# Patient Record
Sex: MALE | Race: WHITE | NOT HISPANIC OR LATINO | Employment: OTHER | URBAN - METROPOLITAN AREA
[De-identification: names, ages, dates, MRNs, and addresses within clinical notes are randomized per-mention and may not be internally consistent; named-entity substitution may affect disease eponyms.]

---

## 2017-04-27 ENCOUNTER — TRANSCRIBE ORDERS (OUTPATIENT)
Dept: ADMINISTRATIVE | Facility: HOSPITAL | Age: 62
End: 2017-04-27

## 2017-04-27 DIAGNOSIS — R91.1 LUNG NODULE: Primary | ICD-10-CM

## 2017-04-27 DIAGNOSIS — J44.9 CHRONIC OBSTRUCTIVE PULMONARY DISEASE, UNSPECIFIED COPD TYPE (HCC): ICD-10-CM

## 2017-05-17 ENCOUNTER — GENERIC CONVERSION - ENCOUNTER (OUTPATIENT)
Dept: OTHER | Facility: OTHER | Age: 62
End: 2017-05-17

## 2017-05-17 ENCOUNTER — HOSPITAL ENCOUNTER (OUTPATIENT)
Dept: PULMONOLOGY | Facility: HOSPITAL | Age: 62
Discharge: HOME | End: 2017-05-17
Attending: INTERNAL MEDICINE
Payer: COMMERCIAL

## 2017-05-17 DIAGNOSIS — J44.9 CHRONIC OBSTRUCTIVE PULMONARY DISEASE, UNSPECIFIED COPD TYPE (HCC): ICD-10-CM

## 2017-05-17 DIAGNOSIS — R91.1 LUNG NODULE: ICD-10-CM

## 2017-05-17 LAB
ARTERIAL PATENCY WRIST A: ABNORMAL
BASE EXCESS BLDA CALC-SCNC: 3 MMOL/L (ref -2–3)
CA-I BLD-SCNC: 1.02 MMOL/L (ref 1.12–1.32)
FIO2 GAS DIL.REBREATH: 0.21 L
GLUCOSE SERPL-MCNC: 116 MG/DL (ref 65–140)
HCO3 BLDA-SCNC: 27.7 MMOL/L (ref 22–28)
HCT VFR BLD CALC: 42 % (ref 36.5–49.3)
HGB BLDA-MCNC: 14.3 G/DL (ref 12–17)
PCO2 BLD: 29 MMOL/L (ref 21–32)
PCO2 BLD: 40.9 MM HG (ref 36–44)
PH BLD: 7.44 [PH] (ref 7.35–7.45)
PO2 BLD: 67 MM HG (ref 75–129)
POTASSIUM BLD-SCNC: 3.1 MMOL/L (ref 3.5–5.3)
SAMPLE SITE: ABNORMAL
SAO2 % BLD FROM PO2: 94 % (ref 95–98)
SODIUM BLD-SCNC: 144 MMOL/L (ref 136–145)
SPECIMEN SOURCE: ABNORMAL

## 2017-05-17 PROCEDURE — 94060 EVALUATION OF WHEEZING: CPT

## 2017-05-17 PROCEDURE — 94729 DIFFUSING CAPACITY: CPT

## 2017-05-17 PROCEDURE — 85014 HEMATOCRIT: CPT

## 2017-05-17 PROCEDURE — 82803 BLOOD GASES ANY COMBINATION: CPT

## 2017-05-17 PROCEDURE — 82947 ASSAY GLUCOSE BLOOD QUANT: CPT

## 2017-05-17 PROCEDURE — 84295 ASSAY OF SERUM SODIUM: CPT

## 2017-05-17 PROCEDURE — 36600 WITHDRAWAL OF ARTERIAL BLOOD: CPT

## 2017-05-17 PROCEDURE — 82330 ASSAY OF CALCIUM: CPT

## 2017-05-17 PROCEDURE — 84132 ASSAY OF SERUM POTASSIUM: CPT

## 2017-05-17 PROCEDURE — 94760 N-INVAS EAR/PLS OXIMETRY 1: CPT

## 2017-05-17 PROCEDURE — 94726 PLETHYSMOGRAPHY LUNG VOLUMES: CPT

## 2017-05-17 RX ORDER — ALBUTEROL SULFATE 2.5 MG/3ML
2.5 SOLUTION RESPIRATORY (INHALATION) ONCE
Status: COMPLETED | OUTPATIENT
Start: 2017-05-17 | End: 2017-05-17

## 2017-05-17 RX ADMIN — ALBUTEROL SULFATE 2.5 MG: 2.5 SOLUTION RESPIRATORY (INHALATION) at 15:02

## 2017-06-28 ENCOUNTER — HOSPITAL ENCOUNTER (INPATIENT)
Facility: HOSPITAL | Age: 62
LOS: 3 days | Discharge: HOME | DRG: 371 | End: 2017-07-01
Attending: EMERGENCY MEDICINE | Admitting: FAMILY MEDICINE
Payer: COMMERCIAL

## 2017-06-28 DIAGNOSIS — N28.9 RENAL INSUFFICIENCY: Primary | ICD-10-CM

## 2017-06-28 DIAGNOSIS — E87.2 METABOLIC ACIDOSIS: ICD-10-CM

## 2017-06-28 DIAGNOSIS — K50.90: ICD-10-CM

## 2017-06-28 DIAGNOSIS — E87.6 HYPOKALEMIA: ICD-10-CM

## 2017-06-28 PROBLEM — E87.1 HYPONATREMIA: Status: ACTIVE | Noted: 2017-06-28

## 2017-06-28 PROBLEM — N17.9 AKI (ACUTE KIDNEY INJURY) (HCC): Status: ACTIVE | Noted: 2017-06-28

## 2017-06-28 PROBLEM — E86.0 DEHYDRATION: Status: ACTIVE | Noted: 2017-06-28

## 2017-06-28 LAB
ALBUMIN SERPL BCP-MCNC: 3.7 G/DL (ref 3.5–5)
ALP SERPL-CCNC: 74 U/L (ref 46–116)
ALT SERPL W P-5'-P-CCNC: 55 U/L (ref 12–78)
ANION GAP SERPL CALCULATED.3IONS-SCNC: 20 MMOL/L (ref 4–13)
AST SERPL W P-5'-P-CCNC: 39 U/L (ref 5–45)
BASE EX.OXY STD BLDV CALC-SCNC: 49.4 % (ref 60–80)
BASE EXCESS BLDV CALC-SCNC: -2.1 MMOL/L
BASOPHILS # BLD AUTO: 0.04 THOUSANDS/ΜL (ref 0–0.1)
BASOPHILS NFR BLD AUTO: 1 % (ref 0–1)
BILIRUB SERPL-MCNC: 0.9 MG/DL (ref 0.2–1)
BUN SERPL-MCNC: 48 MG/DL (ref 5–25)
CALCIUM SERPL-MCNC: 7.9 MG/DL (ref 8.3–10.1)
CHLORIDE SERPL-SCNC: 83 MMOL/L (ref 100–108)
CO2 SERPL-SCNC: 26 MMOL/L (ref 21–32)
CREAT SERPL-MCNC: 3.38 MG/DL (ref 0.6–1.3)
EOSINOPHIL # BLD AUTO: 0.03 THOUSAND/ΜL (ref 0–0.61)
EOSINOPHIL NFR BLD AUTO: 0 % (ref 0–6)
ERYTHROCYTE [DISTWIDTH] IN BLOOD BY AUTOMATED COUNT: 11.8 % (ref 11.6–15.1)
GFR SERPL CREATININE-BSD FRML MDRD: 18.6 ML/MIN/1.73SQ M
GLUCOSE SERPL-MCNC: 121 MG/DL (ref 65–140)
HCO3 BLDV-SCNC: 26.4 MMOL/L (ref 24–30)
HCT VFR BLD AUTO: 50.8 % (ref 36.5–49.3)
HGB BLD-MCNC: 18.8 G/DL (ref 12–17)
LACTATE SERPL-SCNC: 3.1 MMOL/L (ref 0.5–2)
LACTATE SERPL-SCNC: 4.6 MMOL/L (ref 0.5–2)
LYMPHOCYTES # BLD AUTO: 1.93 THOUSANDS/ΜL (ref 0.6–4.47)
LYMPHOCYTES NFR BLD AUTO: 23 % (ref 14–44)
MAGNESIUM SERPL-MCNC: 0.9 MG/DL (ref 1.6–2.6)
MCH RBC QN AUTO: 33.9 PG (ref 26.8–34.3)
MCHC RBC AUTO-ENTMCNC: 37 G/DL (ref 31.4–37.4)
MCV RBC AUTO: 92 FL (ref 82–98)
MONOCYTES # BLD AUTO: 1.04 THOUSAND/ΜL (ref 0.17–1.22)
MONOCYTES NFR BLD AUTO: 12 % (ref 4–12)
NEUTROPHILS # BLD AUTO: 5.35 THOUSANDS/ΜL (ref 1.85–7.62)
NEUTS SEG NFR BLD AUTO: 64 % (ref 43–75)
O2 CT BLDV-SCNC: 12.7 ML/DL
PCO2 BLDV: 59.1 MM HG (ref 42–50)
PH BLDV: 7.27 [PH] (ref 7.3–7.4)
PLATELET # BLD AUTO: 228 THOUSANDS/UL (ref 149–390)
PMV BLD AUTO: 12 FL (ref 8.9–12.7)
PO2 BLDV: 33.1 MM HG (ref 35–45)
POTASSIUM SERPL-SCNC: 2.6 MMOL/L (ref 3.5–5.3)
PROT SERPL-MCNC: 8.3 G/DL (ref 6.4–8.2)
RBC # BLD AUTO: 5.55 MILLION/UL (ref 3.88–5.62)
SODIUM SERPL-SCNC: 129 MMOL/L (ref 136–145)
WBC # BLD AUTO: 8.39 THOUSAND/UL (ref 4.31–10.16)

## 2017-06-28 PROCEDURE — 80053 COMPREHEN METABOLIC PANEL: CPT | Performed by: EMERGENCY MEDICINE

## 2017-06-28 PROCEDURE — 96374 THER/PROPH/DIAG INJ IV PUSH: CPT

## 2017-06-28 PROCEDURE — 99285 EMERGENCY DEPT VISIT HI MDM: CPT

## 2017-06-28 PROCEDURE — 36415 COLL VENOUS BLD VENIPUNCTURE: CPT | Performed by: EMERGENCY MEDICINE

## 2017-06-28 PROCEDURE — 82805 BLOOD GASES W/O2 SATURATION: CPT | Performed by: EMERGENCY MEDICINE

## 2017-06-28 PROCEDURE — 83605 ASSAY OF LACTIC ACID: CPT | Performed by: EMERGENCY MEDICINE

## 2017-06-28 PROCEDURE — 96375 TX/PRO/DX INJ NEW DRUG ADDON: CPT

## 2017-06-28 PROCEDURE — 83735 ASSAY OF MAGNESIUM: CPT | Performed by: EMERGENCY MEDICINE

## 2017-06-28 PROCEDURE — 96361 HYDRATE IV INFUSION ADD-ON: CPT

## 2017-06-28 PROCEDURE — 85025 COMPLETE CBC W/AUTO DIFF WBC: CPT | Performed by: EMERGENCY MEDICINE

## 2017-06-28 RX ORDER — AMLODIPINE BESYLATE 2.5 MG/1
10 TABLET ORAL DAILY
COMMUNITY
End: 2020-11-24

## 2017-06-28 RX ORDER — ALBUTEROL SULFATE 90 UG/1
2 AEROSOL, METERED RESPIRATORY (INHALATION) EVERY 6 HOURS PRN
Status: ON HOLD | COMMUNITY
End: 2020-07-15

## 2017-06-28 RX ORDER — POTASSIUM CHLORIDE 14.9 MG/ML
20 INJECTION INTRAVENOUS ONCE
Status: COMPLETED | OUTPATIENT
Start: 2017-06-29 | End: 2017-06-29

## 2017-06-28 RX ORDER — OMEPRAZOLE 20 MG/1
20 CAPSULE, DELAYED RELEASE ORAL DAILY
COMMUNITY
End: 2017-12-06 | Stop reason: HOSPADM

## 2017-06-28 RX ORDER — POTASSIUM CHLORIDE 29.8 MG/ML
40 INJECTION INTRAVENOUS ONCE
Status: DISCONTINUED | OUTPATIENT
Start: 2017-06-28 | End: 2017-06-28 | Stop reason: SDUPTHER

## 2017-06-28 RX ORDER — MORPHINE SULFATE 10 MG/ML
6 INJECTION, SOLUTION INTRAMUSCULAR; INTRAVENOUS ONCE
Status: COMPLETED | OUTPATIENT
Start: 2017-06-28 | End: 2017-06-28

## 2017-06-28 RX ORDER — POTASSIUM CHLORIDE 14.9 MG/ML
20 INJECTION INTRAVENOUS ONCE
Status: COMPLETED | OUTPATIENT
Start: 2017-06-28 | End: 2017-06-29

## 2017-06-28 RX ADMIN — MORPHINE SULFATE 6 MG: 10 INJECTION, SOLUTION INTRAMUSCULAR; INTRAVENOUS at 21:07

## 2017-06-28 RX ADMIN — SODIUM CHLORIDE 1000 ML: 0.9 INJECTION, SOLUTION INTRAVENOUS at 21:05

## 2017-06-28 RX ADMIN — POTASSIUM CHLORIDE 20 MEQ: 200 INJECTION, SOLUTION INTRAVENOUS at 23:03

## 2017-06-29 PROBLEM — E87.6 HYPOKALEMIA: Chronic | Status: ACTIVE | Noted: 2017-06-28

## 2017-06-29 PROBLEM — R19.7 DIARRHEA: Status: ACTIVE | Noted: 2017-06-29

## 2017-06-29 PROBLEM — Z93.3 COLOSTOMY IN PLACE (HCC): Status: ACTIVE | Noted: 2017-06-29

## 2017-06-29 PROBLEM — E86.0 DEHYDRATION: Status: RESOLVED | Noted: 2017-06-28 | Resolved: 2017-06-29

## 2017-06-29 PROBLEM — E83.42 HYPOMAGNESEMIA: Status: ACTIVE | Noted: 2017-06-29

## 2017-06-29 LAB
ANION GAP SERPL CALCULATED.3IONS-SCNC: 10 MMOL/L (ref 4–13)
ANION GAP SERPL CALCULATED.3IONS-SCNC: 13 MMOL/L (ref 4–13)
ANION GAP SERPL CALCULATED.3IONS-SCNC: 14 MMOL/L (ref 4–13)
BASOPHILS # BLD AUTO: 0.03 THOUSANDS/ΜL (ref 0–0.1)
BASOPHILS NFR BLD AUTO: 0 % (ref 0–1)
BUN SERPL-MCNC: 30 MG/DL (ref 5–25)
BUN SERPL-MCNC: 37 MG/DL (ref 5–25)
BUN SERPL-MCNC: 41 MG/DL (ref 5–25)
C DIFF TOX GENS STL QL NAA+PROBE: NORMAL
CALCIUM SERPL-MCNC: 6.5 MG/DL (ref 8.3–10.1)
CALCIUM SERPL-MCNC: 6.9 MG/DL (ref 8.3–10.1)
CALCIUM SERPL-MCNC: 7 MG/DL (ref 8.3–10.1)
CHLORIDE SERPL-SCNC: 93 MMOL/L (ref 100–108)
CHLORIDE SERPL-SCNC: 94 MMOL/L (ref 100–108)
CHLORIDE SERPL-SCNC: 97 MMOL/L (ref 100–108)
CO2 SERPL-SCNC: 25 MMOL/L (ref 21–32)
CO2 SERPL-SCNC: 26 MMOL/L (ref 21–32)
CO2 SERPL-SCNC: 28 MMOL/L (ref 21–32)
CREAT SERPL-MCNC: 1.46 MG/DL (ref 0.6–1.3)
CREAT SERPL-MCNC: 1.77 MG/DL (ref 0.6–1.3)
CREAT SERPL-MCNC: 2.06 MG/DL (ref 0.6–1.3)
EOSINOPHIL # BLD AUTO: 0.06 THOUSAND/ΜL (ref 0–0.61)
EOSINOPHIL NFR BLD AUTO: 1 % (ref 0–6)
ERYTHROCYTE [DISTWIDTH] IN BLOOD BY AUTOMATED COUNT: 11.8 % (ref 11.6–15.1)
ERYTHROCYTE [DISTWIDTH] IN BLOOD BY AUTOMATED COUNT: 11.8 % (ref 11.6–15.1)
GFR SERPL CREATININE-BSD FRML MDRD: 33 ML/MIN/1.73SQ M
GFR SERPL CREATININE-BSD FRML MDRD: 39.3 ML/MIN/1.73SQ M
GFR SERPL CREATININE-BSD FRML MDRD: 49.1 ML/MIN/1.73SQ M
GLUCOSE SERPL-MCNC: 79 MG/DL (ref 65–140)
GLUCOSE SERPL-MCNC: 83 MG/DL (ref 65–140)
GLUCOSE SERPL-MCNC: 85 MG/DL (ref 65–140)
HCT VFR BLD AUTO: 43.6 % (ref 36.5–49.3)
HCT VFR BLD AUTO: 43.6 % (ref 36.5–49.3)
HGB BLD-MCNC: 15.9 G/DL (ref 12–17)
HGB BLD-MCNC: 15.9 G/DL (ref 12–17)
LACTATE SERPL-SCNC: 1.4 MMOL/L (ref 0.5–2)
LYMPHOCYTES # BLD AUTO: 2.17 THOUSANDS/ΜL (ref 0.6–4.47)
LYMPHOCYTES NFR BLD AUTO: 24 % (ref 14–44)
MAGNESIUM SERPL-MCNC: 1.4 MG/DL (ref 1.6–2.6)
MAGNESIUM SERPL-MCNC: 1.6 MG/DL (ref 1.6–2.6)
MCH RBC QN AUTO: 34 PG (ref 26.8–34.3)
MCH RBC QN AUTO: 34 PG (ref 26.8–34.3)
MCHC RBC AUTO-ENTMCNC: 36.5 G/DL (ref 31.4–37.4)
MCHC RBC AUTO-ENTMCNC: 36.5 G/DL (ref 31.4–37.4)
MCV RBC AUTO: 93 FL (ref 82–98)
MCV RBC AUTO: 93 FL (ref 82–98)
MONOCYTES # BLD AUTO: 1.26 THOUSAND/ΜL (ref 0.17–1.22)
MONOCYTES NFR BLD AUTO: 14 % (ref 4–12)
NEUTROPHILS # BLD AUTO: 5.58 THOUSANDS/ΜL (ref 1.85–7.62)
NEUTS SEG NFR BLD AUTO: 61 % (ref 43–75)
OSMOLALITY UR/SERPL-RTO: 286 MMOL/KG (ref 282–298)
OSMOLALITY UR: 448 MMOL/KG
PHOSPHATE SERPL-MCNC: 2.9 MG/DL (ref 2.3–4.1)
PHOSPHATE SERPL-MCNC: 5.2 MG/DL (ref 2.3–4.1)
PLATELET # BLD AUTO: 178 THOUSANDS/UL (ref 149–390)
PLATELET # BLD AUTO: 178 THOUSANDS/UL (ref 149–390)
PLATELET # BLD AUTO: 185 THOUSANDS/UL (ref 149–390)
PMV BLD AUTO: 11.8 FL (ref 8.9–12.7)
PMV BLD AUTO: 12 FL (ref 8.9–12.7)
PMV BLD AUTO: 12 FL (ref 8.9–12.7)
POTASSIUM SERPL-SCNC: 2.4 MMOL/L (ref 3.5–5.3)
POTASSIUM SERPL-SCNC: 2.5 MMOL/L (ref 3.5–5.3)
POTASSIUM SERPL-SCNC: 2.7 MMOL/L (ref 3.5–5.3)
RBC # BLD AUTO: 4.67 MILLION/UL (ref 3.88–5.62)
RBC # BLD AUTO: 4.67 MILLION/UL (ref 3.88–5.62)
SODIUM 24H UR-SCNC: 17 MOL/L
SODIUM SERPL-SCNC: 132 MMOL/L (ref 136–145)
SODIUM SERPL-SCNC: 133 MMOL/L (ref 136–145)
SODIUM SERPL-SCNC: 135 MMOL/L (ref 136–145)
TSH SERPL DL<=0.05 MIU/L-ACNC: 0.83 UIU/ML (ref 0.36–3.74)
TSH SERPL DL<=0.05 MIU/L-ACNC: 2.35 UIU/ML (ref 0.36–3.74)
WBC # BLD AUTO: 9.45 THOUSAND/UL (ref 4.31–10.16)
WBC # BLD AUTO: 9.45 THOUSAND/UL (ref 4.31–10.16)

## 2017-06-29 PROCEDURE — 85027 COMPLETE CBC AUTOMATED: CPT | Performed by: PHYSICIAN ASSISTANT

## 2017-06-29 PROCEDURE — 83605 ASSAY OF LACTIC ACID: CPT | Performed by: PHYSICIAN ASSISTANT

## 2017-06-29 PROCEDURE — 85025 COMPLETE CBC W/AUTO DIFF WBC: CPT | Performed by: FAMILY MEDICINE

## 2017-06-29 PROCEDURE — 83930 ASSAY OF BLOOD OSMOLALITY: CPT | Performed by: PHYSICIAN ASSISTANT

## 2017-06-29 PROCEDURE — 84100 ASSAY OF PHOSPHORUS: CPT | Performed by: FAMILY MEDICINE

## 2017-06-29 PROCEDURE — 83735 ASSAY OF MAGNESIUM: CPT | Performed by: PHYSICIAN ASSISTANT

## 2017-06-29 PROCEDURE — 83935 ASSAY OF URINE OSMOLALITY: CPT | Performed by: PHYSICIAN ASSISTANT

## 2017-06-29 PROCEDURE — 87077 CULTURE AEROBIC IDENTIFY: CPT | Performed by: PHYSICIAN ASSISTANT

## 2017-06-29 PROCEDURE — 85049 AUTOMATED PLATELET COUNT: CPT | Performed by: PHYSICIAN ASSISTANT

## 2017-06-29 PROCEDURE — 87493 C DIFF AMPLIFIED PROBE: CPT | Performed by: PHYSICIAN ASSISTANT

## 2017-06-29 PROCEDURE — 83735 ASSAY OF MAGNESIUM: CPT | Performed by: FAMILY MEDICINE

## 2017-06-29 PROCEDURE — 84100 ASSAY OF PHOSPHORUS: CPT | Performed by: PHYSICIAN ASSISTANT

## 2017-06-29 PROCEDURE — 87899 AGENT NOS ASSAY W/OPTIC: CPT | Performed by: PHYSICIAN ASSISTANT

## 2017-06-29 PROCEDURE — 87046 STOOL CULTR AEROBIC BACT EA: CPT | Performed by: PHYSICIAN ASSISTANT

## 2017-06-29 PROCEDURE — 84300 ASSAY OF URINE SODIUM: CPT | Performed by: PHYSICIAN ASSISTANT

## 2017-06-29 PROCEDURE — 80048 BASIC METABOLIC PNL TOTAL CA: CPT | Performed by: PHYSICIAN ASSISTANT

## 2017-06-29 PROCEDURE — 84443 ASSAY THYROID STIM HORMONE: CPT | Performed by: FAMILY MEDICINE

## 2017-06-29 PROCEDURE — 87045 FECES CULTURE AEROBIC BACT: CPT | Performed by: PHYSICIAN ASSISTANT

## 2017-06-29 PROCEDURE — 87015 SPECIMEN INFECT AGNT CONCNTJ: CPT | Performed by: PHYSICIAN ASSISTANT

## 2017-06-29 RX ORDER — ACETAMINOPHEN 325 MG/1
650 TABLET ORAL EVERY 6 HOURS PRN
Status: DISCONTINUED | OUTPATIENT
Start: 2017-06-29 | End: 2017-07-01 | Stop reason: HOSPADM

## 2017-06-29 RX ORDER — MAGNESIUM SULFATE HEPTAHYDRATE 40 MG/ML
2 INJECTION, SOLUTION INTRAVENOUS ONCE
Status: COMPLETED | OUTPATIENT
Start: 2017-06-29 | End: 2017-06-29

## 2017-06-29 RX ORDER — POTASSIUM CHLORIDE 20 MEQ/1
40 TABLET, EXTENDED RELEASE ORAL ONCE
Status: COMPLETED | OUTPATIENT
Start: 2017-06-29 | End: 2017-06-29

## 2017-06-29 RX ORDER — TEMAZEPAM 15 MG/1
15 CAPSULE ORAL
Status: DISCONTINUED | OUTPATIENT
Start: 2017-06-29 | End: 2017-07-01 | Stop reason: HOSPADM

## 2017-06-29 RX ORDER — POLYETHYLENE GLYCOL 3350 17 G/17G
238 POWDER, FOR SOLUTION ORAL ONCE
Status: COMPLETED | OUTPATIENT
Start: 2017-06-29 | End: 2017-06-29

## 2017-06-29 RX ORDER — SODIUM CHLORIDE 9 MG/ML
75 INJECTION, SOLUTION INTRAVENOUS CONTINUOUS
Status: DISCONTINUED | OUTPATIENT
Start: 2017-06-29 | End: 2017-07-01 | Stop reason: HOSPADM

## 2017-06-29 RX ORDER — POTASSIUM CHLORIDE 14.9 MG/ML
20 INJECTION INTRAVENOUS ONCE
Status: COMPLETED | OUTPATIENT
Start: 2017-06-29 | End: 2017-06-29

## 2017-06-29 RX ORDER — POTASSIUM CHLORIDE 14.9 MG/ML
20 INJECTION INTRAVENOUS
Status: COMPLETED | OUTPATIENT
Start: 2017-06-29 | End: 2017-06-30

## 2017-06-29 RX ORDER — ONDANSETRON 2 MG/ML
4 INJECTION INTRAMUSCULAR; INTRAVENOUS EVERY 6 HOURS PRN
Status: DISCONTINUED | OUTPATIENT
Start: 2017-06-29 | End: 2017-07-01 | Stop reason: HOSPADM

## 2017-06-29 RX ORDER — ALBUTEROL SULFATE 90 UG/1
2 AEROSOL, METERED RESPIRATORY (INHALATION) EVERY 6 HOURS PRN
Status: DISCONTINUED | OUTPATIENT
Start: 2017-06-29 | End: 2017-07-01 | Stop reason: HOSPADM

## 2017-06-29 RX ORDER — HEPARIN SODIUM 5000 [USP'U]/ML
5000 INJECTION, SOLUTION INTRAVENOUS; SUBCUTANEOUS EVERY 8 HOURS SCHEDULED
Status: DISCONTINUED | OUTPATIENT
Start: 2017-06-29 | End: 2017-07-01 | Stop reason: HOSPADM

## 2017-06-29 RX ORDER — AMLODIPINE BESYLATE 2.5 MG/1
2.5 TABLET ORAL DAILY
Status: DISCONTINUED | OUTPATIENT
Start: 2017-06-29 | End: 2017-07-01 | Stop reason: HOSPADM

## 2017-06-29 RX ORDER — POTASSIUM CHLORIDE 14.9 MG/ML
20 INJECTION INTRAVENOUS ONCE
Status: COMPLETED | OUTPATIENT
Start: 2017-06-30 | End: 2017-06-30

## 2017-06-29 RX ORDER — PANTOPRAZOLE SODIUM 40 MG/1
40 TABLET, DELAYED RELEASE ORAL
Status: DISCONTINUED | OUTPATIENT
Start: 2017-06-29 | End: 2017-07-01 | Stop reason: HOSPADM

## 2017-06-29 RX ADMIN — MAGNESIUM SULFATE HEPTAHYDRATE 2 G: 40 INJECTION, SOLUTION INTRAVENOUS at 02:23

## 2017-06-29 RX ADMIN — PANTOPRAZOLE SODIUM 40 MG: 40 TABLET, DELAYED RELEASE ORAL at 05:14

## 2017-06-29 RX ADMIN — BISACODYL 10 MG: 5 TABLET, COATED ORAL at 17:28

## 2017-06-29 RX ADMIN — HEPARIN SODIUM 5000 UNITS: 5000 INJECTION, SOLUTION INTRAVENOUS; SUBCUTANEOUS at 15:34

## 2017-06-29 RX ADMIN — SODIUM CHLORIDE 75 ML/HR: 0.9 INJECTION, SOLUTION INTRAVENOUS at 02:23

## 2017-06-29 RX ADMIN — FLUTICASONE PROPIONATE AND SALMETEROL 1 PUFF: 50; 100 POWDER RESPIRATORY (INHALATION) at 08:09

## 2017-06-29 RX ADMIN — FLUTICASONE PROPIONATE AND SALMETEROL 1 PUFF: 50; 100 POWDER RESPIRATORY (INHALATION) at 21:35

## 2017-06-29 RX ADMIN — HYDROMORPHONE HYDROCHLORIDE 0.5 MG: 1 INJECTION, SOLUTION INTRAMUSCULAR; INTRAVENOUS; SUBCUTANEOUS at 08:09

## 2017-06-29 RX ADMIN — POTASSIUM CHLORIDE 20 MEQ: 200 INJECTION, SOLUTION INTRAVENOUS at 21:51

## 2017-06-29 RX ADMIN — AMLODIPINE BESYLATE 2.5 MG: 2.5 TABLET ORAL at 08:09

## 2017-06-29 RX ADMIN — POLYETHYLENE GLYCOL 3350 238 G: 17 POWDER, FOR SOLUTION ORAL at 17:28

## 2017-06-29 RX ADMIN — HEPARIN SODIUM 5000 UNITS: 5000 INJECTION, SOLUTION INTRAVENOUS; SUBCUTANEOUS at 05:14

## 2017-06-29 RX ADMIN — POTASSIUM CHLORIDE 20 MEQ: 200 INJECTION, SOLUTION INTRAVENOUS at 11:49

## 2017-06-29 RX ADMIN — TEMAZEPAM 15 MG: 15 CAPSULE ORAL at 22:00

## 2017-06-29 RX ADMIN — TEMAZEPAM 15 MG: 15 CAPSULE ORAL at 01:29

## 2017-06-29 RX ADMIN — HEPARIN SODIUM 5000 UNITS: 5000 INJECTION, SOLUTION INTRAVENOUS; SUBCUTANEOUS at 21:35

## 2017-06-29 RX ADMIN — POTASSIUM CHLORIDE 20 MEQ: 200 INJECTION, SOLUTION INTRAVENOUS at 00:39

## 2017-06-29 RX ADMIN — POTASSIUM CHLORIDE 40 MEQ: 1500 TABLET, EXTENDED RELEASE ORAL at 11:48

## 2017-06-30 ENCOUNTER — GENERIC CONVERSION - ENCOUNTER (OUTPATIENT)
Dept: OTHER | Facility: OTHER | Age: 62
End: 2017-06-30

## 2017-06-30 ENCOUNTER — ANESTHESIA EVENT (INPATIENT)
Dept: GASTROENTEROLOGY | Facility: HOSPITAL | Age: 62
DRG: 371 | End: 2017-06-30
Payer: COMMERCIAL

## 2017-06-30 ENCOUNTER — ANESTHESIA (INPATIENT)
Dept: GASTROENTEROLOGY | Facility: HOSPITAL | Age: 62
DRG: 371 | End: 2017-06-30
Payer: COMMERCIAL

## 2017-06-30 PROBLEM — E87.1 HYPONATREMIA: Status: RESOLVED | Noted: 2017-06-28 | Resolved: 2017-06-30

## 2017-06-30 LAB
ANION GAP SERPL CALCULATED.3IONS-SCNC: 10 MMOL/L (ref 4–13)
ANION GAP SERPL CALCULATED.3IONS-SCNC: 11 MMOL/L (ref 4–13)
ANION GAP SERPL CALCULATED.3IONS-SCNC: 9 MMOL/L (ref 4–13)
ANION GAP SERPL CALCULATED.3IONS-SCNC: 9 MMOL/L (ref 4–13)
BUN SERPL-MCNC: 14 MG/DL (ref 5–25)
BUN SERPL-MCNC: 17 MG/DL (ref 5–25)
BUN SERPL-MCNC: 19 MG/DL (ref 5–25)
BUN SERPL-MCNC: 24 MG/DL (ref 5–25)
CALCIUM SERPL-MCNC: 7 MG/DL (ref 8.3–10.1)
CALCIUM SERPL-MCNC: 7.1 MG/DL (ref 8.3–10.1)
CALCIUM SERPL-MCNC: 7.4 MG/DL (ref 8.3–10.1)
CALCIUM SERPL-MCNC: 7.5 MG/DL (ref 8.3–10.1)
CHLORIDE SERPL-SCNC: 100 MMOL/L (ref 100–108)
CHLORIDE SERPL-SCNC: 100 MMOL/L (ref 100–108)
CHLORIDE SERPL-SCNC: 98 MMOL/L (ref 100–108)
CHLORIDE SERPL-SCNC: 99 MMOL/L (ref 100–108)
CO2 SERPL-SCNC: 25 MMOL/L (ref 21–32)
CO2 SERPL-SCNC: 27 MMOL/L (ref 21–32)
CO2 SERPL-SCNC: 27 MMOL/L (ref 21–32)
CO2 SERPL-SCNC: 28 MMOL/L (ref 21–32)
CREAT SERPL-MCNC: 0.97 MG/DL (ref 0.6–1.3)
CREAT SERPL-MCNC: 1 MG/DL (ref 0.6–1.3)
CREAT SERPL-MCNC: 1.08 MG/DL (ref 0.6–1.3)
CREAT SERPL-MCNC: 1.33 MG/DL (ref 0.6–1.3)
CRP SERPL QL: 30.4 MG/L
ERYTHROCYTE [DISTWIDTH] IN BLOOD BY AUTOMATED COUNT: 11.6 % (ref 11.6–15.1)
ERYTHROCYTE [SEDIMENTATION RATE] IN BLOOD: 17 MM/HOUR (ref 0–10)
GFR SERPL CREATININE-BSD FRML MDRD: 54.7 ML/MIN/1.73SQ M
GFR SERPL CREATININE-BSD FRML MDRD: >60 ML/MIN/1.73SQ M
GLUCOSE SERPL-MCNC: 90 MG/DL (ref 65–140)
GLUCOSE SERPL-MCNC: 96 MG/DL (ref 65–140)
GLUCOSE SERPL-MCNC: 98 MG/DL (ref 65–140)
GLUCOSE SERPL-MCNC: 99 MG/DL (ref 65–140)
HCT VFR BLD AUTO: 42.1 % (ref 36.5–49.3)
HGB BLD-MCNC: 15.3 G/DL (ref 12–17)
MAGNESIUM SERPL-MCNC: 1.5 MG/DL (ref 1.6–2.6)
MCH RBC QN AUTO: 34.5 PG (ref 26.8–34.3)
MCHC RBC AUTO-ENTMCNC: 36.3 G/DL (ref 31.4–37.4)
MCV RBC AUTO: 95 FL (ref 82–98)
PLATELET # BLD AUTO: 182 THOUSANDS/UL (ref 149–390)
PMV BLD AUTO: 11.5 FL (ref 8.9–12.7)
POTASSIUM SERPL-SCNC: 3 MMOL/L (ref 3.5–5.3)
POTASSIUM SERPL-SCNC: 3.3 MMOL/L (ref 3.5–5.3)
POTASSIUM SERPL-SCNC: 3.5 MMOL/L (ref 3.5–5.3)
POTASSIUM SERPL-SCNC: 3.8 MMOL/L (ref 3.5–5.3)
RBC # BLD AUTO: 4.43 MILLION/UL (ref 3.88–5.62)
SODIUM SERPL-SCNC: 134 MMOL/L (ref 136–145)
SODIUM SERPL-SCNC: 136 MMOL/L (ref 136–145)
SODIUM SERPL-SCNC: 136 MMOL/L (ref 136–145)
SODIUM SERPL-SCNC: 137 MMOL/L (ref 136–145)
WBC # BLD AUTO: 7.55 THOUSAND/UL (ref 4.31–10.16)

## 2017-06-30 PROCEDURE — 85027 COMPLETE CBC AUTOMATED: CPT | Performed by: FAMILY MEDICINE

## 2017-06-30 PROCEDURE — 80048 BASIC METABOLIC PNL TOTAL CA: CPT | Performed by: FAMILY MEDICINE

## 2017-06-30 PROCEDURE — 88305 TISSUE EXAM BY PATHOLOGIST: CPT | Performed by: INTERNAL MEDICINE

## 2017-06-30 PROCEDURE — 88342 IMHCHEM/IMCYTCHM 1ST ANTB: CPT | Performed by: INTERNAL MEDICINE

## 2017-06-30 PROCEDURE — 80048 BASIC METABOLIC PNL TOTAL CA: CPT | Performed by: PHYSICIAN ASSISTANT

## 2017-06-30 PROCEDURE — 0DBL8ZX EXCISION OF TRANSVERSE COLON, VIA NATURAL OR ARTIFICIAL OPENING ENDOSCOPIC, DIAGNOSTIC: ICD-10-PCS | Performed by: INTERNAL MEDICINE

## 2017-06-30 PROCEDURE — 85652 RBC SED RATE AUTOMATED: CPT | Performed by: PHYSICIAN ASSISTANT

## 2017-06-30 PROCEDURE — 0DBB8ZX EXCISION OF ILEUM, VIA NATURAL OR ARTIFICIAL OPENING ENDOSCOPIC, DIAGNOSTIC: ICD-10-PCS | Performed by: INTERNAL MEDICINE

## 2017-06-30 PROCEDURE — 83735 ASSAY OF MAGNESIUM: CPT | Performed by: FAMILY MEDICINE

## 2017-06-30 PROCEDURE — 86140 C-REACTIVE PROTEIN: CPT | Performed by: PHYSICIAN ASSISTANT

## 2017-06-30 RX ORDER — PROPOFOL 10 MG/ML
INJECTION, EMULSION INTRAVENOUS AS NEEDED
Status: DISCONTINUED | OUTPATIENT
Start: 2017-06-30 | End: 2017-06-30 | Stop reason: SURG

## 2017-06-30 RX ORDER — POTASSIUM CHLORIDE 14.9 MG/ML
20 INJECTION INTRAVENOUS ONCE
Status: COMPLETED | OUTPATIENT
Start: 2017-06-30 | End: 2017-06-30

## 2017-06-30 RX ORDER — PROPOFOL 10 MG/ML
INJECTION, EMULSION INTRAVENOUS CONTINUOUS PRN
Status: DISCONTINUED | OUTPATIENT
Start: 2017-06-30 | End: 2017-06-30 | Stop reason: SURG

## 2017-06-30 RX ORDER — PREDNISONE 20 MG/1
40 TABLET ORAL DAILY
Status: DISCONTINUED | OUTPATIENT
Start: 2017-06-30 | End: 2017-07-01 | Stop reason: HOSPADM

## 2017-06-30 RX ORDER — POTASSIUM CHLORIDE 20 MEQ/1
40 TABLET, EXTENDED RELEASE ORAL ONCE
Status: COMPLETED | OUTPATIENT
Start: 2017-06-30 | End: 2017-06-30

## 2017-06-30 RX ORDER — LIDOCAINE HYDROCHLORIDE 10 MG/ML
INJECTION, SOLUTION INFILTRATION; PERINEURAL AS NEEDED
Status: DISCONTINUED | OUTPATIENT
Start: 2017-06-30 | End: 2017-06-30 | Stop reason: SURG

## 2017-06-30 RX ADMIN — MAGNESIUM SULFATE IN DEXTROSE 1 G: 10 INJECTION, SOLUTION INTRAVENOUS at 11:39

## 2017-06-30 RX ADMIN — LIDOCAINE HYDROCHLORIDE 50 MG: 10 INJECTION, SOLUTION INFILTRATION; PERINEURAL at 14:05

## 2017-06-30 RX ADMIN — HEPARIN SODIUM 5000 UNITS: 5000 INJECTION, SOLUTION INTRAVENOUS; SUBCUTANEOUS at 05:42

## 2017-06-30 RX ADMIN — TEMAZEPAM 15 MG: 15 CAPSULE ORAL at 21:11

## 2017-06-30 RX ADMIN — POTASSIUM CHLORIDE 20 MEQ: 200 INJECTION, SOLUTION INTRAVENOUS at 00:47

## 2017-06-30 RX ADMIN — POTASSIUM CHLORIDE 20 MEQ: 200 INJECTION, SOLUTION INTRAVENOUS at 11:52

## 2017-06-30 RX ADMIN — POTASSIUM CHLORIDE 40 MEQ: 1500 TABLET, EXTENDED RELEASE ORAL at 15:33

## 2017-06-30 RX ADMIN — SODIUM CHLORIDE: 0.9 INJECTION, SOLUTION INTRAVENOUS at 13:20

## 2017-06-30 RX ADMIN — SODIUM CHLORIDE 75 ML/HR: 0.9 INJECTION, SOLUTION INTRAVENOUS at 03:07

## 2017-06-30 RX ADMIN — AMLODIPINE BESYLATE 2.5 MG: 2.5 TABLET ORAL at 07:43

## 2017-06-30 RX ADMIN — FLUTICASONE PROPIONATE AND SALMETEROL 1 PUFF: 50; 100 POWDER RESPIRATORY (INHALATION) at 21:11

## 2017-06-30 RX ADMIN — PROPOFOL 150 MG: 10 INJECTION, EMULSION INTRAVENOUS at 14:05

## 2017-06-30 RX ADMIN — ONDANSETRON 4 MG: 2 INJECTION INTRAMUSCULAR; INTRAVENOUS at 11:57

## 2017-06-30 RX ADMIN — PREDNISONE 40 MG: 20 TABLET ORAL at 15:33

## 2017-06-30 RX ADMIN — SODIUM CHLORIDE 75 ML/HR: 0.9 INJECTION, SOLUTION INTRAVENOUS at 23:28

## 2017-06-30 RX ADMIN — FLUTICASONE PROPIONATE AND SALMETEROL 1 PUFF: 50; 100 POWDER RESPIRATORY (INHALATION) at 07:44

## 2017-06-30 RX ADMIN — PANTOPRAZOLE SODIUM 40 MG: 40 TABLET, DELAYED RELEASE ORAL at 05:42

## 2017-06-30 RX ADMIN — PROPOFOL 100 MCG/KG/MIN: 10 INJECTION, EMULSION INTRAVENOUS at 14:05

## 2017-06-30 RX ADMIN — SODIUM CHLORIDE 75 ML/HR: 0.9 INJECTION, SOLUTION INTRAVENOUS at 10:18

## 2017-06-30 RX ADMIN — HEPARIN SODIUM 5000 UNITS: 5000 INJECTION, SOLUTION INTRAVENOUS; SUBCUTANEOUS at 21:12

## 2017-07-01 VITALS
HEIGHT: 69 IN | OXYGEN SATURATION: 98 % | BODY MASS INDEX: 19.85 KG/M2 | SYSTOLIC BLOOD PRESSURE: 126 MMHG | WEIGHT: 134.04 LBS | RESPIRATION RATE: 16 BRPM | HEART RATE: 70 BPM | DIASTOLIC BLOOD PRESSURE: 66 MMHG | TEMPERATURE: 97.8 F

## 2017-07-01 PROBLEM — N17.9 AKI (ACUTE KIDNEY INJURY) (HCC): Status: RESOLVED | Noted: 2017-06-28 | Resolved: 2017-07-01

## 2017-07-01 PROBLEM — E87.6 HYPOKALEMIA: Chronic | Status: RESOLVED | Noted: 2017-06-28 | Resolved: 2017-07-01

## 2017-07-01 PROBLEM — E43 SEVERE PROTEIN-CALORIE MALNUTRITION (HCC): Status: ACTIVE | Noted: 2017-07-01

## 2017-07-01 PROBLEM — E83.42 HYPOMAGNESEMIA: Status: RESOLVED | Noted: 2017-06-29 | Resolved: 2017-07-01

## 2017-07-01 LAB
ANION GAP SERPL CALCULATED.3IONS-SCNC: 8 MMOL/L (ref 4–13)
BUN SERPL-MCNC: 11 MG/DL (ref 5–25)
CALCIUM SERPL-MCNC: 7.7 MG/DL (ref 8.3–10.1)
CHLORIDE SERPL-SCNC: 103 MMOL/L (ref 100–108)
CO2 SERPL-SCNC: 27 MMOL/L (ref 21–32)
CREAT SERPL-MCNC: 0.86 MG/DL (ref 0.6–1.3)
GFR SERPL CREATININE-BSD FRML MDRD: >60 ML/MIN/1.73SQ M
GLUCOSE SERPL-MCNC: 115 MG/DL (ref 65–140)
MAGNESIUM SERPL-MCNC: 1.6 MG/DL (ref 1.6–2.6)
POTASSIUM SERPL-SCNC: 4.5 MMOL/L (ref 3.5–5.3)
SODIUM SERPL-SCNC: 138 MMOL/L (ref 136–145)

## 2017-07-01 PROCEDURE — 86704 HEP B CORE ANTIBODY TOTAL: CPT | Performed by: INTERNAL MEDICINE

## 2017-07-01 PROCEDURE — 80048 BASIC METABOLIC PNL TOTAL CA: CPT | Performed by: FAMILY MEDICINE

## 2017-07-01 PROCEDURE — 83735 ASSAY OF MAGNESIUM: CPT | Performed by: FAMILY MEDICINE

## 2017-07-01 PROCEDURE — 87340 HEPATITIS B SURFACE AG IA: CPT | Performed by: INTERNAL MEDICINE

## 2017-07-01 PROCEDURE — 86706 HEP B SURFACE ANTIBODY: CPT | Performed by: INTERNAL MEDICINE

## 2017-07-01 RX ORDER — PREDNISONE 10 MG/1
30 TABLET ORAL DAILY
Qty: 84 TABLET | Refills: 0 | Status: SHIPPED | OUTPATIENT
Start: 2017-07-16 | End: 2017-07-30

## 2017-07-01 RX ORDER — PREDNISONE 20 MG/1
40 TABLET ORAL DAILY
Qty: 28 TABLET | Refills: 0 | Status: SHIPPED | OUTPATIENT
Start: 2017-07-01 | End: 2017-07-15

## 2017-07-01 RX ORDER — PREDNISONE 1 MG/1
5 TABLET ORAL DAILY
Qty: 14 TABLET | Refills: 0 | Status: SHIPPED | OUTPATIENT
Start: 2017-08-28 | End: 2017-09-11

## 2017-07-01 RX ORDER — PREDNISONE 10 MG/1
40 TABLET ORAL DAILY
Qty: 70 TABLET | Refills: 0 | Status: SHIPPED | OUTPATIENT
Start: 2017-07-01 | End: 2017-07-01 | Stop reason: HOSPADM

## 2017-07-01 RX ORDER — MESALAMINE 500 MG/1
1000 CAPSULE, EXTENDED RELEASE ORAL 2 TIMES DAILY
Qty: 60 CAPSULE | Refills: 0 | Status: SHIPPED | OUTPATIENT
Start: 2017-07-01 | End: 2017-12-04

## 2017-07-01 RX ADMIN — FLUTICASONE PROPIONATE AND SALMETEROL 1 PUFF: 50; 100 POWDER RESPIRATORY (INHALATION) at 07:39

## 2017-07-01 RX ADMIN — MESALAMINE 1000 MG: 250 CAPSULE ORAL at 11:34

## 2017-07-01 RX ADMIN — HEPARIN SODIUM 5000 UNITS: 5000 INJECTION, SOLUTION INTRAVENOUS; SUBCUTANEOUS at 05:08

## 2017-07-01 RX ADMIN — PREDNISONE 40 MG: 20 TABLET ORAL at 07:39

## 2017-07-01 RX ADMIN — AMLODIPINE BESYLATE 2.5 MG: 2.5 TABLET ORAL at 07:39

## 2017-07-01 RX ADMIN — PANTOPRAZOLE SODIUM 40 MG: 40 TABLET, DELAYED RELEASE ORAL at 05:08

## 2017-07-02 LAB
BACTERIA STL CULT: NORMAL
HBV CORE AB SER QL: NORMAL
HBV SURFACE AB SER-ACNC: <3.1 MIU/ML
HBV SURFACE AG SER QL: NORMAL

## 2017-07-05 ENCOUNTER — GENERIC CONVERSION - ENCOUNTER (OUTPATIENT)
Dept: OTHER | Facility: OTHER | Age: 62
End: 2017-07-05

## 2017-07-11 ENCOUNTER — GENERIC CONVERSION - ENCOUNTER (OUTPATIENT)
Dept: OTHER | Facility: OTHER | Age: 62
End: 2017-07-11

## 2017-09-21 ENCOUNTER — TRANSCRIBE ORDERS (OUTPATIENT)
Dept: ADMINISTRATIVE | Facility: HOSPITAL | Age: 62
End: 2017-09-21

## 2017-09-21 ENCOUNTER — APPOINTMENT (OUTPATIENT)
Dept: LAB | Facility: AMBULARY SURGERY CENTER | Age: 62
End: 2017-09-21
Attending: INTERNAL MEDICINE
Payer: COMMERCIAL

## 2017-09-21 ENCOUNTER — ALLSCRIPTS OFFICE VISIT (OUTPATIENT)
Dept: OTHER | Facility: OTHER | Age: 62
End: 2017-09-21

## 2017-09-21 ENCOUNTER — GENERIC CONVERSION - ENCOUNTER (OUTPATIENT)
Dept: OTHER | Facility: OTHER | Age: 62
End: 2017-09-21

## 2017-09-21 DIAGNOSIS — K50.90 CROHN'S DISEASE WITHOUT COMPLICATION (HCC): ICD-10-CM

## 2017-09-21 DIAGNOSIS — K50.919 CROHN'S DISEASE WITH COMPLICATION, UNSPECIFIED GASTROINTESTINAL TRACT LOCATION (HCC): Primary | ICD-10-CM

## 2017-09-21 LAB
ALBUMIN SERPL BCP-MCNC: 3.1 G/DL (ref 3.5–5)
ALP SERPL-CCNC: 68 U/L (ref 46–116)
ALT SERPL W P-5'-P-CCNC: 46 U/L (ref 12–78)
ANION GAP SERPL CALCULATED.3IONS-SCNC: 8 MMOL/L (ref 4–13)
AST SERPL W P-5'-P-CCNC: 44 U/L (ref 5–45)
BILIRUB DIRECT SERPL-MCNC: 0.4 MG/DL (ref 0–0.2)
BILIRUB SERPL-MCNC: 1.29 MG/DL (ref 0.2–1)
BUN SERPL-MCNC: 9 MG/DL (ref 5–25)
CALCIUM SERPL-MCNC: 8 MG/DL (ref 8.3–10.1)
CHLORIDE SERPL-SCNC: 98 MMOL/L (ref 100–108)
CO2 SERPL-SCNC: 31 MMOL/L (ref 21–32)
CREAT SERPL-MCNC: 0.68 MG/DL (ref 0.6–1.3)
CRP SERPL QL: 6.8 MG/L
ERYTHROCYTE [DISTWIDTH] IN BLOOD BY AUTOMATED COUNT: 12.5 % (ref 11.6–15.1)
ERYTHROCYTE [SEDIMENTATION RATE] IN BLOOD: 13 MM/HOUR (ref 0–10)
GFR SERPL CREATININE-BSD FRML MDRD: 103 ML/MIN/1.73SQ M
GLUCOSE SERPL-MCNC: 99 MG/DL (ref 65–140)
HCT VFR BLD AUTO: 40.3 % (ref 36.5–49.3)
HGB BLD-MCNC: 14.8 G/DL (ref 12–17)
LIPASE SERPL-CCNC: 88 U/L (ref 73–393)
MCH RBC QN AUTO: 36.4 PG (ref 26.8–34.3)
MCHC RBC AUTO-ENTMCNC: 36.7 G/DL (ref 31.4–37.4)
MCV RBC AUTO: 99 FL (ref 82–98)
PLATELET # BLD AUTO: 193 THOUSANDS/UL (ref 149–390)
PMV BLD AUTO: 11.1 FL (ref 8.9–12.7)
POTASSIUM SERPL-SCNC: 2.6 MMOL/L (ref 3.5–5.3)
PROT SERPL-MCNC: 6.6 G/DL (ref 6.4–8.2)
RBC # BLD AUTO: 4.07 MILLION/UL (ref 3.88–5.62)
SODIUM SERPL-SCNC: 137 MMOL/L (ref 136–145)
WBC # BLD AUTO: 9.38 THOUSAND/UL (ref 4.31–10.16)

## 2017-09-21 PROCEDURE — 85652 RBC SED RATE AUTOMATED: CPT

## 2017-09-21 PROCEDURE — 83993 ASSAY FOR CALPROTECTIN FECAL: CPT

## 2017-09-21 PROCEDURE — 83690 ASSAY OF LIPASE: CPT

## 2017-09-21 PROCEDURE — 86140 C-REACTIVE PROTEIN: CPT

## 2017-09-21 PROCEDURE — 81401 MOPATH PROCEDURE LEVEL 2: CPT

## 2017-09-21 PROCEDURE — 82542 COL CHROMOTOGRAPHY QUAL/QUAN: CPT

## 2017-09-21 PROCEDURE — 86480 TB TEST CELL IMMUN MEASURE: CPT

## 2017-09-21 PROCEDURE — 36415 COLL VENOUS BLD VENIPUNCTURE: CPT

## 2017-09-21 PROCEDURE — 85027 COMPLETE CBC AUTOMATED: CPT

## 2017-09-21 PROCEDURE — 80048 BASIC METABOLIC PNL TOTAL CA: CPT

## 2017-09-21 PROCEDURE — 80076 HEPATIC FUNCTION PANEL: CPT

## 2017-09-28 LAB
CALPROTECTIN STL-MCNT: 31 UG/G (ref 0–120)
DIAGNOSTIC IMP SPEC-IMP: NORMAL
LABORATORY COMMENT REPORT: NORMAL
REF LAB TEST METHOD: NORMAL
TEST INTERPRETATION: NORMAL
TPMT GENE MUT ANL BLD/T: NORMAL
TPMT GENE MUT ANL BLD/T: NORMAL
TPMT RBC-CCNC: 24.5 UNITS/ML RBC

## 2017-10-01 ENCOUNTER — GENERIC CONVERSION - ENCOUNTER (OUTPATIENT)
Dept: OTHER | Facility: OTHER | Age: 62
End: 2017-10-01

## 2017-10-25 LAB — QUANTIFERON-TB GOLD IN TUBE: NORMAL

## 2017-10-28 ENCOUNTER — GENERIC CONVERSION - ENCOUNTER (OUTPATIENT)
Dept: OTHER | Facility: OTHER | Age: 62
End: 2017-10-28

## 2017-12-04 ENCOUNTER — APPOINTMENT (EMERGENCY)
Dept: RADIOLOGY | Facility: HOSPITAL | Age: 62
DRG: 603 | End: 2017-12-04
Payer: COMMERCIAL

## 2017-12-04 ENCOUNTER — HOSPITAL ENCOUNTER (INPATIENT)
Facility: HOSPITAL | Age: 62
LOS: 2 days | Discharge: HOME | DRG: 603 | End: 2017-12-06
Attending: EMERGENCY MEDICINE | Admitting: INTERNAL MEDICINE
Payer: COMMERCIAL

## 2017-12-04 DIAGNOSIS — E87.6 HYPOKALEMIA: ICD-10-CM

## 2017-12-04 DIAGNOSIS — E83.42 HYPOMAGNESEMIA: ICD-10-CM

## 2017-12-04 DIAGNOSIS — I96 GANGRENE (HCC): ICD-10-CM

## 2017-12-04 DIAGNOSIS — L03.012 CELLULITIS OF FINGER OF LEFT HAND: Primary | ICD-10-CM

## 2017-12-04 PROBLEM — L03.90 CELLULITIS: Status: ACTIVE | Noted: 2017-12-04

## 2017-12-04 LAB
ALBUMIN SERPL BCP-MCNC: 3.3 G/DL (ref 3.5–5)
ALP SERPL-CCNC: 76 U/L (ref 46–116)
ALT SERPL W P-5'-P-CCNC: 47 U/L (ref 12–78)
ANION GAP SERPL CALCULATED.3IONS-SCNC: 10 MMOL/L (ref 4–13)
APTT PPP: 30 SECONDS (ref 23–35)
AST SERPL W P-5'-P-CCNC: 31 U/L (ref 5–45)
BASOPHILS # BLD AUTO: 0.02 THOUSANDS/ΜL (ref 0–0.1)
BASOPHILS NFR BLD AUTO: 0 % (ref 0–1)
BILIRUB SERPL-MCNC: 2.1 MG/DL (ref 0.2–1)
BUN SERPL-MCNC: 8 MG/DL (ref 5–25)
CALCIUM SERPL-MCNC: 7.9 MG/DL (ref 8.3–10.1)
CHLORIDE SERPL-SCNC: 98 MMOL/L (ref 100–108)
CO2 SERPL-SCNC: 32 MMOL/L (ref 21–32)
CREAT SERPL-MCNC: 0.73 MG/DL (ref 0.6–1.3)
CRP SERPL HS-MCNC: 26.23 MG/L
EOSINOPHIL # BLD AUTO: 0 THOUSAND/ΜL (ref 0–0.61)
EOSINOPHIL NFR BLD AUTO: 0 % (ref 0–6)
ERYTHROCYTE [DISTWIDTH] IN BLOOD BY AUTOMATED COUNT: 12.2 % (ref 11.6–15.1)
ERYTHROCYTE [SEDIMENTATION RATE] IN BLOOD: 12 MM/HOUR (ref 0–10)
GFR SERPL CREATININE-BSD FRML MDRD: 99 ML/MIN/1.73SQ M
GLUCOSE SERPL-MCNC: 99 MG/DL (ref 65–140)
HCT VFR BLD AUTO: 39.3 % (ref 36.5–49.3)
HGB BLD-MCNC: 14.2 G/DL (ref 12–17)
INR PPP: 0.94 (ref 0.86–1.16)
LACTATE SERPL-SCNC: 2 MMOL/L (ref 0.5–2)
LYMPHOCYTES # BLD AUTO: 1.23 THOUSANDS/ΜL (ref 0.6–4.47)
LYMPHOCYTES NFR BLD AUTO: 8 % (ref 14–44)
MAGNESIUM SERPL-MCNC: 0.6 MG/DL (ref 1.6–2.6)
MCH RBC QN AUTO: 35 PG (ref 26.8–34.3)
MCHC RBC AUTO-ENTMCNC: 36.1 G/DL (ref 31.4–37.4)
MCV RBC AUTO: 97 FL (ref 82–98)
MONOCYTES # BLD AUTO: 0.89 THOUSAND/ΜL (ref 0.17–1.22)
MONOCYTES NFR BLD AUTO: 6 % (ref 4–12)
NEUTROPHILS # BLD AUTO: 13.58 THOUSANDS/ΜL (ref 1.85–7.62)
NEUTS SEG NFR BLD AUTO: 86 % (ref 43–75)
PLATELET # BLD AUTO: 164 THOUSANDS/UL (ref 149–390)
PMV BLD AUTO: 10.7 FL (ref 8.9–12.7)
POTASSIUM SERPL-SCNC: 2.6 MMOL/L (ref 3.5–5.3)
PROT SERPL-MCNC: 6.9 G/DL (ref 6.4–8.2)
PROTHROMBIN TIME: 12.9 SECONDS (ref 12.1–14.4)
RBC # BLD AUTO: 4.06 MILLION/UL (ref 3.88–5.62)
SODIUM SERPL-SCNC: 140 MMOL/L (ref 136–145)
WBC # BLD AUTO: 15.72 THOUSAND/UL (ref 4.31–10.16)

## 2017-12-04 PROCEDURE — 86141 C-REACTIVE PROTEIN HS: CPT | Performed by: EMERGENCY MEDICINE

## 2017-12-04 PROCEDURE — 87040 BLOOD CULTURE FOR BACTERIA: CPT | Performed by: EMERGENCY MEDICINE

## 2017-12-04 PROCEDURE — 85025 COMPLETE CBC W/AUTO DIFF WBC: CPT | Performed by: EMERGENCY MEDICINE

## 2017-12-04 PROCEDURE — 96374 THER/PROPH/DIAG INJ IV PUSH: CPT

## 2017-12-04 PROCEDURE — 85652 RBC SED RATE AUTOMATED: CPT | Performed by: EMERGENCY MEDICINE

## 2017-12-04 PROCEDURE — 83735 ASSAY OF MAGNESIUM: CPT | Performed by: EMERGENCY MEDICINE

## 2017-12-04 PROCEDURE — 85610 PROTHROMBIN TIME: CPT | Performed by: EMERGENCY MEDICINE

## 2017-12-04 PROCEDURE — 83605 ASSAY OF LACTIC ACID: CPT | Performed by: EMERGENCY MEDICINE

## 2017-12-04 PROCEDURE — 73140 X-RAY EXAM OF FINGER(S): CPT

## 2017-12-04 PROCEDURE — 36415 COLL VENOUS BLD VENIPUNCTURE: CPT | Performed by: EMERGENCY MEDICINE

## 2017-12-04 PROCEDURE — 96361 HYDRATE IV INFUSION ADD-ON: CPT

## 2017-12-04 PROCEDURE — 80053 COMPREHEN METABOLIC PANEL: CPT | Performed by: EMERGENCY MEDICINE

## 2017-12-04 PROCEDURE — 85730 THROMBOPLASTIN TIME PARTIAL: CPT | Performed by: EMERGENCY MEDICINE

## 2017-12-04 RX ORDER — MAGNESIUM SULFATE HEPTAHYDRATE 40 MG/ML
2 INJECTION, SOLUTION INTRAVENOUS ONCE
Status: COMPLETED | OUTPATIENT
Start: 2017-12-04 | End: 2017-12-04

## 2017-12-04 RX ORDER — POTASSIUM CHLORIDE 29.8 MG/ML
40 INJECTION INTRAVENOUS ONCE
Status: DISCONTINUED | OUTPATIENT
Start: 2017-12-04 | End: 2017-12-04 | Stop reason: SDUPTHER

## 2017-12-04 RX ORDER — VANCOMYCIN HYDROCHLORIDE 1 G/200ML
15 INJECTION, SOLUTION INTRAVENOUS ONCE
Status: COMPLETED | OUTPATIENT
Start: 2017-12-04 | End: 2017-12-05

## 2017-12-04 RX ORDER — POTASSIUM CHLORIDE 14.9 MG/ML
20 INJECTION INTRAVENOUS
Status: DISPENSED | OUTPATIENT
Start: 2017-12-04 | End: 2017-12-05

## 2017-12-04 RX ADMIN — POTASSIUM CHLORIDE 20 MEQ: 200 INJECTION, SOLUTION INTRAVENOUS at 23:54

## 2017-12-04 RX ADMIN — MAGNESIUM SULFATE HEPTAHYDRATE 2 G: 40 INJECTION, SOLUTION INTRAVENOUS at 22:44

## 2017-12-04 RX ADMIN — SODIUM CHLORIDE 1000 ML: 0.9 INJECTION, SOLUTION INTRAVENOUS at 21:19

## 2017-12-04 RX ADMIN — SODIUM CHLORIDE 3 G: 9 INJECTION, SOLUTION INTRAVENOUS at 23:19

## 2017-12-05 LAB
ANION GAP SERPL CALCULATED.3IONS-SCNC: 8 MMOL/L (ref 4–13)
BASOPHILS # BLD AUTO: 0.03 THOUSANDS/ΜL (ref 0–0.1)
BASOPHILS NFR BLD AUTO: 0 % (ref 0–1)
BUN SERPL-MCNC: 5 MG/DL (ref 5–25)
CALCIUM SERPL-MCNC: 7.2 MG/DL (ref 8.3–10.1)
CHLORIDE SERPL-SCNC: 100 MMOL/L (ref 100–108)
CO2 SERPL-SCNC: 29 MMOL/L (ref 21–32)
CREAT SERPL-MCNC: 0.65 MG/DL (ref 0.6–1.3)
EOSINOPHIL # BLD AUTO: 0.04 THOUSAND/ΜL (ref 0–0.61)
EOSINOPHIL NFR BLD AUTO: 0 % (ref 0–6)
ERYTHROCYTE [DISTWIDTH] IN BLOOD BY AUTOMATED COUNT: 12.4 % (ref 11.6–15.1)
ERYTHROCYTE [DISTWIDTH] IN BLOOD BY AUTOMATED COUNT: 12.7 % (ref 11.6–15.1)
GFR SERPL CREATININE-BSD FRML MDRD: 104 ML/MIN/1.73SQ M
GLUCOSE SERPL-MCNC: 102 MG/DL (ref 65–140)
HCT VFR BLD AUTO: 35.8 % (ref 36.5–49.3)
HCT VFR BLD AUTO: 35.9 % (ref 36.5–49.3)
HGB BLD-MCNC: 12.5 G/DL (ref 12–17)
HGB BLD-MCNC: 12.7 G/DL (ref 12–17)
LYMPHOCYTES # BLD AUTO: 1.78 THOUSANDS/ΜL (ref 0.6–4.47)
LYMPHOCYTES NFR BLD AUTO: 11 % (ref 14–44)
MAGNESIUM SERPL-MCNC: 1.1 MG/DL (ref 1.6–2.6)
MCH RBC QN AUTO: 34.4 PG (ref 26.8–34.3)
MCH RBC QN AUTO: 34.5 PG (ref 26.8–34.3)
MCHC RBC AUTO-ENTMCNC: 34.8 G/DL (ref 31.4–37.4)
MCHC RBC AUTO-ENTMCNC: 35.5 G/DL (ref 31.4–37.4)
MCV RBC AUTO: 97 FL (ref 82–98)
MCV RBC AUTO: 99 FL (ref 82–98)
MONOCYTES # BLD AUTO: 0.97 THOUSAND/ΜL (ref 0.17–1.22)
MONOCYTES NFR BLD AUTO: 6 % (ref 4–12)
NEUTROPHILS # BLD AUTO: 13.81 THOUSANDS/ΜL (ref 1.85–7.62)
NEUTS SEG NFR BLD AUTO: 83 % (ref 43–75)
PLATELET # BLD AUTO: 136 THOUSANDS/UL (ref 149–390)
PLATELET # BLD AUTO: 144 THOUSANDS/UL (ref 149–390)
PMV BLD AUTO: 10.5 FL (ref 8.9–12.7)
PMV BLD AUTO: 11.2 FL (ref 8.9–12.7)
POTASSIUM SERPL-SCNC: 2.3 MMOL/L (ref 3.5–5.3)
POTASSIUM SERPL-SCNC: 3.4 MMOL/L (ref 3.5–5.3)
RBC # BLD AUTO: 3.63 MILLION/UL (ref 3.88–5.62)
RBC # BLD AUTO: 3.68 MILLION/UL (ref 3.88–5.62)
SODIUM SERPL-SCNC: 137 MMOL/L (ref 136–145)
WBC # BLD AUTO: 16.63 THOUSAND/UL (ref 4.31–10.16)
WBC # BLD AUTO: 19.17 THOUSAND/UL (ref 4.31–10.16)

## 2017-12-05 PROCEDURE — 85025 COMPLETE CBC W/AUTO DIFF WBC: CPT | Performed by: FAMILY MEDICINE

## 2017-12-05 PROCEDURE — 87798 DETECT AGENT NOS DNA AMP: CPT | Performed by: FAMILY MEDICINE

## 2017-12-05 PROCEDURE — 85027 COMPLETE CBC AUTOMATED: CPT | Performed by: PHYSICIAN ASSISTANT

## 2017-12-05 PROCEDURE — 83735 ASSAY OF MAGNESIUM: CPT | Performed by: FAMILY MEDICINE

## 2017-12-05 PROCEDURE — 80048 BASIC METABOLIC PNL TOTAL CA: CPT | Performed by: PHYSICIAN ASSISTANT

## 2017-12-05 PROCEDURE — 36415 COLL VENOUS BLD VENIPUNCTURE: CPT | Performed by: PHYSICIAN ASSISTANT

## 2017-12-05 PROCEDURE — 84132 ASSAY OF SERUM POTASSIUM: CPT | Performed by: FAMILY MEDICINE

## 2017-12-05 PROCEDURE — 99285 EMERGENCY DEPT VISIT HI MDM: CPT

## 2017-12-05 RX ORDER — POTASSIUM CHLORIDE 14.9 MG/ML
20 INJECTION INTRAVENOUS
Status: COMPLETED | OUTPATIENT
Start: 2017-12-05 | End: 2017-12-05

## 2017-12-05 RX ORDER — LANOLIN ALCOHOL/MO/W.PET/CERES
6 CREAM (GRAM) TOPICAL
Status: DISCONTINUED | OUTPATIENT
Start: 2017-12-05 | End: 2017-12-06 | Stop reason: HOSPADM

## 2017-12-05 RX ORDER — ALBUTEROL SULFATE 90 UG/1
2 AEROSOL, METERED RESPIRATORY (INHALATION) EVERY 6 HOURS PRN
Status: DISCONTINUED | OUTPATIENT
Start: 2017-12-05 | End: 2017-12-06 | Stop reason: HOSPADM

## 2017-12-05 RX ORDER — AMILORIDE HYDROCHLORIDE 5 MG/1
5 TABLET ORAL DAILY
Status: DISCONTINUED | OUTPATIENT
Start: 2017-12-05 | End: 2017-12-06 | Stop reason: HOSPADM

## 2017-12-05 RX ORDER — AMLODIPINE BESYLATE 2.5 MG/1
2.5 TABLET ORAL DAILY
Status: DISCONTINUED | OUTPATIENT
Start: 2017-12-06 | End: 2017-12-06 | Stop reason: HOSPADM

## 2017-12-05 RX ORDER — POTASSIUM CHLORIDE 14.9 MG/ML
20 INJECTION INTRAVENOUS
Status: DISPENSED | OUTPATIENT
Start: 2017-12-05 | End: 2017-12-05

## 2017-12-05 RX ORDER — POTASSIUM CHLORIDE 20 MEQ/1
40 TABLET, EXTENDED RELEASE ORAL ONCE
Status: COMPLETED | OUTPATIENT
Start: 2017-12-05 | End: 2017-12-05

## 2017-12-05 RX ORDER — ACETAMINOPHEN 325 MG/1
650 TABLET ORAL EVERY 6 HOURS PRN
Status: DISCONTINUED | OUTPATIENT
Start: 2017-12-05 | End: 2017-12-06 | Stop reason: HOSPADM

## 2017-12-05 RX ORDER — FAMOTIDINE 40 MG/5ML
20 POWDER, FOR SUSPENSION ORAL 2 TIMES DAILY
Status: DISCONTINUED | OUTPATIENT
Start: 2017-12-05 | End: 2017-12-06 | Stop reason: HOSPADM

## 2017-12-05 RX ORDER — MAGNESIUM SULFATE HEPTAHYDRATE 40 MG/ML
4 INJECTION, SOLUTION INTRAVENOUS ONCE
Status: COMPLETED | OUTPATIENT
Start: 2017-12-05 | End: 2017-12-05

## 2017-12-05 RX ORDER — AMLODIPINE BESYLATE 2.5 MG/1
2.5 TABLET ORAL DAILY
Status: DISCONTINUED | OUTPATIENT
Start: 2017-12-05 | End: 2017-12-05

## 2017-12-05 RX ORDER — PANTOPRAZOLE SODIUM 40 MG/1
40 TABLET, DELAYED RELEASE ORAL
Status: DISCONTINUED | OUTPATIENT
Start: 2017-12-05 | End: 2017-12-05

## 2017-12-05 RX ORDER — ONDANSETRON 2 MG/ML
4 INJECTION INTRAMUSCULAR; INTRAVENOUS EVERY 6 HOURS PRN
Status: DISCONTINUED | OUTPATIENT
Start: 2017-12-05 | End: 2017-12-06 | Stop reason: HOSPADM

## 2017-12-05 RX ORDER — NICOTINE 21 MG/24HR
1 PATCH, TRANSDERMAL 24 HOURS TRANSDERMAL DAILY
Status: DISCONTINUED | OUTPATIENT
Start: 2017-12-05 | End: 2017-12-06 | Stop reason: HOSPADM

## 2017-12-05 RX ADMIN — MELATONIN 6 MG: 3 TAB ORAL at 21:16

## 2017-12-05 RX ADMIN — MELATONIN 6 MG: 3 TAB ORAL at 01:49

## 2017-12-05 RX ADMIN — CEFAZOLIN SODIUM 1000 MG: 1 SOLUTION INTRAVENOUS at 22:00

## 2017-12-05 RX ADMIN — POTASSIUM CHLORIDE 20 MEQ: 200 INJECTION, SOLUTION INTRAVENOUS at 16:19

## 2017-12-05 RX ADMIN — FLUTICASONE PROPIONATE AND SALMETEROL 1 PUFF: 50; 100 POWDER RESPIRATORY (INHALATION) at 21:51

## 2017-12-05 RX ADMIN — POTASSIUM CHLORIDE 40 MEQ: 1500 TABLET, EXTENDED RELEASE ORAL at 12:42

## 2017-12-05 RX ADMIN — AMLODIPINE BESYLATE 2.5 MG: 2.5 TABLET ORAL at 08:07

## 2017-12-05 RX ADMIN — POTASSIUM CHLORIDE 20 MEQ: 200 INJECTION, SOLUTION INTRAVENOUS at 13:23

## 2017-12-05 RX ADMIN — POTASSIUM CHLORIDE 40 MEQ: 1500 TABLET, EXTENDED RELEASE ORAL at 06:15

## 2017-12-05 RX ADMIN — NICOTINE 1 PATCH: 21 PATCH, EXTENDED RELEASE TRANSDERMAL at 08:07

## 2017-12-05 RX ADMIN — FLUTICASONE PROPIONATE AND SALMETEROL 1 PUFF: 50; 100 POWDER RESPIRATORY (INHALATION) at 08:08

## 2017-12-05 RX ADMIN — AMILORIDE HYDROCHLORIDE 5 MG: 5 TABLET ORAL at 16:16

## 2017-12-05 RX ADMIN — CEFAZOLIN SODIUM 1000 MG: 1 SOLUTION INTRAVENOUS at 14:00

## 2017-12-05 RX ADMIN — VANCOMYCIN HYDROCHLORIDE 1000 MG: 1 INJECTION, SOLUTION INTRAVENOUS at 00:00

## 2017-12-05 RX ADMIN — CEFAZOLIN SODIUM 1000 MG: 1 SOLUTION INTRAVENOUS at 06:21

## 2017-12-05 RX ADMIN — ENOXAPARIN SODIUM 40 MG: 40 INJECTION SUBCUTANEOUS at 08:09

## 2017-12-05 RX ADMIN — PANTOPRAZOLE SODIUM 40 MG: 40 TABLET, DELAYED RELEASE ORAL at 06:14

## 2017-12-05 RX ADMIN — MAGNESIUM SULFATE IN WATER 4 G: 40 INJECTION, SOLUTION INTRAVENOUS at 16:26

## 2017-12-05 RX ADMIN — FAMOTIDINE 20 MG: 40 POWDER, FOR SUSPENSION ORAL at 18:00

## 2017-12-05 NOTE — ED NOTES
Patient rang call bell and screaming "MY ARM HURTS! MAKE IT STOP!" Potassium infusion decreased to 4mEq/hr  Patient tolerating well        Gena Shook RN  12/05/17 0396

## 2017-12-05 NOTE — ED NOTES
Pt provided food menu at this time, made pt aware cannot order till [address] Best Crabtree RN  12/05/17 3423

## 2017-12-05 NOTE — ED NOTES
Jamila Basurto PA-C from [facility] paged at this time re: patient's request for medication to help him sleep  Awaiting new orders        Sabrina Silva RN  12/05/17 6154
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
## 2017-12-05 NOTE — ED NOTES
Pt complains that IV site started to burn all of the sudden  Potassium placed on hold at this time, ice pack provided to site         Reynold Carter RN  12/05/17 0253

## 2017-12-05 NOTE — H&P
History and Physical - [facility]    Patient Information: Jazmine Daugherty II 58 y o  male MRN: [id number]  Unit/Bed#: ED 19 Encounter: [id number]  Admitting Physician: Channing Chamorro PA-C  PCP: Cindy Ivy MD  Date of Admission:  12/04/17    Assessment/Plan:    Hospital Problem List:     Principal Problem:    Cellulitis  Active Problems:    Hypertension    Emphysema/COPD ([facility] )    Crohn disease ([facility] )    Colostomy in place [facility]      Plan for the Primary Problem(s):  · Cellulitis  · XR Left Second Digit- Soft tissue swelling throughout the mid and distal second digit  No evidence of gas or collection  No radiographic evidence of osteomyelitis  · Afebrile here  · Leukoctyosis of 15 72   · Initially tachycardic  · Lactic 2 0  · Blood cultures pending  · IV Ancef  · Tylenol PRN fever  · Pain management  Plan for Additional Problems:   · Hypokalemia- K 2 6  Potassium given in ED  Monitor  · Hypomagnesemia- Mg 0 6  Magnesium given in ED  Monitor  · HTN- /66  Continue home medications  · Emphysema- Stable  Continue home medications  · Crohn Disease s/p colostomy- Continue Pentasa  VTE Prophylaxis: Enoxaparin (Lovenox)  / sequential compression device   Code Status: Full Code  POLST: POLST form is not discussed and not completed at this time  Anticipated Length of Stay:  Patient will be admitted on an Inpatient basis with an anticipated length of stay of  Greater than 2 midnights  Justification for Hospital Stay: Cellulitis  Total Time for Visit, including Counseling / Coordination of Care: 45 minutes  Greater than 50% of this total time spent on direct patient counseling and coordination of care  Chief Complaint:   Finger swelling  History of Present Illness:    Celi Castelan is a 58 y o  male who presents with finger swelling and redness x2 weeks  Patient reports that he started noticing swelling and redness in his finger  He developed a fever today of 102 with chills and sweats  He started to become concerned that he would have an infection in the bone, so he came to the hospital  He reports that he has a history of osteomyelitis in that finger about 8 years ago in which he had to have the bone removed and his fingertip put back on after cutting his finger with a hunting knife  He denies any injury or trauma to the area  Denies lymphangitic spread  Denies pain in finger  Review of Systems:    Review of Systems   Constitutional: Positive for chills, diaphoresis and fever  Negative for appetite change  Respiratory: Negative for cough, shortness of breath and wheezing  Cardiovascular: Negative for chest pain and palpitations  Gastrointestinal: Negative for abdominal pain, constipation, diarrhea, nausea and vomiting  Genitourinary: Negative for dysuria and hematuria  Skin: Positive for color change  Neurological: Negative for dizziness, light-headedness and headaches  All other systems reviewed and are negative  Past Medical and Surgical History:     Past Medical History:   Diagnosis Date    LUCILLE (acute kidney injury) ([place] [address] ) 6/28/2017    Cataract     Colostomy in place [facility]) 6/29/2017    Crohn disease ([place] [address] )     Emphysema of lung ([place] [address] )     Emphysema/COPD ([place] [address] )     Hypertension     Hypokalemia 6/28/2017    Hypomagnesemia 6/29/2017    Hyponatremia 6/28/2017       Past Surgical History:   Procedure Laterality Date    CHOLECYSTECTOMY      COLECTOMY      10 inchs of ileum    COLONOSCOPY N/A 6/30/2017    Procedure: COLONOSCOPY;  Surgeon: Will Wilde MD;  [facility]; Service: Gastroenterology    COLOSTOMY      LITHOTRIPSY         Meds/Allergies:    Prior to Admission medications    Medication Sig Start Date End Date Taking?  Authorizing Provider   albuterol (PROVENTIL HFA,VENTOLIN HFA) 90 mcg/act inhaler Inhale 2 puffs every 6 (six) hours as needed for wheezing    Historical Provider, MD   amLODIPine (NORVASC) 2 5 mg tablet Take 2 5 mg by mouth daily    Historical Provider, MD   fluticasone-salmeterol (ADVAIR HFA) 45-21 MCG/ACT inhaler Inhale 2 puffs 2 (two) times a day    Historical Provider, MD   mesalamine (PENTASA) 500 mg CR capsule Take 2 capsules by mouth 2 (two) times a day 7/1/17   Betty Lazo MD   omeprazole (PriLOSEC) 20 mg delayed release capsule Take 20 mg by mouth daily    Historical Provider, MD     I have reviewed home medications with patient personally  Allergies: No Known Allergies    Social History:     Marital Status: Single   Occupation: Retired  Patient Pre-hospital Living Situation: Home  Patient Pre-hospital Level of Mobility: Independent  Patient Pre-hospital Diet Restrictions: None  Substance Use History:   History   Alcohol Use    Yes     Comment: weekend social     History   Smoking Status    Current Every Day Smoker    Packs/day: 1 50    Types: Cigarettes   Smokeless Tobacco    Never Used     History   Drug Use No       Family History:    non-contributory    Physical Exam:     Vitals:   Blood Pressure: 150/66 (12/04/17 2230)  Pulse: 96 (12/04/17 2230)  Temperature: 99 5 °F (37 5 °C) (12/04/17 1952)  Temp Source: Oral (12/04/17 1952)  Respirations: 16 (12/04/17 2230)  Weight - Scale: 63 5 kg (140 lb) (12/04/17 1950)  SpO2: 95 % (12/04/17 2230)    Physical Exam   Constitutional: He is oriented to person, place, and time  He appears well-developed and well-nourished  He is cooperative  He does not appear ill  No distress  HENT:   Head: Normocephalic and atraumatic  Eyes: Conjunctivae, EOM and lids are normal  Pupils are equal, round, and reactive to light  Cardiovascular: Normal rate, regular rhythm, normal heart sounds and normal pulses  No murmur heard  Pulmonary/Chest: Effort normal and breath sounds normal  He has no wheezes  He has no rhonchi  He has no rales  Abdominal: Soft  Normal appearance and bowel sounds are normal  There is no tenderness  Musculoskeletal: Normal range of motion  Neurological: He is alert and oriented to person, place, and time  He has normal strength  He is not disoriented  No sensory deficit  Skin: He is not diaphoretic  Erythema, edema, warmth of distal phalanx of left second digit without tenderness  No lymphangitic spread  Full ROM of digit  Psychiatric: He has a normal mood and affect  His speech is normal and behavior is normal  Cognition and memory are normal    Vitals reviewed  Additional Data:     Lab Results: I have personally reviewed pertinent reports  Results from last 7 days  Lab Units 12/04/17  2105   WBC Thousand/uL 15 72*   HEMOGLOBIN g/dL 14 2   HEMATOCRIT % 39 3   PLATELETS Thousands/uL 164   NEUTROS PCT % 86*   LYMPHS PCT % 8*   MONOS PCT % 6   EOS PCT % 0       Results from last 7 days  Lab Units 12/04/17  2105   SODIUM mmol/L 140   POTASSIUM mmol/L 2 6*   CHLORIDE mmol/L 98*   CO2 mmol/L 32   BUN mg/dL 8   CREATININE mg/dL 0 73   CALCIUM mg/dL 7 9*   TOTAL PROTEIN g/dL 6 9   BILIRUBIN TOTAL mg/dL 2 10*   ALK PHOS U/L 76   ALT U/L 47   AST U/L 31   GLUCOSE RANDOM mg/dL 99       Results from last 7 days  Lab Units 12/04/17  2105   INR  0 94       Imaging: I have personally reviewed pertinent reports  Xr Finger Second Digit-index Left    Result Date: 12/4/2017  Narrative: LEFT FINGER INDICATION: Distal left second digit pain and swelling  COMPARISON: None VIEWS:  PA view hand and 2 cone-down views of the     digit IMAGES:  3 For the purposes of institution wide universal language the following terms will apply: (thumb=1st digit/finger, index finger=2nd digit/finger, long finger=3rd digit/finger, ring=4th digit/finger and small finger=5th digit/finger) FINDINGS: Metallic clips project over the second distal interphalangeal joint  There is no acute fracture or dislocation  No significant degenerative changes  No lytic or blastic lesion  Soft tissue swelling throughout the mid and distal second digit   No evidence of gas or collection        Impression: Soft tissue swelling throughout the mid and distal second digit  No evidence of gas or collection  No radiographic evidence of osteomyelitis  Workstation performed: [id number]       EKG, Pathology, and Other Studies Reviewed on Admission:   · EKG: Unavailable  Allscripts Records Reviewed: Yes     ** Please Note: Dragon 360 Dictation voice to text software may have been used in the creation of this document   **

## 2017-12-05 NOTE — ED PROVIDER NOTES
History  Chief Complaint   Patient presents with    Finger Injury     pt c/o left hand pointer discoloration  informed injured his finger year ago with a knife but 2 weeks ago noticed discoloration, pt c/o fever 102 at home  no numbness to area  pt hx smoking and COPD     This 15-year-old male presents today with fevers and pain/swelling to his left index finger  Patient states he has a history of a bone infection after a wound many years ago that required surgical debridement and antibiotics  Patient states approximately two weeks ago he began to notice increasing swelling and discoloration to this finger  Patient however did not present until today when his family encouraged him strongly to come to the emergency department  Patient states his fever was 102 at max at home  Patient denies any other signs of illness elsewhere  Patient denies any drainage from the fingers, denies any new injury to finger  Patient does have a history of Crohn's disease with colostomy as well as hypertension and emphysema  Patient is an ongoing smoker still  History provided by:  Patient   used: No    Hand Pain   Location:  Left index finger  Quality:  Aching  Severity:  Moderate  Onset quality:  Gradual  Duration:  2 weeks  Timing:  Constant  Progression:  Worsening  Chronicity:  Recurrent  Ineffective treatments:  None tried  Associated symptoms: fever    Associated symptoms: no abdominal pain, no chest pain, no cough, no diarrhea, no ear pain, no headaches, no nausea, no rash, no shortness of breath, no sore throat, no vomiting and no wheezing        Prior to Admission Medications   Prescriptions Last Dose Informant Patient Reported? Taking?    albuterol (PROVENTIL HFA,VENTOLIN HFA) 90 mcg/act inhaler   Yes No   Sig: Inhale 2 puffs every 6 (six) hours as needed for wheezing   amLODIPine (NORVASC) 2 5 mg tablet   Yes No   Sig: Take 2 5 mg by mouth daily   fluticasone-salmeterol (ADVAIR HFA) 45-21 MCG/ACT inhaler   Yes No   Sig: Inhale 2 puffs 2 (two) times a day   mesalamine (PENTASA) 500 mg CR capsule   No No   Sig: Take 2 capsules by mouth 2 (two) times a day   omeprazole (PriLOSEC) 20 mg delayed release capsule   Yes No   Sig: Take 20 mg by mouth daily      Facility-Administered Medications: None       Past Medical History:   Diagnosis Date    LUCILLE (acute kidney injury) ([place] [address] ) 6/28/2017    Cataract     Colostomy in place ([place] [address] ) 6/29/2017    Crohn disease ([place] [address] )     Emphysema of lung ([place] [address] )     Emphysema/COPD ([place] [address] )     Hypertension     Hypokalemia 6/28/2017    Hypomagnesemia 6/29/2017    Hyponatremia 6/28/2017       Past Surgical History:   Procedure Laterality Date    CHOLECYSTECTOMY      COLECTOMY      10 inchs of ileum    COLONOSCOPY N/A 6/30/2017    Procedure: COLONOSCOPY;  Surgeon: Renetta Winslow MD;  [facility]; Service: Gastroenterology    COLOSTOMY      LITHOTRIPSY         History reviewed  No pertinent family history  I have reviewed and agree with the history as documented  Social History   Substance Use Topics    Smoking status: Current Every Day Smoker     Packs/day: 1 50     Types: Cigarettes    Smokeless tobacco: Never Used    Alcohol use Yes      Comment: weekend social        Review of Systems   Constitutional: Positive for chills, diaphoresis and fever  Negative for activity change and appetite change  HENT: Negative for ear pain, facial swelling, sore throat, tinnitus and voice change  Eyes: Negative for photophobia, pain and redness  Respiratory: Negative for cough, chest tightness, shortness of breath and wheezing  Cardiovascular: Negative for chest pain, palpitations and leg swelling  Gastrointestinal: Negative for abdominal distention, abdominal pain, constipation, diarrhea, nausea and vomiting  Genitourinary: Negative for difficulty urinating, dysuria, flank pain, hematuria and urgency  Musculoskeletal: Positive for joint swelling   Negative for back pain, gait problem and neck pain  Skin: Negative for rash and wound  Neurological: Negative for dizziness, syncope, speech difficulty, weakness and headaches  Psychiatric/Behavioral: Negative for agitation, behavioral problems and confusion  Physical Exam  ED Triage Vitals   Temperature Pulse Respirations Blood Pressure SpO2   12/04/17 1952 12/04/17 1950 12/04/17 1950 12/04/17 1950 12/04/17 1950   99 5 °F (37 5 °C) (!) 106 18 (!) 193/98 96 %      Temp Source Heart Rate Source Patient Position - Orthostatic VS BP Location FiO2 (%)   12/04/17 1950 12/04/17 1950 12/04/17 1950 12/04/17 1950 --   Oral Monitor Sitting Left arm       Pain Score       12/04/17 1950       No Pain           Orthostatic Vital Signs  Vitals:    12/04/17 1950 12/04/17 2121 12/04/17 2200 12/04/17 2230   BP: (!) 193/98 159/82 165/77 150/66   Pulse: (!) 106 99 96 96   Patient Position - Orthostatic VS: Sitting Sitting         Physical Exam   Constitutional: He is oriented to person, place, and time  He appears well-developed and well-nourished  He is cooperative  No distress  HENT:   Head: Normocephalic and atraumatic  Eyes: EOM and lids are normal  Pupils are equal, round, and reactive to light  Right eye exhibits no discharge  Left eye exhibits no discharge  Right conjunctiva is not injected  Left conjunctiva is not injected  Neck: Trachea normal, normal range of motion, full passive range of motion without pain and phonation normal  Neck supple  Cardiovascular: Normal rate, regular rhythm, normal heart sounds and normal pulses  No murmur heard  Pulses:       Dorsalis pedis pulses are 2+ on the right side, and 2+ on the left side  Pulmonary/Chest: Effort normal and breath sounds normal    Abdominal: Soft  He exhibits no distension  There is no tenderness  Musculoskeletal: Normal range of motion  He exhibits edema, tenderness and deformity  Left hand: He exhibits tenderness, deformity and swelling   He exhibits no laceration  Hands:  Neurological: He is alert and oriented to person, place, and time  He has normal strength  No cranial nerve deficit  GCS eye subscore is 4  GCS verbal subscore is 5  GCS motor subscore is 6  Skin: Skin is warm, dry and intact  Capillary refill takes less than 2 seconds  Psychiatric: He has a normal mood and affect  His speech is normal and behavior is normal    Vitals reviewed  ED Medications  Medications   sodium chloride 0 9 % bolus 1,000 mL (1,000 mL Intravenous New Bag 12/4/17 2119)     Followed by   sodium chloride 0 9 % bolus 1,000 mL (0 mL Intravenous Hold 12/4/17 2215)   magnesium sulfate 2 g/50 mL IVPB (premix) 2 g (2 g Intravenous New Bag 12/4/17 2244)   ampicillin-sulbactam (UNASYN) 3 g in sodium chloride 0 9 % 100 mL IVPB (not administered)   vancomycin (VANCOCIN) IVPB (premix) 1,000 mg (not administered)   potassium chloride 20 mEq IVPB (premix) (not administered)       Diagnostic Studies  Results Reviewed     Procedure Component Value Units Date/Time    Comprehensive metabolic panel [[id number]]  (Abnormal) Collected:  12/04/17 2105    Lab Status:  Final result Specimen:  Blood from Arm, Right Updated:  12/04/17 2209     Sodium 140 mmol/L      Potassium 2 6 (LL) mmol/L      Chloride 98 (L) mmol/L      CO2 32 mmol/L      Anion Gap 10 mmol/L      BUN 8 mg/dL      Creatinine 0 73 mg/dL      Glucose 99 mg/dL      Calcium 7 9 (L) mg/dL      AST 31 U/L      ALT 47 U/L      Alkaline Phosphatase 76 U/L      Total Protein 6 9 g/dL      Albumin 3 3 (L) g/dL      Total Bilirubin 2 10 (H) mg/dL      eGFR 99 ml/min/1 73sq m     Narrative:         National Kidney Disease Education Program recommendations are as follows:  GFR calculation is accurate only with a steady state creatinine  Chronic Kidney disease less than 60 ml/min/1 73 sq  meters  Kidney failure less than 15 ml/min/1 73 sq  meters      Magnesium [[id number]]  (Abnormal) Collected:  12/04/17 2105    Lab Status: Final result Specimen:  Blood from Arm, Right Updated:  12/04/17 2208     Magnesium 0 6 (LL) mg/dL     High sensitivity CRP [[id number]]  (Normal) Collected:  12/04/17 2105    Lab Status:  Final result Specimen:  Blood from Arm, Right Updated:  12/04/17 2206     CRP, High Sensitivity 26 23 mg/L     Narrative:               HsCRP Level       Relative Risk           <1 0 mg/L          Low           1 0 to 3 0 mg/L    Average           >3 0 mg/L          High      Lactic acid x2 [[id number]]  (Normal) Collected:  12/04/17 2105    Lab Status:  Final result Specimen:  Blood Updated:  12/04/17 2134     LACTIC ACID 2 0 mmol/L     Narrative:         Result may be elevated if tourniquet was used during collection  Protime-INR [[id number]]  (Normal) Collected:  12/04/17 2105    Lab Status:  Final result Specimen:  Blood from Arm, Right Updated:  12/04/17 2127     Protime 12 9 seconds      INR 0 94    APTT [[id number]]  (Normal) Collected:  12/04/17 2105    Lab Status:  Final result Specimen:  Blood from Arm, Right Updated:  12/04/17 2127     PTT 30 seconds     Narrative: Therapeutic Heparin Range = 60-90 seconds    Blood culture #1 [[id number]] Collected:  12/04/17 2119    Lab Status:   In process Specimen:  Blood from Arm, Right Updated:  12/04/17 2125    CBC and differential [[id number]]  (Abnormal) Collected:  12/04/17 2105    Lab Status:  Final result Specimen:  Blood from Arm, Right Updated:  12/04/17 2122     WBC 15 72 (H) Thousand/uL      RBC 4 06 Million/uL      Hemoglobin 14 2 g/dL      Hematocrit 39 3 %      MCV 97 fL      MCH 35 0 (H) pg      MCHC 36 1 g/dL      RDW 12 2 %      MPV 10 7 fL      Platelets 439 Thousands/uL      Neutrophils Relative 86 (H) %      Lymphocytes Relative 8 (L) %      Monocytes Relative 6 %      Eosinophils Relative 0 %      Basophils Relative 0 %      Neutrophils Absolute 13 58 (H) Thousands/µL      Lymphocytes Absolute 1 23 Thousands/µL      Monocytes Absolute 0 89 Thousand/µL Eosinophils Absolute 0 00 Thousand/µL      Basophils Absolute 0 02 Thousands/µL     Sedimentation rate, automated [[id number]] Collected:  12/04/17 2105    Lab Status: In process Specimen:  Blood from Arm, Right Updated:  12/04/17 2109    Blood culture #2 [[id number]] Collected:  12/04/17 2105    Lab Status: In process Specimen:  Blood from Arm, Right Updated:  12/04/17 2108                 XR finger second digit-index LEFT   Final Result by Tootie Valadez MD (12/04 2218)      Soft tissue swelling throughout the mid and distal second digit  No evidence of gas or collection  No radiographic evidence of osteomyelitis  Workstation performed: [id number]                    Procedures  Procedures       Phone Contacts  ED Phone Contact    ED Course  ED Course                                MDM  Number of Diagnoses or Management Options  Cellulitis of finger of left hand: new and requires workup  Hypokalemia: new and requires workup  Hypomagnesemia: new and requires workup  Diagnosis management comments: Patient with an x-ray that is unremarkable for osteo  Patient however has an elevated white blood cell count and a significantly elevated CRP  Patient will be admitted for further management of this finger infection  Patient be given Unasyn and vancomycin here         Amount and/or Complexity of Data Reviewed  Clinical lab tests: ordered and reviewed  Tests in the radiology section of CPT®: ordered and reviewed  Discuss the patient with other providers: yes  Independent visualization of images, tracings, or specimens: yes    Risk of Complications, Morbidity, and/or Mortality  Presenting problems: high  Diagnostic procedures: high  Management options: high    Patient Progress  Patient progress: stable    CritCare Time    Disposition  Final diagnoses:   Cellulitis of finger of left hand   Hypokalemia   Hypomagnesemia     Time reflects when diagnosis was documented in both MDM as applicable and the Disposition within this note     Time User Action Codes Description Comment    12/4/2017 10:48 PM Rotvincent Joanne Add [L06 [id number]] Cellulitis of finger of left hand     12/4/2017 10:49 PM Rotvincent Joanne Add [E87 [id number]] Hypokalemia     12/4/2017 10:49 PM Rotolo Joanne Add [E83 [id number]] Hypomagnesemia       ED Disposition     ED Disposition Condition Comment    Admit  Case was discussed with [facility] and the patient's admission status was agreed to be Admission Status: inpatient status to the service of Dr Beckie Augustin   Follow-up Information    None       Patient's Medications   Discharge Prescriptions    No medications on file     No discharge procedures on file      ED Provider  Electronically Signed by           Rox Storey MD  12/04/17 7626

## 2017-12-06 VITALS
HEIGHT: 69 IN | SYSTOLIC BLOOD PRESSURE: 142 MMHG | DIASTOLIC BLOOD PRESSURE: 90 MMHG | BODY MASS INDEX: 20.73 KG/M2 | RESPIRATION RATE: 18 BRPM | HEART RATE: 81 BPM | WEIGHT: 139.99 LBS | OXYGEN SATURATION: 97 % | TEMPERATURE: 97.7 F

## 2017-12-06 PROBLEM — E83.42 HYPOMAGNESEMIA: Status: RESOLVED | Noted: 2017-06-29 | Resolved: 2017-12-06

## 2017-12-06 LAB
ALBUMIN SERPL BCP-MCNC: 2.6 G/DL (ref 3.5–5)
ALP SERPL-CCNC: 73 U/L (ref 46–116)
ALT SERPL W P-5'-P-CCNC: 29 U/L (ref 12–78)
ANION GAP SERPL CALCULATED.3IONS-SCNC: 9 MMOL/L (ref 4–13)
AST SERPL W P-5'-P-CCNC: 16 U/L (ref 5–45)
BILIRUB SERPL-MCNC: 0.9 MG/DL (ref 0.2–1)
BUN SERPL-MCNC: 11 MG/DL (ref 5–25)
CALCIUM SERPL-MCNC: 7.5 MG/DL (ref 8.3–10.1)
CHLORIDE SERPL-SCNC: 105 MMOL/L (ref 100–108)
CO2 SERPL-SCNC: 28 MMOL/L (ref 21–32)
CREAT SERPL-MCNC: 0.73 MG/DL (ref 0.6–1.3)
CRP SERPL QL: >90 MG/L
ERYTHROCYTE [SEDIMENTATION RATE] IN BLOOD: 30 MM/HOUR (ref 0–10)
FLUAV AG SPEC QL: NORMAL
FLUBV AG SPEC QL: NORMAL
GFR SERPL CREATININE-BSD FRML MDRD: 99 ML/MIN/1.73SQ M
GLUCOSE SERPL-MCNC: 118 MG/DL (ref 65–140)
MAGNESIUM SERPL-MCNC: 2 MG/DL (ref 1.6–2.6)
POTASSIUM SERPL-SCNC: 3.1 MMOL/L (ref 3.5–5.3)
PROT SERPL-MCNC: 6.1 G/DL (ref 6.4–8.2)
RSV B RNA SPEC QL NAA+PROBE: NORMAL
SODIUM SERPL-SCNC: 142 MMOL/L (ref 136–145)

## 2017-12-06 PROCEDURE — 83735 ASSAY OF MAGNESIUM: CPT | Performed by: FAMILY MEDICINE

## 2017-12-06 PROCEDURE — 85652 RBC SED RATE AUTOMATED: CPT | Performed by: FAMILY MEDICINE

## 2017-12-06 PROCEDURE — 86140 C-REACTIVE PROTEIN: CPT | Performed by: FAMILY MEDICINE

## 2017-12-06 PROCEDURE — 80053 COMPREHEN METABOLIC PANEL: CPT | Performed by: FAMILY MEDICINE

## 2017-12-06 RX ORDER — POTASSIUM CHLORIDE 750 MG/1
40 TABLET, EXTENDED RELEASE ORAL 2 TIMES DAILY
Qty: 60 TABLET | Refills: 0 | Status: SHIPPED | OUTPATIENT
Start: 2017-12-06 | End: 2020-07-09

## 2017-12-06 RX ORDER — AMILORIDE HYDROCHLORIDE 5 MG/1
5 TABLET ORAL DAILY
Qty: 30 TABLET | Refills: 0 | Status: SHIPPED | OUTPATIENT
Start: 2017-12-07 | End: 2020-11-08

## 2017-12-06 RX ORDER — POTASSIUM CHLORIDE 20 MEQ/1
40 TABLET, EXTENDED RELEASE ORAL ONCE
Status: DISCONTINUED | OUTPATIENT
Start: 2017-12-06 | End: 2017-12-06 | Stop reason: HOSPADM

## 2017-12-06 RX ORDER — MAGNESIUM 30 MG
30 TABLET ORAL 2 TIMES DAILY
Qty: 60 TABLET | Refills: 0 | Status: SHIPPED | OUTPATIENT
Start: 2017-12-06 | End: 2020-07-09

## 2017-12-06 RX ORDER — FAMOTIDINE 40 MG/5ML
20 POWDER, FOR SUSPENSION ORAL 2 TIMES DAILY
Qty: 50 ML | Refills: 0 | Status: SHIPPED | OUTPATIENT
Start: 2017-12-06 | End: 2018-04-03

## 2017-12-06 RX ORDER — CEPHALEXIN 500 MG/1
500 CAPSULE ORAL EVERY 6 HOURS SCHEDULED
Qty: 20 CAPSULE | Refills: 0 | Status: SHIPPED | OUTPATIENT
Start: 2017-12-06 | End: 2017-12-11

## 2017-12-06 RX ORDER — POTASSIUM CHLORIDE 20 MEQ/1
40 TABLET, EXTENDED RELEASE ORAL ONCE
Status: COMPLETED | OUTPATIENT
Start: 2017-12-06 | End: 2017-12-06

## 2017-12-06 RX ADMIN — FAMOTIDINE 20 MG: 40 POWDER, FOR SUSPENSION ORAL at 09:50

## 2017-12-06 RX ADMIN — NICOTINE 1 PATCH: 21 PATCH, EXTENDED RELEASE TRANSDERMAL at 09:45

## 2017-12-06 RX ADMIN — AMLODIPINE BESYLATE 2.5 MG: 2.5 TABLET ORAL at 09:34

## 2017-12-06 RX ADMIN — AMILORIDE HYDROCHLORIDE 5 MG: 5 TABLET ORAL at 09:33

## 2017-12-06 RX ADMIN — POTASSIUM CHLORIDE 40 MEQ: 1500 TABLET, EXTENDED RELEASE ORAL at 09:44

## 2017-12-06 RX ADMIN — CEFAZOLIN SODIUM 1000 MG: 1 SOLUTION INTRAVENOUS at 14:44

## 2017-12-06 RX ADMIN — FLUTICASONE PROPIONATE AND SALMETEROL 1 PUFF: 50; 100 POWDER RESPIRATORY (INHALATION) at 09:32

## 2017-12-06 RX ADMIN — ENOXAPARIN SODIUM 40 MG: 40 INJECTION SUBCUTANEOUS at 09:31

## 2017-12-06 RX ADMIN — CEFAZOLIN SODIUM 1000 MG: 1 SOLUTION INTRAVENOUS at 06:02

## 2017-12-06 NOTE — PROGRESS NOTES
NEPHROLOGY PROGRESS NOTE   Cali Armstrong II 58 y o  male MRN: [id number]  Unit/Bed#: -01 Encounter: [id number]  Reason for Consult:  Hypokalemia, hypomagnesemia    ASSESSMENT and PLAN:  1  Electolyte abnormalities:  Likely somewhat chronic in nature  Hypokalemia and hypomagnesemia likely secondary to GI losses associated with poor intake/malabsorption/Crohn's  Also patient on a PPI which can contribute to hypomagnesemia  · Potassium level up to 3 1- Continue replacement  · Magnesium 2 0  · Placed on an H2 blocker 12/5  · Amiloride added on 12/05  2  Crohn's Disease: s/p colostomy 8-10 years ago  On [place]  3  Leukocytosis, fever:  Cellulitis Left index finger    · Blood cultures-no growth at 2400 four hours  · Patient on IV Unasyn  4  Hypertension:  Blood pressure acceptable  Well controlled on amlodipine/Amiloride  5  COPD/emphysema  6  Diarrhea    SUMMARY OF RECOMMENDATIONS:  · Replete potassium- will give 40 mEq b i d   · Continue Amiloride  · Check labs in a m  SUBJECTIVE / INTERVAL HISTORY:  No complaints  Feels better today  Stools are more formed  Appetite intact    OBJECTIVE:  Current Weight: Weight - Scale: 63 5 kg (140 lb)  Vitals:    12/05/17 1208 12/05/17 1521 12/05/17 2200 12/06/17 0608   BP: 139/82 135/79 115/63 120/73   Pulse: 94 82 82 76   Resp: 18 18 18 18   Temp: 98 7 °F (37 1 °C) 98 °F (36 7 °C) 98 2 °F (36 8 °C) 97 8 °F (36 6 °C)   TempSrc: Oral Oral Oral Oral   SpO2: 97% 97% 94% 95%   Weight:           Intake/Output Summary (Last 24 hours) at 12/06/17 0831  Last data filed at 12/05/17 2101   Gross per 24 hour   Intake              880 ml   Output             1000 ml   Net             -120 ml   General:  No acute distress  Comfortably lying in bed  Skin:  Skin warm and dry, no rash  Eyes:  Sclera clear  ENT:  Oropharynx moist  Neck:  Supple, no JVD  Chest:   clear bilaterally  CVS:  Regular rhythm, S1, S2, no S3, no murmur  Abdomen:  Soft, nondistended, bowel sounds present  Colostomy present  Extremities:  No lower extremity edema  Left index finger tip/nail bed discolored but warm  :  Voiding  Neuro:  No acute deficit   Alert and oriented  Psych:  Appropriate, pleasant         Medications:    Current Facility-Administered Medications:     acetaminophen (TYLENOL) tablet 650 mg, 650 mg, Oral, Q6H PRN, Emmanuel Daniel, PA-C    albuterol (PROVENTIL HFA,VENTOLIN HFA) inhaler 2 puff, 2 puff, Inhalation, Q6H PRN, Emmanuel Daniel, PA-C    AMILoride tablet 5 mg, 5 mg, Oral, Daily, Maureen Mauricio MD, 5 mg at 12/05/17 1616    amLODIPine (NORVASC) tablet 2 5 mg, 2 5 mg, Oral, Daily, Samul Homans, CRNP    ceFAZolin (ANCEF) IVPB (premix) 1,000 mg, 1,000 mg, Intravenous, Q8H, Emmanuel Sanchez, PA-C, Last Rate: 100 mL/hr at 12/06/17 0602, 1,000 mg at 12/06/17 0602    enoxaparin (LOVENOX) subcutaneous injection 40 mg, 40 mg, Subcutaneous, Daily, Emmanuel Daniel, PA-C, 40 mg at 12/05/17 0809    famotidine (PEPCID) oral suspension 20 mg, 20 mg, Oral, BID, Samul Homans, CRNP, 20 mg at 12/05/17 1800    fluticasone-salmeterol (ADVAIR) 100-50 mcg/dose inhaler 1 puff, 1 puff, Inhalation, Q12H [address], Emmanuel Daniel, PA-C, 1 puff at 12/05/17 2151    melatonin tablet 6 mg, 6 mg, Oral, HS, Emmanuel Daniel, PA-C, 6 mg at 12/05/17 2116    nicotine (NICODERM CQ) 21 mg/24 hr TD 24 hr patch 1 patch, 1 patch, Transdermal, Daily, Emmanuel Daniel, PA-C, 1 patch at 12/05/17 0807    ondansetron (ZOFRAN) injection 4 mg, 4 mg, Intravenous, Q6H PRN, Emmanuel Daniel, PA-C    [COMPLETED] sodium chloride 0 9 % bolus 1,000 mL, 1,000 mL, Intravenous, Once, Stopped at 12/04/17 2316 **FOLLOWED BY** sodium chloride 0 9 % bolus 1,000 mL, 1,000 mL, Intravenous, Once, Mady Caruso MD, Stopped at 12/05/17 0150    Laboratory Results:    Results from last 7 days  Lab Units 12/06/17  0437 12/05/17  1554 12/05/17  1256 12/05/17  0509 12/04/17  2105   WBC Thousand/uL  --  16 63*  --  19 17* 15 72* HEMOGLOBIN g/dL  --  12 5  --  12 7 14 2   HEMATOCRIT %  --  35 9*  --  35 8* 39 3   PLATELETS Thousands/uL  --  136*  --  144* 164   SODIUM mmol/L 142  --   --  137 140   POTASSIUM mmol/L 3 1* 3 4*  --  2 3* 2 6*   CHLORIDE mmol/L 105  --   --  100 98*   CO2 mmol/L 28  --   --  29 32   BUN mg/dL 11  --   --  5 8   CREATININE mg/dL 0 73  --   --  0 65 0 73   CALCIUM mg/dL 7 5*  --   --  7 2* 7 9*   MAGNESIUM mg/dL 2 0  --  1 1*  --  0 6*   ALBUMIN g/dL 2 6*  --   --   --  3 3*   TOTAL PROTEIN g/dL 6 1*  --   --   --  6 9   GLUCOSE RANDOM mg/dL 118  --   --  102 99     Previous work up:

## 2017-12-06 NOTE — PROGRESS NOTES
Consult Note - Vascular Surgery   [facility]: [phone]    Assessment:  Left index finger cellulitis   Discoloration, left index finger  Nicotine dependence, 1 5ppd x44 years  History of trauma, severed tip L 2nd digit complicated by osteomyelitis requiring surgical debridement of distal phalanx   Hypertension  Emphysema  Crohn's disease, s/p colostomy      Plan:  -Easily palpable radial and ulnar pulses  Good capillary refill  Not concerning for gangrene  Clinical exam and history more consistent with cellulitis  Would treat as such  No need for further testing from a vascular standpoint  Smoking cessation  Discussed with Dr Markus Anne and WADE DE SOUZA    ______________________________________________________________________    Consulting Service: [facility]    Chief Complaint: Evaluate gangrene L 2nd finger    HPI: Sonya Altman is a 58 y o  male with HTN, COPD, Crohn's, Nicotine dependence and  history of trauma to the left 2nd digit that was complicated by osteomyelitis requiring surgical debridement of the distal phalanx 8-10 years ago who presented with discoloration of the left index finger x2 weeks  He does admit to scraping his left index finger on the pavement and sustaining a small abrasion around the same time  He further developed fever of 102 at home and chills  He was concerned about gangrene given the discoloration of his finger tip any presented to the emergency room for evaluation  Vascular is consulted for evaluation of gangrene  Presently denies any pain or discomfort of the left finger tip  He has chronic paresthesias of the left finger tip since trauma and surgical repair at [facility] 8-10 years ago  There is a brown/ecchymotic discoloration of the lateral aspect of the 2nd digit  The nail bed is brown  The area of abrasion from 2 weeks ago has shed a scab and underlying skin is well healed  There is no drainage or induration  There is mild swelling of the tip of index finger    There is good capillary refill  He has easily palpable radial and ulnar pulses  Motor function of the hand grossly intact  X-ray of the hand soft swelling of the mid and distal 2nd digit, no gas or collection  Was no evidence of osteomyelitis  WBC count is 15,000 on admission  WBC 16 63 today  He is afebrile  T Max 99 5 on admission  Preliminarily blood cultures are negative times 24 hours  Review of Systems:  General: negative  Cardiovascular: no chest pain or dyspnea on exertion  Respiratory: no cough, shortness of breath, or wheezing  Gastrointestinal: positive for - colostomy   Genitourinary ROS: no dysuria, trouble voiding, or hematuria  Musculoskeletal ROS: negative  Neurological ROS: no TIA or stroke symptoms  Hematological and Lymphatic ROS: negative  Dermatological ROS: as noted in HPI   Psychological ROS: negative  Ophthalmic ROS: negative  ENT ROS: negative    Past Medical History:  Past Medical History:   Diagnosis Date    LUCILLE [facility]acute kidney injury) ([facility] ) 6/28/2017    Cataract     Colostomy in place ([facility] ) 6/29/2017    Crohn disease ([place] [address] )     Emphysema of lung ([facility] )     Emphysema/COPD ([facility] )     Hypertension     Hypokalemia 6/28/2017    Hypomagnesemia 6/29/2017    Hyponatremia 6/28/2017       Past Surgical History:  Past Surgical History:   Procedure Laterality Date    CHOLECYSTECTOMY      COLECTOMY      10 inchs of ileum    COLONOSCOPY N/A 6/30/2017    Procedure: COLONOSCOPY;  Surgeon: Lan Queen MD;  [facility]; Service: Gastroenterology    COLOSTOMY      FINGER SURGERY Left     LITHOTRIPSY         Social History:  History   Alcohol Use    Yes     Comment: weekend social     History   Drug Use No     History   Smoking Status    Current Every Day Smoker    Packs/day: 1 50    Types: Cigarettes   Smokeless Tobacco    Never Used       Family History:  History reviewed  No pertinent family history      Allergies:  No Known Allergies    Medications:  Current Facility-Administered Medications   Medication Dose Route Frequency    acetaminophen (TYLENOL) tablet 650 mg  650 mg Oral Q6H PRN    albuterol (PROVENTIL HFA,VENTOLIN HFA) inhaler 2 puff  2 puff Inhalation Q6H PRN    AMILoride tablet 5 mg  5 mg Oral Daily    amLODIPine (NORVASC) tablet 2 5 mg  2 5 mg Oral Daily    ceFAZolin (ANCEF) IVPB (premix) 1,000 mg  1,000 mg Intravenous Q8H    enoxaparin (LOVENOX) subcutaneous injection 40 mg  40 mg Subcutaneous Daily    famotidine (PEPCID) oral suspension 20 mg  20 mg Oral BID    fluticasone-salmeterol (ADVAIR) 100-50 mcg/dose inhaler 1 puff  1 puff Inhalation Q12H [address]    melatonin tablet 6 mg  6 mg Oral HS    nicotine (NICODERM CQ) 21 mg/24 hr TD 24 hr patch 1 patch  1 patch Transdermal Daily    ondansetron (ZOFRAN) injection 4 mg  4 mg Intravenous Q6H PRN    sodium chloride 0 9 % bolus 1,000 mL  1,000 mL Intravenous Once       Vitals:  Vitals:    12/06/17 0608   BP: 120/73   Pulse: 76   Resp: 18   Temp: 97 8 °F (36 6 °C)   SpO2: 95%       I/Os:  I/O last 3 completed shifts: In: 3080 [P O :880; IV Piggyback:2200]  Out: 2250 [Urine:1250; Stool:1000]  No intake/output data recorded      Lab Results and Cultures:   CBC with diff:   Lab Results   Component Value Date    WBC 16 63 (H) 12/05/2017    HGB 12 5 12/05/2017    HCT 35 9 (L) 12/05/2017    MCV 99 (H) 12/05/2017     (L) 12/05/2017    MCH 34 4 (H) 12/05/2017    MCHC 34 8 12/05/2017    RDW 12 7 12/05/2017    MPV 10 5 12/05/2017   ,   BMP/CMP:  Lab Results   Component Value Date     12/06/2017    K 3 1 (L) 12/06/2017     12/06/2017    CO2 28 12/06/2017    ANIONGAP 9 12/06/2017    BUN 11 12/06/2017    CREATININE 0 73 12/06/2017    GLUCOSE 118 12/06/2017    GLUCOSE 116 05/17/2017    CALCIUM 7 5 (L) 12/06/2017    AST 16 12/06/2017    ALT 29 12/06/2017    ALKPHOS 73 12/06/2017    PROT 6 1 (L) 12/06/2017    ALBUMIN 2 6 (L) 12/06/2017    BILITOT 0 90 12/06/2017    EGFR 99 12/06/2017   ,   Lipid Panel: No results found for: CHOL,   Coags:   Lab Results   Component Value Date    PTT 30 12/04/2017    INR 0 94 12/04/2017   ,     Blood Culture:   Lab Results   Component Value Date    BLOODCX No Growth at 24 hrs  12/04/2017   ,   Urinalysis: No results found for: Aleshia Penning, SPECGRAV, PHUR, LEUKOCYTESUR, NITRITE, PROTEINUA, GLUCOSEU, KETONESU, BILIRUBINUR, BLOODU,   Urine Culture: No results found for: URINECX,   Wound Culure: No results found for: WOUNDCULT    Imaging:  LEFT FINGER     INDICATION: Distal left second digit pain and swelling       COMPARISON: None     VIEWS:  PA view hand and 2 cone-down views of the     digit     IMAGES:  3  For the purposes of institution wide universal language the following terms will apply: (thumb=1st digit/finger, index finger=2nd digit/finger, long finger=3rd digit/finger, ring=4th digit/finger and small finger=5th digit/finger)     FINDINGS:     Metallic clips project over the second distal interphalangeal joint      There is no acute fracture or dislocation      No significant degenerative changes      No lytic or blastic lesion      Soft tissue swelling throughout the mid and distal second digit  No evidence of gas or collection        IMPRESSION:     Soft tissue swelling throughout the mid and distal second digit  No evidence of gas or collection  No radiographic evidence of osteomyelitis  Physical Exam:    General appearance: alert, appears stated age and cooperative  Head: Normocephalic, without obvious abnormality, atraumatic  Eyes: conjunctivae/corneas clear  PERRL, EOM's intact  Fundi benign  Throat: lips, mucosa, and tongue normal; teeth and gums normal  Neck: no carotid bruit, no JVD and supple, symmetrical, trachea midline  Back: symmetric, no curvature  ROM normal  No CVA tenderness    Lungs: clear to auscultation bilaterally  Chest wall: no tenderness  Heart: regular rate and rhythm, S1, S2 normal, no murmur, click, rub or gallop  Abdomen: soft, non-tender; bowel sounds normal; no masses,  no organomegaly and LLQ colostomy   Genitalia: deferred  Rectal: deferred  Extremities: Left 2nd digit with brown discoloration (see photos), mild swelling, good capillary refill, no evidence of tissue necrosis  Bilateral lower extremities no edema  Skin: Skin color, texture, turgor normal  No rashes or lesions  Neurologic: Alert and oriented X 3, normal strength and tone  Normal symmetric reflexes   Normal coordination and gait    Wound/Incision:  L 2nd digit As above               Pulse exam:  Radial: Right: 2+ Left[de-identified] 2+  Ulnar: Right: 1+ Left[de-identified] 1+  Femoral: Right: 1+ Left: 2+  Popliteal: Right: 2+ Left: 2+  DP: Right: 2+ Left: 1+  PT: Right: 2+ Left: 2+    RAMANA Santiago  12/6/2017

## 2017-12-06 NOTE — ASSESSMENT & PLAN NOTE
1  K stable at 3 1 today  Received 40 mEq K today  Will continue to monitor and replete accordingly  2  Appreciate nephro recommendations

## 2017-12-06 NOTE — PROGRESS NOTES
Progress Note - Gris Mccarthy II 58 y o  male MRN: [id number]    Unit/Bed#: -01 Encounter: [id number]    * Cellulitis   Assessment & Plan    1  Pt recently recalled that he scraped the tip of his second finger two weeks ago  2  Response to IV Ancef: afebrile, subjective fever and chills improving as well as full return to normal appetite  All other VSS  3  Leukocytosis improving- 16 6 yesterday from 19 the previous day  Will trend another WBC with CBC  4  XR right second digit shows not signs of osteomyelitis or gangrene  5  Spoke with Vascular, no major arterial abnormalities  More so a microvascular issue  Pt counseled on quitting smoking for improvement of microvascular circulation  6  Blood cx pending  Hypokalemia   Assessment & Plan    1  K stable at 3 1 today  Received 40 mEq K today  Will continue to monitor and replete accordingly  2  Appreciate nephro recommendations  Hypomagnesemia   Assessment & Plan    1  Resolved at 2 0 today  Nephro following  Crohn disease ([facility] [address] )   Assessment & Plan    1  Pt had flair upon admission, diarrhea has since resolved as well as his appetite  Emphysema/COPD [facility])   Assessment & Plan    1  Stable, continue home regimen of Advair BID and albuterol PRN  Hypertension   Assessment & Plan    1  /90 and stable  2  Continue amlodipine as well as amlioride per nephro recommendation  VTE Pharmacologic Prophylaxis:   Pharmacologic: none needed  Mechanical VTE Prophylaxis in Place: No    Patient Centered Rounds: I have performed bedside rounds with nursing staff today  Discussions with Specialists or Other Care Team Provider: Vascular    Education and Discussions with Family / Patient: updated patient, advised cessation of smoking    Time Spent for Care: 30 minutes  More than 50% of total time spent on counseling and coordination of care as described above      Current Length of Stay: 2 day(s)    Current Patient Status: Inpatient   Certification Statement: The patient, who was admitted as an inpatient, is being discharged sooner than originally anticipated due to: lack of sepsis    Discharge Plan: possibly today    Code Status: Level 1 - Full Code      Subjective: This is a 58year old male who presented to the ED with finger swelling  Today he states that he feels much better  He states that there is no pain in his finger and that his fever and chills are almost gone  He had some episodes of sweating last night but no sweating this morning and no chills  He is able to move his finger with full ROM without pain and has almost full feeling with the exception of some baseline numbness he developed following re-attachment of that finger  He is not weak or fatigued and has full strength of both hands and fingers  He states that he feels much better than when he was admitted  He also states that his diarrhea is resolved  He states that he is now "eating like a pig" versus when he came in and could only tolerate ice water  He is tolerating all his meals  He denies chest pain, shortness of breath, abdominal pain, headaches, lightheadedness, numbness or tingling of his extremities, or leg pain/swelling  Objective:     Vitals:   Temp (24hrs), Av 1 °F (36 7 °C), Min:97 7 °F (36 5 °C), Max:98 7 °F (37 1 °C)    HR:  [76-94] 81  Resp:  [18] 18  BP: (115-142)/(63-90) 142/90  SpO2:  [94 %-97 %] 97 %  Body mass index is 20 67 kg/m²  Input and Output Summary (last 24 hours): Intake/Output Summary (Last 24 hours) at 17 1045  Last data filed at 17 2101   Gross per 24 hour   Intake              880 ml   Output             1000 ml   Net             -120 ml       Physical Exam:     Physical Exam   Constitutional: He is oriented to person, place, and time  He appears well-developed and well-nourished  No distress  HENT:   Head: Normocephalic and atraumatic     Cardiovascular: Normal rate, regular rhythm, normal heart sounds and intact distal pulses  Exam reveals no gallop and no friction rub  No murmur heard  Pulmonary/Chest: Effort normal and breath sounds normal  No respiratory distress  He has no wheezes  He has no rales  He exhibits no tenderness  Musculoskeletal: Normal range of motion  He exhibits no edema or tenderness  Radial and ulnar pulses 2+, full and equal sensation of second left digit  5/5  strength bilaterally and 5/5 index finger strength  Neurological: He is alert and oriented to person, place, and time  Skin: Skin is warm and dry  He is not diaphoretic  Left index finger- patchy brown areas around fingertip, no warmth or erythema  Mildly edematous although could be baseline following re-attachment surgery  Additional Data:     Labs:      Results from last 7 days  Lab Units 12/05/17  1554   WBC Thousand/uL 16 63*   HEMOGLOBIN g/dL 12 5   HEMATOCRIT % 35 9*   PLATELETS Thousands/uL 136*   NEUTROS PCT % 83*   LYMPHS PCT % 11*   MONOS PCT % 6   EOS PCT % 0       Results from last 7 days  Lab Units 12/06/17  0437   SODIUM mmol/L 142   POTASSIUM mmol/L 3 1*   CHLORIDE mmol/L 105   CO2 mmol/L 28   BUN mg/dL 11   CREATININE mg/dL 0 73   CALCIUM mg/dL 7 5*   TOTAL PROTEIN g/dL 6 1*   BILIRUBIN TOTAL mg/dL 0 90   ALK PHOS U/L 73   ALT U/L 29   AST U/L 16   GLUCOSE RANDOM mg/dL 118       Results from last 7 days  Lab Units 12/04/17  2105   INR  0 94       * I Have Reviewed All Lab Data Listed Above  * Additional Pertinent Lab Tests Reviewed: [address] Admission Reviewed    Imaging:    Imaging Reports Reviewed Today Include: XR left second index digit  Imaging Personally Reviewed by Myself Includes:  none    Recent Cultures (last 7 days):       Results from last 7 days  Lab Units 12/05/17  1634 12/04/17  2119 12/04/17  2105   BLOOD CULTURE   --  No Growth at 24 hrs  No Growth at 24 hrs     INFLUENZA A PCR  None Detected  --   --    INFLUENZA B PCR  None Detected  --   --    RSV PCR  None Detected  --   --        Last 24 Hours Medication List:     AMILoride 5 mg Oral Daily   amLODIPine 2 5 mg Oral Daily   cefazolin 1,000 mg Intravenous Q8H   enoxaparin 40 mg Subcutaneous Daily   famotidine 20 mg Oral BID   fluticasone-salmeterol 1 puff Inhalation Q12H [address]   melatonin 6 mg Oral HS   nicotine 1 patch Transdermal Daily   potassium chloride 40 mEq Oral Once   sodium chloride 1,000 mL Intravenous Once        Today, Patient Was Seen By: Curly Stephenson PA-C    ** Please Note: Dictation voice to text software may have been used in the creation of this document   **

## 2017-12-06 NOTE — PLAN OF CARE

## 2017-12-06 NOTE — ASSESSMENT & PLAN NOTE
1  Pt recently recalled that he scraped the tip of his second finger two weeks ago  2  Response to IV Ancef: afebrile, subjective fever and chills improving as well as full return to normal appetite  All other VSS  3  Leukocytosis improving- 16 6 yesterday from 19 the previous day  Will trend another WBC with CBC  4  XR right second digit shows not signs of osteomyelitis or gangrene  5  Spoke with Vascular, no major arterial abnormalities  More so a microvascular issue  Pt counseled on quitting smoking for improvement of microvascular circulation  6  Pre-liminary blood cx results show no growth at 48 hours  7  Pt denies respiratory, GI or urinary symptoms

## 2017-12-07 NOTE — DISCHARGE SUMMARY
Discharge Summary - [facility] Internal Medicine    Patient Information: Lou Muir II 58 y o  male MRN: [id number]  Unit/Bed#: -01 Encounter: [id number]    Discharging Physician / Practitioner: Salinas Colon MD  PCP: Martinez Marinelli MD  Admission Date: 12/4/2017  Discharge Date: 12/06/17    Reason for Admission: left index finer celluitis    Discharge Diagnoses:     Principal Problem:    Cellulitis  Active Problems:    Hypertension    Emphysema/COPD ([facility] )    Crohn disease ([facility] )    Hypokalemia    Colostomy in place [facility])  Resolved Problems:    Hypomagnesemia      Consultations During Hospital Stay:  · vascular surgery  · nephrology    Procedures Performed:     Xray finger-Soft tissue swelling throughout the mid and distal second digit  No evidence of gas or collection  No radiographic evidence of osteomyelitis        Significant Findings / Test Results:     · Blood culture- negative at 24 hrs    Incidental Findings:   ·     Test Results Pending at Discharge (will require follow up): · Blood culture  Outpatient Tests Requested:  Bmp ,mag I a week    Complications:  none    Hospital Course:     Lou Muir II is a 58 y o  male patient who originally presented to the hospital on 12/4/2017 due to discoloration of the left index finger  The patient remained any kind of trauma  Patient was also complaining of fever at home and he was admitted with the diagnosis of cellulitis and started on antibiotics  Since the patient was denying any trauma and there was no pain in the left index finger there was a concern that this may be related to gangrene vascular surgery was evaluated the patient and recommended that his symptoms are more consistent with cellulitis and follow with the antibiotic treatment at this point  The discoloration and erythema significantly improved  Patient will be continued on Keflex to finish the course of antibiotics for a total of 7 days    Patient was also found to have significant electrolyte abnormalities with magnesium of 0 6 and potassium of 2 8 at the time of presentation the hyperkalemia was appearing to be chronic  Given the significantly electrolyte abnormalities renal was consulted and that any renal loss  But nephrology felt that the reason for the hypomagnesemia and hypokalemia is is GI loss patient's electrolytes were repleted and the patient will be going home with p o  magnesium and potassium and this needs to be followed as an outpatient either by his gastroenterologist or by his PCP patient with colostomy and the stool output remains unchanged from before  Patient remained hemodynamically stable and afebrile in the hospital and he was discharged in a stable condition on 12/6/2017 to finish the course of antibiotics and to follow up with the primary care physician  For details refer to the chart    Condition at Discharge: good     Discharge Day Visit / Exam:     * Please refer to separate progress note for these details *    Discussion with Family:       Discharge instructions/Information to patient and family:   See after visit summary for information provided to patient and family  Provisions for Follow-Up Care:  See after visit summary for information related to follow-up care and any pertinent home health orders  Disposition:     Home    For Discharges to [facility] SNF:     Planned Readmission: *none    Discharge Statement:  I spent 45 minutes discharging the patient  This time was spent on the day of discharge  I had direct contact with the patient on the day of discharge  Greater than 50% of the total time was spent examining patient, answering all patient questions, arranging and discussing plan of care with patient as well as directly providing post-discharge instructions  Additional time then spent on discharge activities  Discharge Medications:  See after visit summary for reconciled discharge medications provided to patient and family  ** Please Note: This note has been constructed using a voice recognition system   **

## 2017-12-07 NOTE — CASE MANAGEMENT
Notification of Discharge  This is a Notification of Discharge from our facility [address]  Please be advised that this patient has been discharge from our facility  Below you will find the admission and discharge date and time including the patients disposition  PRESENTATION DATE: 12/4/2017  8:05 PM  IP ADMISSION DATE: 12/4/17 2250  DISCHARGE DATE: 12/6/2017  4:51 PM  DISPOSITION: [address] [place] [address] in the [facility] by Valentin Cameron for 2017  Network Utilization Review Department  Phone: [phone]; Fax [phone]  ATTENTION: The Network Utilization Review Department is now centralized for our 7 Facilities  Make a note that we have a new phone and fax numbers for our Department  Please call with any questions or concerns to [phone] and carefully follow the prompts so that you are directed to the right person  All voicemails are confidential  Fax any determinations, approvals, denials, and requests for initial or continue stay review clinical to [phone]  Due to HIGH CALL volume, it would be easier if you could please send faxed requests to expedite your requests and in part, help us provide discharge notifications faster

## 2017-12-09 LAB — BACTERIA BLD CULT: NORMAL

## 2017-12-10 LAB — BACTERIA BLD CULT: NORMAL

## 2017-12-13 NOTE — CONSULTS
Consult Note - Vascular Surgery   [facility]: [phone]    Assessment:  Left index finger cellulitis   Discoloration, left index finger  Nicotine dependence, 1 5ppd x44 years  History of trauma, severed tip L 2nd digit complicated by osteomyelitis requiring surgical debridement of distal phalanx   Hypertension  Emphysema  Crohn's disease, s/p colostomy      Plan:  -Easily palpable radial and ulnar pulses  Good capillary refill  Not concerning for gangrene  Clinical exam and history more consistent with cellulitis  Would treat as such  No need for further testing from a vascular standpoint  Smoking cessation  Discussed with Dr Sindhu Monzon and WADE DE SOUZA    ______________________________________________________________________    Consulting Service: [facility]    Chief Complaint: Evaluate gangrene L 2nd finger    HPI: Val Henderson is a 58 y o  male with HTN, COPD, Crohn's, Nicotine dependence and  history of trauma to the left 2nd digit that was complicated by osteomyelitis requiring surgical debridement of the distal phalanx 8-10 years ago who presented with discoloration of the left index finger x2 weeks  He does admit to scraping his left index finger on the pavement and sustaining a small abrasion around the same time  He further developed fever of 102 at home and chills  He was concerned about gangrene given the discoloration of his finger tip any presented to the emergency room for evaluation  Vascular is consulted for evaluation of gangrene  Presently denies any pain or discomfort of the left finger tip  He has chronic paresthesias of the left finger tip since trauma and surgical repair at [facility] 8-10 years ago  There is a brown/ecchymotic discoloration of the lateral aspect of the 2nd digit  The nail bed is brown  The area of abrasion from 2 weeks ago has shed a scab and underlying skin is well healed  There is no drainage or induration  There is mild swelling of the tip of index finger    There is good capillary refill  He has easily palpable radial and ulnar pulses  Motor function of the hand grossly intact  X-ray of the hand soft swelling of the mid and distal 2nd digit, no gas or collection  Was no evidence of osteomyelitis  WBC count is 15,000 on admission  WBC 16 63 today  He is afebrile  T Max 99 5 on admission  Preliminarily blood cultures are negative times 24 hours  Review of Systems:  General: negative  Cardiovascular: no chest pain or dyspnea on exertion  Respiratory: no cough, shortness of breath, or wheezing  Gastrointestinal: positive for - colostomy   Genitourinary ROS: no dysuria, trouble voiding, or hematuria  Musculoskeletal ROS: negative  Neurological ROS: no TIA or stroke symptoms  Hematological and Lymphatic ROS: negative  Dermatological ROS: as noted in HPI   Psychological ROS: negative  Ophthalmic ROS: negative  ENT ROS: negative    Past Medical History:  Past Medical History:   Diagnosis Date    LUCILLE (acute kidney injury) ([facility] ) 6/28/2017    Cataract     Colostomy in place ([facility] ) 6/29/2017    Crohn disease ([place] [address] )     Emphysema of lung ([facility] )     Emphysema/COPD ([facility] )     Hypertension     Hypokalemia 6/28/2017    Hypomagnesemia 6/29/2017    Hyponatremia 6/28/2017       Past Surgical History:  Past Surgical History:   Procedure Laterality Date    CHOLECYSTECTOMY      COLECTOMY      10 inchs of ileum    COLONOSCOPY N/A 6/30/2017    Procedure: COLONOSCOPY;  Surgeon: Kendal Corrigna MD;  [facility]; Service: Gastroenterology    COLOSTOMY      FINGER SURGERY Left     LITHOTRIPSY         Social History:  History   Alcohol Use    Yes     Comment: weekend social     History   Drug Use No     History   Smoking Status    Current Every Day Smoker    Packs/day: 1 50    Types: Cigarettes   Smokeless Tobacco    Never Used       Family History:  History reviewed  No pertinent family history      Allergies:  No Known Allergies    Medications:  No current facility-administered medications for this encounter  Vitals:  Vitals:    12/06/17 0900   BP: 142/90   Pulse: 81   Resp: 18   Temp: 97 7 °F (36 5 °C)   SpO2: 97%       I/Os:  No intake/output data recorded  No intake/output data recorded  Lab Results and Cultures:   CBC with diff:   Lab Results   Component Value Date    WBC 16 63 (H) 12/05/2017    HGB 12 5 12/05/2017    HCT 35 9 (L) 12/05/2017    MCV 99 (H) 12/05/2017     (L) 12/05/2017    MCH 34 4 (H) 12/05/2017    MCHC 34 8 12/05/2017    RDW 12 7 12/05/2017    MPV 10 5 12/05/2017   ,   BMP/CMP:  Lab Results   Component Value Date     12/06/2017    K 3 1 (L) 12/06/2017     12/06/2017    CO2 28 12/06/2017    ANIONGAP 9 12/06/2017    BUN 11 12/06/2017    CREATININE 0 73 12/06/2017    GLUCOSE 118 12/06/2017    GLUCOSE 116 05/17/2017    CALCIUM 7 5 (L) 12/06/2017    AST 16 12/06/2017    ALT 29 12/06/2017    ALKPHOS 73 12/06/2017    PROT 6 1 (L) 12/06/2017    ALBUMIN 2 6 (L) 12/06/2017    BILITOT 0 90 12/06/2017    EGFR 99 12/06/2017   ,   Lipid Panel: No results found for: CHOL,   Coags:   Lab Results   Component Value Date    PTT 30 12/04/2017    INR 0 94 12/04/2017   ,     Blood Culture:   Lab Results   Component Value Date    BLOODCX No Growth After 5 Days   12/04/2017   ,   Urinalysis: No results found for: Raguel Haff, SPECGRAV, PHUR, LEUKOCYTESUR, NITRITE, PROTEINUA, GLUCOSEU, KETONESU, BILIRUBINUR, BLOODU,   Urine Culture: No results found for: URINECX,   Wound Culure: No results found for: WOUNDCULT    Imaging:  LEFT FINGER     INDICATION: Distal left second digit pain and swelling       COMPARISON: None     VIEWS:  PA view hand and 2 cone-down views of the     digit     IMAGES:  3  For the purposes of institution wide universal language the following terms will apply: (thumb=1st digit/finger, index finger=2nd digit/finger, long finger=3rd digit/finger, ring=4th digit/finger and small finger=5th digit/finger)     FINDINGS:     Metallic clips project over the second distal interphalangeal joint      There is no acute fracture or dislocation      No significant degenerative changes      No lytic or blastic lesion      Soft tissue swelling throughout the mid and distal second digit  No evidence of gas or collection        IMPRESSION:     Soft tissue swelling throughout the mid and distal second digit  No evidence of gas or collection  No radiographic evidence of osteomyelitis  Physical Exam:    General appearance: alert, appears stated age and cooperative  Head: Normocephalic, without obvious abnormality, atraumatic  Eyes: conjunctivae/corneas clear  PERRL, EOM's intact  Fundi benign  Throat: lips, mucosa, and tongue normal; teeth and gums normal  Neck: no carotid bruit, no JVD and supple, symmetrical, trachea midline  Back: symmetric, no curvature  ROM normal  No CVA tenderness  Lungs: clear to auscultation bilaterally  Chest wall: no tenderness  Heart: regular rate and rhythm, S1, S2 normal, no murmur, click, rub or gallop  Abdomen: soft, non-tender; bowel sounds normal; no masses,  no organomegaly and LLQ colostomy   Genitalia: deferred  Rectal: deferred  Extremities: Left 2nd digit with brown discoloration (see photos), mild swelling, good capillary refill, no evidence of tissue necrosis  Bilateral lower extremities no edema  Skin: Skin color, texture, turgor normal  No rashes or lesions  Neurologic: Alert and oriented X 3, normal strength and tone  Normal symmetric reflexes   Normal coordination and gait    Wound/Incision:  L 2nd digit As above               Pulse exam:  Radial: Right: 2+ Left[de-identified] 2+  Ulnar: Right: 1+ Left[de-identified] 1+  Femoral: Right: 1+ Left: 2+  Popliteal: Right: 2+ Left: 2+  DP: Right: 2+ Left: 1+  PT: Right: 2+ Left: 2+    RAMANA Cohn  12/13/2017

## 2018-01-10 NOTE — MISCELLANEOUS
Message  spoke to patient, he's doing much better on prednisone and Pentasa  Unclear if his inflammation was due to salmonella infection versus his known history of Crohn's disease        Signatures   Electronically signed by : DEAN Tong ; Jul 11 2017 10:24AM EST                       (Author)

## 2018-01-10 NOTE — RESULT NOTES
Discussion/Summary   Please inform patient that his recent stool tests were normal   Some of his markers of inflammation were mildly elevated  I am still awaiting the results of the MRI  Please make sure that he has office follow-up in 3 months time  Verified Results  (1) CALPROTECTIN, FECAL 21Sep2017 09:13AM Mina Huang    Order Number: [id number]     Test Name Result Flag Reference   CALPROTECTIN, FECAL 31 ug/g  0 - 120   Concentration     Interpretation   Follow-Up  <16 - 50 ug/g     Normal           None  >50 -120 ug/g     Borderline       Re-evaluate in 4-6 weeks      >120 ug/g     Abnormal         Repeat as clinically                                      indicated    Performed at:  [address]  [id number]  : Jumana Dalal MD, Phone:  [phone]     (1) TPMT ENZYME ACTIVITY, BLOOD 21Sep2017 09:13AM Gomez Bee     Test Name Result Flag Reference   TPMT ACTIVITY 24 5 Units/mL RBC     Reference Range:  Normal: 15 1 - 26 4  Heterozygous for low TPMT variant: 6 3 - 15 0  Homozygous for low TPMT variant: <6 3   INTERPRETATION Comment     The above results can be interpreted as Normal for red  blood cell Thiopurine Methyltransferase activity  For  patients having an intrinsic low level of TPMT, recent RBC  transfusion can variably increase their assayed enzymatic  activity depending on the amount and circulating half-life  of the transfused red blood cells  This test was developed and its performance characteristics  determined by [facility]  It has not been cleared or approved  by the Food and Drug Administration    This case has been reviewed, approved, interpreted and  electronically signed by Lindy Alonso, PhD    METHODOLOGY Comment     Enzymatic Endpoint/Liquid Chromatography - Tandem Mass Spectrometry  (LC-MS/MS)  Performed at:  [address] [facility] Endocrinology  [address] [place] [address]  [de-identified]  Lab Director: Blanca Eckert MD, Phone:  [phone]     (1) TPMT GENETIC TEST, BLOOD [id number] 09:13AM Analy Leganastacio     Test Name Result Flag Reference   BACKGROUND Comment     The TPMT gene, reference sequence [id number], on chromosome  6p22 3 encodes thiopurine S-methyltransferase, TPMT, an  enzyme that metabolizes and inactivates thiopurine drugs  such as azathioprine, 6-mercaptopurine, and 6-thioguanine  TPMT deficiency variants lead to increased levels of the  activated form of thiopurine drugs, causing an increased  risk for bone marrow toxicity  Approximately 95% of  patients with low TPMT activity have one or more of the  following common mutations: TPMT*2 (c 238G>C), TPMT *3B  (c 460G>A), TPMT *3C (c 719A>G), and TPMT *3A (both  c 460G>A and c 719A>G, which usually occur together on the  same chromosome)  This laboratory developed test detects  the *2, *3A, *3B, *3C, and corresponding *1 wild type  alleles in the TPMT gene by PCR and multiplex  mini-sequencing  Results are reported using nomenclature  recommended by the [facility])  The [facility] thiopurine dosing guidelines  can be found at the [employer] website:  <http://www  pharmgkb org/gene/>  Performed at:  [address] [facility] Endocrinology  [address] [place] [address]  [de-identified]  : Blanca Eckert MD, Phone:  [phone]   TPMT COMMENT Comment     As with all PCR tests, the possibility cannot be ruled out  that a rare polymorphism or unusual mutation alters  amplification and leads to a false negative result  Donor  DNA from transplants and recent transfusions can lead to  inaccurate results  Only three variants are detected by  this assay; the TPMT Enzyme Activity in Erythrocytes test  (623012) might detect the phenotype associated with other  rare TPMT deficiency alleles  These results should be  interpreted in the context of this individual's clinical  history   Adverse reactions to thiopurines may be affected  by additional genetic or environmental causes  Therapeutic  drug monitoring, for example the Thiopurine Metabolites  test (962147), is recommended  This test was developed and its performance characteristics  determined by [facility]  It has not been cleared or approved  by the Food and Drug Administration  This case has been reviewed, approved, interpreted and  electronically signed by Dannielle Velázquez PhD   This case has been reviewed, approved, interpreted and  electronically signed by Dannielle Velázquez PhD    TPMT INTERPRETATION Comment     The TPMT mutations *2, *3A, *3B, and *3C were not detected  This individual most likely has the wild type *1 alleles  for these variant positions  This patient is expected to  have normal TPMT activity, but has a residual risk of  having a mutation not detected by this assay     TPMT GENOTYPE *1/*1

## 2018-01-13 VITALS
HEART RATE: 78 BPM | DIASTOLIC BLOOD PRESSURE: 78 MMHG | HEIGHT: 69 IN | BODY MASS INDEX: 21.09 KG/M2 | RESPIRATION RATE: 14 BRPM | WEIGHT: 142.38 LBS | OXYGEN SATURATION: 98 % | TEMPERATURE: 97 F | SYSTOLIC BLOOD PRESSURE: 122 MMHG

## 2018-01-13 NOTE — RESULT NOTES
Verified Results  (1) C-REACTIVE PROTEIN 21Sep2017 09:13AM Mina Huang    Order Number: [id number]     Test Name Result Flag Reference   C-REACT PROTEIN 6 8 mg/L H <3 0     (1) SED RATE 21Sep2017 09:13AM Mina Huang    Order Number: [id number]     Test Name Result Flag Reference   SED RATE 13 mm/hour H 0-10     (1) CBC/ PLT (NO DIFF) 21Sep2017 09:13AM Mina Huang    Order Number: [id number]     Test Name Result Flag Reference   HEMATOCRIT 40 3 %  36 5-49 3   HEMOGLOBIN 14 8 g/dL  12 0-17 0   MCHC 36 7 g/dL  31 4-37 4   MCH 36 4 pg H 26 8-34 3   MCV 99 fL H 82-98   PLATELET COUNT 901 Thousands/uL  149-390   RBC COUNT 4 07 Million/uL  3 88-5 62   RDW 12 5 %  11 6-15 1   WBC COUNT 9 38 Thousand/uL  4 31-10 16   MPV 11 1 fL  8 9-12 7     (1) BASIC METABOLIC PROFILE [id number] 09:13AM Mina Huang    Order Number: [id number]     Test Name Result Flag Reference   GLUCOSE,RANDM 99 mg/dL     If the patient is fasting, the ADA then defines impaired fasting glucose as > 100 mg/dL and diabetes as > or equal to 123 mg/dL  Specimen collection should occur prior to Sulfasalazine administration due to the potential for falsely depressed results  Specimen collection should occur prior to Sulfapyridine administration due to the potential for falsely elevated results  SODIUM 137 mmol/L  136-145   POTASSIUM 2 6 mmol/L LL 3 5-5 3   CHLORIDE 98 mmol/L L 100-108   CARBON DIOXIDE 31 mmol/L  21-32   ANION GAP (CALC) 8 mmol/L  4-13   BLOOD UREA NITROGEN 9 mg/dL  5-25   CREATININE 0 68 mg/dL  0 60-1 30   Standardized to IDMS reference method   CALCIUM 8 0 mg/dL L 8 3-10 1   eGFR 103 ml/min/1 73sq m     [facility] Disease Education Program recommendations are as follows:  GFR calculation is accurate only with a steady state creatinine  Chronic Kidney disease less than 60 ml/min/1 73 sq  meters  Kidney failure less than 15 ml/min/1 73 sq  meters       (1) HEPATIC FUNCTION PANEL [id number] 09:13AM Patricia Courser   TW Order Number: [id number]     Test Name Result Flag Reference   ALBUMIN 3 1 g/dL L 3 5-5 0   ALK PHOSPHATAS 68 U/L     ALT (SGPT) 46 U/L  12-78   Specimen collection should occur prior to Sulfasalazine and/or Sulfapyridine administration due to the potential for falsely depressed results  AST(SGOT) 44 U/L  5-45   Specimen collection should occur prior to Sulfasalazine and/or Sulfapyridine administration due to the potential for falsely depressed results     BILI, DIRECT 0 40 mg/dL H 0 00-0 20   BILI, TOTAL 1 29 mg/dL H 0 20-1 00   TOTAL PROTEIN 6 6 g/dL  6 4-8 2     (1) LIPASE [id number] 09:13AM Ethan Person Order Number: [id number]     Test Name Result Flag Reference   LIPASE 88 u/L

## 2018-01-15 NOTE — RESULT NOTES
Discussion/Summary   awaiting results of mri/enterography     Verified Results  (1) QUANTIFERON - TB GOLD [id number] 09:13AM Fernanda WALKER Order Number: [id number]     Test Name Result Flag Reference   QUANTIFERON TB GOLD      SEE WRITTEN REPORT FROM [facility]

## 2018-01-17 NOTE — RESULT NOTES
Discussion/Summary   Please inform patient the biopsies from his recent colonoscopy are consistent with activeinflammation in the small intestine at his colon  I like to continue taking prednisone therapy which he should be tapering  I like to see him back in the office in 4-6 weeks  Verified Results  (1) TISSUE EXAM 30Jun2017 02:10PM Chadd Gutierrez     Test Name Result Flag Reference   LAB AP CASE REPORT (Report)     Surgical Pathology Report             Case: [id number]                   Authorizing Provider: Radha Tinoco MD      Collected:      06/30/2017 1410        Ordering Location:   [facility]    Received:      06/30/2017 [address] [place] [address] Endoscopy                               Pathologist:      Blaine Dc MD                                 Specimens:  A) - Intestine, Ileum, H/o crohns, active ileitis                           B) - Colon, Cold biopsy colon, Hx of crohns, active colitis   LAB AP FINAL DIAGNOSIS (Report)     A  Ileum (biopsy):    - Moderate active ileitis with areas of mucosal erosion and villous   blunting     - CMV study pending     - No increase in intraepithelial lymphocytes  - No granulomas, dysplasia or neoplasia identified  B  Colon (biopsy):    - Mild active colitis  - No granulomas, dysplasia or neoplasia identified  Electronically signed by Blaine Dc MD on 7/5/2017 at 1:19 PM   LAB AP NOTE      Interpretation performed at [facility], [address] [facility] [place], [address] [facility]  LAB AP SURGICAL ADDITIONAL INFORMATION (Report)     These tests were developed and their performance characteristics   determined by [facility] or [address] [place] [address]  They may not be cleared or approved by the [place]  Food and   Drug Administration  The FDA has determined that such clearance or   approval is not necessary  These tests are used for clinical purposes  They should not be regarded as investigational or for research   This laboratory has been approved by [facility], designated as a high-complexity   laboratory and is qualified to perform these tests  LAB AP GROSS DESCRIPTION (Report)     A  The specimen is received in formalin, labeled with the patient's name   and hospital number, and is designated ileum biopsy  The specimen   consists of multiple tan-pink soft tissue fragments measuring in aggregate   0 6 x 0 5 x 0 2 cm  Entirely submitted  One cassette  B  The specimen is received in formalin, labeled with the patient's name   and hospital number, and is designated colon biopsy  The specimen   consists of 4 tan-pink soft tissue fragments measuring 0 3 cm, 0 3 cm, 0 4   cm, and 0 5 cm in greatest dimension  Entirely submitted  One cassette  Note: The estimated total formalin fixation time based upon information   provided by the submitting clinician and the standard processing schedule   is approximately 72 hours  MAS   LAB AP CLINICAL INFORMATION History of Crohn's     H/o crohns, active ileitis   LAB AP ADDENDUM 1      A CMV stain performed on the ileal biopsy (part A, with appropriate   control) is negative    Addendum electronically signed by Ethan Haley MD on 7/6/2017 at 11:08 AM     COLONOSCOPY [id number] 12:00AM [place], [address] Name Result Flag Reference   Colonoscopy 06/30/2017        Summary / No summary entered :      No summary entered   Documents attached :      EPIC OP NOTE - Dwaine Foley: [id number] - Transcription      Encounter - Hayes Randolph (Gastroenterology Adult) (Additional      Information Document)

## 2018-04-02 ENCOUNTER — APPOINTMENT (EMERGENCY)
Dept: CT IMAGING | Facility: HOSPITAL | Age: 63
End: 2018-04-02
Payer: COMMERCIAL

## 2018-04-02 ENCOUNTER — HOSPITAL ENCOUNTER (OUTPATIENT)
Facility: HOSPITAL | Age: 63
Setting detail: OBSERVATION
Discharge: HOME | End: 2018-04-04
Attending: EMERGENCY MEDICINE | Admitting: INTERNAL MEDICINE
Payer: COMMERCIAL

## 2018-04-02 ENCOUNTER — APPOINTMENT (EMERGENCY)
Dept: RADIOLOGY | Facility: HOSPITAL | Age: 63
End: 2018-04-02
Payer: COMMERCIAL

## 2018-04-02 DIAGNOSIS — R55 SYNCOPE: Primary | ICD-10-CM

## 2018-04-02 DIAGNOSIS — K21.9 GERD (GASTROESOPHAGEAL REFLUX DISEASE): ICD-10-CM

## 2018-04-02 LAB
ALBUMIN SERPL BCP-MCNC: 3.1 G/DL (ref 3.5–5)
ALP SERPL-CCNC: 89 U/L (ref 46–116)
ALT SERPL W P-5'-P-CCNC: 46 U/L (ref 12–78)
ANION GAP SERPL CALCULATED.3IONS-SCNC: 8 MMOL/L (ref 4–13)
AST SERPL W P-5'-P-CCNC: 38 U/L (ref 5–45)
BASOPHILS # BLD AUTO: 0.02 THOUSANDS/ΜL (ref 0–0.1)
BASOPHILS NFR BLD AUTO: 0 % (ref 0–1)
BILIRUB SERPL-MCNC: 0.7 MG/DL (ref 0.2–1)
BUN SERPL-MCNC: 16 MG/DL (ref 5–25)
CALCIUM SERPL-MCNC: 8.4 MG/DL (ref 8.3–10.1)
CHLORIDE SERPL-SCNC: 103 MMOL/L (ref 100–108)
CO2 SERPL-SCNC: 29 MMOL/L (ref 21–32)
CREAT SERPL-MCNC: 0.95 MG/DL (ref 0.6–1.3)
EOSINOPHIL # BLD AUTO: 0.03 THOUSAND/ΜL (ref 0–0.61)
EOSINOPHIL NFR BLD AUTO: 0 % (ref 0–6)
ERYTHROCYTE [DISTWIDTH] IN BLOOD BY AUTOMATED COUNT: 12.7 % (ref 11.6–15.1)
ETHANOL SERPL-MCNC: <3 MG/DL (ref 0–3)
GFR SERPL CREATININE-BSD FRML MDRD: 85 ML/MIN/1.73SQ M
GLUCOSE SERPL-MCNC: 97 MG/DL (ref 65–140)
GLUCOSE SERPL-MCNC: 98 MG/DL (ref 65–140)
HCT VFR BLD AUTO: 41.1 % (ref 36.5–49.3)
HGB BLD-MCNC: 14.3 G/DL (ref 12–17)
LYMPHOCYTES # BLD AUTO: 1.46 THOUSANDS/ΜL (ref 0.6–4.47)
LYMPHOCYTES NFR BLD AUTO: 13 % (ref 14–44)
MCH RBC QN AUTO: 35.4 PG (ref 26.8–34.3)
MCHC RBC AUTO-ENTMCNC: 34.8 G/DL (ref 31.4–37.4)
MCV RBC AUTO: 102 FL (ref 82–98)
MONOCYTES # BLD AUTO: 0.65 THOUSAND/ΜL (ref 0.17–1.22)
MONOCYTES NFR BLD AUTO: 6 % (ref 4–12)
NEUTROPHILS # BLD AUTO: 8.91 THOUSANDS/ΜL (ref 1.85–7.62)
NEUTS SEG NFR BLD AUTO: 81 % (ref 43–75)
PLATELET # BLD AUTO: 146 THOUSANDS/UL (ref 149–390)
PMV BLD AUTO: 10.3 FL (ref 8.9–12.7)
POTASSIUM SERPL-SCNC: 3.6 MMOL/L (ref 3.5–5.3)
PROT SERPL-MCNC: 6.1 G/DL (ref 6.4–8.2)
RBC # BLD AUTO: 4.04 MILLION/UL (ref 3.88–5.62)
SODIUM SERPL-SCNC: 140 MMOL/L (ref 136–145)
TROPONIN I SERPL-MCNC: <0.02 NG/ML
WBC # BLD AUTO: 11.07 THOUSAND/UL (ref 4.31–10.16)

## 2018-04-02 PROCEDURE — 82948 REAGENT STRIP/BLOOD GLUCOSE: CPT

## 2018-04-02 PROCEDURE — 93005 ELECTROCARDIOGRAM TRACING: CPT

## 2018-04-02 PROCEDURE — 85025 COMPLETE CBC W/AUTO DIFF WBC: CPT | Performed by: EMERGENCY MEDICINE

## 2018-04-02 PROCEDURE — 80320 DRUG SCREEN QUANTALCOHOLS: CPT | Performed by: EMERGENCY MEDICINE

## 2018-04-02 PROCEDURE — 71046 X-RAY EXAM CHEST 2 VIEWS: CPT

## 2018-04-02 PROCEDURE — 36415 COLL VENOUS BLD VENIPUNCTURE: CPT | Performed by: EMERGENCY MEDICINE

## 2018-04-02 PROCEDURE — 96360 HYDRATION IV INFUSION INIT: CPT

## 2018-04-02 PROCEDURE — 84484 ASSAY OF TROPONIN QUANT: CPT | Performed by: EMERGENCY MEDICINE

## 2018-04-02 PROCEDURE — 70450 CT HEAD/BRAIN W/O DYE: CPT

## 2018-04-02 PROCEDURE — 80053 COMPREHEN METABOLIC PANEL: CPT | Performed by: EMERGENCY MEDICINE

## 2018-04-02 RX ORDER — SODIUM CHLORIDE 9 MG/ML
125 INJECTION, SOLUTION INTRAVENOUS CONTINUOUS
Status: DISCONTINUED | OUTPATIENT
Start: 2018-04-02 | End: 2018-04-03 | Stop reason: HOSPADM

## 2018-04-02 RX ADMIN — SODIUM CHLORIDE 500 ML: 0.9 INJECTION, SOLUTION INTRAVENOUS at 22:56

## 2018-04-03 PROBLEM — R55 VASOVAGAL SYNCOPE: Status: ACTIVE | Noted: 2018-04-03

## 2018-04-03 LAB
ANION GAP SERPL CALCULATED.3IONS-SCNC: 9 MMOL/L (ref 4–13)
ATRIAL RATE: 95 BPM
BUN SERPL-MCNC: 14 MG/DL (ref 5–25)
CALCIUM SERPL-MCNC: 7.8 MG/DL (ref 8.3–10.1)
CHLORIDE SERPL-SCNC: 103 MMOL/L (ref 100–108)
CO2 SERPL-SCNC: 26 MMOL/L (ref 21–32)
CREAT SERPL-MCNC: 0.77 MG/DL (ref 0.6–1.3)
ERYTHROCYTE [DISTWIDTH] IN BLOOD BY AUTOMATED COUNT: 12.6 % (ref 11.6–15.1)
GFR SERPL CREATININE-BSD FRML MDRD: 97 ML/MIN/1.73SQ M
GLUCOSE SERPL-MCNC: 90 MG/DL (ref 65–140)
HCT VFR BLD AUTO: 38.9 % (ref 36.5–49.3)
HGB BLD-MCNC: 13.2 G/DL (ref 12–17)
MCH RBC QN AUTO: 34.9 PG (ref 26.8–34.3)
MCHC RBC AUTO-ENTMCNC: 33.9 G/DL (ref 31.4–37.4)
MCV RBC AUTO: 103 FL (ref 82–98)
P AXIS: 90 DEGREES
PLATELET # BLD AUTO: 129 THOUSANDS/UL (ref 149–390)
PLATELET # BLD AUTO: 137 THOUSANDS/UL (ref 149–390)
PMV BLD AUTO: 10.1 FL (ref 8.9–12.7)
PMV BLD AUTO: 10.2 FL (ref 8.9–12.7)
POTASSIUM SERPL-SCNC: 3.3 MMOL/L (ref 3.5–5.3)
PR INTERVAL: 158 MS
QRS AXIS: 24 DEGREES
QRSD INTERVAL: 96 MS
QT INTERVAL: 336 MS
QTC INTERVAL: 422 MS
RBC # BLD AUTO: 3.78 MILLION/UL (ref 3.88–5.62)
SODIUM SERPL-SCNC: 138 MMOL/L (ref 136–145)
T WAVE AXIS: 82 DEGREES
TROPONIN I SERPL-MCNC: <0.02 NG/ML
TROPONIN I SERPL-MCNC: <0.02 NG/ML
VENTRICULAR RATE: 95 BPM
WBC # BLD AUTO: 9.75 THOUSAND/UL (ref 4.31–10.16)

## 2018-04-03 PROCEDURE — 99220 PR INITIAL OBSERVATION CARE/DAY 70 MINUTES: CPT | Performed by: INTERNAL MEDICINE

## 2018-04-03 PROCEDURE — 85049 AUTOMATED PLATELET COUNT: CPT | Performed by: PHYSICIAN ASSISTANT

## 2018-04-03 PROCEDURE — 80048 BASIC METABOLIC PNL TOTAL CA: CPT | Performed by: INTERNAL MEDICINE

## 2018-04-03 PROCEDURE — 93010 ELECTROCARDIOGRAM REPORT: CPT | Performed by: INTERNAL MEDICINE

## 2018-04-03 PROCEDURE — 84484 ASSAY OF TROPONIN QUANT: CPT | Performed by: PHYSICIAN ASSISTANT

## 2018-04-03 PROCEDURE — 99285 EMERGENCY DEPT VISIT HI MDM: CPT

## 2018-04-03 PROCEDURE — 85027 COMPLETE CBC AUTOMATED: CPT | Performed by: INTERNAL MEDICINE

## 2018-04-03 RX ORDER — FAMOTIDINE 40 MG/5ML
20 POWDER, FOR SUSPENSION ORAL 2 TIMES DAILY
Status: DISCONTINUED | OUTPATIENT
Start: 2018-04-03 | End: 2018-04-04 | Stop reason: HOSPADM

## 2018-04-03 RX ORDER — ALBUTEROL SULFATE 90 UG/1
2 AEROSOL, METERED RESPIRATORY (INHALATION) EVERY 6 HOURS PRN
Status: DISCONTINUED | OUTPATIENT
Start: 2018-04-03 | End: 2018-04-04 | Stop reason: HOSPADM

## 2018-04-03 RX ORDER — OMEPRAZOLE 40 MG/1
20 CAPSULE, DELAYED RELEASE ORAL DAILY
COMMUNITY
End: 2020-06-07 | Stop reason: HOSPADM

## 2018-04-03 RX ORDER — NICOTINE 21 MG/24HR
1 PATCH, TRANSDERMAL 24 HOURS TRANSDERMAL DAILY
Status: DISCONTINUED | OUTPATIENT
Start: 2018-04-03 | End: 2018-04-04 | Stop reason: HOSPADM

## 2018-04-03 RX ORDER — POTASSIUM CHLORIDE 20 MEQ/1
40 TABLET, EXTENDED RELEASE ORAL 2 TIMES DAILY
Status: DISCONTINUED | OUTPATIENT
Start: 2018-04-03 | End: 2018-04-04 | Stop reason: HOSPADM

## 2018-04-03 RX ORDER — CALCIUM CARBONATE 200(500)MG
1000 TABLET,CHEWABLE ORAL DAILY PRN
Status: DISCONTINUED | OUTPATIENT
Start: 2018-04-03 | End: 2018-04-04 | Stop reason: HOSPADM

## 2018-04-03 RX ORDER — SENNOSIDES 8.6 MG
1 TABLET ORAL DAILY
Status: DISCONTINUED | OUTPATIENT
Start: 2018-04-03 | End: 2018-04-04 | Stop reason: HOSPADM

## 2018-04-03 RX ORDER — AMILORIDE HYDROCHLORIDE 5 MG/1
5 TABLET ORAL DAILY
Status: DISCONTINUED | OUTPATIENT
Start: 2018-04-03 | End: 2018-04-04 | Stop reason: HOSPADM

## 2018-04-03 RX ORDER — LANOLIN ALCOHOL/MO/W.PET/CERES
6 CREAM (GRAM) TOPICAL
Status: DISCONTINUED | OUTPATIENT
Start: 2018-04-03 | End: 2018-04-04 | Stop reason: HOSPADM

## 2018-04-03 RX ORDER — AMLODIPINE BESYLATE 2.5 MG/1
2.5 TABLET ORAL DAILY
Status: DISCONTINUED | OUTPATIENT
Start: 2018-04-03 | End: 2018-04-04 | Stop reason: HOSPADM

## 2018-04-03 RX ORDER — UREA 10 %
250 LOTION (ML) TOPICAL
Status: DISCONTINUED | OUTPATIENT
Start: 2018-04-03 | End: 2018-04-04 | Stop reason: HOSPADM

## 2018-04-03 RX ORDER — ONDANSETRON 2 MG/ML
4 INJECTION INTRAMUSCULAR; INTRAVENOUS EVERY 6 HOURS PRN
Status: DISCONTINUED | OUTPATIENT
Start: 2018-04-03 | End: 2018-04-04 | Stop reason: HOSPADM

## 2018-04-03 RX ORDER — SODIUM CHLORIDE 9 MG/ML
100 INJECTION, SOLUTION INTRAVENOUS CONTINUOUS
Status: DISCONTINUED | OUTPATIENT
Start: 2018-04-03 | End: 2018-04-04 | Stop reason: HOSPADM

## 2018-04-03 RX ADMIN — AMILORIDE HYDROCHLORIDE 5 MG: 5 TABLET ORAL at 08:54

## 2018-04-03 RX ADMIN — FLUTICASONE PROPIONATE AND SALMETEROL 1 PUFF: 50; 100 POWDER RESPIRATORY (INHALATION) at 20:59

## 2018-04-03 RX ADMIN — FAMOTIDINE 20 MG: 40 POWDER, FOR SUSPENSION ORAL at 18:02

## 2018-04-03 RX ADMIN — POTASSIUM CHLORIDE 40 MEQ: 1500 TABLET, EXTENDED RELEASE ORAL at 08:53

## 2018-04-03 RX ADMIN — FLUTICASONE PROPIONATE AND SALMETEROL 1 PUFF: 50; 100 POWDER RESPIRATORY (INHALATION) at 02:04

## 2018-04-03 RX ADMIN — Medication 250 MG: at 18:02

## 2018-04-03 RX ADMIN — MELATONIN 6 MG: 3 TAB ORAL at 02:06

## 2018-04-03 RX ADMIN — SODIUM CHLORIDE 125 ML/HR: 0.9 INJECTION, SOLUTION INTRAVENOUS at 01:00

## 2018-04-03 RX ADMIN — ENOXAPARIN SODIUM 40 MG: 40 INJECTION SUBCUTANEOUS at 08:53

## 2018-04-03 RX ADMIN — MELATONIN 6 MG: 3 TAB ORAL at 21:00

## 2018-04-03 RX ADMIN — SODIUM CHLORIDE 75 ML/HR: 0.9 INJECTION, SOLUTION INTRAVENOUS at 01:28

## 2018-04-03 RX ADMIN — POTASSIUM CHLORIDE 40 MEQ: 1500 TABLET, EXTENDED RELEASE ORAL at 18:01

## 2018-04-03 RX ADMIN — NICOTINE 1 PATCH: 21 PATCH, EXTENDED RELEASE TRANSDERMAL at 08:53

## 2018-04-03 RX ADMIN — FLUTICASONE PROPIONATE AND SALMETEROL 1 PUFF: 50; 100 POWDER RESPIRATORY (INHALATION) at 08:55

## 2018-04-03 RX ADMIN — AMLODIPINE BESYLATE 2.5 MG: 2.5 TABLET ORAL at 08:53

## 2018-04-03 RX ADMIN — Medication 250 MG: at 06:23

## 2018-04-03 RX ADMIN — FAMOTIDINE 20 MG: 40 POWDER, FOR SUSPENSION ORAL at 08:55

## 2018-04-03 NOTE — CASE MANAGEMENT
Initial Clinical Review    Admission: Date/Time/Statement:  4/3/2018  0003 OBSERVATION     Orders Placed This Encounter   Procedures    Place in Observation (expected length of stay for this patient is less than two midnights)     Telemetry     Standing Status:   Standing     Number of Occurrences:   1     Order Specific Question:   Admitting Physician     Answer:   Nadia Quezada [[id number]]     Order Specific Question:   Level of Care     Answer:   Med Surg [[id number]]     Order Specific Question:   Bed request comments     Answer:   telemetry         ED: Date/Time/Mode of Arrival:   ED Arrival Information     Expected Arrival [address] of Arrival Escorted By Service Admission Type    - 4/2/2018 21:35 Urgent Ambulance [facility] General Medicine Urgent    Arrival Complaint    Syncope          Chief Complaint:   Chief Complaint   Patient presents with    Loss of Consciousness     PT arrived via EMS after a syncopal episode lasting about 1 min  - Head injury, per witness  Pt denies complaints at this time  EMS report some confusion upon arrival         History of Illness: 58 y o  male with past medical history significant for emphysema and Crohn's disease presents the ED status post syncopal episode  Patient reports using the usual state of health and went to a local bar to watch basketball game  Chest CT was starting to have a 2nd beer, he felt a neck cramp in his right side that was severe enough to cause and had to turn up words  Patient reports rubbing the right side of his neck deeply and then the next thing he remembers he was on ambulance  Denies any prodrome of symptoms prior to the syncopized thing  Patient is unsure how long he was down for  He was told that the person sitting next to him was able to catch him, prevented him from falling and hitting his head  Denies chest pain, shortness of breath, nausea, vomiting, lightheaded/weakness, diaphoresis      ED Vital Signs:   ED Triage Vitals Temperature Pulse Respirations Blood Pressure SpO2   04/02/18 2140 04/02/18 2140 04/02/18 2140 04/02/18 2140 04/02/18 2140   99 2 °F (37 3 °C) 97 18 100/67 95 %      Temp Source Heart Rate Source Patient Position - Orthostatic VS BP Location FiO2 (%)   04/02/18 2140 04/02/18 2140 04/02/18 2140 04/02/18 2140 --   Oral Monitor Sitting Right arm       Pain Score       04/02/18 2253       No Pain        Wt Readings from Last 1 Encounters:   04/03/18 59 5 kg (131 lb 2 8 oz)       Vital Signs (abnormal): none  04/04/18 0023  --  65  --  109/54  --  --  --  Lying - Orthostatic VS   04/04/18 0021  --  77  --  122/69  --  --  --  Standing - Orthostatic VS   BP: pt states he felt a little lightheaded, "loss of equilibrium at 04/04/18 0021   04/04/18 0019  --  69  --  119/61  --  --  --  Sitting - Orthostatic VS       Abnormal Labs/Diagnostic Test Results: Total protein 6 1  Albumin 3 1  Wbc 11 07, hgb 14 3, hct 41 1  Ct head - No acute intracranial abnormality  CxR - Emphysematous changes are noted   No focal consolidation, pleural effusion, or pneumothorax  Labs 4/3 - K 3 3   Calcium 7 8  Wbc 9 75, hgb 13 2, hct 38 9    Labs 4/4- calcium 7 9  ED Treatment:   Medication Administration from 04/02/2018 2135 to 04/03/2018 0105       Date/Time Order Dose Route Action Action by Comments     04/03/2018 0009 sodium chloride 0 9 % bolus 500 mL 0 mL Intravenous Stopped Elizabeth Avila RN      04/02/2018 2256 sodium chloride 0 9 % bolus 500 mL 500 mL Intravenous [employer], RN      04/03/2018 0100 sodium chloride 0 9 % infusion 125 mL/hr Intravenous New Bag Comfort Pineda RN           Past Medical/Surgical History:    Active Ambulatory Problems     Diagnosis Date Noted    Hypertension     [facility]     Crohn disease ([facility] )     Hypokalemia 06/28/2017    Col[facility] in place [facility]) 06/29/2017    Diarrhea 06/29/2017    Severe protein-calorie malnutrition ([facility] ) 07/01/2017    Cellulitis 12/04/2017     Resolved Ambulatory Problems     Diagnosis Date Noted    Dehydration 06/28/2017    Hyponatremia 06/28/2017    LUCILLE [facility]acute kidney [facility]) ([facility] ) 06/28/2017    Hypomagnesemia 06/29/2017     Past Medical History:   Diagnosis Date    LUCILLE (acute kidney injury) ([facility] ) 6/28/2017    Cataract     Colostomy in place ([place] [address] ) 6/29/2017    Crohn disease ([place] [address] )     Emphysema of lung ([place] [address] )     Emphysema/COPD ([place] [address] )     Hypertension     Hypokalemia 6/28/2017    Hypomagnesemia 6/29/2017    Hyponatremia 6/28/2017       Admitting Diagnosis: Loss of consciousness ([place] [address] ) [R40 20]  Syncope [R55]    Age/Sex: 58 y o  male    Assessment/Plan:   Emphysema of lung ([place] [address] )   Assessment & Plan     · No SOB today  · Continue home medications          Vasovagal syncope   Assessment & Plan     · Reports acute neck stiffness, then started rubbing R side of neck   Suspect patient compression carotid artery, causing vasovagal syncope  · Tele  · Trend trops  · IVF          Crohn disease ([place] [address] )   Assessment & Plan     · S/P colostomy         Admission Orders:  TELE  4/3/2018  0003 OBSERVATION  Scheduled Meds:   Current Facility-Administered Medications:  albuterol 2 puff Inhalation Q6H PRN Marcelle Le PA-C    AMILoride 5 mg Oral Daily Marcelle Le PA-C    amLODIPine 2 5 mg Oral Daily Marcelle Le PA-C    calcium carbonate 1,000 mg Oral Daily PRN Marcelle Le PA-C    enoxaparin 40 mg Subcutaneous Daily Marcelle Le PA-C    famotidine 20 mg Oral BID Marcelle Le PA-C    fluticasone-salmeterol 1 puff Inhalation Q12H [facility] Marcelle Le PA-C    magnesium gluconate 250 mg Oral BID AC Marcelle Le PA-C    melatonin 6 mg Oral HS Marcelle Le PA-C    nicotine 1 patch Transdermal Daily Marcelle Le PA-C    ondansetron 4 mg Intravenous Q6H PRN Marcelle Le PA-C    potassium chloride 40 mEq Oral BID Marcelle Le PA-C    senna 1 tablet Oral Daily Ora Brayan DANYEL Le PA-C    sodium chloride 75 mL/hr Intravenous Continuous Marcellesantos Le PA-C Last Rate: 75 mL/hr (04/03/18 0128)     Continuous Infusions:   sodium chloride 75 mL/hr Last Rate: 75 mL/hr (04/03/18 0128)     PRN Meds: not used:    albuterol    calcium carbonate    Ondansetron    OTHER ORDERS:  Scds  · On 4/3/2018-  Syncope -   ? Will check orthostatics every shift  Will continue IV fluids overnight  ? Given pain in region of carotid, will check arterial doppler to make sure no issues there  ? Continue telemetry monitoring though so far this is negative  If this testing is negative, will discharge tomorrow am       Thank you,  [address] [facility] in the [facility] by Valentin Cameron for 2017  Network Utilization Review Department  Phone: [phone]; Fax [phone]  ATTENTION: The Network Utilization Review Department is now centralized for our 7 Facilities  Make a note that we have a new phone and fax numbers for our Department  Please call with any questions or concerns to [phone] and carefully follow the prompts so that you are directed to the right person  All voicemails are confidential  Fax any determinations, approvals, denials, and requests for initial or continue stay review clinical to [phone]  Due to HIGH CALL volume, it would be easier if you could please send faxed requests to expedite your requests and in part, help us provide discharge notifications faster

## 2018-04-03 NOTE — PLAN OF CARE
INFECTION - ADULT     Absence or prevention of progression during hospitalization Progressing     Absence of fever/infection during neutropenic period Progressing        Nutrition/Hydration-ADULT     Nutrient/Hydration intake appropriate for improving, restoring or maintaining nutritional needs Progressing        PAIN - ADULT     Verbalizes/displays adequate comfort level or baseline comfort level Progressing        Potential for Falls     Patient will remain free of falls Progressing        SAFETY ADULT     Maintain or return to baseline ADL function Progressing     Maintain or return mobility status to optimal level Progressing

## 2018-04-03 NOTE — H&P
H&P- Richa Sweet II 1955, 58 y o  male MRN: [id number]    Unit/Bed#: -01 Encounter: [id number]    Primary Care Provider: Jyoti Plaza MD   Date and time admitted to hospital: 4/2/2018  9:35 PM    * Emphysema of lung ([facility] )   Assessment & Plan    · No SOB today  · Continue home medications        Vasovagal syncope   Assessment & Plan    · Reports acute neck stiffness, then started rubbing R side of neck  Suspect patient compression carotid artery, causing vasovagal syncope  · Tele  · Trend trops  · IVF        Crohn disease ([facility] )   Assessment & Plan    · S/P colostomy          VTE Prophylaxis: Enoxaparin (Lovenox)  / sequential compression device   Code Status: Level 1- Full Code  POLST: There is no POLST form on file for this patient (pre-hospital)  Discussion with family: family aware of admission    Anticipated Length of Stay:  Patient will be admitted on an Observation basis with an anticipated length of stay of  < 2 midnights  Justification for Hospital Stay: syncope     Total Time for Visit, including Counseling / Coordination of Care: 30 minutes  Greater than 50% of this total time spent on direct patient counseling and coordination of care  Chief Complaint:   syncope    History of Present Illness:    Micheal Sender is a 58 y o  male with past medical history significant for emphysema and Crohn's disease presents the ED status post syncopal episode  Patient reports using the usual state of health and went to a local bar to watch basketball game  Chest CT was starting to have a 2nd beer, he felt a neck cramp in his right side that was severe enough to cause and had to turn up words  Patient reports rubbing the right side of his neck deeply and then the next thing he remembers he was on ambulance  Denies any prodrome of symptoms prior to the syncopized thing  Patient is unsure how long he was down for    He was told that the person sitting next to him was able to catch him, prevented him from falling and hitting his head  Denies chest pain, shortness of breath, nausea, vomiting, lightheaded/weakness, diaphoresis  Review of Systems:    Review of Systems   Constitutional: Negative  HENT: Negative  Eyes: Negative  Respiratory: Negative  Cardiovascular: Negative  Gastrointestinal: Negative  Genitourinary: Negative  Musculoskeletal: Negative  Skin: Negative  Neurological: Positive for syncope  Hematological: Negative  Psychiatric/Behavioral: Negative  Past Medical and Surgical History:     Past Medical History:   Diagnosis Date    [facility] 6/28/2017    Cataract     [facility]) 6/29/2017    Crohn disease ([facility] )     Emphy[facility]ma of lung ([facility] )     Emphysema/COPD ([facility] )     Hypertension     Hypokalemia 6/28/2017    Hypomagnesemia 6/29/2017    Hyponatremia 6/28/2017       Past Surgical History:   Procedure Laterality Date    CHOLECYSTECTOMY      COLECTOMY      10 inchs of ileum    COLONOSCOPY N/A 6/30/2017    Procedure: COLONOSCOPY;  Surgeon: Jeremy Walker MD;  Location: [facility]; Service: Gastroenterology    COLOSTOMY      FINGER SURGERY Left     LITHOTRIPSY         Meds/Allergies:    Prior to Admission medications    Medication Sig Start Date End Date Taking?  Authorizing Provider   albuterol (PROVENTIL HFA,VENTOLIN HFA) 90 mcg/act inhaler Inhale 2 puffs every 6 (six) hours as needed for wheezing   Yes Historical Provider, MD   AMILoride 5 mg tablet Take 1 tablet by mouth daily 12/7/17  Yes Blayne Ray MD   amLODIPine (NORVASC) 2 5 mg tablet Take 2 5 mg by mouth daily   Yes Historical Provider, MD   fluticasone-salmeterol (ADVAIR HFA) 45-21 MCG/ACT inhaler Inhale 2 puffs 2 (two) times a day as needed     Yes Historical Provider, MD   magnesium 30 MG tablet Take 1 tablet by mouth 2 (two) times a day 12/6/17  Yes Blayne Ray MD   omeprazole (PriLOSEC) 40 MG capsule Take 40 mg by mouth daily   Yes Historical Provider, MD   potassium chloride (K-DUR,KLOR-CON) 10 mEq tablet Take 4 tablets by mouth 2 (two) times a day 12/6/17  Yes Sixto Mobley MD   famotidine (PEPCID) 40 mg/5 mL suspension Take 2 5 mL by mouth 2 (two) times a day 12/6/17 4/3/18  Sixto Mobley MD     I have reviewed home medications with patient personally  Allergies: No Known Allergies    Social History:     Marital Status: Single   Occupation: unknown  Patient Pre-hospital Living Situation: independent  Patient Pre-hospital Level of Mobility: independent  Patient Pre-hospital Diet Restrictions: none  Substance Use History:   History   Alcohol Use    Yes     Comment: weekend social     History   Smoking Status    Current Every Day Smoker    Packs/day: 1 50    Types: Cigarettes   Smokeless Tobacco    Never Used     History   Drug Use No       Family History:    non-contributory    Physical Exam:     Vitals:   Blood Pressure: 131/73 (04/03/18 0109)  Pulse: 69 (04/03/18 0109)  Temperature: 98 1 °F (36 7 °C) (04/03/18 0109)  Temp Source: Oral (04/03/18 0109)  Respirations: 18 (04/03/18 0109)  Height: 5' 9" (175 3 cm) (04/03/18 0109)  Weight - Scale: 59 5 kg (131 lb 2 8 oz) (04/03/18 0109)  SpO2: 97 % (04/03/18 0109)    Physical Exam   Constitutional: He is oriented to person, place, and time  He appears well-developed and well-nourished  HENT:   Head: Normocephalic and atraumatic  Eyes: Conjunctivae and EOM are normal  Pupils are equal, round, and reactive to light  No scleral icterus  Neck: No JVD present  Cardiovascular: Normal rate and regular rhythm  Exam reveals no gallop  No murmur heard  Pulmonary/Chest: Effort normal and breath sounds normal  No respiratory distress  He has no wheezes  He has no rales  Abdominal: Soft  Bowel sounds are normal  He exhibits no distension  There is no tenderness  There is no rebound  + colostomy in place   Musculoskeletal: He exhibits no edema or tenderness     Neurological: He is alert and oriented to person, place, and time  He displays normal reflexes  No cranial nerve deficit  He exhibits normal muscle tone  Skin: Skin is warm and dry  No rash noted  No erythema  Psychiatric: He has a normal mood and affect  His behavior is normal        Additional Data:     Lab Results: I have personally reviewed pertinent reports  Results from last 7 days  Lab Units 04/03/18  0137 04/02/18  2252   WBC Thousand/uL  --  11 07*   HEMOGLOBIN g/dL  --  14 3   HEMATOCRIT %  --  41 1   PLATELETS Thousands/uL 137* 146*   NEUTROS PCT %  --  81*   LYMPHS PCT %  --  13*   MONOS PCT %  --  6   EOS PCT %  --  0       Results from last 7 days  Lab Units 04/02/18  2252   SODIUM mmol/L 140   POTASSIUM mmol/L 3 6   CHLORIDE mmol/L 103   CO2 mmol/L 29   BUN mg/dL 16   CREATININE mg/dL 0 95   CALCIUM mg/dL 8 4   TOTAL PROTEIN g/dL 6 1*   BILIRUBIN TOTAL mg/dL 0 70   ALK PHOS U/L 89   ALT U/L 46   AST U/L 38   GLUCOSE RANDOM mg/dL 97           Imaging: I have personally reviewed pertinent reports  CT head without contrast   ED Interpretation by Caralee Essex, MD (04/02 2322)   FINDINGS:      PARENCHYMA:  No intracranial mass, mass effect or midline shift  No CT signs of acute infarction   No acute intracranial hemorrhage  VENTRICLES AND EXTRA-AXIAL SPACES:  Normal for the patient's age  VISUALIZED ORBITS AND PARANASAL SINUSES:  Unremarkable  CALVARIUM AND EXTRACRANIAL SOFT TISSUES:  Normal    Impression:        No acute intracranial abnormality  Workstation performed: [id number]         Final Result by Tootie Valadez MD (04/02 2310)      No acute intracranial abnormality  Workstation performed: [id number]         XR chest 2 views   ED Interpretation by Caralee Essex, MD (04/02 2237)   No acute disease read by me             EKG, Pathology, and Other Studies Reviewed on Admission:   · EKG: NSr    Allscripts / Epic Records Reviewed: Yes     ** Please Note: This note has been constructed using a voice recognition system   **

## 2018-04-03 NOTE — PLAN OF CARE
Problem: DISCHARGE PLANNING - CARE MANAGEMENT  Goal: Discharge to post-acute care or home with appropriate resources  INTERVENTIONS:  - Conduct assessment to determine patient/family and health care team treatment goals, and need for post-acute services based on payer coverage, community resources, and patient preferences, and barriers to discharge  - Address psychosocial, clinical, and financial barriers to discharge as identified in assessment in conjunction with the patient/family and health care team  - Arrange appropriate level of post-acute services according to patients   needs and preference and payer coverage in collaboration with the physician and health care team  - Communicate with and update the patient/family, physician, and health care team regarding progress on the discharge plan  - Arrange appropriate transportation to post-acute venues  Outcome: Adequate for Discharge  Cm met w pt who resides with his daughter and grandchildren in [facility]  Has support at home  Ind w adls  No dme  Drives  DaughterChelsea [phone] is POA and pt has a living will  No VNA hx  Uses [facility] on [address] for RXs  Has ride home at d c no other reported needs at this time  Observation notice provided    CM reviewed discharge planning process including the following: identifying help at home, patient preference for discharge planning needs, pharmacy preference, and availability of treatment team to discuss questions or concerns patient and/or family may have regarding understanding medications and recognizing signs and symptoms once discharged  CM also encouraged patient to follow up with all recommended appointments after discharge  Patient advised of importance for patient and family to participate in managing patients medical well being  CM name and role reviewed and Discharge Checklist provided  Encouraged patient and caregiver to review prior to discharge

## 2018-04-03 NOTE — PLAN OF CARE
Problem: DISCHARGE PLANNING - CARE MANAGEMENT  Goal: Discharge to post-acute care or home with appropriate resources  INTERVENTIONS:  - Conduct assessment to determine patient/family and health care team treatment goals, and need for post-acute services based on payer coverage, community resources, and patient preferences, and barriers to discharge  - Address psychosocial, clinical, and financial barriers to discharge as identified in assessment in conjunction with the patient/family and health care team  - Arrange appropriate level of post-acute services according to patient's   needs and preference and payer coverage in collaboration with the physician and health care team  - Communicate with and update the patient/family, physician, and health care team regarding progress on the discharge plan  - Arrange appropriate transportation to post-acute venues  Outcome: Adequate for Discharge  Cm met w pt who resides with his daughter and grandchildren in [place]  Has support at home  Ind w adls  No dme  Drives  DaughterCarmel [phone] is POA and pt has a living will  No VNA hx  [facility]ses [facility] on [address] for RXs  Has ride home at d c no other reported needs at this time  Observation notice provided    CM reviewed discharge planning process including the following: identifying help at home, patient preference for discharge planning needs, pharmacy preference, and availability of treatment team to discuss questions or concerns patient and/or family may have regarding understanding medications and recognizing signs and symptoms once discharged  CM also encouraged patient to follow up with all recommended appointments after discharge  Patient advised of importance for patient and family to participate in managing patients medical well being  CM name and role reviewed and Discharge Checklist provided  Encouraged patient and caregiver to review prior to discharge

## 2018-04-03 NOTE — SOCIAL WORK
Cm met w pt who resides with his daughter and grandchildren in [place]  Has support at home  Ind w adls  No dme  Drives  DaughterSarah Odette [phone] is POA and pt has a living will  No VNA hx  Uses [facility] on [address] for RXs  Has ride home at d c no other reported needs at this time  Observation notice provided    CM reviewed discharge planning process including the following: identifying help at home, patient preference for discharge planning needs, pharmacy preference, and availability of treatment team to discuss questions or concerns patient and/or family may have regarding understanding medications and recognizing signs and symptoms once discharged  CM also encouraged patient to follow up with all recommended appointments after discharge  Patient advised of importance for patient and family to participate in managing patients medical well being  CM name and role reviewed and Discharge Checklist provided  Encouraged patient and caregiver to review prior to discharge

## 2018-04-03 NOTE — ED PROVIDER NOTES
History  Chief Complaint   Patient presents with    Loss of Consciousness     PT arrived via EMS after a syncopal episode lasting about 1 min  - Head injury, per witness  Pt denies complaints at this time  EMS report some confusion upon arrival       Patient is a 58year old male who had a syncopal episode at the [facility]  Patient has left sided neck pain and was massaging his own neck and looking up and then had syncope  No other prodromal sx  No seizure activity noted  No post ictal period  No prior episodes of syncope  No neck pain now  No chest pain  No fever  No N/V  No headache  Old records reviewed by me and was last seen at this ED on 12/4/17 for cellulitis  [facility] website checked on this patient and patient not found  History provided by:  Patient and relative (daughter)   used: No        Prior to Admission Medications   Prescriptions Last Dose Informant Patient Reported? Taking?    AMILoride 5 mg tablet   No No   Sig: Take 1 tablet by mouth daily   albuterol (PROVENTIL HFA,VENTOLIN HFA) 90 mcg/act inhaler   Yes No   Sig: Inhale 2 puffs every 6 (six) hours as needed for wheezing   amLODIPine (NORVASC) 2 5 mg tablet   Yes No   Sig: Take 2 5 mg by mouth daily   famotidine (PEPCID) 40 mg/5 mL suspension   No No   Sig: Take 2 5 mL by mouth 2 (two) times a day   fluticasone-salmeterol (ADVAIR HFA) 45-21 MCG/ACT inhaler   Yes No   Sig: Inhale 2 puffs 2 (two) times a day as needed     magnesium 30 MG tablet   No No   Sig: Take 1 tablet by mouth 2 (two) times a day   potassium chloride (K-DUR,KLOR-CON) 10 mEq tablet   No No   Sig: Take 4 tablets by mouth 2 (two) times a day      Facility-Administered Medications: None       Past Medical History:   Diagnosis Date    LUICLLE (acute kidney injury) ([facility] ) 6/28/2017    Cataract     Colostomy in place ([facility] ) 6/29/2017    Crohn disease ([facility] )     Emphysema of lung ([facility] )     Emphysema/COPD ([facility] )     Hypertension     Hypokalemia 6/28/2017    Hypomagnesemia 6/29/2017    Hyponatremia 6/28/2017       Past Surgical History:   Procedure Laterality Date    CHOLECYSTECTOMY      COLECTOMY      10 inchs of ileum    COLONOSCOPY N/A 6/30/2017    Procedure: COLONOSCOPY;  Surgeon: Jayson Courtney MD;  [facility]; Service: Gastroenterology    COLOSTOMY      FINGER SURGERY Left     LITHOTRIPSY         History reviewed  No pertinent family history  I have reviewed and agree with the history as documented  Social History   Substance Use Topics    Smoking status: Current Every Day Smoker     Packs/day: 1 50     Types: Cigarettes    Smokeless tobacco: Never Used    Alcohol use Yes      Comment: weekend social        Review of Systems   Constitutional: Negative for fever  Respiratory: Negative for shortness of breath  Cardiovascular: Negative for chest pain  Gastrointestinal: Negative for nausea and vomiting  Musculoskeletal: Positive for neck pain (prior)  Neurological: Positive for syncope  Negative for seizures and headaches  All other systems reviewed and are negative  Physical Exam  ED Triage Vitals   Temperature Pulse Respirations Blood Pressure SpO2   04/02/18 2140 04/02/18 2140 04/02/18 2140 04/02/18 2140 04/02/18 2140   99 2 °F (37 3 °C) 97 18 100/67 95 %      Temp Source Heart Rate Source Patient Position - Orthostatic VS BP Location FiO2 (%)   04/02/18 2140 04/02/18 2140 04/02/18 2140 04/02/18 2140 --   Oral Monitor Sitting Right arm       Pain Score       04/02/18 2253       No Pain           Orthostatic Vital Signs  Vitals:    04/02/18 2140 04/02/18 2215 04/02/18 2257   BP: 100/67  120/74   Pulse: 97 86 77   Patient Position - Orthostatic VS: Sitting  Lying       Physical Exam   Constitutional: He is oriented to person, place, and time  He appears distressed (mild)  HENT:   Head: Normocephalic and atraumatic     Mouth/Throat: Oropharynx is clear and moist    Eyes: EOM are normal  Pupils are equal, round, and reactive to light  No scleral icterus  Neck: Normal range of motion  Neck supple  No JVD present  No tracheal deviation present  Cardiovascular: Normal rate, regular rhythm and normal heart sounds  No murmur heard  Pulmonary/Chest: Effort normal and breath sounds normal  No stridor  No respiratory distress  Abdominal: Soft  Bowel sounds are normal  There is no tenderness  Musculoskeletal: He exhibits no edema, tenderness (No calf tenderness) or deformity  Neurological: He is alert and oriented to person, place, and time  No sensory deficit  No focal deficits  Strength symmetric  Skin: Skin is warm and dry  No rash noted  Psychiatric: He has a normal mood and affect  Nursing note and vitals reviewed  ED Medications  Medications   sodium chloride 0 9 % infusion (not administered)   sodium chloride 0 9 % bolus 500 mL (500 mL Intravenous New Bag 4/2/18 2256)       Diagnostic Studies  Results Reviewed     Procedure Component Value Units Date/Time    Ethanol [[id number]]  (Normal) Collected:  04/02/18 2252    Lab Status:  Final result Specimen:  Blood from Arm, Right Updated:  04/02/18 2338     Ethanol Lvl <3 mg/dL     Troponin I [[id number]]  (Normal) Collected:  04/02/18 2252    Lab Status:  Final result Specimen:  Blood from Arm, Right Updated:  04/02/18 2326     Troponin I <0 02 ng/mL     Narrative:         [employer] Chemistry analyzer 99% cutoff is > 0 04 ng/mL in network labs    o cTnI 99% cutoff is useful only when applied to patients in the clinical setting of myocardial ischemia  o cTnI 99% cutoff should be interpreted in the context of clinical history, ECG findings and possibly cardiac imaging to establish correct diagnosis  o cTnI 99% cutoff may be suggestive but clearly not indicative of a coronary event without the clinical setting of myocardial ischemia      Comprehensive metabolic panel [[id number]]  (Abnormal) Collected:  04/02/18 2252    Lab Status:  Final result Specimen:  Blood from Arm, Right Updated:  04/02/18 2324     Sodium 140 mmol/L      Potassium 3 6 mmol/L      Chloride 103 mmol/L      CO2 29 mmol/L      Anion Gap 8 mmol/L      BUN 16 mg/dL      Creatinine 0 95 mg/dL      Glucose 97 mg/dL      Calcium 8 4 mg/dL      AST 38 U/L      ALT 46 U/L      Alkaline Phosphatase 89 U/L      Total Protein 6 1 (L) g/dL      Albumin 3 1 (L) g/dL      Total Bilirubin 0 70 mg/dL      eGFR 85 ml/min/1 73sq m     Narrative:         National Kidney Disease Education Program recommendations are as follows:  GFR calculation is accurate only with a steady state creatinine  Chronic Kidney disease less than 60 ml/min/1 73 sq  meters  Kidney failure less than 15 ml/min/1 73 sq  meters  CBC and differential [[id number]]  (Abnormal) Collected:  04/02/18 2252    Lab Status:  Final result Specimen:  Blood from Arm, Right Updated:  04/02/18 2308     WBC 11 07 (H) Thousand/uL      RBC 4 04 Million/uL      Hemoglobin 14 3 g/dL      Hematocrit 41 1 %       (H) fL      MCH 35 4 (H) pg      MCHC 34 8 g/dL      RDW 12 7 %      MPV 10 3 fL      Platelets 722 (L) Thousands/uL      Neutrophils Relative 81 (H) %      Lymphocytes Relative 13 (L) %      Monocytes Relative 6 %      Eosinophils Relative 0 %      Basophils Relative 0 %      Neutrophils Absolute 8 91 (H) Thousands/µL      Lymphocytes Absolute 1 46 Thousands/µL      Monocytes Absolute 0 65 Thousand/µL      Eosinophils Absolute 0 03 Thousand/µL      Basophils Absolute 0 02 Thousands/µL     Fingerstick Glucose (POCT) [[id number]]  (Normal) Collected:  04/02/18 2249    Lab Status:  Final result Updated:  04/02/18 2258     POC Glucose 98 mg/dl                  CT head without contrast   ED Interpretation by Trevon Gan MD (04/02 2322)   FINDINGS:      PARENCHYMA:  No intracranial mass, mass effect or midline shift  No CT signs of acute infarction   No acute intracranial hemorrhage  VENTRICLES AND EXTRA-AXIAL SPACES:  Normal for the patient's age  VISUALIZED ORBITS AND PARANASAL SINUSES:  Unremarkable  CALVARIUM AND EXTRACRANIAL SOFT TISSUES:  Normal    Impression:        No acute intracranial abnormality  Workstation performed: [id number]         Final Result by Maggie Ragland MD (04/02 2310)      No acute intracranial abnormality  Workstation performed: [id number]         XR chest 2 views   ED Interpretation by Johann Madrigal MD (04/02 2237)   No acute disease read by me  Procedures  Procedures       Phone Contacts  ED Phone Contact    ED Course  ED Course as of Apr 03 0003 Mon Apr 02, 2018   2356 Patient does not want to be transferred to [facility]                                  MDM  Number of Diagnoses or Management Options  Diagnosis management comments: Differential diagnosis including but not limited to: vasovagal syncope from carotid massaging, cardiac arrhythmia, MI, ACS, doubt PE, metabolic abnormality, intracranial process, anemia, GI bleed, dehydration  Amount and/or Complexity of Data Reviewed  Clinical lab tests: ordered and reviewed  Tests in the radiology section of CPT®: ordered and reviewed  Decide to obtain previous medical records or to obtain history from someone other than the patient: yes  Obtain history from someone other than the patient: yes  Review and summarize past medical records: yes  Independent visualization of images, tracings, or specimens: yes      CritCare Time    Disposition  Final diagnoses:   Syncope     Time reflects when diagnosis was documented in both MDM as applicable and the Disposition within this note     Time User Action Codes Description Comment    4/3/2018 12:01 AM Dank Ruelas Add [R55] Syncope       ED Disposition     ED Disposition Condition Comment    Admit  Case was discussed with AP Marino and the patient's admission status was agreed to be Admission Status: observation status to the service of Dr Christy Gamino   Follow-up Information    None       Patient's Medications   Discharge Prescriptions    No medications on file     No discharge procedures on file      ED Provider  Electronically Signed by           Caitie Mancera MD  04/03/18 4923

## 2018-04-03 NOTE — ASSESSMENT & PLAN NOTE
· Reports acute neck stiffness, then started rubbing R side of neck   Suspect patient compression carotid artery, causing vasovagal syncope  · Tele  · Trend trops  · IVF

## 2018-04-04 ENCOUNTER — APPOINTMENT (OUTPATIENT)
Dept: ULTRASOUND IMAGING | Facility: HOSPITAL | Age: 63
End: 2018-04-04
Payer: COMMERCIAL

## 2018-04-04 VITALS
DIASTOLIC BLOOD PRESSURE: 70 MMHG | BODY MASS INDEX: 19.43 KG/M2 | WEIGHT: 131.17 LBS | HEART RATE: 60 BPM | SYSTOLIC BLOOD PRESSURE: 123 MMHG | TEMPERATURE: 97.8 F | HEIGHT: 69 IN | RESPIRATION RATE: 18 BRPM | OXYGEN SATURATION: 95 %

## 2018-04-04 PROBLEM — E87.6 HYPOKALEMIA: Chronic | Status: RESOLVED | Noted: 2017-06-28 | Resolved: 2018-04-04

## 2018-04-04 LAB
ANION GAP SERPL CALCULATED.3IONS-SCNC: 9 MMOL/L (ref 4–13)
BUN SERPL-MCNC: 14 MG/DL (ref 5–25)
CALCIUM SERPL-MCNC: 7.9 MG/DL (ref 8.3–10.1)
CHLORIDE SERPL-SCNC: 104 MMOL/L (ref 100–108)
CO2 SERPL-SCNC: 25 MMOL/L (ref 21–32)
CREAT SERPL-MCNC: 0.73 MG/DL (ref 0.6–1.3)
GFR SERPL CREATININE-BSD FRML MDRD: 99 ML/MIN/1.73SQ M
GLUCOSE P FAST SERPL-MCNC: 85 MG/DL (ref 65–99)
GLUCOSE SERPL-MCNC: 85 MG/DL (ref 65–140)
POTASSIUM SERPL-SCNC: 4.4 MMOL/L (ref 3.5–5.3)
SODIUM SERPL-SCNC: 138 MMOL/L (ref 136–145)

## 2018-04-04 PROCEDURE — 99217 PR OBSERVATION CARE DISCHARGE MANAGEMENT: CPT | Performed by: INTERNAL MEDICINE

## 2018-04-04 PROCEDURE — 93880 EXTRACRANIAL BILAT STUDY: CPT

## 2018-04-04 PROCEDURE — 80048 BASIC METABOLIC PNL TOTAL CA: CPT | Performed by: INTERNAL MEDICINE

## 2018-04-04 PROCEDURE — 93880 EXTRACRANIAL BILAT STUDY: CPT | Performed by: SURGERY

## 2018-04-04 RX ORDER — FAMOTIDINE 40 MG/5ML
20 POWDER, FOR SUSPENSION ORAL 2 TIMES DAILY
Qty: 50 ML | Refills: 0
Start: 2018-04-04 | End: 2020-06-07 | Stop reason: HOSPADM

## 2018-04-04 RX ADMIN — Medication 250 MG: at 06:10

## 2018-04-04 RX ADMIN — AMILORIDE HYDROCHLORIDE 5 MG: 5 TABLET ORAL at 08:11

## 2018-04-04 RX ADMIN — ENOXAPARIN SODIUM 40 MG: 40 INJECTION SUBCUTANEOUS at 08:09

## 2018-04-04 RX ADMIN — FLUTICASONE PROPIONATE AND SALMETEROL 1 PUFF: 50; 100 POWDER RESPIRATORY (INHALATION) at 08:11

## 2018-04-04 RX ADMIN — POTASSIUM CHLORIDE 40 MEQ: 1500 TABLET, EXTENDED RELEASE ORAL at 08:10

## 2018-04-04 RX ADMIN — NICOTINE 1 PATCH: 21 PATCH, EXTENDED RELEASE TRANSDERMAL at 08:11

## 2018-04-04 RX ADMIN — FAMOTIDINE 20 MG: 40 POWDER, FOR SUSPENSION ORAL at 08:11

## 2018-04-04 RX ADMIN — SODIUM CHLORIDE 100 ML/HR: 0.9 INJECTION, SOLUTION INTRAVENOUS at 00:40

## 2018-04-04 RX ADMIN — AMLODIPINE BESYLATE 2.5 MG: 2.5 TABLET ORAL at 08:10

## 2018-04-04 NOTE — PLAN OF CARE
Problem: Potential for Falls  Goal: Patient will remain free of falls  INTERVENTIONS:  - Assess patient frequently for physical needs  -  Identify cognitive and physical deficits and behaviors that affect risk of falls  -  [place] fall precautions as indicated by assessment   - Educate patient/family on patient safety including physical limitations  - Instruct patient to call for assistance with activity based on assessment  - Modify environment to reduce risk of injury  - Consider OT/PT consult to assist with strengthening/mobility   Outcome: Progressing      Problem: Nutrition/Hydration-ADULT  Goal: Nutrient/Hydration intake appropriate for improving, restoring or maintaining nutritional needs  Monitor and assess patient's nutrition/hydration status for malnutrition (ex- brittle hair, bruises, dry skin, pale skin and conjunctiva, muscle wasting, smooth red tongue, and disorientation)  Collaborate with interdisciplinary team and initiate plan and interventions as ordered  Monitor patient's weight and dietary intake as ordered or per policy  Utilize nutrition screening tool and intervene per policy  Determine patient's food preferences and provide high-protein, high-caloric foods as appropriate       INTERVENTIONS:  - Monitor oral intake, urinary output, labs, and treatment plans  - Assess nutrition and hydration status and recommend course of action  - Evaluate amount of meals eaten  - Assist patient with eating if necessary   - Allow adequate time for meals  - Recommend/ encourage appropriate diets, oral nutritional supplements, and vitamin/mineral supplements  - Order, calculate, and assess calorie counts as needed  - Recommend, monitor, and adjust tube feedings and TPN/PPN based on assessed needs  - Assess need for intravenous fluids  - Provide specific nutrition/hydration education as appropriate  - Include patient/family/caregiver in decisions related to nutrition   Outcome: Progressing

## 2018-04-04 NOTE — DISCHARGE INSTRUCTIONS
Dear Sasha Chaudhry,     It was our pleasure to care for you here at formerly [facility]   It is our hope that we were always able to meet and exceed the expected standards for your care during your stay  You were hospitalized due to syncope (passing out)  You were cared for on the 2nd floor under the service of Nicole Recinos, DO with the [facility] who covers for your primary care physician (PCP), Elizabeth Mendoza MD, while you were hospitalized  If you have any questions or concerns related to this hospitalization, you may contact us at [phone]  For follow up, we recommend that you follow up with your primary care physician  Please review this entire discharge summary as additional general instructions may be provided later as well  However, at this time we provide for you here, the most important instructions / recommendations at discharge:     · Avoid compressing both carotid arteries at the same time  This can temporarily reduce blood flow to the brain and cause you to pass out  · Be sure to keep well hydrated by drinking plenty of fluids per day  Recommendation is to drink at least 64 ounces of fluid per day  · If you have any further concerns related to dizziness or passing out, please discuss further with your primary care provider  · See below for additional information regarding your condition that we evaluated here in the hospital     Sincerely,     Nicole Recinos, DO     Syncope   WHAT YOU NEED TO KNOW:   Syncope is also called fainting or passing out  Syncope is a sudden, temporary loss of consciousness, followed by a fall from a standing or sitting position  Syncope ranges from not serious to a sign of a more serious condition that needs to be treated  You can control some health conditions that cause syncope  Your healthcare providers can help you create a plan to manage syncope and prevent episodes    DISCHARGE INSTRUCTIONS:   Seek care immediately if:   · You are bleeding because you hit your head when you fainted  · You suddenly have double vision, difficulty speaking, numbness, and cannot move your arms or legs  · You have chest pain and trouble breathing  · You vomit blood or material that looks like coffee grounds  · You see blood in your bowel movement  Contact your healthcare provider if:   · You have new or worsening symptoms  · You have another syncope episode  · You have a headache, fast heartbeat, or feel too dizzy to stand up  · You have questions or concerns about your condition or care  Follow up with your healthcare provider as directed:  Write down your questions so you remember to ask them during your visits  Manage syncope:   · Keep a record of your syncope episodes  Include your symptoms and your activity before and after the episode  The record can help your healthcare provider find the cause of your syncope and help you manage episodes  · Sit or lie down when needed  This includes when you feel dizzy, your throat is getting tight, and your vision changes  Raise your legs above the level of your heart  · Take slow, deep breaths if you start to breathe faster with anxiety or fear  This can help decrease dizziness and the feeling that you might faint  · Check your blood pressure often  This is important if you take medicine to lower your blood pressure  Check your blood pressure when you are lying down and when you are standing  Ask how often to check during the day  Keep a record of your blood pressure numbers  Your healthcare provider may use the record to help plan your treatment  Prevent a syncope episode:   · Move slowly and let yourself get used to one position before you move to another position  This is very important when you change from a lying or sitting position to a standing position  Take some deep breaths before you stand up from a lying position  Stand up slowly  Sudden movements may cause a fainting spell  Sit on the side of the bed or couch for a few minutes before you stand up  If you are on bedrest, try to be upright for about 2 hours each day, or as directed  Do not lock your legs if you are standing for a long period of time  Move your legs and bend your knees to keep blood flowing  · Follow your healthcare provider's recommendations  Your provider may  recommend that you drink more liquids to prevent dehydration  You may also need to have more salt to keep your blood pressure from dropping too low and causing syncope  Your provider will tell you how much liquid and sodium to have each day  · Watch for signs of low blood sugar  These include hunger, nervousness, sweating, and fast or fluttery heartbeats  Talk with your healthcare provider about ways to keep your blood sugar level steady  · Do not strain if you are constipated  You may faint if you strain to have a bowel movement  Walking is the best way to get your bowels moving  Eat foods high in fiber to make it easier to have a bowel movement  Good examples are high-fiber cereals, beans, vegetables, and whole-grain breads  Prune juice may help make bowel movements softer  · Be careful in hot weather  Heat can cause a syncope episode  Limit activity done outside on hot days  Physical activity in hot weather can lead to dehydration  This can cause an episode  © 2017 [address] Information is for End User's use only and may not be sold, redistributed or otherwise used for commercial purposes  All illustrations and images included in CareNotes® are the copyrighted property of A D A M [employer] Inc  or Valentin Cameron  The above information is an  only  It is not intended as medical advice for individual conditions or treatments  Talk to your doctor, nurse or pharmacist before following any medical regimen to see if it is safe and effective for you

## 2018-04-04 NOTE — NURSING NOTE
Patient discharged home ambulatory,  Picked up by friend  Discharge instructions reviewed with patient prior to discharge

## 2018-04-04 NOTE — PROGRESS NOTES
Pt is resting comfortably in bed  Pt has NS going at 100ml/hr  Pt is normal sinus on the monitor  Pt has no complaints at this time  No signs of distress  Will continue to monitor

## 2018-04-04 NOTE — DISCHARGE SUMMARY
Discharge Summary - [facility] Internal Medicine    Patient Information: Shamir Arreaga II 58 y o  male MRN: [id number]  Unit/Bed#: -01 Encounter: [id number]    Discharging Physician / Practitioner: Bladimir Schmidt DO  PCP: Bret Rodas MD  Admission Date: 4/2/2018  Discharge Date: 04/04/18    Disposition:     Home    Reason for Admission: Syncope    Discharge Diagnoses:     Principal Problem:    Vasovagal syncope  Active Problems:    Crohn disease ([facility] [address] )    Emphysema of lung ([facility] [address] )  Resolved Problems:    Hypokalemia      Consultations During Hospital Stay:  · None    Procedures Performed:     · None    Significant Findings / Test Results:     · Orthostatic vitals are negative on 04/03/2018 - 04/04/2018  · EKG shows normal sinus rhythm with a rate of 95 bpm and an incomplete right bundle branch block which was present on previous EKG  · Troponin less than 7 85 x 3  · Metabolic profiles were grossly normal aside from some transient hypokalemia on the morning of 04/03/2018  · Hemoglobin ranged between 13 2 and 14 3 during this admission  Platelet count ranges between 129 and 146 during this admission  · White blood cell count initially elevated at 11 07 but normalizes to 9 75 when last checked on 04/03/2018  · Medical alcohol level on admission was normal   · Heart rates have varied anywhere between 58 and 96 during this admission  Most heart rates are in the high 60s to mid / high 70s  Most blood pressures are in the 338'T to 132'F systolic  · Carotid doppler ultrasound shows < 50% stenosis bilaterally (no significant stenosis)  Incidental Findings:   · None     Test Results Pending at Discharge (will require follow up):    · None     Outpatient Tests Requested:  · None    Complications:  None    Hospital Course:     Shamir Arreaga II is a 58 y o  male patient who originally presented to the hospital on 4/2/2018 due to a syncopal event prior to coming to the hospital   The patient had described the event as a pain in the left side of his neck that felt like a spasm or tightening when he turned his neck  The patient then clutched his neck, seemingly pressing on his carotids  The patient states that he then lost consciousness and the next thing he remembers is having people around him as this event happened at a bar  The patient states that he had only had a few sips of a 1st beer when this occurred  The patient has not had similar events in the past   The patient did not fall from a bar stool but instead slumped over weaning on the person who was sitting next to him  He was then helped down to the floor and regain consciousness while on the floor  The patient denies any dizziness or lightheadedness just prior to the event although he states that he has had some dizziness and lightheadedness since that time  The patient denied any chest pain, dyspnea, or palpitations  The patient was admitted into the hospital for evaluation  He was monitored on telemetry was was overall unremarkable  Additional cardiac workup which included troponins and EKG were also negative for any acute pathology that would cause a syncopal event  No further cardiac workup is needed at this time  The patient had carotid Dopplers done to evaluate for any cause for carotidynia and showed < 50% stenosis bilaterally (no significant stenosis)  It is felt that most likely the patient pressed on his carotids temporarily decreasing circulation and resulting in the syncopal event  Alternatively, the patient's syncopal event may have been vasovagal related to the pain response from the neck pain that he had at that time  Condition at Discharge: stable     Discharge Day Visit / Exam:     Subjective: The patient has no acute complaints today and states that his dizziness is gone    The patient has told me that in the past he has no problem with lightheadedness but does get a disequilibrium when he stands under certain fluorescent lights  He has cataracts and 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)  Vitals: Blood Pressure: 123/70 (04/04/18 0715)  Pulse: 60 (04/04/18 0715)  Temperature: 97 8 °F (36 6 °C) (04/04/18 0715)  Temp Source: Oral (04/04/18 0715)  Respirations: 18 (04/04/18 0715)  Height: 5' 9" (175 3 cm) (04/03/18 0109)  Weight - Scale: 59 5 kg (131 lb 2 8 oz) (04/03/18 0109)  SpO2: 95 % (04/04/18 0715)  Exam:   Physical Exam   Constitutional: He is oriented to person, place, and time  No distress  HENT:   Mouth/Throat: Oropharynx is clear and moist  No oropharyngeal exudate  Eyes: Conjunctivae are normal  Pupils are equal, round, and reactive to light  Neck: No JVD present  Cardiovascular: Normal rate and regular rhythm  No murmur heard  Pulmonary/Chest: Effort normal  He has no wheezes  He has no rales  Abdominal: Soft  Bowel sounds are normal  He exhibits no distension  There is no tenderness  Musculoskeletal: He exhibits no edema or tenderness  Neurological: He is alert and oriented to person, place, and time  No cranial nerve deficit  Vitals reviewed  Discussion with Family: Patient only  Discharge instructions/Information to patient and family:   See after visit summary for information provided to patient and family  Provisions for Follow-Up Care:  See after visit summary for information related to follow-up care and any pertinent home health orders  Planned Readmission: None     Discharge Statement:  I spent 32 minutes discharging the patient  This time was spent on the day of discharge  I had direct contact with the patient on the day of discharge  Greater than 50% of the total time was spent examining patient, answering all patient questions, arranging and discussing plan of care with patient as well as directly providing post-discharge instructions  Additional time then spent on discharge activities      Discharge Medications:  See after visit summary for reconciled discharge medications provided to patient and family        ** Please Note: This note has been constructed using a voice recognition system **

## 2018-06-06 ENCOUNTER — HOSPITAL ENCOUNTER (EMERGENCY)
Facility: HOSPITAL | Age: 63
Discharge: HOME | End: 2018-06-06
Attending: EMERGENCY MEDICINE | Admitting: EMERGENCY MEDICINE
Payer: COMMERCIAL

## 2018-06-06 ENCOUNTER — APPOINTMENT (EMERGENCY)
Dept: CT IMAGING | Facility: HOSPITAL | Age: 63
End: 2018-06-06
Payer: COMMERCIAL

## 2018-06-06 VITALS
RESPIRATION RATE: 16 BRPM | DIASTOLIC BLOOD PRESSURE: 89 MMHG | TEMPERATURE: 98.7 F | HEART RATE: 88 BPM | BODY MASS INDEX: 19.99 KG/M2 | OXYGEN SATURATION: 97 % | SYSTOLIC BLOOD PRESSURE: 147 MMHG | WEIGHT: 135.36 LBS

## 2018-06-06 DIAGNOSIS — S00.33XA CONTUSION OF NOSE, INITIAL ENCOUNTER: ICD-10-CM

## 2018-06-06 DIAGNOSIS — S00.83XA CONTUSION OF FACE, INITIAL ENCOUNTER: ICD-10-CM

## 2018-06-06 DIAGNOSIS — S00.31XA ABRASION OF NOSE, INITIAL ENCOUNTER: ICD-10-CM

## 2018-06-06 DIAGNOSIS — S02.2XXA NASAL BONE FRACTURES: ICD-10-CM

## 2018-06-06 DIAGNOSIS — S09.90XA INJURY OF HEAD, INITIAL ENCOUNTER: Primary | ICD-10-CM

## 2018-06-06 DIAGNOSIS — S02.401A MAXILLARY FRACTURE (HCC): ICD-10-CM

## 2018-06-06 PROCEDURE — 99283 EMERGENCY DEPT VISIT LOW MDM: CPT

## 2018-06-06 PROCEDURE — 90471 IMMUNIZATION ADMIN: CPT

## 2018-06-06 PROCEDURE — 90715 TDAP VACCINE 7 YRS/> IM: CPT | Performed by: EMERGENCY MEDICINE

## 2018-06-06 PROCEDURE — 70450 CT HEAD/BRAIN W/O DYE: CPT

## 2018-06-06 PROCEDURE — 70486 CT MAXILLOFACIAL W/O DYE: CPT

## 2018-06-06 RX ORDER — AMOXICILLIN AND CLAVULANATE POTASSIUM 875; 125 MG/1; MG/1
1 TABLET, FILM COATED ORAL EVERY 12 HOURS SCHEDULED
Qty: 20 TABLET | Refills: 0 | Status: SHIPPED | OUTPATIENT
Start: 2018-06-06 | End: 2018-06-16

## 2018-06-06 RX ORDER — AMOXICILLIN AND CLAVULANATE POTASSIUM 875; 125 MG/1; MG/1
1 TABLET, FILM COATED ORAL ONCE
Status: COMPLETED | OUTPATIENT
Start: 2018-06-06 | End: 2018-06-06

## 2018-06-06 RX ADMIN — AMOXICILLIN AND CLAVULANATE POTASSIUM 1 TABLET: 875; 125 TABLET, FILM COATED ORAL at 11:52

## 2018-06-06 RX ADMIN — TETANUS TOXOID, REDUCED DIPHTHERIA TOXOID AND ACELLULAR PERTUSSIS VACCINE, ADSORBED 0.5 ML: 5; 2.5; 8; 8; 2.5 SUSPENSION INTRAMUSCULAR at 10:23

## 2018-06-06 NOTE — ED PROVIDER NOTES
History  Chief Complaint   Patient presents with    Facial Injury     pt tripped fell 3 days ago in his driveway, facial injury to his nose  Noted abrasion, swelling, increased pain and c/o nasal drainage  79-year-old male presents today after a fall three days ago  States he came home Sunday night, got out of his truck, tripped over 1 of his grandson's toys and fell flat on his face  There was no loss of consciousness  His only injuries are to his face and nose  Comes in today because he is having increased pain and continued nasal drainage  Tetanus status is unknown  History provided by:  Patient  Facial Injury   Mechanism of injury:  Fall  Location:  Face, nose and forehead  Time since incident:  4 days  Pain details:     Quality:  Aching    Severity:  Mild    Duration:  4 days    Timing:  Constant    Progression:  Unchanged  Foreign body present:  No foreign bodies  Relieved by:  None tried  Worsened by:  Nothing  Ineffective treatments:  None tried  Associated symptoms: congestion and rhinorrhea    Associated symptoms: no altered mental status, no difficulty breathing, no double vision, no ear pain, no epistaxis, no headaches, no loss of consciousness, no malocclusion, no nausea, no neck pain, no trismus and no vomiting    Risk factors: alcohol use    Risk factors: no prior injuries to these areas        Prior to Admission Medications   Prescriptions Last Dose Informant Patient Reported? Taking?    AMILoride 5 mg tablet   No No   Sig: Take 1 tablet by mouth daily   albuterol (PROVENTIL HFA,VENTOLIN HFA) 90 mcg/act inhaler   Yes No   Sig: Inhale 2 puffs every 6 (six) hours as needed for wheezing   amLODIPine (NORVASC) 2 5 mg tablet   Yes No   Sig: Take 2 5 mg by mouth daily   famotidine (PEPCID) 40 mg/5 mL suspension   No No   Sig: Take 2 5 mL (20 mg total) by mouth 2 (two) times a day   fluticasone-salmeterol (ADVAIR HFA) 45-21 MCG/ACT inhaler   Yes No   Sig: Inhale 2 puffs 2 (two) times a day as needed     magnesium 30 MG tablet   No No   Sig: Take 1 tablet by mouth 2 (two) times a day   omeprazole (PriLOSEC) 40 MG capsule   Yes No   Sig: Take 40 mg by mouth daily   potassium chloride (K-DUR,KLOR-CON) 10 mEq tablet   No No   Sig: Take 4 tablets by mouth 2 (two) times a day      Facility-Administered Medications: None       Past Medical History:   Diagnosis Date    [place]UCILLE (acute kidney injury) ([place] [address] ) 6/28/2017    Cataract     Colostomy in place ([place] [address] ) 6/29/2017    Crohn disease ([place] [address] )     Emphysema of lung ([place] [address] )     Emphysema/COPD ([place] [address] )     Hypertension     Hypokalemia 6/28/2017    Hypomagnesemia 6/29/2017    Hyponatremia 6/28/2017       Past Surgical History:   Procedure Laterality Date    CHOLECYSTECTOMY      COLECTOMY      10 inchs of ileum    COLONOSCOPY N/A 6/30/2017    Procedure: COLONOSCOPY;  Surgeon: Va Nunez MD;  [facility]; Service: Gastroenterology    COLOSTOMY      FINGER SURGERY Left     LITHOTRIPSY         History reviewed  No pertinent family history  I have reviewed and agree with the history as documented  Social History   Substance Use Topics    Smoking status: Current Every Day Smoker     Packs/day: 1 50     Types: Cigarettes    Smokeless tobacco: Never Used    Alcohol use Yes      Comment: weekend social        Review of Systems   Constitutional: Negative for appetite change, chills, diaphoresis, fatigue and fever  HENT: Positive for congestion, facial swelling, rhinorrhea and sinus pressure  Negative for ear pain, nosebleeds, sore throat and trouble swallowing  Eyes: Negative for double vision, redness and visual disturbance  Respiratory: Negative for cough, chest tightness and shortness of breath  Cardiovascular: Negative for chest pain  Gastrointestinal: Negative for abdominal pain, constipation, diarrhea, nausea and vomiting  Musculoskeletal: Negative for back pain, neck pain and neck stiffness  Skin: Positive for wound   Negative for pallor and rash  Allergic/Immunologic: Negative for immunocompromised state  Neurological: Negative for loss of consciousness, speech difficulty, light-headedness, numbness and headaches  Hematological: Does not bruise/bleed easily  Psychiatric/Behavioral: Negative for confusion  All other systems reviewed and are negative  Physical Exam  Physical Exam   Constitutional: He is oriented to person, place, and time  He appears well-developed and well-nourished  HENT:   Head: Head is without raccoon's eyes and without Jones's sign  Right Ear: External ear normal    Left Ear: External ear normal    Mouth/Throat: Oropharynx is clear and moist    Patient has multiple abrasions on the bridge of his nose down to the and  There is a significant amount of nasal swelling  There is no obvious septal hematoma  Nose looks slightly displaced to the right  He also has tenderness along bilateral axilla and the right supraorbital ridge  Eyes: EOM are normal  Pupils are equal, round, and reactive to light  Neck: Normal range of motion  Neck supple  No tracheal deviation present  No midline C-spine tenderness, no step-off noted  Cardiovascular: Normal rate  Pulmonary/Chest: Effort normal    Abdominal: Soft  Bowel sounds are normal    Musculoskeletal: Normal range of motion  Lymphadenopathy:     He has no cervical adenopathy  Neurological: He is alert and oriented to person, place, and time  Skin: Skin is warm and dry  Capillary refill takes less than 2 seconds  Psychiatric: He has a normal mood and affect  Nursing note and vitals reviewed        Vital Signs  ED Triage Vitals [06/06/18 0928]   Temperature Pulse Respirations Blood Pressure SpO2   98 7 °F (37 1 °C) (!) 108 18 (!) 181/101 96 %      Temp Source Heart Rate Source Patient Position - Orthostatic VS BP Location FiO2 (%)   Oral Monitor Sitting Right arm --      Pain Score       Worst Possible Pain           Vitals:    06/06/18 5879 06/06/18 1030 06/06/18 1150   BP: (!) 181/101 152/94 147/89   Pulse: (!) 108  88   Patient Position - Orthostatic VS: Sitting  Lying       Visual Acuity      ED Medications  Medications   tetanus-diphtheria-acellular pertussis (BOOSTRIX) IM injection 0 5 mL (0 5 mL Intramuscular Given 6/6/18 1023)   amoxicillin-clavulanate (AUGMENTIN) 875-125 mg per tablet 1 tablet (1 tablet Oral Given 6/6/18 1152)       Diagnostic Studies  Results Reviewed     None                 CT facial bones without contrast   Final Result by Deedee Wilhelm DO (06/06 1120)   1  Minimally depressed bilateral nasal bone fractures  2   Nondisplaced fracture frontal process of right maxilla  3   Nondisplaced fracture involving the anterior aspect of the bony nasal septum  No evidence of septal hematoma  The study was marked in [facility] for immediate notification  Workstation performed: [id number]         CT head without contrast   Final Result by Deedee Wilhelm DO (06/06 1114)   1  Mild cerebral atrophy with chronic small vessel ischemic change  No acute intracranial abnormality  2   Bilateral nasal bone fractures, incompletely visualized  Please see CT scan of the facial bones report for full details  Workstation performed: [id number]                    Procedures  Procedures       Phone Contacts  ED Phone Contact    ED Course                               MDM  Number of Diagnoses or Management Options  Abrasion of nose, initial encounter: new and requires workup  Contusion of face, initial encounter: new and requires workup  Contusion of nose, initial encounter: new and requires workup  Injury of head, initial encounter: new and requires workup  Maxillary fracture [facility]):   Nasal bone fractures:   Diagnosis management comments: 10:18 AM  No evidence of tetanus record in our system  Last visit to [facility] was in 2010 for a finger injury unknown whether tetanus was updated at this time  Patient does not know when his last tetanus was  Will update today  11:35 AM  CT scans show nondisplaced b/l nasal bone fractures as well as a non displaced fracture of right maxilla  Discussed the case with OMFS who recommend starting Augmentin and follow up in the office as an outpatient  Patient updated with these findings as well  First dose of Augmentin given here in the emergency department  Return to ED instructions reviewed  Amount and/or Complexity of Data Reviewed  Tests in the radiology section of CPT®: ordered and reviewed  Independent visualization of images, tracings, or specimens: yes    Risk of Complications, Morbidity, and/or Mortality  Presenting problems: high  Diagnostic procedures: high  Management options: moderate    Patient Progress  Patient progress: stable    CritCare Time    Disposition  Final diagnoses:   Injury of head, initial encounter   Contusion of face, initial encounter   Contusion of nose, initial encounter   Abrasion of nose, initial encounter   Nasal bone fractures   Maxillary fracture ([facility] [address] )     Time reflects when diagnosis was documented in both MDM as applicable and the Disposition within this note     Time User Action Codes Description Comment    6/6/2018 10:18 AM Ramone Becerra Add [S09 90XA] Injury of head, initial encounter     6/6/2018 10:18 AM Greer Fernandez Add [S00 83XA] Contusion of face, initial encounter     6/6/2018 10:18 AM Greer Fernandez Add [S00 33XA] Contusion of nose, initial encounter     6/6/2018 10:18 AM Greer Fernandez Add [S00 31XA] Abrasion of nose, initial encounter     6/6/2018 11:46 AM Greer Fernandez Add [S02  2XXA] Nasal bone fractures     6/6/2018 11:46 AM Ramone Becerra Add [D62 766S] Maxillary fracture [facility])       ED Disposition     ED Disposition Condition Comment    Discharge  Lea Edelmira III discharge to home/self care      Condition at discharge: Stable        Follow-up Information     Follow up With Specialties Details Why Elian Kearney Roly REHMAN  Schedule an appointment as soon as possible for a visit  [phone]            Discharge Medication List as of 6/6/2018 11:53 AM      START taking these medications    Details   amoxicillin-clavulanate (AUGMENTIN) 875-125 mg per tablet Take 1 tablet by mouth every 12 (twelve) hours for 10 days, Starting Wed 6/6/2018, Until Sat 6/16/2018, Print         CONTINUE these medications which have NOT CHANGED    Details   albuterol (PROVENTIL HFA,VENTOLIN HFA) 90 mcg/act inhaler Inhale 2 puffs every 6 (six) hours as needed for wheezing, Historical Med      AMILoride 5 mg tablet Take 1 tablet by mouth daily, Starting u 12/7/2017, Print      amLODIPine (NORVASC) 2 5 mg tablet Take 2 5 mg by mouth daily, Historical Med      famotidine (PEPCID) 40 mg/5 mL suspension Take 2 5 mL (20 mg total) by mouth 2 (two) times a day, Starting Wed 4/4/2018, No Print      fluticasone-salmeterol (ADVAIR HFA) 45-21 MCG/ACT inhaler Inhale 2 puffs 2 (two) times a day as needed  , Historical Med      magnesium 30 MG tablet Take 1 tablet by mouth 2 (two) times a day, Starting Wed 12/6/2017, Print      omeprazole (PriLOSEC) 40 MG capsule Take 40 mg by mouth daily, Historical Med      potassium chloride (K-DUR,KLOR-CON) 10 mEq tablet Take 4 tablets by mouth 2 (two) times a day, Starting Wed 12/6/2017, Print           No discharge procedures on file      ED Provider  Electronically Signed by           Benjamin Hunter DO  06/06/18 2623

## 2018-06-06 NOTE — DISCHARGE INSTRUCTIONS
Abrasion   WHAT YOU NEED TO KNOW:   An abrasion is a scrape on your skin  It happens when your skin rubs against a rough surface  Some examples of an abrasion include rug burn, a skinned elbow, or road rash  Abrasions can be many shapes and sizes  The wound may hurt, bleed, bruise, or swell  DISCHARGE INSTRUCTIONS:   Return to the emergency department if:   · The bleeding does not stop after 10 minutes of firm pressure  · You cannot rinse one or more foreign objects out of your wound  · You have red streaks on your skin coming from your wound  Contact your healthcare provider if:   · You have a fever or chills  · Your abrasion is red, warm, swollen, or draining pus  · You have questions or concerns about your condition or care  Care for your abrasion:   · Wash your hands and dry them with a clean towel  · Press a clean cloth against your wound to stop any bleeding  · Rinse your wound with a lot of clean water  Do not use harsh soap, alcohol, or iodine solutions  · Use a clean, wet cloth to remove any objects, such as small pieces of rocks or dirt  · Rub antibiotic ointment on your wound  This may help prevent infection and help your wound heal     · Cover the wound with a non-stick bandage  Change the bandage daily, and if gets wet or dirty  Follow up with your healthcare provider as directed:  Write down your questions so you remember to ask them during your visits  © 2017 [address] Information is for End User's use only and may not be sold, redistributed or otherwise used for commercial purposes  All illustrations and images included in CareNotes® are the copyrighted property of [employer]  or Valentin Cameron  The above information is an  only  It is not intended as medical advice for individual conditions or treatments   Talk to your doctor, nurse or pharmacist before following any medical regimen to see if it is safe and effective for you     Contusion in [facility]:   A contusion is a bruise that appears on your skin after an injury  A bruise happens when small blood vessels tear but skin does not  When blood vessels tear, blood leaks into nearby tissue, such as soft tissue or muscle  DISCHARGE INSTRUCTIONS:   Return to the emergency department if:   · You have new trouble moving the injured area  · You have tingling or numbness in or near the injured area  · Your hand or foot below the bruise gets cold or turns pale  Contact your healthcare provider if:   · You find a new lump in the injured area  · Your symptoms do not improve with treatment after 4 to 5 days  · You have questions or concerns about your condition or care  Medicines: You may need any of the following:  · NSAIDs  help decrease swelling and pain or fever  This medicine is available with or without a doctor's order  NSAIDs can cause stomach bleeding or kidney problems in certain people  If you take blood thinner medicine, always ask your healthcare provider if NSAIDs are safe for you  Always read the medicine label and follow directions  · Prescription pain medicine  may be given  Do not wait until the pain is severe before you take your medicine  · Take your medicine as directed  Contact your healthcare provider if you think your medicine is not helping or if you have side effects  Tell him of her if you are allergic to any medicine  Keep a list of the medicines, vitamins, and herbs you take  Include the amounts, and when and why you take them  Bring the list or the pill bottles to follow-up visits  Carry your medicine list with you in case of an emergency  Follow up with your healthcare provider as directed: You may need to return within a week to check your injury again  Write down your questions so you remember to ask them during your visits    Help a contusion heal:   · Rest the injured area  or use it less than usual  If you bruised your leg or foot, you may need crutches or a cane to help you walk  This will help you keep weight off your injured body part  · Apply ice  to decrease swelling and pain  Ice may also help prevent tissue damage  Use an ice pack, or put crushed ice in a plastic bag  Cover it with a towel and place it on your bruise for 15 to 20 minutes every hour or as directed  · Use compression  to support the area and decrease swelling  Wrap an elastic bandage around the area over the bruised muscle  Make sure the bandage is not too tight  You should be able to fit 1 finger between the bandage and your skin  · Elevate (raise) your injured body part  above the level of your heart to help decrease pain and swelling  Use pillows, blankets, or rolled towels to elevate the area as often as you can  · Do not drink alcohol  as directed  Alcohol may slow healing  · Do not stretch injured muscles  right after your injury  Ask your healthcare provider when and how you may safely stretch after your injury  Gentle stretches can help increase your flexibility  · Do not massage the area or put heating pads  on the bruise right after your injury  Heat and massage may slow healing  Your healthcare provider may tell you to apply heat after several days  At that time, heat will start to help the injury heal   Prevent another contusion:   · Stretch and warm up before you play sports or exercise  · Wear protective gear when you play sports  Examples are shin guards and padding  · If you begin a new physical activity, start slowly to give your body a chance to adjust   © 2017 [address] Information is for End User's use only and may not be sold, redistributed or otherwise used for commercial purposes  All illustrations and images included in CareNotes® are the copyrighted property of [employer]  or [address]  The above information is an  only   It is not intended as medical advice for individual conditions or treatments  Talk to your doctor, nurse or pharmacist before following any medical regimen to see if it is safe and effective for you  Facial Fracture   WHAT YOU NEED TO KNOW:   A facial fracture is a break in one or more of the bones in your face  The bones in your face include those around your eye, your cheekbones, and the bones of your nose and jaw  A facial fracture may also cause damage to nearby tissue  DISCHARGE INSTRUCTIONS:   Medicines:   · Decongestant medicine:  Decongestants help decrease swelling in your nose and sinuses  This medicine may also help you breathe easier  · Pain medicine: You may be given a prescription medicine to decrease pain  Do not wait until the pain is severe before you take this medicine  · Steroid medicine: This medicine helps decrease swelling in your face  · Antibiotic medicine:  Antibiotic medicine helps treat an infection caused by bacteria  This medicine may be given if you have an open wound  · Take your medicine as directed  Contact your healthcare provider if you think your medicine is not helping or if you have side effects  Tell him of her if you are allergic to any medicine  Keep a list of the medicines, vitamins, and herbs you take  Include the amounts, and when and why you take them  Bring the list or the pill bottles to follow-up visits  Carry your medicine list with you in case of an emergency  Follow up with your healthcare provider as directed:  Write down your questions so you remember to ask them during your visits  Nutrition:  You may not be able to eat solid food for a period of time  You may first be started on a liquid diet  Examples of liquids you may be able to have include, water, broth, gelatin, apple juice, or lemon-lime soda pop  After a few days, you may be allowed to eat soft foods, such as applesauce, bananas, cooked cereal, cottage cheese, pudding, and yogurt   Ask for more information about the type of foods you can eat  Rehabilitation:  If you had surgery to fix your facial fracture, you may need oral and facial rehabilitation  This is done to restore normal use and movement of your facial muscles  Ask for more information about rehabilitation  Help prevent a facial fracture:   · Wear a helmet when you ride a bicycle or a motorcycle  · Wear a seatbelt at all times when you are inside a motor vehicle  · Wear protective headgear and eyewear during sporting activities  Self-care:   · Apply ice:  Ice helps decrease swelling and pain  Ice may also help prevent tissue damage  Use an ice pack or put crushed ice in a plastic bag  Cover it with a towel and place it on your face for 15 to 20 minutes every hour as directed  · Keep your head elevated:  Keep you head above the level of your heart as often as you can  This will help decrease swelling and pain  Prop your head on pillows or blankets to keep it elevated comfortably  · Avoid putting pressure on your face:      ¨ Do not sleep on the injured side of your face  Pressure on the area of your injury may cause further damage  ¨ Sneeze with your mouth open to decrease pressure on your broken facial bones  Too much pressure from a sneeze may cause your broken bones to move and cause more damage  ¨ DO NOT BLOW YOUR   Nasal Fracture   WHAT YOU NEED TO KNOW:   A nasal fracture is a crack or break in your nose  You may have a break in the upper nose (bridge), the side, or in the septum  The septum is in the middle of the nose and divides your nostrils  Nasal fractures are caused by a hard hit to the nose  They may be caused by a motor vehicle crash, sports injury, fall, or a fight  DISCHARGE INSTRUCTIONS:   Return to the emergency department if:   You feel like one or both of your nasal passages are blocked and you have trouble breathing  Clear fluid is leaking from your nose      Your have severe nose pain, even after you take medicine  You have double vision or have problems moving your eyes  Contact your healthcare provider if:   You have a fever  You continue to have nosebleeds  You have a headache that gets worse, even after you take pain medicine  Your splint or packing are loose  You have questions about your condition or care  Medicines:   Medicine  may be given to decrease pain or help prevent a bacterial infection  Ask how to take pain medicine safely  Medicine may also be given to decrease nasal swelling and help make breathing easier  Take your medicine as directed  Contact your healthcare provider if you think your medicine is not helping or if you have side effects  Tell him or her if you are allergic to any medicine  Keep a list of the medicines, vitamins, and herbs you take  Include the amounts, and when and why you take them  Bring the list or the pill bottles to follow-up visits  Carry your medicine list with you in case of an emergency  Wound care:  Ask your healthcare provider how to care for your wounds, splint, or packing  How to care for your nasal fracture:   Apply ice  on your nose for 15 to 20 minutes every hour or as directed  Use an ice pack, or put crushed ice in a plastic bag  Cover it with a towel  Ice helps prevent tissue damage and decreases swelling and pain  Elevate  your head when you lie down  This will help decrease swelling and pain  You may need to see a specialist 3 to 5 days later for tests or more treatment after swelling has decreased  Protect your nose  to prevent bleeding, bruising, or another fracture  Try not to bump your nose on anything  You may not be able to participate in sports for up to 6 weeks  Follow up with a specialist or your healthcare provider in 2 to 5 days as directed:  Write down any questions you have so you remember to ask them during your visits   Sometimes follow-up care is needed months or even years later to correct problems  © 2017 [address] Information is for End User's use only and may not be sold, redistributed or otherwise used for commercial purposes  All illustrations and images included in CareNotes® are the copyrighted property of [employer]  or Valentin Cameron  The above information is an  only  It is not intended as medical advice for individual conditions or treatments  Talk to your doctor, nurse or pharmacist before following any medical regimen to see if it is safe and effective for you  ¨  nose because it may cause more damage if you have a fracture near your eye  The pressure from blowing your nose may pinch the nerve of your eye and cause permanent damage  · Clean your mouth carefully: It may be hard to clean your teeth if have an injury or fracture near your mouth  Your will be shown the best way to do this so you do not hurt yourself  A water pick or a child-sized soft toothbrush may work well to clean your mouth  Contact your healthcare provider if:   · You are bleeding from a wound on your face  · You have double vision or you suddenly have problems with your eyesight  · You have questions or concerns about your condition or care  Return to the emergency department if:   · You have clear or pinkish fluid draining from your nose or mouth  · You have numbness in your face  · You have worsening pain in your eye or face  · You suddenly have trouble chewing or swallowing  · You suddenly feel lightheaded and short of breath  · You have chest pain when you take a deep breath or cough  You may cough up blood  · Your arm or leg feels warm, tender, and painful  It may look swollen and red  © 2017 [address] Information is for End User's use only and may not be sold, redistributed or otherwise used for commercial purposes   All illustrations and images included in CareNotes® are the copyrighted property of [employer]  or [employer]in Cameron  The above information is an  only  It is not intended as medical advice for individual conditions or treatments  Talk to your doctor, nurse or pharmacist before following any medical regimen to see if it is safe and effective for you

## 2020-05-28 ENCOUNTER — APPOINTMENT (EMERGENCY)
Dept: RADIOLOGY | Facility: HOSPITAL | Age: 65
DRG: 871 | End: 2020-05-28
Payer: COMMERCIAL

## 2020-05-28 ENCOUNTER — APPOINTMENT (INPATIENT)
Dept: MRI IMAGING | Facility: HOSPITAL | Age: 65
DRG: 871 | End: 2020-05-28
Payer: COMMERCIAL

## 2020-05-28 ENCOUNTER — APPOINTMENT (EMERGENCY)
Dept: CT IMAGING | Facility: HOSPITAL | Age: 65
DRG: 871 | End: 2020-05-28
Payer: COMMERCIAL

## 2020-05-28 ENCOUNTER — HOSPITAL ENCOUNTER (INPATIENT)
Facility: HOSPITAL | Age: 65
LOS: 1 days | Discharge: HOME | DRG: 871 | End: 2020-05-29
Attending: EMERGENCY MEDICINE | Admitting: INTERNAL MEDICINE
Payer: COMMERCIAL

## 2020-05-28 DIAGNOSIS — R07.1 CHEST PAIN ON BREATHING: Primary | ICD-10-CM

## 2020-05-28 DIAGNOSIS — J18.9 PNEUMONIA: ICD-10-CM

## 2020-05-28 DIAGNOSIS — R16.0 LIVER MASS, RIGHT LOBE: ICD-10-CM

## 2020-05-28 DIAGNOSIS — A41.9 SEPSIS (HCC): ICD-10-CM

## 2020-05-28 DIAGNOSIS — Z72.0 TOBACCO ABUSE: ICD-10-CM

## 2020-05-28 DIAGNOSIS — R07.82 INTERCOSTAL PAIN: ICD-10-CM

## 2020-05-28 PROBLEM — R07.9 CHEST PAIN: Status: ACTIVE | Noted: 2020-05-28

## 2020-05-28 LAB
ANION GAP SERPL CALCULATED.3IONS-SCNC: 14 MMOL/L (ref 4–13)
ATRIAL RATE: 141 BPM
BASOPHILS # BLD AUTO: 0.04 THOUSANDS/ΜL (ref 0–0.1)
BASOPHILS NFR BLD AUTO: 0 % (ref 0–1)
BUN SERPL-MCNC: 10 MG/DL (ref 5–25)
CALCIUM SERPL-MCNC: 7.5 MG/DL (ref 8.3–10.1)
CHLORIDE SERPL-SCNC: 96 MMOL/L (ref 100–108)
CO2 SERPL-SCNC: 26 MMOL/L (ref 21–32)
CREAT SERPL-MCNC: 0.85 MG/DL (ref 0.6–1.3)
D DIMER PPP FEU-MCNC: 1.57 UG/ML FEU
EOSINOPHIL # BLD AUTO: 0.02 THOUSAND/ΜL (ref 0–0.61)
EOSINOPHIL NFR BLD AUTO: 0 % (ref 0–6)
ERYTHROCYTE [DISTWIDTH] IN BLOOD BY AUTOMATED COUNT: 13.1 % (ref 11.6–15.1)
GFR SERPL CREATININE-BSD FRML MDRD: 92 ML/MIN/1.73SQ M
GLUCOSE SERPL-MCNC: 101 MG/DL (ref 65–140)
HCT VFR BLD AUTO: 44.2 % (ref 36.5–49.3)
HGB BLD-MCNC: 15 G/DL (ref 12–17)
IMM GRANULOCYTES # BLD AUTO: 0.12 THOUSAND/UL (ref 0–0.2)
IMM GRANULOCYTES NFR BLD AUTO: 1 % (ref 0–2)
LACTATE SERPL-SCNC: 1.3 MMOL/L (ref 0.5–2)
LACTATE SERPL-SCNC: 2.4 MMOL/L (ref 0.5–2)
LYMPHOCYTES # BLD AUTO: 2.3 THOUSANDS/ΜL (ref 0.6–4.47)
LYMPHOCYTES NFR BLD AUTO: 13 % (ref 14–44)
MCH RBC QN AUTO: 33.6 PG (ref 26.8–34.3)
MCHC RBC AUTO-ENTMCNC: 33.9 G/DL (ref 31.4–37.4)
MCV RBC AUTO: 99 FL (ref 82–98)
MONOCYTES # BLD AUTO: 1.91 THOUSAND/ΜL (ref 0.17–1.22)
MONOCYTES NFR BLD AUTO: 11 % (ref 4–12)
NEUTROPHILS # BLD AUTO: 13.24 THOUSANDS/ΜL (ref 1.85–7.62)
NEUTS SEG NFR BLD AUTO: 75 % (ref 43–75)
NRBC BLD AUTO-RTO: 0 /100 WBCS
P AXIS: 83 DEGREES
PLATELET # BLD AUTO: 258 THOUSANDS/UL (ref 149–390)
PMV BLD AUTO: 11.9 FL (ref 8.9–12.7)
POTASSIUM SERPL-SCNC: 4.5 MMOL/L (ref 3.5–5.3)
PR INTERVAL: 126 MS
QRS AXIS: 76 DEGREES
QRSD INTERVAL: 80 MS
QT INTERVAL: 274 MS
QTC INTERVAL: 419 MS
RBC # BLD AUTO: 4.46 MILLION/UL (ref 3.88–5.62)
SARS-COV-2 RNA RESP QL NAA+PROBE: NEGATIVE
SODIUM SERPL-SCNC: 136 MMOL/L (ref 136–145)
T WAVE AXIS: 80 DEGREES
TROPONIN I SERPL-MCNC: <0.02 NG/ML
TROPONIN I SERPL-MCNC: <0.02 NG/ML
TSH SERPL DL<=0.05 MIU/L-ACNC: 2.65 UIU/ML (ref 0.36–3.74)
VENTRICULAR RATE: 141 BPM
WBC # BLD AUTO: 17.63 THOUSAND/UL (ref 4.31–10.16)

## 2020-05-28 PROCEDURE — 87077 CULTURE AEROBIC IDENTIFY: CPT | Performed by: FAMILY MEDICINE

## 2020-05-28 PROCEDURE — A9585 GADOBUTROL INJECTION: HCPCS | Performed by: INTERNAL MEDICINE

## 2020-05-28 PROCEDURE — 94640 AIRWAY INHALATION TREATMENT: CPT

## 2020-05-28 PROCEDURE — 84443 ASSAY THYROID STIM HORMONE: CPT | Performed by: INTERNAL MEDICINE

## 2020-05-28 PROCEDURE — 96375 TX/PRO/DX INJ NEW DRUG ADDON: CPT

## 2020-05-28 PROCEDURE — 85379 FIBRIN DEGRADATION QUANT: CPT | Performed by: INTERNAL MEDICINE

## 2020-05-28 PROCEDURE — 71275 CT ANGIOGRAPHY CHEST: CPT

## 2020-05-28 PROCEDURE — 96365 THER/PROPH/DIAG IV INF INIT: CPT

## 2020-05-28 PROCEDURE — 93010 ELECTROCARDIOGRAM REPORT: CPT | Performed by: INTERNAL MEDICINE

## 2020-05-28 PROCEDURE — 83605 ASSAY OF LACTIC ACID: CPT | Performed by: INTERNAL MEDICINE

## 2020-05-28 PROCEDURE — 80048 BASIC METABOLIC PNL TOTAL CA: CPT | Performed by: INTERNAL MEDICINE

## 2020-05-28 PROCEDURE — 94760 N-INVAS EAR/PLS OXIMETRY 1: CPT

## 2020-05-28 PROCEDURE — 85025 COMPLETE CBC W/AUTO DIFF WBC: CPT | Performed by: INTERNAL MEDICINE

## 2020-05-28 PROCEDURE — 99285 EMERGENCY DEPT VISIT HI MDM: CPT

## 2020-05-28 PROCEDURE — 94668 MNPJ CHEST WALL SBSQ: CPT

## 2020-05-28 PROCEDURE — 87040 BLOOD CULTURE FOR BACTERIA: CPT | Performed by: INTERNAL MEDICINE

## 2020-05-28 PROCEDURE — 96376 TX/PRO/DX INJ SAME DRUG ADON: CPT

## 2020-05-28 PROCEDURE — 94664 DEMO&/EVAL PT USE INHALER: CPT

## 2020-05-28 PROCEDURE — 87635 SARS-COV-2 COVID-19 AMP PRB: CPT | Performed by: INTERNAL MEDICINE

## 2020-05-28 PROCEDURE — 87205 SMEAR GRAM STAIN: CPT | Performed by: FAMILY MEDICINE

## 2020-05-28 PROCEDURE — 99284 EMERGENCY DEPT VISIT MOD MDM: CPT | Performed by: EMERGENCY MEDICINE

## 2020-05-28 PROCEDURE — 71045 X-RAY EXAM CHEST 1 VIEW: CPT

## 2020-05-28 PROCEDURE — 74183 MRI ABD W/O CNTR FLWD CNTR: CPT

## 2020-05-28 PROCEDURE — 84484 ASSAY OF TROPONIN QUANT: CPT | Performed by: INTERNAL MEDICINE

## 2020-05-28 PROCEDURE — 99223 1ST HOSP IP/OBS HIGH 75: CPT | Performed by: INTERNAL MEDICINE

## 2020-05-28 PROCEDURE — 36415 COLL VENOUS BLD VENIPUNCTURE: CPT | Performed by: INTERNAL MEDICINE

## 2020-05-28 PROCEDURE — 96366 THER/PROPH/DIAG IV INF ADDON: CPT

## 2020-05-28 PROCEDURE — 93005 ELECTROCARDIOGRAM TRACING: CPT

## 2020-05-28 PROCEDURE — 87070 CULTURE OTHR SPECIMN AEROBIC: CPT | Performed by: FAMILY MEDICINE

## 2020-05-28 RX ORDER — IPRATROPIUM BROMIDE AND ALBUTEROL SULFATE 2.5; .5 MG/3ML; MG/3ML
3 SOLUTION RESPIRATORY (INHALATION) EVERY 6 HOURS PRN
Status: DISCONTINUED | OUTPATIENT
Start: 2020-05-28 | End: 2020-05-29 | Stop reason: HOSPADM

## 2020-05-28 RX ORDER — KETOROLAC TROMETHAMINE 30 MG/ML
15 INJECTION, SOLUTION INTRAMUSCULAR; INTRAVENOUS EVERY 6 HOURS PRN
Status: DISCONTINUED | OUTPATIENT
Start: 2020-05-28 | End: 2020-05-29 | Stop reason: HOSPADM

## 2020-05-28 RX ORDER — CYCLOBENZAPRINE HCL 10 MG
5 TABLET ORAL 3 TIMES DAILY
Status: DISCONTINUED | OUTPATIENT
Start: 2020-05-28 | End: 2020-05-29 | Stop reason: HOSPADM

## 2020-05-28 RX ORDER — IPRATROPIUM BROMIDE AND ALBUTEROL SULFATE 2.5; .5 MG/3ML; MG/3ML
3 SOLUTION RESPIRATORY (INHALATION)
Status: DISCONTINUED | OUTPATIENT
Start: 2020-05-28 | End: 2020-05-28

## 2020-05-28 RX ORDER — LIDOCAINE 50 MG/G
1 PATCH TOPICAL ONCE
Status: COMPLETED | OUTPATIENT
Start: 2020-05-28 | End: 2020-05-29

## 2020-05-28 RX ORDER — LIDOCAINE 50 MG/G
1 PATCH TOPICAL DAILY
Status: DISCONTINUED | OUTPATIENT
Start: 2020-05-28 | End: 2020-05-29 | Stop reason: HOSPADM

## 2020-05-28 RX ORDER — NICOTINE 21 MG/24HR
1 PATCH, TRANSDERMAL 24 HOURS TRANSDERMAL DAILY
Status: DISCONTINUED | OUTPATIENT
Start: 2020-05-28 | End: 2020-05-29 | Stop reason: HOSPADM

## 2020-05-28 RX ORDER — ACETAMINOPHEN 325 MG/1
975 TABLET ORAL EVERY 6 HOURS PRN
Status: DISCONTINUED | OUTPATIENT
Start: 2020-05-28 | End: 2020-05-29 | Stop reason: HOSPADM

## 2020-05-28 RX ORDER — ALBUTEROL SULFATE 90 UG/1
2 AEROSOL, METERED RESPIRATORY (INHALATION) EVERY 4 HOURS PRN
Status: DISCONTINUED | OUTPATIENT
Start: 2020-05-28 | End: 2020-05-29 | Stop reason: HOSPADM

## 2020-05-28 RX ORDER — ALBUTEROL SULFATE 90 UG/1
2 AEROSOL, METERED RESPIRATORY (INHALATION) ONCE
Status: COMPLETED | OUTPATIENT
Start: 2020-05-28 | End: 2020-05-28

## 2020-05-28 RX ORDER — OXYCODONE HYDROCHLORIDE 5 MG/1
5 TABLET ORAL EVERY 4 HOURS PRN
Status: DISCONTINUED | OUTPATIENT
Start: 2020-05-28 | End: 2020-05-29 | Stop reason: HOSPADM

## 2020-05-28 RX ORDER — SODIUM CHLORIDE 9 MG/ML
3 INJECTION INTRAVENOUS
Status: DISCONTINUED | OUTPATIENT
Start: 2020-05-28 | End: 2020-05-29 | Stop reason: HOSPADM

## 2020-05-28 RX ORDER — KETOROLAC TROMETHAMINE 30 MG/ML
15 INJECTION, SOLUTION INTRAMUSCULAR; INTRAVENOUS ONCE
Status: COMPLETED | OUTPATIENT
Start: 2020-05-28 | End: 2020-05-28

## 2020-05-28 RX ORDER — HYDROMORPHONE HCL/PF 1 MG/ML
0.2 SYRINGE (ML) INJECTION EVERY 4 HOURS PRN
Status: DISCONTINUED | OUTPATIENT
Start: 2020-05-28 | End: 2020-05-29 | Stop reason: HOSPADM

## 2020-05-28 RX ADMIN — METRONIDAZOLE 500 MG: 500 INJECTION, SOLUTION INTRAVENOUS at 15:32

## 2020-05-28 RX ADMIN — SODIUM CHLORIDE 800 ML: 0.9 INJECTION, SOLUTION INTRAVENOUS at 11:36

## 2020-05-28 RX ADMIN — IPRATROPIUM BROMIDE AND ALBUTEROL SULFATE 3 ML: 2.5; .5 SOLUTION RESPIRATORY (INHALATION) at 14:45

## 2020-05-28 RX ADMIN — NICOTINE 1 PATCH: 21 PATCH TRANSDERMAL at 15:32

## 2020-05-28 RX ADMIN — KETOROLAC TROMETHAMINE 15 MG: 30 INJECTION, SOLUTION INTRAMUSCULAR at 14:23

## 2020-05-28 RX ADMIN — CYCLOBENZAPRINE HYDROCHLORIDE 5 MG: 10 TABLET, FILM COATED ORAL at 21:53

## 2020-05-28 RX ADMIN — MORPHINE SULFATE 2 MG: 2 INJECTION, SOLUTION INTRAMUSCULAR; INTRAVENOUS at 11:36

## 2020-05-28 RX ADMIN — GADOBUTROL 6 ML: 604.72 INJECTION INTRAVENOUS at 17:09

## 2020-05-28 RX ADMIN — ENOXAPARIN SODIUM 40 MG: 40 INJECTION SUBCUTANEOUS at 15:32

## 2020-05-28 RX ADMIN — IOHEXOL 89 ML: 350 INJECTION, SOLUTION INTRAVENOUS at 11:58

## 2020-05-28 RX ADMIN — MORPHINE SULFATE 2 MG: 2 INJECTION, SOLUTION INTRAMUSCULAR; INTRAVENOUS at 09:34

## 2020-05-28 RX ADMIN — CYCLOBENZAPRINE HYDROCHLORIDE 5 MG: 10 TABLET, FILM COATED ORAL at 15:32

## 2020-05-28 RX ADMIN — ALBUTEROL SULFATE 2 PUFF: 90 AEROSOL, METERED RESPIRATORY (INHALATION) at 10:33

## 2020-05-28 RX ADMIN — METRONIDAZOLE 500 MG: 500 INJECTION, SOLUTION INTRAVENOUS at 21:55

## 2020-05-28 RX ADMIN — SODIUM CHLORIDE 1000 ML: 0.9 INJECTION, SOLUTION INTRAVENOUS at 11:35

## 2020-05-28 RX ADMIN — LIDOCAINE 1 PATCH: 50 PATCH TOPICAL at 14:23

## 2020-05-28 RX ADMIN — CEFTRIAXONE 1000 MG: 1 INJECTION, POWDER, FOR SOLUTION INTRAMUSCULAR; INTRAVENOUS at 11:37

## 2020-05-29 VITALS
WEIGHT: 131 LBS | RESPIRATION RATE: 18 BRPM | DIASTOLIC BLOOD PRESSURE: 82 MMHG | HEART RATE: 110 BPM | BODY MASS INDEX: 19.4 KG/M2 | TEMPERATURE: 99.2 F | HEIGHT: 69 IN | OXYGEN SATURATION: 95 % | SYSTOLIC BLOOD PRESSURE: 139 MMHG

## 2020-05-29 PROBLEM — R65.20 SEVERE SEPSIS (HCC): Status: ACTIVE | Noted: 2020-05-28

## 2020-05-29 PROBLEM — Z72.0 TOBACCO ABUSE: Status: ACTIVE | Noted: 2020-05-29

## 2020-05-29 LAB
ANION GAP SERPL CALCULATED.3IONS-SCNC: 9 MMOL/L (ref 4–13)
BASOPHILS # BLD AUTO: 0.03 THOUSANDS/ΜL (ref 0–0.1)
BASOPHILS NFR BLD AUTO: 0 % (ref 0–1)
BUN SERPL-MCNC: 11 MG/DL (ref 5–25)
CALCIUM SERPL-MCNC: 6.6 MG/DL (ref 8.3–10.1)
CHLORIDE SERPL-SCNC: 100 MMOL/L (ref 100–108)
CO2 SERPL-SCNC: 26 MMOL/L (ref 21–32)
CREAT SERPL-MCNC: 0.65 MG/DL (ref 0.6–1.3)
EOSINOPHIL # BLD AUTO: 0.09 THOUSAND/ΜL (ref 0–0.61)
EOSINOPHIL NFR BLD AUTO: 1 % (ref 0–6)
ERYTHROCYTE [DISTWIDTH] IN BLOOD BY AUTOMATED COUNT: 12.8 % (ref 11.6–15.1)
GFR SERPL CREATININE-BSD FRML MDRD: 103 ML/MIN/1.73SQ M
GLUCOSE SERPL-MCNC: 96 MG/DL (ref 65–140)
HCT VFR BLD AUTO: 36.1 % (ref 36.5–49.3)
HGB BLD-MCNC: 12.1 G/DL (ref 12–17)
IMM GRANULOCYTES # BLD AUTO: 0.05 THOUSAND/UL (ref 0–0.2)
IMM GRANULOCYTES NFR BLD AUTO: 1 % (ref 0–2)
LYMPHOCYTES # BLD AUTO: 1.52 THOUSANDS/ΜL (ref 0.6–4.47)
LYMPHOCYTES NFR BLD AUTO: 14 % (ref 14–44)
MCH RBC QN AUTO: 33.6 PG (ref 26.8–34.3)
MCHC RBC AUTO-ENTMCNC: 33.5 G/DL (ref 31.4–37.4)
MCV RBC AUTO: 100 FL (ref 82–98)
MONOCYTES # BLD AUTO: 1.02 THOUSAND/ΜL (ref 0.17–1.22)
MONOCYTES NFR BLD AUTO: 9 % (ref 4–12)
NEUTROPHILS # BLD AUTO: 8.39 THOUSANDS/ΜL (ref 1.85–7.62)
NEUTS SEG NFR BLD AUTO: 75 % (ref 43–75)
NRBC BLD AUTO-RTO: 0 /100 WBCS
PLATELET # BLD AUTO: 199 THOUSANDS/UL (ref 149–390)
PMV BLD AUTO: 10.7 FL (ref 8.9–12.7)
POTASSIUM SERPL-SCNC: 3.3 MMOL/L (ref 3.5–5.3)
PROCALCITONIN SERPL-MCNC: 0.27 NG/ML
RBC # BLD AUTO: 3.6 MILLION/UL (ref 3.88–5.62)
SODIUM SERPL-SCNC: 135 MMOL/L (ref 136–145)
WBC # BLD AUTO: 11.1 THOUSAND/UL (ref 4.31–10.16)

## 2020-05-29 PROCEDURE — 84145 PROCALCITONIN (PCT): CPT | Performed by: INTERNAL MEDICINE

## 2020-05-29 PROCEDURE — 99239 HOSP IP/OBS DSCHRG MGMT >30: CPT | Performed by: INTERNAL MEDICINE

## 2020-05-29 PROCEDURE — 80048 BASIC METABOLIC PNL TOTAL CA: CPT | Performed by: FAMILY MEDICINE

## 2020-05-29 PROCEDURE — 85025 COMPLETE CBC W/AUTO DIFF WBC: CPT | Performed by: FAMILY MEDICINE

## 2020-05-29 PROCEDURE — 99448 NTRPROF PH1/NTRNET/EHR 21-30: CPT | Performed by: RADIOLOGY

## 2020-05-29 RX ORDER — OXYCODONE HYDROCHLORIDE 5 MG/1
5 TABLET ORAL EVERY 6 HOURS PRN
Qty: 12 TABLET | Refills: 0 | Status: SHIPPED | OUTPATIENT
Start: 2020-05-29 | End: 2020-06-01

## 2020-05-29 RX ORDER — CYCLOBENZAPRINE HCL 5 MG
5 TABLET ORAL 3 TIMES DAILY
Qty: 90 TABLET | Refills: 0 | Status: SHIPPED | OUTPATIENT
Start: 2020-05-29 | End: 2020-07-09

## 2020-05-29 RX ORDER — POTASSIUM CHLORIDE 20 MEQ/1
40 TABLET, EXTENDED RELEASE ORAL 2 TIMES DAILY
Status: DISCONTINUED | OUTPATIENT
Start: 2020-05-29 | End: 2020-05-29 | Stop reason: HOSPADM

## 2020-05-29 RX ORDER — AMOXICILLIN AND CLAVULANATE POTASSIUM 875; 125 MG/1; MG/1
1 TABLET, FILM COATED ORAL EVERY 12 HOURS SCHEDULED
Qty: 14 TABLET | Refills: 0 | Status: SHIPPED | OUTPATIENT
Start: 2020-05-30 | End: 2020-06-07 | Stop reason: HOSPADM

## 2020-05-29 RX ORDER — NICOTINE 21 MG/24HR
1 PATCH, TRANSDERMAL 24 HOURS TRANSDERMAL DAILY
Qty: 28 PATCH | Refills: 0 | Status: SHIPPED | OUTPATIENT
Start: 2020-05-30 | End: 2020-09-12 | Stop reason: HOSPADM

## 2020-05-29 RX ADMIN — LIDOCAINE 1 PATCH: 50 PATCH TOPICAL at 08:05

## 2020-05-29 RX ADMIN — NICOTINE 1 PATCH: 21 PATCH TRANSDERMAL at 08:06

## 2020-05-29 RX ADMIN — CYCLOBENZAPRINE HYDROCHLORIDE 5 MG: 10 TABLET, FILM COATED ORAL at 08:06

## 2020-05-29 RX ADMIN — POTASSIUM CHLORIDE 40 MEQ: 1500 TABLET, EXTENDED RELEASE ORAL at 09:13

## 2020-05-29 RX ADMIN — OXYCODONE HYDROCHLORIDE 5 MG: 5 TABLET ORAL at 01:24

## 2020-05-29 RX ADMIN — ENOXAPARIN SODIUM 40 MG: 40 INJECTION SUBCUTANEOUS at 08:04

## 2020-05-29 RX ADMIN — CEFTRIAXONE SODIUM 1000 MG: 10 INJECTION, POWDER, FOR SOLUTION INTRAVENOUS at 12:04

## 2020-05-29 RX ADMIN — METRONIDAZOLE 500 MG: 500 INJECTION, SOLUTION INTRAVENOUS at 07:22

## 2020-05-29 RX ADMIN — KETOROLAC TROMETHAMINE 15 MG: 30 INJECTION, SOLUTION INTRAMUSCULAR at 02:31

## 2020-05-31 LAB
BACTERIA SPT RESP CULT: ABNORMAL
BACTERIA SPT RESP CULT: ABNORMAL
GRAM STN SPEC: ABNORMAL
GRAM STN SPEC: ABNORMAL

## 2020-06-02 ENCOUNTER — TELEPHONE (OUTPATIENT)
Dept: RADIOLOGY | Facility: HOSPITAL | Age: 65
End: 2020-06-02

## 2020-06-02 LAB
BACTERIA BLD CULT: NORMAL
BACTERIA BLD CULT: NORMAL

## 2020-06-02 RX ORDER — SODIUM CHLORIDE 9 MG/ML
75 INJECTION, SOLUTION INTRAVENOUS CONTINUOUS
Status: CANCELLED | OUTPATIENT
Start: 2020-06-02

## 2020-06-04 ENCOUNTER — HOSPITAL ENCOUNTER (INPATIENT)
Facility: HOSPITAL | Age: 65
LOS: 3 days | Discharge: HOME | DRG: 392 | End: 2020-06-07
Attending: EMERGENCY MEDICINE | Admitting: HOSPITALIST
Payer: COMMERCIAL

## 2020-06-04 ENCOUNTER — APPOINTMENT (EMERGENCY)
Dept: CT IMAGING | Facility: HOSPITAL | Age: 65
DRG: 392 | End: 2020-06-04
Payer: COMMERCIAL

## 2020-06-04 DIAGNOSIS — R10.13 EPIGASTRIC PAIN: Primary | ICD-10-CM

## 2020-06-04 DIAGNOSIS — K29.70 GASTRITIS: ICD-10-CM

## 2020-06-04 DIAGNOSIS — R10.9 ABDOMINAL PAIN: ICD-10-CM

## 2020-06-04 PROBLEM — R65.10 SIRS (SYSTEMIC INFLAMMATORY RESPONSE SYNDROME) (HCC): Status: ACTIVE | Noted: 2020-06-04

## 2020-06-04 LAB
ALBUMIN SERPL BCP-MCNC: 2.9 G/DL (ref 3.5–5)
ALP SERPL-CCNC: 75 U/L (ref 46–116)
ALT SERPL W P-5'-P-CCNC: 34 U/L (ref 12–78)
ANION GAP SERPL CALCULATED.3IONS-SCNC: 19 MMOL/L (ref 4–13)
AST SERPL W P-5'-P-CCNC: 35 U/L (ref 5–45)
ATRIAL RATE: 115 BPM
BASOPHILS # BLD AUTO: 0.08 THOUSANDS/ΜL (ref 0–0.1)
BASOPHILS NFR BLD AUTO: 0 % (ref 0–1)
BILIRUB SERPL-MCNC: 0.48 MG/DL (ref 0.2–1)
BUN SERPL-MCNC: 6 MG/DL (ref 5–25)
CALCIUM SERPL-MCNC: 8.3 MG/DL (ref 8.3–10.1)
CHLORIDE SERPL-SCNC: 99 MMOL/L (ref 100–108)
CO2 SERPL-SCNC: 21 MMOL/L (ref 21–32)
CREAT SERPL-MCNC: 1.05 MG/DL (ref 0.6–1.3)
EOSINOPHIL # BLD AUTO: 0.01 THOUSAND/ΜL (ref 0–0.61)
EOSINOPHIL NFR BLD AUTO: 0 % (ref 0–6)
ERYTHROCYTE [DISTWIDTH] IN BLOOD BY AUTOMATED COUNT: 12.7 % (ref 11.6–15.1)
GFR SERPL CREATININE-BSD FRML MDRD: 75 ML/MIN/1.73SQ M
GLUCOSE SERPL-MCNC: 174 MG/DL (ref 65–140)
HCT VFR BLD AUTO: 41.4 % (ref 36.5–49.3)
HGB BLD-MCNC: 14.2 G/DL (ref 12–17)
IMM GRANULOCYTES # BLD AUTO: 0.27 THOUSAND/UL (ref 0–0.2)
IMM GRANULOCYTES NFR BLD AUTO: 1 % (ref 0–2)
LACTATE SERPL-SCNC: 5.5 MMOL/L (ref 0.5–2)
LACTATE SERPL-SCNC: 5.8 MMOL/L (ref 0.5–2)
LIPASE SERPL-CCNC: 183 U/L (ref 73–393)
LYMPHOCYTES # BLD AUTO: 2.46 THOUSANDS/ΜL (ref 0.6–4.47)
LYMPHOCYTES NFR BLD AUTO: 11 % (ref 14–44)
MAGNESIUM SERPL-MCNC: 0.5 MG/DL (ref 1.6–2.6)
MCH RBC QN AUTO: 33.3 PG (ref 26.8–34.3)
MCHC RBC AUTO-ENTMCNC: 34.3 G/DL (ref 31.4–37.4)
MCV RBC AUTO: 97 FL (ref 82–98)
MONOCYTES # BLD AUTO: 1.27 THOUSAND/ΜL (ref 0.17–1.22)
MONOCYTES NFR BLD AUTO: 6 % (ref 4–12)
NEUTROPHILS # BLD AUTO: 18.76 THOUSANDS/ΜL (ref 1.85–7.62)
NEUTS SEG NFR BLD AUTO: 82 % (ref 43–75)
NRBC BLD AUTO-RTO: 0 /100 WBCS
P AXIS: 79 DEGREES
PLATELET # BLD AUTO: 417 THOUSANDS/UL (ref 149–390)
PLATELET # BLD AUTO: 557 THOUSANDS/UL (ref 149–390)
PMV BLD AUTO: 10.1 FL (ref 8.9–12.7)
PMV BLD AUTO: 9.9 FL (ref 8.9–12.7)
POTASSIUM SERPL-SCNC: 2.9 MMOL/L (ref 3.5–5.3)
PR INTERVAL: 146 MS
PROCALCITONIN SERPL-MCNC: <0.05 NG/ML
PROT SERPL-MCNC: 7.4 G/DL (ref 6.4–8.2)
QRS AXIS: 49 DEGREES
QRSD INTERVAL: 94 MS
QT INTERVAL: 350 MS
QTC INTERVAL: 484 MS
RBC # BLD AUTO: 4.27 MILLION/UL (ref 3.88–5.62)
SODIUM SERPL-SCNC: 139 MMOL/L (ref 136–145)
T WAVE AXIS: 78 DEGREES
TROPONIN I SERPL-MCNC: <0.02 NG/ML
VENTRICULAR RATE: 115 BPM
WBC # BLD AUTO: 22.85 THOUSAND/UL (ref 4.31–10.16)

## 2020-06-04 PROCEDURE — 83690 ASSAY OF LIPASE: CPT | Performed by: EMERGENCY MEDICINE

## 2020-06-04 PROCEDURE — 84484 ASSAY OF TROPONIN QUANT: CPT | Performed by: EMERGENCY MEDICINE

## 2020-06-04 PROCEDURE — 93010 ELECTROCARDIOGRAM REPORT: CPT | Performed by: INTERNAL MEDICINE

## 2020-06-04 PROCEDURE — 84145 PROCALCITONIN (PCT): CPT | Performed by: EMERGENCY MEDICINE

## 2020-06-04 PROCEDURE — 83735 ASSAY OF MAGNESIUM: CPT | Performed by: NURSE PRACTITIONER

## 2020-06-04 PROCEDURE — 84484 ASSAY OF TROPONIN QUANT: CPT | Performed by: NURSE PRACTITIONER

## 2020-06-04 PROCEDURE — 74174 CTA ABD&PLVS W/CONTRAST: CPT

## 2020-06-04 PROCEDURE — 80053 COMPREHEN METABOLIC PANEL: CPT | Performed by: EMERGENCY MEDICINE

## 2020-06-04 PROCEDURE — 85049 AUTOMATED PLATELET COUNT: CPT | Performed by: NURSE PRACTITIONER

## 2020-06-04 PROCEDURE — 99285 EMERGENCY DEPT VISIT HI MDM: CPT | Performed by: EMERGENCY MEDICINE

## 2020-06-04 PROCEDURE — 83605 ASSAY OF LACTIC ACID: CPT | Performed by: NURSE PRACTITIONER

## 2020-06-04 PROCEDURE — C9113 INJ PANTOPRAZOLE SODIUM, VIA: HCPCS | Performed by: EMERGENCY MEDICINE

## 2020-06-04 PROCEDURE — 85025 COMPLETE CBC W/AUTO DIFF WBC: CPT | Performed by: EMERGENCY MEDICINE

## 2020-06-04 PROCEDURE — 96361 HYDRATE IV INFUSION ADD-ON: CPT

## 2020-06-04 PROCEDURE — 36415 COLL VENOUS BLD VENIPUNCTURE: CPT | Performed by: EMERGENCY MEDICINE

## 2020-06-04 PROCEDURE — 96365 THER/PROPH/DIAG IV INF INIT: CPT

## 2020-06-04 PROCEDURE — 83605 ASSAY OF LACTIC ACID: CPT | Performed by: EMERGENCY MEDICINE

## 2020-06-04 PROCEDURE — 71275 CT ANGIOGRAPHY CHEST: CPT

## 2020-06-04 PROCEDURE — 96375 TX/PRO/DX INJ NEW DRUG ADDON: CPT

## 2020-06-04 PROCEDURE — 99285 EMERGENCY DEPT VISIT HI MDM: CPT

## 2020-06-04 PROCEDURE — 99223 1ST HOSP IP/OBS HIGH 75: CPT | Performed by: NURSE PRACTITIONER

## 2020-06-04 PROCEDURE — 87040 BLOOD CULTURE FOR BACTERIA: CPT | Performed by: EMERGENCY MEDICINE

## 2020-06-04 PROCEDURE — 93005 ELECTROCARDIOGRAM TRACING: CPT

## 2020-06-04 RX ORDER — PANTOPRAZOLE SODIUM 40 MG/1
40 INJECTION, POWDER, FOR SOLUTION INTRAVENOUS ONCE
Status: COMPLETED | OUTPATIENT
Start: 2020-06-04 | End: 2020-06-04

## 2020-06-04 RX ORDER — POTASSIUM CHLORIDE 14.9 MG/ML
20 INJECTION INTRAVENOUS
Status: COMPLETED | OUTPATIENT
Start: 2020-06-04 | End: 2020-06-04

## 2020-06-04 RX ORDER — AMILORIDE HYDROCHLORIDE 5 MG/1
5 TABLET ORAL DAILY
Status: DISCONTINUED | OUTPATIENT
Start: 2020-06-05 | End: 2020-06-07 | Stop reason: HOSPADM

## 2020-06-04 RX ORDER — SODIUM CHLORIDE 9 MG/ML
75 INJECTION, SOLUTION INTRAVENOUS CONTINUOUS
Status: DISCONTINUED | OUTPATIENT
Start: 2020-06-04 | End: 2020-06-06

## 2020-06-04 RX ORDER — PANTOPRAZOLE SODIUM 40 MG/1
40 INJECTION, POWDER, FOR SOLUTION INTRAVENOUS
Status: DISCONTINUED | OUTPATIENT
Start: 2020-06-05 | End: 2020-06-07 | Stop reason: HOSPADM

## 2020-06-04 RX ORDER — HYDROMORPHONE HCL/PF 1 MG/ML
0.5 SYRINGE (ML) INJECTION EVERY 4 HOURS PRN
Status: DISCONTINUED | OUTPATIENT
Start: 2020-06-04 | End: 2020-06-07 | Stop reason: HOSPADM

## 2020-06-04 RX ORDER — NICOTINE 21 MG/24HR
1 PATCH, TRANSDERMAL 24 HOURS TRANSDERMAL DAILY
Status: DISCONTINUED | OUTPATIENT
Start: 2020-06-05 | End: 2020-06-07 | Stop reason: HOSPADM

## 2020-06-04 RX ORDER — FLUTICASONE FUROATE AND VILANTEROL 100; 25 UG/1; UG/1
1 POWDER RESPIRATORY (INHALATION) DAILY
Status: DISCONTINUED | OUTPATIENT
Start: 2020-06-05 | End: 2020-06-07 | Stop reason: HOSPADM

## 2020-06-04 RX ORDER — MAGNESIUM HYDROXIDE/ALUMINUM HYDROXICE/SIMETHICONE 120; 1200; 1200 MG/30ML; MG/30ML; MG/30ML
30 SUSPENSION ORAL ONCE
Status: COMPLETED | OUTPATIENT
Start: 2020-06-04 | End: 2020-06-04

## 2020-06-04 RX ORDER — OXYCODONE HYDROCHLORIDE 5 MG/1
5 TABLET ORAL EVERY 4 HOURS PRN
Status: DISCONTINUED | OUTPATIENT
Start: 2020-06-04 | End: 2020-06-07 | Stop reason: HOSPADM

## 2020-06-04 RX ORDER — CYCLOBENZAPRINE HCL 10 MG
5 TABLET ORAL 3 TIMES DAILY
Status: DISCONTINUED | OUTPATIENT
Start: 2020-06-04 | End: 2020-06-07 | Stop reason: HOSPADM

## 2020-06-04 RX ORDER — ALBUTEROL SULFATE 90 UG/1
2 AEROSOL, METERED RESPIRATORY (INHALATION) EVERY 6 HOURS PRN
Status: DISCONTINUED | OUTPATIENT
Start: 2020-06-04 | End: 2020-06-07 | Stop reason: HOSPADM

## 2020-06-04 RX ORDER — MAGNESIUM SULFATE HEPTAHYDRATE 40 MG/ML
2 INJECTION, SOLUTION INTRAVENOUS ONCE
Status: COMPLETED | OUTPATIENT
Start: 2020-06-04 | End: 2020-06-05

## 2020-06-04 RX ORDER — POTASSIUM CHLORIDE 29.8 MG/ML
40 INJECTION INTRAVENOUS ONCE
Status: DISCONTINUED | OUTPATIENT
Start: 2020-06-04 | End: 2020-06-04 | Stop reason: SDUPTHER

## 2020-06-04 RX ORDER — OXYCODONE HYDROCHLORIDE 5 MG/1
2.5 TABLET ORAL EVERY 4 HOURS PRN
Status: DISCONTINUED | OUTPATIENT
Start: 2020-06-04 | End: 2020-06-07 | Stop reason: HOSPADM

## 2020-06-04 RX ORDER — POTASSIUM CHLORIDE 20 MEQ/1
20 TABLET, EXTENDED RELEASE ORAL 2 TIMES DAILY
Status: DISCONTINUED | OUTPATIENT
Start: 2020-06-04 | End: 2020-06-07 | Stop reason: HOSPADM

## 2020-06-04 RX ORDER — FENTANYL CITRATE 50 UG/ML
50 INJECTION, SOLUTION INTRAMUSCULAR; INTRAVENOUS ONCE
Status: COMPLETED | OUTPATIENT
Start: 2020-06-04 | End: 2020-06-04

## 2020-06-04 RX ORDER — AMLODIPINE BESYLATE 10 MG/1
10 TABLET ORAL DAILY
Status: DISCONTINUED | OUTPATIENT
Start: 2020-06-05 | End: 2020-06-07 | Stop reason: HOSPADM

## 2020-06-04 RX ORDER — ACETAMINOPHEN 325 MG/1
650 TABLET ORAL EVERY 6 HOURS PRN
Status: DISCONTINUED | OUTPATIENT
Start: 2020-06-04 | End: 2020-06-07 | Stop reason: HOSPADM

## 2020-06-04 RX ORDER — DICYCLOMINE HCL 20 MG
20 TABLET ORAL
Status: DISCONTINUED | OUTPATIENT
Start: 2020-06-04 | End: 2020-06-07 | Stop reason: HOSPADM

## 2020-06-04 RX ORDER — MAGNESIUM HYDROXIDE/ALUMINUM HYDROXICE/SIMETHICONE 120; 1200; 1200 MG/30ML; MG/30ML; MG/30ML
30 SUSPENSION ORAL EVERY 4 HOURS PRN
Status: DISCONTINUED | OUTPATIENT
Start: 2020-06-04 | End: 2020-06-07 | Stop reason: HOSPADM

## 2020-06-04 RX ORDER — LIDOCAINE HYDROCHLORIDE 20 MG/ML
15 SOLUTION OROPHARYNGEAL ONCE
Status: COMPLETED | OUTPATIENT
Start: 2020-06-04 | End: 2020-06-04

## 2020-06-04 RX ADMIN — FENTANYL CITRATE 50 MCG: 50 INJECTION INTRAMUSCULAR; INTRAVENOUS at 19:49

## 2020-06-04 RX ADMIN — OXYCODONE HYDROCHLORIDE 5 MG: 5 TABLET ORAL at 21:04

## 2020-06-04 RX ADMIN — PANTOPRAZOLE SODIUM 40 MG: 40 INJECTION, POWDER, FOR SOLUTION INTRAVENOUS at 18:15

## 2020-06-04 RX ADMIN — FENTANYL CITRATE 50 MCG: 50 INJECTION INTRAMUSCULAR; INTRAVENOUS at 18:14

## 2020-06-04 RX ADMIN — POTASSIUM CHLORIDE 20 MEQ: 14.9 INJECTION, SOLUTION INTRAVENOUS at 19:07

## 2020-06-04 RX ADMIN — POTASSIUM CHLORIDE 20 MEQ: 14.9 INJECTION, SOLUTION INTRAVENOUS at 21:00

## 2020-06-04 RX ADMIN — PIPERACILLIN SODIUM,TAZOBACTAM SODIUM 3.38 G: 3; .375 INJECTION, POWDER, FOR SOLUTION INTRAVENOUS at 20:30

## 2020-06-04 RX ADMIN — DICYCLOMINE HYDROCHLORIDE 20 MG: 20 TABLET ORAL at 22:16

## 2020-06-04 RX ADMIN — SODIUM CHLORIDE 500 ML: 0.9 INJECTION, SOLUTION INTRAVENOUS at 22:44

## 2020-06-04 RX ADMIN — SODIUM CHLORIDE 1000 ML: 0.9 INJECTION, SOLUTION INTRAVENOUS at 20:00

## 2020-06-04 RX ADMIN — ALUMINUM HYDROXIDE, MAGNESIUM HYDROXIDE, AND SIMETHICONE 30 ML: 200; 200; 20 SUSPENSION ORAL at 19:53

## 2020-06-04 RX ADMIN — SODIUM CHLORIDE 1000 ML: 0.9 INJECTION, SOLUTION INTRAVENOUS at 18:13

## 2020-06-04 RX ADMIN — HYDROMORPHONE HYDROCHLORIDE 0.5 MG: 1 INJECTION, SOLUTION INTRAMUSCULAR; INTRAVENOUS; SUBCUTANEOUS at 22:16

## 2020-06-04 RX ADMIN — POTASSIUM CHLORIDE 20 MEQ: 1500 TABLET, EXTENDED RELEASE ORAL at 22:16

## 2020-06-04 RX ADMIN — LIDOCAINE HYDROCHLORIDE 15 ML: 20 SOLUTION ORAL; TOPICAL at 19:53

## 2020-06-04 RX ADMIN — CYCLOBENZAPRINE 5 MG: 10 TABLET, FILM COATED ORAL at 22:16

## 2020-06-04 RX ADMIN — IOHEXOL 100 ML: 350 INJECTION, SOLUTION INTRAVENOUS at 18:41

## 2020-06-04 RX ADMIN — SODIUM CHLORIDE 100 ML/HR: 0.9 INJECTION, SOLUTION INTRAVENOUS at 22:18

## 2020-06-05 ENCOUNTER — TELEPHONE (OUTPATIENT)
Dept: INPATIENT UNIT | Facility: HOSPITAL | Age: 65
End: 2020-06-05

## 2020-06-05 LAB
ANION GAP SERPL CALCULATED.3IONS-SCNC: 8 MMOL/L (ref 4–13)
BUN SERPL-MCNC: 5 MG/DL (ref 5–25)
CALCIUM SERPL-MCNC: 7.4 MG/DL (ref 8.3–10.1)
CHLORIDE SERPL-SCNC: 101 MMOL/L (ref 100–108)
CO2 SERPL-SCNC: 28 MMOL/L (ref 21–32)
CREAT SERPL-MCNC: 0.71 MG/DL (ref 0.6–1.3)
ERYTHROCYTE [DISTWIDTH] IN BLOOD BY AUTOMATED COUNT: 13.1 % (ref 11.6–15.1)
GFR SERPL CREATININE-BSD FRML MDRD: 99 ML/MIN/1.73SQ M
GLUCOSE SERPL-MCNC: 141 MG/DL (ref 65–140)
HCT VFR BLD AUTO: 39.7 % (ref 36.5–49.3)
HGB BLD-MCNC: 13.5 G/DL (ref 12–17)
LACTATE SERPL-SCNC: 1.2 MMOL/L (ref 0.5–2)
LACTATE SERPL-SCNC: 2.2 MMOL/L (ref 0.5–2)
LACTATE SERPL-SCNC: 3.8 MMOL/L (ref 0.5–2)
LACTATE SERPL-SCNC: 4.6 MMOL/L (ref 0.5–2)
MAGNESIUM SERPL-MCNC: 1.4 MG/DL (ref 1.6–2.6)
MCH RBC QN AUTO: 33.4 PG (ref 26.8–34.3)
MCHC RBC AUTO-ENTMCNC: 34 G/DL (ref 31.4–37.4)
MCV RBC AUTO: 98 FL (ref 82–98)
PLATELET # BLD AUTO: 367 THOUSANDS/UL (ref 149–390)
PMV BLD AUTO: 9.4 FL (ref 8.9–12.7)
POTASSIUM SERPL-SCNC: 3.4 MMOL/L (ref 3.5–5.3)
RBC # BLD AUTO: 4.04 MILLION/UL (ref 3.88–5.62)
SODIUM SERPL-SCNC: 137 MMOL/L (ref 136–145)
TROPONIN I SERPL-MCNC: <0.02 NG/ML
TROPONIN I SERPL-MCNC: <0.02 NG/ML
WBC # BLD AUTO: 17.04 THOUSAND/UL (ref 4.31–10.16)

## 2020-06-05 PROCEDURE — 80048 BASIC METABOLIC PNL TOTAL CA: CPT | Performed by: NURSE PRACTITIONER

## 2020-06-05 PROCEDURE — 83605 ASSAY OF LACTIC ACID: CPT | Performed by: NURSE PRACTITIONER

## 2020-06-05 PROCEDURE — 99232 SBSQ HOSP IP/OBS MODERATE 35: CPT | Performed by: INTERNAL MEDICINE

## 2020-06-05 PROCEDURE — 83735 ASSAY OF MAGNESIUM: CPT | Performed by: NURSE PRACTITIONER

## 2020-06-05 PROCEDURE — 84484 ASSAY OF TROPONIN QUANT: CPT | Performed by: NURSE PRACTITIONER

## 2020-06-05 PROCEDURE — 85027 COMPLETE CBC AUTOMATED: CPT | Performed by: NURSE PRACTITIONER

## 2020-06-05 PROCEDURE — C9113 INJ PANTOPRAZOLE SODIUM, VIA: HCPCS | Performed by: NURSE PRACTITIONER

## 2020-06-05 RX ORDER — SUCRALFATE 1 G/1
1 TABLET ORAL
Status: DISCONTINUED | OUTPATIENT
Start: 2020-06-05 | End: 2020-06-07 | Stop reason: HOSPADM

## 2020-06-05 RX ORDER — SUCRALFATE 1 G/1
1 TABLET ORAL
Status: DISCONTINUED | OUTPATIENT
Start: 2020-06-05 | End: 2020-06-05

## 2020-06-05 RX ORDER — MAGNESIUM SULFATE HEPTAHYDRATE 40 MG/ML
2 INJECTION, SOLUTION INTRAVENOUS ONCE
Status: COMPLETED | OUTPATIENT
Start: 2020-06-05 | End: 2020-06-05

## 2020-06-05 RX ADMIN — PANTOPRAZOLE SODIUM 40 MG: 40 INJECTION, POWDER, FOR SOLUTION INTRAVENOUS at 08:19

## 2020-06-05 RX ADMIN — OXYCODONE HYDROCHLORIDE 5 MG: 5 TABLET ORAL at 01:31

## 2020-06-05 RX ADMIN — DICYCLOMINE HYDROCHLORIDE 20 MG: 20 TABLET ORAL at 21:48

## 2020-06-05 RX ADMIN — AMILORIDE HYDROCLORIDE 5 MG: 5 TABLET ORAL at 08:20

## 2020-06-05 RX ADMIN — SUCRALFATE 1 G: 1 TABLET ORAL at 10:54

## 2020-06-05 RX ADMIN — AMLODIPINE BESYLATE 10 MG: 10 TABLET ORAL at 08:20

## 2020-06-05 RX ADMIN — SUCRALFATE 1 G: 1 TABLET ORAL at 17:12

## 2020-06-05 RX ADMIN — PIPERACILLIN SODIUM AND TAZOBACTAM SODIUM 3.38 G: 36; 4.5 INJECTION, POWDER, FOR SOLUTION INTRAVENOUS at 21:48

## 2020-06-05 RX ADMIN — OXYCODONE HYDROCHLORIDE 5 MG: 5 TABLET ORAL at 15:14

## 2020-06-05 RX ADMIN — CYCLOBENZAPRINE 5 MG: 10 TABLET, FILM COATED ORAL at 17:12

## 2020-06-05 RX ADMIN — SODIUM CHLORIDE 100 ML/HR: 0.9 INJECTION, SOLUTION INTRAVENOUS at 08:28

## 2020-06-05 RX ADMIN — POTASSIUM CHLORIDE 20 MEQ: 1500 TABLET, EXTENDED RELEASE ORAL at 17:12

## 2020-06-05 RX ADMIN — OXYCODONE HYDROCHLORIDE 5 MG: 5 TABLET ORAL at 10:54

## 2020-06-05 RX ADMIN — MAGNESIUM SULFATE HEPTAHYDRATE 2 G: 40 INJECTION, SOLUTION INTRAVENOUS at 00:19

## 2020-06-05 RX ADMIN — PIPERACILLIN SODIUM AND TAZOBACTAM SODIUM 3.38 G: 36; 4.5 INJECTION, POWDER, FOR SOLUTION INTRAVENOUS at 04:44

## 2020-06-05 RX ADMIN — POTASSIUM CHLORIDE 20 MEQ: 1500 TABLET, EXTENDED RELEASE ORAL at 08:20

## 2020-06-05 RX ADMIN — MAGNESIUM OXIDE 800 MG: 400 TABLET ORAL at 08:20

## 2020-06-05 RX ADMIN — CYCLOBENZAPRINE 5 MG: 10 TABLET, FILM COATED ORAL at 21:47

## 2020-06-05 RX ADMIN — OXYCODONE HYDROCHLORIDE 5 MG: 5 TABLET ORAL at 21:47

## 2020-06-05 RX ADMIN — DICYCLOMINE HYDROCHLORIDE 20 MG: 20 TABLET ORAL at 17:12

## 2020-06-05 RX ADMIN — SODIUM CHLORIDE 500 ML: 0.9 INJECTION, SOLUTION INTRAVENOUS at 01:30

## 2020-06-05 RX ADMIN — OXYCODONE HYDROCHLORIDE 5 MG: 5 TABLET ORAL at 06:20

## 2020-06-05 RX ADMIN — SUCRALFATE 1 G: 1 TABLET ORAL at 21:47

## 2020-06-05 RX ADMIN — MAGNESIUM SULFATE HEPTAHYDRATE 2 G: 40 INJECTION, SOLUTION INTRAVENOUS at 10:52

## 2020-06-05 RX ADMIN — PIPERACILLIN SODIUM AND TAZOBACTAM SODIUM 3.38 G: 36; 4.5 INJECTION, POWDER, FOR SOLUTION INTRAVENOUS at 15:14

## 2020-06-05 RX ADMIN — SODIUM CHLORIDE 75 ML/HR: 0.9 INJECTION, SOLUTION INTRAVENOUS at 21:49

## 2020-06-05 RX ADMIN — DICYCLOMINE HYDROCHLORIDE 20 MG: 20 TABLET ORAL at 10:54

## 2020-06-05 RX ADMIN — FLUTICASONE FUROATE AND VILANTEROL TRIFENATATE 1 PUFF: 100; 25 POWDER RESPIRATORY (INHALATION) at 08:20

## 2020-06-05 RX ADMIN — DICYCLOMINE HYDROCHLORIDE 20 MG: 20 TABLET ORAL at 06:21

## 2020-06-05 RX ADMIN — CYCLOBENZAPRINE 5 MG: 10 TABLET, FILM COATED ORAL at 08:20

## 2020-06-05 RX ADMIN — PIPERACILLIN SODIUM AND TAZOBACTAM SODIUM 3.38 G: 36; 4.5 INJECTION, POWDER, FOR SOLUTION INTRAVENOUS at 08:19

## 2020-06-06 LAB
ALBUMIN SERPL BCP-MCNC: 2.6 G/DL (ref 3.5–5)
ALP SERPL-CCNC: 65 U/L (ref 46–116)
ALT SERPL W P-5'-P-CCNC: 8 U/L (ref 12–78)
ANION GAP SERPL CALCULATED.3IONS-SCNC: 6 MMOL/L (ref 4–13)
AST SERPL W P-5'-P-CCNC: 26 U/L (ref 5–45)
BASOPHILS # BLD AUTO: 0.02 THOUSANDS/ΜL (ref 0–0.1)
BASOPHILS NFR BLD AUTO: 0 % (ref 0–1)
BILIRUB SERPL-MCNC: 1.21 MG/DL (ref 0.2–1)
BUN SERPL-MCNC: 4 MG/DL (ref 5–25)
CALCIUM SERPL-MCNC: 7.5 MG/DL (ref 8.3–10.1)
CHLORIDE SERPL-SCNC: 100 MMOL/L (ref 100–108)
CO2 SERPL-SCNC: 28 MMOL/L (ref 21–32)
CREAT SERPL-MCNC: 0.76 MG/DL (ref 0.6–1.3)
EOSINOPHIL # BLD AUTO: 0.03 THOUSAND/ΜL (ref 0–0.61)
EOSINOPHIL NFR BLD AUTO: 0 % (ref 0–6)
ERYTHROCYTE [DISTWIDTH] IN BLOOD BY AUTOMATED COUNT: 13 % (ref 11.6–15.1)
GFR SERPL CREATININE-BSD FRML MDRD: 96 ML/MIN/1.73SQ M
GLUCOSE SERPL-MCNC: 92 MG/DL (ref 65–140)
HCT VFR BLD AUTO: 43.3 % (ref 36.5–49.3)
HGB BLD-MCNC: 14.3 G/DL (ref 12–17)
IMM GRANULOCYTES # BLD AUTO: 0.1 THOUSAND/UL (ref 0–0.2)
IMM GRANULOCYTES NFR BLD AUTO: 1 % (ref 0–2)
LYMPHOCYTES # BLD AUTO: 1.32 THOUSANDS/ΜL (ref 0.6–4.47)
LYMPHOCYTES NFR BLD AUTO: 7 % (ref 14–44)
MAGNESIUM SERPL-MCNC: 1.6 MG/DL (ref 1.6–2.6)
MCH RBC QN AUTO: 32.7 PG (ref 26.8–34.3)
MCHC RBC AUTO-ENTMCNC: 33 G/DL (ref 31.4–37.4)
MCV RBC AUTO: 99 FL (ref 82–98)
MONOCYTES # BLD AUTO: 0.82 THOUSAND/ΜL (ref 0.17–1.22)
MONOCYTES NFR BLD AUTO: 4 % (ref 4–12)
NEUTROPHILS # BLD AUTO: 16.4 THOUSANDS/ΜL (ref 1.85–7.62)
NEUTS SEG NFR BLD AUTO: 88 % (ref 43–75)
NRBC BLD AUTO-RTO: 0 /100 WBCS
PLATELET # BLD AUTO: 318 THOUSANDS/UL (ref 149–390)
PMV BLD AUTO: 9.6 FL (ref 8.9–12.7)
POTASSIUM SERPL-SCNC: 3.4 MMOL/L (ref 3.5–5.3)
PROCALCITONIN SERPL-MCNC: 0.14 NG/ML
PROT SERPL-MCNC: 7 G/DL (ref 6.4–8.2)
RBC # BLD AUTO: 4.37 MILLION/UL (ref 3.88–5.62)
SODIUM SERPL-SCNC: 134 MMOL/L (ref 136–145)
WBC # BLD AUTO: 18.69 THOUSAND/UL (ref 4.31–10.16)

## 2020-06-06 PROCEDURE — 99232 SBSQ HOSP IP/OBS MODERATE 35: CPT | Performed by: INTERNAL MEDICINE

## 2020-06-06 PROCEDURE — C9113 INJ PANTOPRAZOLE SODIUM, VIA: HCPCS | Performed by: NURSE PRACTITIONER

## 2020-06-06 PROCEDURE — 85025 COMPLETE CBC W/AUTO DIFF WBC: CPT | Performed by: INTERNAL MEDICINE

## 2020-06-06 PROCEDURE — 83735 ASSAY OF MAGNESIUM: CPT | Performed by: INTERNAL MEDICINE

## 2020-06-06 PROCEDURE — 80053 COMPREHEN METABOLIC PANEL: CPT | Performed by: INTERNAL MEDICINE

## 2020-06-06 PROCEDURE — 84145 PROCALCITONIN (PCT): CPT | Performed by: INTERNAL MEDICINE

## 2020-06-06 RX ORDER — POTASSIUM CHLORIDE 20 MEQ/1
40 TABLET, EXTENDED RELEASE ORAL ONCE
Status: COMPLETED | OUTPATIENT
Start: 2020-06-06 | End: 2020-06-06

## 2020-06-06 RX ADMIN — SODIUM CHLORIDE 75 ML/HR: 0.9 INJECTION, SOLUTION INTRAVENOUS at 08:37

## 2020-06-06 RX ADMIN — PIPERACILLIN SODIUM AND TAZOBACTAM SODIUM 3.38 G: 36; 4.5 INJECTION, POWDER, FOR SOLUTION INTRAVENOUS at 03:33

## 2020-06-06 RX ADMIN — DICYCLOMINE HYDROCHLORIDE 20 MG: 20 TABLET ORAL at 21:07

## 2020-06-06 RX ADMIN — PANTOPRAZOLE SODIUM 40 MG: 40 INJECTION, POWDER, FOR SOLUTION INTRAVENOUS at 08:33

## 2020-06-06 RX ADMIN — POTASSIUM CHLORIDE 20 MEQ: 1500 TABLET, EXTENDED RELEASE ORAL at 17:42

## 2020-06-06 RX ADMIN — AMILORIDE HYDROCLORIDE 5 MG: 5 TABLET ORAL at 08:34

## 2020-06-06 RX ADMIN — POTASSIUM CHLORIDE 20 MEQ: 1500 TABLET, EXTENDED RELEASE ORAL at 08:32

## 2020-06-06 RX ADMIN — DICYCLOMINE HYDROCHLORIDE 20 MG: 20 TABLET ORAL at 12:25

## 2020-06-06 RX ADMIN — OXYCODONE HYDROCHLORIDE 5 MG: 5 TABLET ORAL at 08:32

## 2020-06-06 RX ADMIN — CYCLOBENZAPRINE 5 MG: 10 TABLET, FILM COATED ORAL at 08:31

## 2020-06-06 RX ADMIN — MAGNESIUM OXIDE 800 MG: 400 TABLET ORAL at 08:31

## 2020-06-06 RX ADMIN — SUCRALFATE 1 G: 1 TABLET ORAL at 21:07

## 2020-06-06 RX ADMIN — SUCRALFATE 1 G: 1 TABLET ORAL at 12:25

## 2020-06-06 RX ADMIN — SUCRALFATE 1 G: 1 TABLET ORAL at 16:29

## 2020-06-06 RX ADMIN — FLUTICASONE FUROATE AND VILANTEROL TRIFENATATE 1 PUFF: 100; 25 POWDER RESPIRATORY (INHALATION) at 08:34

## 2020-06-06 RX ADMIN — PIPERACILLIN SODIUM AND TAZOBACTAM SODIUM 3.38 G: 36; 4.5 INJECTION, POWDER, FOR SOLUTION INTRAVENOUS at 09:58

## 2020-06-06 RX ADMIN — PIPERACILLIN SODIUM AND TAZOBACTAM SODIUM 3.38 G: 36; 4.5 INJECTION, POWDER, FOR SOLUTION INTRAVENOUS at 15:01

## 2020-06-06 RX ADMIN — OXYCODONE HYDROCHLORIDE 5 MG: 5 TABLET ORAL at 21:08

## 2020-06-06 RX ADMIN — DICYCLOMINE HYDROCHLORIDE 20 MG: 20 TABLET ORAL at 06:30

## 2020-06-06 RX ADMIN — PIPERACILLIN SODIUM AND TAZOBACTAM SODIUM 3.38 G: 36; 4.5 INJECTION, POWDER, FOR SOLUTION INTRAVENOUS at 21:07

## 2020-06-06 RX ADMIN — POTASSIUM CHLORIDE 40 MEQ: 1500 TABLET, EXTENDED RELEASE ORAL at 16:29

## 2020-06-06 RX ADMIN — AMLODIPINE BESYLATE 10 MG: 10 TABLET ORAL at 08:32

## 2020-06-06 RX ADMIN — DICYCLOMINE HYDROCHLORIDE 20 MG: 20 TABLET ORAL at 16:30

## 2020-06-06 RX ADMIN — ENOXAPARIN SODIUM 40 MG: 40 INJECTION SUBCUTANEOUS at 08:33

## 2020-06-06 RX ADMIN — CYCLOBENZAPRINE 5 MG: 10 TABLET, FILM COATED ORAL at 16:29

## 2020-06-06 RX ADMIN — SUCRALFATE 1 G: 1 TABLET ORAL at 06:30

## 2020-06-07 VITALS
TEMPERATURE: 97.9 F | OXYGEN SATURATION: 96 % | HEART RATE: 92 BPM | BODY MASS INDEX: 20.32 KG/M2 | WEIGHT: 137.57 LBS | SYSTOLIC BLOOD PRESSURE: 148 MMHG | RESPIRATION RATE: 18 BRPM | DIASTOLIC BLOOD PRESSURE: 90 MMHG

## 2020-06-07 PROBLEM — R65.10 SIRS (SYSTEMIC INFLAMMATORY RESPONSE SYNDROME) (HCC): Status: RESOLVED | Noted: 2020-06-04 | Resolved: 2020-06-07

## 2020-06-07 PROBLEM — E87.6 HYPOKALEMIA: Chronic | Status: RESOLVED | Noted: 2017-06-28 | Resolved: 2020-06-07

## 2020-06-07 LAB
ALBUMIN SERPL BCP-MCNC: 2.4 G/DL (ref 3.5–5)
ALP SERPL-CCNC: 65 U/L (ref 46–116)
ALT SERPL W P-5'-P-CCNC: 18 U/L (ref 12–78)
ANION GAP SERPL CALCULATED.3IONS-SCNC: 7 MMOL/L (ref 4–13)
AST SERPL W P-5'-P-CCNC: 17 U/L (ref 5–45)
BASOPHILS # BLD AUTO: 0.03 THOUSANDS/ΜL (ref 0–0.1)
BASOPHILS NFR BLD AUTO: 0 % (ref 0–1)
BILIRUB SERPL-MCNC: 1.29 MG/DL (ref 0.2–1)
BUN SERPL-MCNC: 6 MG/DL (ref 5–25)
CALCIUM SERPL-MCNC: 8.4 MG/DL (ref 8.3–10.1)
CHLORIDE SERPL-SCNC: 101 MMOL/L (ref 100–108)
CO2 SERPL-SCNC: 25 MMOL/L (ref 21–32)
CREAT SERPL-MCNC: 0.68 MG/DL (ref 0.6–1.3)
EOSINOPHIL # BLD AUTO: 0.05 THOUSAND/ΜL (ref 0–0.61)
EOSINOPHIL NFR BLD AUTO: 0 % (ref 0–6)
ERYTHROCYTE [DISTWIDTH] IN BLOOD BY AUTOMATED COUNT: 13.1 % (ref 11.6–15.1)
GFR SERPL CREATININE-BSD FRML MDRD: 101 ML/MIN/1.73SQ M
GLUCOSE SERPL-MCNC: 83 MG/DL (ref 65–140)
HCT VFR BLD AUTO: 40.4 % (ref 36.5–49.3)
HGB BLD-MCNC: 13.7 G/DL (ref 12–17)
IMM GRANULOCYTES # BLD AUTO: 0.11 THOUSAND/UL (ref 0–0.2)
IMM GRANULOCYTES NFR BLD AUTO: 1 % (ref 0–2)
LYMPHOCYTES # BLD AUTO: 1.46 THOUSANDS/ΜL (ref 0.6–4.47)
LYMPHOCYTES NFR BLD AUTO: 10 % (ref 14–44)
MCH RBC QN AUTO: 33.4 PG (ref 26.8–34.3)
MCHC RBC AUTO-ENTMCNC: 33.9 G/DL (ref 31.4–37.4)
MCV RBC AUTO: 99 FL (ref 82–98)
MONOCYTES # BLD AUTO: 0.75 THOUSAND/ΜL (ref 0.17–1.22)
MONOCYTES NFR BLD AUTO: 5 % (ref 4–12)
NEUTROPHILS # BLD AUTO: 12.65 THOUSANDS/ΜL (ref 1.85–7.62)
NEUTS SEG NFR BLD AUTO: 84 % (ref 43–75)
NRBC BLD AUTO-RTO: 0 /100 WBCS
PLATELET # BLD AUTO: 250 THOUSANDS/UL (ref 149–390)
PMV BLD AUTO: 9.9 FL (ref 8.9–12.7)
POTASSIUM SERPL-SCNC: 3.6 MMOL/L (ref 3.5–5.3)
PROT SERPL-MCNC: 6.6 G/DL (ref 6.4–8.2)
RBC # BLD AUTO: 4.1 MILLION/UL (ref 3.88–5.62)
SODIUM SERPL-SCNC: 133 MMOL/L (ref 136–145)
WBC # BLD AUTO: 15.05 THOUSAND/UL (ref 4.31–10.16)

## 2020-06-07 PROCEDURE — 80053 COMPREHEN METABOLIC PANEL: CPT | Performed by: INTERNAL MEDICINE

## 2020-06-07 PROCEDURE — 85025 COMPLETE CBC W/AUTO DIFF WBC: CPT | Performed by: INTERNAL MEDICINE

## 2020-06-07 PROCEDURE — C9113 INJ PANTOPRAZOLE SODIUM, VIA: HCPCS | Performed by: NURSE PRACTITIONER

## 2020-06-07 PROCEDURE — 99239 HOSP IP/OBS DSCHRG MGMT >30: CPT | Performed by: INTERNAL MEDICINE

## 2020-06-07 RX ORDER — PANTOPRAZOLE SODIUM 40 MG/1
40 TABLET, DELAYED RELEASE ORAL 2 TIMES DAILY
Qty: 30 TABLET | Refills: 0 | Status: SHIPPED | OUTPATIENT
Start: 2020-06-07 | End: 2020-07-09

## 2020-06-07 RX ADMIN — PIPERACILLIN SODIUM AND TAZOBACTAM SODIUM 3.38 G: 36; 4.5 INJECTION, POWDER, FOR SOLUTION INTRAVENOUS at 02:08

## 2020-06-07 RX ADMIN — FLUTICASONE FUROATE AND VILANTEROL TRIFENATATE 1 PUFF: 100; 25 POWDER RESPIRATORY (INHALATION) at 08:57

## 2020-06-07 RX ADMIN — AMILORIDE HYDROCLORIDE 5 MG: 5 TABLET ORAL at 08:57

## 2020-06-07 RX ADMIN — PIPERACILLIN SODIUM AND TAZOBACTAM SODIUM 3.38 G: 36; 4.5 INJECTION, POWDER, FOR SOLUTION INTRAVENOUS at 08:56

## 2020-06-07 RX ADMIN — POTASSIUM CHLORIDE 20 MEQ: 1500 TABLET, EXTENDED RELEASE ORAL at 08:56

## 2020-06-07 RX ADMIN — CYCLOBENZAPRINE 5 MG: 10 TABLET, FILM COATED ORAL at 08:55

## 2020-06-07 RX ADMIN — SUCRALFATE 1 G: 1 TABLET ORAL at 06:07

## 2020-06-07 RX ADMIN — ENOXAPARIN SODIUM 40 MG: 40 INJECTION SUBCUTANEOUS at 08:56

## 2020-06-07 RX ADMIN — MAGNESIUM OXIDE 800 MG: 400 TABLET ORAL at 08:54

## 2020-06-07 RX ADMIN — AMLODIPINE BESYLATE 10 MG: 10 TABLET ORAL at 08:55

## 2020-06-07 RX ADMIN — PANTOPRAZOLE SODIUM 40 MG: 40 INJECTION, POWDER, FOR SOLUTION INTRAVENOUS at 08:56

## 2020-06-07 RX ADMIN — DICYCLOMINE HYDROCHLORIDE 20 MG: 20 TABLET ORAL at 06:08

## 2020-06-08 ENCOUNTER — HOSPITAL ENCOUNTER (OUTPATIENT)
Dept: RADIOLOGY | Facility: HOSPITAL | Age: 65
Discharge: HOME | End: 2020-06-08
Attending: RADIOLOGY | Admitting: RADIOLOGY
Payer: COMMERCIAL

## 2020-06-08 VITALS
HEIGHT: 69 IN | WEIGHT: 130 LBS | HEART RATE: 89 BPM | RESPIRATION RATE: 17 BRPM | DIASTOLIC BLOOD PRESSURE: 71 MMHG | OXYGEN SATURATION: 97 % | TEMPERATURE: 98.2 F | BODY MASS INDEX: 19.26 KG/M2 | SYSTOLIC BLOOD PRESSURE: 143 MMHG

## 2020-06-08 LAB
INR PPP: 1.02 (ref 0.84–1.19)
PROTHROMBIN TIME: 13 SECONDS (ref 11.6–14.5)

## 2020-06-08 PROCEDURE — 88313 SPECIAL STAINS GROUP 2: CPT | Performed by: PATHOLOGY

## 2020-06-08 PROCEDURE — 88341 IMHCHEM/IMCYTCHM EA ADD ANTB: CPT | Performed by: PATHOLOGY

## 2020-06-08 PROCEDURE — 88307 TISSUE EXAM BY PATHOLOGIST: CPT | Performed by: PATHOLOGY

## 2020-06-08 PROCEDURE — 88342 IMHCHEM/IMCYTCHM 1ST ANTB: CPT | Performed by: PATHOLOGY

## 2020-06-08 PROCEDURE — 47000 NEEDLE BIOPSY OF LIVER PERQ: CPT | Performed by: RADIOLOGY

## 2020-06-08 PROCEDURE — 99152 MOD SED SAME PHYS/QHP 5/>YRS: CPT | Performed by: RADIOLOGY

## 2020-06-08 PROCEDURE — 88333 PATH CONSLTJ SURG CYTO XM 1: CPT | Performed by: PATHOLOGY

## 2020-06-08 PROCEDURE — 85610 PROTHROMBIN TIME: CPT | Performed by: RADIOLOGY

## 2020-06-08 PROCEDURE — 47000 NEEDLE BIOPSY OF LIVER PERQ: CPT

## 2020-06-08 PROCEDURE — 76942 ECHO GUIDE FOR BIOPSY: CPT | Performed by: RADIOLOGY

## 2020-06-08 PROCEDURE — 99024 POSTOP FOLLOW-UP VISIT: CPT | Performed by: RADIOLOGY

## 2020-06-08 RX ORDER — LIDOCAINE WITH 8.4% SOD BICARB 0.9%(10ML)
SYRINGE (ML) INJECTION CODE/TRAUMA/SEDATION MEDICATION
Status: COMPLETED | OUTPATIENT
Start: 2020-06-08 | End: 2020-06-08

## 2020-06-08 RX ORDER — SODIUM CHLORIDE 9 MG/ML
75 INJECTION, SOLUTION INTRAVENOUS CONTINUOUS
Status: DISCONTINUED | OUTPATIENT
Start: 2020-06-08 | End: 2020-06-08 | Stop reason: HOSPADM

## 2020-06-08 RX ORDER — FENTANYL CITRATE 50 UG/ML
INJECTION, SOLUTION INTRAMUSCULAR; INTRAVENOUS CODE/TRAUMA/SEDATION MEDICATION
Status: COMPLETED | OUTPATIENT
Start: 2020-06-08 | End: 2020-06-08

## 2020-06-08 RX ORDER — MIDAZOLAM HYDROCHLORIDE 2 MG/2ML
INJECTION, SOLUTION INTRAMUSCULAR; INTRAVENOUS CODE/TRAUMA/SEDATION MEDICATION
Status: COMPLETED | OUTPATIENT
Start: 2020-06-08 | End: 2020-06-08

## 2020-06-08 RX ADMIN — MIDAZOLAM 0.5 MG: 1 INJECTION INTRAMUSCULAR; INTRAVENOUS at 10:39

## 2020-06-08 RX ADMIN — FENTANYL CITRATE 50 MCG: 50 INJECTION, SOLUTION INTRAMUSCULAR; INTRAVENOUS at 10:34

## 2020-06-08 RX ADMIN — SODIUM CHLORIDE 75 ML/HR: 0.9 INJECTION, SOLUTION INTRAVENOUS at 09:50

## 2020-06-08 RX ADMIN — MIDAZOLAM 1 MG: 1 INJECTION INTRAMUSCULAR; INTRAVENOUS at 10:34

## 2020-06-08 RX ADMIN — Medication 6 ML: at 10:35

## 2020-06-08 RX ADMIN — FENTANYL CITRATE 25 MCG: 50 INJECTION, SOLUTION INTRAMUSCULAR; INTRAVENOUS at 10:39

## 2020-06-09 LAB
BACTERIA BLD CULT: NORMAL
BACTERIA BLD CULT: NORMAL

## 2020-06-19 ENCOUNTER — TELEPHONE (OUTPATIENT)
Dept: SURGICAL ONCOLOGY | Facility: CLINIC | Age: 65
End: 2020-06-19

## 2020-06-23 PROBLEM — C22.0 HEPATOCELLULAR CARCINOMA (HCC): Status: ACTIVE | Noted: 2020-06-23

## 2020-06-25 ENCOUNTER — CONSULT (OUTPATIENT)
Dept: SURGICAL ONCOLOGY | Facility: CLINIC | Age: 65
End: 2020-06-25
Payer: COMMERCIAL

## 2020-06-25 VITALS — SYSTOLIC BLOOD PRESSURE: 140 MMHG | DIASTOLIC BLOOD PRESSURE: 90 MMHG | TEMPERATURE: 100.3 F | HEART RATE: 160 BPM

## 2020-06-25 DIAGNOSIS — C22.0 HEPATOCELLULAR CARCINOMA (HCC): Primary | ICD-10-CM

## 2020-06-25 PROCEDURE — 99245 OFF/OP CONSLTJ NEW/EST HI 55: CPT | Performed by: SURGERY

## 2020-06-25 RX ORDER — CEFAZOLIN SODIUM 1 G/50ML
1000 SOLUTION INTRAVENOUS ONCE
Status: CANCELLED | OUTPATIENT
Start: 2020-06-25 | End: 2020-06-25

## 2020-06-25 NOTE — H&P (VIEW-ONLY)
Surgical Oncology Consult       [address] [facility] [address] [id number] [phone]    Valencia Sharma III  1955  [id number]  [address] [facility] [address] [id number] [phone]    Diagnoses and all orders for this visit:    Hepatocellular carcinoma ([place] [address] )  -     Case request operating room: IOUS LIVER, LIVER RESECTION/ABLATION; Standing  -     PAT Covid Screening; Future  -     Type and screen; Future  -     Prepare Leukoreduced RBC: 2 Units; Future  -     Comprehensive metabolic panel; Future  -     CBC and differential; Future  -     APTT; Future  -     Protime-INR; Future  -     HEMOGLOBIN A1C W/ EAG ESTIMATION; Future  -     EKG 12 lead; Future  -     Ambulatory referral to Cardiology; Future  -     Ambulatory referral to Pulmonology; Future  -     AFP tumor marker; Future  -     Complete PFT without post bronchodilator; Future  -     MISC COVID-19 TEST; Future  -     Case request operating room: IOUS LIVER, LIVER RESECTION/ABLATION    Other orders  -     Incentive spirometry; Standing  -     Insert and maintain IV line; Standing  -     Void On-Call to O R ; Standing  -     Place sequential compression device; Standing  -     Nursing communication Please give pre-op Carbohydrate drink to patient 2-4 hours prior to surgery (Drink is provided by [address] [place] [address]); Standing  -     ceFAZolin (ANCEF) IVPB (premix) 1,000 mg 50 mL  -     metroNIDAZOLE (FLAGYL) IVPB (premix) 500 mg 100 mL        Chief Complaint   Patient presents with    Consult     Patient here for [facility]  of the liver  Incidental liver mass was found while hospitalized  Patient complains of a 10 lb weight loss in the past 4-5 months and dark orange urine  No family history of liver cancer  No follow-ups on file         Hepatocellular carcinoma ([place] [address] ) 6/8/2020 Initial Diagnosis     Hepatocellular carcinoma, moderately differentiated         History of Present Illness:  66-year-old male who recently presented with left chest wall pain  CT on May 28, 2020 revealed a 3 2 x 2 6 cm hypervascular mass in the right hepatic lobe  This was not present in June of 2017  There was also severe emphysema seen  Several pulmonary nodules were seen and this was felt to be inflammatory  Follow-up MRI revealed a 3 3 x 2 7 x 3 3 cm hypervascular mass  IR biopsy was then performed  Pathology revealed [address]  He comes in now to discuss further therapy  No abdominal pain, nausea or vomiting  He has lost close to 30 lb that he relates to stress of his daughter's divorce  He does have a history of COPD and he also had an abnormal EKG during his hospitalization with right bundle branch block  He denies any chest pain or shortness of breath when climbing flights of steps  He is on inhalers  He also has an ileostomy secondary to his Crohn's disease  Review of Systems  Complete ROS Surg Onc:   Constitutional: The patient has weight loss  No change in appetite or fatigue  Eyes: No complaints of visual problems, no scleral icterus  ENT: no complaints of ear pain, no hoarseness, no difficulty swallowing,  no tinnitus and no new masses in head, oral cavity, or neck  Cardiovascular: No complaints of chest pain, no palpitations, no ankle edema  Respiratory: No complaints of shortness of breath, no cough  Gastrointestinal: No complaints of jaundice, no bloody stools, no pale stools  Genitourinary: No complaints of dysuria, no hematuria, no nocturia, no frequent urination, no urethral discharge  Musculoskeletal: No complaints of weakness, paralysis, joint stiffness or arthralgias  Integumentary: No complaints of rash, no new lesions  Neurological: No complaints of convulsions, no seizures, no dizziness  Hematologic/Lymphatic: No complaints of easy bruising  Endocrine:  No hot or cold intolerance  No polydipsia, polyphagia, or polyuria  Allergy/immunology:  No environmental allergies  No food allergies  Not immunocompromised  Skin:  No pallor or rash  No wound  Patient Active Problem List   Diagnosis    Hypertension    Emphysema/COPD ([facility] )    Crohn disease ([facility] )    [facility] place ([facility] )    Diarrhea    Severe protein-calorie malnutrition ([facility] )    Cellulitis    Emphy[facility]ma of lung ([facility] )    Vasovagal syncope    Severe sepsis (HCC)    Pneumonia    Chest pain    Liver mass    Tobacco abuse    Abdominal pain    Hepatocellular carcinoma (HCC)     Past Medical History:   Diagnosis Date    LUCILLE (acute kidney injury) ([facility] ) 6/28/2017    Cataract     Colostomy in place ([facility] ) 6/29/2017    Crohn disease ([facility] )     Emphysema of lung ([facility] )     Emphysema/COPD ([facility] )     Hypertension     Hypokalemia 6/28/2017    Hypomagnesemia 6/29/2017    Hyponatremia 6/28/2017    Pneumonia      Past Surgical History:   Procedure Laterality Date    CHOLECYSTECTOMY      COLECTOMY      10 inchs of ileum    COLONOSCOPY N/A 6/30/2017    Procedure: COLONOSCOPY;  Surgeon: Kristopher Delgado MD;  [facility]; Service: Gastroenterology    COLOSTOMY      FINGER SURGERY Left     IR IMAGE GUIDED BIOPSY/ASPIRATION LIVER  6/8/2020    LITHOTRIPSY       No family history on file    Social History     Socioeconomic History    Marital status: Single     Spouse name: Not on file    Number of children: Not on file    Years of education: Not on file    Highest education level: Not on file   Occupational History    Not on file   Social Needs    Financial resource strain: Not on file    Food insecurity:     Worry: Not on file     Inability: Not on file    Transportation needs:     Medical: Not on file     Non-medical: Not on file   Tobacco Use    Smoking status: Current Every Day Smoker     Packs/day: 1 50     Types: Cigarettes    Smokeless tobacco: Never Used Substance and Sexual Activity    Alcohol use:  Yes     Alcohol/week: 1 0 - 2 0 standard drinks     Types: 1 - 2 Cans of beer per week     Frequency: Monthly or less     Drinks per session: 1 or 2     Comment: "1-2beers a day, glass of wine at night"    Drug use: No    Sexual activity: Never   Lifestyle    Physical activity:     Days per week: Not on file     Minutes per session: Not on file    Stress: Not on file   Relationships    Social connections:     Talks on phone: Not on file     Gets together: Not on file     Attends [employer] service: Not on file     Active member of club or organization: Not on file     Attends meetings of clubs or organizations: Not on file     Relationship status: Not on file    Intimate partner violence:     Fear of current or ex partner: Not on file     Emotionally abused: Not on file     Physically abused: Not on file     Forced sexual activity: Not on file   Other Topics Concern    Not on file   Social History Narrative    Not on file       Current Outpatient Medications:     albuterol (PROVENTIL HFA,VENTOLIN HFA) 90 mcg/act inhaler, Inhale 2 puffs every 6 (six) hours as needed for wheezing, Disp: , Rfl:     AMILoride 5 mg tablet, Take 1 tablet by mouth daily, Disp: 30 tablet, Rfl: 0    amLODIPine (NORVASC) 2 5 mg tablet, Take 10 mg by mouth daily , Disp: , Rfl:     cyclobenzaprine (FLEXERIL) 5 mg tablet, Take 1 tablet (5 mg total) by mouth 3 (three) times a day, Disp: 90 tablet, Rfl: 0    fluticasone-salmeterol (ADVAIR HFA) 45-21 MCG/ACT inhaler, Inhale 2 puffs 2 (two) times a day as needed  , Disp: , Rfl:     magnesium 30 MG tablet, Take 1 tablet by mouth 2 (two) times a day (Patient taking differently: Take 1,000 mg by mouth daily ), Disp: 60 tablet, Rfl: 0    pantoprazole (PROTONIX) 40 mg tablet, Take 1 tablet (40 mg total) by mouth 2 (two) times a day, Disp: 30 tablet, Rfl: 0    potassium chloride (K-DUR,KLOR-CON) 10 mEq tablet, Take 4 tablets by mouth 2 (two) times a day (Patient taking differently: Take 20 mEq by mouth 2 (two) times a day ), Disp: 60 tablet, Rfl: 0    nicotine (NICODERM CQ) 21 mg/24 hr TD 24 hr patch, Place 1 patch on the skin daily (Patient not taking: Reported on 6/25/2020), Disp: 28 patch, Rfl: 0  No Known Allergies  Vitals:    06/25/20 1318   BP: 140/90   Pulse: (!) 160   Temp: 100 3 °F (37 9 °C)       Physical Exam   Constitutional: General appearance: The Patient is well-developed and well-nourished who appears the stated age in no acute distress  Patient is pleasant and talkative  HEENT:  Normocephalic  Sclerae are anicteric  Mucous membranes are moist  Neck is supple without adenopathy  No JVD  Chest: The lungs are clear to auscultation  Cardiac: Heart is regular rate  Abdomen: Abdomen is soft, non-tender, non-distended and without masses  Extremities: There is no clubbing or cyanosis  There is no edema  Symmetric  Neuro: Grossly nonfocal  Gait is normal      Lymphatic: No evidence of cervical adenopathy bilaterally  No evidence of axillary adenopathy bilaterally  No evidence of inguinal adenopathy bilaterally  Skin: Warm, anicteric  Psych:  Patient is pleasant and talkative  Breasts:      Pathology:  Final Diagnosis   A  Liver mass, needle core biopsy:  - Hepatocellular carcinoma, moderately differentiated  - Background liver with mild steatosis (10-20%), and periportal and centrilobular fibrosis (Stage 2 of 4)     Comment: Immunohistochemistry was performed on block A3  The tumor cells are positive for arginase, patchy positive for glypican-3, HepPar1, CD34 shows diffuse staining pattern, polyclonal CEA in a canalicular pattern, special stain for reticulin shows thickened hepatic plates, supporting final diagnosis  Tumor cells are negative for CD68  Trichrome stain shows periportal and centrilobular fibrosis       Intradepartmental consultation is in agreement    Dr Jose Pino is notified of the diagnosis in [facility] via [employer] on 6/10/2020 at 12pm           Electronically signed by Dorice Goldmann, MD on 6/10/2020 at 11:55 AM         Labs:  Lab Results   Component Value Date    WBC 15 05 (H) 06/07/2020    HGB 13 7 06/07/2020    HCT 40 4 06/07/2020    MCV 99 (H) 06/07/2020     06/07/2020     Lab Results   Component Value Date    SODIUM 133 (L) 06/07/2020    K 3 6 06/07/2020     06/07/2020    CO2 25 06/07/2020    AGAP 7 06/07/2020    BUN 6 06/07/2020    CREATININE 0 68 06/07/2020    GLUC 83 06/07/2020    GLUF 85 04/04/2018    CALCIUM 8 4 06/07/2020    AST 17 06/07/2020    ALT 18 06/07/2020    ALKPHOS 65 06/07/2020    TP 6 6 06/07/2020    TBILI 1 29 (H) 06/07/2020    EGFR 101 06/07/2020         Imaging  X-ray Chest 1 View Portable    Result Date: 5/28/2020  Narrative: CHEST INDICATION:   chest pain  COMPARISON:  4/2/2018 EXAM PERFORMED/VIEWS:  XR CHEST PORTABLE FINDINGS:  There is aortic knob calcification  Cardiomediastinal silhouette appears unremarkable  The lungs are clear  No pneumothorax or pleural effusion  Osseous structures appear within normal limits for patient age  Impression: No acute cardiopulmonary disease  Workstation performed: [id number]     Mri Abdomen W Wo Contrast    Result Date: 5/28/2020  Narrative: MRI - ABDOMEN - WITH AND WITHOUT CONTRAST INDICATION:  Liver lesion previous study from May 28, 2020 COMPARISON: CT from May 28, 2020, June 28, 2017 TECHNIQUE:  The following pulse sequences were obtained prior to and following the administration of intravenous contrast:     Coronal and axial T2 with TE of 90 and 180 respectively, axial T2 with fat saturation, axial FIESTA fat-sat, axial T1-weighted in-and-out-of phase, axial DWI/ADC, precontrast axial T1 with fat saturation, post-contrast dynamic axial T1 with fat saturation at 20, 70, and 180 seconds, coronal T1  with fat saturation and 7 minute delayed axial T1 with fat saturation   IV Contrast:  6 mL of gadobutrol injection (MULTI-DOSE) FINDINGS: LOWER CHEST:   Unremarkable  LIVER: There are no cirrhotic changes in the liver parenchyma  There is a liver lesion in the segment 8 which measures 3 3 x 2 7 x 3 3 cm  This lesion is mildly T2 hyperintense on the longer Echo sequences  This lesion is hypervascular and demonstrates intense enhancement during the arterial phase There is only mild decrease in signal intensity of this lesion on the portal venous phase and delayed images This lesion demonstrates diffusion restriction  There is a enhancing capsule/pseudocapsule around this cyst lesion  This lesion was not visible on the previous study of June 28, 2017 The hepatic veins and portal veins are patent  BILE DUCTS: No intrahepatic or extrahepatic bile duct dilation  GALLBLADDER:  Not visualized PANCREAS: Normal  No main pancreatic ductal dilation  ADRENAL GLANDS:  Normal  SPLEEN:  Normal  KIDNEYS/PROXIMAL URETERS: No hydroureteronephrosis  No left renal cyst seen BOWEL:   No abnormal dilation of the small bowel loops seen PERITONEUM/RETROPERITONEUM: No ascites  LYMPH NODES: No abdominal lymphadenopathy  VASCULAR STRUCTURES:  The portal vein is patent Celiac trunk is patent Renal arteries are patent ABDOMINAL WALL:  Unremarkable  OSSEOUS STRUCTURES:  No suspicious osseous lesion  Impression: New Hypervascular mass in the segment 8 of the liver, measuring 2 7 x 3 3 cm, suspicious, needs further characterization consider tissue diagnosis    Differential include hypervascular metastatic lesion, cholangiocarcinoma melanoma  I personally discussed this study with DEE DEE VIVAS on 5/28/2020 at 6:11 PM   Workstation performed: [id number]     Cta Chest Abdomen Pelvis W Wo Contrast    Result Date: 6/4/2020  Narrative: CT ANGIOGRAM OF THE CHEST, ABDOMEN AND PELVIS WITH AND WITHOUT IV CONTRAST INDICATION:  Chest pain COMPARISON: 5/28/2020 CTA TECHNIQUE:  CT angiogram examination of the chest, abdomen and pelvis was performed according to standard protocol  This examination, like all CT scans performed in the [facility], was performed utilizing techniques to minimize radiation dose exposure, including the use of iterative reconstruction and automated exposure control  Contrast as well as noncontrast images were obtained  3D reconstructions were performed an independent workstation, and are supplied for review  Rad dose 742 mGy-cm IV Contrast:  100 mL of iohexol (OMNIPAQUE) Enteric Contrast: FINDINGS: VASCULAR STRUCTURES: OTHER FINDINGS: CHEST: HEART:  Mild coronary artery calcifications  Normal cardiac size  No pericardial effusion  LUNGS: Severe emphysema  Small bilateral upper lobe nodules again evident  PLEURA: No pleural effusion  MEDIASTINUM AND MACIEL:  No mass or significant lymphadenopathy  CHEST WALL AND LOWER NECK: Normal  ABDOMEN LIVER/BILIARY TREE:  3 2 cm x 2 6 cm enhancing mass right hepatic lobe suspicious for metastasis, consistent with prior studies GALLBLADDER:  No calcified gallstones  No pericholecystic inflammatory change  SPLEEN:  Unremarkable  Normal size  PANCREAS:  Unremarkable  ADRENAL GLANDS:  Unremarkable  KIDNEYS/URETERS:  No solid renal mass  No hydronephrosis  No urinary tract calculi  PELVIS REPRODUCTIVE ORGANS:  Unremarkable for patient's age  URINARY BLADDER:  Unremarkable  ADDITIONAL ABDOMINAL AND PELVIC STRUCTURES: STOMACH AND BOWEL:  Left periumbilical colostomy  ABDOMINOPELVIC CAVITY:  No pathologically enlarged mesenteric or retroperitoneal lymph nodes  No ascites or free intraperitoneal air  ABDOMINAL WALL/INGUINAL REGIONS:  Unremarkable  OSSEOUS STRUCTURES:  No acute fracture or destructive osseous lesion  Bilateral L5 spondylolysis  Grade 1 anterolisthesis L5-S1       Impression: Persistent small upper lobe nodules likely infectious or inflammatory, unchanged No evidence of aortic aneurysm or dissection Severe emphysema Persistent enhancing hepatic mass measuring approximately 3 2 cm x 2 6 cm suspicious for metastatic disease Status post left paraumbilical colostomy Workstation performed: [id number]     Ir Image Guided Biopsy/aspiration Liver    Result Date: 6/8/2020  Narrative: EXAMINATION: Liver biopsy INDICATION: Liver mass CONTRAST: N/A FLUOROSCOPY TIME:   N/A IMAGES:  3 ANESTHESIA: Moderate sedation and local lidocaine PROCEDURE:  The patient was identified verbally and by wristband  Timeout was performed  Informed consent was obtained  Following obtaining informed consent, the patient was prepped and draped in the usual sterile fashion  All elements of maximal sterile barrier technique, cap and mask and sterile gloves and sterile drape and hand hygiene and 2% chlorhexidine for cutaneous antisepsis  Sterile ultrasound technique with sterile gel and sterile probe covers was also utilized  Under ultrasound guidance, a 17-gauge coaxial introducer needle was advanced into the right liver mass  An 18-gauge BioPince needle was used to obtain 4 core needle samples which were placed into formalin  Pathologist was present to confirm adequacy of samples  D-Stat hemostatic agent was instilled through the introducer needle during its removal  Bandage was applied  The patient tolerated the procedure well without complication  The patient left the IR department in stable condition  Findings: Successful right liver mass core needle biopsy  Impression: Impression: Successful ultrasound-guided liver biopsy  Workstation performed: [id number]     Cta Ed Chest Pe Study    Result Date: 5/28/2020  Narrative: CTA - CHEST WITH IV CONTRAST - PULMONARY ANGIOGRAM INDICATION:   PE suspected, intermediate prob, positive D-dimer  Left chest pain which started last night while resting  Sharp and stabbing  Worse upon deep inspiration  Increased dyspnea  Smoker with chronic productive cough  COMPARISON: Chest radiograph from today  Abdomen CT from 6/28/2017   TECHNIQUE: CTA examination of the chest was performed using angiographic technique according to a protocol specifically tailored to evaluate for pulmonary embolism  Axial, sagittal, and coronal 2D reformatted images were created from the source data and  submitted for interpretation  In addition, coronal 3D MIP postprocessing was performed on the acquisition scanner  Radiation dose length product (DLP) for this visit:  470 mGy-cm   This examination, like all CT scans performed in the [facility], was performed utilizing techniques to minimize radiation dose exposure, including the use of iterative reconstruction and automated exposure control  IV Contrast:  89 mL of iohexol (OMNIPAQUE)  FINDINGS: PULMONARY ARTERIAL TREE:  No pulmonary embolus  LUNGS:  Severe emphysema with multiple irregular ill-defined subcentimeter nodular opacities in the upper lobe centered about the bronchi  Moderate debris in the lower lobe bronchi  PLEURA:  Unremarkable  HEART/GREAT VESSELS:  No pericardial effusion  Minimal coronary artery calcification indicating atherosclerotic heart disease  MEDIASTINUM AND MACIEL:  Unremarkable  CHEST WALL AND LOWER NECK:   Unremarkable  VISUALIZED STRUCTURES IN THE UPPER ABDOMEN: 3 2 x 2 6 cm hypervascular mass in the right hepatic lobe, not visible on the abdominal CT from June 2017  OSSEOUS STRUCTURES:  Mild degenerative disease in the spine  Mild inferior endplate depression of T6  Healed bilateral rib fractures  Impression: No pulmonary embolus  Multiple subcentimeter nodular opacities in the upper lobes centered about the bronchi which are either infectious or inflammatory  The appearance is not consistent with malignancy  Moderate debris in the lower lobe bronchi which could be due to bronchitis versus aspiration  Severe emphysema  3 2 x 2 6 cm hypervascular mass in the right lobe of the liver, not visible on the abdominal CT from June 2017  Follow-up with a liver MRI is recommended for further evaluation   The study was marked in [facility] for immediate notification and follow-up  Workstation performed: [id number]     I reviewed the above laboratory and imaging data  Discussion/Summary:  51-year-old male with [address]  in a non cirrhotic liver  His Child score is B  His MELD score is 9  We discussed multiple treatment options  With Estrella Chakraborty  that is less than 5 cm in size he is a candidate for transplant  We also discussed resection/ablation with intraoperative ultrasound  I also discussed percutaneous ablation along with a slightly higher risk of local recurrence since this is more than 3 cm in size  After some discussion, he would like to proceed with surgical intervention  The risks of intraoperative ultrasound of the liver, liver resection/ablation were explained and included bleeding, infection, need for further surgery, wound complications, adjacent organ injury, biliary fistula, sepsis, mi, DVT, stroke, pulmonary embolism, and death  Informed consent was obtained  We will schedule this at our earliest mutual convenience  Since he has not seen a pulmonologist I did discuss the pulmonary complications associated with this procedure and especially the right upper quadrant incision  I will have him obtain Pulmonary clearance  His EKG was also abnormal   On my review there are ST depressions, consequently we will have him see Cardiology for risk stratification  I will see him again at the time of surgery after all these consultations have been obtained  We will also obtain an AFP level as a baseline  He is agreeable to this  All his questions were answered

## 2020-06-29 ENCOUNTER — TELEPHONE (OUTPATIENT)
Dept: PULMONOLOGY | Facility: CLINIC | Age: 65
End: 2020-06-29

## 2020-06-29 NOTE — TELEPHONE ENCOUNTER
This patient would be a new patient needing pre-op clearance  Surgery: Liver Resection  Surgery date: Possibly 7/15  On Oxygen: No  Pulmonary Issues: Emphysema   Kind of sedation: General with Epidural  Length of surgery: 180 minutes  Surgeon: Dr Keily Nagy  [phone]    LM for patient to call me back

## 2020-07-01 DIAGNOSIS — C22.0 HEPATOCELLULAR CARCINOMA (HCC): ICD-10-CM

## 2020-07-01 PROCEDURE — U0003 INFECTIOUS AGENT DETECTION BY NUCLEIC ACID (DNA OR RNA); SEVERE ACUTE RESPIRATORY SYNDROME CORONAVIRUS 2 (SARS-COV-2) (CORONAVIRUS DISEASE [COVID-19]), AMPLIFIED PROBE TECHNIQUE, MAKING USE OF HIGH THROUGHPUT TECHNOLOGIES AS DESCRIBED BY CMS-2020-01-R: HCPCS | Performed by: OBSTETRICS & GYNECOLOGY

## 2020-07-02 ENCOUNTER — DOCUMENTATION (OUTPATIENT)
Dept: HEMATOLOGY ONCOLOGY | Facility: CLINIC | Age: 65
End: 2020-07-02

## 2020-07-02 NOTE — PROGRESS NOTES
Rectal/GI Multidisciplinary Case Review date: 7/2/2020      Presenting Doctor: Dr Saul Antony      Diagnosis: Gary Sharma was presented at the Rectal/GI Multidisciplinary Conference today  PHYSICIAN RECOMMENDED PLAN:    -Surgery 7/15/2020  -Have GI evaluate for underlying liver issues (Hepatitis C, Hepatitis B etc)    -Patient sees Dr Sherlyn Mason (GI), spoke to Dr Vel Jackson, per him, Aliyah Shearer has had this testing done already at [facility] labs and all has been negative (Hep B, Hep C), he states he sent his notes over, Aliyah Shearer is scheduled to see Dr Vel Jackson again on 7/31/20    Team agreed to plan

## 2020-07-06 LAB — SARS-COV-2 RNA SPEC QL NAA+PROBE: NOT DETECTED

## 2020-07-07 ENCOUNTER — CONSULT (OUTPATIENT)
Dept: CARDIOLOGY CLINIC | Facility: CLINIC | Age: 65
End: 2020-07-07
Payer: COMMERCIAL

## 2020-07-07 VITALS
HEIGHT: 69 IN | SYSTOLIC BLOOD PRESSURE: 120 MMHG | DIASTOLIC BLOOD PRESSURE: 74 MMHG | TEMPERATURE: 98.6 F | WEIGHT: 129.2 LBS | HEART RATE: 119 BPM | BODY MASS INDEX: 19.13 KG/M2

## 2020-07-07 DIAGNOSIS — R94.31 ABNORMAL EKG: ICD-10-CM

## 2020-07-07 DIAGNOSIS — C22.0 HEPATOCELLULAR CARCINOMA (HCC): ICD-10-CM

## 2020-07-07 DIAGNOSIS — Z01.810 PREOP CARDIOVASCULAR EXAM: Primary | ICD-10-CM

## 2020-07-07 PROCEDURE — 93000 ELECTROCARDIOGRAM COMPLETE: CPT | Performed by: INTERNAL MEDICINE

## 2020-07-07 PROCEDURE — 99244 OFF/OP CNSLTJ NEW/EST MOD 40: CPT | Performed by: INTERNAL MEDICINE

## 2020-07-07 RX ORDER — OMEPRAZOLE 20 MG/1
20 CAPSULE, DELAYED RELEASE ORAL DAILY
COMMUNITY
End: 2021-01-04 | Stop reason: SDUPTHER

## 2020-07-07 RX ORDER — ALPRAZOLAM 0.25 MG/1
TABLET ORAL
COMMUNITY
End: 2020-09-03 | Stop reason: SDUPTHER

## 2020-07-07 RX ORDER — ROSUVASTATIN CALCIUM 10 MG/1
10 TABLET, COATED ORAL DAILY
Qty: 30 TABLET | Refills: 1 | Status: SHIPPED | OUTPATIENT
Start: 2020-07-07 | End: 2020-08-03 | Stop reason: SDUPTHER

## 2020-07-07 NOTE — PATIENT INSTRUCTIONS
Have the echo done at 1 pm on Friday at the cardiology department at [facility] ([address], [place], [place], [address]) at Friday 1 pm      Take your BP medicine till the day before    Call if you have any questions

## 2020-07-07 NOTE — PROGRESS NOTES
Cardiology   Marlen Banks III 59 y o  male MRN: [id number]  Unit/Bed#:  Encounter: [id number]        Reason for Consult / Principal Problem: Pre-operative risk assessment      PCP: Clarita Uriostegui MD      Assessment/Plan:    >> Hepatocellular carcinoma  >> Hypertension  >> COPD  >>  Coronary calcifications and aortic calcifications on CT  >>  Abnormal EKG: Septal infarct pattern    Plan:    -  Start Crestor 10 mg daily today, discussed this with his surgical oncologist Dr Huan Alexis  He is Child [place] B and will likely be able to tolerate it  This may help mitigate some risk of plaque rupture  -  Continue blood pressure medicines:   amiloride, amlodipine into the day before procedure  -  Check ECHO preoperatively: We have made an   Appointment for him  On Friday  -  If the echo significantly abnormal will  Consider getting heart failure involved/ will discuss [facility] surgery whether the procedure can be safely delayed  -  The echo is unremarkable can proceed with surgery  -  He is at moderate to high risk for high risk (intraperitoneal) surgery  -  Traditional risk calculators may underestimate his risk  -  RCRI: 6 6% MACE (positive for high risk surgery, CAD)  - Memo Brody: 1 08%  -  His surgery is urgent as it involves cancer  Therefore will proceed without further ischemic workup  - Consider cardiac anesthesia        CC:  Preoperative risk stratification    HPI  59 y o  male with  Incidentally discovered liver lesion  This turned out to be hepatocellular carcinoma  He also has a history of hypertension and COPD  Long smoking history  He has been trying to cut back  He does not have any complaints of chest pain or shortness of breath  No heart failure type symptoms  He is able to climb up 1 or 2 flights of stairs without any difficulty  Can walk several blocks on level ground without getting chest pain or shortness of breath  Is able to perform all his day-to-day activities    His EKG shows a septal infarct pattern in leads V1 and V2  He has no family history of premature coronary artery disease, does not drink  Significantly or use drugs  He had a CT scan during his hospitalization that revealed coronary artery and aortic calcifications   at this time he has no complaints  Physical exam  Objective   Vitals: There were no vitals taken for this visit  General:  AO x3, no acute distress  Cardiac:  S1-S2 normal  No murmurs, rubs or gallops, JVP:  Not seen  Lungs:   Mild bilateral expiratory wheeze  Abdomen:  Soft, nontender, nondistended  Extremities:  Warm, well perfused, pulses palpable, no ulcers or rashes  Edema: no edema  Neuro: Grossly nonfocal  Psych:  Normal affect  Musculoskeletal:  Range of motion normal, no swelling or tenderness over joints  HEENT:  EOM normal, pupils equal and reactive to light  Neck: no palpable mass, no bruits  Skin:  no rashes (comprehensive skin exam not performed)  @ @        ======================================================  TREADMILL STRESS  No results found for this or any previous visit    ----------------------------------------------------------------------------------------------  NUCLEAR STRESS TEST: No results found for this or any previous visit  No results found for this or any previous visit     --------------------------------------------------------------------------------  CATH:  No results found for this or any previous visit   --------------------------------------------------------------------------------  ECHO:   No results found for this or any previous visit    No results found for this or any previous visit   --------------------------------------------------------------------------------  HOLTER  No results found for this or any previous visit   --------------------------------------------------------------------------------  CAROTIDS  Results for orders placed during the hospital encounter of 04/02/18   VAS carotid complete study    Narrative [facility] REPORT  CLINICAL:  Indications:  Syncope [R55]  The patient experienced a syncopal episode which  lasted 1 minute  Operative History  Colostomy  Cholecystectomy  Risk Factors  The patient has history of smoking (current)  He has no history of HTN or  Diabetes  Clinical  Right Pressure:  109/54 mm Hg     FINDINGS:     Right        Impression  PSV  EDV (cm/s)  Direction of Flow  Ratio    Dist  ICA                 66          27                      1 13    Mid  ICA                  50          15                      0 85    Prox  ICA    1 - 49%      48          11                      0 82    Dist CCA                  43          15                              Mid CCA                   58          15                      1 10    Prox CCA                  53          14                              Ext Carotid               64          11                      1 10    Prox Vert                 58          19  Antegrade                   Subclavian               106           0                                 Left         Impression  PSV  EDV (cm/s)  Direction of Flow  Ratio    Dist  ICA                 68          24                      1 13    Mid  ICA                  63          21                      1 04    Prox  ICA    1 - 49%      88          24                      1 45    Dist CCA                  61          20                              Mid CCA                   60          20                      1 04    Prox CCA                  58          17                              Ext Carotid              185          27                      3 07    Prox Vert                 54          17  Antegrade                   Subclavian               119           0                                       CONCLUSION:  Impression  RIGHT:  There is <50% stenosis noted in the internal carotid artery  Plaque is  heterogenous and irregular  Vertebral artery flow is antegrade   There is no significant subclavian artery  disease  LEFT:  There is <50% stenosis noted in the internal carotid artery  Plaque is  heterogenous and irregular  Vertebral artery flow is antegrade  There is no significant subclavian artery  disease  Recommend repeat duplex in 1 - 2 years to follow up on plaque progression  Internal carotid artery stenosis determination by consensus criteria from:  zaid Bahena al  Carotid Artery Stenosis: Gray-Scale and Doppler US Diagnosis  - Society of Radiologists in [facility] [address], Radiology 2003;  [phone]  SIGNATURE:  Electronically Signed by: Leandra Martinez MD, RPVI on 2018-04-04 02:54:52 PM        There are no diagnoses linked to this encounter    ======================================================          Review of Systems  ROS as noted above, otherwise 12 point review of systems was performed and is negative  Historical Information   Past Medical History:   Diagnosis Date    [facility]    Cataract     Colostomy in place [facility]) 6/29/2017    [facility]     [facility]     [facility]/COPD [facility]     Hypertension     Hypokalemia 6/28/2017    Hypomagnesemia 6/29/2017    Hyponatremia 6/28/2017    Pneumonia      Past Surgical History:   Procedure Laterality Date    CHOLECYSTECTOMY      COLECTOMY      10 inchs of ileum    COLONOSCOPY N/A 6/30/2017    Procedure: COLONOSCOPY;  Surgeon: Ky Gramajo MD;  [facility];   Service: Gastroenterology    COLOSTOMY      FINGER SURGERY Left     IR IMAGE GUIDED BIOPSY/ASPIRATION LIVER  6/8/2020    LITHOTRIPSY       Social History     Substance and Sexual Activity   Alcohol Use Yes    Alcohol/week: 1 0 - 2 0 standard drinks    Types: 1 - 2 Cans of beer per week    Frequency: Monthly or less    Drinks per session: 1 or 2    Comment: "1-2beers a day, glass of wine at night"     Social History     Substance and Sexual Activity   Drug Use No     Social History     Tobacco Use   Smoking Status Current Every Day Smoker    Packs/day: 1 50    Types: Cigarettes   Smokeless Tobacco Never Used     No family history on file  Meds/Allergies   Hospital Medications: No current facility-administered medications for this visit  Home Medications:   (Not in a hospital admission)    No Known Allergies      Portions of the record may have been created with voice recognition software  Occasional wrong words or "sound a like" substitutions may have occurred due to the inherent limitations of voice recognition software  Read the chart carefully and recognize, using context, where substitutions have occurred

## 2020-07-08 ENCOUNTER — APPOINTMENT (OUTPATIENT)
Dept: LAB | Facility: HOSPITAL | Age: 65
End: 2020-07-08
Attending: SURGERY
Payer: COMMERCIAL

## 2020-07-08 ENCOUNTER — HOSPITAL ENCOUNTER (OUTPATIENT)
Dept: PULMONOLOGY | Facility: HOSPITAL | Age: 65
Discharge: HOME | End: 2020-07-08
Attending: SURGERY
Payer: COMMERCIAL

## 2020-07-08 ENCOUNTER — DOCUMENTATION (OUTPATIENT)
Dept: PULMONOLOGY | Facility: CLINIC | Age: 65
End: 2020-07-08

## 2020-07-08 DIAGNOSIS — C22.0 HEPATOCELLULAR CARCINOMA (HCC): ICD-10-CM

## 2020-07-08 DIAGNOSIS — Z01.810 PREOP CARDIOVASCULAR EXAM: ICD-10-CM

## 2020-07-08 DIAGNOSIS — R94.31 ABNORMAL EKG: ICD-10-CM

## 2020-07-08 LAB
ABO GROUP BLD: NORMAL
AFP-TM SERPL-MCNC: 4.2 NG/ML (ref 0.5–8)
ALBUMIN SERPL BCP-MCNC: 2.8 G/DL (ref 3.5–5)
ALP SERPL-CCNC: 101 U/L (ref 46–116)
ALT SERPL W P-5'-P-CCNC: 25 U/L (ref 12–78)
ANION GAP SERPL CALCULATED.3IONS-SCNC: 6 MMOL/L (ref 4–13)
APTT PPP: 32 SECONDS (ref 23–37)
AST SERPL W P-5'-P-CCNC: 22 U/L (ref 5–45)
BASOPHILS # BLD AUTO: 0.08 THOUSANDS/ΜL (ref 0–0.1)
BASOPHILS NFR BLD AUTO: 1 % (ref 0–1)
BILIRUB SERPL-MCNC: 0.49 MG/DL (ref 0.2–1)
BLD GP AB SCN SERPL QL: NEGATIVE
BUN SERPL-MCNC: 7 MG/DL (ref 5–25)
CALCIUM SERPL-MCNC: 7.9 MG/DL (ref 8.3–10.1)
CHLORIDE SERPL-SCNC: 106 MMOL/L (ref 100–108)
CHOLEST SERPL-MCNC: 95 MG/DL (ref 50–200)
CO2 SERPL-SCNC: 28 MMOL/L (ref 21–32)
CREAT SERPL-MCNC: 0.63 MG/DL (ref 0.6–1.3)
EOSINOPHIL # BLD AUTO: 0.18 THOUSAND/ΜL (ref 0–0.61)
EOSINOPHIL NFR BLD AUTO: 2 % (ref 0–6)
ERYTHROCYTE [DISTWIDTH] IN BLOOD BY AUTOMATED COUNT: 12.7 % (ref 11.6–15.1)
EST. AVERAGE GLUCOSE BLD GHB EST-MCNC: 105 MG/DL
GFR SERPL CREATININE-BSD FRML MDRD: 104 ML/MIN/1.73SQ M
GLUCOSE P FAST SERPL-MCNC: 80 MG/DL (ref 65–99)
HBA1C MFR BLD: 5.3 %
HCT VFR BLD AUTO: 41.8 % (ref 36.5–49.3)
HDLC SERPL-MCNC: 40 MG/DL
HGB BLD-MCNC: 14 G/DL (ref 12–17)
IMM GRANULOCYTES # BLD AUTO: 0.06 THOUSAND/UL (ref 0–0.2)
IMM GRANULOCYTES NFR BLD AUTO: 1 % (ref 0–2)
INR PPP: 1.09 (ref 0.84–1.19)
LDLC SERPL CALC-MCNC: 44 MG/DL (ref 0–100)
LYMPHOCYTES # BLD AUTO: 2.38 THOUSANDS/ΜL (ref 0.6–4.47)
LYMPHOCYTES NFR BLD AUTO: 22 % (ref 14–44)
MCH RBC QN AUTO: 32.1 PG (ref 26.8–34.3)
MCHC RBC AUTO-ENTMCNC: 33.5 G/DL (ref 31.4–37.4)
MCV RBC AUTO: 96 FL (ref 82–98)
MONOCYTES # BLD AUTO: 0.71 THOUSAND/ΜL (ref 0.17–1.22)
MONOCYTES NFR BLD AUTO: 7 % (ref 4–12)
NEUTROPHILS # BLD AUTO: 7.4 THOUSANDS/ΜL (ref 1.85–7.62)
NEUTS SEG NFR BLD AUTO: 67 % (ref 43–75)
NRBC BLD AUTO-RTO: 0 /100 WBCS
PLATELET # BLD AUTO: 440 THOUSANDS/UL (ref 149–390)
PMV BLD AUTO: 10 FL (ref 8.9–12.7)
POTASSIUM SERPL-SCNC: 3.5 MMOL/L (ref 3.5–5.3)
PROT SERPL-MCNC: 6.9 G/DL (ref 6.4–8.2)
PROTHROMBIN TIME: 14.1 SECONDS (ref 11.6–14.5)
RBC # BLD AUTO: 4.36 MILLION/UL (ref 3.88–5.62)
RH BLD: POSITIVE
SODIUM SERPL-SCNC: 140 MMOL/L (ref 136–145)
SPECIMEN EXPIRATION DATE: NORMAL
TRIGL SERPL-MCNC: 55 MG/DL
WBC # BLD AUTO: 10.81 THOUSAND/UL (ref 4.31–10.16)

## 2020-07-08 PROCEDURE — 94729 DIFFUSING CAPACITY: CPT | Performed by: INTERNAL MEDICINE

## 2020-07-08 PROCEDURE — 85025 COMPLETE CBC W/AUTO DIFF WBC: CPT

## 2020-07-08 PROCEDURE — 85730 THROMBOPLASTIN TIME PARTIAL: CPT

## 2020-07-08 PROCEDURE — 80053 COMPREHEN METABOLIC PANEL: CPT

## 2020-07-08 PROCEDURE — 36415 COLL VENOUS BLD VENIPUNCTURE: CPT

## 2020-07-08 PROCEDURE — 94726 PLETHYSMOGRAPHY LUNG VOLUMES: CPT | Performed by: INTERNAL MEDICINE

## 2020-07-08 PROCEDURE — 86900 BLOOD TYPING SEROLOGIC ABO: CPT

## 2020-07-08 PROCEDURE — 94010 BREATHING CAPACITY TEST: CPT

## 2020-07-08 PROCEDURE — 83036 HEMOGLOBIN GLYCOSYLATED A1C: CPT

## 2020-07-08 PROCEDURE — 86850 RBC ANTIBODY SCREEN: CPT

## 2020-07-08 PROCEDURE — 94726 PLETHYSMOGRAPHY LUNG VOLUMES: CPT

## 2020-07-08 PROCEDURE — 94760 N-INVAS EAR/PLS OXIMETRY 1: CPT

## 2020-07-08 PROCEDURE — 86901 BLOOD TYPING SEROLOGIC RH(D): CPT

## 2020-07-08 PROCEDURE — 94729 DIFFUSING CAPACITY: CPT

## 2020-07-08 PROCEDURE — 80061 LIPID PANEL: CPT

## 2020-07-08 PROCEDURE — 94010 BREATHING CAPACITY TEST: CPT | Performed by: INTERNAL MEDICINE

## 2020-07-08 PROCEDURE — 82105 ALPHA-FETOPROTEIN SERUM: CPT

## 2020-07-08 PROCEDURE — 85610 PROTHROMBIN TIME: CPT

## 2020-07-09 ENCOUNTER — OFFICE VISIT (OUTPATIENT)
Dept: PULMONOLOGY | Facility: CLINIC | Age: 65
End: 2020-07-09
Payer: COMMERCIAL

## 2020-07-09 ENCOUNTER — ANESTHESIA EVENT (OUTPATIENT)
Dept: PERIOP | Facility: HOSPITAL | Age: 65
DRG: 406 | End: 2020-07-09
Payer: COMMERCIAL

## 2020-07-09 VITALS
WEIGHT: 136 LBS | HEIGHT: 69 IN | OXYGEN SATURATION: 97 % | HEART RATE: 104 BPM | BODY MASS INDEX: 20.14 KG/M2 | SYSTOLIC BLOOD PRESSURE: 120 MMHG | DIASTOLIC BLOOD PRESSURE: 76 MMHG | TEMPERATURE: 98 F

## 2020-07-09 DIAGNOSIS — I10 ESSENTIAL HYPERTENSION: ICD-10-CM

## 2020-07-09 DIAGNOSIS — F17.210 NICOTINE DEPENDENCE, CIGARETTES, UNCOMPLICATED: ICD-10-CM

## 2020-07-09 DIAGNOSIS — E78.5 DYSLIPIDEMIA: ICD-10-CM

## 2020-07-09 DIAGNOSIS — J43.2 CENTRILOBULAR EMPHYSEMA (HCC): Primary | ICD-10-CM

## 2020-07-09 DIAGNOSIS — R06.00 DOE (DYSPNEA ON EXERTION): ICD-10-CM

## 2020-07-09 DIAGNOSIS — C22.0 HEPATOCELLULAR CARCINOMA (HCC): ICD-10-CM

## 2020-07-09 PROCEDURE — U0003 INFECTIOUS AGENT DETECTION BY NUCLEIC ACID (DNA OR RNA); SEVERE ACUTE RESPIRATORY SYNDROME CORONAVIRUS 2 (SARS-COV-2) (CORONAVIRUS DISEASE [COVID-19]), AMPLIFIED PROBE TECHNIQUE, MAKING USE OF HIGH THROUGHPUT TECHNOLOGIES AS DESCRIBED BY CMS-2020-01-R: HCPCS | Performed by: OBSTETRICS & GYNECOLOGY

## 2020-07-09 PROCEDURE — 99244 OFF/OP CNSLTJ NEW/EST MOD 40: CPT | Performed by: INTERNAL MEDICINE

## 2020-07-09 RX ORDER — POTASSIUM CHLORIDE 750 MG/1
20 CAPSULE, EXTENDED RELEASE ORAL 2 TIMES DAILY
COMMUNITY
End: 2022-01-25 | Stop reason: SDUPTHER

## 2020-07-09 NOTE — PROGRESS NOTES
Pulmonary Consultation   Freedom Beyer III 59 y o  male MRN: [id number]    Encounter: [id number]      Reason for consultation:   Chronic obstructive pulmonary disease and preop evaluation  Requesting physician:  Julissa Auguste MD       Impressions:   · Chronic obstructive pulmonary disease of moderate severity  · Dyspnea on exertion  · Ongoing tobacco use  · Hepatocellular carcinoma scheduled for resection next week  · Essential hypertension  · Dyslipidemia  Recommendations:  · Anoro Ellipta 1 inhalation once a day  · Albuterol rescue inhaler 2 inhalations 4 times a day as needed  · Discontinue Advair  · Smoking cessation  · The patient has an increased risk for potential postoperative pulmonary complications  However it is not prohibitive to proceed with the surgery  · Follow-up in 6 months  Discussion:  The patient has moderate chronic obstructive pulmonary disease  I had a long discussion with him regarding the nature of the disease and the potential progression of his disease with ongoing tobacco use  I have started him on Anoro Ellipta 1 inhalation once a day  I have provided him with a sample and showed him how to use the Ellipta device appropriately  I have discontinue the Advair  He will use albuterol rescue inhaler 2 inhalations 4 times a day as needed  I have explained to the patient he has to quit smoking today to improve his out of avoiding postoperative complications  I have also advised him that he should seriously consider nonsmoking even after the surgery  He has a hepatic cancer resection surgery schedule next week  He has a higher risk for potential postoperative pulmonary complications  However his risk is not prohibitive to proceed with the surgery  I will see him in 6 months in a follow-up visit  History of Present Illness   HPI:  Sanjuana Perez is a 59 y o  male who is here for evaluation of COPD and preoperative evaluation    The patient has chronic obstructive pulmonary disease diagnosed about 2 years ago  He has been on Advair as needed  Recently was diagnosed with hepatocellular carcinoma  He is scheduled for hepatocellular carcinoma resection next week  The patient has shortness of breath on exertion  He has occasional cough mainly in the morning  He denies hemoptysis  No chest pain  He has good appetite  Denies weight loss  He has no pain  Review of systems:  Review of systems was completed and was otherwise negative except as listed in HPI  Historical Information   Past Medical History:   Diagnosis Date    [facility]2017    Cataract     Colon polyp     [facility] place [facility] 6/29/2017    [place] ([place] [address] )     [place] of lung ([place] [address] )     Emphyse[place]/COPD ([place] [address] )     GERD (gastroesophageal reflux disease)     Hypertension     Hypokalemia 6/28/2017    Hypomagnesemia 6/29/2017    Hyponatremia 6/28/2017    Kidney stone     Pneumonia      Past Surgical History:   Procedure Laterality Date    CHOLECYSTECTOMY      COLECTOMY      10 inchs of ileum    COLONOSCOPY N/A 6/30/2017    Procedure: COLONOSCOPY;  Surgeon: Gloria Romero MD;  [facility]; Service: Gastroenterology    COLOSTOMY      FINGER SURGERY Left     IR IMAGE GUIDED BIOPSY/ASPIRATION LIVER  6/8/2020    LITHOTRIPSY       No family history on file  Family History:  No family history of chronic lung disease  Social History:  The patient lives with his daughter and grandkids  He has about 50 pack year smoking history  He still smoking at least a pack a day  He is retired  He worked in [email or web address]  Meds/Allergies   No current facility-administered medications for this visit          (Not in a hospital admission)  No Known Allergies    Vitals: Blood pressure 120/76, pulse 104, temperature 98 °F (36 7 °C), temperature source Temporal, height 5' 9" (1 753 m), weight 61 7 kg (136 lb), SpO2 97 % ,      Physical exam:        Head/eyes:    Normocephalic, without obvious abnormality, atraumatic,         PERRL, extraocular muscles intact, no scleral icterus    Nose:   Nares normal, septum midline, mucosa normal, no drainage    or sinus tenderness   Throat:   Moist mucous membranes, no thrush   Neck:   Supple, trachea midline, no adenopathy; no carotid    bruit or JVD   Lungs:     Decreased breath sounds  No wheezing or rhonchi  Heart:    Regular rate and rhythm, S1 and S2 normal, no murmur, rub   or gallop   Abdomen:     Soft, non-tender, bowel sounds active all four quadrants,     no masses, no organomegaly   Extremities:   Extremities normal, atraumatic, no cyanosis or edema   Skin:   Warm, dry, turgor normal, no rashes or lesions   Neurologic:   CNII-XII intact, normal strength, non-focal             Imaging and other studies: I have personally reviewed pertinent films in PACS CT of the chest is reviewed on the [facility] system  He has diffuse centrilobular emphysema predominantly in the upper lobes  PFT's is reviewed  Normal total lung capacity with mildly increased residual volume consistent with mild air trapping  Moderate airflow limitation  Reduced diffusion capacity  Normal airway resistance as indicated by the specific airway conductance      Lab Results   Component Value Date    WBC 10 81 (H) 07/08/2020    HGB 14 0 07/08/2020    HCT 41 8 07/08/2020    MCV 96 07/08/2020     (H) 07/08/2020     Lab Results   Component Value Date    SODIUM 140 07/08/2020    K 3 5 07/08/2020     07/08/2020    CO2 28 07/08/2020    BUN 7 07/08/2020    CREATININE 0 63 07/08/2020    GLUC 83 06/07/2020    CALCIUM 7 9 (L) 07/08/2020             Brayan Cifuentes MD

## 2020-07-09 NOTE — PRE-PROCEDURE INSTRUCTIONS
Pre-Surgery Instructions:   Medication Instructions    albuterol (PROVENTIL HFA,VENTOLIN HFA) 90 mcg/act inhaler Instructed patient per Anesthesia Guidelines   ALPRAZolam (XANAX) 0 25 mg tablet Instructed patient per Anesthesia Guidelines   AMILoride 5 mg tablet Instructed patient per Anesthesia Guidelines   amLODIPine (NORVASC) 2 5 mg tablet Instructed patient per Anesthesia Guidelines   fluticasone-salmeterol (ADVAIR HFA) 45-21 MCG/ACT inhaler Instructed patient per Anesthesia Guidelines   MAGNESIUM CHLORIDE PO Instructed patient per Anesthesia Guidelines   nicotine (NICODERM CQ) 21 mg/24 hr TD 24 hr patch Instructed patient per Anesthesia Guidelines   omeprazole (PriLOSEC) 20 mg delayed release capsule Instructed patient per Anesthesia Guidelines   potassium chloride (MICRO-K) 10 MEQ CR capsule Instructed patient per Anesthesia Guidelines   rosuvastatin (CRESTOR) 10 MG tablet Instructed patient per Anesthesia Guidelines   VIT B12-METHIONINE-INOS-CHOL IM Instructed patient per Anesthesia Guidelines   [DISCONTINUED] potassium chloride (K-DUR,KLOR-CON) 10 mEq tablet Instructed patient per Anesthesia Guidelines  Have you had / have a sore throat? No  have you had / have a cough less than 1 week? No  Have you had / have a fever greater than 100 0 - 100  4? No  Are you experiencing any shortness of breath? No    Review with patient medications and showering instructions  Advice need covid test done today  Advice ASC call  Verbalized understanding    Benzodiazepine antagonist Med Class   If this medication is needed please continue to take on your normal schedule  If you take it in the morning, then you may take this medicine with a sip of water  Calcium Channel Blocker Med Class   Continue to take this heart medication on your normal schedule  If this is an oral medication and you take it in the morning, then you may take this medicine with a sip of water      Inhalational Med Class   Continue to take these inhaler medications on your normal schedule up to and including the day of surgery  Statin Med Class   Continue to take this medication on your normal schedule    If this is an oral medication and you take it in the morning, then you may take this medicine with a sip of water

## 2020-07-10 ENCOUNTER — TRANSCRIBE ORDERS (OUTPATIENT)
Dept: GASTROENTEROLOGY | Facility: HOSPITAL | Age: 65
End: 2020-07-10

## 2020-07-10 ENCOUNTER — HOSPITAL ENCOUNTER (OUTPATIENT)
Dept: NON INVASIVE DIAGNOSTICS | Facility: HOSPITAL | Age: 65
Discharge: HOME | End: 2020-07-10
Payer: COMMERCIAL

## 2020-07-10 ENCOUNTER — DOCUMENTATION (OUTPATIENT)
Dept: NON INVASIVE DIAGNOSTICS | Facility: HOSPITAL | Age: 65
End: 2020-07-10

## 2020-07-10 DIAGNOSIS — C22.0 HEPATOCELLULAR CARCINOMA (HCC): ICD-10-CM

## 2020-07-10 DIAGNOSIS — R94.31 ABNORMAL EKG: ICD-10-CM

## 2020-07-10 DIAGNOSIS — Z01.810 PREOP CARDIOVASCULAR EXAM: ICD-10-CM

## 2020-07-10 LAB
INPATIENT: NORMAL
SARS-COV-2 RNA SPEC QL NAA+PROBE: NOT DETECTED

## 2020-07-10 PROCEDURE — 93306 TTE W/DOPPLER COMPLETE: CPT | Performed by: INTERNAL MEDICINE

## 2020-07-10 PROCEDURE — 93306 TTE W/DOPPLER COMPLETE: CPT

## 2020-07-13 DIAGNOSIS — Z91.89 AT RISK FOR OBSTRUCTIVE SLEEP APNEA: Primary | ICD-10-CM

## 2020-07-14 NOTE — ANESTHESIA PREPROCEDURE EVALUATION
Review of Systems/Medical History  Patient summary reviewed  Chart reviewed  No history of anesthetic complications     Cardiovascular  EKG reviewed, Exercise tolerance (METS): good,  Hypertension poorly controlled,    Pulmonary  Smoker , COPD moderate- medication dependent ,   Comment: COVID negative on 7/9     GI/Hepatic    GERD , Liver disease , Bowel prep  Comment: Crohn's Disease with colostomy     Kidney stones, Kidney disease ARF,   Comment: LUCILLE     Endo/Other  Negative endo/other ROS   Comment: Right hip arthritis    GYN  Negative gynecology ROS          Hematology  No anemia ,     Musculoskeletal    Arthritis     Neurology    Visual impairment,   Comment: Glaucoma b/l eyes Psychology   Negative psychology ROS          7/10/20 TTE     LEFT VENTRICLE:  Systolic function was normal by visual assessment  Ejection fraction was estimated to be 70 %  There were no regional wall motion abnormalities  Doppler parameters were consistent with abnormal left ventricular relaxation (grade 1 diastolic dysfunction)      RIGHT VENTRICLE:  The size was normal   Systolic function was normal        Physical Exam    Airway    Mallampati score: I  TM Distance: >3 FB  Neck ROM: full     Dental       Cardiovascular  Rhythm: regular, Rate: normal,     Pulmonary  Decreased breath sounds, Wheezes,     Other Findings        Anesthesia Plan  ASA Score- 3     Anesthesia Type- general and epidural with ASA Monitors  Additional Monitors: arterial line  Airway Plan: ETT  Plan Factors-    Induction- intravenous  Postoperative Plan- Plan for postoperative opioid use  Planned trial extubation    Informed Consent- Anesthetic plan and risks discussed with patient  I personally reviewed this patient with the CRNA  Discussed and agreed on the Anesthesia Plan with the CRNA  Hubert Thomason

## 2020-07-15 ENCOUNTER — HOSPITAL ENCOUNTER (INPATIENT)
Facility: HOSPITAL | Age: 65
LOS: 7 days | Discharge: HOME HEALTH | DRG: 406 | End: 2020-07-22
Attending: SURGERY | Admitting: SURGERY
Payer: COMMERCIAL

## 2020-07-15 ENCOUNTER — ANESTHESIA (OUTPATIENT)
Dept: PERIOP | Facility: HOSPITAL | Age: 65
DRG: 406 | End: 2020-07-15
Payer: COMMERCIAL

## 2020-07-15 DIAGNOSIS — R10.12 LEFT UPPER QUADRANT ABDOMINAL PAIN: ICD-10-CM

## 2020-07-15 DIAGNOSIS — C22.0 HEPATOCELLULAR CARCINOMA (HCC): Primary | ICD-10-CM

## 2020-07-15 DIAGNOSIS — Z51.5 PALLIATIVE CARE PATIENT: ICD-10-CM

## 2020-07-15 LAB
ABO GROUP BLD: NORMAL
BASE EXCESS BLDA CALC-SCNC: 0 MMOL/L (ref -2–3)
BLD GP AB SCN SERPL QL: NEGATIVE
CA-I BLD-SCNC: 0.92 MMOL/L (ref 1.12–1.32)
ERYTHROCYTE [DISTWIDTH] IN BLOOD BY AUTOMATED COUNT: 13.1 % (ref 11.6–15.1)
GLUCOSE SERPL-MCNC: 75 MG/DL (ref 65–140)
HCO3 BLDA-SCNC: 25.5 MMOL/L (ref 22–28)
HCT VFR BLD AUTO: 37.9 % (ref 36.5–49.3)
HCT VFR BLD CALC: 32 % (ref 36.5–49.3)
HGB BLD-MCNC: 13 G/DL (ref 12–17)
HGB BLDA-MCNC: 10.9 G/DL (ref 12–17)
MCH RBC QN AUTO: 33 PG (ref 26.8–34.3)
MCHC RBC AUTO-ENTMCNC: 34.3 G/DL (ref 31.4–37.4)
MCV RBC AUTO: 96 FL (ref 82–98)
PCO2 BLD: 27 MMOL/L (ref 21–32)
PCO2 BLD: 44.7 MM HG (ref 36–44)
PH BLD: 7.37 [PH] (ref 7.35–7.45)
PLATELET # BLD AUTO: 178 THOUSANDS/UL (ref 149–390)
PMV BLD AUTO: 10.7 FL (ref 8.9–12.7)
PO2 BLD: 221 MM HG (ref 75–129)
POTASSIUM BLD-SCNC: 3.4 MMOL/L (ref 3.5–5.3)
RBC # BLD AUTO: 3.94 MILLION/UL (ref 3.88–5.62)
RH BLD: POSITIVE
SAO2 % BLD FROM PO2: 100 % (ref 60–85)
SODIUM BLD-SCNC: 143 MMOL/L (ref 136–145)
SPECIMEN SOURCE: ABNORMAL
WBC # BLD AUTO: 17.56 THOUSAND/UL (ref 4.31–10.16)

## 2020-07-15 PROCEDURE — 47380 OPEN ABLATE LIVER TUMOR RF: CPT | Performed by: SURGERY

## 2020-07-15 PROCEDURE — 85027 COMPLETE CBC AUTOMATED: CPT | Performed by: SURGERY

## 2020-07-15 PROCEDURE — 82803 BLOOD GASES ANY COMBINATION: CPT

## 2020-07-15 PROCEDURE — 82330 ASSAY OF CALCIUM: CPT

## 2020-07-15 PROCEDURE — C1886 CATHETER, ABLATION: HCPCS | Performed by: SURGERY

## 2020-07-15 PROCEDURE — 84132 ASSAY OF SERUM POTASSIUM: CPT

## 2020-07-15 PROCEDURE — 0F500ZZ DESTRUCTION OF LIVER, OPEN APPROACH: ICD-10-PCS | Performed by: SURGERY

## 2020-07-15 PROCEDURE — 86900 BLOOD TYPING SEROLOGIC ABO: CPT | Performed by: SURGERY

## 2020-07-15 PROCEDURE — 85014 HEMATOCRIT: CPT

## 2020-07-15 PROCEDURE — 86923 COMPATIBILITY TEST ELECTRIC: CPT

## 2020-07-15 PROCEDURE — 76940 US GUIDE TISSUE ABLATION: CPT | Performed by: SURGERY

## 2020-07-15 PROCEDURE — 82947 ASSAY GLUCOSE BLOOD QUANT: CPT

## 2020-07-15 PROCEDURE — 86901 BLOOD TYPING SEROLOGIC RH(D): CPT | Performed by: SURGERY

## 2020-07-15 PROCEDURE — 84295 ASSAY OF SERUM SODIUM: CPT

## 2020-07-15 RX ORDER — PROPOFOL 10 MG/ML
INJECTION, EMULSION INTRAVENOUS AS NEEDED
Status: DISCONTINUED | OUTPATIENT
Start: 2020-07-15 | End: 2020-07-15 | Stop reason: SURG

## 2020-07-15 RX ORDER — ROCURONIUM BROMIDE 10 MG/ML
INJECTION, SOLUTION INTRAVENOUS AS NEEDED
Status: DISCONTINUED | OUTPATIENT
Start: 2020-07-15 | End: 2020-07-15 | Stop reason: SURG

## 2020-07-15 RX ORDER — ROPIVACAINE HYDROCHLORIDE 2 MG/ML
INJECTION, SOLUTION EPIDURAL; INFILTRATION; PERINEURAL AS NEEDED
Status: DISCONTINUED | OUTPATIENT
Start: 2020-07-15 | End: 2020-07-15 | Stop reason: SURG

## 2020-07-15 RX ORDER — LIDOCAINE HYDROCHLORIDE AND EPINEPHRINE 15; 5 MG/ML; UG/ML
INJECTION, SOLUTION EPIDURAL
Status: COMPLETED | OUTPATIENT
Start: 2020-07-15 | End: 2020-07-15

## 2020-07-15 RX ORDER — ONDANSETRON 2 MG/ML
INJECTION INTRAMUSCULAR; INTRAVENOUS AS NEEDED
Status: DISCONTINUED | OUTPATIENT
Start: 2020-07-15 | End: 2020-07-15 | Stop reason: SURG

## 2020-07-15 RX ORDER — ALBUTEROL SULFATE 2.5 MG/3ML
2.5 SOLUTION RESPIRATORY (INHALATION) ONCE AS NEEDED
Status: DISCONTINUED | OUTPATIENT
Start: 2020-07-15 | End: 2020-07-15 | Stop reason: HOSPADM

## 2020-07-15 RX ORDER — HEPARIN SODIUM 5000 [USP'U]/ML
5000 INJECTION, SOLUTION INTRAVENOUS; SUBCUTANEOUS EVERY 8 HOURS SCHEDULED
Status: DISCONTINUED | OUTPATIENT
Start: 2020-07-15 | End: 2020-07-22 | Stop reason: HOSPADM

## 2020-07-15 RX ORDER — ATORVASTATIN CALCIUM 80 MG/1
80 TABLET, FILM COATED ORAL
Status: DISCONTINUED | OUTPATIENT
Start: 2020-07-15 | End: 2020-07-22 | Stop reason: HOSPADM

## 2020-07-15 RX ORDER — METOCLOPRAMIDE HYDROCHLORIDE 5 MG/ML
10 INJECTION INTRAMUSCULAR; INTRAVENOUS ONCE AS NEEDED
Status: DISCONTINUED | OUTPATIENT
Start: 2020-07-15 | End: 2020-07-15 | Stop reason: HOSPADM

## 2020-07-15 RX ORDER — FENTANYL CITRATE/PF 50 MCG/ML
25 SYRINGE (ML) INJECTION
Status: COMPLETED | OUTPATIENT
Start: 2020-07-15 | End: 2020-07-15

## 2020-07-15 RX ORDER — AMLODIPINE BESYLATE 10 MG/1
10 TABLET ORAL DAILY
Status: DISCONTINUED | OUTPATIENT
Start: 2020-07-16 | End: 2020-07-22 | Stop reason: HOSPADM

## 2020-07-15 RX ORDER — FENTANYL CITRATE 50 UG/ML
INJECTION, SOLUTION INTRAMUSCULAR; INTRAVENOUS AS NEEDED
Status: DISCONTINUED | OUTPATIENT
Start: 2020-07-15 | End: 2020-07-15 | Stop reason: SURG

## 2020-07-15 RX ORDER — ONDANSETRON 2 MG/ML
4 INJECTION INTRAMUSCULAR; INTRAVENOUS ONCE AS NEEDED
Status: DISCONTINUED | OUTPATIENT
Start: 2020-07-15 | End: 2020-07-15 | Stop reason: HOSPADM

## 2020-07-15 RX ORDER — LIDOCAINE HYDROCHLORIDE 10 MG/ML
INJECTION, SOLUTION EPIDURAL; INFILTRATION; INTRACAUDAL; PERINEURAL AS NEEDED
Status: DISCONTINUED | OUTPATIENT
Start: 2020-07-15 | End: 2020-07-15 | Stop reason: SURG

## 2020-07-15 RX ORDER — ROPIVACAINE HYDROCHLORIDE 2 MG/ML
INJECTION, SOLUTION EPIDURAL; INFILTRATION; PERINEURAL CONTINUOUS PRN
Status: DISCONTINUED | OUTPATIENT
Start: 2020-07-15 | End: 2020-07-15 | Stop reason: SURG

## 2020-07-15 RX ORDER — SODIUM CHLORIDE, SODIUM LACTATE, POTASSIUM CHLORIDE, CALCIUM CHLORIDE 600; 310; 30; 20 MG/100ML; MG/100ML; MG/100ML; MG/100ML
125 INJECTION, SOLUTION INTRAVENOUS CONTINUOUS
Status: DISCONTINUED | OUTPATIENT
Start: 2020-07-15 | End: 2020-07-15

## 2020-07-15 RX ORDER — SODIUM CHLORIDE, SODIUM LACTATE, POTASSIUM CHLORIDE, CALCIUM CHLORIDE 600; 310; 30; 20 MG/100ML; MG/100ML; MG/100ML; MG/100ML
75 INJECTION, SOLUTION INTRAVENOUS CONTINUOUS
Status: DISCONTINUED | OUTPATIENT
Start: 2020-07-15 | End: 2020-07-16

## 2020-07-15 RX ORDER — DEXAMETHASONE SODIUM PHOSPHATE 10 MG/ML
INJECTION, SOLUTION INTRAMUSCULAR; INTRAVENOUS AS NEEDED
Status: DISCONTINUED | OUTPATIENT
Start: 2020-07-15 | End: 2020-07-15 | Stop reason: SURG

## 2020-07-15 RX ORDER — SODIUM CHLORIDE 9 MG/ML
INJECTION, SOLUTION INTRAVENOUS CONTINUOUS PRN
Status: DISCONTINUED | OUTPATIENT
Start: 2020-07-15 | End: 2020-07-15 | Stop reason: SURG

## 2020-07-15 RX ORDER — CEFAZOLIN SODIUM 1 G/50ML
1000 SOLUTION INTRAVENOUS ONCE
Status: COMPLETED | OUTPATIENT
Start: 2020-07-15 | End: 2020-07-15

## 2020-07-15 RX ORDER — MIDAZOLAM HYDROCHLORIDE 2 MG/2ML
INJECTION, SOLUTION INTRAMUSCULAR; INTRAVENOUS AS NEEDED
Status: DISCONTINUED | OUTPATIENT
Start: 2020-07-15 | End: 2020-07-15 | Stop reason: SURG

## 2020-07-15 RX ORDER — PANTOPRAZOLE SODIUM 20 MG/1
20 TABLET, DELAYED RELEASE ORAL
Status: DISCONTINUED | OUTPATIENT
Start: 2020-07-16 | End: 2020-07-22 | Stop reason: HOSPADM

## 2020-07-15 RX ORDER — HYDROMORPHONE HCL/PF 1 MG/ML
0.5 SYRINGE (ML) INJECTION
Status: DISCONTINUED | OUTPATIENT
Start: 2020-07-15 | End: 2020-07-15 | Stop reason: HOSPADM

## 2020-07-15 RX ORDER — MAGNESIUM HYDROXIDE 1200 MG/15ML
LIQUID ORAL AS NEEDED
Status: DISCONTINUED | OUTPATIENT
Start: 2020-07-15 | End: 2020-07-15 | Stop reason: HOSPADM

## 2020-07-15 RX ORDER — AMILORIDE HYDROCHLORIDE 5 MG/1
5 TABLET ORAL DAILY
Status: DISCONTINUED | OUTPATIENT
Start: 2020-07-16 | End: 2020-07-22 | Stop reason: HOSPADM

## 2020-07-15 RX ORDER — LIDOCAINE HYDROCHLORIDE 10 MG/ML
0.5 INJECTION, SOLUTION EPIDURAL; INFILTRATION; INTRACAUDAL; PERINEURAL ONCE AS NEEDED
Status: COMPLETED | OUTPATIENT
Start: 2020-07-15 | End: 2020-07-15

## 2020-07-15 RX ORDER — SUCCINYLCHOLINE/SOD CL,ISO/PF 100 MG/5ML
SYRINGE (ML) INTRAVENOUS AS NEEDED
Status: DISCONTINUED | OUTPATIENT
Start: 2020-07-15 | End: 2020-07-15 | Stop reason: SURG

## 2020-07-15 RX ADMIN — ROPIVACAINE HYDROCHLORIDE 20 ML: 2 INJECTION, SOLUTION EPIDURAL; INFILTRATION at 14:11

## 2020-07-15 RX ADMIN — FENTANYL CITRATE 25 MCG: 50 INJECTION INTRAMUSCULAR; INTRAVENOUS at 16:38

## 2020-07-15 RX ADMIN — SUGAMMADEX 200 MG: 100 INJECTION, SOLUTION INTRAVENOUS at 15:41

## 2020-07-15 RX ADMIN — PROPOFOL 150 MG: 10 INJECTION, EMULSION INTRAVENOUS at 13:34

## 2020-07-15 RX ADMIN — FENTANYL CITRATE 25 MCG: 50 INJECTION, SOLUTION INTRAMUSCULAR; INTRAVENOUS at 13:11

## 2020-07-15 RX ADMIN — SODIUM CHLORIDE, SODIUM LACTATE, POTASSIUM CHLORIDE, AND CALCIUM CHLORIDE: .6; .31; .03; .02 INJECTION, SOLUTION INTRAVENOUS at 15:35

## 2020-07-15 RX ADMIN — PHENYLEPHRINE HYDROCHLORIDE 40 MCG/MIN: 10 INJECTION INTRAVENOUS at 13:44

## 2020-07-15 RX ADMIN — ONDANSETRON 4 MG: 2 INJECTION INTRAMUSCULAR; INTRAVENOUS at 15:21

## 2020-07-15 RX ADMIN — SODIUM CHLORIDE, SODIUM LACTATE, POTASSIUM CHLORIDE, AND CALCIUM CHLORIDE 75 ML/HR: .6; .31; .03; .02 INJECTION, SOLUTION INTRAVENOUS at 19:17

## 2020-07-15 RX ADMIN — ROPIVACAINE HYDROCHLORIDE 8 ML/HR: 2 INJECTION, SOLUTION EPIDURAL; INFILTRATION at 14:13

## 2020-07-15 RX ADMIN — FENTANYL CITRATE 25 MCG: 50 INJECTION INTRAMUSCULAR; INTRAVENOUS at 17:17

## 2020-07-15 RX ADMIN — FENTANYL CITRATE 25 MCG: 50 INJECTION, SOLUTION INTRAMUSCULAR; INTRAVENOUS at 13:13

## 2020-07-15 RX ADMIN — SODIUM CHLORIDE, SODIUM LACTATE, POTASSIUM CHLORIDE, AND CALCIUM CHLORIDE 125 ML/HR: .6; .31; .03; .02 INJECTION, SOLUTION INTRAVENOUS at 10:45

## 2020-07-15 RX ADMIN — SODIUM CHLORIDE: 0.9 INJECTION, SOLUTION INTRAVENOUS at 13:40

## 2020-07-15 RX ADMIN — LIDOCAINE HYDROCHLORIDE 0.5 ML: 10 INJECTION, SOLUTION EPIDURAL; INFILTRATION; INTRACAUDAL; PERINEURAL at 10:45

## 2020-07-15 RX ADMIN — HEPARIN SODIUM 5000 UNITS: 5000 INJECTION INTRAVENOUS; SUBCUTANEOUS at 21:49

## 2020-07-15 RX ADMIN — LIDOCAINE HYDROCHLORIDE AND EPINEPHRINE 3 ML: 15; 5 INJECTION, SOLUTION EPIDURAL at 13:13

## 2020-07-15 RX ADMIN — DEXAMETHASONE SODIUM PHOSPHATE 10 MG: 10 INJECTION, SOLUTION INTRAMUSCULAR; INTRAVENOUS at 13:40

## 2020-07-15 RX ADMIN — METRONIDAZOLE 500 MG: 500 INJECTION, SOLUTION INTRAVENOUS at 14:01

## 2020-07-15 RX ADMIN — MIDAZOLAM 1 MG: 1 INJECTION INTRAMUSCULAR; INTRAVENOUS at 13:11

## 2020-07-15 RX ADMIN — ROCURONIUM BROMIDE 50 MG: 10 INJECTION, SOLUTION INTRAVENOUS at 13:40

## 2020-07-15 RX ADMIN — ROPIVACAINE HYDROCHLORIDE: 5 INJECTION, SOLUTION EPIDURAL; INFILTRATION; PERINEURAL at 16:00

## 2020-07-15 RX ADMIN — CEFAZOLIN SODIUM 1000 MG: 1 SOLUTION INTRAVENOUS at 14:01

## 2020-07-15 RX ADMIN — FENTANYL CITRATE 25 MCG: 50 INJECTION INTRAMUSCULAR; INTRAVENOUS at 17:02

## 2020-07-15 RX ADMIN — FENTANYL CITRATE 25 MCG: 50 INJECTION INTRAMUSCULAR; INTRAVENOUS at 16:18

## 2020-07-15 RX ADMIN — LIDOCAINE HYDROCHLORIDE 50 MG: 10 INJECTION, SOLUTION EPIDURAL; INFILTRATION; INTRACAUDAL; PERINEURAL at 13:34

## 2020-07-15 RX ADMIN — SODIUM CHLORIDE, SODIUM LACTATE, POTASSIUM CHLORIDE, AND CALCIUM CHLORIDE: .6; .31; .03; .02 INJECTION, SOLUTION INTRAVENOUS at 13:29

## 2020-07-15 RX ADMIN — Medication 100 MG: at 13:34

## 2020-07-15 RX ADMIN — FENTANYL CITRATE 50 MCG: 50 INJECTION, SOLUTION INTRAMUSCULAR; INTRAVENOUS at 13:34

## 2020-07-15 NOTE — ANESTHESIA PROCEDURE NOTES
Epidural Block    Patient location during procedure: holding area  Start time: 7/15/2020 1:13 PM  Reason for block: procedure for pain and at surgeon's request  Staffing  Anesthesiologist: Derik Hernandez MD  Performed: anesthesiologist   Preanesthetic Checklist  Completed: patient identified, site marked, surgical consent, pre-op evaluation, timeout performed, IV checked, risks and benefits discussed and monitors and equipment checked  Epidural  Patient position: sitting  Prep: ChloraPrep  Patient monitoring: cardiac monitor and frequent blood pressure checks  Approach: midline  Location: lumbar (1-5)  Injection technique: EMMA air  Needle  Needle type: Tuohy   Needle gauge: 18 G  Catheter type: side hole  Catheter size: 20 G  Catheter at skin depth: 9 5 cm  Test dose: negativelidocaine 1 5% with epinephrine 1:200,000 test dose, 3 mLnegative aspiration for CSF, negative aspiration for heme and no paresthesia on injection  patient tolerated the procedure well with no immediate complications

## 2020-07-15 NOTE — ANESTHESIA POSTPROCEDURE EVALUATION
Post-Op Assessment Note    CV Status:  Stable  Pain Score: 0    Pain management: adequate     Mental Status:  Alert and awake   Hydration Status:  Euvolemic and stable   PONV Controlled:  Controlled   Airway Patency:  Patent   Post Op Vitals Reviewed: Yes      Staff: Anesthesiologist, CRNA     Post-op block assessment: catheter intact and no complications        /59 (07/15/20 1602)    Temp (!) 97 °F (36 1 °C) (07/15/20 1602)    Pulse 84 (07/15/20 1602)   Resp 22 (07/15/20 1602)    SpO2 100 % (07/15/20 1602)

## 2020-07-15 NOTE — OP NOTE
OPERATIVE REPORT  PATIENT NAME: Freedom Beyer III    :  1955  MRN: [id number]  Pt Location: BE OR ROOM 09    SURGERY DATE: 7/15/2020    Surgeon(s) and Role:     * Julissa Auguste MD - Primary     * Magen Rain MD - Assisting     * Leydi Lester MD - Co-surgeon    Preop Diagnosis:  Hepatocellular carcinoma ([address] ) [C22 0]    Post-Op Diagnosis Codes:     * Hepatocellular carcinoma ([address] ) [C22 0]    Procedure(s) (LRB):  LIVER ABLATION, INTRAOPERATIVE U/S LIVER (N/A)    Specimen(s):  * No specimens in log *    Estimated Blood Loss:   Minimal    Drains:  NG/OG/Enteral Tube Orogastric 18 Fr Center mouth (Active)   Number of days: 0       Colostomy LLQ (Active)   Stoma Assessment [place] 7/15/2020  4:02 PM   Stoma Shape Round 7/15/2020  4:02 PM   Peristomal Assessment Clean; Intact 7/15/2020  4:02 PM   Number of days:        Urethral Catheter Latex 16 Fr  (Active)   Site Assessment Clean;Skin intact 7/15/2020  4:02 PM   Collection Container Standard drainage bag 7/15/2020  4:02 PM   Securement Method Securing device (Describe) 7/15/2020  4:02 PM   Number of days: 0       Anesthesia Type:   General w/ Epidural    Operative Indications:  Hepatocellular carcinoma ([address] ) [C250]  51-year-old male with [address]  It was recommended that he undergo resection/ablation  Risks and benefits were explained  Informed consent was obtained  Patient was brought to the operating  Operative Findings:  No obvious cirrhosis  Lesion measured 3 2 x 2 4 x 3 3 cm    Complications:   None    Procedure and Technique:  After identifying the patient, general anesthesia was achieved  A Torres catheter was placed  Lines were placed by Anesthesia  Patient was prepped and draped in the usual sterile fashion  A time-out was performed  Right upper quadrant incision was made  Using sharp dissection this was taken through the skin, through the fascia, through the rectus, and through the posterior fascia and into the peritoneal cavity    We explored the abdomen  There was no evidence of obvious extrahepatic adenopathy  There was a palpable mass mostly in segment 5 of the liver  Intraoperative ultrasound was now performed and the lesion in segment 5 was the only mass seen, and this did correspond to the lesion seen on MRI  The patient's liver did not appear completely normal, but however was not frankly cirrhotic  Based on the appearance and the size of this lesion, we elected to perform ablation rather than resection  I felt we could completely ablate the lesion with overlapping spheres of energy  Now using ultrasound guidance and microwave energy, we used 5 separate overlapping spheres of migrate energy to completely ablate the lesion  When we initially ablated the lesion there was 1 area that did not appear to be ablated more anteriorly  This was further ablated  The biggest concern was the area of the tumor adjacent to the vessels and there was a concern of a heat sink  Ultimately we were able to place a probe right adjacent to the blood vessel at the deepest aspect of the tumor and were able to ablate this area completely  At this point with ultrasound guidance, there did not appear to be any viable tumor  There was excellent hemostasis  Sponge and needle counts were correct  Wound was copiously irrigated  There was excellent hemostasis  The posterior fascia was approximated with 1  PDS suture starting at either end of the incision and secured centrally  Anterior fascia was closed with 1  PDS suture starting at either end of the incision and secured centrally  Subcutaneous tissue was irrigated  Skin was Monocryl suture in a subcuticular fashion  Skin glue was used on the skin  Patient was then extubated and taken to the recovery room in stable condition having tolerated the procedure well  I was present and participated in all aspects of this procedure         I was present for the entire procedure    Patient Disposition:  PACU     SIGNATURE: Eleanor Malin MD  DATE: July 15, 2020  TIME: 4:35 PM

## 2020-07-15 NOTE — INTERVAL H&P NOTE
H&P reviewed  After examining the patient I find no changes in the patients condition since the H&P had been written      Vitals:    07/15/20 1013   BP: 124/75   Pulse: 93   Resp: 16   Temp: 98 °F (36 7 °C)   SpO2: 100%

## 2020-07-16 LAB
ANION GAP SERPL CALCULATED.3IONS-SCNC: 7 MMOL/L (ref 4–13)
BUN SERPL-MCNC: 10 MG/DL (ref 5–25)
CALCIUM SERPL-MCNC: 6.1 MG/DL (ref 8.3–10.1)
CHLORIDE SERPL-SCNC: 106 MMOL/L (ref 100–108)
CO2 SERPL-SCNC: 26 MMOL/L (ref 21–32)
CREAT SERPL-MCNC: 0.69 MG/DL (ref 0.6–1.3)
ERYTHROCYTE [DISTWIDTH] IN BLOOD BY AUTOMATED COUNT: 12.8 % (ref 11.6–15.1)
GFR SERPL CREATININE-BSD FRML MDRD: 100 ML/MIN/1.73SQ M
GLUCOSE SERPL-MCNC: 227 MG/DL (ref 65–140)
HCT VFR BLD AUTO: 36 % (ref 36.5–49.3)
HGB BLD-MCNC: 11.8 G/DL (ref 12–17)
MCH RBC QN AUTO: 32.3 PG (ref 26.8–34.3)
MCHC RBC AUTO-ENTMCNC: 32.8 G/DL (ref 31.4–37.4)
MCV RBC AUTO: 99 FL (ref 82–98)
PLATELET # BLD AUTO: 143 THOUSANDS/UL (ref 149–390)
PMV BLD AUTO: 11.6 FL (ref 8.9–12.7)
POTASSIUM SERPL-SCNC: 3.4 MMOL/L (ref 3.5–5.3)
RBC # BLD AUTO: 3.65 MILLION/UL (ref 3.88–5.62)
SODIUM SERPL-SCNC: 139 MMOL/L (ref 136–145)
WBC # BLD AUTO: 16.59 THOUSAND/UL (ref 4.31–10.16)

## 2020-07-16 PROCEDURE — NC001 PR NO CHARGE: Performed by: SURGERY

## 2020-07-16 PROCEDURE — 85027 COMPLETE CBC AUTOMATED: CPT | Performed by: SURGERY

## 2020-07-16 PROCEDURE — 99024 POSTOP FOLLOW-UP VISIT: CPT | Performed by: SURGERY

## 2020-07-16 PROCEDURE — 80048 BASIC METABOLIC PNL TOTAL CA: CPT | Performed by: SURGERY

## 2020-07-16 PROCEDURE — 99251 PR INITL INPATIENT CONSULT NEW/ESTAB PT 20 MIN: CPT | Performed by: ANESTHESIOLOGY

## 2020-07-16 RX ORDER — POTASSIUM CHLORIDE 750 MG/1
10 TABLET, EXTENDED RELEASE ORAL DAILY
Status: DISCONTINUED | OUTPATIENT
Start: 2020-07-16 | End: 2020-07-21

## 2020-07-16 RX ORDER — POTASSIUM CHLORIDE 20 MEQ/1
40 TABLET, EXTENDED RELEASE ORAL ONCE
Status: COMPLETED | OUTPATIENT
Start: 2020-07-16 | End: 2020-07-16

## 2020-07-16 RX ORDER — FLUTICASONE FUROATE AND VILANTEROL 100; 25 UG/1; UG/1
1 POWDER RESPIRATORY (INHALATION) DAILY
Status: DISCONTINUED | OUTPATIENT
Start: 2020-07-16 | End: 2020-07-22 | Stop reason: HOSPADM

## 2020-07-16 RX ORDER — HYDROMORPHONE HCL/PF 1 MG/ML
0.5 SYRINGE (ML) INJECTION EVERY 2 HOUR PRN
Status: DISPENSED | OUTPATIENT
Start: 2020-07-16 | End: 2020-07-17

## 2020-07-16 RX ADMIN — ATORVASTATIN CALCIUM 80 MG: 80 TABLET, FILM COATED ORAL at 17:45

## 2020-07-16 RX ADMIN — ROPIVACAINE HYDROCHLORIDE: 5 INJECTION, SOLUTION EPIDURAL; INFILTRATION; PERINEURAL at 08:29

## 2020-07-16 RX ADMIN — HEPARIN SODIUM 5000 UNITS: 5000 INJECTION INTRAVENOUS; SUBCUTANEOUS at 21:00

## 2020-07-16 RX ADMIN — SODIUM CHLORIDE 500 ML: 0.9 INJECTION, SOLUTION INTRAVENOUS at 02:56

## 2020-07-16 RX ADMIN — HEPARIN SODIUM 5000 UNITS: 5000 INJECTION INTRAVENOUS; SUBCUTANEOUS at 05:16

## 2020-07-16 RX ADMIN — POTASSIUM CHLORIDE 40 MEQ: 1500 TABLET, EXTENDED RELEASE ORAL at 08:28

## 2020-07-16 RX ADMIN — HEPARIN SODIUM 5000 UNITS: 5000 INJECTION INTRAVENOUS; SUBCUTANEOUS at 14:03

## 2020-07-16 RX ADMIN — POTASSIUM CHLORIDE 10 MEQ: 750 TABLET, EXTENDED RELEASE ORAL at 08:28

## 2020-07-16 RX ADMIN — AMILORIDE HYDROCLORIDE 5 MG: 5 TABLET ORAL at 08:28

## 2020-07-16 RX ADMIN — FLUTICASONE FUROATE AND VILANTEROL TRIFENATATE 1 PUFF: 100; 25 POWDER RESPIRATORY (INHALATION) at 11:13

## 2020-07-16 RX ADMIN — PANTOPRAZOLE SODIUM 20 MG: 20 TABLET, DELAYED RELEASE ORAL at 05:15

## 2020-07-16 NOTE — RESTORATIVE TECHNICIAN NOTE
Restorative Specialist Mobility Note       Activity: Ambulate in [place], Ambulate in room, Bathroom privileges, Chair, Dangle, Stand at bedside(Educated/encouraged pt to ambulate with assistance 3-4 x's/day  Bed alarm on   Pt callbell, PCA button, phone/tray within reach )     Assistive Device: Front wheel walker       [employer] BS, Restorative Technician, [employer]

## 2020-07-16 NOTE — PLAN OF CARE
Problem: PAIN - ADULT  Goal: Verbalizes/displays adequate comfort level or baseline comfort level  Description  Interventions:  - Encourage patient to monitor pain and request assistance  - Assess pain using appropriate pain scale  - Administer analgesics based on type and severity of pain and evaluate response  - Implement non-pharmacological measures as appropriate and evaluate response  - Consider cultural and social influences on pain and pain management  - Notify physician/advanced practitioner if interventions unsuccessful or patient reports new pain  Outcome: Progressing     Problem: INFECTION - ADULT  Goal: Absence or prevention of progression during hospitalization  Description  INTERVENTIONS:  - Assess and monitor for signs and symptoms of infection  - Monitor lab/diagnostic results  - Monitor all insertion sites, i e  indwelling lines, tubes, and drains  - Monitor endotracheal if appropriate and nasal secretions for changes in amount and color  - [place] appropriate cooling/warming therapies per order  - Administer medications as ordered  - Instruct and encourage patient and family to use good hand hygiene technique  - Identify and instruct in appropriate isolation precautions for identified infection/condition  Outcome: Progressing  Goal: Absence of fever/infection during neutropenic period  Description  INTERVENTIONS:  - Monitor WBC    Outcome: Progressing     Problem: SAFETY ADULT  Goal: Patient will remain free of falls  Description  INTERVENTIONS:  - Assess patient frequently for physical needs  -  Identify cognitive and physical deficits and behaviors that affect risk of falls    -  [place] fall precautions as indicated by assessment   - Educate patient/family on patient safety including physical limitations  - Instruct patient to call for assistance with activity based on assessment  - Modify environment to reduce risk of injury  - Consider OT/PT consult to assist with strengthening/mobility  Outcome: Progressing  Goal: Maintain or return to baseline ADL function  Description  INTERVENTIONS:  -  Assess patient's ability to carry out ADLs; assess patient's baseline for ADL function and identify physical deficits which impact ability to perform ADLs (bathing, care of mouth/teeth, toileting, grooming, dressing, etc )  - Assess/evaluate cause of self-care deficits   - Assess range of motion  - Assess patient's mobility; develop plan if impaired  - Assess patient's need for assistive devices and provide as appropriate  - Encourage maximum independence but intervene and supervise when necessary  - Involve family in performance of ADLs  - Assess for home care needs following discharge   - Consider OT consult to assist with ADL evaluation and planning for discharge  - Provide patient education as appropriate  Outcome: Progressing  Goal: Maintain or return mobility status to optimal level  Description  INTERVENTIONS:  - Assess patient's baseline mobility status (ambulation, transfers, stairs, etc )    - Identify cognitive and physical deficits and behaviors that affect mobility  - Identify mobility aids required to assist with transfers and/or ambulation (gait belt, sit-to-stand, lift, walker, cane, etc )  - [place] fall precautions as indicated by assessment  - Record patient progress and toleration of activity level on Mobility SBAR; progress patient to next Phase/Stage  - Instruct patient to call for assistance with activity based on assessment  - Consider rehabilitation consult to assist with strengthening/weightbearing, etc   Outcome: Progressing     Problem: DISCHARGE PLANNING  Goal: Discharge to home or other facility with appropriate resources  Description  INTERVENTIONS:  - Identify barriers to discharge w/patient and caregiver  - Arrange for needed discharge resources and transportation as appropriate  - Identify discharge learning needs (meds, wound care, etc )  - Arrange for interpretive services to assist at discharge as needed  - Refer to Case Management Department for coordinating discharge planning if the patient needs post-hospital services based on physician/advanced practitioner order or complex needs related to functional status, cognitive ability, or social support system  Outcome: Progressing     Problem: Knowledge Deficit  Goal: Patient/family/caregiver demonstrates understanding of disease process, treatment plan, medications, and discharge instructions  Description  Complete learning assessment and assess knowledge base    Interventions:  - Provide teaching at level of understanding  - Provide teaching via preferred learning methods  Outcome: Progressing

## 2020-07-16 NOTE — CONSULTS
Inpatient consult to Acute Pain Service  Consult performed by: Hilton Snow MD  Consult ordered by: Judd Cox MD        Epidural Follow-up Note - Acute Pain Service    Lena Steele III 59 y o  male MRN: [id number]  Unit/Bed#: [facility] 929-01 Encounter: [id number]      Assessment:   Principal Problem:    Hepatocellular carcinoma ([address] )      Raymundo Cool is a 59y o  year old male POD #1 S/P Liver ablation    Plan:  Analgesia:  - Continue Thoracic epidural infusion of Ropivacaine 0 1% with fentanyl 2 mcg/mL at 8/5/10/3  - Add Dilaudid 0 5 mg IV q2hr PRN for breakthrough pain (ordered by APS)  - Anticipate epidural removal and transition to primarily PO regimen on POD #5    Bowel Regimen:  - Bowel regimen when appropriate from surgical perspective    APS will continue to follow   Please call  / [phone] or enrich-in Acute Pain Service - [facility] (/ between [phone] and on weekends) with questions or concerns    Pain History  Current pain location(s): abdomen  Pain Scale:   5/10  Quality: sharp  24 hour history: steady    PCEA use:  Opioid requirement previous 24 hours: None    Meds/Allergies   current meds:   Current Facility-Administered Medications   Medication Dose Route Frequency    AMILoride tablet 5 mg  5 mg Oral Daily    amLODIPine (NORVASC) tablet 10 mg  10 mg Oral Daily    atorvastatin (LIPITOR) tablet 80 mg  80 mg Oral Daily With Dinner    fluticasone-vilanterol (BREO ELLIPTA) 100-25 mcg/inh inhaler 1 puff  1 puff Inhalation Daily    heparin (porcine) subcutaneous injection 5,000 Units  5,000 Units Subcutaneous Q8H [facility]    pantoprazole (PROTONIX) EC tablet 20 mg  20 mg Oral Early Morning    potassium chloride (K-DUR,KLOR-CON) CR tablet 10 mEq  10 mEq Oral Daily    ropivacaine 0 1% and fentaNYL 2 mcg/mL PCEA   Epidural Continuous       No Known Allergies    Objective     Temp:  [97 °F (36 1 °C)-99 9 °F (37 7 °C)] 98 2 °F (36 8 °C)  HR:  [] 85  Resp:  [12-23] 18  BP: ()/(43-87) 123/71    Physical Exam  Epidural: Site clean/dry/intact, no surrounding erythema/edema/induration, infusion functioning appropriately    Lab Results:   Results from last 7 days   Lab Units 07/16/20  0510   WBC Thousand/uL 16 59*   HEMOGLOBIN g/dL 11 8*   HEMATOCRIT % 36 0*   PLATELETS Thousands/uL 143*      Results from last 7 days   Lab Units 07/16/20  0510 07/15/20  1431   POTASSIUM mmol/L 3 4*  --    CHLORIDE mmol/L 106  --    CO2 mmol/L 26  --    CO2, I-STAT mmol/L  --  27   BUN mg/dL 10  --    CREATININE mg/dL 0 69  --    CALCIUM mg/dL 6 1*  --    GLUCOSE, ISTAT mg/dl  --  75          Imaging Studies: I have personally reviewed pertinent reports  EKG, Pathology, and Other Studies: I have personally reviewed pertinent reports  Counseling / Coordination of Care  Total floor / unit time spent today 15 minutes  Greater than 50% of total time was spent with the patient and / or family counseling and / or coordination of care  Please note that the APS provides consultative services regarding pain management only  With the exception of ketamine, peripheral nerve catheters, and epidural infusions (and except when indicated), final decisions regarding starting or changing doses of analgesic medications are at the discretion of the consulting service  Off hours consultation and/or medication management is generally not available      Awais Up MD  Acute Pain Service

## 2020-07-16 NOTE — SOCIAL WORK
LOS 1  Not a bundle  Met with pt and discussed role of CM  Pt lives with his dtr and 2 grandsons in an in-law suite apt--no steps at entrance  Pt is independent in ADLs  No DME or prior HHC  Preference for pharmacy is [facility] on [address] [facility] [facility]  No MH/D&A tx hx  PCP- Clarita Uriostegui MD ([phone])  Pt reports POA as his dtr Melissa  Main contact: Donny Hull ([phone])  Dtr will transport home upon d/c     CM reviewed d/c planning process including the following: identifying help at home, patient preference for d/c planning needs, Discharge Lounge, [facility] Meds to Bed program, availability of treatment team to discuss questions or concerns patient and/or family may have regarding understanding medications and recognizing signs and symptoms once discharged  CM also encouraged patient to follow up with all recommended appointments after discharge  Patient advised of importance for patient and family to participate in managing patients medical well being  Patient/caregiver received discharge checklist  Content reviewed  Patient/caregiver encouraged to participate in discharge plan of care prior to discharge home

## 2020-07-16 NOTE — PROGRESS NOTES
Progress Note - Surgical Oncology   Sid Pinon III 59 y o  male MRN: [id number]  Unit/Bed#: [facility] 929-01 Encounter: [id number]    Assessment:  Patient is a 59 y o  male w/ hepatocellular carcinoma s/p liver ablation 7/15     hypotensive episode overnight -given bolus  UOP 2180 mL    Plan:  Regular  Possible DC fluids  Epidural  Torres    Subjective/Objective     Subjective:   Pain improving  No nausea  Tolerating diet- eating pretzels through night  [place] Simple BM+ in osteomy  Flatus+  Concerns from nursing for dark urine--patient has history of dark urine with kidney stones  Objective:    Blood pressure 101/61, pulse 94, temperature 98 2 °F (36 8 °C), resp  rate 16, height 5' 9" (1 753 m), weight 63 kg (138 lb 14 2 oz), SpO2 98 %  ,Body mass index is 20 51 kg/m²  Intake/Output Summary (Last 24 hours) at 7/16/2020 0449  Last data filed at 7/15/2020 2214  Gross per 24 hour   Intake 2600 ml   Output 1680 ml   Net 920 ml       Invasive Devices     Peripheral Intravenous Line            Peripheral IV 07/15/20 Left Antecubital less than 1 day    Peripheral IV 07/15/20 Left Forearm less than 1 day    Peripheral IV 07/15/20 Left Forearm less than 1 day          Epidural Line            Epidural Catheter 07/15/20 less than 1 day          Drain            Colostomy LLQ -- days    NG/OG/Enteral Tube Orogastric 18 Fr Center mouth less than 1 day    Urethral Catheter Latex 16 Fr  less than 1 day                Physical Exam:   Gen:  Well-developed, well-nourished male in NAD  Lungs: Normal respiratory effort  Abd: soft, mildly tender, nondistended, Incisions clean dry and intact  Ostomy intact  Skin: warm/ dry  Extremities: pulses 2+  Neuro:  AxO x3      Results from last 7 days   Lab Units 07/15/20  1636 07/15/20  1431   WBC Thousand/uL 17 56*  --    HEMOGLOBIN g/dL 13 0  --    I STAT HEMOGLOBIN g/dl  --  10 9*   HEMATOCRIT % 37 9  --    HEMATOCRIT, ISTAT %  --  32*   PLATELETS Thousands/uL 178  --      Results from last 7 days Lab Units 07/15/20  1431   CO2, I-STAT mmol/L 27   GLUCOSE, ISTAT mg/dl 75

## 2020-07-16 NOTE — UTILIZATION REVIEW
Initial Clinical Review    Elective IP surgical procedure    Age/Sex: 59 y o  male     Surgery Date: 7/15/20    Procedure:   Preop Diagnosis:  Hepatocellular carcinoma ([facility] ) [C22 0]     Post-Op Diagnosis Codes:     * Hepatocellular carcinoma ([facility] [address] [facility] ) [C22 0]     Procedure(s) (LRB):  LIVER ABLATION, INTRAOPERATIVE U/S LIVER (N/A)    Anesthesia: General with Epidural     Operative Findings: No obvious cirrhosis  Lesion measured 3 2 x 2 4 x 3 3 cm    POD#1 Progress Note:   Pt has some hypotension overnight and was bolused with IV fluids  He can advance to regular diet, continue PCEA, IV fluids may d/c today, continue sheehan - has some dark urine with h/o kidney stones, monitor creat  Tolerating diet, no nausea, + flatus and BM in ostomy  Admission Orders: Date/Time/Statement: Admission Orders (From admission, onward)     Ordered        07/15/20 1555  Inpatient Admission  Once                   Orders Placed This Encounter   Procedures    Inpatient Admission     Standing Status:   Standing     Number of Occurrences:   1     Order Specific Question:   Admitting Physician     Answer:   Luis Jansen     Order Specific Question:   Level of Care     Answer:   Med Surg [16]     Order Specific Question:   Estimated length of stay     Answer:   More than 2 Midnights     Order Specific Question:   Certification     Answer:   I certify that inpatient services are medically necessary for this patient for a duration of greater than two midnights  See H&P and MD Progress Notes for additional information about the patient's course of treatment       Vital Signs: /71   Pulse 85   Temp 98 2 °F (36 8 °C)   Resp 18   Ht 5' 9" (1 753 m)   Wt 63 kg (138 lb 14 2 oz)   SpO2 95%   BMI 20 51 kg/m²      Diet: regular   Mobility: chair     DVT Prophylaxis: SCDs, heparin sq    Medications/Pain Control:   Scheduled Medications:    Medications:  AMILoride 5 mg Oral Daily   amLODIPine 10 mg Oral Daily   atorvastatin 80 mg Oral Daily With Dinner   fluticasone-vilanterol 1 puff Inhalation Daily   heparin (porcine) 5,000 Units Subcutaneous Q8H [facility]   pantoprazole 20 mg Oral Early Morning   potassium chloride 10 mEq Oral Daily     Continuous IV Infusions:    ropivacaine 0 1% and fentaNYL 2 mcg/mL  Epidural Continuous     PRN Meds:    HYDROmorphone 0 5 mg Intravenous Q2H PRN         Network Utilization Review Department  [email or web address]  ATTENTION: Please call with any questions or concerns to [phone] and carefully listen to the prompts so that you are directed to the right person  All voicemails are confidential   Valitalo Ronald all requests for admission clinical reviews, approved or denied determinations and any other requests to dedicated fax number below belonging to the campus where the patient is receiving treatment   List of dedicated fax numbers for the Facilities:  [address] DENIALS (Administrative/Medical Necessity) [phone]   [address] (Maternity/NICU/Pediatrics) [phone]   Giovany Sims [phone]   Bertin Alonso [phone]   Faith Ortiz [phone]   Paige Neely [phone]   [address] [phone]   [facility]  [phone]   [address] [facility] [address]  [address] [facility] [address] [facility] [phone]

## 2020-07-16 NOTE — PROGRESS NOTES
Post- OP Note - Surgical Oncology  Ariella Gómez III 59 y o  male MRN: [id number]  Unit/Bed#: [facility] 929-01 Encounter: [id number]    Assessment:  Patient is a 59 y o  male w/ hepatocellular carcinoma s/p liver ablation 7/15  Given bolus for hypotension (88/56)    Plan:  Regular  LR @ 75  Epidural  Torres  Continue post-op care  Monitor Cr    Subjective/Objective     Subjective:   Patient alert and oriented  Pain well controlled  No chest pains or shortness of breath  Concerns for dark urine- patient attributes to history of kidney stones  Objective:    Blood pressure 92/58, pulse 90, temperature 98 2 °F (36 8 °C), resp  rate 16, height 5' 9" (1 753 m), weight 63 kg (138 lb 14 2 oz), SpO2 100 %  ,Body mass index is 20 51 kg/m²  Intake/Output Summary (Last 24 hours) at 7/16/2020 0356  Last data filed at 7/15/2020 2214  Gross per 24 hour   Intake 2600 ml   Output 1680 ml   Net 920 ml       Invasive Devices     Peripheral Intravenous Line            Peripheral IV 07/15/20 Left Antecubital less than 1 day    Peripheral IV 07/15/20 Left Forearm less than 1 day    Peripheral IV 07/15/20 Left Forearm less than 1 day          Epidural Line            Epidural Catheter 07/15/20 less than 1 day          Drain            Colostomy LLQ -- days    NG/OG/Enteral Tube Orogastric 18 Fr Center mouth less than 1 day    Urethral Catheter Latex 16 Fr  less than 1 day                Physical Exam:   Gen:  Well-developed, well-nourished male in NAD  CV: RRR  Lungs: Normal respiratory effort  Abd: soft, nontender, nondistended, Incisions clean dry and intact  Ostomy pink, patent producing  Skin: warm/ dry  Neuro:  AxO x3      Results from last 7 days   Lab Units 07/15/20  1636 07/15/20  1431   WBC Thousand/uL 17 56*  --    HEMOGLOBIN g/dL 13 0  --    I STAT HEMOGLOBIN g/dl  --  10 9*   HEMATOCRIT % 37 9  --    HEMATOCRIT, ISTAT %  --  32*   PLATELETS Thousands/uL 178  --      Results from last 7 days   Lab Units 07/15/20  1431 CO2, I-STAT mmol/L 27   GLUCOSE, ISTAT mg/dl 75

## 2020-07-17 LAB
ANION GAP SERPL CALCULATED.3IONS-SCNC: 7 MMOL/L (ref 4–13)
ATRIAL RATE: 119 BPM
BASOPHILS # BLD AUTO: 0.04 THOUSANDS/ΜL (ref 0–0.1)
BASOPHILS NFR BLD AUTO: 0 % (ref 0–1)
BUN SERPL-MCNC: 5 MG/DL (ref 5–25)
CALCIUM SERPL-MCNC: 6.9 MG/DL (ref 8.3–10.1)
CHLORIDE SERPL-SCNC: 103 MMOL/L (ref 100–108)
CO2 SERPL-SCNC: 26 MMOL/L (ref 21–32)
CREAT SERPL-MCNC: 0.5 MG/DL (ref 0.6–1.3)
EOSINOPHIL # BLD AUTO: 0.03 THOUSAND/ΜL (ref 0–0.61)
EOSINOPHIL NFR BLD AUTO: 0 % (ref 0–6)
ERYTHROCYTE [DISTWIDTH] IN BLOOD BY AUTOMATED COUNT: 13.2 % (ref 11.6–15.1)
ERYTHROCYTE [DISTWIDTH] IN BLOOD BY AUTOMATED COUNT: 13.3 % (ref 11.6–15.1)
ERYTHROCYTE [DISTWIDTH] IN BLOOD BY AUTOMATED COUNT: 13.4 % (ref 11.6–15.1)
GFR SERPL CREATININE-BSD FRML MDRD: 115 ML/MIN/1.73SQ M
GLUCOSE SERPL-MCNC: 90 MG/DL (ref 65–140)
HCT VFR BLD AUTO: 36.8 % (ref 36.5–49.3)
HCT VFR BLD AUTO: 37.6 % (ref 36.5–49.3)
HCT VFR BLD AUTO: 38 % (ref 36.5–49.3)
HGB BLD-MCNC: 12.2 G/DL (ref 12–17)
HGB BLD-MCNC: 12.4 G/DL (ref 12–17)
HGB BLD-MCNC: 12.8 G/DL (ref 12–17)
IMM GRANULOCYTES # BLD AUTO: 0.1 THOUSAND/UL (ref 0–0.2)
IMM GRANULOCYTES NFR BLD AUTO: 1 % (ref 0–2)
LYMPHOCYTES # BLD AUTO: 2.38 THOUSANDS/ΜL (ref 0.6–4.47)
LYMPHOCYTES NFR BLD AUTO: 12 % (ref 14–44)
MCH RBC QN AUTO: 32.3 PG (ref 26.8–34.3)
MCH RBC QN AUTO: 32.4 PG (ref 26.8–34.3)
MCH RBC QN AUTO: 32.7 PG (ref 26.8–34.3)
MCHC RBC AUTO-ENTMCNC: 33 G/DL (ref 31.4–37.4)
MCHC RBC AUTO-ENTMCNC: 33.2 G/DL (ref 31.4–37.4)
MCHC RBC AUTO-ENTMCNC: 33.7 G/DL (ref 31.4–37.4)
MCV RBC AUTO: 97 FL (ref 82–98)
MCV RBC AUTO: 97 FL (ref 82–98)
MCV RBC AUTO: 98 FL (ref 82–98)
MONOCYTES # BLD AUTO: 1.29 THOUSAND/ΜL (ref 0.17–1.22)
MONOCYTES NFR BLD AUTO: 6 % (ref 4–12)
NEUTROPHILS # BLD AUTO: 16.78 THOUSANDS/ΜL (ref 1.85–7.62)
NEUTS SEG NFR BLD AUTO: 81 % (ref 43–75)
NRBC BLD AUTO-RTO: 0 /100 WBCS
P AXIS: 65 DEGREES
PLATELET # BLD AUTO: 106 THOUSANDS/UL (ref 149–390)
PLATELET # BLD AUTO: 112 THOUSANDS/UL (ref 149–390)
PLATELET # BLD AUTO: 99 THOUSANDS/UL (ref 149–390)
PMV BLD AUTO: 11.1 FL (ref 8.9–12.7)
PMV BLD AUTO: 11.2 FL (ref 8.9–12.7)
PMV BLD AUTO: 11.6 FL (ref 8.9–12.7)
POTASSIUM SERPL-SCNC: 3.6 MMOL/L (ref 3.5–5.3)
PR INTERVAL: 128 MS
QRS AXIS: 0 DEGREES
QRSD INTERVAL: 80 MS
QT INTERVAL: 298 MS
QTC INTERVAL: 419 MS
RBC # BLD AUTO: 3.78 MILLION/UL (ref 3.88–5.62)
RBC # BLD AUTO: 3.83 MILLION/UL (ref 3.88–5.62)
RBC # BLD AUTO: 3.91 MILLION/UL (ref 3.88–5.62)
SODIUM SERPL-SCNC: 136 MMOL/L (ref 136–145)
T WAVE AXIS: 70 DEGREES
VENTRICULAR RATE: 119 BPM
WBC # BLD AUTO: 20.62 THOUSAND/UL (ref 4.31–10.16)
WBC # BLD AUTO: 21.49 THOUSAND/UL (ref 4.31–10.16)
WBC # BLD AUTO: 22.45 THOUSAND/UL (ref 4.31–10.16)

## 2020-07-17 PROCEDURE — 85025 COMPLETE CBC W/AUTO DIFF WBC: CPT | Performed by: SURGERY

## 2020-07-17 PROCEDURE — 80048 BASIC METABOLIC PNL TOTAL CA: CPT | Performed by: SURGERY

## 2020-07-17 PROCEDURE — 85027 COMPLETE CBC AUTOMATED: CPT | Performed by: SURGERY

## 2020-07-17 PROCEDURE — 99024 POSTOP FOLLOW-UP VISIT: CPT | Performed by: SURGERY

## 2020-07-17 PROCEDURE — 85027 COMPLETE CBC AUTOMATED: CPT | Performed by: PHYSICIAN ASSISTANT

## 2020-07-17 PROCEDURE — 93010 ELECTROCARDIOGRAM REPORT: CPT | Performed by: INTERNAL MEDICINE

## 2020-07-17 PROCEDURE — 93005 ELECTROCARDIOGRAM TRACING: CPT

## 2020-07-17 PROCEDURE — NC001 PR NO CHARGE: Performed by: SURGERY

## 2020-07-17 PROCEDURE — 99232 SBSQ HOSP IP/OBS MODERATE 35: CPT | Performed by: ANESTHESIOLOGY

## 2020-07-17 RX ORDER — LIDOCAINE 50 MG/G
1 PATCH TOPICAL DAILY
Status: DISCONTINUED | OUTPATIENT
Start: 2020-07-17 | End: 2020-07-22 | Stop reason: HOSPADM

## 2020-07-17 RX ORDER — MUSCLE RUB CREAM 100; 150 MG/G; MG/G
CREAM TOPICAL 4 TIMES DAILY PRN
Status: DISCONTINUED | OUTPATIENT
Start: 2020-07-17 | End: 2020-07-22 | Stop reason: HOSPADM

## 2020-07-17 RX ORDER — OXYCODONE HYDROCHLORIDE 10 MG/1
10 TABLET ORAL EVERY 4 HOURS PRN
Status: DISCONTINUED | OUTPATIENT
Start: 2020-07-17 | End: 2020-07-20

## 2020-07-17 RX ORDER — DIAZEPAM 5 MG/1
5 TABLET ORAL EVERY 6 HOURS PRN
Status: DISCONTINUED | OUTPATIENT
Start: 2020-07-17 | End: 2020-07-20

## 2020-07-17 RX ORDER — POTASSIUM CHLORIDE 20 MEQ/1
20 TABLET, EXTENDED RELEASE ORAL ONCE
Status: COMPLETED | OUTPATIENT
Start: 2020-07-17 | End: 2020-07-17

## 2020-07-17 RX ORDER — OXYCODONE HYDROCHLORIDE 5 MG/1
5 TABLET ORAL EVERY 4 HOURS PRN
Status: DISCONTINUED | OUTPATIENT
Start: 2020-07-17 | End: 2020-07-20

## 2020-07-17 RX ADMIN — HYDROMORPHONE HYDROCHLORIDE 0.5 MG: 1 INJECTION, SOLUTION INTRAMUSCULAR; INTRAVENOUS; SUBCUTANEOUS at 11:04

## 2020-07-17 RX ADMIN — POTASSIUM CHLORIDE 20 MEQ: 750 TABLET, EXTENDED RELEASE ORAL at 09:12

## 2020-07-17 RX ADMIN — HEPARIN SODIUM 5000 UNITS: 5000 INJECTION INTRAVENOUS; SUBCUTANEOUS at 21:47

## 2020-07-17 RX ADMIN — DIAZEPAM 5 MG: 5 TABLET ORAL at 09:12

## 2020-07-17 RX ADMIN — LIDOCAINE 1 PATCH: 50 PATCH TOPICAL at 15:50

## 2020-07-17 RX ADMIN — HEPARIN SODIUM 5000 UNITS: 5000 INJECTION INTRAVENOUS; SUBCUTANEOUS at 13:13

## 2020-07-17 RX ADMIN — OXYCODONE HYDROCHLORIDE 10 MG: 10 TABLET ORAL at 14:25

## 2020-07-17 RX ADMIN — ROPIVACAINE HYDROCHLORIDE: 5 INJECTION, SOLUTION EPIDURAL; INFILTRATION; PERINEURAL at 13:50

## 2020-07-17 RX ADMIN — DIAZEPAM 5 MG: 5 TABLET ORAL at 21:54

## 2020-07-17 RX ADMIN — HYDROMORPHONE HYDROCHLORIDE 0.5 MG: 1 INJECTION, SOLUTION INTRAMUSCULAR; INTRAVENOUS; SUBCUTANEOUS at 06:20

## 2020-07-17 RX ADMIN — ROPIVACAINE HYDROCHLORIDE: 5 INJECTION, SOLUTION EPIDURAL; INFILTRATION; PERINEURAL at 00:18

## 2020-07-17 RX ADMIN — OXYCODONE HYDROCHLORIDE 10 MG: 10 TABLET ORAL at 21:54

## 2020-07-17 RX ADMIN — POTASSIUM CHLORIDE 10 MEQ: 750 TABLET, EXTENDED RELEASE ORAL at 09:12

## 2020-07-17 RX ADMIN — OXYCODONE HYDROCHLORIDE 5 MG: 5 TABLET ORAL at 10:15

## 2020-07-17 RX ADMIN — PANTOPRAZOLE SODIUM 20 MG: 20 TABLET, DELAYED RELEASE ORAL at 06:01

## 2020-07-17 RX ADMIN — SODIUM CHLORIDE 1000 ML: 0.9 INJECTION, SOLUTION INTRAVENOUS at 15:47

## 2020-07-17 RX ADMIN — ATORVASTATIN CALCIUM 80 MG: 80 TABLET, FILM COATED ORAL at 16:30

## 2020-07-17 RX ADMIN — HYDROMORPHONE HYDROCHLORIDE 0.5 MG: 1 INJECTION, SOLUTION INTRAMUSCULAR; INTRAVENOUS; SUBCUTANEOUS at 02:15

## 2020-07-17 RX ADMIN — FLUTICASONE FUROATE AND VILANTEROL TRIFENATATE 1 PUFF: 100; 25 POWDER RESPIRATORY (INHALATION) at 09:12

## 2020-07-17 RX ADMIN — DIAZEPAM 5 MG: 5 TABLET ORAL at 14:25

## 2020-07-17 RX ADMIN — HEPARIN SODIUM 5000 UNITS: 5000 INJECTION INTRAVENOUS; SUBCUTANEOUS at 06:01

## 2020-07-17 NOTE — PROGRESS NOTES
Consults  Epidural Follow-up Note - Acute Pain Service    Rosa Augustine III 59 y o  male MRN: [id number]  Unit/Bed#: [facility] 929-01 Encounter: [id number]      Assessment:   Principal Problem:    Hepatocellular carcinoma ([facility] [address] )      Leonides Whittington is a 59y o  year old male POD #2 S/P Liver ablation  The patient states that his pain increased significantly overnight  The pain is in the right subcostal area, where the incision is, and also in his right collar bone  The pain woke him up from his sleep  The epidural is in tact and running properly  Patient has been using boluses  He received some dilaudid for breakthrough pain with some relief  As I prepared to give an extra bolus of 2% lidocaine with epi through the epidural, and monitors were placed on the patient, it was noted that the patient has a rapid, irregular heart rate, and his SBP was 94  Therefore I held off on doing the bolus, and alerted the surgical team   They will order an EKG to rule out new onset afib  Oral oxycodone was ordered to help with pain control  Plan:  Analgesia:  - Continue Thoracic epidural infusion of Ropivacaine 0 1% with fentanyl 2 mcg/mL at 8/5/10/3  - Add Oxycodone 5-10 mg for breakthrough pain (ordered by APS)  - Anticipate epidural removal and transition to primarily PO regimen on POD #5  Will D/C earlier if it is not working  Bowel Regimen:  - Bowel regimen when appropriate from surgical perspective    APS will continue to follow   Please call  / [phone] or [facility] (/ between [phone] and on weekends) with questions or concerns    Pain History  Current pain location(s): abdomen  Pain Scale:   9/10  Quality: sharp  24 hour history: increase in pain overnight    PCEA use: 13 boluses  Opioid requirement previous 24 hours: 1 0 mg dilaudid    Meds/Allergies   current meds:   Current Facility-Administered Medications   Medication Dose Route Frequency    AMILoride tablet 5 mg 5 mg Oral Daily    amLODIPine (NORVASC) tablet 10 mg  10 mg Oral Daily    atorvastatin (LIPITOR) tablet 80 mg  80 mg Oral Daily With Dinner    diazepam (VALIUM) tablet 5 mg  5 mg Oral Q6H PRN    fluticasone-vilanterol (BREO ELLIPTA) 100-25 mcg/inh inhaler 1 puff  1 puff Inhalation Daily    heparin (porcine) subcutaneous injection 5,000 Units  5,000 Units Subcutaneous Q8H [facility]    HYDROmorphone (DILAUDID) injection 0 5 mg  0 5 mg Intravenous Q2H PRN    oxyCODONE (ROXICODONE) immediate release tablet 10 mg  10 mg Oral Q4H PRN    oxyCODONE (ROXICODONE) IR tablet 5 mg  5 mg Oral Q4H PRN    pantoprazole (PROTONIX) EC tablet 20 mg  20 mg Oral Early Morning    potassium chloride (K-DUR,KLOR-CON) CR tablet 10 mEq  10 mEq Oral Daily    ropivacaine 0 1% and fentaNYL 2 mcg/mL PCEA   Epidural Continuous       No Known Allergies    Objective     Temp:  [97 9 °F (36 6 °C)-99 5 °F (37 5 °C)] 97 9 °F (36 6 °C)  HR:  [] 123  Resp:  [18-22] 18  BP: ()/() 90/56    Physical Exam   Constitutional: He is oriented to person, place, and time  He appears well-developed  HENT:   Head: Normocephalic  Eyes: Pupils are equal, round, and reactive to light  Cardiovascular: Intact distal pulses  Irregular, rapid rate  Pulmonary/Chest: Effort normal    Abdominal: Soft  There is tenderness  Neurological: He is alert and oriented to person, place, and time  Psychiatric: He has a normal mood and affect   His behavior is normal      Epidural: Site clean/dry/intact, no surrounding erythema/edema/induration, infusion functioning appropriately    Lab Results:   Results from last 7 days   Lab Units 07/17/20  0226   WBC Thousand/uL 21 49*   HEMOGLOBIN g/dL 12 8   HEMATOCRIT % 38 0   PLATELETS Thousands/uL 106*      Results from last 7 days   Lab Units 07/17/20  0527  07/15/20  1431   POTASSIUM mmol/L 3 6   < >  --    CHLORIDE mmol/L 103   < >  --    CO2 mmol/L 26   < >  --    CO2, I-STAT mmol/L  --   --  27   BUN mg/dL 5   < >  --    CREATININE mg/dL 0 50*   < >  --    CALCIUM mg/dL 6 9*   < >  --    GLUCOSE, ISTAT mg/dl  --   --  75    < > = values in this interval not displayed  Imaging Studies: I have personally reviewed pertinent reports  EKG, Pathology, and Other Studies: I have personally reviewed pertinent reports  Counseling / Coordination of Care  Total floor / unit time spent today 30 minutes  Greater than 50% of total time was spent with the patient and / or family counseling and / or coordination of care  Please note that the APS provides consultative services regarding pain management only  With the exception of ketamine, peripheral nerve catheters, and epidural infusions (and except when indicated), final decisions regarding starting or changing doses of analgesic medications are at the discretion of the consulting service  Off hours consultation and/or medication management is generally not available      Ambar Dash MD  Acute Pain Service

## 2020-07-17 NOTE — PLAN OF CARE
Problem: INFECTION - ADULT  Goal: Absence or prevention of progression during hospitalization  Description  INTERVENTIONS:  - Assess and monitor for signs and symptoms of infection  - Monitor lab/diagnostic results  - Monitor all insertion sites, i e  indwelling lines, tubes, and drains  - Monitor endotracheal if appropriate and nasal secretions for changes in amount and color  - [place] appropriate cooling/warming therapies per order  - Administer medications as ordered  - Instruct and encourage patient and family to use good hand hygiene technique  - Identify and instruct in appropriate isolation precautions for identified infection/condition  Outcome: Progressing  Goal: Absence of fever/infection during neutropenic period  Description  INTERVENTIONS:  - Monitor WBC    Outcome: Progressing     Problem: SAFETY ADULT  Goal: Patient will remain free of falls  Description  INTERVENTIONS:  - Assess patient frequently for physical needs  -  Identify cognitive and physical deficits and behaviors that affect risk of falls    -  [place] fall precautions as indicated by assessment   - Educate patient/family on patient safety including physical limitations  - Instruct patient to call for assistance with activity based on assessment  - Modify environment to reduce risk of injury  - Consider OT/PT consult to assist with strengthening/mobility  Outcome: Progressing  Goal: Maintain or return to baseline ADL function  Description  INTERVENTIONS:  -  Assess patient's ability to carry out ADLs; assess patient's baseline for ADL function and identify physical deficits which impact ability to perform ADLs (bathing, care of mouth/teeth, toileting, grooming, dressing, etc )  - Assess/evaluate cause of self-care deficits   - Assess range of motion  - Assess patient's mobility; develop plan if impaired  - Assess patient's need for assistive devices and provide as appropriate  - Encourage maximum independence but intervene and supervise when necessary  - Involve family in performance of ADLs  - Assess for home care needs following discharge   - Consider OT consult to assist with ADL evaluation and planning for discharge  - Provide patient education as appropriate  Outcome: Progressing  Goal: Maintain or return mobility status to optimal level  Description  INTERVENTIONS:  - Assess patient's baseline mobility status (ambulation, transfers, stairs, etc )    - Identify cognitive and physical deficits and behaviors that affect mobility  - Identify mobility aids required to assist with transfers and/or ambulation (gait belt, sit-to-stand, lift, walker, cane, etc )  - [place] fall precautions as indicated by assessment  - Record patient progress and toleration of activity level on Mobility SBAR; progress patient to next Phase/Stage  - Instruct patient to call for assistance with activity based on assessment  - Consider rehabilitation consult to assist with strengthening/weightbearing, etc   Outcome: Progressing     Problem: DISCHARGE PLANNING  Goal: Discharge to home or other facility with appropriate resources  Description  INTERVENTIONS:  - Identify barriers to discharge w/patient and caregiver  - Arrange for needed discharge resources and transportation as appropriate  - Identify discharge learning needs (meds, wound care, etc )  - Arrange for interpretive services to assist at discharge as needed  - Refer to Case Management Department for coordinating discharge planning if the patient needs post-hospital services based on physician/advanced practitioner order or complex needs related to functional status, cognitive ability, or social support system  Outcome: Progressing     Problem: Knowledge Deficit  Goal: Patient/family/caregiver demonstrates understanding of disease process, treatment plan, medications, and discharge instructions  Description  Complete learning assessment and assess knowledge base    Interventions:  - Provide teaching at level of understanding  - Provide teaching via preferred learning methods  Outcome: Progressing     Problem: Potential for Falls  Goal: Patient will remain free of falls  Description  INTERVENTIONS:  - Assess patient frequently for physical needs  -  Identify cognitive and physical deficits and behaviors that affect risk of falls    -  [place] fall precautions as indicated by assessment   - Educate patient/family on patient safety including physical limitations  - Instruct patient to call for assistance with activity based on assessment  - Modify environment to reduce risk of injury  - Consider OT/PT consult to assist with strengthening/mobility  Outcome: Progressing     Problem: PAIN - ADULT  Goal: Verbalizes/displays adequate comfort level or baseline comfort level  Description  Interventions:  - Encourage patient to monitor pain and request assistance  - Assess pain using appropriate pain scale  - Administer analgesics based on type and severity of pain and evaluate response  - Implement non-pharmacological measures as appropriate and evaluate response  - Consider cultural and social influences on pain and pain management  - Notify physician/advanced practitioner if interventions unsuccessful or patient reports new pain  Outcome: Not Progressing

## 2020-07-17 NOTE — PROGRESS NOTES
Progress Note - Oncologic Surgery   Sid Pinon III 59 y o  male MRN: [id number]  Unit/Bed#: [facility] 929-01 Encounter: [id number]    Assessment:  Patient is a 59 y o  male who presented with [address] , now status post liver ablation on 07/15/POD 2  Patient having pain issues  cardiac up to 131 around 2:00 a m  Hypertensive likely pain related around the same time to 173/118  Now on 2 L nasal cannula satting in the mid 90s  Had some episodes of desaturations overnight to 86%  Plan:  Regular diet  Maintain colostomy  Epidural-will speak with APS about rule possibly removing epidural today  Plan was to remove epidural tomorrow but given patient's marginal pain control overnight may benefit from transition to a p o  Regimen  Discontinue Torres    Subjective/Objective     Subjective:   Events as noted above  Patient passing air and stool into his ostomy  Having cramping right upper quadrant pain right above his incision  Denies nausea vomiting, was tolerating a diet yesterday  Has been ambulating twice yesterday and using IS up to a 1000  Objective:    Blood pressure 116/80, pulse (!) 122, temperature 98 2 °F (36 8 °C), temperature source Oral, resp  rate 18, height 5' 9" (1 753 m), weight 63 kg (138 lb 14 2 oz), SpO2 96 %  ,Body mass index is 20 51 kg/m²        Intake/Output Summary (Last 24 hours) at 7/17/2020 0622  Last data filed at 7/17/2020 4162  Gross per 24 hour   Intake 2234 55 ml   Output 3825 ml   Net -1590 45 ml       Invasive Devices     Peripheral Intravenous Line            Peripheral IV 07/15/20 Left Forearm 1 day    Peripheral IV 07/15/20 Left Forearm 1 day          Epidural Line            Epidural Catheter 07/15/20 1 day          Drain            Colostomy LLQ -- days    Urethral Catheter Latex 16 Fr  1 day                Physical Exam:   Gen:  Well-developed, well-nourished male in pt in visible pain on exam  CV: RR/tachy  Lungs: Normal respiratory effort on 6L NC  Abd: soft, Right side tender, mildly distended, Incisions clean dry and intact, some ecchymosis around the lateral aspect of wound  Neuro:  AxO x3      Results from last 7 days   Lab Units 07/17/20  0226 07/16/20  0510 07/15/20  1636   WBC Thousand/uL 21 49* 16 59* 17 56*   HEMOGLOBIN g/dL 12 8 11 8* 13 0   HEMATOCRIT % 38 0 36 0* 37 9   PLATELETS Thousands/uL 106* 143* 178     Results from last 7 days   Lab Units 07/16/20  0510 07/15/20  1431   POTASSIUM mmol/L 3 4*  --    CHLORIDE mmol/L 106  --    CO2 mmol/L 26  --    CO2, I-STAT mmol/L  --  27   BUN mg/dL 10  --    CREATININE mg/dL 0 69  --    GLUCOSE, ISTAT mg/dl  --  75   CALCIUM mg/dL 6 1*  --

## 2020-07-17 NOTE — PROGRESS NOTES
Anesthesia OK with adding Valium 5 mg q6 prn for RUQ spasms  EKG revealing normal rhythm along with sinus tachycardia  Rate 78/min when examining him, then went to 125/min after exam, back to 80/min  BP 90/50 then and 90/56 now  Patient much more pain controlled since getting the Valium 1/2 hour ago  Pulse 98 regular  Will continue to monitor

## 2020-07-17 NOTE — NURSING NOTE
Pt states he awoke with 10/10 pain to RUQ of abd and pain radiating to right clavicle  Describes pain as a "cramp" and "spasm"  Abdomen Incision appears newly ecchymotic, abdomen soft, non-distended, bowel sounds present  Vitals were taken, Temp of 99 5 f orally, 's-130's, /127, RR 22, SpO2 dropped to 86-88% on room air  Pt was put on 2L nasal canula, encouraged to push epidural  Katina Maxwell with [facility] paged and at patients bedside to evaluate and assessed pt  Stat labs ordered and PRN IV dilaudid administered  SpO2 now at 92-94%  Will continue to monitor

## 2020-07-17 NOTE — RESTORATIVE TECHNICIAN NOTE
Restorative Specialist Mobility Note       Activity: Dangle, Stand at bedside, Other (Comment)(Educated/encouraged pt to ambulate with assistance 3-4 x's/day  Pt sat up at the EOB for a few minutes then wanted to lay back down 2* c/o R collar bone pain nursing aware  Bed alarm on   Pt callbell, PCA button, phone/tray within reach )     Emory Ford BS, Restorative Technician, [employer]

## 2020-07-17 NOTE — QUICK NOTE
Paged by patients overnight nursing staff as patient was in excruciating pain  He had become tachycardic and complaining of pain in RUQ underneath rib and radiates to his R shoulder  Patient with pain control issue as he was not utilizing his Epidural     Abdomen is benign, some ecchymosis on lateral portion of incision      STAT CBC platelet ordered  Breakthrough Dilaudid given    Clear urine

## 2020-07-18 LAB
ABO GROUP BLD BPU: NORMAL
ABO GROUP BLD BPU: NORMAL
ALBUMIN SERPL BCP-MCNC: 1.9 G/DL (ref 3.5–5)
ALP SERPL-CCNC: 113 U/L (ref 46–116)
ALT SERPL W P-5'-P-CCNC: 310 U/L (ref 12–78)
ANION GAP SERPL CALCULATED.3IONS-SCNC: 9 MMOL/L (ref 4–13)
AST SERPL W P-5'-P-CCNC: 386 U/L (ref 5–45)
BASOPHILS # BLD AUTO: 0.03 THOUSANDS/ΜL (ref 0–0.1)
BASOPHILS NFR BLD AUTO: 0 % (ref 0–1)
BILIRUB SERPL-MCNC: 2.31 MG/DL (ref 0.2–1)
BPU ID: NORMAL
BPU ID: NORMAL
BUN SERPL-MCNC: 8 MG/DL (ref 5–25)
CALCIUM SERPL-MCNC: 6.7 MG/DL (ref 8.3–10.1)
CHLORIDE SERPL-SCNC: 103 MMOL/L (ref 100–108)
CO2 SERPL-SCNC: 25 MMOL/L (ref 21–32)
CREAT SERPL-MCNC: 0.57 MG/DL (ref 0.6–1.3)
CROSSMATCH: NORMAL
CROSSMATCH: NORMAL
EOSINOPHIL # BLD AUTO: 0.12 THOUSAND/ΜL (ref 0–0.61)
EOSINOPHIL NFR BLD AUTO: 1 % (ref 0–6)
ERYTHROCYTE [DISTWIDTH] IN BLOOD BY AUTOMATED COUNT: 13.5 % (ref 11.6–15.1)
GFR SERPL CREATININE-BSD FRML MDRD: 109 ML/MIN/1.73SQ M
GLUCOSE SERPL-MCNC: 75 MG/DL (ref 65–140)
HCT VFR BLD AUTO: 35.7 % (ref 36.5–49.3)
HGB BLD-MCNC: 11.7 G/DL (ref 12–17)
IMM GRANULOCYTES # BLD AUTO: 0.09 THOUSAND/UL (ref 0–0.2)
IMM GRANULOCYTES NFR BLD AUTO: 1 % (ref 0–2)
LYMPHOCYTES # BLD AUTO: 1.48 THOUSANDS/ΜL (ref 0.6–4.47)
LYMPHOCYTES NFR BLD AUTO: 9 % (ref 14–44)
MCH RBC QN AUTO: 32.4 PG (ref 26.8–34.3)
MCHC RBC AUTO-ENTMCNC: 32.8 G/DL (ref 31.4–37.4)
MCV RBC AUTO: 99 FL (ref 82–98)
MONOCYTES # BLD AUTO: 1.21 THOUSAND/ΜL (ref 0.17–1.22)
MONOCYTES NFR BLD AUTO: 7 % (ref 4–12)
NEUTROPHILS # BLD AUTO: 14.38 THOUSANDS/ΜL (ref 1.85–7.62)
NEUTS SEG NFR BLD AUTO: 82 % (ref 43–75)
NRBC BLD AUTO-RTO: 0 /100 WBCS
PLATELET # BLD AUTO: 96 THOUSANDS/UL (ref 149–390)
PMV BLD AUTO: 11.9 FL (ref 8.9–12.7)
POTASSIUM SERPL-SCNC: 3.6 MMOL/L (ref 3.5–5.3)
PROT SERPL-MCNC: 5.4 G/DL (ref 6.4–8.2)
RBC # BLD AUTO: 3.61 MILLION/UL (ref 3.88–5.62)
SODIUM SERPL-SCNC: 137 MMOL/L (ref 136–145)
UNIT DISPENSE STATUS: NORMAL
UNIT DISPENSE STATUS: NORMAL
UNIT PRODUCT CODE: NORMAL
UNIT PRODUCT CODE: NORMAL
UNIT RH: NORMAL
UNIT RH: NORMAL
WBC # BLD AUTO: 17.31 THOUSAND/UL (ref 4.31–10.16)

## 2020-07-18 PROCEDURE — 80053 COMPREHEN METABOLIC PANEL: CPT | Performed by: PHYSICIAN ASSISTANT

## 2020-07-18 PROCEDURE — 85025 COMPLETE CBC W/AUTO DIFF WBC: CPT | Performed by: PHYSICIAN ASSISTANT

## 2020-07-18 PROCEDURE — 99024 POSTOP FOLLOW-UP VISIT: CPT | Performed by: SURGERY

## 2020-07-18 PROCEDURE — 99231 SBSQ HOSP IP/OBS SF/LOW 25: CPT | Performed by: ANESTHESIOLOGY

## 2020-07-18 RX ORDER — POTASSIUM CHLORIDE 20 MEQ/1
40 TABLET, EXTENDED RELEASE ORAL ONCE
Status: COMPLETED | OUTPATIENT
Start: 2020-07-18 | End: 2020-07-18

## 2020-07-18 RX ORDER — ACETAMINOPHEN 325 MG/1
650 TABLET ORAL EVERY 6 HOURS PRN
Status: DISCONTINUED | OUTPATIENT
Start: 2020-07-18 | End: 2020-07-20

## 2020-07-18 RX ADMIN — PANTOPRAZOLE SODIUM 20 MG: 20 TABLET, DELAYED RELEASE ORAL at 05:42

## 2020-07-18 RX ADMIN — DIAZEPAM 5 MG: 5 TABLET ORAL at 22:23

## 2020-07-18 RX ADMIN — AMILORIDE HYDROCLORIDE 5 MG: 5 TABLET ORAL at 11:21

## 2020-07-18 RX ADMIN — FLUTICASONE FUROATE AND VILANTEROL TRIFENATATE 1 PUFF: 100; 25 POWDER RESPIRATORY (INHALATION) at 11:21

## 2020-07-18 RX ADMIN — ROPIVACAINE HYDROCHLORIDE: 5 INJECTION, SOLUTION EPIDURAL; INFILTRATION; PERINEURAL at 07:26

## 2020-07-18 RX ADMIN — ACETAMINOPHEN 650 MG: 325 TABLET, FILM COATED ORAL at 15:20

## 2020-07-18 RX ADMIN — POTASSIUM CHLORIDE 10 MEQ: 750 TABLET, EXTENDED RELEASE ORAL at 11:22

## 2020-07-18 RX ADMIN — POTASSIUM CHLORIDE 40 MEQ: 1500 TABLET, EXTENDED RELEASE ORAL at 11:22

## 2020-07-18 RX ADMIN — DIAZEPAM 5 MG: 5 TABLET ORAL at 05:45

## 2020-07-18 RX ADMIN — ATORVASTATIN CALCIUM 80 MG: 80 TABLET, FILM COATED ORAL at 16:25

## 2020-07-18 RX ADMIN — HEPARIN SODIUM 5000 UNITS: 5000 INJECTION INTRAVENOUS; SUBCUTANEOUS at 05:42

## 2020-07-18 RX ADMIN — HEPARIN SODIUM 5000 UNITS: 5000 INJECTION INTRAVENOUS; SUBCUTANEOUS at 22:23

## 2020-07-18 RX ADMIN — HEPARIN SODIUM 5000 UNITS: 5000 INJECTION INTRAVENOUS; SUBCUTANEOUS at 14:12

## 2020-07-18 RX ADMIN — ROPIVACAINE HYDROCHLORIDE: 5 INJECTION, SOLUTION EPIDURAL; INFILTRATION; PERINEURAL at 22:15

## 2020-07-18 NOTE — PROGRESS NOTES
Progress Note - Oncologic Surgery   Rosie Coral III 59 y o  male MRN: [id number]  Unit/Bed#: [facility] 929-01 Encounter: [id number]    Assessment:  Patient is a 59 y o  male who presented with [address] , now status post liver ablation on 07/15  intermittently febrile  Tmax: 101 9; tachy into low 110-120s throughout day yesterday  Pain control issues  mainly complains of right shoulder pain unrelieved by lidocaine patch and pain meds    Plan:  Regular diet  Maintain colostomy  Epidural will stay until POD5 likely  Continue Valium for muscle spasms  OOB/ambulate  Urinary retention protocol      Subjective/Objective     Subjective:   Events as noted above  Overall pain improved from yesterday  Objective:    Blood pressure 119/71, pulse (!) 108, temperature 98 6 °F (37 °C), resp  rate 18, height 5' 9" (1 753 m), weight 63 kg (138 lb 14 2 oz), SpO2 90 %  ,Body mass index is 20 51 kg/m²  Intake/Output Summary (Last 24 hours) at 7/18/2020 0754  Last data filed at 7/18/2020 6616  Gross per 24 hour   Intake 2208 2 ml   Output 3439 ml   Net -1230 8 ml       Invasive Devices     Peripheral Intravenous Line            Peripheral IV 07/15/20 Left Forearm 2 days    Peripheral IV 07/15/20 Left Forearm 2 days          Epidural Line            Epidural Catheter 07/15/20 2 days          Drain            Colostomy LLQ -- days                Physical Exam:   Gen:  Well-developed, well-nourished male in NAD  CV: RR/tachy  Lungs: Normal respiratory effort on RA  Abd: soft, RUQ tender, nondistended, Incisions clean dry and intact  Colostomy in place w/air and soft brown stool in bag  Neuro:  AxO x3        Results from last 7 days   Lab Units 07/18/20  0500 07/17/20  1123 07/17/20  0527   WBC Thousand/uL 17 31* 22 45* 20 62*   HEMOGLOBIN g/dL 11 7* 12 2 12 4   HEMATOCRIT % 35 7* 36 8 37 6   PLATELETS Thousands/uL 96* 112* 99*     Results from last 7 days   Lab Units 07/18/20  0500 07/17/20  0527 07/16/20  0510 07/15/20  1431   POTASSIUM mmol/L 3 6 3 6 3 4*  --    CHLORIDE mmol/L 103 103 106  --    CO2 mmol/L 25 26 26  --    CO2, I-STAT mmol/L  --   --   --  27   BUN mg/dL 8 5 10  --    CREATININE mg/dL 0 57* 0 50* 0 69  --    GLUCOSE, ISTAT mg/dl  --   --   --  75   CALCIUM mg/dL 6 7* 6 9* 6 1*  --

## 2020-07-18 NOTE — PROGRESS NOTES
Patient denied pain medication at 2045, but given oxycodone and valium  at 2200  Attempting to void in urinal  Epidural site clean and dry  alleyn dressing intact     Abdominal incision clean and dry, well approximated,

## 2020-07-18 NOTE — PROGRESS NOTES
Consults  Epidural Follow-up Note - Acute Pain Service    Elia Slater III 59 y o  male MRN: [id number]  Unit/Bed#: [facility] 929-01 Encounter: [id number]      Assessment:   Principal Problem:    Hepatocellular carcinoma ([address] )      Mansfield Libman is a 59y o  year old male POD #3 S/P Liver ablation  Still having pain in his subcostal region, and coller bone, but the PCEA, valium and oxycodone seem to be working well to control it  The pain is in the right subcostal area, where the incision is, and also in his right collar bone  He has a good appetite this morning and is eating well  The epidural is in tact and running properly  Patient has been using boluses  He received some dilaudid for breakthrough pain with some relief  Plan:  Analgesia:  - Continue Thoracic epidural infusion of Ropivacaine 0 1% with fentanyl 2 mcg/mL at 8/5/10/3  - Add Oxycodone 5-10 mg for breakthrough pain (ordered by APS)  - Anticipate epidural removal and transition to primarily PO regimen on POD #5  Will D/C earlier if it is not working  Bowel Regimen:  - Bowel regimen when appropriate from surgical perspective    APS will continue to follow   Please call  / [phone] or [facility] (/ between 0820-3767 and on weekends) with questions or concerns    Pain History  Current pain location(s): abdomen  Pain Scale:   9/10  Quality: sharp  24 hour history: increase in pain overnight    PCEA use: 13 boluses  Opioid requirement previous 24 hours: 1 0 mg dilaudid    Meds/Allergies   current meds:   Current Facility-Administered Medications   Medication Dose Route Frequency    AMILoride tablet 5 mg  5 mg Oral Daily    amLODIPine (NORVASC) tablet 10 mg  10 mg Oral Daily    atorvastatin (LIPITOR) tablet 80 mg  80 mg Oral Daily With Dinner    diazepam (VALIUM) tablet 5 mg  5 mg Oral Q6H PRN    fluticasone-vilanterol (BREO ELLIPTA) 100-25 mcg/inh inhaler 1 puff  1 puff Inhalation Daily    heparin (porcine) subcutaneous injection 5,000 Units  5,000 Units Subcutaneous Q8H [facility]    lidocaine (LIDODERM) 5 % patch 1 patch  1 patch Topical Daily    menthol-methyl salicylate (BENGAY) 46-71 % cream   Apply externally 4x Daily PRN    oxyCODONE (ROXICODONE) immediate release tablet 10 mg  10 mg Oral Q4H PRN    oxyCODONE (ROXICODONE) IR tablet 5 mg  5 mg Oral Q4H PRN    pantoprazole (PROTONIX) EC tablet 20 mg  20 mg Oral Early Morning    potassium chloride (K-DUR,KLOR-CON) CR tablet 10 mEq  10 mEq Oral Daily    ropivacaine 0 1% and fentaNYL 2 mcg/mL PCEA   Epidural Continuous       No Known Allergies    Objective     Temp:  [97 7 °F (36 5 °C)-101 9 °F (38 8 °C)] 98 6 °F (37 °C)  HR:  [103-124] 108  Resp:  [16-28] 18  BP: ()/(56-71) 119/71    Physical Exam   Constitutional: He is oriented to person, place, and time  He appears well-developed  HENT:   Head: Normocephalic  Eyes: Pupils are equal, round, and reactive to light  Cardiovascular: Intact distal pulses  Irregular, rapid rate  Pulmonary/Chest: Effort normal    Abdominal: Soft  There is tenderness  Neurological: He is alert and oriented to person, place, and time  Psychiatric: He has a normal mood and affect   His behavior is normal      Epidural: Site clean/dry/intact, no surrounding erythema/edema/induration, infusion functioning appropriately    Lab Results:   Results from last 7 days   Lab Units 07/18/20  0500   WBC Thousand/uL 17 31*   HEMOGLOBIN g/dL 11 7*   HEMATOCRIT % 35 7*   PLATELETS Thousands/uL 96*      Results from last 7 days   Lab Units 07/18/20  0500  07/15/20  1431   POTASSIUM mmol/L 3 6   < >  --    CHLORIDE mmol/L 103   < >  --    CO2 mmol/L 25   < >  --    CO2, I-STAT mmol/L  --   --  27   BUN mg/dL 8   < >  --    CREATININE mg/dL 0 57*   < >  --    CALCIUM mg/dL 6 7*   < >  --    ALK PHOS U/L 113  --   --    ALT U/L 310*  --   --    AST U/L 386*  --   --    GLUCOSE, ISTAT mg/dl  --   --  75    < > = values in this interval not displayed  Imaging Studies: I have personally reviewed pertinent reports  EKG, Pathology, and Other Studies: I have personally reviewed pertinent reports  Counseling / Coordination of Care  Total floor / unit time spent today 30 minutes  Greater than 50% of total time was spent with the patient and / or family counseling and / or coordination of care  Please note that the APS provides consultative services regarding pain management only  With the exception of ketamine, peripheral nerve catheters, and epidural infusions (and except when indicated), final decisions regarding starting or changing doses of analgesic medications are at the discretion of the consulting service  Off hours consultation and/or medication management is generally not available      Pieter Pete MD  Acute Pain Service

## 2020-07-19 LAB
ALBUMIN SERPL BCP-MCNC: 1.9 G/DL (ref 3.5–5)
ALP SERPL-CCNC: 108 U/L (ref 46–116)
ALT SERPL W P-5'-P-CCNC: 193 U/L (ref 12–78)
ANION GAP SERPL CALCULATED.3IONS-SCNC: 6 MMOL/L (ref 4–13)
AST SERPL W P-5'-P-CCNC: 154 U/L (ref 5–45)
BASOPHILS # BLD AUTO: 0.02 THOUSANDS/ΜL (ref 0–0.1)
BASOPHILS NFR BLD AUTO: 0 % (ref 0–1)
BILIRUB SERPL-MCNC: 1.03 MG/DL (ref 0.2–1)
BUN SERPL-MCNC: 8 MG/DL (ref 5–25)
CALCIUM SERPL-MCNC: 6.7 MG/DL (ref 8.3–10.1)
CHLORIDE SERPL-SCNC: 103 MMOL/L (ref 100–108)
CO2 SERPL-SCNC: 26 MMOL/L (ref 21–32)
CREAT SERPL-MCNC: 0.59 MG/DL (ref 0.6–1.3)
EOSINOPHIL # BLD AUTO: 0.13 THOUSAND/ΜL (ref 0–0.61)
EOSINOPHIL NFR BLD AUTO: 1 % (ref 0–6)
ERYTHROCYTE [DISTWIDTH] IN BLOOD BY AUTOMATED COUNT: 13.4 % (ref 11.6–15.1)
GFR SERPL CREATININE-BSD FRML MDRD: 107 ML/MIN/1.73SQ M
GLUCOSE SERPL-MCNC: 91 MG/DL (ref 65–140)
HCT VFR BLD AUTO: 33.6 % (ref 36.5–49.3)
HGB BLD-MCNC: 11.1 G/DL (ref 12–17)
IMM GRANULOCYTES # BLD AUTO: 0.07 THOUSAND/UL (ref 0–0.2)
IMM GRANULOCYTES NFR BLD AUTO: 1 % (ref 0–2)
LYMPHOCYTES # BLD AUTO: 1.51 THOUSANDS/ΜL (ref 0.6–4.47)
LYMPHOCYTES NFR BLD AUTO: 12 % (ref 14–44)
MCH RBC QN AUTO: 31.8 PG (ref 26.8–34.3)
MCHC RBC AUTO-ENTMCNC: 33 G/DL (ref 31.4–37.4)
MCV RBC AUTO: 96 FL (ref 82–98)
MONOCYTES # BLD AUTO: 0.9 THOUSAND/ΜL (ref 0.17–1.22)
MONOCYTES NFR BLD AUTO: 7 % (ref 4–12)
NEUTROPHILS # BLD AUTO: 10.04 THOUSANDS/ΜL (ref 1.85–7.62)
NEUTS SEG NFR BLD AUTO: 79 % (ref 43–75)
NRBC BLD AUTO-RTO: 0 /100 WBCS
PLATELET # BLD AUTO: 92 THOUSANDS/UL (ref 149–390)
PMV BLD AUTO: 11.8 FL (ref 8.9–12.7)
POTASSIUM SERPL-SCNC: 3.7 MMOL/L (ref 3.5–5.3)
PROT SERPL-MCNC: 5.5 G/DL (ref 6.4–8.2)
RBC # BLD AUTO: 3.49 MILLION/UL (ref 3.88–5.62)
SODIUM SERPL-SCNC: 135 MMOL/L (ref 136–145)
WBC # BLD AUTO: 12.67 THOUSAND/UL (ref 4.31–10.16)

## 2020-07-19 PROCEDURE — 85025 COMPLETE CBC W/AUTO DIFF WBC: CPT | Performed by: SURGERY

## 2020-07-19 PROCEDURE — 80053 COMPREHEN METABOLIC PANEL: CPT | Performed by: SURGERY

## 2020-07-19 PROCEDURE — 99024 POSTOP FOLLOW-UP VISIT: CPT | Performed by: SURGERY

## 2020-07-19 PROCEDURE — 99231 SBSQ HOSP IP/OBS SF/LOW 25: CPT | Performed by: ANESTHESIOLOGY

## 2020-07-19 RX ADMIN — POTASSIUM CHLORIDE 10 MEQ: 750 TABLET, EXTENDED RELEASE ORAL at 10:00

## 2020-07-19 RX ADMIN — AMILORIDE HYDROCLORIDE 5 MG: 5 TABLET ORAL at 10:00

## 2020-07-19 RX ADMIN — HEPARIN SODIUM 5000 UNITS: 5000 INJECTION INTRAVENOUS; SUBCUTANEOUS at 15:12

## 2020-07-19 RX ADMIN — ACETAMINOPHEN 650 MG: 325 TABLET, FILM COATED ORAL at 23:05

## 2020-07-19 RX ADMIN — ACETAMINOPHEN 650 MG: 325 TABLET, FILM COATED ORAL at 07:50

## 2020-07-19 RX ADMIN — DIAZEPAM 5 MG: 5 TABLET ORAL at 10:11

## 2020-07-19 RX ADMIN — AMLODIPINE BESYLATE 10 MG: 10 TABLET ORAL at 10:00

## 2020-07-19 RX ADMIN — ATORVASTATIN CALCIUM 80 MG: 80 TABLET, FILM COATED ORAL at 17:19

## 2020-07-19 RX ADMIN — HEPARIN SODIUM 5000 UNITS: 5000 INJECTION INTRAVENOUS; SUBCUTANEOUS at 06:11

## 2020-07-19 RX ADMIN — ROPIVACAINE HYDROCHLORIDE: 5 INJECTION, SOLUTION EPIDURAL; INFILTRATION; PERINEURAL at 13:10

## 2020-07-19 RX ADMIN — ACETAMINOPHEN 650 MG: 325 TABLET, FILM COATED ORAL at 13:41

## 2020-07-19 RX ADMIN — PANTOPRAZOLE SODIUM 20 MG: 20 TABLET, DELAYED RELEASE ORAL at 06:11

## 2020-07-19 RX ADMIN — HEPARIN SODIUM 5000 UNITS: 5000 INJECTION INTRAVENOUS; SUBCUTANEOUS at 21:48

## 2020-07-19 RX ADMIN — DIAZEPAM 5 MG: 5 TABLET ORAL at 19:14

## 2020-07-19 RX ADMIN — FLUTICASONE FUROATE AND VILANTEROL TRIFENATATE 1 PUFF: 100; 25 POWDER RESPIRATORY (INHALATION) at 10:08

## 2020-07-19 NOTE — NURSING NOTE
Notified Lisset Slider of pt's positive nursing sepsis screen and fever & tachycardia this afternoon

## 2020-07-19 NOTE — PROGRESS NOTES
Progress Note - Oncologic Surgery   Batsheva Guzman III 59 y o  male MRN: [id number]  Unit/Bed#: [facility] 929-01 Encounter: [id number]    Assessment:  Patient is a 59 y o  male who presented with [address] , now status post liver ablation on 07/15  Was febrile  T-max 100 8° 3 a m  Tachy into low 1 teens  sats in low 90s on RA  Pain control issues, not using demand dose on epidural  Pain in shoulder    Plan:  Regular diet  Maintain colostomy  Maintain epidural until postop day 5  Out of bed and ambulate  DVT prophylaxis      Subjective/Objective     Subjective:   Events as noted above  Objective:    Blood pressure 118/76, pulse 102, temperature (!) 100 6 °F (38 1 °C), resp  rate 20, height 5' 9" (1 753 m), weight 63 kg (138 lb 14 2 oz), SpO2 92 %  ,Body mass index is 20 51 kg/m²  Intake/Output Summary (Last 24 hours) at 7/19/2020 0719  Last data filed at 7/19/2020 3108  Gross per 24 hour   Intake 1739 95 ml   Output 1875 ml   Net -135 05 ml       Invasive Devices     Peripheral Intravenous Line            Peripheral IV 07/15/20 Left Forearm 3 days    Peripheral IV 07/15/20 Left Forearm 3 days          Epidural Line            Epidural Catheter 07/15/20 3 days          Drain            Colostomy LLQ -- days                Physical Exam:   Gen:  Well-developed, well-nourished male in NAD  CV: RRR  Lungs: Normal respiratory effort on RA  Abd: soft,  RUQ tender, slightly more distended, Incisions clean dry and intact w/ some ecchymosis around lat incision  Colostomy and placement stool air in the beefy, red and viable  Neuro:  AxO x3        Results from last 7 days   Lab Units 07/18/20  0500 07/17/20  1123 07/17/20  0527   WBC Thousand/uL 17 31* 22 45* 20 62*   HEMOGLOBIN g/dL 11 7* 12 2 12 4   HEMATOCRIT % 35 7* 36 8 37 6   PLATELETS Thousands/uL 96* 112* 99*     Results from last 7 days   Lab Units 07/19/20  0552 07/18/20  0500 07/17/20  0527  07/15/20  1431   POTASSIUM mmol/L 3 7 3 6 3 6   < >  --    CHLORIDE mmol/L 103 103 103   < >  --    CO2 mmol/L 26 25 26   < >  --    CO2, I-STAT mmol/L  --   --   --   --  27   BUN mg/dL 8 8 5   < >  --    CREATININE mg/dL 0 59* 0 57* 0 50*   < >  --    GLUCOSE, ISTAT mg/dl  --   --   --   --  75   CALCIUM mg/dL 6 7* 6 7* 6 9*   < >  --     < > = values in this interval not displayed

## 2020-07-19 NOTE — PROGRESS NOTES
Consults  Epidural Follow-up Note - Acute Pain Service    Connor Kras III 59 y o  male MRN: [id number]  Unit/Bed#: [facility] 929-01 Encounter: [id number]      Assessment:   Principal Problem:    Hepatocellular carcinoma ([facility] [address] )      Babatunde Lopez is a 59y o  year old male POD #4 S/P Liver ablation  Still having pain in his subcostal region, and coller bone, but the PCEA, valium and oxycodone seem to be working well to control it  The pain is in the right subcostal area, where the incision is, and also in his right collar bone  He pain is basically unchanged over the past 24 hours and is well controlled  The epidural is in tact and running properly  Patient has been using boluses  He received some dilaudid for breakthrough pain with some relief  Plan:  Analgesia:  - Continue Thoracic epidural infusion of Ropivacaine 0 1% with fentanyl 2 mcg/mL at 8/5/10/3  - Continue Oxycodone 5-10 mg for breakthrough pain (ordered by APS)  - Anticipate epidural removal and transition to primarily PO regimen on POD #5  Bowel Regimen:  - Bowel regimen when appropriate from surgical perspective    APS will continue to follow  Please call  / [phone] or [employer] [facility] Acute Pain Service - [facility] (/ between [phone] and on weekends) with questions or concerns    Pain History  Current pain location(s): abdomen  Pain Scale:   5/10  Quality: sharp  24 hour history: Pain well controlled  No changes      PCEA use: 10/16 boluses  Opioid requirement previous 24 hours: 1 0 mg dilaudid    Meds/Allergies   current meds:   Current Facility-Administered Medications   Medication Dose Route Frequency    acetaminophen (TYLENOL) tablet 650 mg  650 mg Oral Q6H PRN    AMILoride tablet 5 mg  5 mg Oral Daily    amLODIPine (NORVASC) tablet 10 mg  10 mg Oral Daily    atorvastatin (LIPITOR) tablet 80 mg  80 mg Oral Daily With Dinner    diazepam (VALIUM) tablet 5 mg  5 mg Oral Q6H PRN    fluticasone-vilanterol (BREO ELLIPTA) 100-25 mcg/inh inhaler 1 puff  1 puff Inhalation Daily    heparin (porcine) subcutaneous injection 5,000 Units  5,000 Units Subcutaneous Q8H [address]    lidocaine (LIDODERM) 5 % patch 1 patch  1 patch Topical Daily    menthol-methyl salicylate (BENGAY) 82-60 % cream   Apply externally 4x Daily PRN    oxyCODONE (ROXICODONE) immediate release tablet 10 mg  10 mg Oral Q4H PRN    oxyCODONE (ROXICODONE) IR tablet 5 mg  5 mg Oral Q4H PRN    pantoprazole (PROTONIX) EC tablet 20 mg  20 mg Oral Early Morning    potassium chloride (K-DUR,KLOR-CON) CR tablet 10 mEq  10 mEq Oral Daily    ropivacaine 0 1% and fentaNYL 2 mcg/mL PCEA   Epidural Continuous       No Known Allergies    Objective     Temp:  [98 4 °F (36 9 °C)-100 8 °F (38 2 °C)] 98 6 °F (37 °C)  HR:  [] 98  Resp:  [16-20] 16  BP: ()/(62-81) 115/73    Physical Exam   Constitutional: He is oriented to person, place, and time  He appears well-developed  HENT:   Head: Normocephalic  Eyes: Pupils are equal, round, and reactive to light  Cardiovascular: Intact distal pulses  Irregular, rapid rate  Pulmonary/Chest: Effort normal    Abdominal: Soft  There is tenderness  Neurological: He is alert and oriented to person, place, and time  Psychiatric: He has a normal mood and affect   His behavior is normal      Epidural: Site clean/dry/intact, no surrounding erythema/edema/induration, infusion functioning appropriately    Lab Results:   Results from last 7 days   Lab Units 07/19/20  0552   WBC Thousand/uL 12 67*   HEMOGLOBIN g/dL 11 1*   HEMATOCRIT % 33 6*   PLATELETS Thousands/uL 92*      Results from last 7 days   Lab Units 07/19/20  0552  07/15/20  1431   POTASSIUM mmol/L 3 7   < >  --    CHLORIDE mmol/L 103   < >  --    CO2 mmol/L 26   < >  --    CO2, I-STAT mmol/L  --   --  27   BUN mg/dL 8   < >  --    CREATININE mg/dL 0 59*   < >  --    CALCIUM mg/dL 6 7*   < >  --    ALK PHOS U/L 108   < >  --    ALT U/L 193*   < >  --    AST U/L 154*   < >  -- GLUCOSE, ISTAT mg/dl  --   --  75    < > = values in this interval not displayed  Imaging Studies: I have personally reviewed pertinent reports  EKG, Pathology, and Other Studies: I have personally reviewed pertinent reports  Counseling / Coordination of Care  Total floor / unit time spent today 30 minutes  Greater than 50% of total time was spent with the patient and / or family counseling and / or coordination of care  Please note that the APS provides consultative services regarding pain management only  With the exception of ketamine, peripheral nerve catheters, and epidural infusions (and except when indicated), final decisions regarding starting or changing doses of analgesic medications are at the discretion of the consulting service  Off hours consultation and/or medication management is generally not available      Frank Jackson MD  Acute Pain Service

## 2020-07-20 LAB
ERYTHROCYTE [DISTWIDTH] IN BLOOD BY AUTOMATED COUNT: 13.4 % (ref 11.6–15.1)
HCT VFR BLD AUTO: 36.2 % (ref 36.5–49.3)
HGB BLD-MCNC: 11.8 G/DL (ref 12–17)
MCH RBC QN AUTO: 32.1 PG (ref 26.8–34.3)
MCHC RBC AUTO-ENTMCNC: 32.6 G/DL (ref 31.4–37.4)
MCV RBC AUTO: 98 FL (ref 82–98)
PLATELET # BLD AUTO: 104 THOUSANDS/UL (ref 149–390)
PMV BLD AUTO: 11.7 FL (ref 8.9–12.7)
RBC # BLD AUTO: 3.68 MILLION/UL (ref 3.88–5.62)
WBC # BLD AUTO: 12.24 THOUSAND/UL (ref 4.31–10.16)

## 2020-07-20 PROCEDURE — 99024 POSTOP FOLLOW-UP VISIT: CPT | Performed by: SURGERY

## 2020-07-20 PROCEDURE — 85027 COMPLETE CBC AUTOMATED: CPT | Performed by: SURGERY

## 2020-07-20 RX ORDER — GABAPENTIN 100 MG/1
100 CAPSULE ORAL 3 TIMES DAILY
Status: DISCONTINUED | OUTPATIENT
Start: 2020-07-20 | End: 2020-07-21

## 2020-07-20 RX ORDER — POTASSIUM CHLORIDE 20 MEQ/1
20 TABLET, EXTENDED RELEASE ORAL ONCE
Status: COMPLETED | OUTPATIENT
Start: 2020-07-20 | End: 2020-07-20

## 2020-07-20 RX ORDER — ACETAMINOPHEN 325 MG/1
650 TABLET ORAL EVERY 6 HOURS SCHEDULED
Status: DISCONTINUED | OUTPATIENT
Start: 2020-07-20 | End: 2020-07-22 | Stop reason: HOSPADM

## 2020-07-20 RX ORDER — DIAZEPAM 5 MG/1
5 TABLET ORAL EVERY 6 HOURS
Status: DISCONTINUED | OUTPATIENT
Start: 2020-07-20 | End: 2020-07-22 | Stop reason: HOSPADM

## 2020-07-20 RX ORDER — OXYCODONE HYDROCHLORIDE 10 MG/1
10 TABLET ORAL EVERY 4 HOURS PRN
Status: DISCONTINUED | OUTPATIENT
Start: 2020-07-20 | End: 2020-07-21

## 2020-07-20 RX ORDER — HYDROMORPHONE HCL/PF 1 MG/ML
0.5 SYRINGE (ML) INJECTION
Status: DISCONTINUED | OUTPATIENT
Start: 2020-07-20 | End: 2020-07-22 | Stop reason: HOSPADM

## 2020-07-20 RX ORDER — METHOCARBAMOL 500 MG/1
500 TABLET, FILM COATED ORAL EVERY 6 HOURS SCHEDULED
Status: DISCONTINUED | OUTPATIENT
Start: 2020-07-20 | End: 2020-07-22 | Stop reason: HOSPADM

## 2020-07-20 RX ADMIN — METHOCARBAMOL 500 MG: 500 TABLET, FILM COATED ORAL at 09:09

## 2020-07-20 RX ADMIN — GABAPENTIN 100 MG: 100 CAPSULE ORAL at 16:52

## 2020-07-20 RX ADMIN — DIAZEPAM 5 MG: 5 TABLET ORAL at 22:02

## 2020-07-20 RX ADMIN — OXYCODONE HYDROCHLORIDE 10 MG: 10 TABLET ORAL at 16:52

## 2020-07-20 RX ADMIN — POTASSIUM CHLORIDE 10 MEQ: 750 TABLET, EXTENDED RELEASE ORAL at 09:10

## 2020-07-20 RX ADMIN — PANTOPRAZOLE SODIUM 20 MG: 20 TABLET, DELAYED RELEASE ORAL at 05:20

## 2020-07-20 RX ADMIN — OXYCODONE HYDROCHLORIDE 10 MG: 10 TABLET ORAL at 12:44

## 2020-07-20 RX ADMIN — OXYCODONE HYDROCHLORIDE 15 MG: 10 TABLET ORAL at 20:16

## 2020-07-20 RX ADMIN — ATORVASTATIN CALCIUM 80 MG: 80 TABLET, FILM COATED ORAL at 16:56

## 2020-07-20 RX ADMIN — DIAZEPAM 5 MG: 5 TABLET ORAL at 16:56

## 2020-07-20 RX ADMIN — ACETAMINOPHEN 650 MG: 325 TABLET, FILM COATED ORAL at 09:09

## 2020-07-20 RX ADMIN — GABAPENTIN 100 MG: 100 CAPSULE ORAL at 20:17

## 2020-07-20 RX ADMIN — ACETAMINOPHEN 650 MG: 325 TABLET, FILM COATED ORAL at 23:26

## 2020-07-20 RX ADMIN — ACETAMINOPHEN 650 MG: 325 TABLET, FILM COATED ORAL at 12:44

## 2020-07-20 RX ADMIN — AMLODIPINE BESYLATE 10 MG: 10 TABLET ORAL at 09:10

## 2020-07-20 RX ADMIN — ACETAMINOPHEN 650 MG: 325 TABLET, FILM COATED ORAL at 16:52

## 2020-07-20 RX ADMIN — OXYCODONE HYDROCHLORIDE 5 MG: 5 TABLET ORAL at 09:09

## 2020-07-20 RX ADMIN — METHOCARBAMOL 500 MG: 500 TABLET, FILM COATED ORAL at 23:26

## 2020-07-20 RX ADMIN — HYDROMORPHONE HYDROCHLORIDE 0.5 MG: 1 INJECTION, SOLUTION INTRAMUSCULAR; INTRAVENOUS; SUBCUTANEOUS at 23:26

## 2020-07-20 RX ADMIN — POTASSIUM CHLORIDE 20 MEQ: 1500 TABLET, EXTENDED RELEASE ORAL at 09:10

## 2020-07-20 RX ADMIN — HYDROMORPHONE HYDROCHLORIDE 0.5 MG: 1 INJECTION, SOLUTION INTRAMUSCULAR; INTRAVENOUS; SUBCUTANEOUS at 10:31

## 2020-07-20 RX ADMIN — AMILORIDE HYDROCLORIDE 5 MG: 5 TABLET ORAL at 09:09

## 2020-07-20 RX ADMIN — HEPARIN SODIUM 5000 UNITS: 5000 INJECTION INTRAVENOUS; SUBCUTANEOUS at 22:02

## 2020-07-20 RX ADMIN — HYDROMORPHONE HYDROCHLORIDE 0.5 MG: 1 INJECTION, SOLUTION INTRAMUSCULAR; INTRAVENOUS; SUBCUTANEOUS at 15:10

## 2020-07-20 RX ADMIN — HYDROMORPHONE HYDROCHLORIDE 0.5 MG: 1 INJECTION, SOLUTION INTRAMUSCULAR; INTRAVENOUS; SUBCUTANEOUS at 18:33

## 2020-07-20 RX ADMIN — ROPIVACAINE HYDROCHLORIDE: 5 INJECTION, SOLUTION EPIDURAL; INFILTRATION; PERINEURAL at 03:52

## 2020-07-20 RX ADMIN — FLUTICASONE FUROATE AND VILANTEROL TRIFENATATE 1 PUFF: 100; 25 POWDER RESPIRATORY (INHALATION) at 09:10

## 2020-07-20 RX ADMIN — HEPARIN SODIUM 5000 UNITS: 5000 INJECTION INTRAVENOUS; SUBCUTANEOUS at 15:10

## 2020-07-20 RX ADMIN — METHOCARBAMOL 500 MG: 500 TABLET, FILM COATED ORAL at 16:52

## 2020-07-20 RX ADMIN — GABAPENTIN 100 MG: 100 CAPSULE ORAL at 09:10

## 2020-07-20 RX ADMIN — METHOCARBAMOL 500 MG: 500 TABLET, FILM COATED ORAL at 12:43

## 2020-07-20 NOTE — PLAN OF CARE
Problem: PAIN - ADULT  Goal: Verbalizes/displays adequate comfort level or baseline comfort level  Description  Interventions:  - Encourage patient to monitor pain and request assistance  - Assess pain using appropriate pain scale  - Administer analgesics based on type and severity of pain and evaluate response  - Implement non-pharmacological measures as appropriate and evaluate response  - Consider cultural and social influences on pain and pain management  - Notify physician/advanced practitioner if interventions unsuccessful or patient reports new pain  Outcome: Progressing     Problem: INFECTION - ADULT  Goal: Absence or prevention of progression during hospitalization  Description  INTERVENTIONS:  - Assess and monitor for signs and symptoms of infection  - Monitor lab/diagnostic results  - Monitor all insertion sites, i e  indwelling lines, tubes, and drains  - Monitor endotracheal if appropriate and nasal secretions for changes in amount and color  - [place] appropriate cooling/warming therapies per order  - Administer medications as ordered  - Instruct and encourage patient and family to use good hand hygiene technique  - Identify and instruct in appropriate isolation precautions for identified infection/condition  Outcome: Progressing  Goal: Absence of fever/infection during neutropenic period  Description  INTERVENTIONS:  - Monitor WBC    Outcome: Progressing     Problem: SAFETY ADULT  Goal: Patient will remain free of falls  Description  INTERVENTIONS:  - Assess patient frequently for physical needs  -  Identify cognitive and physical deficits and behaviors that affect risk of falls    -  [place] fall precautions as indicated by assessment   - Educate patient/family on patient safety including physical limitations  - Instruct patient to call for assistance with activity based on assessment  - Modify environment to reduce risk of injury  - Consider OT/PT consult to assist with strengthening/mobility  Outcome: Progressing  Goal: Maintain or return to baseline ADL function  Description  INTERVENTIONS:  -  Assess patient's ability to carry out ADLs; assess patient's baseline for ADL function and identify physical deficits which impact ability to perform ADLs (bathing, care of mouth/teeth, toileting, grooming, dressing, etc )  - Assess/evaluate cause of self-care deficits   - Assess range of motion  - Assess patient's mobility; develop plan if impaired  - Assess patient's need for assistive devices and provide as appropriate  - Encourage maximum independence but intervene and supervise when necessary  - Involve family in performance of ADLs  - Assess for home care needs following discharge   - Consider OT consult to assist with ADL evaluation and planning for discharge  - Provide patient education as appropriate  Outcome: Progressing  Goal: Maintain or return mobility status to optimal level  Description  INTERVENTIONS:  - Assess patient's baseline mobility status (ambulation, transfers, stairs, etc )    - Identify cognitive and physical deficits and behaviors that affect mobility  - Identify mobility aids required to assist with transfers and/or ambulation (gait belt, sit-to-stand, lift, walker, cane, etc )  - [place] fall precautions as indicated by assessment  - Record patient progress and toleration of activity level on Mobility SBAR; progress patient to next Phase/Stage  - Instruct patient to call for assistance with activity based on assessment  - Consider rehabilitation consult to assist with strengthening/weightbearing, etc   Outcome: Progressing     Problem: DISCHARGE PLANNING  Goal: Discharge to home or other facility with appropriate resources  Description  INTERVENTIONS:  - Identify barriers to discharge w/patient and caregiver  - Arrange for needed discharge resources and transportation as appropriate  - Identify discharge learning needs (meds, wound care, etc )  - Arrange for interpretive services to assist at discharge as needed  - Refer to Case Management Department for coordinating discharge planning if the patient needs post-hospital services based on physician/advanced practitioner order or complex needs related to functional status, cognitive ability, or social support system  Outcome: Progressing     Problem: Knowledge Deficit  Goal: Patient/family/caregiver demonstrates understanding of disease process, treatment plan, medications, and discharge instructions  Description  Complete learning assessment and assess knowledge base  Interventions:  - Provide teaching at level of understanding  - Provide teaching via preferred learning methods  Outcome: Progressing     Problem: Potential for Falls  Goal: Patient will remain free of falls  Description  INTERVENTIONS:  - Assess patient frequently for physical needs  -  Identify cognitive and physical deficits and behaviors that affect risk of falls    -  [place] fall precautions as indicated by assessment   - Educate patient/family on patient safety including physical limitations  - Instruct patient to call for assistance with activity based on assessment  - Modify environment to reduce risk of injury  - Consider OT/PT consult to assist with strengthening/mobility  Outcome: Progressing

## 2020-07-20 NOTE — PROGRESS NOTES
Epidural Follow-up Note - Acute Pain Service    Porfirio Bianchi III 59 y o  male MRN: [id number]  Unit/Bed#: [facility] 929-01 Encounter: [id number]      Assessment:   Principal Problem:    Hepatocellular carcinoma ([facility] [address] )      Ham Mcclure is a 59y o  year old male POD5 s/p liver ablation    Plan:  Analgesia:  - Discontinue Thoracic epidural infusion  Removed at 9:00 am with tip intact   - heparin SQ last at 7/19 2148   - Last platelet count   Lab Results   Component Value Date     (L) 07/20/2020     - Transition to standard oral opioid regimen with oxycodone 5 mg/10 mg PO q4hrs PRN for moderate/severe pain, Dilaudid 0 5 mg IV q2hrs PRN for breakthrough pain  - Multimodal analgesia with - Tylenol 650 mg PO q6hrs standing  - Gabapentin 100 mg PO TID  - Robaxin 500 mg PO q6hrs   - Valium 5mg q6hr prn    Bowel Regimen:  - Bowel regimen when appropriate from surgical perspective    APS will sign off at this time  Thank you for the consult   All opioids and other analgesics to be written at discretion of primary team  Please call  / [phone] or [facility] (/ between [phone] and on weekends) with questions or concerns    Plan discussed with primary team    Pain History  Current pain location(s): abdomen  Pain Scale:   5-6  Quality: dull, aching  24 hour history: overall pain is well controlled with PCEA    PCEA use:  Opioid requirement previous 24 hours: none    Meds/Allergies   all current active meds have been reviewed and current meds:   Current Facility-Administered Medications   Medication Dose Route Frequency    acetaminophen (TYLENOL) tablet 650 mg  650 mg Oral Q6H [address]    AMILoride tablet 5 mg  5 mg Oral Daily    amLODIPine (NORVASC) tablet 10 mg  10 mg Oral Daily    atorvastatin (LIPITOR) tablet 80 mg  80 mg Oral Daily With Dinner    diazepam (VALIUM) tablet 5 mg  5 mg Oral Q6H PRN    fluticasone-vilanterol (BREO ELLIPTA) 100-25 mcg/inh inhaler 1 puff  1 puff Inhalation Daily    gabapentin (NEURONTIN) capsule 100 mg  100 mg Oral TID    heparin (porcine) subcutaneous injection 5,000 Units  5,000 Units Subcutaneous Q8H [address]    HYDROmorphone (DILAUDID) injection 0 5 mg  0 5 mg Intravenous Q3H PRN    lidocaine (LIDODERM) 5 % patch 1 patch  1 patch Topical Daily    menthol-methyl salicylate (BENGAY) 43-22 % cream   Apply externally 4x Daily PRN    methocarbamol (ROBAXIN) tablet 500 mg  500 mg Oral Q6H [address]    oxyCODONE (ROXICODONE) immediate release tablet 10 mg  10 mg Oral Q4H PRN    oxyCODONE (ROXICODONE) IR tablet 5 mg  5 mg Oral Q4H PRN    pantoprazole (PROTONIX) EC tablet 20 mg  20 mg Oral Early Morning    potassium chloride (K-DUR,KLOR-CON) CR tablet 10 mEq  10 mEq Oral Daily       No Known Allergies    Objective     Temp:  [98 °F (36 7 °C)-100 6 °F (38 1 °C)] 98 9 °F (37 2 °C)  HR:  [] 97  Resp:  [16-22] 18  BP: (104-147)/(67-88) 147/88    Physical Exam   Constitutional: He is oriented to person, place, and time  He appears well-developed  No distress  HENT:   Head: Normocephalic and atraumatic  Cardiovascular:   tachycardic   Pulmonary/Chest: Effort normal    Abdominal: Soft  Musculoskeletal: Normal range of motion  Neurological: He is alert and oriented to person, place, and time  Skin: Skin is warm  Vitals reviewed      Epidural: Site clean/dry/intact, no surrounding erythema/edema/induration, Epidural removed at 9:00 am with tip intact    Lab Results:   Results from last 7 days   Lab Units 07/20/20  0533   WBC Thousand/uL 12 24*   HEMOGLOBIN g/dL 11 8*   HEMATOCRIT % 36 2*   PLATELETS Thousands/uL 104*      Results from last 7 days   Lab Units 07/19/20  0552  07/15/20  1431   POTASSIUM mmol/L 3 7   < >  --    CHLORIDE mmol/L 103   < >  --    CO2 mmol/L 26   < >  --    CO2, I-STAT mmol/L  --   --  27   BUN mg/dL 8   < >  --    CREATININE mg/dL 0 59*   < >  --    CALCIUM mg/dL 6 7*   < >  --    ALK PHOS U/L 108   < >  --    ALT U/L 193*   < > --    AST U/L 154*   < >  --    GLUCOSE, ISTAT mg/dl  --   --  75    < > = values in this interval not displayed  Imaging Studies: I have personally reviewed pertinent reports  EKG, Pathology, and Other Studies: I have personally reviewed pertinent reports  Counseling / Coordination of Care  Total floor / unit time spent today 15 minutes  Greater than 50% of total time was spent with the patient and / or family counseling and / or coordination of care  A description of the counseling / coordination of care: removal of epidural, explaining transition of pain management to IV and PO pain medications, communication with team and nursing staff    Please note that the APS provides consultative services regarding pain management only  With the exception of ketamine, peripheral nerve catheters, and epidural infusions (and except when indicated), final decisions regarding starting or changing doses of analgesic medications are at the discretion of the consulting service  Off hours consultation and/or medication management is generally not available      Jasmine Benavides MD  Acute Pain Service

## 2020-07-20 NOTE — PROGRESS NOTES
Progress Note - Surgical Oncology  Rachel Wilde III 59 y o  male MRN: [id number]  Unit/Bed#: [facility] 929-01 Encounter: [id number]      Objective:  Patient states he slightly better today  Still has thigh right upper quadrant pain from his right subcostal incision that radiates to his right clavicle  One temperature spike of 100 6 yesterday at 3 in the afternoon  Denies any chills  States he is pretty well pain controlled with the epidural   Is getting Valium p r n  For muscle spasms  His ileostomy is functioning  His urinating well on his own  He is tolerating a regular diet  Patient's subcu heparin is held this morning for his epidural to be removed  WBC count down to 12 67 yesterday from 17 3  Hemoglobin stable at 11, total bilirubin down to 1 03 yesterday from 2 31    1875 UA  1 ostomy bag empty    Blood pressure 108/74, pulse 94, temperature 98 9 °F (37 2 °C), temperature source Oral, resp  rate 22, height 5' 9" (1 753 m), weight 63 kg (138 lb 14 2 oz), SpO2 93 %  ,Body mass index is 20 51 kg/m²  Intake/Output Summary (Last 24 hours) at 7/20/2020 0524  Last data filed at 7/20/2020 0352  Gross per 24 hour   Intake 926 05 ml   Output 2625 ml   Net -1698 95 ml       Invasive Devices     Peripheral Intravenous Line            Peripheral IV 07/19/20 Right;Upper;Ventral (anterior) Arm less than 1 day          Epidural Line            Epidural Catheter 07/15/20 4 days          Drain            Colostomy LLQ -- days                Physical Exam:   Abdomen:  Soft, scaphoid, right subcostal incision tenderness touch, no erythema, no ecchymosis  Bowel sounds are present, colostomy with soft stool in the bag  Extremities: There is no calf tenderness    Lab, Imaging and other studies:  Pending  VTE Pharmacologic Prophylaxis: Heparin held this morning for epidural removal  VTE Mechanical Prophylaxis: sequential compression device    Assessment:  POD # 5 liver mass ablation  [address]     Plan:  1   Will restart subcu heparin after epidural removed  2  Follow a m  Labs  3  Oral pain medications went epidural catheters removed  4  Anticipate home elite this afternoon if pain controlled or tomorrow  5  Follow-up appointment in office July 31st, 2020 at 2:45 p  m  [facility]

## 2020-07-20 NOTE — UTILIZATION REVIEW
Elective Surgical Continued Stay Review    Date: 7/20/20    POD#: 5    Current Patient Class: IP Current Level of Care: MS    Assessment/Plan: 59 y o  male, initial surgery date 7/15 with Liver ablation and intraop Liver US for hepatocellular CA  Pt is still c/o thigh and RUQ pain from R subcostal incision with radiation to R clavicle  Had one temp spike yesterday without chills and has been afebrile today  Ileostomy functioning well  Epidural d/c   Voiding well and tolerating a diet  WBC is trending down  Pertinent Labs/Diagnostic Results:     7/17 ECG - Sinus tachycardia  Septal infarct (cited on or before 20-OCT-2006)  Abnormal ECG  When compared with ECG of 04-JUN-2020 18:17,  Premature ventricular complexes are no longer Present  Premature supraventricular complexes are no longer Present  Incomplete right bundle branch block is no longer Present     Ref   Range 7/16/2020 05:10 7/17/2020 05:27 7/18/2020 05:00 7/19/2020 05:52   Sodium Latest Ref Range: 136 - 145 mmol/L 139 136 137 135 (L)   Potassium Latest Ref Range: 3 5 - 5 3 mmol/L 3 4 (L) 3 6 3 6 3 7   Chloride Latest Ref Range: 100 - 108 mmol/L 106 103 103 103   CO2 Latest Ref Range: 21 - 32 mmol/L 26 26 25 26   Anion Gap Latest Ref Range: 4 - 13 mmol/L 7 7 9 6   BUN Latest Ref Range: 5 - 25 mg/dL 10 5 8 8   Creatinine Latest Ref Range: 0 60 - 1 30 mg/dL 0 69 0 50 (L) 0 57 (L) 0 59 (L)   Glucose, Random Latest Ref Range: 65 - 140 mg/dL 227 (H) 90 75 91   Calcium Latest Ref Range: 8 3 - 10 1 mg/dL 6 1 (L) 6 9 (L) 6 7 (L) 6 7 (L)   AST Latest Ref Range: 5 - 45 U/L   386 (H) 154 (H)   ALT Latest Ref Range: 12 - 78 U/L   310 (H) 193 (H)   Alkaline Phosphatase Latest Ref Range: 46 - 116 U/L   113 108   Total Protein Latest Ref Range: 6 4 - 8 2 g/dL   5 4 (L) 5 5 (L)   Albumin Latest Ref Range: 3 5 - 5 0 g/dL   1 9 (L) 1 9 (L)   TOTAL BILIRUBIN Latest Ref Range: 0 20 - 1 00 mg/dL   2 31 (H) 1 03 (H)   eGFR Latest Units: ml/min/1 73sq m 100 115 109 107 Ref  Range 7/16/2020 05:10 7/17/2020 02:26 7/17/2020 05:27 7/17/2020 11:23 7/18/2020 05:00 7/19/2020 05:52 7/20/2020 05:33   WBC Latest Ref Range: 4 31 - 10 16 Thousand/uL 16 59 (H) 21 49 (H) 20 62 (H) 22 45 (H) 17 31 (H) 12 67 (H) 12 24 (H)   Red Blood Cell Count Latest Ref Range: 3 88 - 5 62 Million/uL 3 65 (L) 3 91 3 83 (L) 3 78 (L) 3 61 (L) 3 49 (L) 3 68 (L)   Hemoglobin Latest Ref Range: 12 0 - 17 0 g/dL 11 8 (L) 12 8 12 4 12 2 11 7 (L) 11 1 (L) 11 8 (L)   HCT Latest Ref Range: 36 5 - 49 3 % 36 0 (L) 38 0 37 6 36 8 35 7 (L) 33 6 (L) 36 2 (L)   MCV Latest Ref Range: 82 - 98 fL 99 (H) 97 98 97 99 (H) 96 98   MCH Latest Ref Range: 26 8 - 34 3 pg 32 3 32 7 32 4 32 3 32 4 31 8 32 1   MCHC Latest Ref Range: 31 4 - 37 4 g/dL 32 8 33 7 33 0 33 2 32 8 33 0 32 6   RDW Latest Ref Range: 11 6 - 15 1 % 12 8 13 2 13 4 13 3 13 5 13 4 13 4   Platelet Count Latest Ref Range: 149 - 390 Thousands/uL 143 (L) 106 (L) 99 (L) 112 (L) 96 (L) 92 (L) 104 (L)   MPV Latest Ref Range: 8 9 - 12 7 fL 11 6 11 1 11 6 11 2 11 9 11 8 11 7   nRBC Latest Units: /100 WBCs   0  0 0    Neutrophils % Latest Ref Range: 43 - 75 %   81 (H)  82 (H) 79 (H)    Immat GRANS % Latest Ref Range: 0 - 2 %   1  1 1    Lymphocytes Relative Latest Ref Range: 14 - 44 %   12 (L)  9 (L) 12 (L)    Monocytes Relative Latest Ref Range: 4 - 12 %   6  7 7    Eosinophils Latest Ref Range: 0 - 6 %   0  1 1    Basophils Relative Latest Ref Range: 0 - 1 %   0  0 0    Immature Grans Absolute Latest Ref Range: 0 00 - 0 20 Thousand/uL   0 10  0 09 0 07    Absolute Neutrophils Latest Ref Range: 1 85 - 7 62 Thousands/µL   16 78 (H)  14 38 (H) 10 04 (H)    Lymphocytes Absolute Latest Ref Range: 0 60 - 4 47 Thousands/µL   2 38  1 48 1 51    Absolute Monocytes Latest Ref Range: 0 17 - 1 22 Thousand/µL   1 29 (H)  1 21 0 90    Absolute Eosinophils Latest Ref Range: 0 00 - 0 61 Thousand/µL   0 03  0 12 0 13    Basophils Absolute Latest Ref Range: 0 00 - 0 10 Thousands/µL   0 04 0  03 0 02      Vital Signs:   07/20/20 15:25:05  98 °F (36 7 °C)  94  18  117/76  90  90 %         07/20/20 0700                None (Room air)     07/20/20 06:39:36  98 9 °F (37 2 °C)  97  18  147/88  108  94 %         07/20/20 03:45:59    94        93 %         07/20/20 03:39:29    102        90 %         07/20/20 03:27:27  98 9 °F (37 2 °C)  94  22  108/74  85  89 %Abnormal       Lying   07/20/20 0300                None (Room air)     07/19/20 2305              2 L/min  Nasal cannula     07/19/20 22:47:27  100 1 °F (37 8 °C)  111Abnormal   20  118/74  89  96 %         07/19/20 18:50:42  99 4 °F (37 4 °C)  102  20  104/67  79  94 %         07/19/20 1532  98 7 °F (37 1 °C)                   07/19/20 1500  100 6 °F (38 1 °C)Abnormal   115Abnormal   18  114/74    91 %         07/19/20 13:16:15  98 °F (36 7 °C)                   07/19/20 1310  98 5 °F (36 9 °C)                   07/19/20 1300            92 %         07/19/20 10:49:02  98 6 °F (37 °C)  98  16  115/73  87  88 %Abnormal          07/19/20 0900  99 5 °F (37 5 °C)                   07/19/20 06:47:50  100 6 °F (38 1 °C)Abnormal   102  20  118/76  90  92 %         07/19/20 02:47:11  100 8 °F (38 2 °C)Abnormal   110Abnormal   20  132/81  98  90 %         07/18/20 22:45:32  98 4 °F (36 9 °C)  101  18  139/79  99  90 %         07/18/20 18:48:23  98 8 °F (37 1 °C)  104  20  102/65  77  92 %         07/18/20 1500  100 5 °F (38 1 °C)  111Abnormal   20  92/62    91 %         07/18/20 10:57:44  100 1 °F (37 8 °C)  122Abnormal   18  92/62  72  90 %         07/18/20 0726                None (Room air)     07/18/20 06:46:28  98 6 °F (37 °C)  108Abnormal   18  119/71  87  90 %         07/18/20 02:54:37  97 7 °F (36 5 °C)  106Abnormal   18  117/69  85  93 %           Medications:   Scheduled Medications:    Medications:  acetaminophen 650 mg Oral Q6H [facility]   AMILoride 5 mg Oral Daily   amLODIPine 10 mg Oral Daily   atorvastatin 80 mg Oral Daily With Dinner   fluticasone-vilanterol 1 puff Inhalation Daily   gabapentin 100 mg Oral TID   heparin (porcine) 5,000 Units Subcutaneous Q8H [facility] long[facility]   lidocaine 1 patch Topical Daily   methocarbamol 500 mg Oral Q6H [facility]QU[facility]A   pantoprazole 20 mg Oral Early Morning   potassium chloride 10 mEq Oral Daily     Continuous IV Infusions:     PRN Meds:    diazepam 5 mg Oral Q6H PRN    HYDROmorphone 0 5 mg Intravenous Q3H PRN x2 7/20   menthol-methyl salicylate  Apply externally 4x Daily PRN    oxyCODONE 10 mg Oral Q4H PRN x1 7/20   oxyCODONE 5 mg Oral Q4H PRN x1 7/20     Discharge Plan:  Probably home    Network Utilization Review Department  [email or web address]  ATTENTION: Please call with any questions or concerns to [phone] and carefully listen to the prompts so that you are directed to the right person  All voicemails are confidential   Henny Ewing all requests for admission clinical reviews, approved or denied determinations and any other requests to dedicated fax number below belonging to the campus where the patient is receiving treatment   List of dedicated fax numbers for the Facilities:  [address] DENIALS (Administrative/Medical Necessity) [phone]   [address] (Maternity/NICU/Pediatrics) [phone]   Lokesh Swenson [phone]   [facility] [phone]   Rolly Mackey [phone]   Darius Ware [phone]   [address] [place] [address] [phone]   [facility]  [phone]   [address] [facility]  [address] [facility] [address] [facility] [phone]

## 2020-07-21 LAB
ANION GAP SERPL CALCULATED.3IONS-SCNC: 7 MMOL/L (ref 4–13)
BUN SERPL-MCNC: 6 MG/DL (ref 5–25)
CALCIUM SERPL-MCNC: 7.5 MG/DL (ref 8.3–10.1)
CHLORIDE SERPL-SCNC: 102 MMOL/L (ref 100–108)
CO2 SERPL-SCNC: 26 MMOL/L (ref 21–32)
CREAT SERPL-MCNC: 0.53 MG/DL (ref 0.6–1.3)
ERYTHROCYTE [DISTWIDTH] IN BLOOD BY AUTOMATED COUNT: 13.2 % (ref 11.6–15.1)
GFR SERPL CREATININE-BSD FRML MDRD: 112 ML/MIN/1.73SQ M
GLUCOSE SERPL-MCNC: 86 MG/DL (ref 65–140)
HCT VFR BLD AUTO: 36.1 % (ref 36.5–49.3)
HGB BLD-MCNC: 12 G/DL (ref 12–17)
MCH RBC QN AUTO: 31.7 PG (ref 26.8–34.3)
MCHC RBC AUTO-ENTMCNC: 33.2 G/DL (ref 31.4–37.4)
MCV RBC AUTO: 95 FL (ref 82–98)
PLATELET # BLD AUTO: 120 THOUSANDS/UL (ref 149–390)
PMV BLD AUTO: 11.4 FL (ref 8.9–12.7)
POTASSIUM SERPL-SCNC: 3.7 MMOL/L (ref 3.5–5.3)
RBC # BLD AUTO: 3.79 MILLION/UL (ref 3.88–5.62)
SODIUM SERPL-SCNC: 135 MMOL/L (ref 136–145)
WBC # BLD AUTO: 10.29 THOUSAND/UL (ref 4.31–10.16)

## 2020-07-21 PROCEDURE — 97167 OT EVAL HIGH COMPLEX 60 MIN: CPT

## 2020-07-21 PROCEDURE — 97163 PT EVAL HIGH COMPLEX 45 MIN: CPT

## 2020-07-21 PROCEDURE — 99254 IP/OBS CNSLTJ NEW/EST MOD 60: CPT | Performed by: INTERNAL MEDICINE

## 2020-07-21 PROCEDURE — 85027 COMPLETE CBC AUTOMATED: CPT | Performed by: SURGERY

## 2020-07-21 PROCEDURE — 99024 POSTOP FOLLOW-UP VISIT: CPT | Performed by: SURGERY

## 2020-07-21 PROCEDURE — 80048 BASIC METABOLIC PNL TOTAL CA: CPT | Performed by: SURGERY

## 2020-07-21 RX ORDER — GABAPENTIN 300 MG/1
300 CAPSULE ORAL 3 TIMES DAILY
Status: DISCONTINUED | OUTPATIENT
Start: 2020-07-21 | End: 2020-07-22 | Stop reason: HOSPADM

## 2020-07-21 RX ORDER — OXYCODONE HYDROCHLORIDE 10 MG/1
10 TABLET ORAL EVERY 4 HOURS
Status: DISCONTINUED | OUTPATIENT
Start: 2020-07-21 | End: 2020-07-22 | Stop reason: HOSPADM

## 2020-07-21 RX ORDER — POTASSIUM CHLORIDE 20 MEQ/1
20 TABLET, EXTENDED RELEASE ORAL DAILY
Status: DISCONTINUED | OUTPATIENT
Start: 2020-07-22 | End: 2020-07-22 | Stop reason: HOSPADM

## 2020-07-21 RX ADMIN — ACETAMINOPHEN 650 MG: 325 TABLET, FILM COATED ORAL at 05:16

## 2020-07-21 RX ADMIN — DIAZEPAM 5 MG: 5 TABLET ORAL at 05:16

## 2020-07-21 RX ADMIN — ACETAMINOPHEN 650 MG: 325 TABLET, FILM COATED ORAL at 12:49

## 2020-07-21 RX ADMIN — HYDROMORPHONE HYDROCHLORIDE 0.5 MG: 1 INJECTION, SOLUTION INTRAMUSCULAR; INTRAVENOUS; SUBCUTANEOUS at 03:55

## 2020-07-21 RX ADMIN — AMILORIDE HYDROCLORIDE 5 MG: 5 TABLET ORAL at 10:10

## 2020-07-21 RX ADMIN — ATORVASTATIN CALCIUM 80 MG: 80 TABLET, FILM COATED ORAL at 15:42

## 2020-07-21 RX ADMIN — OXYCODONE HYDROCHLORIDE 15 MG: 10 TABLET ORAL at 15:41

## 2020-07-21 RX ADMIN — DIAZEPAM 5 MG: 5 TABLET ORAL at 17:39

## 2020-07-21 RX ADMIN — METHOCARBAMOL 500 MG: 500 TABLET, FILM COATED ORAL at 17:39

## 2020-07-21 RX ADMIN — OXYCODONE HYDROCHLORIDE 15 MG: 10 TABLET ORAL at 05:16

## 2020-07-21 RX ADMIN — OXYCODONE HYDROCHLORIDE 15 MG: 10 TABLET ORAL at 00:59

## 2020-07-21 RX ADMIN — HEPARIN SODIUM 5000 UNITS: 5000 INJECTION INTRAVENOUS; SUBCUTANEOUS at 13:04

## 2020-07-21 RX ADMIN — OXYCODONE HYDROCHLORIDE 15 MG: 10 TABLET ORAL at 10:10

## 2020-07-21 RX ADMIN — POTASSIUM CHLORIDE 10 MEQ: 750 TABLET, EXTENDED RELEASE ORAL at 10:11

## 2020-07-21 RX ADMIN — FLUTICASONE FUROATE AND VILANTEROL TRIFENATATE 1 PUFF: 100; 25 POWDER RESPIRATORY (INHALATION) at 10:11

## 2020-07-21 RX ADMIN — METHOCARBAMOL 500 MG: 500 TABLET, FILM COATED ORAL at 12:50

## 2020-07-21 RX ADMIN — GABAPENTIN 100 MG: 100 CAPSULE ORAL at 10:10

## 2020-07-21 RX ADMIN — AMLODIPINE BESYLATE 10 MG: 10 TABLET ORAL at 10:11

## 2020-07-21 RX ADMIN — GABAPENTIN 300 MG: 300 CAPSULE ORAL at 22:07

## 2020-07-21 RX ADMIN — GABAPENTIN 300 MG: 300 CAPSULE ORAL at 15:42

## 2020-07-21 RX ADMIN — HYDROMORPHONE HYDROCHLORIDE 0.5 MG: 1 INJECTION, SOLUTION INTRAMUSCULAR; INTRAVENOUS; SUBCUTANEOUS at 13:04

## 2020-07-21 RX ADMIN — METHOCARBAMOL 500 MG: 500 TABLET, FILM COATED ORAL at 05:16

## 2020-07-21 RX ADMIN — ACETAMINOPHEN 650 MG: 325 TABLET, FILM COATED ORAL at 17:39

## 2020-07-21 RX ADMIN — HEPARIN SODIUM 5000 UNITS: 5000 INJECTION INTRAVENOUS; SUBCUTANEOUS at 05:16

## 2020-07-21 RX ADMIN — DIAZEPAM 5 MG: 5 TABLET ORAL at 22:08

## 2020-07-21 RX ADMIN — PANTOPRAZOLE SODIUM 20 MG: 20 TABLET, DELAYED RELEASE ORAL at 05:16

## 2020-07-21 RX ADMIN — HEPARIN SODIUM 5000 UNITS: 5000 INJECTION INTRAVENOUS; SUBCUTANEOUS at 22:07

## 2020-07-21 RX ADMIN — DIAZEPAM 5 MG: 5 TABLET ORAL at 10:10

## 2020-07-21 NOTE — PLAN OF CARE
Problem: OCCUPATIONAL THERAPY ADULT  Goal: Performs self-care activities at highest level of function for planned discharge setting  See evaluation for individualized goals  Description  Treatment Interventions: ADL retraining, Functional transfer training, Endurance training, Patient/family training, Equipment evaluation/education, Compensatory technique education, Energy conservation, Activityengagement  Equipment Recommended: Bedside commode       See flowsheet documentation for full assessment, interventions and recommendations  Note:   Limitation: Decreased ADL status, Decreased endurance, Decreased self-care trans, Decreased high-level ADLs  Prognosis: Good  Assessment: Pt is a 59 y o  male who was admitted to [facility] on 7/15/2020 with Hepatocellular carcinoma ([facility] [address] )   Pt s/p LIVER ABLATION, INTRAOPERATIVE U/S LIVER 7/15/2020  Per EMR, pt has been experiencing increased incisional and right clavicular pain since PCEA removal  Pt's problem list also includes PMH of Chron's, HTN, emphysema/COPD, colostomy, cellulitis, vasovagal syncope, pneumonia  At baseline pt was completing all ADLs/IADLs IND, ambulating IND w/o AD, (+) driving, retired  Pt lives with his dtr and 2 grandchildren (9& 15years old) in an in-law suite apartment with 5+5 POOL  Pt reports his dtr works, is able to assist him when home as needed  Currently pt requires SUP-MIN A for overall ADLS and MIN A for functional mobility/transfers  Pt currently presents with impairments in the following categories -steps to enter environment, limited home support, difficulty performing ADLS and difficulty performing IADLS  activity tolerance, endurance, standing balance/tolerance and sitting balance/tolerance   These impairments, as well as pt's fatigue, pain, decreased caregiver support, risk for falls and home environment  limit pt's ability to safely engage in all baseline areas of occupation, includinggrooming, bathing, dressing, toileting, functional mobility/transfers, community mobility, driving, meal prep, cleaning and leisure activities   From OT standpoint, recommend SHORT TERM REHAB vs  HOME OT upon D/C - pending progress & pain management  Anticipate pt will progress as pain level decreases  OT will continue to follow to address the below stated goals        OT Discharge Recommendation: Post-Acute Rehabilitation Services(vs  HOME OT pending progress/pain management)

## 2020-07-21 NOTE — PLAN OF CARE
Problem: PHYSICAL THERAPY ADULT  Goal: Performs mobility at highest level of function for planned discharge setting  See evaluation for individualized goals  Description  Treatment/Interventions: LE strengthening/ROM, Functional transfer training, Therapeutic exercise, Endurance training, Elevations, Gait training, Bed mobility, Equipment eval/education          See flowsheet documentation for full assessment, interventions and recommendations  Note:   Prognosis: Good  Problem List: Decreased strength, Decreased endurance, Impaired balance, Decreased mobility, Pain  Assessment: Pt is a 60 yo male admitted to [place] [address] on 7/15/2020 s/p operation for liver ablation, intraoperative  Dx: hepatocellular carcinoma  Two patient identifiers were used to confirm  Pt lives in a bi-level home with no POOL and 5+5 steps in side  Pt has a in-law suite where he resides with his daughter and 2 grand sons  Pt was I for ADL's and mobility prior  Pt's impairments include reduced mobility, limited endurance, high risk of falling, pain, gait abnormalities, very limited by pain  These impairments limit the ability of the patient to perform mobility without increased assistance, return to PLOF and participate in everyday life activities  Pt would benefit from continued skilled therapy while in the hospital to improve overall mobility and work towards a safe d/c  Recommend discharge to rehab vs home PT  At the end of the session the patient was left in supine position with call bell and phone within reach  Barriers to Discharge: Inaccessible home environment, Decreased caregiver support     PT Discharge Recommendation: Other (Comment)(rehab vs home PT)     PT - OK to Discharge: No    See flowsheet documentation for full assessment

## 2020-07-21 NOTE — OCCUPATIONAL THERAPY NOTE
Occupational Therapy Evaluation     Patient Name: Simon Milton  Today's Date: 7/21/2020  Problem List  Principal Problem:    Hepatocellular carcinoma [facility])    Past Medical History  Past Medical History:   Diagnosis Date    LUCILLE (acute kidney injury) ([facility] [address] ) 6/28/2017    Cancer ([facility] [address] )     liver    Cataract     Colon polyp     Colostomy in place ([facility] [address] ) 6/29/2017    [facility]     Emphysema of lung ([facility] )     [facility]D [facility]     GERD (gastroesophageal reflux disease)     Hypertension     Hypokalemia 6/28/2017    Hypomagnesemia 6/29/2017    Hyponatremia 6/28/2017    Kidney stone     Pneumonia      Past Surgical History  Past Surgical History:   Procedure Laterality Date    CATARACT EXTRACTION Bilateral     CHOLECYSTECTOMY      COLECTOMY      10 inchs of ileum    COLONOSCOPY N/A 6/30/2017    Procedure: COLONOSCOPY;  Surgeon: Natasha Cool MD;  [facility]: [facility]; Service: Gastroenterology    COLOSTOMY      FINGER SURGERY Left     IR IMAGE GUIDED BIOPSY/ASPIRATION LIVER  6/8/2020    LITHOTRIPSY      LIVER LOBECTOMY N/A 7/15/2020    Procedure: LIVER ABLATION, INTRAOPERATIVE U/S LIVER;  Surgeon: Anatoliy Hill MD;  Location: [facility];  Service: Surgical Oncology           07/21/20 1198   Note Type   Note type Eval only   Restrictions/Precautions   Weight Bearing Precautions Per Order No   Other Precautions Pain; Fall Risk;Bed Alarm; Chair Alarm   Pain Assessment   Pain Assessment Tool 0-10   Pain Score Worst Possible Pain   Pain Location/Orientation Location: Abdomen; Location: Incision;Orientation: Right   Hospital Pain Intervention(s) Repositioned; Ambulation/increased activity   Home Living   Type of Home House  (in-law suite)   Home Layout   (bi-level, 5+5 steps to main level, 0 POOL)   Bathroom Shower/Tub Tub/shower unit   Bathroom Toilet Standard   Bathroom Equipment   (none per pt)   Home Equipment   (none per pt)   Prior Function   Level of [place] Independent with ADLs and functional mobility   Lives With Daughter  (& grandchildren 2 grandchildren (9& 15years old))   Receives Help From [facility] [address] in the last 6 months 0   Vocational Retired   Comments Pt reports his dtr works, is able to assist him when home as needed  Lifestyle   Autonomy At baseline pt was completing all ADLs/IADLs IND, ambulating IND w/o AD, (+) driving, retired  Reciprocal Relationships supportive family   Service to Others retired   Intrinsic Gratification enjoys eating [employer]   Psychosocial   Psychosocial (WDL) WDL   Subjective   Subjective "The pain is so bad"   ADL   Eating Assistance 7  Independent   Grooming Assistance 5  [address] [facility] [address] 4  Minimal Assistance   [address] 3  Moderate Assistance   [address] 4  [address] 2  Maximal [address]  3  Moderate Assistance   Bed Mobility   Supine to Sit 5  Supervision   Additional items HOB elevated; Increased time required   Sit to Supine 5  Supervision   Additional items HOB elevated; Increased time required   Transfers   Sit to Stand 4  Minimal assistance   Additional items Assist x 1; Increased time required;Verbal cues   Stand to Sit 4  Minimal assistance   Additional items Assist x 1; Increased time required   Additional Comments RW   Functional Mobility   Functional Mobility 4  Minimal assistance   Additional Comments short household distances, slow & guarded   Additional items Rolling walker   Balance   Static Sitting Fair +   Dynamic Sitting Fair   Static Standing Fair   Dynamic Standing Fair -   Activity Tolerance   Activity Tolerance Patient limited by fatigue;Patient limited by pain   Medical Staff Made Aware PT, CM   Nurse Made Aware Per RN pt appropriate to be seen   RUE Assessment   RUE Assessment WFL  (limited by pain)   LUE Assessment   LUE Assessment WFL   Hand Function   Gross Motor Coordination Functional   Fine Motor Coordination Functional   Cognition   Overall Cognitive Status WFL   Arousal/Participation Alert; Cooperative   Attention Within functional limits   Orientation Level Oriented X4   Memory Within functional limits   Following Commands Follows all commands and directions without difficulty   Assessment   Limitation Decreased ADL status; Decreased endurance;Decreased self-care trans;Decreased high-level ADLs   Prognosis Good   Assessment Pt is a 59 y o  male who was admitted to [facility] on 7/15/2020 with Hepatocellular carcinoma ([facility] [address] )   Pt s/p LIVER ABLATION, INTRAOPERATIVE U/S LIVER 7/15/2020  Per EMR, pt has been experiencing increased incisional and right clavicular pain since PCEA removal  Pt's problem list also includes PMH of Chron's, HTN, emphysema/COPD, colostomy, cellulitis, vasovagal syncope, pneumonia  At baseline pt was completing all ADLs/IADLs IND, ambulating IND w/o AD, (+) driving, retired  Pt lives with his dtr and 2 grandchildren (9& 15years old) in an in-law suite apartment with 5+5 POOL  Pt reports his dtr works, is able to assist him when home as needed  Currently pt requires MIN A-MOD A for overall ADLS and MIN A for functional mobility/transfers  Pt currently presents with impairments in the following categories -steps to enter environment, limited home support, difficulty performing ADLS and difficulty performing IADLS  activity tolerance, endurance, standing balance/tolerance and sitting balance/tolerance  These impairments, as well as pt's fatigue, pain, decreased caregiver support, risk for falls and home environment  limit pt's ability to safely engage in all baseline areas of occupation, includinggrooming, bathing, dressing, toileting, functional mobility/transfers, community mobility, driving, meal prep, cleaning and leisure activities    From OT standpoint, recommend SHORT TERM REHAB vs  HOME OT upon D/C - pending progress & pain management  Anticipate pt will progress as pain level decreases  OT will continue to follow to address the below stated goals  Goals   Patient Goals to have less pain   LTG Time Frame 10-14   Long Term Goal #1 see goals below   Plan   Treatment Interventions ADL retraining;Functional transfer training; Endurance training;Patient/family training;Equipment evaluation/education; Compensatory technique education; Energy conservation; Activityengagement   Goal Expiration Date 08/04/20   OT Frequency 3-5x/wk   Recommendation   OT Discharge Recommendation Post-Acute Rehabilitation Services  (vs  HOME OT pending progress/pain management)   Equipment Recommended Bedside commode   Modified [place] Scale   Modified [place] Scale 4        GOALS     Pt will improve activity tolerance to G for min 30 min txment sessions     Pt will complete UB ADLs with MOD IND and use of adaptive device and DME as needed     Pt will complete LB ADLs with MOD IND w/ use of AE and DME as needed     Pt will complete toileting w/ MOD IND w/ G hygiene/thoroughness using DME as needed     Pt will improve functional transfers to Mod I on/off all surfaces using DME as needed w/ G balance/safety      Pt will improve functional mobility during ADL/IADL/leisure tasks to Mod I using DME as needed w/ G balance/safety      Pt will demonstrate G carryover of pt/caregiver education and training as appropriate w/ mod I w/o cues w/ good tolerance     Demo Good carryover with safe use of RW during functional tasks  Improve standing balance to Good for 8-10 minutes of participation in functional tasks  Pt to demo % carryover of energy conservation strategies during functional tasks        Claire Marcus, OT

## 2020-07-21 NOTE — CONSULTS
Consultation - Palliative and [address] 59 y o  male [id number]    Assessment:  Andre Luna is a 59 y o  male with history of Chron's and hepatocellular carcinoma post op day 6 s/p liver ablation presents to our service for pain control  He is experiencing increased incisional and right clavicular pain since PCEA removal 7/20 requiring multiple PRNs for inadequate relief  He is ambulating, passing flatus, eating well, and has a functional ostomy  His main barrier to discharge is his pain  Plan:  1  Symptom management - Juan C Montiel is experiencing increased pain from his incision and right    - Prior PCEA 7/15-7/20 settings- ropivicaine/fentanyl 0 2mcg/ml     -Basal- 8 ml/hr    -Bolus- 5 ml, 3 dose lockout    -OME on PCEA 7/19 - 214 2 OME   -Current pain regimen    -Oxycodone 10mg/15mg for moderate/severe pain q4 PRN; Dilaudid 0 5mg for breakthrough pain q2 PRN-- has been requiring maximum dosages- OME 7/20 = 167 5    -Additional pain medications include: Tylenol 650 q6 PRN, gabapentin 100 PO TID, Robaxin 500q6 PRN, Valium 5 q6PRN -- has been requiring multiple non-opoid PRNs   - Increase Gabapentin to 300mg TID to cover for the possible neuropathic pain of the right clavicle  -Maintain current regimen of Oxycodone 10mg/15mg for moderate/severe pain q4 PRN; Dilaudid 0 5mg for breakthrough pain q2 PRN, Tylenol 650 q6 PRN, gabapentin 100 PO TID, Robaxin 500q6 PRN, Valium 5 q6PRN    2  Goals - Pain control      Code Status: full code - Level 1   Decisional apparatus:  Patient is competent on my exam today  If competence is lost, patient's substitute decision maker would default to Yamil Hdz (daughter) by [place] Act 169  Advance Directive / Living Will / POLST:  Yes     I have reviewed the patient's controlled substance dispensing history in the Prescription Drug Monitoring Program in compliance with the [facility] regulations before prescribing any controlled substances           We appreciate the invitation to be involved in this patient's care  Please do not hesitate to reach our on call provider through our clinic answering service at  should you have acute symptom control concerns  Gena Delgado  Palliative and Supportive Care  Clinic/Answering Service: [phone]        IDENTIFICATION:  Consults  Physician Requesting Consult: Jayy Villegas MD  Reason for Consult / Principal Problem: Pain Management  Hx and PE limited by: none    HISTORY OF PRESENT ILLNESS:       Clarita Alfaro is a 59 y o  male with a history of Chron's disease and hepatocellular carcinoma who presents with postoperative pain s/p liver ablation 7/15  He is currently post op day 6 and experiencing 10/10 incisional and right clavicle pain  After the procedure, Gopi Dominguez was placed on PCEA of 0 2mg/ml ropivicaine/fentanyl with the following settings: 8 ml/hr basal, 5 ml bolus with 3 dose lockout  He says that the PCEA adequately covered his incisional pain, but did not help his right clavicle pain  He had robaxin and valium added at that time and he felt this improved his pain  His PCEA was discontinued 7/20 and he was placed on a roxycodone PO 5mg/10mg for moderate/severe pain as well as Dilaudid 0 5 mg IV for breakthrough pain  His roxycodone was increased to 10/15 this morning  He is still requiring all PRNs (Tylenol 650 q6,, Robaxin 500q6, Valium 5q6, Gabapentin 100 po TID) at scheduled intervals  Gopi Dominguez reports that his pain is still significant, 10/10, on assessment today, which he reports is about the same as his pain while on the PCEA  He reports otherwise doing well  No nausea, vomiting, diarrhea  He is eating well, ambulating with a walker frequently, passing gas, and urinating without difficulty  He mentioned that he was told he could possibly go home, but feels his pain is too severe, although he would like to return home as soon as possible   He would prefer an oral regimen so he could return home, but is open to IV management if that is not helpful     Review of Systems   Constitution: Negative for chills, decreased appetite and fever  Cardiovascular: Negative for chest pain and dyspnea on exertion  Endocrine: Negative  Hematologic/Lymphatic: Negative  Skin: Negative  Musculoskeletal: Positive for muscle cramps  Gastrointestinal: Positive for abdominal pain  Negative for constipation, diarrhea, nausea and vomiting  Pain at site of right transverse incision   Genitourinary: Negative for bladder incontinence and frequency  Neurological: Negative  Psychiatric/Behavioral: Negative  Review of systems was widely negative, with the exception of pain with deep inspiration at the incision site and "spasm" of his muscles near the incision site  Past Medical History:   Diagnosis Date    [facility]UC[facility] ([facility] ) 6/28/2017    Cancer ([place] [address] )     liver    Cataract     Colon polyp     Colostomy in place ([place] [address] ) 6/29/2017    Crohn disease ([place] [address] )     Emphysema of lung ([place] [address] )     Emphysema/COPD ([place] [address] )     GERD (gastroesophageal reflux disease)     Hypertension     Hypokalemia 6/28/2017    Hypomagnesemia 6/29/2017    Hyponatremia 6/28/2017    Kidney stone     Pneumonia      Past Surgical History:   Procedure Laterality Date    CATARACT EXTRACTION Bilateral     CHOLECYSTECTOMY      COLECTOMY      10 inchs of ileum    COLONOSCOPY N/A 6/30/2017    Procedure: COLONOSCOPY;  Surgeon: Vane Burch MD;  [facility];   Service: Gastroenterology    COLOSTOMY      FINGER SURGERY Left     IR IMAGE GUIDED BIOPSY/ASPIRATION LIVER  6/8/2020    LITHOTRIPSY      LIVER LOBECTOMY N/A 7/15/2020    Procedure: LIVER ABLATION, INTRAOPERATIVE U/S LIVER;  Surgeon: Puja Henry MD;  [facility];  Service: Surgical Oncology     Social History     Socioeconomic History    Marital status: Single     Spouse name: Not on file    Number of children: Not on file    Years of education: Not on file    Highest education level: Not on file   Occupational History    Not on file   Social Needs    Financial resource strain: Not on file    Food insecurity:     Worry: Not on file     Inability: Not on file    Transportation needs:     Medical: Not on file     Non-medical: Not on file   Tobacco Use    Smoking status: Current Every Day Smoker     Packs/day: 1 00     Types: Cigarettes    Smokeless tobacco: Never Used   Substance and Sexual Activity    Alcohol use: Not Currently     Alcohol/week: 1 0 - 2 0 standard drinks     Types: 1 - 2 Cans of beer per week     Frequency: Monthly or less     Drinks per session: 1 or 2     Comment: "1-2beers a day, glass of wine at night"    Drug use: No    Sexual activity: Not Currently   Lifestyle    Physical activity:     Days per week: Not on file     Minutes per session: Not on file    Stress: Not on file   Relationships    Social connections:     Talks on phone: Not on file     Gets together: Not on file     Attends Hinduism service: Not on file     Active member of club or organization: Not on file     Attends meetings of clubs or organizations: Not on file     Relationship status: Not on file    Intimate partner violence:     Fear of current or ex partner: Not on file     Emotionally abused: Not on file     Physically abused: Not on file     Forced sexual activity: Not on file   Other Topics Concern    Not on file   Social History Narrative    Not on file     History reviewed  No pertinent family history  MEDICATIONS / ALLERGIES:    all current active meds have been reviewed    No Known Allergies    OBJECTIVE:    Physical Exam  Physical Exam   Constitutional: He is oriented to person, place, and time  He appears well-developed and well-nourished  HENT:   Head: Normocephalic and atraumatic  Eyes: Pupils are equal, round, and reactive to light  EOM are normal    Neck: Normal range of motion     Cardiovascular: Normal rate and regular rhythm  Pulmonary/Chest: Effort normal and breath sounds normal    Abdominal: Soft  There is tenderness  Tenderness at sight of right transverse incision  Incision was clean, dry, and intact  Ostomy bag recently changed  Musculoskeletal: Normal range of motion  Neurological: He is alert and oriented to person, place, and time  Skin: Skin is warm and dry  Psychiatric: He has a normal mood and affect  His behavior is normal        Lab Results: I have personally reviewed pertinent labs  Imaging Studies: n/a  EKG, Pathology, and Other Studies: n/a    Counseling / Coordination of Care    Total floor / unit time spent today 50 minutes  Greater than 50% of total time was spent with the patient and / or family counseling and / or coordination of care

## 2020-07-21 NOTE — PROGRESS NOTES
Progress Note - Surgical Oncology  Mammnelia Royal III 59 y o  male MRN: [id number]  Unit/Bed#: [facility] 929-01 Encounter: [id number]      Objective:  Patient is still having a lot pain  His oxycodone was increased to 15 for severe and 10 from a for moderate  He continues to take Valium, gabapentin, IV Dilaudid, Tylenol this scheduled every 6 hours  He has been out of bed twice and ambulated  He is tolerating a regular diet  Passing flatus and colostomy is functioning well  His pain he states continue least to be 10/10  Patient is voiding well on his own  Total bilirubin yesterday was 1 03 down from 2 31     2425 UA  Colostomy output not recorded    Blood pressure 132/86, pulse (!) 106, temperature 98 6 °F (37 °C), temperature source Oral, resp  rate 20, height 5' 9" (1 753 m), weight 63 kg (138 lb 14 2 oz), SpO2 91 %  ,Body mass index is 20 51 kg/m²  Intake/Output Summary (Last 24 hours) at 7/21/2020 0515  Last data filed at 7/21/2020 0401  Gross per 24 hour   Intake 503 75 ml   Output 2425 ml   Net -1921 25 ml       Invasive Devices     Peripheral Intravenous Line            Peripheral IV 07/19/20 Right;Upper;Ventral (anterior) Arm 1 day          Drain            Colostomy LLQ -- days                Physical Exam:   Abdomen:  Soft scaphoid, right subcostal incision is clean and dry, as stool in the ostomy bag, tender over incisional area  Extremities: There is no calf tenderness, no pedal edema    Lab, Imaging and other studies:    VTE Pharmacologic Prophylaxis: Heparin  VTE Mechanical Prophylaxis: sequential compression device    Assessment:  POD # 6 liver mass amblation  Primary [address]     Plan:  1  ?  Palliative care consult for pain control  2  Continue house diet  3  I&Os, colostomy output to be documented  4  Continue out of bed ambulating the halls  5   Work with incentive spirometry more

## 2020-07-21 NOTE — PLAN OF CARE
Problem: PAIN - ADULT  Goal: Verbalizes/displays adequate comfort level or baseline comfort level  Description  Interventions:  - Encourage patient to monitor pain and request assistance  - Assess pain using appropriate pain scale  - Administer analgesics based on type and severity of pain and evaluate response  - Implement non-pharmacological measures as appropriate and evaluate response  - Consider cultural and social influences on pain and pain management  - Notify physician/advanced practitioner if interventions unsuccessful or patient reports new pain  Outcome: Progressing     Problem: INFECTION - ADULT  Goal: Absence or prevention of progression during hospitalization  Description  INTERVENTIONS:  - Assess and monitor for signs and symptoms of infection  - Monitor lab/diagnostic results  - Monitor all insertion sites, i e  indwelling lines, tubes, and drains  - Monitor endotracheal if appropriate and nasal secretions for changes in amount and color  - [place] appropriate cooling/warming therapies per order  - Administer medications as ordered  - Instruct and encourage patient and family to use good hand hygiene technique  - Identify and instruct in appropriate isolation precautions for identified infection/condition  Outcome: Progressing  Goal: Absence of fever/infection during neutropenic period  Description  INTERVENTIONS:  - Monitor WBC    Outcome: Progressing     Problem: SAFETY ADULT  Goal: Patient will remain free of falls  Description  INTERVENTIONS:  - Assess patient frequently for physical needs  -  Identify cognitive and physical deficits and behaviors that affect risk of falls    -  [place] fall precautions as indicated by assessment   - Educate patient/family on patient safety including physical limitations  - Instruct patient to call for assistance with activity based on assessment  - Modify environment to reduce risk of injury  - Consider OT/PT consult to assist with strengthening/mobility  Outcome: Progressing  Goal: Maintain or return to baseline ADL function  Description  INTERVENTIONS:  -  Assess patient's ability to carry out ADLs; assess patient's baseline for ADL function and identify physical deficits which impact ability to perform ADLs (bathing, care of mouth/teeth, toileting, grooming, dressing, etc )  - Assess/evaluate cause of self-care deficits   - Assess range of motion  - Assess patient's mobility; develop plan if impaired  - Assess patient's need for assistive devices and provide as appropriate  - Encourage maximum independence but intervene and supervise when necessary  - Involve family in performance of ADLs  - Assess for home care needs following discharge   - Consider OT consult to assist with ADL evaluation and planning for discharge  - Provide patient education as appropriate  Outcome: Progressing  Goal: Maintain or return mobility status to optimal level  Description  INTERVENTIONS:  - Assess patient's baseline mobility status (ambulation, transfers, stairs, etc )    - Identify cognitive and physical deficits and behaviors that affect mobility  - Identify mobility aids required to assist with transfers and/or ambulation (gait belt, sit-to-stand, lift, walker, cane, etc )  - [place] fall precautions as indicated by assessment  - Record patient progress and toleration of activity level on Mobility SBAR; progress patient to next Phase/Stage  - Instruct patient to call for assistance with activity based on assessment  - Consider rehabilitation consult to assist with strengthening/weightbearing, etc   Outcome: Progressing     Problem: DISCHARGE PLANNING  Goal: Discharge to home or other facility with appropriate resources  Description  INTERVENTIONS:  - Identify barriers to discharge w/patient and caregiver  - Arrange for needed discharge resources and transportation as appropriate  - Identify discharge learning needs (meds, wound care, etc )  - Arrange for interpretive services to assist at discharge as needed  - Refer to Case Management Department for coordinating discharge planning if the patient needs post-hospital services based on physician/advanced practitioner order or complex needs related to functional status, cognitive ability, or social support system  Outcome: Progressing     Problem: Knowledge Deficit  Goal: Patient/family/caregiver demonstrates understanding of disease process, treatment plan, medications, and discharge instructions  Description  Complete learning assessment and assess knowledge base  Interventions:  - Provide teaching at level of understanding  - Provide teaching via preferred learning methods  Outcome: Progressing     Problem: Potential for Falls  Goal: Patient will remain free of falls  Description  INTERVENTIONS:  - Assess patient frequently for physical needs  -  Identify cognitive and physical deficits and behaviors that affect risk of falls    -  [place] fall precautions as indicated by assessment   - Educate patient/family on patient safety including physical limitations  - Instruct patient to call for assistance with activity based on assessment  - Modify environment to reduce risk of injury  - Consider OT/PT consult to assist with strengthening/mobility  Outcome: Progressing

## 2020-07-21 NOTE — PHYSICAL THERAPY NOTE
Physical Therapy Evaluation Note     Patient Name: Becky Marcum    HYGPD'Y Date: 7/21/2020     Problem List  Principal Problem:    Hepatocellular carcinoma [facility])       Past Medical History  Past Medical History:   Diagnosis Date    [facility] ) 6/28/2017    [facility] )     liver    Cataract     Colon polyp     Colostomy in place ([facility] ) 6/29/2017    Crohn disease ([facility] )     [facility] lung [facility] )     [facility]COPD ([facility] )     GERD (gastroesophageal reflux disease)     Hypertension     Hypokalemia 6/28/2017    Hypomagnesemia 6/29/2017    Hyponatremia 6/28/2017    Kidney stone     Pneumonia         Past Surgical History  Past Surgical History:   Procedure Laterality Date    CATARACT EXTRACTION Bilateral     CHOLECYSTECTOMY      COLECTOMY      10 inchs of ileum    COLONOSCOPY N/A 6/30/2017    Procedure: COLONOSCOPY;  Surgeon: Lincoln Bauer MD;  [facility]; Service: Gastroenterology    COLOSTOMY      FINGER SURGERY Left     IR IMAGE GUIDED BIOPSY/ASPIRATION LIVER  6/8/2020    LITHOTRIPSY      LIVER LOBECTOMY N/A 7/15/2020    Procedure: LIVER ABLATION, INTRAOPERATIVE U/S LIVER;  Surgeon: Cecelia Dalton MD;  [facility];  Service: Surgical Oncology      07/21/20 7515   Note Type   Note type Eval only   Pain Assessment   Pain Assessment Tool Gill-Baker FACES   Gill-Baker FACES Pain Rating 10   Pain Location/Orientation Location: Abdomen   Home Living   Type of Home House   Additional Comments Pt lives with his daughter an 2 grandson in a in law suite with no POOL  Pt was I for ADL's and mobility prior Pt does not own any DME  Pt lives in a bi level with 5+5 steps in side  Pt's daughter is home occ but does have to work away from home, so the patient will be home for extended periods of time      Prior Function   Level of [place] Independent with ADLs and functional mobility   Lives With Daughter Receives Help From Family   ADL Assistance Independent   IADLs Independent   Vocational Retired   Restrictions/Precautions   [place] Bearing Precautions Per Order No   Other Precautions Pain; Fall Risk   General   Family/Caregiver Present No   Cognition   Overall Cognitive Status WFL   Arousal/Participation Alert   Orientation Level Oriented X4   Memory Within functional limits   Following Commands Follows all commands and directions without difficulty   RLE Assessment   RLE Assessment WFL   LLE Assessment   LLE Assessment WFL   Bed Mobility   Supine to Sit 5  Supervision   Transfers   Sit to Stand 4  Minimal assistance   Additional items Assist x 1   Stand to Sit 4  Minimal assistance   Additional items Assist x 1   Ambulation/Elevation   Gait pattern Excessively slow; Step to;Shuffling; Forward Flexion   Gait Assistance 4  Minimal assist   Additional items Assist x 1   Assistive Device Rolling walker   Distance 65ftx2   Balance   Static Sitting Fair +   Dynamic Sitting Fair +   Static Standing Fair   Dynamic Standing Fair   Ambulatory Poor +   Endurance Deficit   Endurance Deficit Yes   Activity Tolerance   Activity Tolerance Patient limited by fatigue;Patient limited by pain   Medical Staff Made Aware OT   Nurse Made Aware nurse approved therapy session   Assessment   Prognosis Good   Problem List Decreased strength;Decreased endurance; Impaired balance;Decreased mobility;Pain   Assessment Pt is a 58 yo male admitted to [place] [address] on 7/15/2020 s/p operation for liver ablation, intraoperative  Dx: hepatocellular carcinoma  Two patient identifiers were used to confirm  Pt lives in a bi-level home with no POOL and 5+5 steps in side  Pt has a in-law suite where he resides with his daughter and 2 grand sons  Pt was I for ADL's and mobility prior  Pt's impairments include reduced mobility, limited endurance, high risk of falling, pain, gait abnormalities, very limited by pain   These impairments limit the ability of the patient to perform mobility without increased assistance, return to PLOF and participate in everyday life activities  Pt would benefit from continued skilled therapy while in the hospital to improve overall mobility and work towards a safe d/c  Recommend discharge to rehab vs home PT  At the end of the session the patient was left in supine position with call bell and phone within reach  Barriers to Discharge Inaccessible home environment;Decreased caregiver support   Goals   STG Expiration Date 08/04/20   Short Term Goal #1 STG 1: Pt will perform transfers at a MI level to return to baseline of function  STG 2: Pt will ambulate 300ft with RW at a MI level to reduce the level of assistance needed upon d/c home  STG 3: Pt will negotiate 5+5 steps with HR at a MI level to ensure safety with activity when able to ambulate 150ft at a S level  STG 4: Pt will perform bed mobility at a I to safety return to PLOF  PT Treatment Day 0   Plan   Treatment/Interventions LE strengthening/ROM; Functional transfer training; Therapeutic exercise; Endurance training;Elevations;Gait training;Bed mobility; Equipment eval/education   PT Frequency Other (Comment)  (3-5xwk)   Recommendation   PT Discharge Recommendation Other (Comment)  (rehab vs home PT)   PT - OK to Discharge No   Additional Comments pending pain managment    Modified Codington Scale   Modified Codington Scale 4   Barthel Index   Feeding 10   Bathing 0   Grooming Score 5   Dressing Score 5   Bladder Score 10   Bowels Score 10   Toilet Use Score 5   Transfers (Bed/Chair) Score 10   Mobility (Level Surface) Score 0   Stairs Score 5   Barthel Index Score 60   Italia Lora, Pt, DPT

## 2020-07-22 VITALS
WEIGHT: 138.89 LBS | HEART RATE: 114 BPM | OXYGEN SATURATION: 91 % | SYSTOLIC BLOOD PRESSURE: 142 MMHG | DIASTOLIC BLOOD PRESSURE: 87 MMHG | HEIGHT: 69 IN | BODY MASS INDEX: 20.57 KG/M2 | RESPIRATION RATE: 18 BRPM | TEMPERATURE: 98.1 F

## 2020-07-22 LAB
ANION GAP SERPL CALCULATED.3IONS-SCNC: 6 MMOL/L (ref 4–13)
BASOPHILS # BLD AUTO: 0.02 THOUSANDS/ΜL (ref 0–0.1)
BASOPHILS NFR BLD AUTO: 0 % (ref 0–1)
BUN SERPL-MCNC: 6 MG/DL (ref 5–25)
CALCIUM SERPL-MCNC: 7.9 MG/DL (ref 8.3–10.1)
CHLORIDE SERPL-SCNC: 101 MMOL/L (ref 100–108)
CO2 SERPL-SCNC: 29 MMOL/L (ref 21–32)
CREAT SERPL-MCNC: 0.57 MG/DL (ref 0.6–1.3)
EOSINOPHIL # BLD AUTO: 0.24 THOUSAND/ΜL (ref 0–0.61)
EOSINOPHIL NFR BLD AUTO: 2 % (ref 0–6)
ERYTHROCYTE [DISTWIDTH] IN BLOOD BY AUTOMATED COUNT: 13.4 % (ref 11.6–15.1)
GFR SERPL CREATININE-BSD FRML MDRD: 109 ML/MIN/1.73SQ M
GLUCOSE SERPL-MCNC: 92 MG/DL (ref 65–140)
HCT VFR BLD AUTO: 36 % (ref 36.5–49.3)
HGB BLD-MCNC: 12 G/DL (ref 12–17)
IMM GRANULOCYTES # BLD AUTO: 0.07 THOUSAND/UL (ref 0–0.2)
IMM GRANULOCYTES NFR BLD AUTO: 1 % (ref 0–2)
LYMPHOCYTES # BLD AUTO: 1.32 THOUSANDS/ΜL (ref 0.6–4.47)
LYMPHOCYTES NFR BLD AUTO: 12 % (ref 14–44)
MAGNESIUM SERPL-MCNC: 1.2 MG/DL (ref 1.6–2.6)
MAGNESIUM SERPL-MCNC: 2.9 MG/DL (ref 1.6–2.6)
MCH RBC QN AUTO: 32.4 PG (ref 26.8–34.3)
MCHC RBC AUTO-ENTMCNC: 33.3 G/DL (ref 31.4–37.4)
MCV RBC AUTO: 97 FL (ref 82–98)
MONOCYTES # BLD AUTO: 0.76 THOUSAND/ΜL (ref 0.17–1.22)
MONOCYTES NFR BLD AUTO: 7 % (ref 4–12)
NEUTROPHILS # BLD AUTO: 8.26 THOUSANDS/ΜL (ref 1.85–7.62)
NEUTS SEG NFR BLD AUTO: 78 % (ref 43–75)
NRBC BLD AUTO-RTO: 0 /100 WBCS
PLATELET # BLD AUTO: 156 THOUSANDS/UL (ref 149–390)
PMV BLD AUTO: 11.4 FL (ref 8.9–12.7)
POTASSIUM SERPL-SCNC: 3.7 MMOL/L (ref 3.5–5.3)
RBC # BLD AUTO: 3.7 MILLION/UL (ref 3.88–5.62)
SODIUM SERPL-SCNC: 136 MMOL/L (ref 136–145)
WBC # BLD AUTO: 10.67 THOUSAND/UL (ref 4.31–10.16)

## 2020-07-22 PROCEDURE — 80048 BASIC METABOLIC PNL TOTAL CA: CPT | Performed by: PHYSICIAN ASSISTANT

## 2020-07-22 PROCEDURE — 99024 POSTOP FOLLOW-UP VISIT: CPT | Performed by: SURGERY

## 2020-07-22 PROCEDURE — 85025 COMPLETE CBC W/AUTO DIFF WBC: CPT | Performed by: PHYSICIAN ASSISTANT

## 2020-07-22 PROCEDURE — 83735 ASSAY OF MAGNESIUM: CPT | Performed by: PHYSICIAN ASSISTANT

## 2020-07-22 PROCEDURE — NC001 PR NO CHARGE: Performed by: SURGERY

## 2020-07-22 PROCEDURE — 99232 SBSQ HOSP IP/OBS MODERATE 35: CPT | Performed by: INTERNAL MEDICINE

## 2020-07-22 RX ORDER — GABAPENTIN 300 MG/1
300 CAPSULE ORAL 3 TIMES DAILY
Qty: 30 CAPSULE | Refills: 0 | Status: SHIPPED | OUTPATIENT
Start: 2020-07-22 | End: 2020-11-24

## 2020-07-22 RX ORDER — OXYCODONE HYDROCHLORIDE 10 MG/1
10 TABLET ORAL EVERY 4 HOURS PRN
Qty: 60 TABLET | Refills: 0 | Status: SHIPPED | OUTPATIENT
Start: 2020-07-22 | End: 2020-08-01

## 2020-07-22 RX ORDER — MAGNESIUM SULFATE HEPTAHYDRATE 40 MG/ML
4 INJECTION, SOLUTION INTRAVENOUS ONCE
Status: COMPLETED | OUTPATIENT
Start: 2020-07-22 | End: 2020-07-22

## 2020-07-22 RX ADMIN — OXYCODONE HYDROCHLORIDE 10 MG: 10 TABLET ORAL at 13:40

## 2020-07-22 RX ADMIN — OXYCODONE HYDROCHLORIDE 10 MG: 10 TABLET ORAL at 05:08

## 2020-07-22 RX ADMIN — DIAZEPAM 5 MG: 5 TABLET ORAL at 10:13

## 2020-07-22 RX ADMIN — FLUTICASONE FUROATE AND VILANTEROL TRIFENATATE 1 PUFF: 100; 25 POWDER RESPIRATORY (INHALATION) at 10:10

## 2020-07-22 RX ADMIN — ACETAMINOPHEN 650 MG: 325 TABLET, FILM COATED ORAL at 05:07

## 2020-07-22 RX ADMIN — GABAPENTIN 300 MG: 300 CAPSULE ORAL at 10:12

## 2020-07-22 RX ADMIN — HEPARIN SODIUM 5000 UNITS: 5000 INJECTION INTRAVENOUS; SUBCUTANEOUS at 13:41

## 2020-07-22 RX ADMIN — DIAZEPAM 5 MG: 5 TABLET ORAL at 05:07

## 2020-07-22 RX ADMIN — HEPARIN SODIUM 5000 UNITS: 5000 INJECTION INTRAVENOUS; SUBCUTANEOUS at 05:07

## 2020-07-22 RX ADMIN — ACETAMINOPHEN 650 MG: 325 TABLET, FILM COATED ORAL at 00:50

## 2020-07-22 RX ADMIN — METHOCARBAMOL 500 MG: 500 TABLET, FILM COATED ORAL at 13:41

## 2020-07-22 RX ADMIN — AMILORIDE HYDROCLORIDE 5 MG: 5 TABLET ORAL at 10:13

## 2020-07-22 RX ADMIN — OXYCODONE HYDROCHLORIDE 10 MG: 10 TABLET ORAL at 10:10

## 2020-07-22 RX ADMIN — MAGNESIUM OXIDE TAB 400 MG (240 MG ELEMENTAL MG) 400 MG: 400 (240 MG) TAB at 10:13

## 2020-07-22 RX ADMIN — POTASSIUM CHLORIDE 20 MEQ: 1500 TABLET, EXTENDED RELEASE ORAL at 10:12

## 2020-07-22 RX ADMIN — MAGNESIUM SULFATE IN WATER 4 G: 40 INJECTION, SOLUTION INTRAVENOUS at 10:19

## 2020-07-22 RX ADMIN — ACETAMINOPHEN 650 MG: 325 TABLET, FILM COATED ORAL at 13:41

## 2020-07-22 RX ADMIN — AMLODIPINE BESYLATE 10 MG: 10 TABLET ORAL at 10:12

## 2020-07-22 RX ADMIN — METHOCARBAMOL 500 MG: 500 TABLET, FILM COATED ORAL at 00:50

## 2020-07-22 RX ADMIN — OXYCODONE HYDROCHLORIDE 10 MG: 10 TABLET ORAL at 00:52

## 2020-07-22 RX ADMIN — METHOCARBAMOL 500 MG: 500 TABLET, FILM COATED ORAL at 05:07

## 2020-07-22 RX ADMIN — PANTOPRAZOLE SODIUM 20 MG: 20 TABLET, DELAYED RELEASE ORAL at 05:07

## 2020-07-22 NOTE — SOCIAL WORK
A post acute care recommendation was made by your care team for [facility]  Discussed [place] of Choice with patient  List of providers offered and declined, prefers to remain with [facility] provider  Referral to [facility] via Kathrny Yodit obtained and provided

## 2020-07-22 NOTE — DISCHARGE INSTRUCTIONS
Liver Cancer   WHAT YOU NEED TO KNOW:   Liver cancer can prevent your liver from working correctly and removing harmful material from your blood  The 2 most common types of liver cancer are hepatocellular carcinoma and cholangiocarcinoma  DISCHARGE INSTRUCTIONS:   Call 911 for any of the following:   · You feel lightheaded, short of breath, or have chest pain  · You cough up or vomit blood  · You are confused, or very drowsy and difficult to wake  Contact your healthcare provider if:   · You have a fever  · You have increased weakness or fatigue  · You have appetite loss or weight loss  · You have increased abdominal pain or swelling  · You vomit or cannot keep food or liquids down  · You have increased jaundice or your urine is dark  · You have questions or concerns about your condition or care  Medicines:   · Medicines  may be given to decrease nausea, pain, or swelling  You may also need medicine to reduce other symptoms  · Take your medicine as directed  Contact your healthcare provider if you think your medicine is not helping or if you have side effects  Tell him or her if you are allergic to any medicine  Keep a list of the medicines, vitamins, and herbs you take  Include the amounts, and when and why you take them  Bring the list or the pill bottles to follow-up visits  Carry your medicine list with you in case of an emergency  Follow up with your healthcare provider as directed: You will need to return for more tests  Write down your questions so you remember to ask them during your visits  Do not drink alcohol:  Alcohol harms your liver  It can also make your symptoms worse  Do not smoke:  Nicotine can damage blood vessels and make it more difficult to manage your liver cancer  Smoking also increases your risk for new or returning cancer and delays healing after treatment  Do not use e-cigarettes or smokeless tobacco in place of cigarettes or to help you quit  They still contain nicotine  Ask your healthcare provider for information if you currently smoke and need help quitting  Drink liquids as directed: Too much or not enough liquid can cause swelling in your legs and abdomen  Ask how much liquid to drink each day and which liquids are best for you  Eat small meals throughout the day: You may not feel hungry, but it is important that you eat  Proper nutrition can give you more energy and help you feel better  A dietitian can help you find ways to get enough protein, calories, vitamins, and minerals  Ask if you need to limit sodium (salt)  Exercise as directed:  Exercise can help increase your energy level and appetite  Ask your healthcare provider how much exercise you need an which exercises are best for you  © 2017 [facility] Information is for End User's use only and may not be sold, redistributed or otherwise used for commercial purposes  All illustrations and images included in CareNotes® are the copyrighted property of A D A M [employer] Inc  or Valentin Cameron  The above information is an  only  It is not intended as medical advice for individual conditions or treatments  Talk to your doctor, nurse or pharmacist before following any medical regimen to see if it is safe and effective for you

## 2020-07-22 NOTE — PROGRESS NOTES
Progress note - Palliative and [address] 59 y o  male [id number]    Assessment:    - Geremias Christina is a 59 y o  male with history of Chron's and hepatocellular carcinoma post op day 7 s/p liver ablation presents to our service for pain control  He is experiencing pain at incisional site and referred pain to the right clavicle  He is ambulating, passing flatus, eating well, and has a functional ostomy  His main barrier to discharge is his pain, which is more well controlled  Plan:  1  Symptom management - Letha Chowdhury is feeling relief from the majority of his symptoms    -Past 24 hours requirements- 108 5 OME   -Last required PRN Oxycodone 3pm yesterday   - Regimen for discharge:    -oxycodone 10mg IR q4H PRN    -Gabapentin 300 TID   -Discontinue the following, as not likely providing the relief Letha Chowdhury is experiencing:    -Robaxin 500mg q6    -Valium 5mg q6    -Tylenol 650mg q6   - Follow up outpatient palliative care to tailor pain medication regimen after discharge- 7/31    2  Goals - Pain control   -                          Code Status: full code - Level 1              Decisional apparatus:  Patient is competent on my exam today  If competence is lost, patient's substitute decision maker would default to Sebastian Gannon (daughter) by [place]  Advance Directive / Living Will / POLST:  Yes       Interval history:       Letha Chowdhury reports doing well today  He is still experiencing some "spasming" pain at his right clavicle, but reports that it is much less frequent  He rates his pain as a 4/10 and that it is not bothering him as much  Some incisional pain at right transverse incision with movement  He is otherwise doing well, ambulating, eating well, functional ostomy, afebrile  He feels he is ready to go home  Review of systems was negative for shortness of breath, chest pain, nausea, vomiting, changes in bowel habits      MEDICATIONS / ALLERGIES:     all current active meds have been reviewed    No Known Allergies    OBJECTIVE:    Physical Exam  Physical Exam   Constitutional: He is oriented to person, place, and time  He appears well-developed  HENT:   Head: Normocephalic and atraumatic  Eyes: Pupils are equal, round, and reactive to light  EOM are normal    Neck: Normal range of motion  Cardiovascular: Normal rate, regular rhythm and normal heart sounds  Pulmonary/Chest: Effort normal and breath sounds normal  No respiratory distress  He has no wheezes  He has no rales  Abdominal: Soft  Bowel sounds are normal    Tender at right transverse incision  Incision is clean, dry, and intact  Left sided ostomy  Neurological: He is alert and oriented to person, place, and time  No cranial nerve deficit  Skin: Skin is warm and dry  Psychiatric: He has a normal mood and affect  His behavior is normal        Lab Results: I have personally reviewed pertinent labs  Imaging Studies: n/a  EKG, Pathology, and Other Studies: n/a    Counseling / Coordination of Care    Total floor / unit time spent today 25 minutes  Greater than 50% of total time was spent with the patient and / or family counseling and / or coordination of care

## 2020-07-22 NOTE — PLAN OF CARE
Problem: PAIN - ADULT  Goal: Verbalizes/displays adequate comfort level or baseline comfort level  Description  Interventions:  - Encourage patient to monitor pain and request assistance  - Assess pain using appropriate pain scale  - Administer analgesics based on type and severity of pain and evaluate response  - Implement non-pharmacological measures as appropriate and evaluate response  - Consider cultural and social influences on pain and pain management  - Notify physician/advanced practitioner if interventions unsuccessful or patient reports new pain  Outcome: Progressing     Problem: INFECTION - ADULT  Goal: Absence or prevention of progression during hospitalization  Description  INTERVENTIONS:  - Assess and monitor for signs and symptoms of infection  - Monitor lab/diagnostic results  - Monitor all insertion sites, i e  indwelling lines, tubes, and drains  - Monitor endotracheal if appropriate and nasal secretions for changes in amount and color  - [place] appropriate cooling/warming therapies per order  - Administer medications as ordered  - Instruct and encourage patient and family to use good hand hygiene technique  - Identify and instruct in appropriate isolation precautions for identified infection/condition  Outcome: Progressing  Goal: Absence of fever/infection during neutropenic period  Description  INTERVENTIONS:  - Monitor WBC    Outcome: Progressing     Problem: SAFETY ADULT  Goal: Patient will remain free of falls  Description  INTERVENTIONS:  - Assess patient frequently for physical needs  -  Identify cognitive and physical deficits and behaviors that affect risk of falls    -  [place] fall precautions as indicated by assessment   - Educate patient/family on patient safety including physical limitations  - Instruct patient to call for assistance with activity based on assessment  - Modify environment to reduce risk of injury  - Consider OT/PT consult to assist with strengthening/mobility  Outcome: Progressing  Goal: Maintain or return to baseline ADL function  Description  INTERVENTIONS:  -  Assess patient's ability to carry out ADLs; assess patient's baseline for ADL function and identify physical deficits which impact ability to perform ADLs (bathing, care of mouth/teeth, toileting, grooming, dressing, etc )  - Assess/evaluate cause of self-care deficits   - Assess range of motion  - Assess patient's mobility; develop plan if impaired  - Assess patient's need for assistive devices and provide as appropriate  - Encourage maximum independence but intervene and supervise when necessary  - Involve family in performance of ADLs  - Assess for home care needs following discharge   - Consider OT consult to assist with ADL evaluation and planning for discharge  - Provide patient education as appropriate  Outcome: Progressing  Goal: Maintain or return mobility status to optimal level  Description  INTERVENTIONS:  - Assess patient's baseline mobility status (ambulation, transfers, stairs, etc )    - Identify cognitive and physical deficits and behaviors that affect mobility  - Identify mobility aids required to assist with transfers and/or ambulation (gait belt, sit-to-stand, lift, walker, cane, etc )  - [place] fall precautions as indicated by assessment  - Record patient progress and toleration of activity level on Mobility SBAR; progress patient to next Phase/Stage  - Instruct patient to call for assistance with activity based on assessment  - Consider rehabilitation consult to assist with strengthening/weightbearing, etc   Outcome: Progressing     Problem: DISCHARGE PLANNING  Goal: Discharge to home or other facility with appropriate resources  Description  INTERVENTIONS:  - Identify barriers to discharge w/patient and caregiver  - Arrange for needed discharge resources and transportation as appropriate  - Identify discharge learning needs (meds, wound care, etc )  - Arrange for interpretive services to assist at discharge as needed  - Refer to Case Management Department for coordinating discharge planning if the patient needs post-hospital services based on physician/advanced practitioner order or complex needs related to functional status, cognitive ability, or social support system  Outcome: Progressing     Problem: Knowledge Deficit  Goal: Patient/family/caregiver demonstrates understanding of disease process, treatment plan, medications, and discharge instructions  Description  Complete learning assessment and assess knowledge base  Interventions:  - Provide teaching at level of understanding  - Provide teaching via preferred learning methods  Outcome: Progressing     Problem: Potential for Falls  Goal: Patient will remain free of falls  Description  INTERVENTIONS:  - Assess patient frequently for physical needs  -  Identify cognitive and physical deficits and behaviors that affect risk of falls    -  [place] fall precautions as indicated by assessment   - Educate patient/family on patient safety including physical limitations  - Instruct patient to call for assistance with activity based on assessment  - Modify environment to reduce risk of injury  - Consider OT/PT consult to assist with strengthening/mobility  Outcome: Progressing

## 2020-07-22 NOTE — DISCHARGE SUMMARY
Discharge Summary - Joaquin Barbosa III 59 y o  male MRN: [id number]    [facility] Encounter: [id number]    Admission Date:   Admission Orders (From admission, onward)     Ordered        07/15/20 1555  Inpatient Admission  Once                     Admitting Diagnosis: Hepatocellular carcinoma ([address] ) [C22 0]    HPI: 70-year-old male who recently presented with left chest wall pain  CT on May 28, 2020 revealed a 3 2 x 2 6 cm hypervascular mass in the right hepatic lobe  This was not present in June of 2017  There was also severe emphysema seen  Several pulmonary nodules were seen and this was felt to be inflammatory  Follow-up MRI revealed a 3 3 x 2 7 x 3 3 cm hypervascular mass  IR biopsy was then performed  Pathology revealed [address]  No abdominal pain, nausea or vomiting  He has lost close to 30 lb that he relates to stress of his daughter's divorce  He does have a history of COPD and he also had an abnormal EKG during his hospitalization with right bundle branch block  He denies any chest pain or shortness of breath when climbing flights of steps  He is on inhalers  He also has an ileostomy secondary to his Crohn's disease  Liver Ablation was scheduled     Procedures Performed: Liver Ablation    Summary of Hospital Course:  Patient admitted for procedure on July 15th  Ablation was done of segment 5 lesion  Postoperative course uncomplicated, with exception of postoperative pain at incision, shoulder pain, and spasms of his right upper quadrant  Acute Pain Service managed his epidural   It was removed on postop day 5  Palliative Care was consulted for recommendations regarding pain management on discharge  Patient was set up with home PT, and follow up with Surgical Oncology and Palliative Care  He was ready for discharge on 7/22      Significant Findings, Care, Treatment and Services Provided: Liver Ablation  Consults: Acute Pain Service, Palliative Care    Complications: None    Discharge Diagnosis: [address]  s/p liver ablation    Resolved Problems  Date Reviewed: 7/7/2020    None          Condition at Discharge: stable         Discharge instructions/Information to patient and family:   See after visit summary for information provided to patient and family  Provisions for Follow-Up Care:  See after visit summary for information related to follow-up care and any pertinent home health orders  PCP: Emily Anaya MD    Disposition: home with PT    Planned Readmission: No      Discharge Statement   I spent 25 minutes discharging the patient  This time was spent on the day of discharge  I had direct contact with the patient on the day of discharge  Additional documentation is required if more than 30 minutes were spent on discharge  Discharge Medications:  See after visit summary for reconciled discharge medications provided to patient and family

## 2020-07-22 NOTE — PROGRESS NOTES
Progress Note - Surgical Oncology  Lena Steele III 59 y o  male MRN: [id number]  Unit/Bed#: [facility] 929-01 Encounter: [id number]    Subjective:   Patient reporting improvement in his pain this morning, rating it as 5/10  Did not require prn for pain control overnight  Pain regimen is oxycodone 10 mg q4h, robaxin 500 mg q6h, gabapentin 300 mg TID, tylenol 650 q6h, and valium 5 mg q6h  Appreciate palliative care recommendations for new pain regimen  Tolerating regular diet, urinating without difficulty, and he has been out of bed and is ambulating well  Passing flatus and ostomy bag functioning appropriately  Objective:   Blood pressure 141/88, pulse 97, temperature 98 °F (36 7 °C), resp  rate 15, height 5' 9" (1 753 m), weight 63 kg (138 lb 14 2 oz), SpO2 98 %  ,Body mass index is 20 51 kg/m²  Intake/Output Summary (Last 24 hours) at 7/22/2020 0541  Last data filed at 7/22/2020 0513  Gross per 24 hour   Intake 580 ml   Output 1100 ml   Net -520 ml   Urine output: 400 cc ovn (1100 cc/24 hour)  Osotomy output: not recorded      Invasive Devices     Peripheral Intravenous Line            Peripheral IV 07/19/20 Right;Upper;Ventral (anterior) Arm 2 days          Drain            Colostomy LLQ -- days                Physical Exam: General appearance: alert and oriented, in no acute distress  Abdomen: soft, non-tender to palpation, incision clean/dry/intact  Extremities: no edema, redness or tenderness in the calves or thighs    Lab, Imaging and other studies:  CBC:   Lab Results   Component Value Date    WBC 10 67 (H) 07/22/2020    HGB 12 0 07/22/2020    HCT 36 0 (L) 07/22/2020    MCV 97 07/22/2020     07/22/2020    MCH 32 4 07/22/2020    MCHC 33 3 07/22/2020    RDW 13 4 07/22/2020    MPV 11 4 07/22/2020    NRBC 0 07/22/2020     VTE Pharmacologic Prophylaxis: Heparin  VTE Mechanical Prophylaxis: sequential compression device     Assessment:  Mr José Jonas is a 59 y o   Male POD7 s/p liver ablation for hepatocellular carcinoma  He is clinically improving as evidenced by his improvement in pain and decreased need for prn pain medications  Plan:  1  Follow up with palliative for home pain regimen scripts  2  Continue regular diet  3  I&Os  Follow up on ostomy output  4  Encourage out of bed and ambulation  5   Continue incentive spirometry     Dispo: anticipate discharge today pending palliative recommendation    Laura Mays MS4

## 2020-07-26 ENCOUNTER — TELEPHONE (OUTPATIENT)
Dept: OTHER | Facility: OTHER | Age: 65
End: 2020-07-26

## 2020-07-26 DIAGNOSIS — L03.311 CELLULITIS OF ABDOMINAL WALL: Primary | ICD-10-CM

## 2020-07-26 RX ORDER — CEPHALEXIN 500 MG/1
500 CAPSULE ORAL EVERY 6 HOURS SCHEDULED
Status: DISCONTINUED | OUTPATIENT
Start: 2020-07-26 | End: 2020-07-31 | Stop reason: HOSPADM

## 2020-07-26 NOTE — TELEPHONE ENCOUNTER
Patient calling back saying he was supposed to have a prescription sent for an antibiotic but [facility] never got anything  Can we please resend Keflex to [facility]      Callback # [phone]

## 2020-07-27 ENCOUNTER — TELEPHONE (OUTPATIENT)
Dept: SURGICAL ONCOLOGY | Facility: CLINIC | Age: 65
End: 2020-07-27

## 2020-07-27 NOTE — TELEPHONE ENCOUNTER
Pt's daughter called in to report that [facility] never received the script for Keflex that Dr Niesha Kyle was supposed to send in yesterday  Per Epic record, script was entered but not transmitted to pharmacy  Script info called into [facility] today; she will  later today

## 2020-07-28 ENCOUNTER — TELEPHONE (OUTPATIENT)
Dept: SURGICAL ONCOLOGY | Facility: CLINIC | Age: 65
End: 2020-07-28

## 2020-07-29 NOTE — PATIENT INSTRUCTIONS
Please protect yourself from the novel Coronavirus (COVID-19)! Even though we STILL do not have a vaccine or good antiviral drugs for this infection, the following strategies can help you stay healthy:    = Wash your hands with soap and water, or hand  with at least 60% alcohol, often  = Avoid touching your face!   = Avoid close contact with others, even if they seem healthy  Avoid gatherings of more than 10 people, and don't travel unnecessarily  = Stay home, except to get medical care or other essentials  If you can eat and drink and breathe and sleep, please consider calling your doctor's office instead of visiting in person, even if you are ill   = Cover your cough with a tissue, or your elbow  = Clean frequently touched surfaces and objects daily (e g , tables, countertops, light switches, doorknobs, and cabinet handles)  Regular household detergent and water are sufficient  Numbers of cases of Coronavirus are spiking in many  States  This is not a more dangerous virus, but a sign that more people in a community are spreading the virus  Please check the local disease reports near you if you consider travelling this summer  We do NOT advise travel to any community or State with a rising viral caseload  Check out [employer] for [place] data that are updated daily  http://www [employer] [place]/   Global Epidemics  Org, from [facility]OUTPAT[facility] will give you Hxvxgc-ga-Qheocg information on virus cases  Https://globalepidemics  org/  PRESCRIPTION REFILL REMINDER:  All medication refills should be requested prior to [facility] on Friday  Any refill requests after noon on Friday would be addressed the following Monday

## 2020-07-31 ENCOUNTER — HOSPITAL ENCOUNTER (EMERGENCY)
Facility: HOSPITAL | Age: 65
Discharge: HOME | End: 2020-07-31
Attending: EMERGENCY MEDICINE | Admitting: EMERGENCY MEDICINE
Payer: COMMERCIAL

## 2020-07-31 ENCOUNTER — OFFICE VISIT (OUTPATIENT)
Dept: PALLIATIVE MEDICINE | Facility: CLINIC | Age: 65
End: 2020-07-31
Payer: COMMERCIAL

## 2020-07-31 VITALS
HEART RATE: 96 BPM | RESPIRATION RATE: 20 BRPM | DIASTOLIC BLOOD PRESSURE: 68 MMHG | SYSTOLIC BLOOD PRESSURE: 122 MMHG | TEMPERATURE: 97 F | OXYGEN SATURATION: 96 %

## 2020-07-31 VITALS
TEMPERATURE: 98.3 F | RESPIRATION RATE: 18 BRPM | DIASTOLIC BLOOD PRESSURE: 72 MMHG | WEIGHT: 126.6 LBS | HEIGHT: 69 IN | SYSTOLIC BLOOD PRESSURE: 104 MMHG | OXYGEN SATURATION: 92 % | BODY MASS INDEX: 18.75 KG/M2 | HEART RATE: 116 BPM

## 2020-07-31 DIAGNOSIS — C22.0 HEPATOCELLULAR CARCINOMA (HCC): Primary | ICD-10-CM

## 2020-07-31 DIAGNOSIS — K50.90 CROHN'S DISEASE WITHOUT COMPLICATION, UNSPECIFIED GASTROINTESTINAL TRACT LOCATION (HCC): ICD-10-CM

## 2020-07-31 DIAGNOSIS — L02.211 ABDOMINAL WALL ABSCESS: ICD-10-CM

## 2020-07-31 DIAGNOSIS — T81.49XA WOUND INFECTION AFTER SURGERY: Primary | ICD-10-CM

## 2020-07-31 DIAGNOSIS — J43.9 PULMONARY EMPHYSEMA, UNSPECIFIED EMPHYSEMA TYPE (HCC): ICD-10-CM

## 2020-07-31 DIAGNOSIS — Z51.5 PALLIATIVE CARE PATIENT: ICD-10-CM

## 2020-07-31 LAB
ATRIAL RATE: 96 BPM
ATRIAL RATE: 96 BPM
BASOPHILS # BLD AUTO: 0.04 THOUSANDS/ΜL (ref 0–0.1)
BASOPHILS NFR BLD AUTO: 0 % (ref 0–1)
EOSINOPHIL # BLD AUTO: 0.1 THOUSAND/ΜL (ref 0–0.61)
EOSINOPHIL NFR BLD AUTO: 1 % (ref 0–6)
ERYTHROCYTE [DISTWIDTH] IN BLOOD BY AUTOMATED COUNT: 13.2 % (ref 11.6–15.1)
HCT VFR BLD AUTO: 37.1 % (ref 36.5–49.3)
HGB BLD-MCNC: 12.1 G/DL (ref 12–17)
IMM GRANULOCYTES # BLD AUTO: 0.05 THOUSAND/UL (ref 0–0.2)
IMM GRANULOCYTES NFR BLD AUTO: 0 % (ref 0–2)
LYMPHOCYTES # BLD AUTO: 1.3 THOUSANDS/ΜL (ref 0.6–4.47)
LYMPHOCYTES NFR BLD AUTO: 11 % (ref 14–44)
MCH RBC QN AUTO: 31.7 PG (ref 26.8–34.3)
MCHC RBC AUTO-ENTMCNC: 32.6 G/DL (ref 31.4–37.4)
MCV RBC AUTO: 97 FL (ref 82–98)
MONOCYTES # BLD AUTO: 1.02 THOUSAND/ΜL (ref 0.17–1.22)
MONOCYTES NFR BLD AUTO: 9 % (ref 4–12)
NEUTROPHILS # BLD AUTO: 9.47 THOUSANDS/ΜL (ref 1.85–7.62)
NEUTS SEG NFR BLD AUTO: 79 % (ref 43–75)
NRBC BLD AUTO-RTO: 0 /100 WBCS
P AXIS: 54 DEGREES
P AXIS: 60 DEGREES
PLATELET # BLD AUTO: 360 THOUSANDS/UL (ref 149–390)
PMV BLD AUTO: 10.2 FL (ref 8.9–12.7)
PR INTERVAL: 150 MS
PR INTERVAL: 150 MS
QRS AXIS: 15 DEGREES
QRS AXIS: 4 DEGREES
QRSD INTERVAL: 86 MS
QRSD INTERVAL: 90 MS
QT INTERVAL: 326 MS
QT INTERVAL: 328 MS
QTC INTERVAL: 411 MS
QTC INTERVAL: 414 MS
RBC # BLD AUTO: 3.82 MILLION/UL (ref 3.88–5.62)
T WAVE AXIS: 69 DEGREES
T WAVE AXIS: 71 DEGREES
VENTRICULAR RATE: 96 BPM
VENTRICULAR RATE: 96 BPM
WBC # BLD AUTO: 11.98 THOUSAND/UL (ref 4.31–10.16)

## 2020-07-31 PROCEDURE — 99284 EMERGENCY DEPT VISIT MOD MDM: CPT

## 2020-07-31 PROCEDURE — 99024 POSTOP FOLLOW-UP VISIT: CPT | Performed by: SURGERY

## 2020-07-31 PROCEDURE — 36415 COLL VENOUS BLD VENIPUNCTURE: CPT | Performed by: EMERGENCY MEDICINE

## 2020-07-31 PROCEDURE — 87075 CULTR BACTERIA EXCEPT BLOOD: CPT | Performed by: SURGERY

## 2020-07-31 PROCEDURE — 87070 CULTURE OTHR SPECIMN AEROBIC: CPT | Performed by: SURGERY

## 2020-07-31 PROCEDURE — 87077 CULTURE AEROBIC IDENTIFY: CPT | Performed by: SURGERY

## 2020-07-31 PROCEDURE — 87186 SC STD MICRODIL/AGAR DIL: CPT | Performed by: SURGERY

## 2020-07-31 PROCEDURE — 93005 ELECTROCARDIOGRAM TRACING: CPT

## 2020-07-31 PROCEDURE — 93010 ELECTROCARDIOGRAM REPORT: CPT | Performed by: INTERNAL MEDICINE

## 2020-07-31 PROCEDURE — 85025 COMPLETE CBC W/AUTO DIFF WBC: CPT | Performed by: EMERGENCY MEDICINE

## 2020-07-31 PROCEDURE — 87205 SMEAR GRAM STAIN: CPT | Performed by: SURGERY

## 2020-07-31 PROCEDURE — 99205 OFFICE O/P NEW HI 60 MIN: CPT | Performed by: FAMILY MEDICINE

## 2020-07-31 RX ORDER — SULFAMETHOXAZOLE AND TRIMETHOPRIM 400; 80 MG/1; MG/1
2 TABLET ORAL EVERY 12 HOURS SCHEDULED
Qty: 20 TABLET | Refills: 0 | Status: SHIPPED | OUTPATIENT
Start: 2020-07-31 | End: 2020-08-05

## 2020-07-31 RX ORDER — SULFAMETHOXAZOLE AND TRIMETHOPRIM 400; 80 MG/1; MG/1
2 TABLET ORAL EVERY 12 HOURS SCHEDULED
Status: DISCONTINUED | OUTPATIENT
Start: 2020-07-31 | End: 2020-07-31 | Stop reason: HOSPADM

## 2020-07-31 RX ADMIN — SULFAMETHOXAZOLE AND TRIMETHOPRIM 2 TABLET: 400; 80 TABLET ORAL at 14:36

## 2020-07-31 NOTE — PROGRESS NOTES
Outpatient Consultation - Palliative and [facility] Deshawn III 59 y o  male [id number]    Assessment & Plan  1  Hepatocellular carcinoma ([facility] [address] )    2  Crohn's disease without complication, unspecified gastrointestinal tract location ([address] )    3  Pulmonary emphysema, unspecified emphysema type ([facility] [address] )    4  Palliative care patient    5  Abdominal wall abscess        Medications adjusted this encounter:  Requested Prescriptions      No prescriptions requested or ordered in this encounter     No orders of the defined types were placed in this encounter  There are no discontinued medications  Pt is acutely ill today, and is referred direclty to ED for evaluation of sepsis d/t abd abscess  Mr Alex Romero and his daughter Marcio Mcclure were seen today for symptoms and planning cares related to above illnesses  Above orders were sent electronically, or provided in hardcopy in clinic  I have reviewed the patient's controlled substance dispensing history in the Prescription Drug Monitoring Program in compliance with the [facility] regulations before prescribing any controlled substances  We appreciate the referral, and wish for them to continue to follow with us  If there are questions or concerns, please contact us through our clinic/answering service 24 hours a day, seven days a week  Manny Rodrigues MD  [facility]          Visit Information    Accompanied By: Family member    Source of History: Family member    History Limitations: pt appears toxic and cannot provide history today    Contacts: daughter Keren Kilgore [phone]    New consultation for:  symptom management    History of Present Illness    Yrn Chin III is a 59 y o  male with a PMH of [address]  s/p liver ablation [on 07/15/2020], crohn's disease s/p ileostomy, COPD, HTN who presents to [facility] today for hospital follow up      Primary Oncology: Dr Kathy Levy    Patient reports worsening abd pain and increased weakness over past few days  He actually has trouble articulating himself today, and appears very tremulous  His daughter provides the history, and the perspective that he was actually improving with function and coordinatoin prior to the beginning of this week  He was able -- on Tuesday -- to stand and ambulate with walker  Since that time, he has required the assist of one to two to stand, and has difficulty ambulating  His intake has been poor, and he has developed a fever today  In clinic, we discussed with him, with Melissa, and with Dr Chavez Held the concern for abscess of abdominal wall, and that his current presentation is consistent with and concerning for sepsis  This may be life-threatening, and the pt and family were agreeable to abort the clinic visit and proceed directly to ED  ADT order was entered by Dr Em Cody, and the pt was accompanied with family and Dr Loree Ziegler directly to ED at [facility] for further evaluation  Pertinent Palliative Care Domains    Physical Symptoms:ambulates with difficulty    Psychological Symptoms:limited insight today    Social Aspects: could not address    Spiritual Aspects: could not address      Historical Data  Past Medical History:   Diagnosis Date    LUCILLE (acute kidney injury) ([facility] [address] ) 6/28/2017    Cancer ([facility] [address] )     liver    Cataract     Colon polyp     Colostomy in place ([facility] [address] ) 6/29/2017    Crohn disease ([address] )     Emphysema of lung ([address] )     Emphysema/COPD ([facility] [address] )     GERD (gastroesophageal reflux disease)     Hypertension     Hypokalemia 6/28/2017    Hypomagnesemia 6/29/2017    Hyponatremia 6/28/2017    Kidney stone     Pneumonia      Past Surgical History:   Procedure Laterality Date    CATARACT EXTRACTION Bilateral     CHOLECYSTECTOMY      COLECTOMY      10 inchs of ileum    COLONOSCOPY N/A 6/30/2017    Procedure: COLONOSCOPY;  Surgeon: Jamil Ponce MD;  [facility];   Service: Gastroenterology    COLOSTOMY      FINGER SURGERY Left     IR IMAGE GUIDED BIOPSY/ASPIRATION LIVER  6/8/2020    LITHOTRIPSY      LIVER LOBECTOMY N/A 7/15/2020    Procedure: LIVER ABLATION, INTRAOPERATIVE U/S LIVER;  Surgeon: Sandra Solis MD;  [facility];  Service: Surgical Oncology     Social History     Socioeconomic History    Marital status: Single     Spouse name: Not on file    Number of children: Not on file    Years of education: Not on file    Highest education level: Not on file   Occupational History    Not on file   Social Needs    Financial resource strain: Not on file    Food insecurity:     Worry: Not on file     Inability: Not on file    Transportation needs:     Medical: Not on file     Non-medical: Not on file   Tobacco Use    Smoking status: Current Every Day Smoker     Packs/day: 1 00     Types: Cigarettes    Smokeless tobacco: Never Used   Substance and Sexual Activity    Alcohol use: Not Currently     Alcohol/week: 1 0 - 2 0 standard drinks     Types: 1 - 2 Cans of beer per week     Frequency: Monthly or less     Drinks per session: 1 or 2     Comment: "1-2beers a day, glass of wine at night"    Drug use: No    Sexual activity: Not Currently   Lifestyle    Physical activity:     Days per week: Not on file     Minutes per session: Not on file    Stress: Not on file   Relationships    Social connections:     Talks on phone: Not on file     Gets together: Not on file     Attends Yazdanism service: Not on file     Active member of club or organization: Not on file     Attends meetings of clubs or organizations: Not on file     Relationship status: Not on file    Intimate partner violence:     Fear of current or ex partner: Not on file     Emotionally abused: Not on file     Physically abused: Not on file     Forced sexual activity: Not on file   Other Topics Concern    Not on file   Social History Narrative    Not on file     No family history on file    No Known Allergies      Review of Systems   Unable to perform ROS: acuity of condition         Vital Signs    /72 (BP Location: Left arm, Patient Position: Sitting, Cuff Size: Standard)   Pulse (!) 116   Temp 98 3 °F (36 8 °C) (Temporal)   Resp 18   Ht 5' 9" (1 753 m)   Wt 57 4 kg (126 lb 9 6 oz)   SpO2 92%   BMI 18 70 kg/m²     Physical Exam and Objective Data  Physical Exam   Constitutional: He appears distressed  Frail, toxic appearing   HENT:   Head: Normocephalic and atraumatic  Right Ear: External ear normal    Left Ear: External ear normal    Eyes: Pupils are equal, round, and reactive to light  Conjunctivae and EOM are normal  Right eye exhibits no discharge  Left eye exhibits no discharge  Neck: No tracheal deviation present  Cardiovascular: Regular rhythm  tachy   Pulmonary/Chest: Effort normal  No stridor  No respiratory distress  He has wheezes  Abdominal: Soft  Scaphoid, with 5-6cm oblong area of sharply demarcated erythema surrounding surgical incision  This is exquisitely tender, and has an appreciable amount of fluctuance and swelling and induration  The incision itself appears to have some dirty detritus  This wound is separate from the ostomy on the skin; the ostomy appears clean and healthy, and producing stool appropriately  Neurological: He is alert  Orientation poor  Tremulous, with an economy of motion   Skin: Skin is warm and dry  No rash noted  He is not diaphoretic  There is erythema  No pallor     Psychiatric:   Does not participate in exam         Radiology and Laboratory:  I personally reviewed and interpreted the following results: none new today    60+ minutes was spent face to face with Rashawn Valdez and his family with greater than 50% of the time spent in counseling or coordination of care including discussions of etiology of diagnosis, prognosis of diagnosis, instructions for disease self management, treatment instructions, patient and family counseling/involvement in care and compliance with treatment regimen; Dr Catina Booth was also direclty involved in the care and evaluation of the patient today in clinic   All of the patient's questions were answered during this discussion

## 2020-07-31 NOTE — PROGRESS NOTES
Surgical Oncology   Procedure quick note    Suture abscess located over lateral aspect of abdominal surgical incision, sharply demarcated erythema previously demarcated with ink  Incision site was cleaned and prepped  1/2in incision made along previous incision line and anaerobic/aerobic cultures obtained   Purulent discharge expressed from abscess and it was flushed with 25cc of NS solution   Packed lightly with 1/2in NuPrep gauze and covered with clean dry bandage

## 2020-08-02 LAB
BACTERIA SPEC ANAEROBE CULT: NORMAL
BACTERIA WND AEROBE CULT: ABNORMAL
GRAM STN SPEC: ABNORMAL

## 2020-08-03 ENCOUNTER — TELEPHONE (OUTPATIENT)
Dept: SURGICAL ONCOLOGY | Facility: CLINIC | Age: 65
End: 2020-08-03

## 2020-08-03 DIAGNOSIS — Z01.810 PREOP CARDIOVASCULAR EXAM: ICD-10-CM

## 2020-08-03 RX ORDER — ROSUVASTATIN CALCIUM 10 MG/1
10 TABLET, COATED ORAL DAILY
Qty: 90 TABLET | Refills: 1 | Status: SHIPPED | OUTPATIENT
Start: 2020-08-03 | End: 2021-01-04 | Stop reason: SDUPTHER

## 2020-08-05 ENCOUNTER — TELEPHONE (OUTPATIENT)
Dept: SURGICAL ONCOLOGY | Facility: CLINIC | Age: 65
End: 2020-08-05

## 2020-08-07 ENCOUNTER — OFFICE VISIT (OUTPATIENT)
Dept: SLEEP CENTER | Facility: CLINIC | Age: 65
End: 2020-08-07
Payer: COMMERCIAL

## 2020-08-07 ENCOUNTER — OFFICE VISIT (OUTPATIENT)
Dept: SURGICAL ONCOLOGY | Facility: CLINIC | Age: 65
End: 2020-08-07

## 2020-08-07 VITALS
HEART RATE: 113 BPM | HEIGHT: 69 IN | DIASTOLIC BLOOD PRESSURE: 86 MMHG | SYSTOLIC BLOOD PRESSURE: 120 MMHG | TEMPERATURE: 97.5 F | WEIGHT: 123.8 LBS | BODY MASS INDEX: 18.33 KG/M2

## 2020-08-07 VITALS
HEIGHT: 69 IN | HEART RATE: 100 BPM | BODY MASS INDEX: 18.37 KG/M2 | DIASTOLIC BLOOD PRESSURE: 60 MMHG | SYSTOLIC BLOOD PRESSURE: 100 MMHG | WEIGHT: 124 LBS

## 2020-08-07 DIAGNOSIS — Z91.89 AT RISK FOR OBSTRUCTIVE SLEEP APNEA: ICD-10-CM

## 2020-08-07 DIAGNOSIS — R06.83 SNORING: Primary | ICD-10-CM

## 2020-08-07 DIAGNOSIS — G47.30 OBSERVED SLEEP APNEA: ICD-10-CM

## 2020-08-07 DIAGNOSIS — J43.9 PULMONARY EMPHYSEMA, UNSPECIFIED EMPHYSEMA TYPE (HCC): ICD-10-CM

## 2020-08-07 DIAGNOSIS — C22.0 HEPATOCELLULAR CARCINOMA (HCC): Primary | ICD-10-CM

## 2020-08-07 PROCEDURE — 99024 POSTOP FOLLOW-UP VISIT: CPT | Performed by: SURGERY

## 2020-08-07 PROCEDURE — 99244 OFF/OP CNSLTJ NEW/EST MOD 40: CPT | Performed by: PSYCHIATRY & NEUROLOGY

## 2020-08-07 RX ORDER — OXYCODONE HCL 10 MG/1
10 TABLET, FILM COATED, EXTENDED RELEASE ORAL EVERY 8 HOURS PRN
COMMUNITY
End: 2020-11-24

## 2020-08-07 NOTE — PATIENT INSTRUCTIONS
Recommendations:  1) In lab diagnostic Polysomnography   2)  Driving safety was reviewed with patient  If the patient feels too sleepy to drive he knows not to drive  If he becomes sleepy while driving he will pull over and nap  3) Follow-up initiation of treatment  4) Call with any questions or concerns
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
## 2020-08-07 NOTE — LETTER
August 7, 2020     Cipriano Khan, [address]  [address] [address]    Patient: Freedom Beyer III   YOB: 1955   Date of Visit: 8/7/2020       Dear Dr Ave Schilder:    Thank you for referring Don Collins to me for evaluation  Below are my notes for this consultation  If you have questions, please do not hesitate to call me  I look forward to following your patient along with you  Sincerely,        Julissa Auguste MD        CC: MD Julissa Pizarro MD  8/7/2020  3:05 PM  Sign when Signing Visit               Surgical Oncology Follow Up       [facility] [address] [address]  [address] [facility] III  1955  [id number]  [facility] Dorys Lackey  [address] [place] [id number] [phone]    Diagnoses and all orders for this visit:    Hepatocellular carcinoma [facility])        Chief Complaint   Patient presents with    Post-op       Return in about 3 weeks (around 8/28/2020)  Oncology History   [facility] )   6/8/2020 Initial Diagnosis    Hepatocellular carcinoma, moderately differentiated     7/15/2020 Surgery    Microwave ablation of liver segment 5         Staging: [address]    Treatment history:  Ablation of segment 5 liver lesion, July 2020  Current treatment:  Observation  Disease status: ARLYN    History of Present Illness:  Patient returns in follow-up of his liver ablation  He is doing well  Last week his wound was opened in the ER and packed  No fevers or chills  His appetite is good  No nausea or vomiting  Review of Systems  Complete ROS Surg Onc:   Complete ROS Surg Onc:   Constitutional: The patient denies new or recent history of general fatigue, no recent weight loss, no change in appetite  Eyes: No complaints of visual problems, no scleral icterus     ENT: no complaints of ear pain, no hoarseness, no difficulty swallowing,  no tinnitus and no new masses in head, oral cavity, or neck  Cardiovascular: No complaints of chest pain, no palpitations, no ankle edema  Respiratory: No complaints of shortness of breath, no cough  Gastrointestinal: No complaints of jaundice, no bloody stools, no pale stools  Genitourinary: No complaints of dysuria, no hematuria, no nocturia, no frequent urination, no urethral discharge  Musculoskeletal: No complaints of weakness, paralysis, joint stiffness or arthralgias  Integumentary: No complaints of rash, no new lesions  Neurological: No complaints of convulsions, no seizures, no dizziness  Hematologic/Lymphatic: No complaints of easy bruising  Endocrine:  No hot or cold intolerance  No polydipsia, polyphagia, or polyuria  Allergy/immunology:  No environmental allergies  No food allergies  Not immunocompromised  Skin:  No pallor or rash  Surgical wound          Patient Active Problem List   Diagnosis    Hypertension    [facility]D ([facility] )    Crohn disease ([facility] )    Colostomy in place ([place] [address] )    Diarrhea    Severe protein-calorie malnutrition ([facility] )    Cellulitis    [facility]    Vasovagal syncope    Severe sepsis ([facility])    Pneumonia    Chest pain    Liver mass    Tobacco abuse    Abdominal pain    Hepatocellular carcinoma ([facility] )    Palliative care patient    Abdominal wall abscess     Past Medical History:   Diagnosis Date    LUCILLE [facility]acute [facility]) [facility] ) 6/28/2017    Cancer [facility]     liver    Cataract     Colon polyp     Colostomy in place ([facility] ) 6/29/2017    Crohn disease ([facility] )     [facility]     Emphysema/COPD ([facility] )     GERD (gastroesophageal reflux disease)     Hypertension     Hypokalemia 6/28/2017    Hypomagnesemia 6/29/2017    Hyponatremia 6/28/2017    Kidney stone     Pneumonia      Past Surgical History:   Procedure Laterality Date    CATARACT EXTRACTION Bilateral     CHOLECYSTECTOMY      COLECTOMY      10 inchs of ileum    COLONOSCOPY N/A 2017    Procedure: COLONOSCOPY;  Surgeon: Marjan Walker MD;  [facility]; Service: Gastroenterology    COLOSTOMY      FINGER SURGERY Left     IR IMAGE GUIDED BIOPSY/ASPIRATION LIVER  2020    LITHOTRIPSY      LIVER LOBECTOMY N/A 7/15/2020    Procedure: LIVER ABLATION, INTRAOPERATIVE U/S LIVER;  Surgeon: Jayy Villegas MD;  [facility];  Service: Surgical Oncology     No family history on file    Social History     Socioeconomic History    Marital status: Single     Spouse name: Not on file    Number of children: Not on file    Years of education: Not on file    Highest education level: Not on file   Occupational History    Not on file   Social Needs    Financial resource strain: Not on file    Food insecurity     Worry: Not on file     Inability: Not on file    Transportation needs     Medical: Not on file     Non-medical: Not on file   Tobacco Use    Smoking status: Former Smoker     Packs/day: 1 00     Types: Cigarettes     Last attempt to quit: 7/15/2020     Years since quittin 0    Smokeless tobacco: Former User   Substance and Sexual Activity    Alcohol use: Not Currently     Alcohol/week: 1 0 - 2 0 standard drinks     Types: 1 - 2 Cans of beer per week     Frequency: Monthly or less     Drinks per session: 1 or 2     Comment: "1-2beers a day, glass of wine at night"    Drug use: No    Sexual activity: Not Currently   Lifestyle    Physical activity     Days per week: Not on file     Minutes per session: Not on file    Stress: Not on file   Relationships    Social connections     Talks on phone: Not on file     Gets together: Not on file     Attends Jain service: Not on file     Active member of club or organization: Not on file     Attends meetings of clubs or organizations: Not on file     Relationship status: Not on file    Intimate partner violence     Fear of current or ex partner: Not on file     Emotionally abused: Not on file     Physically abused: Not on file     Forced sexual activity: Not on file   Other Topics Concern    Not on file   Social History Narrative    Not on file       Current Outpatient Medications:     umeclidinium-vilanterol (ANORO ELLIPTA) 62 5-25 MCG/INH inhaler, Inhale 1 puff daily, Disp: 1 Inhaler, Rfl: 5    ALPRAZolam (XANAX) 0 25 mg tablet, Take by mouth daily at bedtime as needed for anxiety, Disp: , Rfl:     AMILoride 5 mg tablet, Take 1 tablet by mouth daily, Disp: 30 tablet, Rfl: 0    amLODIPine (NORVASC) 2 5 mg tablet, Take 10 mg by mouth daily , Disp: , Rfl:     gabapentin (NEURONTIN) 300 mg capsule, Take 1 capsule (300 mg total) by mouth 3 (three) times a day for 10 days, Disp: 30 capsule, Rfl: 0    MAGNESIUM CHLORIDE PO, Take 1,000 mg by mouth daily, Disp: , Rfl:     nicotine (NICODERM CQ) 21 mg/24 hr TD 24 hr patch, Place 1 patch on the skin daily, Disp: 28 patch, Rfl: 0    omeprazole (PriLOSEC) 20 mg delayed release capsule, Take 20 mg by mouth daily, Disp: , Rfl:     oxyCODONE (OxyCONTIN) 10 mg 12 hr tablet, Take 10 mg by mouth every 8 (eight) hours, Disp: , Rfl:     potassium chloride (MICRO-K) 10 MEQ CR capsule, Take 20 mEq by mouth 2 (two) times a day, Disp: , Rfl:     rosuvastatin (CRESTOR) 10 MG tablet, Take 1 tablet (10 mg total) by mouth daily, Disp: 90 tablet, Rfl: 1    VIT B12-METHIONINE-INOS-CHOL IM, Inject into a muscle every 30 (thirty) days, Disp: , Rfl:   No Known Allergies  Vitals:    08/07/20 1422   BP: 120/86   Pulse: (!) 113   Temp: 97 5 °F (36 4 °C)       Physical Exam  Constitutional: General appearance: The Patient is well-developed and well-nourished who appears the stated age in no acute distress  Patient is pleasant and talkative  HEENT:  Normocephalic  Sclerae are anicteric  Mucous membranes are moist    Abdomen: Abdomen is soft, non-tender, non-distended and without masses    Incision is clean and dry  The lateral aspect was repacked  There is good granulation at the base  Extremities: There is no clubbing or cyanosis  There is no edema  Symmetric  Neuro: Grossly nonfocal  Gait is normal      Skin: Warm, anicteric  Psych:  Patient is pleasant and talkative  Breasts:        Pathology:  [unfilled]    Labs:      Imaging  No results found  I reviewed the above laboratory and imaging data  Discussion/Summary: 59-year-old male with [address]  in a non cirrhotic liver  His Child score is B  His MELD score is 9  He underwent ablation of the segment 5 liver lesion  He tolerated this well  He will continue his wound packing  He was instructed on how to take care of this  He will continue with visiting nursing  I will see him again in 3 weeks for another wound check  He is agreeable to this  All his questions were answered

## 2020-08-07 NOTE — PROGRESS NOTES
Surgical Oncology Follow Up       [address] [facility] [place]  [address] [facility] [address]  [phone]    Marlen Banks III  1955  [id number]  [address] [facility] [place]  [address] [facility] [address] [phone]    Diagnoses and all orders for this visit:    Hepatocellular carcinoma [facility])        Chief Complaint   Patient presents with    Post-op       Return in about 3 weeks (around 8/28/2020)  Oncology History   Hepatocellular carcinoma ([facility] )   6/8/2020 Initial Diagnosis    Hepatocellular carcinoma, moderately differentiated     7/15/2020 Surgery    Microwave ablation of liver segment 5         Staging: [facility] [address]    Treatment history:  Ablation of segment 5 liver lesion, July 2020  Current treatment:  Observation  Disease status: ARLYN    History of Present Illness:  Patient returns in follow-up of his liver ablation  He is doing well  Last week his wound was opened in the ER and packed  No fevers or chills  His appetite is good  No nausea or vomiting  Review of Systems  Complete ROS Surg Onc:   Complete ROS Surg Onc:   Constitutional: The patient denies new or recent history of general fatigue, no recent weight loss, no change in appetite  Eyes: No complaints of visual problems, no scleral icterus  ENT: no complaints of ear pain, no hoarseness, no difficulty swallowing,  no tinnitus and no new masses in head, oral cavity, or neck  Cardiovascular: No complaints of chest pain, no palpitations, no ankle edema  Respiratory: No complaints of shortness of breath, no cough  Gastrointestinal: No complaints of jaundice, no bloody stools, no pale stools  Genitourinary: No complaints of dysuria, no hematuria, no nocturia, no frequent urination, no urethral discharge     Musculoskeletal: No complaints of weakness, paralysis, joint stiffness or arthralgias  Integumentary: No complaints of rash, no new lesions  Neurological: No complaints of convulsions, no seizures, no dizziness  Hematologic/Lymphatic: No complaints of easy bruising  Endocrine:  No hot or cold intolerance  No polydipsia, polyphagia, or polyuria  Allergy/immunology:  No environmental allergies  No food allergies  Not immunocompromised  Skin:  No pallor or rash  Surgical wound  Patient Active Problem List   Diagnosis    Hypertension    Emphysema/COPD ([place] [address] )    Crohn disease ([place] [address] )    Colostomy in place ([place] [address] )    Diarrhea    Severe protein-calorie malnutrition ([facility] )    Cellulitis    [facility]    Vasovagal syncope    Severe sepsis (HCC)    Pneumonia    Chest pain    Liver mass    Tobacco abuse    Abdominal pain    Hepatocellular carcinoma ([place] [address] )    Palliative care patient    Abdominal wall abscess     Past Medical History:   Diagnosis Date    LUCILLE (acute kidney injury) ([facility] ) 6/28/2017    Cancer ([place] [address] )     liver    Cataract     Colon polyp     Colostomy in place ([place] [address] ) 6/29/2017    Crohn disease ([place] [address] )     Emphy[facility] lung ([facility] )     Emphysema/COPD ([facility] )     GERD (gastroesophageal reflux disease)     Hypertension     Hypokalemia 6/28/2017    Hypomagnesemia 6/29/2017    Hyponatremia 6/28/2017    Kidney stone     Pneumonia      Past Surgical History:   Procedure Laterality Date    CATARACT EXTRACTION Bilateral     CHOLECYSTECTOMY      COLECTOMY      10 inchs of ileum    COLONOSCOPY N/A 6/30/2017    Procedure: COLONOSCOPY;  Surgeon: Valeria Betancur MD;  [facility]; Service: Gastroenterology    COLOSTOMY      FINGER SURGERY Left     IR IMAGE GUIDED BIOPSY/ASPIRATION LIVER  6/8/2020    LITHOTRIPSY      LIVER LOBECTOMY N/A 7/15/2020    Procedure: LIVER ABLATION, INTRAOPERATIVE U/S LIVER;  Surgeon: Julissa Auguste MD;  [facility];  Service: Surgical Oncology     No family history on file    Social History Socioeconomic History    Marital status: Single     Spouse name: Not on file    Number of children: Not on file    Years of education: Not on file    Highest education level: Not on file   Occupational History    Not on file   Social Needs    Financial resource strain: Not on file    Food insecurity     Worry: Not on file     Inability: Not on file    Transportation needs     Medical: Not on file     Non-medical: Not on file   Tobacco Use    Smoking status: Former Smoker     Packs/day: 1 00     Types: Cigarettes     Last attempt to quit: 7/15/2020     Years since quittin 0    Smokeless tobacco: Former User   Substance and Sexual Activity    Alcohol use: Not Currently     Alcohol/week: 1 0 - 2 0 standard drinks     Types: 1 - 2 Cans of beer per week     Frequency: Monthly or less     Drinks per session: 1 or 2     Comment: "1-2beers a day, glass of wine at night"    Drug use: No    Sexual activity: Not Currently   Lifestyle    Physical activity     Days per week: Not on file     Minutes per session: Not on file    Stress: Not on file   Relationships    Social connections     Talks on phone: Not on file     Gets together: Not on file     Attends Denominational service: Not on file     Active member of club or organization: Not on file     Attends meetings of clubs or organizations: Not on file     Relationship status: Not on file    Intimate partner violence     Fear of current or ex partner: Not on file     Emotionally abused: Not on file     Physically abused: Not on file     Forced sexual activity: Not on file   Other Topics Concern    Not on file   Social History Narrative    Not on file       Current Outpatient Medications:     umeclidinium-vilanterol (ANORO ELLIPTA) 62 5-25 MCG/INH inhaler, Inhale 1 puff daily, Disp: 1 Inhaler, Rfl: 5    ALPRAZolam (XANAX) 0 25 mg tablet, Take by mouth daily at bedtime as needed for anxiety, Disp: , Rfl:     AMILoride 5 mg tablet, Take 1 tablet by mouth daily, Disp: 30 tablet, Rfl: 0    amLODIPine (NORVASC) 2 5 mg tablet, Take 10 mg by mouth daily , Disp: , Rfl:     gabapentin (NEURONTIN) 300 mg capsule, Take 1 capsule (300 mg total) by mouth 3 (three) times a day for 10 days, Disp: 30 capsule, Rfl: 0    MAGNESIUM CHLORIDE PO, Take 1,000 mg by mouth daily, Disp: , Rfl:     nicotine (NICODERM CQ) 21 mg/24 hr TD 24 hr patch, Place 1 patch on the skin daily, Disp: 28 patch, Rfl: 0    omeprazole (PriLOSEC) 20 mg delayed release capsule, Take 20 mg by mouth daily, Disp: , Rfl:     oxyCODONE (OxyCONTIN) 10 mg 12 hr tablet, Take 10 mg by mouth every 8 (eight) hours, Disp: , Rfl:     potassium chloride (MICRO-K) 10 MEQ CR capsule, Take 20 mEq by mouth 2 (two) times a day, Disp: , Rfl:     rosuvastatin (CRESTOR) 10 MG tablet, Take 1 tablet (10 mg total) by mouth daily, Disp: 90 tablet, Rfl: 1    VIT B12-METHIONINE-INOS-CHOL IM, Inject into a muscle every 30 (thirty) days, Disp: , Rfl:   No Known Allergies  Vitals:    08/07/20 1422   BP: 120/86   Pulse: (!) 113   Temp: 97 5 °F (36 4 °C)       Physical Exam  Constitutional: General appearance: The Patient is well-developed and well-nourished who appears the stated age in no acute distress  Patient is pleasant and talkative  HEENT:  Normocephalic  Sclerae are anicteric  Mucous membranes are moist    Abdomen: Abdomen is soft, non-tender, non-distended and without masses  Incision is clean and dry  The lateral aspect was repacked  There is good granulation at the base  Extremities: There is no clubbing or cyanosis  There is no edema  Symmetric  Neuro: Grossly nonfocal  Gait is normal      Skin: Warm, anicteric  Psych:  Patient is pleasant and talkative  Breasts:        Pathology:  [unfilled]    Labs:      Imaging  No results found  I reviewed the above laboratory and imaging data  Discussion/Summary: 41-year-old male with [address]  in a non cirrhotic liver  His Child score is B  His MELD score is 9  He underwent ablation of the segment 5 liver lesion  He tolerated this well  He will continue his wound packing  He was instructed on how to take care of this  He will continue with visiting nursing  I will see him again in 3 weeks for another wound check  He is agreeable to this  All his questions were answered

## 2020-08-18 ENCOUNTER — OFFICE VISIT (OUTPATIENT)
Dept: CARDIOLOGY CLINIC | Facility: CLINIC | Age: 65
End: 2020-08-18
Payer: COMMERCIAL

## 2020-08-18 VITALS
HEIGHT: 69 IN | HEART RATE: 92 BPM | SYSTOLIC BLOOD PRESSURE: 118 MMHG | BODY MASS INDEX: 19.09 KG/M2 | DIASTOLIC BLOOD PRESSURE: 70 MMHG | WEIGHT: 128.9 LBS | TEMPERATURE: 98.7 F

## 2020-08-18 DIAGNOSIS — C22.0 HEPATOCELLULAR CARCINOMA (HCC): Primary | ICD-10-CM

## 2020-08-18 PROCEDURE — 3008F BODY MASS INDEX DOCD: CPT | Performed by: INTERNAL MEDICINE

## 2020-08-18 PROCEDURE — 1036F TOBACCO NON-USER: CPT | Performed by: INTERNAL MEDICINE

## 2020-08-18 PROCEDURE — 99215 OFFICE O/P EST HI 40 MIN: CPT | Performed by: INTERNAL MEDICINE

## 2020-08-18 RX ORDER — CEPHALEXIN 500 MG/1
CAPSULE ORAL
COMMUNITY
Start: 2020-07-27 | End: 2020-08-18

## 2020-08-18 NOTE — PATIENT INSTRUCTIONS
Please follow up    Take your meds as prescribed    Have the liver test done    Call if you have any questions

## 2020-08-18 NOTE — PROGRESS NOTES
Cardiology   [facility] 59 [facility]  [facility] MRN: [id number]  Unit/Bed#:  Encounter: [id number]        Reason for Consult / Principal Problem: Pre-operative risk assessment      PCP: Mary Billy MD      Assessment/Plan:    >> Hepatocellular carcinoma  >> Hypertension  >> COPD  >>  Coronary calcifications and aortic calcifications on CT  >>  Abnormal EKG: Septal infarct pattern    Plan:     the patient had an uneventful resection of hepatocellular carcinoma, he continues to take rosuvastatin, amiloride on amlodipine his blood pressure appears very well controlled  His last liver function tests in the hospital were abnormal, will check repeat, if it is worse will consider holding the rosuvastatin, it is similar better will continue  He has no complaints  Follow-up in 6 months  He will keep a track of his blood pressure at home  8/18:  No complaints,  He had uneventful surgery since his last visit  No heart failure or anginal symptoms postoperatively  He has been tolerating all of his medications  HPI  59 y o  male with  Incidentally discovered liver lesion  This turned out to be hepatocellular carcinoma  He also has a history of hypertension and COPD  Long smoking history  He has been trying to cut back  He does not have any complaints of chest pain or shortness of breath  No heart failure type symptoms  He is able to climb up 1 or 2 flights of stairs without any difficulty  Can walk several blocks on level ground without getting chest pain or shortness of breath  Is able to perform all his day-to-day activities  His EKG shows a septal infarct pattern in leads V1 and V2  He has no family history of premature coronary artery disease, does not drink  Significantly or use drugs  He had a CT scan during his hospitalization that revealed coronary artery and aortic calcifications   at this time he has no complaints            Physical exam  Objective   Vitals: Blood pressure 118/70, pulse 92, temperature 98 7 °F (37 1 °C), temperature source Temporal, height 5' 9" (1 753 m), weight 58 5 kg (128 lb 14 4 oz)  General:  AO x3, no acute distress  Cardiac:  S1-S2 normal  No murmurs, rubs or gallops, JVP:  Not seen  Lungs:   Mild bilateral expiratory wheeze  Abdomen:  Soft, nontender, nondistended  Extremities:  Warm, well perfused, pulses palpable, no ulcers or rashes  Edema: no edema  Neuro: Grossly nonfocal  Psych:  Normal affect  Musculoskeletal:  Range of motion normal, no swelling or tenderness over joints  HEENT:  EOM normal, pupils equal and reactive to light  Neck: no palpable mass, no bruits  Skin:  no rashes (comprehensive skin exam not performed)  @ @        ======================================================  TREADMILL STRESS  No results found for this or any previous visit    ----------------------------------------------------------------------------------------------  NUCLEAR STRESS TEST: No results found for this or any previous visit  No results found for this or any previous visit     --------------------------------------------------------------------------------  CATH:  No results found for this or any previous visit   --------------------------------------------------------------------------------  ECHO:   No results found for this or any previous visit  No results found for this or any previous visit   --------------------------------------------------------------------------------  HOLTER  No results found for this or any previous visit   --------------------------------------------------------------------------------  CAROTIDS  Results for orders placed during the hospital encounter of 04/02/18   VAS carotid complete study    Narrative    [facility] REPORT  CLINICAL:  Indications:  Syncope [R55]  The patient experienced a syncopal episode which  lasted 1 minute    Operative History  Colostomy  Cholecystectomy  Risk Factors  The patient has history of smoking (current)  He has no history of HTN or  Diabetes  Clinical  Right Pressure:  109/54 mm Hg     FINDINGS:     Right        Impression  PSV  EDV (cm/s)  Direction of Flow  Ratio    Dist  ICA                 66          27                      1 13    Mid  ICA                  50          15                      0 85    Prox  ICA    1 - 49%      48          11                      0 82    Dist CCA                  43          15                              Mid CCA                   58          15                      1 10    Prox CCA                  53          14                              Ext Carotid               64          11                      1 10    Prox Vert                 58          19  Antegrade                   Subclavian               106           0                                 Left         Impression  PSV  EDV (cm/s)  Direction of Flow  Ratio    Dist  ICA                 68          24                      1 13    Mid  ICA                  63          21                      1 04    Prox  ICA    1 - 49%      88          24                      1 45    Dist CCA                  61          20                              Mid CCA                   60          20                      1 04    Prox CCA                  58          17                              Ext Carotid              185          27                      3 07    Prox Vert                 54          17  Antegrade                   Subclavian               119           0                                       CONCLUSION:  Impression  RIGHT:  There is <50% stenosis noted in the internal carotid artery  Plaque is  heterogenous and irregular  Vertebral artery flow is antegrade  There is no significant subclavian artery  disease  LEFT:  There is <50% stenosis noted in the internal carotid artery  Plaque is  heterogenous and irregular  Vertebral artery flow is antegrade   There is no significant subclavian artery  disease  Recommend repeat duplex in 1 - 2 years to follow up on plaque progression  Internal carotid artery stenosis determination by consensus criteria from:  Kristi Peter , et al  Carotid Artery Stenosis: Gray-Scale and Doppler US Diagnosis  - Society of Radiologists in [facility] [address], Radiology 2003;  [phone]  SIGNATURE:  Electronically Signed by: Yady Ludwig MD, [address] on 2018-04-04 02:54:52 PM        Diagnoses and all orders for this visit:    Hepatocellular carcinoma ([facility] )  -     Hepatic function panel    Other orders  -     Discontinue: cephalexin (KEFLEX) 500 mg capsule; TAKE 1 CAPSULE BY MOUTH EVERY 6 HOURS FOR 5 DAYS       ======================================================          Review of Systems  ROS as noted above, otherwise 12 point review of systems was performed and is negative  Historical Information   Past Medical History:   Diagnosis Date    LUCILLE (acute kidney injury) ([facility] ) 6/28/2017    Cancer ([facility] )     liver    Cataract     Colon polyp     Colostomy in place ([facility] ) 6/29/2017    Crohn disease ([facility] )     Emphysema of lung ([facility] )     Emphysema/COPD ([facility] )     GERD (gastroesophageal reflux disease)     Hypertension     Hypokalemia 6/28/2017    Hypomagnesemia 6/29/2017    Hyponatremia 6/28/2017    Kidney stone     Pneumonia      Past Surgical History:   Procedure Laterality Date    CATARACT EXTRACTION Bilateral     CHOLECYSTECTOMY      COLECTOMY      10 inchs of ileum    COLONOSCOPY N/A 6/30/2017    Procedure: COLONOSCOPY;  Surgeon: Nimo Avendano MD;  [facility];   Service: Gastroenterology    COLOSTOMY      FINGER SURGERY Left     IR IMAGE GUIDED BIOPSY/ASPIRATION LIVER  6/8/2020    LITHOTRIPSY      LIVER LOBECTOMY N/A 7/15/2020    Procedure: LIVER ABLATION, INTRAOPERATIVE U/S LIVER;  Surgeon: Prem Metzger MD;  [facility];  Service: Surgical Oncology     Social History     Substance and Sexual Activity   Alcohol Use Not Currently    Alcohol/week: 1 0 - 2 0 standard drinks    Types: 1 - 2 Cans of beer per week    Frequency: Monthly or less    Drinks per session: 1 or 2    Comment: "1-2beers a day, glass of wine at night"     Social History     Substance and Sexual Activity   Drug Use No     Social History     Tobacco Use   Smoking Status Former Smoker    Packs/day: 1 00    Types: Cigarettes    Last attempt to quit: 7/15/2020    Years since quittin 0   Smokeless Tobacco Former User     History reviewed  No pertinent family history  Meds/Allergies   Hospital Medications:   No current facility-administered medications for this visit  Home Medications: (Not in a hospital admission)      No Known Allergies      Portions of the record may have been created with voice recognition software  Occasional wrong words or "sound a like" substitutions may have occurred due to the inherent limitations of voice recognition software  Read the chart carefully and recognize, using context, where substitutions have occurred

## 2020-08-27 ENCOUNTER — TELEPHONE (OUTPATIENT)
Dept: SURGICAL ONCOLOGY | Facility: CLINIC | Age: 65
End: 2020-08-27

## 2020-09-01 ENCOUNTER — HOSPITAL ENCOUNTER (OUTPATIENT)
Dept: SLEEP CENTER | Facility: CLINIC | Age: 65
Discharge: HOME | End: 2020-09-01
Payer: COMMERCIAL

## 2020-09-01 ENCOUNTER — OFFICE VISIT (OUTPATIENT)
Dept: SURGICAL ONCOLOGY | Facility: CLINIC | Age: 65
End: 2020-09-01

## 2020-09-01 VITALS
WEIGHT: 128.5 LBS | SYSTOLIC BLOOD PRESSURE: 118 MMHG | TEMPERATURE: 97.6 F | HEART RATE: 91 BPM | DIASTOLIC BLOOD PRESSURE: 86 MMHG | HEIGHT: 69 IN | BODY MASS INDEX: 19.03 KG/M2

## 2020-09-01 DIAGNOSIS — R06.83 SNORING: ICD-10-CM

## 2020-09-01 DIAGNOSIS — G47.30 OBSERVED SLEEP APNEA: ICD-10-CM

## 2020-09-01 DIAGNOSIS — C22.0 HEPATOCELLULAR CARCINOMA (HCC): Primary | ICD-10-CM

## 2020-09-01 DIAGNOSIS — Z91.89 AT RISK FOR OBSTRUCTIVE SLEEP APNEA: ICD-10-CM

## 2020-09-01 DIAGNOSIS — J43.9 PULMONARY EMPHYSEMA, UNSPECIFIED EMPHYSEMA TYPE (HCC): ICD-10-CM

## 2020-09-01 PROCEDURE — 95810 POLYSOM 6/> YRS 4/> PARAM: CPT | Performed by: PSYCHIATRY & NEUROLOGY

## 2020-09-01 PROCEDURE — 99024 POSTOP FOLLOW-UP VISIT: CPT | Performed by: SURGERY

## 2020-09-01 PROCEDURE — 95810 POLYSOM 6/> YRS 4/> PARAM: CPT

## 2020-09-01 NOTE — LETTER
September 1, 2020     Polina Cervantes, [place]  [address] [address]    Patient: Geoffrey Patton III   YOB: 1955   Date of Visit: 9/1/2020       Dear Dr Lana Munoz:    Thank you for referring Luisronda Mcardle to me for evaluation  Below are my notes for this consultation  If you have questions, please do not hesitate to call me  I look forward to following your patient along with you  Sincerely,        Josh Green MD        CC: No Recipients  Josh Green MD  9/1/2020  9:39 AM  Sign when Signing Visit               Surgical Oncology Follow Up       [address] [facility] [address] [address]  [id number] [employer] [facility] [employer] [facility]  1955  [id number]  [address] [facility] [address] [address] [phone]    Diagnoses and all orders for this visit:    Hepatocellular carcinoma ([facility] )  -     MRI abdomen w wo contrast; Future  -     BUN; Future  -     Creatinine, serum; Future  -     AFP tumor marker; Future        Chief Complaint   Patient presents with    Follow-up       Return in about 2 months (around 11/1/2020) for Office Visit, Imaging - See orders, Labs - See Treatment Plan  Oncology History   Hepatocellular carcinoma ([address] )   6/8/2020 Initial Diagnosis    Hepatocellular carcinoma, moderately differentiated     7/15/2020 Surgery    Microwave ablation of liver segment 5         Staging: [address]    Treatment history:  Ablation of segment 5 liver lesion, July 2020  Current treatment:  Observation  Disease status: ARLYN    History of Present Illness:  Patient returns in follow-up  He is doing well at this time  He is placing minimal packing in the wound  He is having no abdominal pain  His appetite is good  No fevers chills      Review of Systems  Complete ROS Surg Onc:   Complete ROS Surg Onc:   Constitutional: The patient denies new or recent history of general fatigue, no recent weight loss, no change in appetite  Eyes: No complaints of visual problems, no scleral icterus  ENT: no complaints of ear pain, no hoarseness, no difficulty swallowing,  no tinnitus and no new masses in head, oral cavity, or neck  Cardiovascular: No complaints of chest pain, no palpitations, no ankle edema  Respiratory: No complaints of shortness of breath, no cough  Gastrointestinal: No complaints of jaundice, no bloody stools, no pale stools  Genitourinary: No complaints of dysuria, no hematuria, no nocturia, no frequent urination, no urethral discharge  Musculoskeletal: No complaints of weakness, paralysis, joint stiffness or arthralgias  Integumentary: No complaints of rash, no new lesions  Neurological: No complaints of convulsions, no seizures, no dizziness  Hematologic/Lymphatic: No complaints of easy bruising  Endocrine:  No hot or cold intolerance  No polydipsia, polyphagia, or polyuria  Allergy/immunology:  No environmental allergies  No food allergies  Not immunocompromised  Skin:  No pallor or rash  No wound          Patient Active Problem List   Diagnosis    Hypertension    [facility]    Crohn disease ([facility] )    Colostomy in place ([facility] )    Diarrhea    Severe protein-calorie malnutrition [facility]    Cellulitis    Emphysema of lung ([facility] )    Vasovagal syncope    Severe sepsis (HCC)    Pneumonia    Chest pain    Liver mass    Tobacco abuse    Abdominal pain    Hepatocellular carcinoma ([facility]    Palliative care patient    Abdominal wall abscess     Past Medical History:   Diagnosis Date    LUCILLE (acute kidney injury) ([facility] ) 6/28/2017    Cancer ([facility] )     liver    Cataract     Colon polyp     Colostomy in place ([facility] ) 6/29/2017    Crohn disease ([facility]     Emphysema of lung ([facility] )     Emphy[facility]ma/COPD ([facility]     GERD (gastroesophageal reflux disease)     Hypertension     Hypokalemia 6/28/2017  Hypomagnesemia 2017    Hyponatremia 2017    Kidney stone     Pneumonia      Past Surgical History:   Procedure Laterality Date    CATARACT EXTRACTION Bilateral     CHOLECYSTECTOMY      COLECTOMY      10 inchs of ileum    COLONOSCOPY N/A 2017    Procedure: COLONOSCOPY;  Surgeon: Ayala Prince MD;  [facility]; Service: Gastroenterology    COLOSTOMY      FINGER SURGERY Left     IR BIOPSY LIVER MASS  2020    LITHOTRIPSY      LIVER LOBECTOMY N/A 7/15/2020    Procedure: LIVER ABLATION, INTRAOPERATIVE U/S LIVER;  Surgeon: Mac Cannon MD;  [facility];  Service: Surgical Oncology     No family history on file    Social History     Socioeconomic History    Marital status: Single     Spouse name: Not on file    Number of children: Not on file    Years of education: Not on file    Highest education level: Not on file   Occupational History    Not on file   Social Needs    Financial resource strain: Not on file    Food insecurity     Worry: Not on file     Inability: Not on file    Transportation needs     Medical: Not on file     Non-medical: Not on file   Tobacco Use    Smoking status: Former Smoker     Packs/day: 1 00     Types: Cigarettes     Last attempt to quit: 7/15/2020     Years since quittin 1    Smokeless tobacco: Former User   Substance and Sexual Activity    Alcohol use: Not Currently     Alcohol/week: 1 0 - 2 0 standard drinks     Types: 1 - 2 Cans of beer per week     Frequency: Monthly or less     Drinks per session: 1 or 2     Comment: "1-2beers a day, glass of wine at night"    Drug use: No    Sexual activity: Not Currently   Lifestyle    Physical activity     Days per week: Not on file     Minutes per session: Not on file    Stress: Not on file   Relationships    Social connections     Talks on phone: Not on file     Gets together: Not on file     Attends Oriental orthodox service: Not on file     Active member of club or organization: Not on file     Attends meetings of clubs or organizations: Not on file     Relationship status: Not on file    Intimate partner violence     Fear of current or ex partner: Not on file     Emotionally abused: Not on file     Physically abused: Not on file     Forced sexual activity: Not on file   Other Topics Concern    Not on file   Social History Narrative    Not on file       Current Outpatient Medications:     ALPRAZolam (XANAX) 0 25 mg tablet, Take by mouth daily at bedtime as needed for anxiety, Disp: , Rfl:     AMILoride 5 mg tablet, Take 1 tablet by mouth daily, Disp: 30 tablet, Rfl: 0    amLODIPine (NORVASC) 2 5 mg tablet, Take 10 mg by mouth daily , Disp: , Rfl:     MAGNESIUM CHLORIDE PO, Take 1,000 mg by mouth daily, Disp: , Rfl:     omeprazole (PriLOSEC) 20 mg delayed release capsule, Take 20 mg by mouth daily, Disp: , Rfl:     oxyCODONE (OxyCONTIN) 10 mg 12 hr tablet, Take 10 mg by mouth every 8 (eight) hours , Disp: , Rfl:     potassium chloride (MICRO-K) 10 MEQ CR capsule, Take 20 mEq by mouth 2 (two) times a day, Disp: , Rfl:     rosuvastatin (CRESTOR) 10 MG tablet, Take 1 tablet (10 mg total) by mouth daily, Disp: 90 tablet, Rfl: 1    umeclidinium-vilanterol (ANORO ELLIPTA) 62 5-25 MCG/INH inhaler, Inhale 1 puff daily, Disp: 1 Inhaler, Rfl: 5    VIT B12-METHIONINE-INOS-CHOL IM, Inject into a muscle every 30 (thirty) days, Disp: , Rfl:     gabapentin (NEURONTIN) 300 mg capsule, Take 1 capsule (300 mg total) by mouth 3 (three) times a day for 10 days, Disp: 30 capsule, Rfl: 0    nicotine (NICODERM CQ) 21 mg/24 hr TD 24 hr patch, Place 1 patch on the skin daily (Patient not taking: Reported on 8/18/2020), Disp: 28 patch, Rfl: 0  No Known Allergies  Vitals:    09/01/20 0913   BP: 118/86   Pulse: 91   Temp: 97 6 °F (36 4 °C)       Physical Exam  Constitutional: General appearance: The Patient is well-developed and well-nourished who appears the stated age in no acute distress   Patient is pleasant and talkative  HEENT:  Normocephalic  Sclerae are anicteric  Mucous membranes are moist    Abdomen: Abdomen is soft, non-tender, non-distended and without masses  His incision has healed  No packing was placed  Extremities: There is no clubbing or cyanosis  There is no edema  Symmetric  Neuro: Grossly nonfocal  Gait is normal        Skin: Warm, anicteric  Psych:  Patient is pleasant and talkative  Breasts:        Pathology:  [unfilled]    Labs:      Imaging  No results found  I reviewed the above laboratory and imaging data  Discussion/Summary: 54-year-old male with [address]  in a non cirrhotic liver   His Child score is B   His MELD score is 9  He underwent ablation of the segment 5 liver lesion  He tolerated this well  His wound has healed  He is due for his repeat imaging in 6 weeks  We will schedule the MRI with an AFP level  I will see him again once we have those studies   He is agreeable to this   All his questions were answered

## 2020-09-01 NOTE — PROGRESS NOTES
Surgical Oncology Follow Up       [address] [facility] [place]  [address] [facility] [id number] [phone]    Diann Cole III  1955  [id number]  [address] [facility] [address] [id number] [phone]    Diagnoses and all orders for this visit:    [facility] ([facility] )  -     MRI abdomen w wo contrast; Future  -     BUN; Future  -     Creatinine, serum; Future  -     AFP tumor marker; Future        Chief Complaint   Patient presents with    Follow-up       Return in about 2 months (around 11/1/2020) for Office Visit, Imaging - See orders, Labs - See Treatment Plan  Oncology History   Hepatocellular carcinoma ([facility] )   6/8/2020 Initial Diagnosis    Hepatocellular carcinoma, moderately differentiated     7/15/2020 Surgery    Microwave ablation of liver segment 5         Staging: [address] [id number]    Treatment history:  Ablation of segment 5 liver lesion, July 2020  Current treatment:  Observation  Disease status: ARLYN    History of Present Illness:  Patient returns in follow-up  He is doing well at this time  He is placing minimal packing in the wound  He is having no abdominal pain  His appetite is good  No fevers chills  Review of Systems  Complete ROS Surg Onc:   Complete ROS Surg Onc:   Constitutional: The patient denies new or recent history of general fatigue, no recent weight loss, no change in appetite  Eyes: No complaints of visual problems, no scleral icterus  ENT: no complaints of ear pain, no hoarseness, no difficulty swallowing,  no tinnitus and no new masses in head, oral cavity, or neck  Cardiovascular: No complaints of chest pain, no palpitations, no ankle edema  Respiratory: No complaints of shortness of breath, no cough  Gastrointestinal: No complaints of jaundice, no bloody stools, no pale stools     Genitourinary: No complaints of dysuria, no hematuria, no nocturia, no frequent urination, no urethral discharge  Musculoskeletal: No complaints of weakness, paralysis, joint stiffness or arthralgias  Integumentary: No complaints of rash, no new lesions  Neurological: No complaints of convulsions, no seizures, no dizziness  Hematologic/Lymphatic: No complaints of easy bruising  Endocrine:  No hot or cold intolerance  No polydipsia, polyphagia, or polyuria  Allergy/immunology:  No environmental allergies  No food allergies  Not immunocompromised  Skin:  No pallor or rash  No wound  Patient Active Problem List   Diagnosis    Hypertension    [facility]COPD ([facility] )    [facility] disease [facility]    Colostomy in place ([facility] )    Diarrhea    Severe protein-calorie malnutrition ([facility] )    Cellulitis    [facility] lung [facility] )    Vasovagal syncope    Severe sepsis (HCC)    Pneumonia    Chest pain    Liver mass    Tobacco abuse    Abdominal pain    [facility]atocellular carcinoma [facility]    Palliative care patient    Abdominal wall abscess     Past Medical History:   Diagnosis Date    LUCILLE (acute kidney injury) ([facility] ) 6/28/2017    Cancer ([facility] )     liver    Cataract     Colon polyp     Colostomy in place ([facility] ) 6/29/2017    Crohn disease ([facility] )     Emphysema of lung ([facility] )     Emphysema/COPD ([facility] )     GERD (gastroesophageal reflux disease)     Hypertension     Hypokalemia 6/28/2017    Hypomagnesemia 6/29/2017    Hyponatremia 6/28/2017    Kidney stone     Pneumonia      Past Surgical History:   Procedure Laterality Date    CATARACT EXTRACTION Bilateral     CHOLECYSTECTOMY      COLECTOMY      10 inchs of ileum    COLONOSCOPY N/A 6/30/2017    Procedure: COLONOSCOPY;  Surgeon: Elpidio Dash MD;  [facility];   Service: Gastroenterology    COLOSTOMY      FINGER SURGERY Left     IR BIOPSY LIVER MASS  6/8/2020    LITHOTRIPSY      LIVER LOBECTOMY N/A 7/15/2020    Procedure: LIVER ABLATION, INTRAOPERATIVE U/S LIVER; Surgeon: Robert Kan MD;  [facility];  Service: Surgical Oncology     No family history on file    Social History     Socioeconomic History    Marital status: Single     Spouse name: Not on file    Number of children: Not on file    Years of education: Not on file    Highest education level: Not on file   Occupational History    Not on file   Social Needs    Financial resource strain: Not on file    Food insecurity     Worry: Not on file     Inability: Not on file    Transportation needs     Medical: Not on file     Non-medical: Not on file   Tobacco Use    Smoking status: Former Smoker     Packs/day: 1 00     Types: Cigarettes     Last attempt to quit: 7/15/2020     Years since quittin 1    Smokeless tobacco: Former User   Substance and Sexual Activity    Alcohol use: Not Currently     Alcohol/week: 1 0 - 2 0 standard drinks     Types: 1 - 2 Cans of beer per week     Frequency: Monthly or less     Drinks per session: 1 or 2     Comment: "1-2beers a day, glass of wine at night"    Drug use: No    Sexual activity: Not Currently   Lifestyle    Physical activity     Days per week: Not on file     Minutes per session: Not on file    Stress: Not on file   Relationships    Social connections     Talks on phone: Not on file     Gets together: Not on file     Attends Yazdanism service: Not on file     Active member of club or organization: Not on file     Attends meetings of clubs or organizations: Not on file     Relationship status: Not on file    Intimate partner violence     Fear of current or ex partner: Not on file     Emotionally abused: Not on file     Physically abused: Not on file     Forced sexual activity: Not on file   Other Topics Concern    Not on file   Social History Narrative    Not on file       Current Outpatient Medications:     ALPRAZolam (XANAX) 0 25 mg tablet, Take by mouth daily at bedtime as needed for anxiety, Disp: , Rfl:     AMILoride 5 mg tablet, Take 1 tablet by mouth daily, Disp: 30 tablet, Rfl: 0    amLODIPine (NORVASC) 2 5 mg tablet, Take 10 mg by mouth daily , Disp: , Rfl:     MAGNESIUM CHLORIDE PO, Take 1,000 mg by mouth daily, Disp: , Rfl:     omeprazole (PriLOSEC) 20 mg delayed release capsule, Take 20 mg by mouth daily, Disp: , Rfl:     oxyCODONE (OxyCONTIN) 10 mg 12 hr tablet, Take 10 mg by mouth every 8 (eight) hours , Disp: , Rfl:     potassium chloride (MICRO-K) 10 MEQ CR capsule, Take 20 mEq by mouth 2 (two) times a day, Disp: , Rfl:     rosuvastatin (CRESTOR) 10 MG tablet, Take 1 tablet (10 mg total) by mouth daily, Disp: 90 tablet, Rfl: 1    umeclidinium-vilanterol (ANORO ELLIPTA) 62 5-25 MCG/INH inhaler, Inhale 1 puff daily, Disp: 1 Inhaler, Rfl: 5    VIT B12-METHIONINE-INOS-CHOL IM, Inject into a muscle every 30 (thirty) days, Disp: , Rfl:     gabapentin (NEURONTIN) 300 mg capsule, Take 1 capsule (300 mg total) by mouth 3 (three) times a day for 10 days, Disp: 30 capsule, Rfl: 0    nicotine (NICODERM CQ) 21 mg/24 hr TD 24 hr patch, Place 1 patch on the skin daily (Patient not taking: Reported on 8/18/2020), Disp: 28 patch, Rfl: 0  No Known Allergies  Vitals:    09/01/20 0913   BP: 118/86   Pulse: 91   Temp: 97 6 °F (36 4 °C)       Physical Exam  Constitutional: General appearance: The Patient is well-developed and well-nourished who appears the stated age in no acute distress  Patient is pleasant and talkative  HEENT:  Normocephalic  Sclerae are anicteric  Mucous membranes are moist    Abdomen: Abdomen is soft, non-tender, non-distended and without masses  His incision has healed  No packing was placed  Extremities: There is no clubbing or cyanosis  There is no edema  Symmetric  Neuro: Grossly nonfocal  Gait is normal        Skin: Warm, anicteric  Psych:  Patient is pleasant and talkative  Breasts:        Pathology:  [unfilled]    Labs:      Imaging  No results found    I reviewed the above laboratory and imaging data     Discussion/Summary: 61-year-old male with [address]  in a non cirrhotic liver   His Child score is B   His MELD score is 9  He underwent ablation of the segment 5 liver lesion  He tolerated this well  His wound has healed  He is due for his repeat imaging in 6 weeks  We will schedule the MRI with an AFP level  I will see him again once we have those studies   He is agreeable to this   All his questions were answered

## 2020-09-02 NOTE — PROGRESS NOTES
Sleep Study Documentation    Pre-Sleep Study       Sleep testing procedure explained to patient:YES    Patient napped prior to study:NO    Caffeine:Dayshift worker after 12PM   Caffeine use:YES- coffee  6 ounces, chocolate  6 ounces and energy drinks  2 to 3 servings    Alcohol:Dayshift workers after 5PM: Alcohol use:YES-Beer 1 serving and Wine 1 serving    Typical day for patient:NO       Study Documentation    Sleep Study Indications: Z91 89, R06 83, G47 30, J43 9    Sleep Study: Diagnostic   Snore:None  Supplemental O2: no    O2 flow rate (L/min) range   O2 flow rate (L/min) final   Minimum SaO2 91  Baseline SaO2 95        Mode of Therapy:    EKG abnormalities: no     EEG abnormalities: no    Sleep Study Recorded < 2 hours: N/A    Sleep Study Recorded > 2 hours but incomplete study: N/A    Sleep Study Recorded 6 hours but no sleep obtained: NO    Patient classification: retired       Post-Sleep Study    Medication used at bedtime or during sleep study:YES other prescription medications    Patient reports time it took to fall asleep:20 to 30 minutes    Patient reports waking up during study:1 to 2 times  Patient reports returning to sleep without difficulty  Patient reports sleeping 2 to 4 hours without dreaming  Patient reports sleep during study:typical    Patient rated sleepiness: Not sleepy or tired    PAP treatment:no

## 2020-09-03 ENCOUNTER — TELEPHONE (OUTPATIENT)
Dept: SLEEP CENTER | Facility: CLINIC | Age: 65
End: 2020-09-03

## 2020-09-03 DIAGNOSIS — F34.1 DYSTHYMIC DISORDER: Primary | ICD-10-CM

## 2020-09-03 RX ORDER — MIRTAZAPINE 15 MG/1
15 TABLET, FILM COATED ORAL
COMMUNITY
End: 2020-09-03 | Stop reason: SDUPTHER

## 2020-09-03 NOTE — TELEPHONE ENCOUNTER
----- Message from Rosalinda Bishop MD sent at 9/2/2020  1:24 PM EDT -----  PSG read  NO SDB seen  No PAP or further follow up needed at this time  If symptoms worsen, may recheck with time supine

## 2020-09-04 RX ORDER — ALPRAZOLAM 0.25 MG/1
0.25 TABLET ORAL
Qty: 30 TABLET | Refills: 0 | Status: SHIPPED | OUTPATIENT
Start: 2020-09-04 | End: 2020-11-19 | Stop reason: SDUPTHER

## 2020-09-04 RX ORDER — MIRTAZAPINE 15 MG/1
15 TABLET, FILM COATED ORAL
Qty: 30 TABLET | Refills: 0 | Status: SHIPPED | OUTPATIENT
Start: 2020-09-04 | End: 2020-11-23

## 2020-09-10 ENCOUNTER — HOSPITAL ENCOUNTER (OUTPATIENT)
Facility: HOSPITAL | Age: 65
Setting detail: OBSERVATION
Discharge: HOME | End: 2020-09-12
Attending: EMERGENCY MEDICINE | Admitting: EMERGENCY MEDICINE
Payer: COMMERCIAL

## 2020-09-10 ENCOUNTER — APPOINTMENT (EMERGENCY)
Dept: RADIOLOGY | Facility: HOSPITAL | Age: 65
End: 2020-09-10
Payer: COMMERCIAL

## 2020-09-10 DIAGNOSIS — M25.532 ACUTE PAIN OF LEFT WRIST: ICD-10-CM

## 2020-09-10 DIAGNOSIS — M00.9: Primary | ICD-10-CM

## 2020-09-10 DIAGNOSIS — E83.51 HYPOCALCEMIA: ICD-10-CM

## 2020-09-10 PROBLEM — F11.90 CHRONIC, CONTINUOUS USE OF OPIOIDS: Status: ACTIVE | Noted: 2020-09-10

## 2020-09-10 LAB
ALBUMIN SERPL BCP-MCNC: 3.1 G/DL (ref 3.5–5)
ALP SERPL-CCNC: 115 U/L (ref 46–116)
ALT SERPL W P-5'-P-CCNC: 33 U/L (ref 12–78)
ANION GAP SERPL CALCULATED.3IONS-SCNC: 12 MMOL/L (ref 4–13)
AST SERPL W P-5'-P-CCNC: 48 U/L (ref 5–45)
BASOPHILS # BLD AUTO: 0.04 THOUSANDS/ΜL (ref 0–0.1)
BASOPHILS NFR BLD AUTO: 0 % (ref 0–1)
BILIRUB SERPL-MCNC: 1.23 MG/DL (ref 0.2–1)
BUN SERPL-MCNC: 9 MG/DL (ref 5–25)
CALCIUM SERPL-MCNC: 6.3 MG/DL (ref 8.3–10.1)
CHLORIDE SERPL-SCNC: 97 MMOL/L (ref 100–108)
CO2 SERPL-SCNC: 29 MMOL/L (ref 21–32)
CREAT SERPL-MCNC: 0.7 MG/DL (ref 0.6–1.3)
CRP SERPL QL: 48.1 MG/L
EOSINOPHIL # BLD AUTO: 0.04 THOUSAND/ΜL (ref 0–0.61)
EOSINOPHIL NFR BLD AUTO: 0 % (ref 0–6)
ERYTHROCYTE [DISTWIDTH] IN BLOOD BY AUTOMATED COUNT: 15.1 % (ref 11.6–15.1)
ERYTHROCYTE [SEDIMENTATION RATE] IN BLOOD: 35 MM/HOUR (ref 0–19)
GFR SERPL CREATININE-BSD FRML MDRD: 100 ML/MIN/1.73SQ M
GLUCOSE SERPL-MCNC: 106 MG/DL (ref 65–140)
HCT VFR BLD AUTO: 40.6 % (ref 36.5–49.3)
HGB BLD-MCNC: 13.5 G/DL (ref 12–17)
IMM GRANULOCYTES # BLD AUTO: 0.08 THOUSAND/UL (ref 0–0.2)
IMM GRANULOCYTES NFR BLD AUTO: 1 % (ref 0–2)
LACTATE SERPL-SCNC: 1.3 MMOL/L (ref 0.5–2)
LYMPHOCYTES # BLD AUTO: 1.24 THOUSANDS/ΜL (ref 0.6–4.47)
LYMPHOCYTES NFR BLD AUTO: 9 % (ref 14–44)
MCH RBC QN AUTO: 31.2 PG (ref 26.8–34.3)
MCHC RBC AUTO-ENTMCNC: 33.3 G/DL (ref 31.4–37.4)
MCV RBC AUTO: 94 FL (ref 82–98)
MONOCYTES # BLD AUTO: 0.91 THOUSAND/ΜL (ref 0.17–1.22)
MONOCYTES NFR BLD AUTO: 7 % (ref 4–12)
NEUTROPHILS # BLD AUTO: 10.96 THOUSANDS/ΜL (ref 1.85–7.62)
NEUTS SEG NFR BLD AUTO: 83 % (ref 43–75)
NRBC BLD AUTO-RTO: 0 /100 WBCS
PLATELET # BLD AUTO: 183 THOUSANDS/UL (ref 149–390)
PMV BLD AUTO: 10.9 FL (ref 8.9–12.7)
POTASSIUM SERPL-SCNC: 3.6 MMOL/L (ref 3.5–5.3)
PROT SERPL-MCNC: 7.2 G/DL (ref 6.4–8.2)
RBC # BLD AUTO: 4.33 MILLION/UL (ref 3.88–5.62)
SODIUM SERPL-SCNC: 138 MMOL/L (ref 136–145)
WBC # BLD AUTO: 13.27 THOUSAND/UL (ref 4.31–10.16)

## 2020-09-10 PROCEDURE — 96361 HYDRATE IV INFUSION ADD-ON: CPT

## 2020-09-10 PROCEDURE — 83605 ASSAY OF LACTIC ACID: CPT | Performed by: PHYSICIAN ASSISTANT

## 2020-09-10 PROCEDURE — 80053 COMPREHEN METABOLIC PANEL: CPT | Performed by: PHYSICIAN ASSISTANT

## 2020-09-10 PROCEDURE — 85025 COMPLETE CBC W/AUTO DIFF WBC: CPT | Performed by: PHYSICIAN ASSISTANT

## 2020-09-10 PROCEDURE — 86140 C-REACTIVE PROTEIN: CPT | Performed by: PHYSICIAN ASSISTANT

## 2020-09-10 PROCEDURE — 36415 COLL VENOUS BLD VENIPUNCTURE: CPT | Performed by: PHYSICIAN ASSISTANT

## 2020-09-10 PROCEDURE — 85652 RBC SED RATE AUTOMATED: CPT | Performed by: PHYSICIAN ASSISTANT

## 2020-09-10 PROCEDURE — 99285 EMERGENCY DEPT VISIT HI MDM: CPT | Performed by: PHYSICIAN ASSISTANT

## 2020-09-10 PROCEDURE — 87040 BLOOD CULTURE FOR BACTERIA: CPT | Performed by: PHYSICIAN ASSISTANT

## 2020-09-10 PROCEDURE — 96374 THER/PROPH/DIAG INJ IV PUSH: CPT

## 2020-09-10 PROCEDURE — 99284 EMERGENCY DEPT VISIT MOD MDM: CPT

## 2020-09-10 PROCEDURE — 99219 PR INITIAL OBSERVATION CARE/DAY 50 MINUTES: CPT | Performed by: HOSPITALIST

## 2020-09-10 PROCEDURE — 73110 X-RAY EXAM OF WRIST: CPT

## 2020-09-10 RX ORDER — AMILORIDE HYDROCHLORIDE 5 MG/1
5 TABLET ORAL DAILY
Status: DISCONTINUED | OUTPATIENT
Start: 2020-09-11 | End: 2020-09-12 | Stop reason: HOSPADM

## 2020-09-10 RX ORDER — AMLODIPINE BESYLATE 10 MG/1
10 TABLET ORAL DAILY
Status: DISCONTINUED | OUTPATIENT
Start: 2020-09-11 | End: 2020-09-12 | Stop reason: HOSPADM

## 2020-09-10 RX ORDER — HYDROMORPHONE HCL/PF 1 MG/ML
0.5 SYRINGE (ML) INJECTION EVERY 4 HOURS PRN
Status: DISCONTINUED | OUTPATIENT
Start: 2020-09-10 | End: 2020-09-12 | Stop reason: HOSPADM

## 2020-09-10 RX ORDER — PANTOPRAZOLE SODIUM 40 MG/1
40 TABLET, DELAYED RELEASE ORAL
Status: DISCONTINUED | OUTPATIENT
Start: 2020-09-11 | End: 2020-09-12 | Stop reason: HOSPADM

## 2020-09-10 RX ORDER — HYDROMORPHONE HCL/PF 1 MG/ML
0.5 SYRINGE (ML) INJECTION ONCE
Status: COMPLETED | OUTPATIENT
Start: 2020-09-10 | End: 2020-09-10

## 2020-09-10 RX ORDER — OXYCODONE HYDROCHLORIDE 10 MG/1
10 TABLET ORAL EVERY 4 HOURS PRN
Status: DISCONTINUED | OUTPATIENT
Start: 2020-09-10 | End: 2020-09-11

## 2020-09-10 RX ORDER — OXYCODONE HYDROCHLORIDE AND ACETAMINOPHEN 5; 325 MG/1; MG/1
1 TABLET ORAL ONCE
Status: DISCONTINUED | OUTPATIENT
Start: 2020-09-10 | End: 2020-09-10

## 2020-09-10 RX ORDER — ALPRAZOLAM 0.25 MG/1
0.25 TABLET ORAL
Status: DISCONTINUED | OUTPATIENT
Start: 2020-09-10 | End: 2020-09-12 | Stop reason: HOSPADM

## 2020-09-10 RX ORDER — PRAVASTATIN SODIUM 80 MG/1
80 TABLET ORAL
Status: DISCONTINUED | OUTPATIENT
Start: 2020-09-10 | End: 2020-09-12 | Stop reason: HOSPADM

## 2020-09-10 RX ORDER — OXYCODONE HYDROCHLORIDE 5 MG/1
5 TABLET ORAL EVERY 4 HOURS PRN
Status: DISCONTINUED | OUTPATIENT
Start: 2020-09-10 | End: 2020-09-11

## 2020-09-10 RX ORDER — NAPROXEN 250 MG/1
375 TABLET ORAL ONCE
Status: COMPLETED | OUTPATIENT
Start: 2020-09-10 | End: 2020-09-10

## 2020-09-10 RX ORDER — KETOROLAC TROMETHAMINE 30 MG/ML
15 INJECTION, SOLUTION INTRAMUSCULAR; INTRAVENOUS EVERY 6 HOURS SCHEDULED
Status: COMPLETED | OUTPATIENT
Start: 2020-09-10 | End: 2020-09-12

## 2020-09-10 RX ORDER — OXYCODONE HYDROCHLORIDE 5 MG/1
5 TABLET ORAL ONCE
Status: COMPLETED | OUTPATIENT
Start: 2020-09-10 | End: 2020-09-10

## 2020-09-10 RX ORDER — MIRTAZAPINE 15 MG/1
15 TABLET, FILM COATED ORAL
Status: DISCONTINUED | OUTPATIENT
Start: 2020-09-10 | End: 2020-09-12 | Stop reason: HOSPADM

## 2020-09-10 RX ORDER — POTASSIUM CHLORIDE 20MEQ/15ML
20 LIQUID (ML) ORAL 2 TIMES DAILY
Status: DISCONTINUED | OUTPATIENT
Start: 2020-09-10 | End: 2020-09-12 | Stop reason: HOSPADM

## 2020-09-10 RX ADMIN — CEFTRIAXONE SODIUM 1000 MG: 10 INJECTION, POWDER, FOR SOLUTION INTRAVENOUS at 15:40

## 2020-09-10 RX ADMIN — MIRTAZAPINE 15 MG: 15 TABLET, FILM COATED ORAL at 21:33

## 2020-09-10 RX ADMIN — MAGNESIUM OXIDE TAB 400 MG (241.3 MG ELEMENTAL MG) 400 MG: 400 (241.3 MG) TAB at 18:20

## 2020-09-10 RX ADMIN — HYDROMORPHONE HYDROCHLORIDE 0.5 MG: 1 INJECTION, SOLUTION INTRAMUSCULAR; INTRAVENOUS; SUBCUTANEOUS at 13:06

## 2020-09-10 RX ADMIN — KETOROLAC TROMETHAMINE 15 MG: 30 INJECTION, SOLUTION INTRAMUSCULAR at 18:20

## 2020-09-10 RX ADMIN — OXYCODONE HYDROCHLORIDE 10 MG: 10 TABLET ORAL at 20:39

## 2020-09-10 RX ADMIN — HYDROMORPHONE HYDROCHLORIDE 0.5 MG: 1 INJECTION, SOLUTION INTRAMUSCULAR; INTRAVENOUS; SUBCUTANEOUS at 16:34

## 2020-09-10 RX ADMIN — OXYCODONE HYDROCHLORIDE 5 MG: 5 TABLET ORAL at 12:12

## 2020-09-10 RX ADMIN — POTASSIUM CHLORIDE 20 MEQ: 20 SOLUTION ORAL at 18:20

## 2020-09-10 RX ADMIN — VANCOMYCIN HYDROCHLORIDE 750 MG: 750 INJECTION, SOLUTION INTRAVENOUS at 16:03

## 2020-09-10 RX ADMIN — PRAVASTATIN SODIUM 80 MG: 80 TABLET ORAL at 18:22

## 2020-09-10 RX ADMIN — NAPROXEN 375 MG: 250 TABLET ORAL at 12:13

## 2020-09-10 RX ADMIN — SODIUM CHLORIDE 1000 ML: 0.9 INJECTION, SOLUTION INTRAVENOUS at 14:09

## 2020-09-10 NOTE — H&P
H&P- Swetha Murillo III 1955, 59 y o  male MRN: [id number]    Unit/Bed#: FT 04 Encounter: [id number]    Primary Care Provider: Aiden Mix MD   Date and time admitted to hospital: 9/10/2020 11:25 AM    * Acute pain of left wrist  Assessment & Plan  · POA  S/P twisting injury 48 hours ago after mechanical fall at home  Active and passive ROM limited by pain  Swelling to dorsal surface of wrist with erythema (does streak up, but not beyond the wrist brace)  · XR without acute osseous abnormalities, mildly elevated WBC  · DDx: ligamentous injury, gout/pseudogout, septic joint  · S/P Rocephin + Vanco in the ED  · Pain control-- sx unrelieved with home oxy regimen  · Trend labs/vitals; f/u cultures  · Ortho eval-- appreciate input    Hepatocellular carcinoma (HCC)  Assessment & Plan  · Follows with Dr Catina Booth  · S/p ablation of liver lesion    Emphysema/COPD [facility])  Assessment & Plan  · No acute exacerbation   · Continue home inhaler regimen      VTE Prophylaxis: Pharmacologic VTE Prophylaxis contraindicated due to low irks  / reason for no mechanical VTE prophylaxis low risk   Code Status: level 1  POLST: POLST form is not discussed and not completed at this time  Discussion with family: family not available     Anticipated Length of Stay:  Patient will be admitted on an Observation basis with an anticipated length of stay of  < 2 midnights  Justification for Hospital Stay: pain contorl     Total Time for Visit, including Counseling / Coordination of Care: 30 minutes  Greater than 50% of this total time spent on direct patient counseling and coordination of care  Chief Complaint:   L wrist pain     History of Present Illness:    Domitila Danielle is a 59 y o  male with past medical history significant for hepatocellular carcinoma, COPD presents to the ED for evaluation of acute left wrist pain x2 days  Patient reports he was at home a few days ago, and had a mechanical fall    When he was trying to steady himself before falling, he grabbed onto an object with his left wrist, and sustained a twisting injury to the left wrist   He had no forceful trauma to the left wrist   Over the course last 48 hours, patient has significant increase in pain and swelling of the left wrist   Today, he had significant difficulty with range of motion of his wrist   He reports taking oxycodone at home with minimal relief of symptoms  He denies any fevers or chills  Reports no prior injury in the past     Review of Systems:    Review of Systems   Constitutional: Negative  HENT: Negative  Eyes: Negative  Respiratory: Negative  Cardiovascular: Negative  Gastrointestinal: Negative  Genitourinary: Negative  Musculoskeletal: Positive for arthralgias and joint swelling  Skin: Negative  Neurological: Negative  Hematological: Negative  Psychiatric/Behavioral: Negative  Past Medical and Surgical History:     Past Medical History:   Diagnosis Date    LUCILLE [facility]acute kidney [facility]) ([email or web address] 75 ) 6/28/2017    Cancer ([place] [address] )     liver    Cataract     Colon polyp     Colostomy in place ([place] [address] ) 6/29/2017    Crohn disease ([place] [address] )     Emphysema of lung ([place] [address] )     Emphysema/COPD ([place] [address] )     GERD (gastroesophageal reflux disease)     Hypertension     Hypokalemia 6/28/2017    Hypomagnesemia 6/29/2017    Hyponatremia 6/28/2017    Kidney stone     Pneumonia        Past Surgical History:   Procedure Laterality Date    CATARACT EXTRACTION Bilateral     CHOLECYSTECTOMY      COLECTOMY      10 inchs of ileum    COLONOSCOPY N/A 6/30/2017    Procedure: COLONOSCOPY;  Surgeon: Marjan Walker MD;  [facility];   Service: Gastroenterology    COLOSTOMY      FINGER SURGERY Left     IR BIOPSY LIVER MASS  6/8/2020    LITHOTRIPSY      LIVER LOBECTOMY N/A 7/15/2020    Procedure: LIVER ABLATION, INTRAOPERATIVE U/S LIVER;  Surgeon: Jayy Villegas MD;  [facility];  Service: Surgical Oncology Meds/Allergies:    Prior to Admission medications    Medication Sig Start Date End Date Taking? Authorizing Provider   ALPRAZolam Eusebio Alfaro) 0 25 mg tablet Take 1 tablet (0 25 mg total) by mouth daily at bedtime as needed for anxiety 9/4/20   Carina Elder MD   AMILoride 5 mg tablet Take 1 tablet by mouth daily 12/7/17   Ryan Love MD   amLODIPine (NORVASC) 2 5 mg tablet Take 10 mg by mouth daily     Historical Provider, MD   gabapentin (NEURONTIN) 300 mg capsule Take 1 capsule (300 mg total) by mouth 3 (three) times a day for 10 days 7/22/20 8/7/20  Jackie Quiros MD   MAGNESIUM CHLORIDE PO Take 1,000 mg by mouth daily    Historical Provider, MD   mirtazapine (REMERON) 15 mg tablet Take 1 tablet (15 mg total) by mouth daily at bedtime 9/4/20   Carina Elder MD   nicotine (NICODERM CQ) 21 mg/24 hr TD 24 hr patch Place 1 patch on the skin daily  Patient not taking: Reported on 8/18/2020 5/30/20   Comfort Moulton MD   omeprazole (PriLOSEC) 20 mg delayed release capsule Take 20 mg by mouth daily    Historical Provider, MD   oxyCODONE (OxyCONTIN) 10 mg 12 hr tablet Take 10 mg by mouth every 8 (eight) hours     Historical Provider, MD   potassium chloride (MICRO-K) 10 MEQ CR capsule Take 20 mEq by mouth 2 (two) times a day    Historical Provider, MD   rosuvastatin (CRESTOR) 10 MG tablet Take 1 tablet (10 mg total) by mouth daily 8/3/20   Concha Brooks MD   umeclidinium-vilanterol (ANORO ELLIPTA) 62 5-25 MCG/INH inhaler Inhale 1 puff daily 7/9/20   Fermin Peter MD   VIT B12-METHIONINE-INOS-CHOL IM Inject into a muscle every 30 (thirty) days    Historical Provider, MD RAY have reviewed home medications with patient personally      Allergies: No Known Allergies    Social History:     Marital Status: Single   Occupation: retired  Patient Pre-hospital Living Situation: independent  Patient Pre-hospital Level of Mobility: independent  Patient Pre-hospital Diet Restrictions: none  Substance Use History: Social History     Substance and Sexual Activity   Alcohol Use Not Currently    Alcohol/week: 1 0 - 2 0 standard drinks    Types: 1 - 2 Cans of beer per week    Frequency: Monthly or less    Drinks per session: 1 or 2    Comment: "1-2beers a day, glass of wine at night"     Social History     Tobacco Use   Smoking Status Former Smoker    Packs/day: 1 00    Types: Cigarettes    Last attempt to quit: 7/15/2020    Years since quittin 1   Smokeless Tobacco Former User     Social History     Substance and Sexual Activity   Drug Use No       Family History:    History reviewed  No pertinent family history  Physical Exam:     Vitals:   Blood Pressure: 151/84 (09/10/20 1336)  Pulse: 96 (09/10/20 133)  Temperature: 98 8 °F (37 1 °C) (09/10/20 1336)  Temp Source: Oral (09/10/20 1336)  Respirations: 17 (09/10/20 1336)  SpO2: 95 % (09/10/20 1336)    Physical Exam  Constitutional:       Appearance: Normal appearance  HENT:      Mouth/Throat:      Mouth: Mucous membranes are moist    Eyes:      Pupils: Pupils are equal, round, and reactive to light  Cardiovascular:      Rate and Rhythm: Normal rate and regular rhythm  Pulses: Normal pulses  Heart sounds: No murmur  No friction rub  No gallop  Pulmonary:      Effort: Pulmonary effort is normal  No respiratory distress  Breath sounds: Normal breath sounds  Abdominal:      General: Abdomen is flat  Bowel sounds are normal       Palpations: Abdomen is soft  Tenderness: There is no abdominal tenderness  Musculoskeletal:      Left wrist: He exhibits decreased range of motion (limited 2/2 pain ), tenderness (ulnar process, volar surface) and swelling (dorsal surface of L wrist)  Arms:    Skin:     General: Skin is dry  Neurological:      General: No focal deficit present  Mental Status: He is alert and oriented to person, place, and time     Psychiatric:         Mood and Affect: Mood normal          Behavior: Behavior normal  Additional Data:     Lab Results: I have personally reviewed pertinent reports  Results from last 7 days   Lab Units 09/10/20  1306   WBC Thousand/uL 13 27*   HEMOGLOBIN g/dL 13 5   HEMATOCRIT % 40 6   PLATELETS Thousands/uL 183   NEUTROS PCT % 83*   LYMPHS PCT % 9*   MONOS PCT % 7   EOS PCT % 0     Results from last 7 days   Lab Units 09/10/20  1306   SODIUM mmol/L 138   POTASSIUM mmol/L 3 6   CHLORIDE mmol/L 97*   CO2 mmol/L 29   BUN mg/dL 9   CREATININE mg/dL 0 70   ANION GAP mmol/L 12   CALCIUM mg/dL 6 3*   ALBUMIN g/dL 3 1*   TOTAL BILIRUBIN mg/dL 1 23*   ALK PHOS U/L 115   ALT U/L 33   AST U/L 48*   GLUCOSE RANDOM mg/dL 106                 Results from last 7 days   Lab Units 09/10/20  1405   LACTIC ACID mmol/L 1 3       Imaging: I have personally reviewed pertinent reports  XR wrist 3+ views LEFT   ED Interpretation by Stephan Nielson PA-C (09/10 1221)   Negative for acute bony abnormality  Final Result by Matt Ventura MD (09/10 1323)      No acute osseous abnormality  Workstation performed: [id number]             EKG, Pathology, and Other Studies Reviewed on Admission:   · EKG: not available     [employer] [place] [facility]\"" / [facility] Records Reviewed: Yes     ** Please Note: This note has been constructed using a voice recognition system   **

## 2020-09-10 NOTE — ASSESSMENT & PLAN NOTE
· POA  S/P twisting injury 48 hours ago after mechanical fall at home  Active and passive ROM limited by pain  Swelling to dorsal surface of wrist with erythema (does streak up, but not beyond the wrist brace)  · XR without acute osseous abnormalities, mildly elevated WBC     · DDx: ligamentous injury, gout/pseudogout, septic joint  · S/P Rocephin + Vanco in the ED  · Pain control-- sx unrelieved with home oxy regimen  · Trend labs/vitals; f/u cultures  · Ortho eval-- appreciate input

## 2020-09-10 NOTE — ED PROVIDER NOTES
History  Chief Complaint   Patient presents with    Wrist Injury     Pt fell at home night before last and twisted his left wrist      The patient is a 60-year-old male with history of liver cancer, hypertension and hyperlipidemia who presents to the emergency department for evaluation of a left wrist injury  The patient states that 2 nights ago he tripped over something and caught himself on a wall with his left hand  States when this occurred, he twisted his left wrist   He reports he has been having pain in his left wrist since  He has been wearing a brace for the last 2 days, however he continues to have worsening pain  He reports that he took 3 oxycodone last night without relief  He states he did not fall to the ground, hit his head or lose consciousness during this event  He denies any further injury at this time  He did not take anything for pain today  History provided by:  Patient   used: No        Prior to Admission Medications   Prescriptions Last Dose Informant Patient Reported? Taking?    ALPRAZolam (XANAX) 0 25 mg tablet   No No   Sig: Take 1 tablet (0 25 mg total) by mouth daily at bedtime as needed for anxiety   AMILoride 5 mg tablet  Self No No   Sig: Take 1 tablet by mouth daily   MAGNESIUM CHLORIDE PO  Self Yes No   Sig: Take 1,000 mg by mouth daily   VIT B12-METHIONINE-INOS-CHOL IM  Self Yes No   Sig: Inject into a muscle every 30 (thirty) days   amLODIPine (NORVASC) 2 5 mg tablet  Self Yes No   Sig: Take 10 mg by mouth daily    gabapentin (NEURONTIN) 300 mg capsule   No No   Sig: Take 1 capsule (300 mg total) by mouth 3 (three) times a day for 10 days   mirtazapine (REMERON) 15 mg tablet   No No   Sig: Take 1 tablet (15 mg total) by mouth daily at bedtime   nicotine (NICODERM CQ) 21 mg/24 hr TD 24 hr patch  Self No No   Sig: Place 1 patch on the skin daily   Patient not taking: Reported on 8/18/2020   omeprazole (PriLOSEC) 20 mg delayed release capsule  Self Yes No   Sig: Take 20 mg by mouth daily   oxyCODONE (OxyCONTIN) 10 mg 12 hr tablet  Self Yes No   Sig: Take 10 mg by mouth every 8 (eight) hours    potassium chloride (MICRO-K) 10 MEQ CR capsule  Self Yes No   Sig: Take 20 mEq by mouth 2 (two) times a day   rosuvastatin (CRESTOR) 10 MG tablet  Self No No   Sig: Take 1 tablet (10 mg total) by mouth daily   umeclidinium-vilanterol (ANORO ELLIPTA) 62 5-25 MCG/INH inhaler  Self No No   Sig: Inhale 1 puff daily      Facility-Administered Medications: None       Past Medical History:   Diagnosis Date    [place]) [place] [address] ) 2017    Cancer ([place] [address] )     liver    Cataract     Colon polyp     Colostomy in place ([place] [address] ) 2017    Crohn disease ([place] [address] )     Emphysema of lung ([place] [address] )     Emphysema/COPD ([place] [address] )     GERD (gastroesophageal reflux disease)     Hypertension     Hypokalemia 2017    Hypomagnesemia 2017    Hyponatremia 2017    Kidney stone     Pneumonia        Past Surgical History:   Procedure Laterality Date    CATARACT EXTRACTION Bilateral     CHOLECYSTECTOMY      COLECTOMY      10 inchs of ileum    COLONOSCOPY N/A 2017    Procedure: COLONOSCOPY;  Surgeon: Valeria Betancur MD;  [facility]: [facility]; Service: Gastroenterology    COLOSTOMY      FINGER SURGERY Left     IR BIOPSY LIVER MASS  2020    LITHOTRIPSY      LIVER LOBECTOMY N/A 7/15/2020    Procedure: LIVER ABLATION, INTRAOPERATIVE U/S LIVER;  Surgeon: Julissa Auguste MD;  [facility];  Service: Surgical Oncology       History reviewed  No pertinent family history  I have reviewed and agree with the history as documented      E-Cigarette/Vaping    E-Cigarette Use Never User      E-Cigarette/Vaping Substances     Social History     Tobacco Use    Smoking status: Former Smoker     Packs/day: 1 00     Types: Cigarettes     Last attempt to quit: 7/15/2020     Years since quittin 1    Smokeless tobacco: Former User   Substance Use Topics    Alcohol use: Not Currently     Alcohol/week: 1 0 - 2 0 standard drinks     Types: 1 - 2 Cans of beer per week     Frequency: Monthly or less     Drinks per session: 1 or 2     Comment: "1-2beers a day, glass of wine at night"    Drug use: No       Review of Systems   Constitutional: Negative for chills and fever  HENT: Negative for ear pain and sore throat  Eyes: Negative for redness and visual disturbance  Respiratory: Negative for cough, shortness of breath and wheezing  Cardiovascular: Negative for chest pain  Gastrointestinal: Negative for abdominal pain, diarrhea, nausea and vomiting  Genitourinary: Negative for dysuria and hematuria  Musculoskeletal: Positive for arthralgias (Left wrist pain)  Negative for back pain, joint swelling, neck pain and neck stiffness  Skin: Negative for color change, rash and wound  Neurological: Negative for dizziness, light-headedness, numbness and headaches  Hematological: Does not bruise/bleed easily  All other systems reviewed and are negative  Physical Exam  Physical Exam  Constitutional:       Appearance: Normal appearance  HENT:      Head: Normocephalic and atraumatic  Nose: Nose normal    Eyes:      Extraocular Movements: Extraocular movements intact  Conjunctiva/sclera: Conjunctivae normal    Neck:      Musculoskeletal: Normal range of motion and neck supple  Cardiovascular:      Rate and Rhythm: Normal rate  Pulmonary:      Effort: Pulmonary effort is normal  No respiratory distress  Musculoskeletal:      Left elbow: Normal       Left wrist: He exhibits decreased range of motion, tenderness and bony tenderness  He exhibits no swelling, no effusion and no deformity  Left hand: Normal    Skin:     General: Skin is warm and dry  Neurological:      General: No focal deficit present  Mental Status: He is alert and oriented to person, place, and time           Vital Signs  ED Triage Vitals [09/10/20 1124]   Temperature Pulse Respirations Blood Pressure SpO2   98 6 °F (37 °C) (!) 115 18 142/95 97 %      Temp Source Heart Rate Source Patient Position - Orthostatic VS BP Location FiO2 (%)   Oral Monitor Sitting Right arm --      Pain Score       Worst Possible Pain           Vitals:    09/10/20 1124 09/10/20 1336 09/10/20 1814   BP: 142/95 151/84 139/74   Pulse: (!) 115 96 92   Patient Position - Orthostatic VS: Sitting Lying Sitting         Visual Acuity      ED Medications  Medications   ALPRAZolam (XANAX) tablet 0 25 mg (has no administration in time range)   AMILoride tablet 5 mg (has no administration in time range)   magnesium oxide (MAG-OX) tablet 400 mg (400 mg Oral Given 9/10/20 1820)   mirtazapine (REMERON) tablet 15 mg (has no administration in time range)   pantoprazole (PROTONIX) EC tablet 40 mg (has no administration in time range)   potassium chloride oral solution 20 mEq (20 mEq Oral Given 9/10/20 1820)   amLODIPine (NORVASC) tablet 10 mg (has no administration in time range)   pravastatin (PRAVACHOL) tablet 80 mg (80 mg Oral Given 9/10/20 1822)   umeclidinium-vilanterol (ANORO ELLIPTA) 62 5-25 MCG/INH inhaler 1 puff (has no administration in time range)   ketorolac (TORADOL) injection 15 mg (15 mg Intravenous Given 9/10/20 1820)   oxyCODONE (ROXICODONE) IR tablet 5 mg (has no administration in time range)   oxyCODONE (ROXICODONE) immediate release tablet 10 mg (has no administration in time range)   HYDROmorphone (DILAUDID) injection 0 5 mg (0 5 mg Intravenous Given 9/10/20 1634)   naproxen (NAPROSYN) tablet 375 mg (375 mg Oral Given 9/10/20 1213)   oxyCODONE (ROXICODONE) IR tablet 5 mg (5 mg Oral Given 9/10/20 1212)   HYDROmorphone (DILAUDID) injection 0 5 mg (0 5 mg Intravenous Given 9/10/20 1306)   sodium chloride 0 9 % bolus 1,000 mL (0 mL Intravenous Stopped 9/10/20 1607)   vancomycin (VANCOCIN) IVPB (premix) 750 mg 150 mL (750 mg Intravenous New Bag 9/10/20 7473)   ceftriaxone (ROCEPHIN) 1 g/50 mL in dextrose IVPB (0 mg Intravenous Stopped 9/10/20 1607)       Diagnostic Studies  Results Reviewed     Procedure Component Value Units Date/Time    Blood culture #1 [[id number]] Collected:  09/10/20 1355    Lab Status:  Preliminary result Specimen:  Blood from Arm, Right Updated:  09/10/20 1701     Blood Culture Received in Microbiology Lab  Culture in Progress  Blood culture #2 [[id number]] Collected:  09/10/20 1404    Lab Status:  Preliminary result Specimen:  Blood from Hand, Right Updated:  09/10/20 1701     Blood Culture Received in Microbiology Lab  Culture in Progress  Lactic acid [[id number]]  (Normal) Collected:  09/10/20 1405    Lab Status:  Final result Specimen:  Blood from Arm, Right Updated:  09/10/20 1434     LACTIC ACID 1 3 mmol/L     Narrative:       Result may be elevated if tourniquet was used during collection      C-reactive protein [[id number]]  (Abnormal) Collected:  09/10/20 1306    Lab Status:  Final result Specimen:  Blood from Arm, Right Updated:  09/10/20 1423     CRP 48 1 mg/L     Comprehensive metabolic panel [[id number]]  (Abnormal) Collected:  09/10/20 1306    Lab Status:  Final result Specimen:  Blood from Arm, Right Updated:  09/10/20 1344     Sodium 138 mmol/L      Potassium 3 6 mmol/L      Chloride 97 mmol/L      CO2 29 mmol/L      ANION GAP 12 mmol/L      BUN 9 mg/dL      Creatinine 0 70 mg/dL      Glucose 106 mg/dL      Calcium 6 3 mg/dL      AST 48 U/L      ALT 33 U/L      Alkaline Phosphatase 115 U/L      Total Protein 7 2 g/dL      Albumin 3 1 g/dL      Total Bilirubin 1 23 mg/dL      eGFR 100 ml/min/1 73sq m     Narrative:       [facility] guidelines for Chronic Kidney Disease (CKD):     Stage 1 with normal or high GFR (GFR > 90 mL/min/1 73 square meters)    Stage 2 Mild CKD (GFR = 60-89 mL/min/1 73 square meters)    Stage 3A Moderate CKD (GFR = 45-59 mL/min/1 73 square meters)    Stage 3B Moderate CKD (GFR = 30-44 mL/min/1 73 square meters)    Stage 4 Severe CKD (GFR = 15-29 mL/min/1 73 square meters)    Stage 5 End Stage CKD (GFR <15 mL/min/1 73 square meters)  Note: GFR calculation is accurate only with a steady state creatinine    Sedimentation rate, automated [[id number]]  (Abnormal) Collected:  09/10/20 1306    Lab Status:  Final result Specimen:  Blood from Arm, Right Updated:  09/10/20 1341     Sed Rate 35 mm/hour     Narrative:       New method- Test performed using  automated Rheology Technology  If following a patient's inflammatory disease during treatment, a new baseline should be established  CBC and differential [[id number]]  (Abnormal) Collected:  09/10/20 1306    Lab Status:  Final result Specimen:  Blood from Arm, Right Updated:  09/10/20 1323     WBC 13 27 Thousand/uL      RBC 4 33 Million/uL      Hemoglobin 13 5 g/dL      Hematocrit 40 6 %      MCV 94 fL      MCH 31 2 pg      MCHC 33 3 g/dL      RDW 15 1 %      MPV 10 9 fL      Platelets 677 Thousands/uL      nRBC 0 /100 WBCs      Neutrophils Relative 83 %      Immat GRANS % 1 %      Lymphocytes Relative 9 %      Monocytes Relative 7 %      Eosinophils Relative 0 %      Basophils Relative 0 %      Neutrophils Absolute 10 96 Thousands/µL      Immature Grans Absolute 0 08 Thousand/uL      Lymphocytes Absolute 1 24 Thousands/µL      Monocytes Absolute 0 91 Thousand/µL      Eosinophils Absolute 0 04 Thousand/µL      Basophils Absolute 0 04 Thousands/µL                  XR wrist 3+ views LEFT   ED Interpretation by Heather Daugherty PA-C (09/10 1221)   Negative for acute bony abnormality  Final Result by Winsome Batres MD (09/10 1323)      No acute osseous abnormality  Workstation performed: [id number]                    Procedures  Procedures         ED Course  ED Course as of Sep 10 2033   Thu Sep 10, 2020   1227 X-ray reviewed and negative for any acute bony abnormalities  Will re-evaluate after medication has time to take effect        1252 On re-evaluation the patient reports no improvement of pain  He is seemingly having pain out of proportion to his reported injury  Patient has overlying erythema that I initially attributed to him wearing his brace too tight  However, he has now had the brace for an hour, and the erythema is still present  I now also noting erythema tracking up his arm  Have concern for infection at this time  Labs ordered  1340 Patient noted to have elevated white count  Lactic acid and blood cultures added on  Patient reports significant improvement of pain following the Dilaudid  He is resting comfortably at this time  1515 Vancomycin and ceftriaxone ordered  Patient admitted to [place] at this time  Initial Sepsis Screening     Row Name 09/10/20 1339                Is the patient's history suggestive of a new or worsening infection? (!) Yes (Proceed)  -AF        Suspected source of infection  soft tissue  -AF        Are two or more of the following signs & symptoms of infection both present and new to the patient?  --        Indicate SIRS criteria  Tachycardia > 90 bpm;Leukocytosis (WBC > 95817 IJL)  -AF        If the answer is yes to both questions, suspicion of sepsis is present  --        If severe sepsis is present AND tissue hypoperfusion perists in the hour after fluid resuscitation or lactate > 4, the patient meets criteria for SEPTIC SHOCK  --        Are any of the following organ dysfunction criteria present within 6 hours of suspected infection and SIRS criteria that are NOT considered to be chronic conditions?   --        Organ dysfunction  --        Date of presentation of severe sepsis  --        Time of presentation of severe sepsis  --        Tissue hypoperfusion persists in the hour after crystalloid fluid administration, evidenced, by either:  --        Was hypotension present within one hour of the conclusion of crystalloid fluid administration?  --        Date of presentation of septic shock  --        Time of presentation of septic shock  --          User Key  (r) = Recorded By, (t) = Taken By, (c) = Cosigned By    [address] Name Provider Type    AF Nicolle Bianchi PA-C Physician Assistant                      MDM  Number of Diagnoses or Management Options  Septic joint of left wrist [facility]): new and requires workup  Diagnosis management comments: Patient presents for evaluation of left wrist pain following injury 2 days ago  Differential includes but is not limited to sprain versus contusion versus fracture  X-ray of left wrist ordered  Naproxen and oxycodone ordered  Patient was re-evaluated multiple times during ED stay  Please see ED course for more details  X-ray of left wrist negative for bony abnormality  Upon further re-evaluation, I have concern for septic joint  The patient has pain out of proportion to injury  He additionally has erythema over the wrist as well as erythema streaking up the forearm  I feel that the red streaking tract the vessels  Labs were ultimately obtained and patient was found to have elevated WBC count, elevated ESR and elevated CRP  I am unable to range the patient's wrist without him screaming in pain  While I cannot account for why the pain also coincides with his injury, I feel admission for further evaluation is necessary  Patient is agreeable  I spoke with Orthopedics and made them aware that I will be admitting this patient due to concern for septic joint  Vancomycin and ceftriaxone were ordered  Case was discussed with [facility] who agreed to accept the patient under the service of Dr Tano Littlejohn  Patient is stable for admission         Amount and/or Complexity of Data Reviewed  Clinical lab tests: ordered and reviewed  Tests in the radiology section of CPT®: ordered and reviewed  Decide to obtain previous medical records or to obtain history from someone other than the patient: yes  Review and summarize past medical records: yes  Discuss the patient with other providers: yes (Dr Paloma Patel)        Disposition  Final diagnoses:   Septic joint of left wrist [facility])     Time reflects when diagnosis was documented in both MDM as applicable and the Disposition within this note     Time User Action Codes Description Comment    9/10/2020  3:04 PM Tariq Rosado Add [M00 9] [facility]     9/10/2020  3:26 PM Panefrain Brendan Carboneler Add [M25 532] Acute pain of left wrist       ED Disposition     ED Disposition Condition Date/Time Comment    Admit Stable Thu Sep 10, 2020  3:04 PM Case was discussed with [facility] and the patient's admission status was agreed to be Admission Status: observation status to the service of Dr Fanny Chandler  Follow-up Information    None         Current Discharge Medication List      CONTINUE these medications which have NOT CHANGED    Details   ALPRAZolam (XANAX) 0 25 mg tablet Take 1 tablet (0 25 mg total) by mouth daily at bedtime as needed for anxiety  Qty: 30 tablet, Refills: 0    Associated Diagnoses: Dysthymic disorder      AMILoride 5 mg tablet Take 1 tablet by mouth daily  Qty: 30 tablet, Refills: 0      amLODIPine (NORVASC) 2 5 mg tablet Take 10 mg by mouth daily       gabapentin (NEURONTIN) 300 mg capsule Take 1 capsule (300 mg total) by mouth 3 (three) times a day for 10 days  Qty: 30 capsule, Refills: 0    Associated Diagnoses: [facility]atocellular [facility]  Palliative care patient      MAGNESIUM CHLORIDE PO Take 1,000 mg by mouth daily      mirtazapine (REMERON) 15 mg tablet Take 1 tablet (15 mg total) by mouth daily at bedtime  Qty: 30 tablet, Refills: 0    Associated Diagnoses: Dysthymic disorder      nicotine (NICODERM CQ) 21 mg/24 hr TD 24 hr patch Place 1 patch on the skin daily  Qty: 28 patch, Refills: 0    Associated Diagnoses: Tobacco abuse      omeprazole (PriLOSEC) 20 mg delayed release capsule Take 20 mg by mouth daily      oxyCODONE (OxyCONTIN) 10 mg 12 hr tablet Take 10 mg by mouth every 8 (eight) hours potassium chloride (MICRO-K) 10 MEQ CR capsule Take 20 mEq by mouth 2 (two) times a day      rosuvastatin (CRESTOR) 10 MG tablet Take 1 tablet (10 mg total) by mouth daily  Qty: 90 tablet, Refills: 1    Associated Diagnoses: Preop cardiovascular exam      umeclidinium-vilanterol (ANORO ELLIPTA) 62 5-25 MCG/INH inhaler Inhale 1 puff daily  Qty: 1 Inhaler, Refills: 5    Associated Diagnoses: Centrilobular emphysema (HCC)      VIT B12-METHIONINE-INOS-CHOL IM Inject into a muscle every 30 (thirty) days           No discharge procedures on file      PDMP Review     None          ED Provider  Electronically Signed by           Angela Kirby PA-C  09/10/20 2033

## 2020-09-10 NOTE — ED NOTES
"I took 3 oxycodones before I went to sleep last night"  Pt states did not help pain much       Shashank Wesley, CADEN  09/10/20 1988

## 2020-09-11 LAB
BASOPHILS # BLD AUTO: 0.03 THOUSANDS/ΜL (ref 0–0.1)
BASOPHILS NFR BLD AUTO: 0 % (ref 0–1)
EOSINOPHIL # BLD AUTO: 0.07 THOUSAND/ΜL (ref 0–0.61)
EOSINOPHIL NFR BLD AUTO: 1 % (ref 0–6)
ERYTHROCYTE [DISTWIDTH] IN BLOOD BY AUTOMATED COUNT: 15.2 % (ref 11.6–15.1)
HCT VFR BLD AUTO: 39.4 % (ref 36.5–49.3)
HGB BLD-MCNC: 13.1 G/DL (ref 12–17)
IMM GRANULOCYTES # BLD AUTO: 0.07 THOUSAND/UL (ref 0–0.2)
IMM GRANULOCYTES NFR BLD AUTO: 1 % (ref 0–2)
LYMPHOCYTES # BLD AUTO: 1.18 THOUSANDS/ΜL (ref 0.6–4.47)
LYMPHOCYTES NFR BLD AUTO: 9 % (ref 14–44)
MCH RBC QN AUTO: 31.4 PG (ref 26.8–34.3)
MCHC RBC AUTO-ENTMCNC: 33.2 G/DL (ref 31.4–37.4)
MCV RBC AUTO: 95 FL (ref 82–98)
MONOCYTES # BLD AUTO: 0.75 THOUSAND/ΜL (ref 0.17–1.22)
MONOCYTES NFR BLD AUTO: 6 % (ref 4–12)
NEUTROPHILS # BLD AUTO: 10.91 THOUSANDS/ΜL (ref 1.85–7.62)
NEUTS SEG NFR BLD AUTO: 83 % (ref 43–75)
NRBC BLD AUTO-RTO: 0 /100 WBCS
PLATELET # BLD AUTO: 163 THOUSANDS/UL (ref 149–390)
PMV BLD AUTO: 11 FL (ref 8.9–12.7)
RBC # BLD AUTO: 4.17 MILLION/UL (ref 3.88–5.62)
URATE SERPL-MCNC: 3.5 MG/DL (ref 4.2–8)
WBC # BLD AUTO: 13.01 THOUSAND/UL (ref 4.31–10.16)

## 2020-09-11 PROCEDURE — 99244 OFF/OP CNSLTJ NEW/EST MOD 40: CPT | Performed by: PHYSICIAN ASSISTANT

## 2020-09-11 PROCEDURE — 84550 ASSAY OF BLOOD/URIC ACID: CPT | Performed by: PHYSICIAN ASSISTANT

## 2020-09-11 PROCEDURE — 99225 PR SBSQ OBSERVATION CARE/DAY 25 MINUTES: CPT | Performed by: HOSPITALIST

## 2020-09-11 PROCEDURE — 85025 COMPLETE CBC W/AUTO DIFF WBC: CPT | Performed by: PHYSICIAN ASSISTANT

## 2020-09-11 RX ORDER — OXYCODONE HYDROCHLORIDE 5 MG/1
2.5 TABLET ORAL EVERY 4 HOURS PRN
Status: DISCONTINUED | OUTPATIENT
Start: 2020-09-11 | End: 2020-09-12 | Stop reason: HOSPADM

## 2020-09-11 RX ORDER — OXYCODONE HYDROCHLORIDE 5 MG/1
5 TABLET ORAL EVERY 4 HOURS PRN
Status: DISCONTINUED | OUTPATIENT
Start: 2020-09-11 | End: 2020-09-12 | Stop reason: HOSPADM

## 2020-09-11 RX ADMIN — KETOROLAC TROMETHAMINE 15 MG: 30 INJECTION, SOLUTION INTRAMUSCULAR at 23:17

## 2020-09-11 RX ADMIN — KETOROLAC TROMETHAMINE 15 MG: 30 INJECTION, SOLUTION INTRAMUSCULAR at 11:22

## 2020-09-11 RX ADMIN — AMLODIPINE BESYLATE 10 MG: 10 TABLET ORAL at 08:25

## 2020-09-11 RX ADMIN — KETOROLAC TROMETHAMINE 15 MG: 30 INJECTION, SOLUTION INTRAMUSCULAR at 05:31

## 2020-09-11 RX ADMIN — MAGNESIUM OXIDE TAB 400 MG (241.3 MG ELEMENTAL MG) 400 MG: 400 (241.3 MG) TAB at 17:26

## 2020-09-11 RX ADMIN — MIRTAZAPINE 15 MG: 15 TABLET, FILM COATED ORAL at 21:02

## 2020-09-11 RX ADMIN — OXYCODONE HYDROCHLORIDE 5 MG: 5 TABLET ORAL at 21:02

## 2020-09-11 RX ADMIN — POTASSIUM CHLORIDE 20 MEQ: 20 SOLUTION ORAL at 08:24

## 2020-09-11 RX ADMIN — AMILORIDE HYDROCLORIDE 5 MG: 5 TABLET ORAL at 08:24

## 2020-09-11 RX ADMIN — PRAVASTATIN SODIUM 80 MG: 80 TABLET ORAL at 17:26

## 2020-09-11 RX ADMIN — PANTOPRAZOLE SODIUM 40 MG: 40 TABLET, DELAYED RELEASE ORAL at 05:32

## 2020-09-11 RX ADMIN — HYDROMORPHONE HYDROCHLORIDE 0.5 MG: 1 INJECTION, SOLUTION INTRAMUSCULAR; INTRAVENOUS; SUBCUTANEOUS at 19:20

## 2020-09-11 RX ADMIN — POTASSIUM CHLORIDE 20 MEQ: 20 SOLUTION ORAL at 17:26

## 2020-09-11 RX ADMIN — KETOROLAC TROMETHAMINE 15 MG: 30 INJECTION, SOLUTION INTRAMUSCULAR at 00:09

## 2020-09-11 RX ADMIN — OXYCODONE HYDROCHLORIDE 5 MG: 5 TABLET ORAL at 08:30

## 2020-09-11 RX ADMIN — KETOROLAC TROMETHAMINE 15 MG: 30 INJECTION, SOLUTION INTRAMUSCULAR at 17:26

## 2020-09-11 RX ADMIN — OXYCODONE HYDROCHLORIDE 5 MG: 5 TABLET ORAL at 16:07

## 2020-09-11 RX ADMIN — MAGNESIUM OXIDE TAB 400 MG (241.3 MG ELEMENTAL MG) 400 MG: 400 (241.3 MG) TAB at 08:24

## 2020-09-11 NOTE — PROGRESS NOTES
Progress Note - Yrn Chin III 1955, 59 y o  male MRN: [id number]    Unit/Bed#: S -06 Encounter: [id number]    Primary Care Provider: Ozzie Naqvi MD   Date and time admitted to hospital: 9/10/2020 11:25 AM    * Acute pain of left wrist  Assessment & Plan  · POA  S/P twisting injury 48 hours ago after mechanical fall at home  Active and passive ROM limited by pain  Swelling to dorsal surface of wrist with erythema (does streak up, but not beyond the wrist brace)  · XR without acute osseous abnormalities, mildly elevated WBC  · DDx: ligamentous injury, gout/pseudogout, septic joint  · S/P Rocephin + Vanco in the ED-- low suspicion for septic joint  · Pain control-- sx are improving with ATC toradol-- plan to give for an additional 24 hours and re-assess in AM   · Trend labs/vitals; f/u cultures  · Ortho eval-- appreciate input-- agree with pain control, consider aspiration if pain not improving    Hepatocellular carcinoma ([facility] [address] )  Assessment & Plan  · Follows with Dr Kathy Levy  · S/p ablation of liver lesion    Emphysema/COPD [facility])  Assessment & Plan  · No acute exacerbation   · Continue home inhaler regimen      VTE Pharmacologic Prophylaxis:   Pharmacologic: Pharmacologic VTE Prophylaxis contraindicated due to low risk  Mechanical VTE Prophylaxis in Place: No    Patient Centered Rounds: I have performed bedside rounds with nursing staff today  Discussions with Specialists or Other Care Team Provider: LEFTY, RN, Ortho    Education and Discussions with Family / Patient: patient    Time Spent for Care: 30 minutes  More than 50% of total time spent on counseling and coordination of care as described above      Current Length of Stay: 0 day(s)    Current Patient Status: Observation   Certification Statement: The patient, admitted on an observation basis, will now require > 2 midnight hospital stay due to ongoing pain management with IV anti-inflammatoriesd    Discharge Plan: d/c to home likely tomorrow pending further clinical improvement unless ortho feels intervention necessary in the inpatient setting    Code Status: Level 1 - Full Code      Subjective:   Patient feels better today  Still w/ pain in L wrist, but does state it is improving as he can move his fingers more easily  Objective:     Vitals:   Temp (24hrs), Av 4 °F (36 9 °C), Min:98 1 °F (36 7 °C), Max:98 8 °F (37 1 °C)    Temp:  [98 1 °F (36 7 °C)-98 8 °F (37 1 °C)] 98 4 °F (36 9 °C)  HR:  [92-96] 95  Resp:  [17-18] 18  BP: (115-151)/(62-84) 137/69  SpO2:  [93 %-95 %] 94 %  There is no height or weight on file to calculate BMI  Input and Output Summary (last 24 hours): Intake/Output Summary (Last 24 hours) at 2020 1146  Last data filed at 2020 0718  Gross per 24 hour   Intake 1165 ml   Output 150 ml   Net 1015 ml       Physical Exam:     Physical Exam  HENT:      Head: Normocephalic  Mouth/Throat:      Mouth: Mucous membranes are moist    Eyes:      Pupils: Pupils are equal, round, and reactive to light  Cardiovascular:      Rate and Rhythm: Normal rate and regular rhythm  Pulses: Normal pulses  Pulmonary:      Effort: Pulmonary effort is normal  No respiratory distress  Breath sounds: Normal breath sounds  Abdominal:      General: Abdomen is flat  Bowel sounds are normal       Palpations: Abdomen is soft  Musculoskeletal:         General: Swelling (mild, improving over dorsal aspect of wrist) and tenderness (still with tenderness ulnar process, volar surface of wrist) present  Comments: ROM is limited by pain, but is improving   Skin:     General: Skin is warm and dry  Findings: Erythema (mild, improving over dorsal aspect of wrist) present  Neurological:      General: No focal deficit present  Mental Status: He is alert and oriented to person, place, and time     Psychiatric:         Mood and Affect: Mood normal          Additional Data:     Labs:    Results from last 7 days   Lab Units 09/11/20  0548   WBC Thousand/uL 13 01*   HEMOGLOBIN g/dL 13 1   HEMATOCRIT % 39 4   PLATELETS Thousands/uL 163   NEUTROS PCT % 83*   LYMPHS PCT % 9*   MONOS PCT % 6   EOS PCT % 1     Results from last 7 days   Lab Units 09/10/20  1306   SODIUM mmol/L 138   POTASSIUM mmol/L 3 6   CHLORIDE mmol/L 97*   CO2 mmol/L 29   BUN mg/dL 9   CREATININE mg/dL 0 70   ANION GAP mmol/L 12   CALCIUM mg/dL 6 3*   ALBUMIN g/dL 3 1*   TOTAL BILIRUBIN mg/dL 1 23*   ALK PHOS U/L 115   ALT U/L 33   AST U/L 48*   GLUCOSE RANDOM mg/dL 106                 Results from last 7 days   Lab Units 09/10/20  1405   LACTIC ACID mmol/L 1 3           * I Have Reviewed All Lab Data Listed Above  * Additional Pertinent Lab Tests Reviewed: [address] Admission Reviewed    Imaging:    Imaging Reports Reviewed Today Include: XR  Imaging Personally Reviewed by Myself Includes:  XR    Recent Cultures (last 7 days):     Results from last 7 days   Lab Units 09/10/20  1404 09/10/20  1355   BLOOD CULTURE  Received in Microbiology Lab  Culture in Progress  Received in Microbiology Lab  Culture in Progress         Last 24 Hours Medication List:   Current Facility-Administered Medications   Medication Dose Route Frequency Provider Last Rate    ALPRAZolam  0 25 mg Oral HS PRN Marcelle Le PA-C      AMILoride  5 mg Oral Daily Marcelle Le PA-C      amLODIPine  10 mg Oral Daily Marcelle Le PA-C      HYDROmorphone  0 5 mg Intravenous Q4H PRN Marcelle Le PA-C      ketorolac  15 mg Intravenous Q6H [address] Marcelle Le PA-C      magnesium oxide  400 mg Oral BID Marcelle Le PA-C      mirtazapine  15 mg Oral HS Marclele Le PA-C      oxyCODONE  10 mg Oral Q4H PRN Marcelle Le PA-C      oxyCODONE  5 mg Oral Q4H PRN Marcelle Le PA-C      pantoprazole  40 mg Oral Early Morning Marcelle Le PA-C      potassium chloride  20 mEq Oral BID Marcelle Le PA-C  pravastatin  80 mg Oral Daily With [employer] Le PA-C      umeclidinium-vilanterol  1 puff Inhalation Daily Marcelle Le PA-C          Today, Patient Was Seen By: Argenis Shahid PA-C    ** Please Note: Dictation voice to text software may have been used in the creation of this document   **

## 2020-09-11 NOTE — CONSULTS
[address] III 59 y o  male MRN: [id number]  Unit/Bed#: S -35      Chief Complaint:   Left wrist pain    HPI:  59 y o  male complaining of left wrist injury  Patient reports 2 days ago, he was falling and he went to catch himself and had a twisting injury to his left wrist  He reports since the injury, he has had worsening diffuse pain and swelling of the wrist  He denies any fevers, chills, or any other infectious symptoms including cough, sore throat, n/v/d, or dysuria  He denies any open wounds or breaks in the skin  Denies numbness or tingling  No known history of gout  No previous joint infections    Review Of Systems:   · Skin: Normal  · Neuro: See HPI  · Musculoskeletal: See HPI  · 14 point review of systems negative except as stated above     Past Medical History:   Past Medical History:   Diagnosis Date    LUCILLE [facility]acute [facility]) ([facility] ) 6/28/2017    Cancer ([place] [address] )     liver    Cataract     Colon polyp     Colostomy in place ([facility] ) 6/29/2017    Crohn disease ([facility] )     [facility] )     Emphysema/COPD ([place] [address] )     GERD (gastroesophageal reflux disease)     Hypertension     Hypokalemia 6/28/2017    Hypomagnesemia 6/29/2017    Hyponatremia 6/28/2017    Kidney stone     Pneumonia        Past Surgical History:   Past Surgical History:   Procedure Laterality Date    CATARACT EXTRACTION Bilateral     CHOLECYSTECTOMY      COLECTOMY      10 inchs of ileum    COLONOSCOPY N/A 6/30/2017    Procedure: COLONOSCOPY;  Surgeon: Melissa Corey MD;  [facility]; Service: Gastroenterology    COLOSTOMY      FINGER SURGERY Left     IR BIOPSY LIVER MASS  6/8/2020    LITHOTRIPSY      LIVER LOBECTOMY N/A 7/15/2020    Procedure: LIVER ABLATION, INTRAOPERATIVE U/S LIVER;  Surgeon: Maxine Palma MD;  [facility];  Service: Surgical Oncology       Family History:  Family history reviewed and non-contributory  History reviewed  No pertinent family history      Social History:  Social History     Socioeconomic History    Marital status: Single     Spouse name: None    Number of children: None    Years of education: None    Highest education level: None   Occupational History    None   Social Needs    Financial resource strain: None    Food insecurity     Worry: None     Inability: None    Transportation needs     Medical: None     Non-medical: None   Tobacco Use    Smoking status: Former Smoker     Packs/day: 1 00     Types: Cigarettes     Last attempt to quit: 7/15/2020     Years since quittin 1    Smokeless tobacco: Former User   Substance and Sexual Activity    Alcohol use: Not Currently     Alcohol/week: 1 0 - 2 0 standard drinks     Types: 1 - 2 Cans of beer per week     Frequency: Monthly or less     Drinks per session: 1 or 2     Comment: "1-2beers a day, glass of wine at night"    Drug use: No    Sexual activity: Not Currently   Lifestyle    Physical activity     Days per week: None     Minutes per session: None    Stress: None   Relationships    Social connections     Talks on phone: None     Gets together: None     Attends Yazdanism service: None     Active member of club or organization: None     Attends meetings of clubs or organizations: None     Relationship status: None    Intimate partner violence     Fear of current or ex partner: None     Emotionally abused: None     Physically abused: None     Forced sexual activity: None   Other Topics Concern    None   Social History Narrative    None       Allergies:   No Known Allergies        Labs:  0   Lab Value Date/Time    HCT 39 4 2020 0548    HCT 40 6 09/10/2020 1306    HCT 37 1 2020 1227    HGB 13 1 2020 0548    HGB 13 5 09/10/2020 1306    HGB 12 1 2020 1227    INR 1 09 2020 1021    WBC 13 01 (H) 2020 0548    WBC 13 27 (H) 09/10/2020 1306    WBC 11 98 (H) 2020 1227    ESR 35 (H) 09/10/2020 1306    CRP 48 1 (H) 09/10/2020 1306       Meds:    Current Facility-Administered Medications:     ALPRAZolam (XANAX) tablet 0 25 mg, 0 25 mg, Oral, HS PRN, Marcelle Le PA-C    AMILoride tablet 5 mg, 5 mg, Oral, Daily, Marcelle Le PA-C    amLODIPine (NORVASC) tablet 10 mg, 10 mg, Oral, Daily, KEVIN BhattC    HYDROmorphone (DILAUDID) injection 0 5 mg, 0 5 mg, Intravenous, Q4H PRN, KEVIN BhattC, 0 5 mg at 09/10/20 1634    ketorolac (TORADOL) injection 15 mg, 15 mg, Intravenous, Q6H [address], Marcelle Le PA-C, 15 mg at 09/11/20 0531    magnesium oxide (MAG-OX) tablet 400 mg, 400 mg, Oral, BID, KEVIN BhattC, 400 mg at 09/10/20 1820    mirtazapine (REMERON) tablet 15 mg, 15 mg, Oral, HS, KEVIN BhattC, 15 mg at 09/10/20 2133    oxyCODONE (ROXICODONE) immediate release tablet 10 mg, 10 mg, Oral, Q4H PRN, AP Bhatt-C, 10 mg at 09/10/20 2039    oxyCODONE (ROXICODONE) IR tablet 5 mg, 5 mg, Oral, Q4H PRN, KEVIN BhattC    pantoprazole (PROTONIX) EC tablet 40 mg, 40 mg, Oral, Early Morning, KEVIN BhattC, 40 mg at 09/11/20 0532    potassium chloride oral solution 20 mEq, 20 mEq, Oral, BID, Marcelle Le PA-C, 20 mEq at 09/10/20 1820    pravastatin (PRAVACHOL) tablet 80 mg, 80 mg, Oral, Daily With Dinner, AP Bhatt-C, 80 mg at 09/10/20 1822    umeclidinium-vilanterol (ANORO ELLIPTA) 62 5-25 MCG/INH inhaler 1 puff, 1 puff, Inhalation, Daily, Marcelle Le PA-C    Blood Culture:   Lab Results   Component Value Date    BLOODCX Received in Microbiology Lab  Culture in Progress  09/10/2020       Wound Culture:   Lab Results   Component Value Date    WOUNDCULT 1+ Growth of Enterobacter cloacae complex (A) 07/31/2020       Ins and Outs:  I/O last 24 hours:   In: 0204 [IV Piggyback:1045]  Out: 150 [Urine:150]          Physical Exam:   /62 (BP Location: Right arm)   Pulse 93   Temp 98 2 °F (36 8 °C) (Oral)   Resp 18   SpO2 95%   Gen: Alert and oriented to person, place, time  HEENT: EOMI, eyes clear, moist mucus membranes, hearing intact  Respiratory: Bilateral chest rise  No audible wheezing found  Cardiovascular: Regular Rate and Rhythm  Abdomen: soft nontender/nondistended  Musculoskeletal: left lower extremity  · Skin intact without abrasions or lacerations  No warmth  Minimal erythema noted  No erythematous streaking present  · Mild diffuse swelling about the wrist    · TTP diffusely about the dorsal wrist  · ROM of the wrist limited due to pain, able to tolerate 20 degrees of flexion and 20 degrees of extension  · SILT m/r/u  · Motor intact m/r/u but limited due to pain  · 2+ DP pulse    Tertiary: no tenderness over all other joints/long bones as except already stated  Radiology:   I personally reviewed the films  Left wrist x-rays demonstrate calcification in the area of the TFCC  This may be evidence of crystalline arthropathy or less likely an avulsion fracture  Assessment:  64 y o male with acute left wrist pain and swelling, likely due to wrist injury that occurred 2 days ago     Plan:   · Diffuse wrist pain and swelling present but no fevers or source of infection  Minimal leukocytosis  Elevated CRP and ESR could the result of inflammation due to injury or even gout  Low likelihood of septic wrist joint at this time  · Consider checking uric acid level   · Recommend treatment with steroids and/or NSAIDs as medically appropriate  · WBAT LUE in wrist splint for immobilization  · If symptoms worsen or do not improve with the above treatments, we can consider wrist aspiration to rule out septic joint    · Ortho will follow       Amber Fernando PA-C

## 2020-09-11 NOTE — UTILIZATION REVIEW
Initial Clinical Review    Admission: Date/Time/Statement:   Admission Orders (From admission, onward)     Ordered        09/10/20 1505  Place in Observation (expected length of stay for this patient is less than two midnights)  Once                   Orders Placed This Encounter   Procedures    Place in Observation (expected length of stay for this patient is less than two midnights)     Standing Status:   Standing     Number of Occurrences:   1     Order Specific Question:   Admitting Physician     Answer:   Anthony Alcantara [[id number]]     Order Specific Question:   Level of Care     Answer:   Med Surg [16]     ED Arrival Information     Expected Arrival Acuity Means of Arrival Escorted By Service Admission Type    - 9/10/2020 11:14 Less Urgent Walk-In Self General Medicine Urgent    Arrival Complaint    Wrist Pain/Fall        Chief Complaint   Patient presents with    Wrist Injury     Pt fell at home night before last and twisted his left wrist      Assessment/Plan: this is a 59year old male from home to ED admitted to observation due to left wrist pain ans swelling/mechanical injury  Presented due to right wrist injury and pain starting about 2 nights ago  Patient tripped and caught himself on wall with left hand, twisting wrist   Took 3 oxycodone without effect, using brace and not helping  On exam decreased ROM, tenderness to left wrist, mildly swollen with erythema of dorsal surface  CRP 48 1  Sed rate 35  WBC 13 27  Blood cultures done  Xray left wrist without acute bony abnormality  In the ED given naprosen, oxycodone, Dilaudid 0 5 IV twice, IVF and IV antibiotics  Orthopedics consulted  Pain control in progress with scheduled IV Toradol and IV Dilaudid for breakthrough  Hold antibiotics    On 9/11/2020 minimal erythema and mild diffuse swelling left wrist   ROM limited due to pain  Pain is improving with scheduled Toradol and will continue   Per orthopedics on 9/11/2020: doubt septic joint  Recommend checking uric acid level, steroids or NSAIDS, WBAT LUE in wrist splint  ED Triage Vitals [09/10/20 1124]   Temperature Pulse Respirations Blood Pressure SpO2   98 6 °F (37 °C) (!) 115 18 142/95 97 %      Temp Source Heart Rate Source Patient Position - Orthostatic VS BP Location FiO2 (%)   Oral Monitor Sitting Right arm --      Pain Score       Worst Possible Pain          Wt Readings from Last 1 Encounters:   09/01/20 58 3 kg (128 lb 8 oz)     Additional Vital Signs:   09/11/20 0718   98 4 °F (36 9 °C)   95   18   137/69   --   94 %   None (Room air)   Lying    09/10/20 2218   98 2 °F (36 8 °C)   93   18   115/62   82   95 %   None (Room air)   Sitting    09/10/20 1814   98 1 °F (36 7 °C)   92   18   139/74   101   93 %   None (Room air         Pertinent Labs/Diagnostic Test Results:   9/10/2020 Xray left wrist- No acute osseous abnormality      Results from last 7 days   Lab Units 09/11/20  0548 09/10/20  1306   WBC Thousand/uL 13 01* 13 27*   HEMOGLOBIN g/dL 13 1 13 5   HEMATOCRIT % 39 4 40 6   PLATELETS Thousands/uL 163 183   NEUTROS ABS Thousands/µL 10 91* 10 96*     Results from last 7 days   Lab Units 09/10/20  1306   SODIUM mmol/L 138   POTASSIUM mmol/L 3 6   CHLORIDE mmol/L 97*   CO2 mmol/L 29   ANION GAP mmol/L 12   BUN mg/dL 9   CREATININE mg/dL 0 70   EGFR ml/min/1 73sq m 100   CALCIUM mg/dL 6 3*     Results from last 7 days   Lab Units 09/10/20  1306   AST U/L 48*   ALT U/L 33   ALK PHOS U/L 115   TOTAL PROTEIN g/dL 7 2   ALBUMIN g/dL 3 1*   TOTAL BILIRUBIN mg/dL 1 23*     Results from last 7 days   Lab Units 09/10/20  1306   GLUCOSE RANDOM mg/dL 106     Results from last 7 days   Lab Units 09/10/20  1405   LACTIC ACID mmol/L 1 3     Results from last 7 days   Lab Units 09/10/20  1306   CRP mg/L 48 1*   SED RATE mm/hour 35*     Results from last 7 days   Lab Units 09/10/20  1404 09/10/20  1355   BLOOD CULTURE  Received in Microbiology Lab  Culture in Progress  Received in Microbiology Lab  Culture in Progress       ED Treatment:   Medication Administration from 09/10/2020 1113 to 09/10/2020 1755       Date/Time Order Dose Route Action Comments     09/10/2020 1213 naproxen (NAPROSYN) tablet 375 mg 375 mg Oral Given      09/10/2020 1212 oxyCODONE (ROXICODONE) IR tablet 5 mg 5 mg Oral Given      09/10/2020 1306 HYDROmorphone (DILAUDID) injection 0 5 mg 0 5 mg Intravenous Given      09/10/2020 1409 sodium chloride 0 9 % bolus 1,000 mL 1,000 mL Intravenous New Bag      09/10/2020 1603 vancomycin (VANCOCIN) IVPB (premix) 750 mg 150 mL 750 mg Intravenous New Bag      09/10/2020 1540 ceftriaxone (ROCEPHIN) 1 g/50 mL in dextrose IVPB 1,000 mg Intravenous New Bag      09/10/2020 1634 HYDROmorphone (DILAUDID) injection 0 5 mg 0 5 mg Intravenous Given         Past Medical History:   Diagnosis Date    LUCILLE [facility]acute kidney injury) ([facility] ) 6/28/2017    Cancer ([facility] )     liver    Cataract     Colon polyp     Colostomy in place ([facility] ) 6/29/2017    Crohn disease ([facility] )     Emphysema of lung ([facility] )     Emphysema/COPD ([facility] )     GERD (gastroesophageal reflux disease)     Hypertension     Hypokalemia 6/28/2017    Hypomagnesemia 6/29/2017    Hyponatremia 6/28/2017    Kidney stone     Pneumonia      Present on Admission:   Hepatocellular carcinoma ([facility] )   Emphysema/COPD ([facility] )      Admitting Diagnosis: Wrist pain [M25 539]  Septic joint of left wrist (HCC) [M00 9]  Acute pain of left wrist [M25 532]  Age/Sex: 59 y o  male  Admission Orders: 9/10/2020 1505 Observation   Scheduled Medications:  AMILoride, 5 mg, Oral, Daily  amLODIPine, 10 mg, Oral, Daily  ketorolac, 15 mg, Intravenous, Q6H ENRIQUETA  magnesium oxide, 400 mg, Oral, BID  mirtazapine, 15 mg, Oral, HS  pantoprazole, 40 mg, Oral, Early Morning  potassium chloride, 20 mEq, Oral, BID  pravastatin, 80 mg, Oral, Daily With Dinner  umeclidinium-vilanterol, 1 puff, Inhalation, Daily    Continuous IV Infusions: none     PRN Meds:  ALPRAZolam, 0 25 mg, Oral, HS PRN  HYDROmorphone, 0 5 mg, Intravenous, Q4H PRN - used x 1 (1634)  oxyCODONE, 10 mg, Oral, Q4H PRN - used x 1   oxyCODONE, 5 mg, Oral, Q4H PRN - used x 1       IP CONSULT TO [address] [facility],[address] Utilization Review Department  [email or web address]  ATTENTION: Please call with any questions or concerns to [phone] and carefully listen to the prompts so that you are directed to the right person  All voicemails are confidential   Harlene Pair all requests for admission clinical reviews, approved or denied determinations and any other requests to dedicated fax number below belonging to the campus where the patient is receiving treatment   List of dedicated fax numbers for the Facilities:  [address] [place] [address] DENIALS (Administrative/Medical Necessity) [phone]   [address] (Maternity/NICU/Pediatrics) [phone]   Lea Arndt [phone]   Serafin Arriola [phone]   Toy Lawson [phone]   [facility] [phone]   [address] [place] [address] [phone]   [facility]  [phone]   [address] [facility]  [address] [place] [address] [facility] [phone]

## 2020-09-11 NOTE — ASSESSMENT & PLAN NOTE
· POA  S/P twisting injury 48 hours ago after mechanical fall at home  Active and passive ROM limited by pain  Swelling to dorsal surface of wrist with erythema (does streak up, but not beyond the wrist brace)  · XR without acute osseous abnormalities, mildly elevated WBC     · DDx: ligamentous injury, gout/pseudogout, septic joint  · S/P Rocephin + Vanco in the ED-- low suspicion for septic joint  · Pain control-- sx are improving with ATC toradol-- plan to give for an additional 24 hours and re-assess in AM   · Trend labs/vitals; f/u cultures  · Ortho eval-- appreciate input-- agree with pain control, consider aspiration if pain not improving

## 2020-09-12 ENCOUNTER — APPOINTMENT (OUTPATIENT)
Dept: RADIOLOGY | Facility: HOSPITAL | Age: 65
End: 2020-09-12
Payer: COMMERCIAL

## 2020-09-12 VITALS
TEMPERATURE: 97.8 F | RESPIRATION RATE: 18 BRPM | DIASTOLIC BLOOD PRESSURE: 69 MMHG | HEART RATE: 97 BPM | OXYGEN SATURATION: 93 % | SYSTOLIC BLOOD PRESSURE: 118 MMHG

## 2020-09-12 PROBLEM — E83.51 HYPOCALCEMIA: Status: ACTIVE | Noted: 2020-09-12

## 2020-09-12 LAB
ANION GAP SERPL CALCULATED.3IONS-SCNC: 11 MMOL/L (ref 4–13)
BUN SERPL-MCNC: 10 MG/DL (ref 5–25)
CALCIUM SERPL-MCNC: 5.9 MG/DL (ref 8.3–10.1)
CHLORIDE SERPL-SCNC: 102 MMOL/L (ref 100–108)
CO2 SERPL-SCNC: 27 MMOL/L (ref 21–32)
CREAT SERPL-MCNC: 0.61 MG/DL (ref 0.6–1.3)
ERYTHROCYTE [DISTWIDTH] IN BLOOD BY AUTOMATED COUNT: 15 % (ref 11.6–15.1)
GFR SERPL CREATININE-BSD FRML MDRD: 106 ML/MIN/1.73SQ M
GLUCOSE SERPL-MCNC: 85 MG/DL (ref 65–140)
HCT VFR BLD AUTO: 35.2 % (ref 36.5–49.3)
HGB BLD-MCNC: 11.7 G/DL (ref 12–17)
MCH RBC QN AUTO: 31.2 PG (ref 26.8–34.3)
MCHC RBC AUTO-ENTMCNC: 33.2 G/DL (ref 31.4–37.4)
MCV RBC AUTO: 94 FL (ref 82–98)
PLATELET # BLD AUTO: 161 THOUSANDS/UL (ref 149–390)
PMV BLD AUTO: 11.4 FL (ref 8.9–12.7)
POTASSIUM SERPL-SCNC: 3.6 MMOL/L (ref 3.5–5.3)
RBC # BLD AUTO: 3.75 MILLION/UL (ref 3.88–5.62)
SODIUM SERPL-SCNC: 140 MMOL/L (ref 136–145)
WBC # BLD AUTO: 10.16 THOUSAND/UL (ref 4.31–10.16)

## 2020-09-12 PROCEDURE — 85027 COMPLETE CBC AUTOMATED: CPT | Performed by: PHYSICIAN ASSISTANT

## 2020-09-12 PROCEDURE — 73110 X-RAY EXAM OF WRIST: CPT

## 2020-09-12 PROCEDURE — 80048 BASIC METABOLIC PNL TOTAL CA: CPT | Performed by: PHYSICIAN ASSISTANT

## 2020-09-12 PROCEDURE — 99217 PR OBSERVATION CARE DISCHARGE MANAGEMENT: CPT | Performed by: PHYSICIAN ASSISTANT

## 2020-09-12 PROCEDURE — 99213 OFFICE O/P EST LOW 20 MIN: CPT | Performed by: PHYSICIAN ASSISTANT

## 2020-09-12 RX ORDER — CALCIUM GLUCONATE 20 MG/ML
1 INJECTION, SOLUTION INTRAVENOUS ONCE
Status: COMPLETED | OUTPATIENT
Start: 2020-09-12 | End: 2020-09-12

## 2020-09-12 RX ORDER — CALCIUM CARBONATE 200(500)MG
1 TABLET,CHEWABLE ORAL DAILY
Refills: 0
Start: 2020-09-12

## 2020-09-12 RX ADMIN — PRAVASTATIN SODIUM 80 MG: 80 TABLET ORAL at 15:37

## 2020-09-12 RX ADMIN — CALCIUM GLUCONATE 1 G: 20 INJECTION, SOLUTION INTRAVENOUS at 10:46

## 2020-09-12 RX ADMIN — CALCIUM GLUCONATE 1 G: 20 INJECTION, SOLUTION INTRAVENOUS at 15:32

## 2020-09-12 RX ADMIN — MAGNESIUM OXIDE TAB 400 MG (241.3 MG ELEMENTAL MG) 400 MG: 400 (241.3 MG) TAB at 08:15

## 2020-09-12 RX ADMIN — AMLODIPINE BESYLATE 10 MG: 10 TABLET ORAL at 08:14

## 2020-09-12 RX ADMIN — KETOROLAC TROMETHAMINE 15 MG: 30 INJECTION, SOLUTION INTRAMUSCULAR at 05:13

## 2020-09-12 RX ADMIN — POTASSIUM CHLORIDE 20 MEQ: 20 SOLUTION ORAL at 08:15

## 2020-09-12 RX ADMIN — KETOROLAC TROMETHAMINE 15 MG: 30 INJECTION, SOLUTION INTRAMUSCULAR at 11:52

## 2020-09-12 RX ADMIN — HYDROMORPHONE HYDROCHLORIDE 0.5 MG: 1 INJECTION, SOLUTION INTRAMUSCULAR; INTRAVENOUS; SUBCUTANEOUS at 10:49

## 2020-09-12 RX ADMIN — PANTOPRAZOLE SODIUM 40 MG: 40 TABLET, DELAYED RELEASE ORAL at 05:13

## 2020-09-12 RX ADMIN — OXYCODONE HYDROCHLORIDE 5 MG: 5 TABLET ORAL at 03:19

## 2020-09-12 RX ADMIN — AMILORIDE HYDROCLORIDE 5 MG: 5 TABLET ORAL at 08:15

## 2020-09-12 NOTE — DISCHARGE INSTRUCTIONS
Wrist Injury   WHAT YOU NEED TO KNOW:   A wrist injury happens when the tissues of your wrist joint are damaged  Your wrist joint is made up of tendons, ligaments, nerves, and bones  Two common types of injuries that can happen to your wrist are sprains and strains  A sprain can happen when the ligaments are stretched or torn  Ligaments are bands of elastic tissue that connect and hold the bones together  A strain happens when a tendon or muscle is overused, stretched, or torn  Tendons attach your hand and arm muscles to the bones of the wrist    DISCHARGE INSTRUCTIONS:   Medicines:   · NSAIDs:  These medicines decrease swelling, pain, and fever  NSAIDs are available without a doctor's order  Ask which medicine is right for you  Ask how much to take and when to take it  Take as directed  NSAIDs can cause stomach bleeding and kidney problems if not taken correctly  · Pain medicine: You may be given a prescription medicine to decrease pain  Do not wait until the pain is severe before you take this medicine  · Take your medicine as directed  Contact your healthcare provider if you think your medicine is not helping or if you have side effects  Tell him or her if you are allergic to any medicine  Keep a list of the medicines, vitamins, and herbs you take  Include the amounts, and when and why you take them  Bring the list or the pill bottles to follow-up visits  Carry your medicine list with you in case of an emergency  Follow up with your healthcare provider as directed:  Write down your questions so you remember to ask them during your visits  Manage your symptoms:   · Wrist supports:  A cast or splint may be put on your fingers, hand, and wrist to support your wrist and prevent further damage  Wear these as directed  Ask for instructions on how to bathe while you are wearing a splint or case  · Rest:  You may need to rest your wrist for at least 48 hours and avoid activities that cause pain   Ask what activities you should avoid and for how long  · Ice:  Ice helps decrease swelling and pain  Ice may also help prevent tissue damage  Use an ice pack or put crushed ice in a plastic bag  Cover it with a towel and place it on your injured wrist for 15 to 20 minutes every hour as directed  · Compression:  Your healthcare provider may suggest you wrap your wrist with an elastic bandage  This will help decrease swelling, support your wrist, and help it heal  Wear your wrist wrap as directed  Ask for instructions on how to wrap your wrist     · Elevation:  When you sit or lie down, keep your wrist at or above the level of your heart  This may help decrease pain and swelling  Physical therapy:  Your healthcare provider may recommend that you go to physical therapy  A physical therapist shows you how to do exercises that can help to strengthen your wrist and improve its range of movement  These exercises may also help decrease your pain  Prevent another wrist injury:   · Do strengthening exercises: Your healthcare provider or physical therapist may suggest that you do exercises to strengthen your hand and arm muscles  Ask when you may return to your regular physical activities or sports  If you start to exercise too soon it may cause you to injure your wrist again  · Protect your wrists:  Wrist guard splints or protective tape can help to support your wrist during exercise and sports  These devices may also keep your wrist from bending too far back  Ask for more information about the type of wrist support that you should use  Contact your healthcare provider if:   · You have a fever  · The bruising, swelling, or pain in your wrist gets worse  · You have questions or concerns about your condition or care  Seek care immediately or call 911 if:   · The skin on or near your wrist or hand feels cold, or it turns blue or white      · The skin on or near your wrist or hand is very tight, raised, and swollen  · You have new trouble moving and using your hands, fingers, or wrist     · Your wrist, hands, or fingers become swollen, red, numb, or they tingle  · Your wrist has any open wounds, including from surgery, that are red, swollen, warm, or have pus coming from them  © 2017 [employer] Information is for End User's use only and may not be sold, redistributed or otherwise used for commercial purposes  All illustrations and images included in CareNotes® are the copyrighted property of A D [employer] M [employer]  or Valentin Cameron  The above information is an  only  It is not intended as medical advice for individual conditions or treatments  Talk to your doctor, nurse or pharmacist before following any medical regimen to see if it is safe and effective for you

## 2020-09-12 NOTE — PROGRESS NOTES
Progress Note - Orthopedics   Adonis Yo III 59 y o  male MRN: [id number]  Unit/Bed#: S -01      Subjective:    64 y o male seen and evaluated  No acute events overnight  He reports his pain and swelling have mildly improved since yesterday  He states today, the majority of his pain is localized over the radial aspect of his wrist  Denies fevers chills, numbness or tingling       Labs:  0   Lab Value Date/Time    HCT 35 2 (L) 09/12/2020 0436    HCT 39 4 09/11/2020 0548    HCT 40 6 09/10/2020 1306    HGB 11 7 (L) 09/12/2020 0436    HGB 13 1 09/11/2020 0548    HGB 13 5 09/10/2020 1306    INR 1 09 07/08/2020 1021    WBC 10 16 09/12/2020 0436    WBC 13 01 (H) 09/11/2020 0548    WBC 13 27 (H) 09/10/2020 1306    ESR 35 (H) 09/10/2020 1306    CRP 48 1 (H) 09/10/2020 1306       Meds:    Current Facility-Administered Medications:     ALPRAZolam (XANAX) tablet 0 25 mg, 0 25 mg, Oral, HS PRN, Marcelle Le PA-C    AMILoride tablet 5 mg, 5 mg, Oral, Daily, Marcelle Le PA-C, 5 mg at 09/11/20 0824    amLODIPine (NORVASC) tablet 10 mg, 10 mg, Oral, Daily, Marcelle Le PA-C, 10 mg at 09/11/20 0825    calcium gluconate 1 g in sodium chloride 0 9% 50 mL (premix), 1 g, Intravenous, Once, [place] Jha PA-C    HYDROmorphone (DILAUDID) injection 0 5 mg, 0 5 mg, Intravenous, Q4H PRN, Marcelle Le PA-C, 0 5 mg at 09/11/20 1920    ketorolac (TORADOL) injection 15 mg, 15 mg, Intravenous, Q6H [facility], Marcelle Le PA-C, 15 mg at 09/12/20 0513    magnesium oxide (MAG-OX) tablet 400 mg, 400 mg, Oral, BID, Marcelle Le PA-C, 400 mg at 09/11/20 1726    mirtazapine (REMERON) tablet 15 mg, 15 mg, Oral, HS, Marcelle Le PA-C, 15 mg at 09/11/20 2102    oxyCODONE (ROXICODONE) IR tablet 2 5 mg, 2 5 mg, Oral, Q4H PRN, Marcelle Le PA-C    oxyCODONE (ROXICODONE) IR tablet 5 mg, 5 mg, Oral, Q4H PRN, Marcelle Le PA-C, 5 mg at 09/12/20 0319    pantoprazole (PROTONIX) EC tablet 40 mg, 40 mg, Oral, Early Morning, Marcelle Le PA-C, 40 mg at 09/12/20 0513    potassium chloride oral solution 20 mEq, 20 mEq, Oral, BID, Marcelle Le PA-C, 20 mEq at 09/11/20 1726    pravastatin (PRAVACHOL) tablet 80 mg, 80 mg, Oral, Daily With Dinner, Marcelle Le PA-C, 80 mg at 09/11/20 1726    Blood Culture:   Lab Results   Component Value Date    BLOODCX No Growth at 24 hrs  09/10/2020       Wound Culture:   Lab Results   Component Value Date    WOUNDCULT 1+ Growth of Enterobacter cloacae complex (A) 07/31/2020       Ins and Outs:  I/O last 24 hours: In: 300 [P O :300]  Out: -           Physical:  Vitals:    09/12/20 0657   BP: 130/81   Pulse: 82   Resp: 16   Temp: 98 °F (36 7 °C)   SpO2: 96%     Musculoskeletal: left Upper Extremity (wrist)  · Skin intact and well perfused without erythema or warmth  · Mild diffuse swelling throughout the dorsal wrist  · Tenderness to palpation over the radial aspect of the wrist, specifically tender at the anatomic snuffbox  · SILT m/r/u  Motor intact ain/pin/m/r/u    · Intact radial pulse    Assessment:    64 y o male with left wrist pain and swelling s/p injury      Plan:  · Although his pain is overall improving, he does have significant localized snuffbox tenderness today   Will order new x-rays of the left wrist to better evaluate the scaphoid  · Thumb spica wrist brace ordered  · Pain control per primary team  · Dispo: Ortho will follow    Leda Reinoso PA-C

## 2020-09-12 NOTE — PLAN OF CARE
Problem: Nutrition/Hydration-ADULT  Goal: Nutrient/Hydration intake appropriate for improving, restoring or maintaining nutritional needs  Description: Monitor and assess patient's nutrition/hydration status for malnutrition  Collaborate with interdisciplinary team and initiate plan and interventions as ordered  Monitor patient's weight and dietary intake as ordered or per policy  Utilize nutrition screening tool and intervene as necessary  Determine patient's food preferences and provide high-protein, high-caloric foods as appropriate  INTERVENTIONS:  - Monitor oral intake, urinary output, labs, and treatment plans  - Assess nutrition and hydration status and recommend course of action  - Evaluate amount of meals eaten  - Assist patient with eating if necessary   - Allow adequate time for meals  - Recommend/ encourage appropriate diets, oral nutritional supplements, and vitamin/mineral supplements  - Order, calculate, and assess calorie counts as needed  - Recommend, monitor, and adjust tube feedings and TPN/PPN based on assessed needs  - Assess need for intravenous fluids  - Provide specific nutrition/hydration education as appropriate  - Include patient/family/caregiver in decisions related to nutrition  Outcome: Progressing     Problem: Potential for Falls  Goal: Patient will remain free of falls  Description: INTERVENTIONS:  - Assess patient frequently for physical needs  -  Identify cognitive and physical deficits and behaviors that affect risk of falls    -  [place] fall precautions as indicated by assessment   - Educate patient/family on patient safety including physical limitations  - Instruct patient to call for assistance with activity based on assessment  - Modify environment to reduce risk of injury  - Consider OT/PT consult to assist with strengthening/mobility  Outcome: Progressing     Problem: MUSCULOSKELETAL - ADULT  Goal: Maintain or return mobility to safest level of function  Description: INTERVENTIONS:  - Assess patient's ability to carry out ADLs; assess patient's baseline for ADL function and identify physical deficits which impact ability to perform ADLs (bathing, care of mouth/teeth, toileting, grooming, dressing, etc )  - Assess/evaluate cause of self-care deficits   - Assess range of motion  - Assess patient's mobility  - Assess patient's need for assistive devices and provide as appropriate  - Encourage maximum independence but intervene and supervise when necessary  - Involve family in performance of ADLs  - Assess for home care needs following discharge   - Consider OT consult to assist with ADL evaluation and planning for discharge  - Provide patient education as appropriate  Outcome: Progressing  Goal: Maintain proper alignment of affected body part  Description: INTERVENTIONS:  - Support, maintain and protect limb and body alignment  - Provide patient/ family with appropriate education  Outcome: Progressing

## 2020-09-12 NOTE — ASSESSMENT & PLAN NOTE
· Ca level noted to be 5 9 today with Ca of 6 3 yesterday  · Ca corrected for albumin of 3 1 is 6 6  · Received 2 g of calcium gluconate on 9/12  · Recommend outpatient BMP in 2 days and outpatient follow-up with PCP for further work-up of his hypocalcemia including PTH, vitamin D level     · Instructed patient to take Tums daily

## 2020-09-12 NOTE — DISCHARGE SUMMARY
Discharge- Joy Romero III 1955, 59 y o  male MRN: [id number]    Unit/Bed#: S -17 Encounter: [id number]    Primary Care Provider: Nadya Ayala MD   Date and time admitted to hospital: 9/10/2020 11:25 AM        * Acute pain of left wrist  Assessment & Plan  · POA  S/P twisting injury 48 hours prior to presentation after mechanical fall at home  · XR without acute osseous abnormalities  · Lab work with mild leukocytosis, which has resolved since presentation  Sedimentation rate 35 and CRP 48 1  Uric acid 3 5  · Received 1 dose of IV ceftriaxone and vancomycin for possible septic arthritis  · Low suspicion for septic joint, hold further antibiotics  · Blood cultures with no growth at 24 hours  · Pain has improved somewhat since presentation  Patient reports that he has oxycodone at home still from July and will use as needed on discharge as he reportedly cannot tolerate NSAIDs given h/o GI bleed and does not take Tylenol given h/o HCC  · Orthopedic surgery following, recommended repeat x-ray of the wrist to r/o scaphoid fx and per orthopedic's note no scaphoid fx is noted (official results pending)  · Orthopedic surgery recommended patient wear thumb spica splint on discharge and follow-up as an outpatient in 1 week for repeat x-rays  Hypocalcemia  Assessment & Plan  · Ca level noted to be 5 9 today with Ca of 6 3 yesterday  · Ca corrected for albumin of 3 1 is 6 6  · Received 2 g of calcium gluconate on 9/12  · Recommend outpatient BMP in 2 days and outpatient follow-up with PCP for further work-up of his hypocalcemia including PTH, vitamin D level     · Instructed patient to take Tums daily       Hepatocellular carcinoma ([address] )  Assessment & Plan  · Follows with Dr Verona Aguirre  · S/p ablation of liver lesion in July 2020    Emphysema/COPD [facility])  Assessment & Plan  · No acute exacerbation   · Continue home inhaler regimen      Discharging Physician / Practitioner: Angie Wall CHARLENE  PCP: Marcelo Menezes MD  Admission Date:   Admission Orders (From admission, onward)     Ordered        09/10/20 1505  Place in Observation (expected length of stay for this patient is less than two midnights)  Once                   Discharge Date: 09/12/20    Resolved Problems  Date Reviewed: 9/12/2020    None          Consultations During Hospital Stay:  · Orthopedic surgery    Procedures Performed:   · None    Significant Findings / Test Results:   · X-ray left wrist: "No acute osseous abnormality "  · Leukocytosis  · Sedimentation rate 35  · CRP 48 1  · Uric acid 3 5  · Blood cultures x2:  No growth at 24 hours    Incidental Findings:   · Hypocalcemia  Test Results Pending at Discharge (will require follow up): · Final results of repeat left wrist x-ray  · Final result of blood cultures  Outpatient Tests Requested:  · Outpatient follow-up with primary care physician  · Outpatient follow-up with surgical oncology  Complications:  none    Reason for Admission:  Left wrist pain    Hospital Course:     Jer Carrillo is a 59 y o  male patient with history of [facility] [address]  status post ablation of liver, COPD who originally presented to the hospital on 9/10/2020 due to left wrist pain  Patient reported that while at home 2 days prior to presentation he became off balance and while falling tried to grab onto objects with his left wrist and sustained a twisting injury to the left wrist   He noted increasing pain, swelling of his left wrist and decreased ROM of his left wrist      On presentation to the hospital, x-ray of the left wrist revealed no acute osseous abnormality  He was noted to have mild leukocytosis on presentation with WBCs of 13 27 and was also noted to have an elevated sed rate and CRP level  Patient received 1 dose of IV ceftriaxone and vancomycin in the emergency department for possible septic arthritis and was admitted for further workup    Orthopedic surgery evaluation was obtained and recommended repeat x-ray of the left wrist which per orthopedic surgery note showed no obvious scaphoid fracture (official results pending at time of discharge)  Patient remained afebrile throughout his hospitalization, his leukocytosis resolved and his blood cultures are negative at time of discharge  Orthopedic surgery recommended discharging the patient with thumb spica splint and outpatient follow-up in 1 week for repeat x-rays  Of note, patient was found to have hypocalcemia on lab work with calcium as low as 5 9 on 9/12/20  He received 2 grams of IV calcium gluconate while hospitalized  He was instructed to have a repeat BMP in 2 days and follow-up with PCP for further workup of his hypocalcemia  Patient was instructed to take Tums 500 mg daily on discharge  Please see above list of diagnoses and related plan for additional information  Condition at Discharge: stable     Discharge Day Visit / Exam:     Subjective:  Patient sitting up in bed, reports that his right hand/ wrist pain has improved overall since presentation and he reports noticing some improvement in his ROM today  He denies any arm pain  He reports that he is ambulating in his room without difficulty  No bowel or bladder complaints  Vitals: Blood Pressure: 118/69 (09/12/20 1500)  Pulse: 97 (09/12/20 1500)  Temperature: 97 8 °F (36 6 °C) (09/12/20 1500)  Temp Source: Oral (09/12/20 1500)  Respirations: 18 (09/12/20 1500)  SpO2: 93 % (09/12/20 1500)  Exam:   Physical Exam  Vitals signs and nursing note reviewed  Constitutional:       General: He is not in acute distress  Appearance: He is not diaphoretic  Comments: Thin   HENT:      Head: Normocephalic and atraumatic  Eyes:      Conjunctiva/sclera: Conjunctivae normal    Cardiovascular:      Rate and Rhythm: Normal rate and regular rhythm  Pulmonary:      Effort: Pulmonary effort is normal  No respiratory distress  Breath sounds: Normal breath sounds  Abdominal:      General: Bowel sounds are normal       Palpations: Abdomen is soft  Tenderness: There is no abdominal tenderness  Musculoskeletal:         General: Swelling (right hand, wrist) and tenderness (right hand, wrist) present  Comments: Decreased ROM of left wrist due to swelling   Skin:     General: Skin is warm and dry  Neurological:      Mental Status: He is alert and oriented to person, place, and time  Mental status is at baseline  Psychiatric:         Mood and Affect: Mood normal          Behavior: Behavior normal          Discharge instructions/Information to patient and family:   See after visit summary for information provided to patient and family  Provisions for Follow-Up Care:  See after visit summary for information related to follow-up care and any pertinent home health orders  Disposition:     Home    Planned Readmission: none     Discharge Statement:  I spent 40 minutes discharging the patient  This time was spent on the day of discharge  I had direct contact with the patient on the day of discharge  Greater than 50% of the total time was spent examining patient, answering all patient questions, arranging and discussing plan of care with patient as well as directly providing post-discharge instructions  Additional time then spent on discharge activities  Discharge Medications:  See after visit summary for reconciled discharge medications provided to patient and family        ** Please Note: This note has been constructed using a voice recognition system **

## 2020-09-12 NOTE — DISCHARGE INSTR - AVS FIRST PAGE
Dear Domitila Danielle,     It was our pleasure to care for you here at [facility]  It is our hope that we were always able to exceed the expected standards for your care during your stay  You were hospitalized due to left wrist pain  You were cared for on the 4th floor by Bhaskar Patten PA-C under the service of Adam Thakur MD with the [facility] who covers for your primary care physician (PCP), Aiden Mix MD, while you were hospitalized  If you have any questions or concerns related to this hospitalization, you may contact us at [phone]  For follow up as well as any medication refills, we recommend that you follow up with your primary care physician  A registered nurse will reach out to you by phone within a few days after your discharge to answer any additional questions that you may have after going home  However, at this time we provide for you here, the most important instructions / recommendations at discharge:     · Notable Medication Adjustments -   · Take Tums 500 mg daily  · Testing Required after Discharge -   · Obtain repeat BMP in 2 days and follow-up with your PCP for the results  · Important follow up information -   · Follow-up your primary care physician in the next week  · Follow-up with orthopedic surgery in 1 week  · Other Instructions -   · Wear thumb spica splint until seen by orthopedic surgery  · Return to the emergency department or contact your primary care physician if you developed new/worsening symptoms, including increased pain, swelling or erythema  · Please review this entire after visit summary as additional general instructions including medication list, appointments, activity, diet, any pertinent wound care, and other additional recommendations from your care team that may be provided for you        Sincerely,     Bhaskar Patten PA-C

## 2020-09-12 NOTE — ASSESSMENT & PLAN NOTE
· POA  S/P twisting injury 48 hours prior to presentation after mechanical fall at home  · XR without acute osseous abnormalities  · Lab work with mild leukocytosis, which has resolved since presentation  Sedimentation rate 35 and CRP 48 1  Uric acid 3 5  · Received 1 dose of IV ceftriaxone and vancomycin for possible septic arthritis  · Low suspicion for septic joint, hold further antibiotics  · Blood cultures with no growth at 24 hours  · Pain has improved somewhat since presentation  Patient reports that he has oxycodone at home still from July and will use as needed on discharge as he reportedly cannot tolerate NSAIDs given h/o GI bleed and does not take Tylenol given h/o HCC  · Orthopedic surgery following, recommended repeat x-ray of the wrist to r/o scaphoid fx and per orthopedic's note no scaphoid fx is noted (official results pending)  · Orthopedic surgery recommended patient wear thumb spica splint on discharge and follow-up as an outpatient in 1 week for repeat x-rays

## 2020-09-12 NOTE — UTILIZATION REVIEW
Continued Stay Review  9/10/2020 1505 Observation     Date: 9/12/2020                           Current Patient Class: observation   Current Level of Care: med surg    HPI:64 y o  male initially admitted on 9/10/2020 to observation due to left wrist pain ans swelling/mechanical injury  Presented due to right wrist injury and pain starting about 2 nights ago  Patient tripped and caught himself on wall with left hand, twisting wrist   Took 3 oxycodone without effect, using brace and not helping  On exam decreased ROM, tenderness to left wrist, mildly swollen with erythema of dorsal surface  CRP 48 1  Sed rate 35  WBC 13 27  Blood cultures done  Xray left wrist without acute bony abnormality  In the ED given naprosen, oxycodone, Dilaudid 0 5 IV twice, IVF and IV antibiotics  Orthopedics consulted  Pain control in progress with scheduled IV Toradol and IV Dilaudid for breakthrough  Hold antibiotics  On 9/11/2020 minimal erythema and mild diffuse swelling left wrist   ROM limited due to pain  Pain is improving with scheduled Toradol and will continue   Per orthopedics on 9/11/2020: doubt septic joint  Recommend checking uric acid level, steroids or NSAIDS, WBAT LUE in wrist splint  Assessment/Plan: on 9/12/2020 Pain and swelling have improved since yesterday with persistent pain radial aspect of wrist   On exam mild diffuse swelling left dorsal wrist and tenderness to palpation at the anatomic snuff box    Calcium 5 9 and repletion in progress  Plan is new xray left wrist, thumb spica wrist brace ordered  Pain control continues       Pertinent Labs/Diagnostic Results:   9/10/2020 Xray left wrist- No acute osseous abnormality  Results from last 7 days   Lab Units 09/12/20  0436 09/11/20  0548 09/10/20  1306   WBC Thousand/uL 10 16 13 01* 13 27*   HEMOGLOBIN g/dL 11 7* 13 1 13 5   HEMATOCRIT % 35 2* 39 4 40 6   PLATELETS Thousands/uL 161 163 183   NEUTROS ABS Thousands/µL  --  10 91* 10 96*     Results from last 7 days   Lab Units 09/12/20  0436 09/10/20  1306   SODIUM mmol/L 140 138   POTASSIUM mmol/L 3 6 3 6   CHLORIDE mmol/L 102 97*   CO2 mmol/L 27 29   ANION GAP mmol/L 11 12   BUN mg/dL 10 9   CREATININE mg/dL 0 61 0 70   EGFR ml/min/1 73sq m 106 100   CALCIUM mg/dL 5 9* 6 3*     Results from last 7 days   Lab Units 09/10/20  1306   AST U/L 48*   ALT U/L 33   ALK PHOS U/L 115   TOTAL PROTEIN g/dL 7 2   ALBUMIN g/dL 3 1*   TOTAL BILIRUBIN mg/dL 1 23*     Results from last 7 days   Lab Units 09/12/20  0436 09/10/20  1306   GLUCOSE RANDOM mg/dL 85 106     Results from last 7 days   Lab Units 09/10/20  1405   LACTIC ACID mmol/L 1 3     Results from last 7 days   Lab Units 09/10/20  1306   CRP mg/L 48 1*   SED RATE mm/hour 35*     Results from last 7 days   Lab Units 09/10/20  1404 09/10/20  1355   BLOOD CULTURE  No Growth at 24 hrs  No Growth at 24 hrs       Vital Signs:   09/12/20 0657   98 °F (36 7 °C)   82   16   130/81   98   96 %   None (Room air)   Lying    09/11/20 2218   98 8 °F (37 1 °C)   78   18   112/67   85   97 %   None (Room air)   Lying    09/11/20 1546   99 3 °F (37 4 °C)   90   17   120/64   85   96 %   None (Room air)   Lying    09/11/20 0718   98 4 °F (36 9 °C)   95   18   137/69   --   94 %   None (Room air)   Lying    09/10/20 2218   98 2 °F (36 8 °C)   93   18   115/62   82   95 %   None (Room air)   Sitting    09/10/20 1814   98 1 °F (36 7 °C)   92   18   139/74   101   93 %   None (Room air)   Sitting    09/10/20 1336   98 8 °F (37 1 °C)   96   17   151/84   --   95 %   None (Room air)   Lying    09/10/20 1124   98 6 °F (37 °C)   115Abnormal     18   142/95   --   97 %   None (Room air           Medications:   Scheduled Medications:  AMILoride, 5 mg, Oral, Daily  amLODIPine, 10 mg, Oral, Daily  calcium gluconate, 1 g, Intravenous, Once - 0700 on 9/12/2020   ketorolac, 15 mg, Intravenous, Q6H ENRIQUETA  magnesium oxide, 400 mg, Oral, BID  mirtazapine, 15 mg, Oral, HS  pantoprazole, 40 mg, Oral, Early Morning  potassium chloride, 20 mEq, Oral, BID  pravastatin, 80 mg, Oral, Daily With Dinner    Continuous IV Infusions: none     PRN Meds:  ALPRAZolam, 0 25 mg, Oral, HS PRN  HYDROmorphone, 0 5 mg, Intravenous, Q4H PRN - used x 1 on 9/10/2020 and 9/11/2020  oxyCODONE, 2 5 mg, Oral, Q4H PRN  oxyCODONE, 5 mg, Oral, Q4H PRN - used x 3 on 9/11/2020 and x 1 on 9/12/2020         Discharge Plan: to be determined  Network Utilization Review Department  [email or web address]  ATTENTION: Please call with any questions or concerns to [phone] and carefully listen to the prompts so that you are directed to the right person  All voicemails are confidential   Misty Doing all requests for admission clinical reviews, approved or denied determinations and any other requests to dedicated fax number below belonging to the campus where the patient is receiving treatment   List of dedicated fax numbers for the Facilities:  [address] DENIALS (Administrative/Medical Necessity) [phone]   [address] (Maternity/NICU/Pediatrics) [phone]   Yolanda Handing [phone]   Allyne Just [phone]   Yamileth Cooper [phone]   145 [facility]  [phone]   [address] [place] [address] [phone]   [facility]  [phone]   [address] [facility] [address]  [address] [facility] [address] [facility] [phone]

## 2020-09-15 LAB
BACTERIA BLD CULT: NORMAL
BACTERIA BLD CULT: NORMAL

## 2020-09-21 ENCOUNTER — OFFICE VISIT (OUTPATIENT)
Dept: OBGYN CLINIC | Facility: CLINIC | Age: 65
End: 2020-09-21
Payer: COMMERCIAL

## 2020-09-21 VITALS
WEIGHT: 137 LBS | SYSTOLIC BLOOD PRESSURE: 161 MMHG | HEART RATE: 93 BPM | BODY MASS INDEX: 20.23 KG/M2 | TEMPERATURE: 96.4 F | DIASTOLIC BLOOD PRESSURE: 95 MMHG

## 2020-09-21 DIAGNOSIS — S63.502A SPRAIN OF LEFT WRIST, INITIAL ENCOUNTER: Primary | ICD-10-CM

## 2020-09-21 DIAGNOSIS — M25.532 LEFT WRIST PAIN: ICD-10-CM

## 2020-09-21 PROCEDURE — 99203 OFFICE O/P NEW LOW 30 MIN: CPT | Performed by: ORTHOPAEDIC SURGERY

## 2020-09-21 RX ORDER — PREDNISONE 20 MG/1
40 TABLET ORAL DAILY
Qty: 10 TABLET | Refills: 0 | Status: SHIPPED | OUTPATIENT
Start: 2020-09-21 | End: 2020-11-24

## 2020-09-21 NOTE — PROGRESS NOTES
Assessment/Plan:  1  Sprain of left wrist, initial encounter     2  Left wrist pain  CANCELED: XR wrist 3+ vw left       Scribe Attestation    I,:   Argentina High MA am acting as a scribe while in the presence of the attending physician :        I,:   Oleg Hardin, DO personally performed the services described in this documentation    as scribed in my presence :              I discussed with Danyel Castaneda that his signs and symptoms are consistent with left wrist FCR strain  He is tender to palpation over the FCR tendon dorsally  X-rays are negative for any fractures or dislocations  Treatment options were discussed in the form of bracing  He was agreeable to this  A prescription was provided for Prednisone 5 days to help with the pain and inflammation  Patient was advised to contact Dr Hieu Barriga to see if he is able to take this  He will follow up in 9 days to repeat evaluation due to insurance issues  Subjective:   Lars Solano is a 59 y o  male who presents to the office today for evaluation of left wrist pain  Patient states 2 weeks ago he tried to stop himself from fall when he had a twisting injury to his wrist  Patient states later that day he noted swelling to the dorsal aspect of his hand  Patient states he has increased pain with finger range of motion  Patient presented to the ED on 9/10/20 where x-rays were taken  Patient states they were concerned for cellulitis and he was admitted  He denies a history of gout  He has been using a wrist brace  He states his pain is improving  Patient did recently have surgery for liver cancer  Review of Systems   Constitutional: Negative for chills and fever  HENT: Negative for drooling and sneezing  Eyes: Negative for redness  Respiratory: Negative for cough and wheezing  Gastrointestinal: Negative for nausea and vomiting  Musculoskeletal: Negative for arthralgias, joint swelling and myalgias  Neurological: Negative for weakness and numbness  Psychiatric/Behavioral: Negative for behavioral problems  The patient is not nervous/anxious  Past Medical History:   Diagnosis Date    [place]UC[place]) ([place] [address] ) 2017    Cancer ([place] [address] )     liver    Cataract     Colon polyp     Colostomy in place ([place] [address] ) 2017    Crohn disease ([place] [address] )     Emphysema of lung ([place] [address] )     Emphysema/COPD ([place] [address] )     GERD (gastroesophageal reflux disease)     Hypertension     Hypokalemia 2017    Hypomagnesemia 2017    Hyponatremia 2017    Kidney stone     Pneumonia        Past Surgical History:   Procedure Laterality Date    CATARACT EXTRACTION Bilateral     CHOLECYSTECTOMY      COLECTOMY      10 inchs of ileum    COLONOSCOPY N/A 2017    Procedure: COLONOSCOPY;  Surgeon: Amor Gomez MD;  [facility]; Service: Gastroenterology    COLOSTOMY      FINGER SURGERY Left     IR BIOPSY LIVER MASS  2020    LITHOTRIPSY      LIVER LOBECTOMY N/A 7/15/2020    Procedure: LIVER ABLATION, INTRAOPERATIVE U/S LIVER;  Surgeon: Dempsey Fabry, MD;  [facility];  Service: Surgical Oncology       History reviewed  No pertinent family history      Social History     Occupational History    Not on file   Tobacco Use    Smoking status: Former Smoker     Packs/day: 1 00     Types: Cigarettes     Last attempt to quit: 7/15/2020     Years since quittin 1    Smokeless tobacco: Former User   Substance and Sexual Activity    Alcohol use: Not Currently     Alcohol/week: 1 0 - 2 0 standard drinks     Types: 1 - 2 Cans of beer per week     Frequency: Monthly or less     Drinks per session: 1 or 2     Comment: "1-2beers a day, glass of wine at night"    Drug use: No    Sexual activity: Not Currently         Current Outpatient Medications:     ALPRAZolam (XANAX) 0 25 mg tablet, Take 1 tablet (0 25 mg total) by mouth daily at bedtime as needed for anxiety, Disp: 30 tablet, Rfl: 0    AMILoride 5 mg tablet, Take 1 tablet by mouth daily, Disp: 30 tablet, Rfl: 0    amLODIPine (NORVASC) 2 5 mg tablet, Take 10 mg by mouth daily , Disp: , Rfl:     calcium carbonate (TUMS) 500 mg chewable tablet, Chew 1 tablet (500 mg total) daily, Disp:  , Rfl: 0    gabapentin (NEURONTIN) 300 mg capsule, Take 1 capsule (300 mg total) by mouth 3 (three) times a day for 10 days, Disp: 30 capsule, Rfl: 0    MAGNESIUM CHLORIDE PO, Take 1,000 mg by mouth daily, Disp: , Rfl:     mirtazapine (REMERON) 15 mg tablet, Take 1 tablet (15 mg total) by mouth daily at bedtime, Disp: 30 tablet, Rfl: 0    omeprazole (PriLOSEC) 20 mg delayed release capsule, Take 20 mg by mouth daily, Disp: , Rfl:     oxyCODONE (OxyCONTIN) 10 mg 12 hr tablet, Take 10 mg by mouth every 8 (eight) hours as needed for moderate pain , Disp: , Rfl:     potassium chloride (MICRO-K) 10 MEQ CR capsule, Take 20 mEq by mouth 2 (two) times a day, Disp: , Rfl:     rosuvastatin (CRESTOR) 10 MG tablet, Take 1 tablet (10 mg total) by mouth daily, Disp: 90 tablet, Rfl: 1    umeclidinium-vilanterol (ANORO ELLIPTA) 62 5-25 MCG/INH inhaler, Inhale 1 puff daily, Disp: 1 Inhaler, Rfl: 5    VIT B12-METHIONINE-INOS-CHOL IM, Inject into a muscle every 30 (thirty) days, Disp: , Rfl:     No Known Allergies    Objective:  Vitals:    09/21/20 0915   BP: 161/95   Pulse: 93   Temp: (!) 96 4 °F (35 8 °C)       Ortho Exam     Left wrist    FDS FDP ext intact  TTP FCR tendon distally   DURJ stable pronation and supination   Global tenderness dorsally   Compartments soft  Brisk capillary refill  S/m intact median, radial, and ulnar nerve     Physical Exam  Constitutional:       Appearance: He is well-developed  HENT:      Head: Normocephalic and atraumatic  Eyes:      General:         Right eye: No discharge  Left eye: No discharge  Conjunctiva/sclera: Conjunctivae normal    Neck:      Musculoskeletal: Normal range of motion and neck supple  Cardiovascular:      Rate and Rhythm: Normal rate     Pulmonary: Effort: Pulmonary effort is normal  No respiratory distress  Musculoskeletal:      Comments: As noted in HPI   Skin:     General: Skin is warm and dry  Neurological:      Mental Status: He is alert and oriented to person, place, and time  Psychiatric:         Behavior: Behavior normal          Thought Content: Thought content normal          Judgment: Judgment normal          I have personally reviewed pertinent films in PACS and my interpretation is as follows:X-ray left wrist performed on 9/12/20 demonstrates no fractures or dislocations

## 2020-09-22 LAB
ALBUMIN SERPL-MCNC: 3.5 G/DL (ref 3.6–5.1)
ALBUMIN/GLOB SERPL: 1.5 (CALC) (ref 1–2.5)
ALP SERPL-CCNC: 119 U/L (ref 35–144)
ALT SERPL-CCNC: 14 U/L (ref 9–46)
AST SERPL-CCNC: 14 U/L (ref 10–35)
BILIRUB DIRECT SERPL-MCNC: 0.1 MG/DL
BILIRUB INDIRECT SERPL-MCNC: 0.4 MG/DL (CALC) (ref 0.2–1.2)
BILIRUB SERPL-MCNC: 0.5 MG/DL (ref 0.2–1.2)
GLOBULIN SER CALC-MCNC: 2.4 G/DL (CALC) (ref 1.9–3.7)
PROT SERPL-MCNC: 5.9 G/DL (ref 6.1–8.1)

## 2020-09-25 ENCOUNTER — OFFICE VISIT (OUTPATIENT)
Dept: INTERNAL MEDICINE CLINIC | Facility: CLINIC | Age: 65
End: 2020-09-25
Payer: COMMERCIAL

## 2020-09-25 DIAGNOSIS — E53.8 VITAMIN B12 DEFICIENCY: Primary | ICD-10-CM

## 2020-09-25 PROCEDURE — 96372 THER/PROPH/DIAG INJ SC/IM: CPT | Performed by: INTERNAL MEDICINE

## 2020-09-25 RX ORDER — CYANOCOBALAMIN 1000 UG/ML
1000 INJECTION INTRAMUSCULAR; SUBCUTANEOUS
Status: DISCONTINUED | OUTPATIENT
Start: 2020-09-25 | End: 2020-09-25

## 2020-09-25 RX ORDER — CYANOCOBALAMIN 1000 UG/ML
1000 INJECTION INTRAMUSCULAR; SUBCUTANEOUS
Status: COMPLETED | OUTPATIENT
Start: 2020-09-25 | End: 2022-02-14

## 2020-09-25 RX ADMIN — CYANOCOBALAMIN 1000 MCG: 1000 INJECTION INTRAMUSCULAR; SUBCUTANEOUS at 10:53

## 2020-09-29 ENCOUNTER — OFFICE VISIT (OUTPATIENT)
Dept: OBGYN CLINIC | Facility: CLINIC | Age: 65
End: 2020-09-29
Payer: COMMERCIAL

## 2020-09-29 VITALS
HEIGHT: 69 IN | BODY MASS INDEX: 20.76 KG/M2 | DIASTOLIC BLOOD PRESSURE: 91 MMHG | HEART RATE: 106 BPM | WEIGHT: 140.2 LBS | SYSTOLIC BLOOD PRESSURE: 151 MMHG

## 2020-09-29 DIAGNOSIS — E87.6 HYPOKALEMIA: Primary | ICD-10-CM

## 2020-09-29 DIAGNOSIS — M25.532 LEFT WRIST PAIN: Primary | ICD-10-CM

## 2020-09-29 PROCEDURE — 99213 OFFICE O/P EST LOW 20 MIN: CPT | Performed by: ORTHOPAEDIC SURGERY

## 2020-09-29 PROCEDURE — 1036F TOBACCO NON-USER: CPT | Performed by: ORTHOPAEDIC SURGERY

## 2020-09-29 PROCEDURE — 3077F SYST BP >= 140 MM HG: CPT | Performed by: ORTHOPAEDIC SURGERY

## 2020-09-29 PROCEDURE — 3080F DIAST BP >= 90 MM HG: CPT | Performed by: ORTHOPAEDIC SURGERY

## 2020-09-29 RX ORDER — POTASSIUM CHLORIDE 750 MG/1
TABLET, FILM COATED, EXTENDED RELEASE ORAL
Qty: 360 TABLET | Refills: 0 | Status: SHIPPED | OUTPATIENT
Start: 2020-09-29 | End: 2020-11-24

## 2020-09-29 NOTE — PROGRESS NOTES
Assessment/Plan:  1  Left wrist pain       Patient appears to have ECU tendonitis from the injury  A steroid shot was discussed today and the patient consented to undergo an ECU tendon sheath injection today  This was done under sterile technique  Patient tolerated this well  He will follow up as needed  I did discuss this pain should improve with 4-6 weeks of bracing and the injection  He will call if he does not improve  Subjective: follow up    Patient ID: Scott Castillo is a 59 y o  male  HPI  Patient presents today for follow up for his left wrist pain  He is localizing the pain over his L ulnar wrist, particularly over the ECU tendon and sub sheath  He notes pain with turning his wrist   He denies any numbness or tingling  Review of Systems   Constitutional: Negative for chills, fever and unexpected weight change  HENT: Negative for hearing loss, nosebleeds and sore throat  Eyes: Negative for pain, redness and visual disturbance  Respiratory: Negative for cough, shortness of breath and wheezing  Cardiovascular: Negative for chest pain, palpitations and leg swelling  Gastrointestinal: Negative for abdominal pain, nausea and vomiting  Endocrine: Negative for polyphagia and polyuria  Genitourinary: Negative for dysuria and hematuria  Musculoskeletal:        See HPI   Skin: Negative for rash and wound  Neurological: Negative for dizziness, numbness and headaches  Psychiatric/Behavioral: Negative for decreased concentration and suicidal ideas  The patient is not nervous/anxious            Past Medical History:   Diagnosis Date    [facility]UC[facility]LE [facility]) ([facility] ) 6/28/2017    Cancer ([facility] )     liver    Cataract     Colon polyp     Colostomy in place ([facility] ) 6/29/2017    Crohn disease ([facility] )     [facility]     Emphysema/COPD ([facility] )     GERD (gastroesophageal reflux disease)     Hypertension     Hypokalemia 6/28/2017    Hypomagnesemia 6/29/2017    Hyponatremia 2017    Kidney stone     Pneumonia        Past Surgical History:   Procedure Laterality Date    CATARACT EXTRACTION Bilateral     CHOLECYSTECTOMY      COLECTOMY      10 inchs of ileum    COLONOSCOPY N/A 2017    Procedure: COLONOSCOPY;  Surgeon: Sonja Garrett MD;  [facility]; Service: Gastroenterology    COLOSTOMY      FINGER SURGERY Left     IR BIOPSY LIVER MASS  2020    LITHOTRIPSY      LIVER LOBECTOMY N/A 7/15/2020    Procedure: LIVER ABLATION, INTRAOPERATIVE U/S LIVER;  Surgeon: Georgie Hashimoto, MD;  [facility];  Service: Surgical Oncology       History reviewed  No pertinent family history      Social History     Occupational History    Not on file   Tobacco Use    Smoking status: Former Smoker     Packs/day: 1 00     Types: Cigarettes     Last attempt to quit: 7/15/2020     Years since quittin 2    Smokeless tobacco: Former User   Substance and Sexual Activity    Alcohol use: Not Currently     Alcohol/week: 1 0 - 2 0 standard drinks     Types: 1 - 2 Cans of beer per week     Frequency: Monthly or less     Drinks per session: 1 or 2     Comment: "1-2beers a day, glass of wine at night"    Drug use: No    Sexual activity: Not Currently         Current Outpatient Medications:     ALPRAZolam (XANAX) 0 25 mg tablet, Take 1 tablet (0 25 mg total) by mouth daily at bedtime as needed for anxiety, Disp: 30 tablet, Rfl: 0    AMILoride 5 mg tablet, Take 1 tablet by mouth daily, Disp: 30 tablet, Rfl: 0    amLODIPine (NORVASC) 2 5 mg tablet, Take 10 mg by mouth daily , Disp: , Rfl:     calcium carbonate (TUMS) 500 mg chewable tablet, Chew 1 tablet (500 mg total) daily, Disp:  , Rfl: 0    MAGNESIUM CHLORIDE PO, Take 1,000 mg by mouth daily, Disp: , Rfl:     mirtazapine (REMERON) 15 mg tablet, Take 1 tablet (15 mg total) by mouth daily at bedtime, Disp: 30 tablet, Rfl: 0    omeprazole (PriLOSEC) 20 mg delayed release capsule, Take 20 mg by mouth daily, Disp: , Rfl:     oxyCODONE (OxyCONTIN) 10 mg 12 hr tablet, Take 10 mg by mouth every 8 (eight) hours as needed for moderate pain , Disp: , Rfl:     potassium chloride (MICRO-K) 10 MEQ CR capsule, Take 20 mEq by mouth 2 (two) times a day, Disp: , Rfl:     predniSONE 20 mg tablet, Take 2 tablets (40 mg total) by mouth daily, Disp: 10 tablet, Rfl: 0    rosuvastatin (CRESTOR) 10 MG tablet, Take 1 tablet (10 mg total) by mouth daily, Disp: 90 tablet, Rfl: 1    umeclidinium-vilanterol (ANORO ELLIPTA) 62 5-25 MCG/INH inhaler, Inhale 1 puff daily, Disp: 1 Inhaler, Rfl: 5    VIT B12-METHIONINE-INOS-CHOL IM, Inject into a muscle every 30 (thirty) days, Disp: , Rfl:     gabapentin (NEURONTIN) 300 mg capsule, Take 1 capsule (300 mg total) by mouth 3 (three) times a day for 10 days, Disp: 30 capsule, Rfl: 0    potassium chloride (Klor-Con) 10 mEq tablet, TAKE 2 TABLETS TWICE A DAY, Disp: 360 tablet, Rfl: 0    Current Facility-Administered Medications:     cyanocobalamin injection 1,000 mcg, 1,000 mcg, Intramuscular, Q30 Days, Torri Coleman MD, 1,000 mcg at 09/25/20 1053    No Known Allergies    Objective:  Vitals:    09/29/20 1132   BP: 151/91   Pulse: (!) 106       Body mass index is 20 7 kg/m²  Ortho Exam     L wrist  Tender over ECU tendon  No subluxation  Comp soft  Bcr  sens motor intact m/u/r/a  Mild edema over ulnar wrist  NT over dorsal wrist  FROM    Physical Exam  HENT:      Head: Normocephalic  Nose: Nose normal    Eyes:      Pupils: Pupils are equal, round, and reactive to light  Cardiovascular:      Rate and Rhythm: Normal rate  Pulmonary:      Effort: Pulmonary effort is normal    Skin:     General: Skin is warm and dry  Capillary Refill: Capillary refill takes less than 2 seconds  Neurological:      General: No focal deficit present  Mental Status: He is alert and oriented to person, place, and time     Psychiatric:         Mood and Affect: Mood normal          Behavior: Behavior normal          Thought Content: Thought content normal          Judgment: Judgment normal          I have personally reviewed pertinent films in PACS

## 2020-10-12 DIAGNOSIS — I10 ESSENTIAL HYPERTENSION: Primary | ICD-10-CM

## 2020-10-12 RX ORDER — AMLODIPINE BESYLATE 5 MG/1
TABLET ORAL
Qty: 90 TABLET | Refills: 0 | Status: SHIPPED | OUTPATIENT
Start: 2020-10-12 | End: 2021-04-11 | Stop reason: SDUPTHER

## 2020-10-15 ENCOUNTER — TELEPHONE (OUTPATIENT)
Dept: PALLIATIVE MEDICINE | Facility: CLINIC | Age: 65
End: 2020-10-15

## 2020-10-23 ENCOUNTER — CLINICAL SUPPORT (OUTPATIENT)
Dept: INTERNAL MEDICINE CLINIC | Facility: CLINIC | Age: 65
End: 2020-10-23
Payer: MEDICARE

## 2020-10-23 DIAGNOSIS — Z23 NEED FOR INFLUENZA VACCINATION: Primary | ICD-10-CM

## 2020-10-23 PROCEDURE — G0008 ADMIN INFLUENZA VIRUS VAC: HCPCS | Performed by: INTERNAL MEDICINE

## 2020-10-23 PROCEDURE — 90662 IIV NO PRSV INCREASED AG IM: CPT | Performed by: INTERNAL MEDICINE

## 2020-11-02 ENCOUNTER — HOSPITAL ENCOUNTER (OUTPATIENT)
Dept: MRI IMAGING | Facility: HOSPITAL | Age: 65
Discharge: HOME | End: 2020-11-02
Attending: SURGERY
Payer: MEDICARE

## 2020-11-02 DIAGNOSIS — C22.0 HEPATOCELLULAR CARCINOMA (HCC): ICD-10-CM

## 2020-11-02 PROCEDURE — A9585 GADOBUTROL INJECTION: HCPCS | Performed by: SURGERY

## 2020-11-02 PROCEDURE — G1004 CDSM NDSC: HCPCS

## 2020-11-02 PROCEDURE — 74183 MRI ABD W/O CNTR FLWD CNTR: CPT

## 2020-11-02 RX ORDER — CYANOCOBALAMIN 1000 UG/ML
1000 INJECTION INTRAMUSCULAR; SUBCUTANEOUS
Status: CANCELLED | OUTPATIENT
Start: 2020-11-02

## 2020-11-02 RX ADMIN — GADOBUTROL 6 ML: 604.72 INJECTION INTRAVENOUS at 11:16

## 2020-11-08 DIAGNOSIS — I10 ESSENTIAL HYPERTENSION, BENIGN: Primary | ICD-10-CM

## 2020-11-08 LAB
AFP-TM SERPL-MCNC: 4.2 NG/ML
BUN SERPL-MCNC: 6 MG/DL (ref 7–25)
CREAT SERPL-MCNC: 0.8 MG/DL (ref 0.7–1.25)
SL AMB EGFR AFRICAN AMERICAN: 109 ML/MIN/1.73M2
SL AMB EGFR NON AFRICAN AMERICAN: 94 ML/MIN/1.73M2

## 2020-11-08 RX ORDER — AMILORIDE HYDROCHLORIDE 5 MG/1
TABLET ORAL
Qty: 90 TABLET | Refills: 0 | Status: SHIPPED | OUTPATIENT
Start: 2020-11-08 | End: 2021-02-19 | Stop reason: SDUPTHER

## 2020-11-09 ENCOUNTER — TELEPHONE (OUTPATIENT)
Dept: SURGICAL ONCOLOGY | Facility: CLINIC | Age: 65
End: 2020-11-09

## 2020-11-10 ENCOUNTER — OFFICE VISIT (OUTPATIENT)
Dept: SURGICAL ONCOLOGY | Facility: CLINIC | Age: 65
End: 2020-11-10
Payer: MEDICARE

## 2020-11-10 VITALS
RESPIRATION RATE: 18 BRPM | SYSTOLIC BLOOD PRESSURE: 126 MMHG | BODY MASS INDEX: 20.88 KG/M2 | HEIGHT: 69 IN | WEIGHT: 141 LBS | TEMPERATURE: 98.5 F | HEART RATE: 84 BPM | DIASTOLIC BLOOD PRESSURE: 88 MMHG

## 2020-11-10 DIAGNOSIS — C22.0 HEPATOCELLULAR CARCINOMA (HCC): Primary | ICD-10-CM

## 2020-11-10 PROCEDURE — 99214 OFFICE O/P EST MOD 30 MIN: CPT | Performed by: SURGERY

## 2020-11-19 DIAGNOSIS — F34.1 DYSTHYMIC DISORDER: ICD-10-CM

## 2020-11-20 RX ORDER — ALPRAZOLAM 0.25 MG/1
0.25 TABLET ORAL
Qty: 30 TABLET | Refills: 0 | Status: SHIPPED | OUTPATIENT
Start: 2020-11-20 | End: 2021-01-04 | Stop reason: SDUPTHER

## 2020-11-21 DIAGNOSIS — F34.1 DYSTHYMIC DISORDER: ICD-10-CM

## 2020-11-23 RX ORDER — MIRTAZAPINE 15 MG/1
TABLET, FILM COATED ORAL
Qty: 30 TABLET | Refills: 1 | Status: SHIPPED | OUTPATIENT
Start: 2020-11-23 | End: 2021-01-18

## 2020-11-24 ENCOUNTER — OFFICE VISIT (OUTPATIENT)
Dept: INTERNAL MEDICINE CLINIC | Facility: CLINIC | Age: 65
End: 2020-11-24
Payer: MEDICARE

## 2020-11-24 VITALS
WEIGHT: 141 LBS | BODY MASS INDEX: 20.88 KG/M2 | HEIGHT: 69 IN | HEART RATE: 81 BPM | DIASTOLIC BLOOD PRESSURE: 94 MMHG | SYSTOLIC BLOOD PRESSURE: 151 MMHG | TEMPERATURE: 98.4 F

## 2020-11-24 DIAGNOSIS — J44.9 CHRONIC OBSTRUCTIVE PULMONARY DISEASE, UNSPECIFIED COPD TYPE (HCC): ICD-10-CM

## 2020-11-24 DIAGNOSIS — Z00.00 MEDICARE ANNUAL WELLNESS VISIT, INITIAL: ICD-10-CM

## 2020-11-24 DIAGNOSIS — E87.6 HYPOKALEMIA: ICD-10-CM

## 2020-11-24 DIAGNOSIS — E78.2 MIXED HYPERLIPIDEMIA: ICD-10-CM

## 2020-11-24 DIAGNOSIS — K21.9 GASTROESOPHAGEAL REFLUX DISEASE WITHOUT ESOPHAGITIS: ICD-10-CM

## 2020-11-24 DIAGNOSIS — Z93.3 COLOSTOMY IN PLACE (HCC): ICD-10-CM

## 2020-11-24 DIAGNOSIS — I10 ESSENTIAL HYPERTENSION: Primary | ICD-10-CM

## 2020-11-24 DIAGNOSIS — C22.0 HEPATOCELLULAR CARCINOMA (HCC): ICD-10-CM

## 2020-11-24 DIAGNOSIS — K50.018 CROHN'S DISEASE OF SMALL INTESTINE WITH OTHER COMPLICATION (HCC): ICD-10-CM

## 2020-11-24 DIAGNOSIS — Z23 ENCOUNTER FOR IMMUNIZATION: ICD-10-CM

## 2020-11-24 PROBLEM — E83.42 HYPOMAGNESEMIA: Status: ACTIVE | Noted: 2017-06-29

## 2020-11-24 PROCEDURE — 99214 OFFICE O/P EST MOD 30 MIN: CPT | Performed by: INTERNAL MEDICINE

## 2020-11-24 PROCEDURE — 90732 PPSV23 VACC 2 YRS+ SUBQ/IM: CPT | Performed by: INTERNAL MEDICINE

## 2020-11-24 PROCEDURE — G0009 ADMIN PNEUMOCOCCAL VACCINE: HCPCS | Performed by: INTERNAL MEDICINE

## 2020-11-24 PROCEDURE — 96372 THER/PROPH/DIAG INJ SC/IM: CPT | Performed by: INTERNAL MEDICINE

## 2020-11-24 PROCEDURE — G0402 INITIAL PREVENTIVE EXAM: HCPCS | Performed by: INTERNAL MEDICINE

## 2020-11-24 RX ORDER — CYANOCOBALAMIN 1000 UG/ML
1000 INJECTION INTRAMUSCULAR; SUBCUTANEOUS
Status: CANCELLED | OUTPATIENT
Start: 2020-11-24

## 2020-11-24 RX ADMIN — CYANOCOBALAMIN 1000 MCG: 1000 INJECTION INTRAMUSCULAR; SUBCUTANEOUS at 08:35

## 2020-11-24 RX ADMIN — CYANOCOBALAMIN 1000 MCG: 1000 INJECTION INTRAMUSCULAR; SUBCUTANEOUS at 09:06

## 2020-11-24 RX ADMIN — CYANOCOBALAMIN 1000 MCG: 1000 INJECTION INTRAMUSCULAR; SUBCUTANEOUS at 09:23

## 2020-11-25 RX ADMIN — CYANOCOBALAMIN 1000 MCG: 1000 INJECTION INTRAMUSCULAR; SUBCUTANEOUS at 12:30

## 2020-12-23 ENCOUNTER — CLINICAL SUPPORT (OUTPATIENT)
Dept: INTERNAL MEDICINE CLINIC | Facility: CLINIC | Age: 65
End: 2020-12-23
Payer: MEDICARE

## 2020-12-23 DIAGNOSIS — E53.8 VITAMIN B12 DEFICIENCY: ICD-10-CM

## 2020-12-23 PROCEDURE — 96372 THER/PROPH/DIAG INJ SC/IM: CPT | Performed by: INTERNAL MEDICINE

## 2020-12-23 RX ADMIN — CYANOCOBALAMIN 1000 MCG: 1000 INJECTION INTRAMUSCULAR; SUBCUTANEOUS at 09:37

## 2021-01-04 DIAGNOSIS — Z01.810 PREOP CARDIOVASCULAR EXAM: ICD-10-CM

## 2021-01-04 DIAGNOSIS — F34.1 DYSTHYMIC DISORDER: ICD-10-CM

## 2021-01-04 DIAGNOSIS — K21.9 GASTROESOPHAGEAL REFLUX DISEASE WITHOUT ESOPHAGITIS: Primary | ICD-10-CM

## 2021-01-04 RX ORDER — ROSUVASTATIN CALCIUM 10 MG/1
10 TABLET, COATED ORAL DAILY
Qty: 90 TABLET | Refills: 1 | Status: SHIPPED | OUTPATIENT
Start: 2021-01-04 | End: 2021-04-23 | Stop reason: SDUPTHER

## 2021-01-04 RX ORDER — OMEPRAZOLE 20 MG/1
20 CAPSULE, DELAYED RELEASE ORAL DAILY
Qty: 90 CAPSULE | Refills: 1 | Status: SHIPPED | OUTPATIENT
Start: 2021-01-04 | End: 2021-07-02

## 2021-01-04 RX ORDER — ALPRAZOLAM 0.25 MG/1
0.25 TABLET ORAL
Qty: 90 TABLET | Refills: 0 | Status: SHIPPED | OUTPATIENT
Start: 2021-01-04 | End: 2021-04-11 | Stop reason: SDUPTHER

## 2021-01-04 NOTE — TELEPHONE ENCOUNTER
Pt requested a 90 day supply for his medications  I did make changes, please see Rx      Last Appt - 11/24/2020  Next Appt - 1/20/2021

## 2021-01-18 DIAGNOSIS — F34.1 DYSTHYMIC DISORDER: ICD-10-CM

## 2021-01-18 RX ORDER — MIRTAZAPINE 15 MG/1
TABLET, FILM COATED ORAL
Qty: 30 TABLET | Refills: 1 | Status: SHIPPED | OUTPATIENT
Start: 2021-01-18 | End: 2021-03-18 | Stop reason: SDUPTHER

## 2021-01-18 RX ORDER — BUPROPION HYDROCHLORIDE 150 MG/1
TABLET ORAL
Qty: 30 TABLET | Refills: 2 | Status: SHIPPED | OUTPATIENT
Start: 2021-01-18 | End: 2021-05-03 | Stop reason: SDUPTHER

## 2021-01-20 ENCOUNTER — CLINICAL SUPPORT (OUTPATIENT)
Dept: INTERNAL MEDICINE CLINIC | Facility: CLINIC | Age: 66
End: 2021-01-20
Payer: MEDICARE

## 2021-01-20 DIAGNOSIS — E53.8 VITAMIN B12 DEFICIENCY: Primary | ICD-10-CM

## 2021-01-20 RX ADMIN — CYANOCOBALAMIN 1000 MCG: 1000 INJECTION INTRAMUSCULAR; SUBCUTANEOUS at 09:42

## 2021-02-04 LAB
AFP-TM SERPL-MCNC: 5.7 NG/ML
BUN SERPL-MCNC: 9 MG/DL (ref 7–25)
CK SERPL-CCNC: 120 U/L (ref 44–196)

## 2021-02-11 ENCOUNTER — HOSPITAL ENCOUNTER (OUTPATIENT)
Dept: MRI IMAGING | Facility: HOSPITAL | Age: 66
Discharge: HOME | End: 2021-02-11
Attending: SURGERY
Payer: MEDICARE

## 2021-02-11 DIAGNOSIS — C22.0 HEPATOCELLULAR CARCINOMA (HCC): ICD-10-CM

## 2021-02-11 PROCEDURE — G1004 CDSM NDSC: HCPCS

## 2021-02-11 PROCEDURE — 74183 MRI ABD W/O CNTR FLWD CNTR: CPT

## 2021-02-11 PROCEDURE — A9585 GADOBUTROL INJECTION: HCPCS | Performed by: SURGERY

## 2021-02-11 RX ADMIN — GADOBUTROL 6 ML: 604.72 INJECTION INTRAVENOUS at 11:12

## 2021-02-13 DIAGNOSIS — Z23 ENCOUNTER FOR IMMUNIZATION: ICD-10-CM

## 2021-02-16 ENCOUNTER — OFFICE VISIT (OUTPATIENT)
Dept: CARDIOLOGY CLINIC | Facility: CLINIC | Age: 66
End: 2021-02-16
Payer: MEDICARE

## 2021-02-16 VITALS
HEIGHT: 69 IN | BODY MASS INDEX: 21.18 KG/M2 | SYSTOLIC BLOOD PRESSURE: 150 MMHG | DIASTOLIC BLOOD PRESSURE: 70 MMHG | WEIGHT: 143 LBS

## 2021-02-16 DIAGNOSIS — I10 ESSENTIAL HYPERTENSION: Primary | ICD-10-CM

## 2021-02-16 DIAGNOSIS — Z13.6 SCREENING FOR HEART DISEASE: ICD-10-CM

## 2021-02-16 PROCEDURE — 99214 OFFICE O/P EST MOD 30 MIN: CPT | Performed by: INTERNAL MEDICINE

## 2021-02-16 PROCEDURE — 93000 ELECTROCARDIOGRAM COMPLETE: CPT | Performed by: INTERNAL MEDICINE

## 2021-02-16 NOTE — PATIENT INSTRUCTIONS
Check your BP at home and call us with the numbers in 2-3 weeks    Check your cholesterol and thyroid hormone levels ( already ordered by your primary care doctor)    Call with questions

## 2021-02-16 NOTE — PROGRESS NOTES
Cardiology   Anabel Souza III 72 y o  male MRN: [id number]  Unit/Bed#:  Encounter: [id number]        Reason for Consult / Principal Problem: Pre-operative risk assessment      PCP: Aureliano Mistry MD      Assessment/Plan:    >> Hepatocellular carcinoma  >> Hypertension  >> COPD  >>  Coronary calcifications and aortic calcifications on CT  >>  Abnormal EKG: Septal infarct pattern    Plan:     the patient had an uneventful resection of hepatocellular carcinoma, he continues to take rosuvastatin, amiloride on amlodipine his blood pressure appears  well controlled  He has follow up and bloodwork scheduled by his PCP      Follow-up in 12 months  He will keep a track of his blood pressure at home  2/16/21: No complaints  Has been tolerating meds  States his BP has been well controlled at home with SBP in the low 130s  8/18:  No complaints,  He had uneventful surgery since his last visit  No heart failure or anginal symptoms postoperatively  He has been tolerating all of his medications  HPI  72 y o  male with  Incidentally discovered liver lesion  This turned out to be hepatocellular carcinoma  He also has a history of hypertension and COPD  Long smoking history  He has been trying to cut back  He does not have any complaints of chest pain or shortness of breath  No heart failure type symptoms  He is able to climb up 1 or 2 flights of stairs without any difficulty  Can walk several blocks on level ground without getting chest pain or shortness of breath  Is able to perform all his day-to-day activities  His EKG shows a septal infarct pattern in leads V1 and V2  He has no family history of premature coronary artery disease, does not drink  Significantly or use drugs  He had a CT scan during his hospitalization that revealed coronary artery and aortic calcifications   at this time he has no complaints            Physical exam  Objective   Vitals: Blood pressure 150/70, height 5' 9" (1 753 m), weight 64 9 kg (143 lb)  General:  AO x3, no acute distress  Cardiac:  S1-S2 normal  No murmurs, rubs or gallops, JVP:  Not seen  Lungs:   Mild bilateral expiratory wheeze  Abdomen:  Soft, nontender, nondistended  Extremities:  Warm, well perfused, pulses palpable, no ulcers or rashes  Edema: no edema  Neuro: Grossly nonfocal  Psych:  Normal affect  Musculoskeletal:  Range of motion normal, no swelling or tenderness over joints  HEENT:  EOM normal, pupils equal and reactive to light  Neck: no palpable mass, no bruits  Skin:  no rashes (comprehensive skin exam not performed)  @ @        ======================================================  TREADMILL STRESS  No results found for this or any previous visit    ----------------------------------------------------------------------------------------------  NUCLEAR STRESS TEST: No results found for this or any previous visit  No results found for this or any previous visit     --------------------------------------------------------------------------------  CATH:  No results found for this or any previous visit   --------------------------------------------------------------------------------  ECHO:   No results found for this or any previous visit  No results found for this or any previous visit   --------------------------------------------------------------------------------  HOLTER  No results found for this or any previous visit   --------------------------------------------------------------------------------  CAROTIDS  Results for orders placed during the hospital encounter of 04/02/18   VAS carotid complete study    Narrative    [facility] REPORT  CLINICAL:  Indications:  Syncope [R55]  The patient experienced a syncopal episode which  lasted 1 minute  Operative History  Colostomy  Cholecystectomy  Risk Factors  The patient has history of smoking (current)  He has no history of HTN or  Diabetes    Clinical  Right Pressure:  109/54 mm Hg     FINDINGS:     Right        Impression  PSV  EDV (cm/s)  Direction of Flow  Ratio    Dist  ICA                 66          27                      1 13    Mid  ICA                  50          15                      0 85    Prox  ICA    1 - 49%      48          11                      0 82    Dist CCA                  43          15                              Mid CCA                   58          15                      1 10    Prox CCA                  53          14                              Ext Carotid               64          11                      1 10    Prox Vert                 58          19  Antegrade                   Subclavian               106           0                                 Left         Impression  PSV  EDV (cm/s)  Direction of Flow  Ratio    Dist  ICA                 68          24                      1 13    Mid  ICA                  63          21                      1 04    Prox  ICA    1 - 49%      88          24                      1 45    Dist CCA                  61          20                              Mid CCA                   60          20                      1 04    Prox CCA                  58          17                              Ext Carotid              185          27                      3 07    Prox Vert                 54          17  Antegrade                   Subclavian               119           0                                       CONCLUSION:  Impression  RIGHT:  There is <50% stenosis noted in the internal carotid artery  Plaque is  heterogenous and irregular  Vertebral artery flow is antegrade  There is no significant subclavian artery  disease  LEFT:  There is <50% stenosis noted in the internal carotid artery  Plaque is  heterogenous and irregular  Vertebral artery flow is antegrade  There is no significant subclavian artery  disease  Recommend repeat duplex in 1 - 2 years to follow up on plaque progression       Internal carotid artery stenosis determination by consensus criteria from:  zaid Navarro al  Carotid Artery Stenosis: Gray-Scale and Doppler US Diagnosis  - Society of Radiologists in [facility] [address], Radiology 2003;  [phone]  SIGNATURE:  Electronically Signed by: Anastasia Rogers MD, VI on 2018-04-04 02:54:52 PM        Diagnoses and all orders for this visit:    Essential hypertension  -     POCT ECG    Screening for heart disease  -     POCT ECG       ======================================================          Review of Systems  ROS as noted above, otherwise 12 point review of systems was performed and is negative  Historical Information   Past Medical History:   Diagnosis Date    LUCILLE (acute kidney injury) ([facility] [address] ) 6/28/2017    Blindness of left eye     Since birth    Cancer [facility])     liver    Cataract     Colon polyp     Colostomy in place [facility]) 6/29/2017    COPD (chronic obstructive pulmonary disease) (HCC)     Crohn disease (HCC)     Emphysema of lung ([facility] [address] )     Emphysema/COPD ([facility] [address] )     Essential hypertension     GERD (gastroesophageal reflux disease)     Hypertension     Hypokalemia 6/28/2017    Hypomagnesemia 6/29/2017    Hyponatremia 6/28/2017    Kidney stone     Osteomyelitis ([facility] [address] )     Pneumonia      Past Surgical History:   Procedure Laterality Date    CATARACT EXTRACTION Bilateral     CHOLECYSTECTOMY      COLECTOMY      10 inchs of ileum    COLONOSCOPY N/A 6/30/2017    Procedure: COLONOSCOPY;  Surgeon: Kayy Monique MD;  [facility];   Service: Gastroenterology    COLOSTOMY      FINGER AMPUTATION      FINGER SURGERY Left     IR BIOPSY LIVER MASS  6/8/2020    LITHOTRIPSY      LIVER LOBECTOMY N/A 7/15/2020    Procedure: LIVER ABLATION, INTRAOPERATIVE U/S LIVER;  Surgeon: Pamella Archibald MD;  [facility];  Service: Surgical Oncology     Social History     Substance and Sexual Activity   Alcohol Use Yes    Alcohol/week: 1 0 standard drinks    Types: 1 Cans of beer per week    Frequency: Monthly or less    Drinks per session: 1 or 2    Binge frequency: Never     Social History     Substance and Sexual Activity   Drug Use No     Social History     Tobacco Use   Smoking Status Former Smoker    Packs/day: 1 00    Types: Cigarettes    Quit date: 7/15/2020    Years since quittin 5   Smokeless Tobacco Never Used     Family History   Problem Relation Age of Onset    Hypertension Father     Heart disease Sister        Meds/Allergies   Hospital Medications:   Current Facility-Administered Medications   Medication Dose Route Frequency    cyanocobalamin injection 1,000 mcg  1,000 mcg Intramuscular Q30 Days     Home Medications: (Not in a hospital admission)      No Known Allergies      Portions of the record may have been created with voice recognition software  Occasional wrong words or "sound a like" substitutions may have occurred due to the inherent limitations of voice recognition software  Read the chart carefully and recognize, using context, where substitutions have occurred

## 2021-02-17 ENCOUNTER — TELEPHONE (OUTPATIENT)
Dept: GYNECOLOGIC ONCOLOGY | Facility: CLINIC | Age: 66
End: 2021-02-17

## 2021-02-19 DIAGNOSIS — I10 ESSENTIAL HYPERTENSION, BENIGN: ICD-10-CM

## 2021-02-19 RX ORDER — AMILORIDE HYDROCHLORIDE 5 MG/1
5 TABLET ORAL DAILY
Qty: 90 TABLET | Refills: 0 | Status: SHIPPED | OUTPATIENT
Start: 2021-02-19 | End: 2021-05-13

## 2021-02-20 LAB
ALBUMIN SERPL-MCNC: 3.8 G/DL (ref 3.6–5.1)
ALBUMIN/GLOB SERPL: 1.6 (CALC) (ref 1–2.5)
ALP SERPL-CCNC: 93 U/L (ref 35–144)
ALT SERPL-CCNC: 24 U/L (ref 9–46)
AST SERPL-CCNC: 35 U/L (ref 10–35)
BASOPHILS # BLD AUTO: 42 CELLS/UL (ref 0–200)
BASOPHILS NFR BLD AUTO: 0.5 %
BILIRUB SERPL-MCNC: 0.7 MG/DL (ref 0.2–1.2)
BUN SERPL-MCNC: 10 MG/DL (ref 7–25)
BUN/CREAT SERPL: ABNORMAL (CALC) (ref 6–22)
CALCIUM SERPL-MCNC: 8.1 MG/DL (ref 8.6–10.3)
CHLORIDE SERPL-SCNC: 106 MMOL/L (ref 98–110)
CHOLEST SERPL-MCNC: 139 MG/DL
CHOLEST/HDLC SERPL: 1.5 (CALC)
CO2 SERPL-SCNC: 25 MMOL/L (ref 20–32)
CREAT SERPL-MCNC: 0.76 MG/DL (ref 0.7–1.25)
EOSINOPHIL # BLD AUTO: 67 CELLS/UL (ref 15–500)
EOSINOPHIL NFR BLD AUTO: 0.8 %
ERYTHROCYTE [DISTWIDTH] IN BLOOD BY AUTOMATED COUNT: 12.9 % (ref 11–15)
GLOBULIN SER CALC-MCNC: 2.4 G/DL (CALC) (ref 1.9–3.7)
GLUCOSE SERPL-MCNC: 75 MG/DL (ref 65–99)
HCT VFR BLD AUTO: 42 % (ref 38.5–50)
HDLC SERPL-MCNC: 94 MG/DL
HGB BLD-MCNC: 14.2 G/DL (ref 13.2–17.1)
LDLC SERPL CALC-MCNC: 27 MG/DL (CALC)
LYMPHOCYTES # BLD AUTO: 2167 CELLS/UL (ref 850–3900)
LYMPHOCYTES NFR BLD AUTO: 25.8 %
MCH RBC QN AUTO: 34.5 PG (ref 27–33)
MCHC RBC AUTO-ENTMCNC: 33.8 G/DL (ref 32–36)
MCV RBC AUTO: 101.9 FL (ref 80–100)
MONOCYTES # BLD AUTO: 563 CELLS/UL (ref 200–950)
MONOCYTES NFR BLD AUTO: 6.7 %
NEUTROPHILS # BLD AUTO: 5561 CELLS/UL (ref 1500–7800)
NEUTROPHILS NFR BLD AUTO: 66.2 %
NONHDLC SERPL-MCNC: 45 MG/DL (CALC)
PLATELET # BLD AUTO: 159 THOUSAND/UL (ref 140–400)
PMV BLD REES-ECKER: 10.4 FL (ref 7.5–12.5)
POTASSIUM SERPL-SCNC: 3.9 MMOL/L (ref 3.5–5.3)
PROT SERPL-MCNC: 6.2 G/DL (ref 6.1–8.1)
RBC # BLD AUTO: 4.12 MILLION/UL (ref 4.2–5.8)
SL AMB EGFR AFRICAN AMERICAN: 111 ML/MIN/1.73M2
SL AMB EGFR NON AFRICAN AMERICAN: 96 ML/MIN/1.73M2
SODIUM SERPL-SCNC: 142 MMOL/L (ref 135–146)
TRIGL SERPL-MCNC: 93 MG/DL
TSH SERPL-ACNC: 2.09 MIU/L (ref 0.4–4.5)
WBC # BLD AUTO: 8.4 THOUSAND/UL (ref 3.8–10.8)

## 2021-02-24 ENCOUNTER — OFFICE VISIT (OUTPATIENT)
Dept: INTERNAL MEDICINE CLINIC | Facility: CLINIC | Age: 66
End: 2021-02-24
Payer: MEDICARE

## 2021-02-24 VITALS
BODY MASS INDEX: 21.03 KG/M2 | DIASTOLIC BLOOD PRESSURE: 82 MMHG | SYSTOLIC BLOOD PRESSURE: 128 MMHG | OXYGEN SATURATION: 98 % | TEMPERATURE: 98.8 F | WEIGHT: 142 LBS | HEIGHT: 69 IN | HEART RATE: 92 BPM

## 2021-02-24 DIAGNOSIS — I10 ESSENTIAL HYPERTENSION: ICD-10-CM

## 2021-02-24 DIAGNOSIS — E87.6 HYPOKALEMIA: ICD-10-CM

## 2021-02-24 DIAGNOSIS — J44.9 CHRONIC OBSTRUCTIVE PULMONARY DISEASE, UNSPECIFIED COPD TYPE (HCC): Primary | ICD-10-CM

## 2021-02-24 DIAGNOSIS — Z11.59 NEED FOR HEPATITIS C SCREENING TEST: ICD-10-CM

## 2021-02-24 DIAGNOSIS — E43 SEVERE PROTEIN-CALORIE MALNUTRITION (HCC): ICD-10-CM

## 2021-02-24 DIAGNOSIS — Z93.3 COLOSTOMY IN PLACE (HCC): ICD-10-CM

## 2021-02-24 DIAGNOSIS — K21.9 GASTROESOPHAGEAL REFLUX DISEASE WITHOUT ESOPHAGITIS: ICD-10-CM

## 2021-02-24 DIAGNOSIS — E78.2 MIXED HYPERLIPIDEMIA: ICD-10-CM

## 2021-02-24 DIAGNOSIS — C22.0 HEPATOCELLULAR CARCINOMA (HCC): ICD-10-CM

## 2021-02-24 DIAGNOSIS — E83.42 HYPOMAGNESEMIA: ICD-10-CM

## 2021-02-24 DIAGNOSIS — K50.018 CROHN'S DISEASE OF SMALL INTESTINE WITH OTHER COMPLICATION (HCC): ICD-10-CM

## 2021-02-24 DIAGNOSIS — E53.8 VITAMIN B12 DEFICIENCY: ICD-10-CM

## 2021-02-24 PROCEDURE — 99214 OFFICE O/P EST MOD 30 MIN: CPT | Performed by: INTERNAL MEDICINE

## 2021-02-24 RX ORDER — CYANOCOBALAMIN 1000 UG/ML
1000 INJECTION INTRAMUSCULAR; SUBCUTANEOUS
Status: DISCONTINUED | OUTPATIENT
Start: 2021-02-24 | End: 2021-02-24

## 2021-02-24 NOTE — PROGRESS NOTES
Assessment/Plan:             1  Chronic obstructive pulmonary disease, unspecified COPD type ([place] [address] )    2  Essential hypertension    3  Vitamin B12 deficiency  -     cyanocobalamin injection 1,000 mcg    4  Mixed hyperlipidemia    5  Colostomy in place [facility])    6  Severe protein-calorie malnutrition ([place] [address] )    7  Crohn's disease of small intestine with other complication ([place] [address] )    8  Hepatocellular carcinoma ([place] [address] )    9  Need for hepatitis C screening test    10  Gastroesophageal reflux disease without esophagitis    11  Hypokalemia    12  Hypomagnesemia           Subjective:      Patient ID: Daryl Iraheta is a 72 y o  male  Follow-up on blood test done on 02/19/2021-discussed with him also need B12 shot      The following portions of the patient's history were reviewed and updated as appropriate: He  has a past medical history of LUCILLE (acute kidney injury) ([place] [address] ) (6/28/2017), Blindness of left eye, Cancer ([place] [address] ), Cataract, Colon polyp, Colostomy in place [facility]) (6/29/2017), COPD (chronic obstructive pulmonary disease) ([place] [address] ), Crohn disease ([place] [address] ), Emphysema of lung ([place] [address] ), Emphysema/COPD ([place] [address] ), Essential hypertension, GERD (gastroesophageal reflux disease), Hypertension, Hypokalemia (6/28/2017), Hypomagnesemia (6/29/2017), Hyponatremia (6/28/2017), Kidney stone, Osteomyelitis ([place] [address] ), and Pneumonia    He   Patient Active Problem List    Diagnosis Date Noted    Need for hepatitis C screening test 02/24/2021    Vitamin B12 deficiency 02/24/2021    Cataract     Chronic obstructive pulmonary disease ([facility] ) 11/24/2020    Mixed hyperlipidemia 11/24/2020    Kidney stone     GERD (gastroesophageal reflux disease)     Left wrist pain 09/29/2020    Hypocalcemia 09/12/2020    Acute pain of left wrist 09/10/2020    Chronic, continuous use of opioids 09/10/2020    Observed sleep apnea     Palliative care patient 07/31/2020    Abdominal wall abscess 07/31/2020    Hepatocellular carcinoma ([facility] ) 06/23/2020    Abdominal pain 06/04/2020    Tobacco abuse 05/29/2020    Severe sepsis [facility] ) 05/28/2020    Pneumonia 05/28/2020    Chest pain 05/28/2020    Liver mass 05/28/2020    Vasovagal syncope 04/03/2018    Emphysema of lung ([facility] )     Cellulitis 12/04/2017    Severe protein-calorie malnutrition ([facility] ) 07/01/2017    Colostomy in place [facility]) 06/29/2017    Diarrhea 06/29/2017    Hypomagnesemia 06/29/2017    Hypokalemia 06/28/2017    Hypertension     Emphysema/COPD ([facility] )     Crohn disease ([place] [address] )      He  has a past surgical history that includes Colostomy; Cholecystectomy; Colectomy; Colonoscopy (N/A, 6/30/2017); Lithotripsy; Finger surgery (Left); IR biopsy liver mass (6/8/2020); Cataract extraction (Bilateral); Liver lobectomy (N/A, 7/15/2020); and Finger amputation  His family history includes Heart disease in his sister; Hypertension in his father  He  reports that he quit smoking about 7 months ago  His smoking use included cigarettes  He smoked 1 00 pack per day  He has never used smokeless tobacco  He reports current alcohol use of about 1 0 standard drinks of alcohol per week  He reports that he does not use drugs    Current Outpatient Medications   Medication Sig Dispense Refill    ALPRAZolam (XANAX) 0 25 mg tablet Take 1 tablet (0 25 mg total) by mouth daily at bedtime as needed for anxiety 90 tablet 0    AMILoride 5 mg tablet Take 1 tablet (5 mg total) by mouth daily 90 tablet 0    amLODIPine (NORVASC) 5 mg tablet TAKE 1 TABLET DAILY 90 tablet 0    buPROPion (WELLBUTRIN XL) 150 mg 24 hr tablet TAKE 1 TABLET BY MOUTH EVERY DAY 30 tablet 2    calcium carbonate (TUMS) 500 mg chewable tablet Chew 1 tablet (500 mg total) daily  0    MAGNESIUM CHLORIDE PO Take 1,000 mg by mouth daily      mirtazapine (REMERON) 15 mg tablet TAKE 1 TABLET BY MOUTH EVERYDAY AT BEDTIME 30 tablet 1    omeprazole (PriLOSEC) 20 mg delayed release capsule Take 1 capsule (20 mg total) by mouth daily 90 capsule 1    potassium chloride (MICRO-K) 10 MEQ CR capsule Take 20 mEq by mouth 2 (two) times a day      rosuvastatin (CRESTOR) 10 MG tablet Take 1 tablet (10 mg total) by mouth daily 90 tablet 1    umeclidinium-vilanterol (ANORO ELLIPTA) 62 5-25 MCG/INH inhaler Inhale 1 puff daily 1 Inhaler 5    VIT B12-METHIONINE-INOS-CHOL IM Inject into a muscle every 30 (thirty) days       Current Facility-Administered Medications   Medication Dose Route Frequency Provider Last Rate Last Admin    cyanocobalamin injection 1,000 mcg  1,000 mcg Intramuscular Q30 Days Torri Coleman MD   1,000 mcg at 01/20/21 9745    cyanocobalamin injection 1,000 mcg  1,000 mcg Intramuscular Q30 Days Torri Coleman MD         Current Outpatient Medications on File Prior to Visit   Medication Sig    ALPRAZolam (XANAX) 0 25 mg tablet Take 1 tablet (0 25 mg total) by mouth daily at bedtime as needed for anxiety    AMILoride 5 mg tablet Take 1 tablet (5 mg total) by mouth daily    amLODIPine (NORVASC) 5 mg tablet TAKE 1 TABLET DAILY    buPROPion (WELLBUTRIN XL) 150 mg 24 hr tablet TAKE 1 TABLET BY MOUTH EVERY DAY    calcium carbonate (TUMS) 500 mg chewable tablet Chew 1 tablet (500 mg total) daily    MAGNESIUM CHLORIDE PO Take 1,000 mg by mouth daily    mirtazapine (REMERON) 15 mg tablet TAKE 1 TABLET BY MOUTH EVERYDAY AT BEDTIME    omeprazole (PriLOSEC) 20 mg delayed release capsule Take 1 capsule (20 mg total) by mouth daily    potassium chloride (MICRO-K) 10 MEQ CR capsule Take 20 mEq by mouth 2 (two) times a day    rosuvastatin (CRESTOR) 10 MG tablet Take 1 tablet (10 mg total) by mouth daily    umeclidinium-vilanterol (ANORO ELLIPTA) 62 5-25 MCG/INH inhaler Inhale 1 puff daily    VIT B12-METHIONINE-INOS-CHOL IM Inject into a muscle every 30 (thirty) days     Current Facility-Administered Medications on File Prior to Visit   Medication    cyanocobalamin injection 1,000 mcg     He has No Known Allergies       Review of Systems   Constitutional: Negative for chills and fever  HENT: Negative for congestion, ear pain and sore throat  Eyes: Negative for pain  Respiratory: Negative for cough and shortness of breath  Cardiovascular: Negative for chest pain and leg swelling  Gastrointestinal: Negative for abdominal pain, nausea and vomiting  Endocrine: Negative for polyuria  Genitourinary: Negative for difficulty urinating, frequency and urgency  Musculoskeletal: Negative for arthralgias and back pain  Skin: Negative for rash  Neurological: Negative for weakness and headaches  Psychiatric/Behavioral: Negative for sleep disturbance  The patient is not nervous/anxious            Objective:      /82 (BP Location: Left arm, Patient Position: Sitting, Cuff Size: Standard)   Pulse 92   Temp 98 8 °F (37 1 °C) (Temporal)   Ht 5' 9" (1 753 m)   Wt 64 4 kg (142 lb)   SpO2 98%   BMI 20 97 kg/m²     Recent Results (from the past 1344 hour(s))   BUN    Collection Time: 02/03/21 10:25 AM   Result Value Ref Range    BUN 9 7 - 25 mg/dL   Creatine Kinase, Total    Collection Time: 02/03/21 10:25 AM   Result Value Ref Range    Creatine Kinase, Total 120 44 - 196 U/L   AFP tumor marker    Collection Time: 02/03/21 10:25 AM   Result Value Ref Range    AFP-Tumor Marker 5 7 <6 1 ng/mL   Lipid Panel with Direct LDL reflex    Collection Time: 02/19/21  9:59 AM   Result Value Ref Range    Total Cholesterol 139 <200 mg/dL    HDL 94 > OR = 40 mg/dL    Triglycerides 93 <150 mg/dL    LDL Calculated 27 mg/dL (calc)    Chol HDLC Ratio 1 5 <5 0 (calc)    Non-HDL Cholesterol 45 <130 mg/dL (calc)   Comprehensive metabolic panel    Collection Time: 02/19/21  9:59 AM   Result Value Ref Range    Glucose, Random 75 65 - 99 mg/dL    BUN 10 7 - 25 mg/dL    Creatinine 0 76 0 70 - 1 25 mg/dL    eGFR Non  96 > OR = 60 mL/min/1 73m2    eGFR  111 > OR = 60 mL/min/1 73m2    SL AMB BUN/CREATININE RATIO NOT APPLICABLE 6 - 22 (calc)    Sodium 142 135 - 146 mmol/L Potassium 3 9 3 5 - 5 3 mmol/L    Chloride 106 98 - 110 mmol/L    CO2 25 20 - 32 mmol/L    Calcium 8 1 (L) 8 6 - 10 3 mg/dL    Protein, Total 6 2 6 1 - 8 1 g/dL    Albumin 3 8 3 6 - 5 1 g/dL    Globulin 2 4 1 9 - 3 7 g/dL (calc)    Albumin/Globulin Ratio 1 6 1 0 - 2 5 (calc)    TOTAL BILIRUBIN 0 7 0 2 - 1 2 mg/dL    Alkaline Phosphatase 93 35 - 144 U/L    AST 35 10 - 35 U/L    ALT 24 9 - 46 U/L   CBC and differential    Collection Time: 02/19/21  9:59 AM   Result Value Ref Range    White Blood Cell Count 8 4 3 8 - 10 8 Thousand/uL    Red Blood Cell Count 4 12 (L) 4 20 - 5 80 Million/uL    Hemoglobin 14 2 13 2 - 17 1 g/dL    HCT 42 0 38 5 - 50 0 %     9 (H) 80 0 - 100 0 fL    MCH 34 5 (H) 27 0 - 33 0 pg    MCHC 33 8 32 0 - 36 0 g/dL    RDW 12 9 11 0 - 15 0 %    Platelet Count 508 643 - 400 Thousand/uL    SL AMB MPV 10 4 7 5 - 12 5 fL    Neutrophils (Absolute) 5,561 1,500 - 7,800 cells/uL    Lymphocytes (Absolute) 2,167 850 - 3,900 cells/uL    Monocytes (Absolute) 563 200 - 950 cells/uL    Eosinophils (Absolute) 67 15 - 500 cells/uL    Basophils ABS 42 0 - 200 cells/uL    Neutrophils 66 2 %    Lymphocytes 25 8 %    Monocytes 6 7 %    Eosinophils 0 8 %    Basophils PCT 0 5 %   TSH, 3rd generation    Collection Time: 02/19/21  9:59 AM   Result Value Ref Range    TSH 2 09 0 40 - 4 50 mIU/L        Physical Exam  Constitutional:       Appearance: Normal appearance  HENT:      Head: Normocephalic  Right Ear: Tympanic membrane, ear canal and external ear normal       Left Ear: Tympanic membrane, ear canal and external ear normal       Nose: Nose normal  No congestion  Mouth/Throat:      Mouth: Mucous membranes are moist       Pharynx: Oropharynx is clear  No oropharyngeal exudate or posterior oropharyngeal erythema  Eyes:      Extraocular Movements: Extraocular movements intact  Conjunctiva/sclera: Conjunctivae normal       Pupils: Pupils are equal, round, and reactive to light     Neck: Musculoskeletal: Normal range of motion and neck supple  Cardiovascular:      Rate and Rhythm: Normal rate and regular rhythm  Heart sounds: Normal heart sounds  No murmur  Pulmonary:      Effort: Pulmonary effort is normal       Breath sounds: Normal breath sounds  No wheezing or rales  Abdominal:      General: Bowel sounds are normal  There is no distension  Palpations: Abdomen is soft  Tenderness: There is no abdominal tenderness  Musculoskeletal: Normal range of motion  Right lower leg: No edema  Left lower leg: No edema  Lymphadenopathy:      Cervical: No cervical adenopathy  Skin:     General: Skin is warm  Neurological:      General: No focal deficit present  Mental Status: He is alert and oriented to person, place, and time

## 2021-02-25 ENCOUNTER — TELEPHONE (OUTPATIENT)
Dept: SURGICAL ONCOLOGY | Facility: CLINIC | Age: 66
End: 2021-02-25

## 2021-02-25 RX ADMIN — CYANOCOBALAMIN 1000 MCG: 1000 INJECTION INTRAMUSCULAR; SUBCUTANEOUS at 15:44

## 2021-02-26 ENCOUNTER — OFFICE VISIT (OUTPATIENT)
Dept: SURGICAL ONCOLOGY | Facility: CLINIC | Age: 66
End: 2021-02-26
Payer: MEDICARE

## 2021-02-26 VITALS
HEART RATE: 78 BPM | DIASTOLIC BLOOD PRESSURE: 76 MMHG | WEIGHT: 143 LBS | RESPIRATION RATE: 16 BRPM | BODY MASS INDEX: 21.18 KG/M2 | HEIGHT: 69 IN | SYSTOLIC BLOOD PRESSURE: 112 MMHG | TEMPERATURE: 97.8 F

## 2021-02-26 DIAGNOSIS — C22.0 HEPATOCELLULAR CARCINOMA (HCC): Primary | ICD-10-CM

## 2021-02-26 PROCEDURE — 99214 OFFICE O/P EST MOD 30 MIN: CPT | Performed by: SURGERY

## 2021-02-26 NOTE — PROGRESS NOTES
Surgical Oncology Follow Up       [address] [facility]  [facility] [place]  [address]   [place] [address]    Zigmund Friday Blawn III  1955  [id number]  [address] [facility]  [facility] [place]  [address]  [place] [address]    Diagnoses and all orders for this visit:    Hepatocellular carcinoma ([facility] [address] )  -     MRI abdomen w wo contrast; Future  -     BUN; Future  -     AFP tumor marker; Future  -     Creatinine, serum; Future        Chief Complaint   Patient presents with    Follow-up       No follow-ups on file  Oncology History   Hepatocellular carcinoma ([facility] [address] )   6/8/2020 Initial Diagnosis    Hepatocellular carcinoma, moderately differentiated     7/15/2020 Surgery    Microwave ablation of liver segment 5         Staging: HCC   Treatment history:  Ablation of segment 5 liver lesion, July 2020  Current treatment:  Observation  Disease status: ARLYN    History of Present Illness: Patient returns in follow-up of his [facility] [address]  He is doing well at this time with no complaints  His AFP level is normal   MRI from February 11, 2021 reveals a stable ablation zone in segment 5  There is a new enhancing lesion in segment 5  There is no washout  This was felt to be LR 3  I personally reviewed the films  His only complaint is he is feeling stressed from his social situation regarding his house  Review of Systems  Complete ROS Surg Onc:   Complete ROS Surg Onc:   Constitutional: The patient denies new or recent history of general fatigue, no recent weight loss, no change in appetite  Eyes: No complaints of visual problems, no scleral icterus  ENT: no complaints of ear pain, no hoarseness, no difficulty swallowing,  no tinnitus and no new masses in head, oral cavity, or neck  Cardiovascular: No complaints of chest pain, no palpitations, no ankle edema  Respiratory: No complaints of shortness of breath, no cough  Gastrointestinal: No complaints of jaundice, no bloody stools, no pale stools  Genitourinary: No complaints of dysuria, no hematuria, no nocturia, no frequent urination, no urethral discharge  Musculoskeletal: No complaints of weakness, paralysis, joint stiffness or arthralgias  Integumentary: No complaints of rash, no new lesions  Neurological: No complaints of convulsions, no seizures, no dizziness  Hematologic/Lymphatic: No complaints of easy bruising  Endocrine:  No hot or cold intolerance  No polydipsia, polyphagia, or polyuria  Allergy/immunology:  No environmental allergies  No food allergies  Not immunocompromised  Skin:  No pallor or rash  No wound          Patient Active Problem List   Diagnosis    Hypertension    Emphysema/COPD ([place] [address] )    Crohn disease ([place] [address] )    Colostomy in place ([place] [address] )    Diarrhea    Severe protein-calorie malnutrition ([place] [address] )    Cellulitis    Em[facility] lung ([facility] )    Vasovagal syncope    Severe sepsis (HCC)    Pneumonia    Chest pain    Liver mass    Tobacco abuse    Abdominal pain    Hepatocellular carcinoma ([place] [address] )    Palliative care patient    Abdominal wall abscess    Observed sleep apnea    Acute pain of left wrist    Chronic, continuous use of opioids    Hypocalcemia    Left wrist pain    Kidney stone    Hypomagnesemia    Hypokalemia    GERD (gastroesophageal reflux disease)    Chronic obstructive pulmonary disease (HCC)    Mixed hyperlipidemia    Need for hepatitis C screening test    Vitamin B12 deficiency    Cataract     Past Medical History:   Diagnosis Date    LUCILLE (acute kidney injury) ([place] [address] ) 6/28/2017    Blindness of left eye     Since birth    Cancer [facility])     liver    Cataract     Colon polyp     Colostomy in place [facility]) 6/29/2017    COPD (chronic obstructive pulmonary disease) (HCC)     Crohn disease ([place] [address] )     Emphysema of lung ([place] [address] )     Emphysema/COPD ([place] [address] )     Essential hypertension     GERD (gastroesophageal reflux disease)     Hypertension     Hypokalemia 2017    Hypomagnesemia 2017    Hyponatremia 2017    Kidney stone     Osteomyelitis (HCC)     Pneumonia      Past Surgical History:   Procedure Laterality Date    CATARACT EXTRACTION Bilateral     CHOLECYSTECTOMY      COLECTOMY      10 inchs of ileum    COLONOSCOPY N/A 2017    Procedure: COLONOSCOPY;  Surgeon: Yohana Mcdonnell MD;  [facility]: [facility]; Service: Gastroenterology    COLOSTOMY      FINGER AMPUTATION      FINGER SURGERY Left     IR BIOPSY LIVER MASS  2020    LITHOTRIPSY      LIVER LOBECTOMY N/A 7/15/2020    Procedure: LIVER ABLATION, INTRAOPERATIVE U/S LIVER;  Surgeon: Heather Kingsley MD;  [facility];  Service: Surgical Oncology     Family History   Problem Relation Age of Onset    Hypertension Father     Heart disease Sister      Social History     Socioeconomic History    Marital status: Single     Spouse name: Not on file    Number of children: Not on file    Years of education: Not on file    Highest education level: Not on file   Occupational History    Not on file   Social Needs    Financial resource strain: Not on file    Food insecurity     Worry: Not on file     Inability: Not on file    Transportation needs     Medical: Not on file     Non-medical: Not on file   Tobacco Use    Smoking status: Former Smoker     Packs/day: 1 00     Types: Cigarettes     Quit date: 7/15/2020     Years since quittin 6    Smokeless tobacco: Never Used   Substance and Sexual Activity    Alcohol use:  Yes     Alcohol/week: 1 0 standard drinks     Types: 1 Cans of beer per week     Frequency: Monthly or less     Drinks per session: 1 or 2     Binge frequency: Never    Drug use: No    Sexual activity: Not Currently   Lifestyle    Physical activity     Days per week: Not on file     Minutes per session: Not on file    Stress: Not on file   Relationships    Social connections     Talks on phone: Not on file Gets together: Not on file     Attends Yarsanism service: Not on file     Active member of club or organization: Not on file     Attends meetings of clubs or organizations: Not on file     Relationship status: Not on file    Intimate partner violence     Fear of current or ex partner: Not on file     Emotionally abused: Not on file     Physically abused: Not on file     Forced sexual activity: Not on file   Other Topics Concern    Not on file   Social History Narrative    Not on file       Current Outpatient Medications:     ALPRAZolam (XANAX) 0 25 mg tablet, Take 1 tablet (0 25 mg total) by mouth daily at bedtime as needed for anxiety, Disp: 90 tablet, Rfl: 0    AMILoride 5 mg tablet, Take 1 tablet (5 mg total) by mouth daily, Disp: 90 tablet, Rfl: 0    amLODIPine (NORVASC) 5 mg tablet, TAKE 1 TABLET DAILY, Disp: 90 tablet, Rfl: 0    buPROPion (WELLBUTRIN XL) 150 mg 24 hr tablet, TAKE 1 TABLET BY MOUTH EVERY DAY, Disp: 30 tablet, Rfl: 2    calcium carbonate (TUMS) 500 mg chewable tablet, Chew 1 tablet (500 mg total) daily, Disp:  , Rfl: 0    MAGNESIUM CHLORIDE PO, Take 1,000 mg by mouth daily, Disp: , Rfl:     mirtazapine (REMERON) 15 mg tablet, TAKE 1 TABLET BY MOUTH EVERYDAY AT BEDTIME, Disp: 30 tablet, Rfl: 1    omeprazole (PriLOSEC) 20 mg delayed release capsule, Take 1 capsule (20 mg total) by mouth daily, Disp: 90 capsule, Rfl: 1    potassium chloride (MICRO-K) 10 MEQ CR capsule, Take 20 mEq by mouth 2 (two) times a day, Disp: , Rfl:     rosuvastatin (CRESTOR) 10 MG tablet, Take 1 tablet (10 mg total) by mouth daily, Disp: 90 tablet, Rfl: 1    umeclidinium-vilanterol (ANORO ELLIPTA) 62 5-25 MCG/INH inhaler, Inhale 1 puff daily, Disp: 1 Inhaler, Rfl: 5    VIT B12-METHIONINE-INOS-CHOL IM, Inject into a muscle every 30 (thirty) days, Disp: , Rfl:     Current Facility-Administered Medications:     cyanocobalamin injection 1,000 mcg, 1,000 mcg, Intramuscular, Q30 Days, Torri Coleman MD, 1,000 mcg at 02/25/21 1544  No Known Allergies  Vitals:    02/26/21 0935   BP: 112/76   Pulse: 78   Resp: 16   Temp: 97 8 °F (36 6 °C)       Physical Exam  Constitutional: General appearance: The Patient is well-developed and well-nourished who appears the stated age in no acute distress  Patient is pleasant and talkative  HEENT:  Normocephalic  Sclerae are anicteric  Mucous membranes are moist  Neck is supple without adenopathy  No JVD  Chest: The lungs are clear to auscultation  Cardiac: Heart is regular rate  Abdomen: Abdomen is soft, non-tender, non-distended and without masses  Extremities: There is no clubbing or cyanosis  There is no edema  Symmetric  Neuro: Grossly nonfocal  Gait is normal      Lymphatic: No evidence of cervical adenopathy bilaterally  No evidence of axillary adenopathy bilaterally  No evidence of inguinal adenopathy bilaterally  Skin: Warm, anicteric  Psych:  Patient is pleasant and talkative  Breasts:        Pathology:  [unfilled]    Labs:   Ref Range & Units 2/3/21 10:25 AM   AFP-Tumor Marker <6 1 ng/mL 5 7          Imaging  Mri Abdomen W Wo Contrast    Result Date: 2/16/2021  Narrative: MRI - ABDOMEN - WITH AND WITHOUT CONTRAST INDICATION:  Post ablation of moderately differentiated at C3 COMPARISON: None TECHNIQUE:  The following pulse sequences were obtained prior to and following the administration of intravenous contrast:     Coronal and axial T2 with TE of 90 and 180 respectively, axial T2 with fat saturation, axial FIESTA fat-sat, axial T1-weighted in-and-out-of phase, axial DWI/ADC, precontrast axial T1 with fat saturation, post-contrast dynamic axial T1 with fat saturation at 20, 70, and 180 seconds, coronal T1  with fat saturation and 7 minute delayed axial T1 with fat saturation  IV Contrast:  6 mL of Gadobutrol injection (SINGLE-DOSE) FINDINGS: LOWER CHEST:   Unremarkable   LIVER: Mild hepatic steatosis seen Again noted is a treated lesion in the segment 8 and 5 which demonstrates no thick nodular area of enhancement to suggest any residual viable tissue  This measures 4 8 x 4 4 cm on the previous study this was measuring 4 8 x 4 7 cm There is perilesional enhancement which is an expected posttreatment finding A nodular area of enhancement is noted in the right hepatic lobe, segment 5 at the inferior and lateral aspect of this lesion, measuring 1 1 cm ,  Seen in image 56 series [id number]  There is no corresponding focus of diffusion restriction or T2 hyperintensity The hepatic veins and portal veins are patent  BILE DUCTS: No intrahepatic or extrahepatic bile duct dilation  Bile ducts remain unchanged from the previous study GALLBLADDER:  Normal  PANCREAS: Normal  No main pancreatic ductal dilation  ADRENAL GLANDS:  Normal  SPLEEN:  Normal  KIDNEYS/PROXIMAL URETERS: No hydroureteronephrosis  No suspicious renal mass  Left renal cyst is seen BOWEL:   No dilated loops of bowel  PERITONEUM/RETROPERITONEUM: No ascites  LYMPH NODES: No abdominal lymphadenopathy  VASCULAR STRUCTURES:  No aneurysm  ABDOMINAL WALL:  Unremarkable  OSSEOUS STRUCTURES:  No suspicious osseous lesion  Impression: Expected  post ablation changes in the previously noted segment 5 lesion with no residual viable tumor  LR-TR  Nonviable There is a new hyperenhancing lesion in the right hepatic lobe, segment 5 at the inferior and lateral aspect of the treated lesion without any washout washout, measuring 1 cm ,  LIRADS 3 follow-up at 3 months suggested The study was marked in EPIC for significant notification  Workstation performed: [id number]     I reviewed the above laboratory and imaging data  Discussion/Summary: 77-year-old male with [address]  in a non cirrhotic liver   His Child score is B   His MELD score is 9  He underwent ablation of the segment 5 liver lesion  His AFP level is normal  The MRI shows that this has been completely treated  Second area, I suspect is benign as well    We will plan on repeating the MRI and AFP level in 3 months  I will see him again once we have those studies  He is agreeable to this   All his questions were answered

## 2021-02-26 NOTE — LETTER
February 26, 2021     Vanessa Haque MD  [address]  [address]  [address] [place] [address]    Patient: Jone Cook III   YOB: 1955   Date of Visit: 2/26/2021       Dear Dr Aleja King: Thank you for referring Marisol Smith to me for evaluation  Below are my notes for this consultation  If you have questions, please do not hesitate to call me  I look forward to following your patient along with you  Sincerely,        Karla Shook MD        CC: MD Karla Garcia MD  2/26/2021  9:58 AM  Sign when Signing Visit               Surgical Oncology Follow Up       [address] [facility]  [address] [facility] [address]    Petty Elkins III  1955  [id number]  [address] [place] [address] [facility]  [address] [facility] [id number]    Diagnoses and all orders for this visit:    Hepatocellular carcinoma ([facility] )  -     MRI abdomen w wo contrast; Future  -     BUN; Future  -     AFP tumor marker; Future  -     Creatinine, serum; Future        Chief Complaint   Patient presents with    Follow-up       No follow-ups on file  Oncology History   Hepatocellular carcinoma ([facility] [address] )   6/8/2020 Initial Diagnosis    Hepatocellular carcinoma, moderately differentiated     7/15/2020 Surgery    Microwave ablation of liver segment 5         Staging: HCC   Treatment history:  Ablation of segment 5 liver lesion, July 2020  Current treatment:  Observation  Disease status: ARLYN    History of Present Illness: Patient returns in follow-up of his [facility] [address]  He is doing well at this time with no complaints  His AFP level is normal   MRI from February 11, 2021 reveals a stable ablation zone in segment 5  There is a new enhancing lesion in segment 5  There is no washout  This was felt to be LR 3  I personally reviewed the films    His only complaint is he is feeling stressed from his social situation regarding his house  Review of Systems  Complete ROS Surg Onc:   Complete ROS Surg Onc:   Constitutional: The patient denies new or recent history of general fatigue, no recent weight loss, no change in appetite  Eyes: No complaints of visual problems, no scleral icterus  ENT: no complaints of ear pain, no hoarseness, no difficulty swallowing,  no tinnitus and no new masses in head, oral cavity, or neck  Cardiovascular: No complaints of chest pain, no palpitations, no ankle edema  Respiratory: No complaints of shortness of breath, no cough  Gastrointestinal: No complaints of jaundice, no bloody stools, no pale stools  Genitourinary: No complaints of dysuria, no hematuria, no nocturia, no frequent urination, no urethral discharge  Musculoskeletal: No complaints of weakness, paralysis, joint stiffness or arthralgias  Integumentary: No complaints of rash, no new lesions  Neurological: No complaints of convulsions, no seizures, no dizziness  Hematologic/Lymphatic: No complaints of easy bruising  Endocrine:  No hot or cold intolerance  No polydipsia, polyphagia, or polyuria  Allergy/immunology:  No environmental allergies  No food allergies  Not immunocompromised  Skin:  No pallor or rash  No wound          Patient Active Problem List   Diagnosis    Hypertension    [facility]sema/COPD ([facility] )    Crohn disease ([facility] [address] )    Colostomy in place ([facility] )    Diarrhea    Severe protein-calorie malnutrition ([address] )    Cellulitis    Emphysema of lung ([facility] [facility] )    Vasovagal syncope    Severe sepsis (HCC)    Pneumonia    Chest pain    Liver mass    Tobacco abuse    Abdominal pain    Hepatocellular carcinoma ([facility] )    Palliative care patient    Abdominal wall abscess    Observed sleep apnea    Acute pain of left wrist    Chronic, continuous use of opioids    Hypocalcemia    Left wrist pain    Kidney stone    Hypomagnesemia    Hypokalemia    GERD (gastroesophageal reflux disease)    Chronic obstructive pulmonary disease (HCC)    Mixed hyperlipidemia    Need for hepatitis C screening test    Vitamin B12 deficiency    Cataract     Past Medical History:   Diagnosis Date    [facility] 6/28/2017    Blindness of left eye     Since birth    Cancer [facility])     liver    Cataract     Colon polyp     Colostomy in place [facility]) 6/29/2017    COPD (chronic obstructive pulmonary disease) (HCC)     Crohn disease (HCC)     Em[facility] lung ([facility] )     Emphysema/COPD ([place] [address] )     Essential hypertension     GERD (gastroesophageal reflux disease)     Hypertension     Hypokalemia 6/28/2017    Hypomagnesemia 6/29/2017    Hyponatremia 6/28/2017    Kidney stone     Osteomyelitis ([place] [address] )     Pneumonia      Past Surgical History:   Procedure Laterality Date    CATARACT EXTRACTION Bilateral     CHOLECYSTECTOMY      COLECTOMY      10 inchs of ileum    COLONOSCOPY N/A 6/30/2017    Procedure: COLONOSCOPY;  Surgeon: Vadim Marshall MD;  [facility];   Service: Gastroenterology    COLOSTOMY      FINGER AMPUTATION      FINGER SURGERY Left     IR BIOPSY LIVER MASS  6/8/2020    LITHOTRIPSY      LIVER LOBECTOMY N/A 7/15/2020    Procedure: LIVER ABLATION, INTRAOPERATIVE U/S LIVER;  Surgeon: Valentina Julien MD;  [facility];  Service: Surgical Oncology     Family History   Problem Relation Age of Onset    Hypertension Father     Heart disease Sister      Social History     Socioeconomic History    Marital status: Single     Spouse name: Not on file    Number of children: Not on file    Years of education: Not on file    Highest education level: Not on file   Occupational History    Not on file   Social Needs    Financial resource strain: Not on file    Food insecurity     Worry: Not on file     Inability: Not on file    Transportation needs     Medical: Not on file     Non-medical: Not on file   Tobacco Use    Smoking status: Former Smoker     Packs/day: 1 00     Types: Cigarettes     Quit date: 7/15/2020     Years since quittin 6    Smokeless tobacco: Never Used   Substance and Sexual Activity    Alcohol use:  Yes     Alcohol/week: 1 0 standard drinks     Types: 1 Cans of beer per week     Frequency: Monthly or less     Drinks per session: 1 or 2     Binge frequency: Never    Drug use: No    Sexual activity: Not Currently   Lifestyle    Physical activity     Days per week: Not on file     Minutes per session: Not on file    Stress: Not on file   Relationships    Social connections     Talks on phone: Not on file     Gets together: Not on file     Attends Mosque service: Not on file     Active member of club or organization: Not on file     Attends meetings of clubs or organizations: Not on file     Relationship status: Not on file    Intimate partner violence     Fear of current or ex partner: Not on file     Emotionally abused: Not on file     Physically abused: Not on file     Forced sexual activity: Not on file   Other Topics Concern    Not on file   Social History Narrative    Not on file       Current Outpatient Medications:     ALPRAZolam (XANAX) 0 25 mg tablet, Take 1 tablet (0 25 mg total) by mouth daily at bedtime as needed for anxiety, Disp: 90 tablet, Rfl: 0    AMILoride 5 mg tablet, Take 1 tablet (5 mg total) by mouth daily, Disp: 90 tablet, Rfl: 0    amLODIPine (NORVASC) 5 mg tablet, TAKE 1 TABLET DAILY, Disp: 90 tablet, Rfl: 0    buPROPion (WELLBUTRIN XL) 150 mg 24 hr tablet, TAKE 1 TABLET BY MOUTH EVERY DAY, Disp: 30 tablet, Rfl: 2    calcium carbonate (TUMS) 500 mg chewable tablet, Chew 1 tablet (500 mg total) daily, Disp:  , Rfl: 0    MAGNESIUM CHLORIDE PO, Take 1,000 mg by mouth daily, Disp: , Rfl:     mirtazapine (REMERON) 15 mg tablet, TAKE 1 TABLET BY MOUTH EVERYDAY AT BEDTIME, Disp: 30 tablet, Rfl: 1    omeprazole (PriLOSEC) 20 mg delayed release capsule, Take 1 capsule (20 mg total) by mouth daily, Disp: 90 capsule, Rfl: 1   potassium chloride (MICRO-K) 10 MEQ CR capsule, Take 20 mEq by mouth 2 (two) times a day, Disp: , Rfl:     rosuvastatin (CRESTOR) 10 MG tablet, Take 1 tablet (10 mg total) by mouth daily, Disp: 90 tablet, Rfl: 1    umeclidinium-vilanterol (ANORO ELLIPTA) 62 5-25 MCG/INH inhaler, Inhale 1 puff daily, Disp: 1 Inhaler, Rfl: 5    VIT B12-METHIONINE-INOS-CHOL IM, Inject into a muscle every 30 (thirty) days, Disp: , Rfl:     Current Facility-Administered Medications:     cyanocobalamin injection 1,000 mcg, 1,000 mcg, Intramuscular, Q30 Days, Torri Coleman MD, 1,000 mcg at 02/25/21 1544  No Known Allergies  Vitals:    02/26/21 0935   BP: 112/76   Pulse: 78   Resp: 16   Temp: 97 8 °F (36 6 °C)       Physical Exam  Constitutional: General appearance: The Patient is well-developed and well-nourished who appears the stated age in no acute distress  Patient is pleasant and talkative  HEENT:  Normocephalic  Sclerae are anicteric  Mucous membranes are moist  Neck is supple without adenopathy  No JVD  Chest: The lungs are clear to auscultation  Cardiac: Heart is regular rate  Abdomen: Abdomen is soft, non-tender, non-distended and without masses  Extremities: There is no clubbing or cyanosis  There is no edema  Symmetric  Neuro: Grossly nonfocal  Gait is normal      Lymphatic: No evidence of cervical adenopathy bilaterally  No evidence of axillary adenopathy bilaterally  No evidence of inguinal adenopathy bilaterally  Skin: Warm, anicteric  Psych:  Patient is pleasant and talkative    Breasts:        Pathology:  [unfilled]    Labs:   Ref Range & Units 2/3/21 10:25 AM   AFP-Tumor Marker <6 1 ng/mL 5 7          Imaging  Mri Abdomen W Wo Contrast    Result Date: 2/16/2021  Narrative: MRI - ABDOMEN - WITH AND WITHOUT CONTRAST INDICATION:  Post ablation of moderately differentiated at C3 COMPARISON: None TECHNIQUE:  The following pulse sequences were obtained prior to and following the administration of intravenous contrast:     Coronal and axial T2 with TE of 90 and 180 respectively, axial T2 with fat saturation, axial FIESTA fat-sat, axial T1-weighted in-and-out-of phase, axial DWI/ADC, precontrast axial T1 with fat saturation, post-contrast dynamic axial T1 with fat saturation at 20, 70, and 180 seconds, coronal T1  with fat saturation and 7 minute delayed axial T1 with fat saturation  IV Contrast:  6 mL of Gadobutrol injection (SINGLE-DOSE) FINDINGS: LOWER CHEST:   Unremarkable  LIVER: Mild hepatic steatosis seen Again noted is a treated lesion in the segment 8 and 5 which demonstrates no thick nodular area of enhancement to suggest any residual viable tissue  This measures 4 8 x 4 4 cm on the previous study this was measuring 4 8 x 4 7 cm There is perilesional enhancement which is an expected posttreatment finding A nodular area of enhancement is noted in the right hepatic lobe, segment 5 at the inferior and lateral aspect of this lesion, measuring 1 1 cm ,  Seen in image 56 series 32274  There is no corresponding focus of diffusion restriction or T2 hyperintensity The hepatic veins and portal veins are patent  BILE DUCTS: No intrahepatic or extrahepatic bile duct dilation  Bile ducts remain unchanged from the previous study GALLBLADDER:  Normal  PANCREAS: Normal  No main pancreatic ductal dilation  ADRENAL GLANDS:  Normal  SPLEEN:  Normal  KIDNEYS/PROXIMAL URETERS: No hydroureteronephrosis  No suspicious renal mass  Left renal cyst is seen BOWEL:   No dilated loops of bowel  PERITONEUM/RETROPERITONEUM: No ascites  LYMPH NODES: No abdominal lymphadenopathy  VASCULAR STRUCTURES:  No aneurysm  ABDOMINAL WALL:  Unremarkable  OSSEOUS STRUCTURES:  No suspicious osseous lesion  Impression: Expected  post ablation changes in the previously noted segment 5 lesion with no residual viable tumor   LR-TR  Nonviable There is a new hyperenhancing lesion in the right hepatic lobe, segment 5 at the inferior and lateral aspect of the treated lesion without any washout washout, measuring 1 cm ,  LIRADS 3 follow-up at 3 months suggested The study was marked in EPIC for significant notification  Workstation performed: [id number]     I reviewed the above laboratory and imaging data  Discussion/Summary: 70-year-old male with [address]  in a non cirrhotic liver   His Child score is B   His MELD score is 9  He underwent ablation of the segment 5 liver lesion  His AFP level is normal  The MRI shows that this has been completely treated  Second area, I suspect is benign as well    We will plan on repeating the MRI and AFP level in 3 months  I will see him again once we have those studies  He is agreeable to this   All his questions were answered

## 2021-03-18 DIAGNOSIS — F34.1 DYSTHYMIC DISORDER: ICD-10-CM

## 2021-03-19 RX ORDER — MIRTAZAPINE 15 MG/1
15 TABLET, FILM COATED ORAL
Qty: 30 TABLET | Refills: 2 | Status: SHIPPED | OUTPATIENT
Start: 2021-03-19 | End: 2021-04-11 | Stop reason: SDUPTHER

## 2021-03-23 ENCOUNTER — IMMUNIZATIONS (OUTPATIENT)
Dept: FAMILY MEDICINE CLINIC | Facility: HOSPITAL | Age: 66
End: 2021-03-23

## 2021-03-23 DIAGNOSIS — Z23 ENCOUNTER FOR IMMUNIZATION: Primary | ICD-10-CM

## 2021-03-23 PROCEDURE — 91300 SARS-COV-2 / COVID-19 MRNA VACCINE (PFIZER-BIONTECH) 30 MCG: CPT

## 2021-03-23 PROCEDURE — 0001A SARS-COV-2 / COVID-19 MRNA VACCINE (PFIZER-BIONTECH) 30 MCG: CPT

## 2021-03-24 ENCOUNTER — CLINICAL SUPPORT (OUTPATIENT)
Dept: INTERNAL MEDICINE CLINIC | Facility: CLINIC | Age: 66
End: 2021-03-24
Payer: MEDICARE

## 2021-03-24 DIAGNOSIS — E53.8 VITAMIN B12 DEFICIENCY: ICD-10-CM

## 2021-03-24 PROCEDURE — 96372 THER/PROPH/DIAG INJ SC/IM: CPT | Performed by: INTERNAL MEDICINE

## 2021-03-24 RX ADMIN — CYANOCOBALAMIN 1000 MCG: 1000 INJECTION INTRAMUSCULAR; SUBCUTANEOUS at 09:23

## 2021-04-11 DIAGNOSIS — F34.1 DYSTHYMIC DISORDER: ICD-10-CM

## 2021-04-11 DIAGNOSIS — I10 ESSENTIAL HYPERTENSION: ICD-10-CM

## 2021-04-12 RX ORDER — ALPRAZOLAM 0.25 MG/1
0.25 TABLET ORAL
Qty: 90 TABLET | Refills: 0 | Status: SHIPPED | OUTPATIENT
Start: 2021-04-12 | End: 2021-07-08 | Stop reason: SDUPTHER

## 2021-04-12 RX ORDER — MIRTAZAPINE 15 MG/1
15 TABLET, FILM COATED ORAL
Qty: 30 TABLET | Refills: 0 | Status: SHIPPED | OUTPATIENT
Start: 2021-04-12 | End: 2021-05-05

## 2021-04-12 RX ORDER — AMLODIPINE BESYLATE 5 MG/1
5 TABLET ORAL DAILY
Qty: 90 TABLET | Refills: 0 | Status: SHIPPED | OUTPATIENT
Start: 2021-04-12 | End: 2021-07-07

## 2021-04-14 ENCOUNTER — IMMUNIZATIONS (OUTPATIENT)
Dept: FAMILY MEDICINE CLINIC | Facility: HOSPITAL | Age: 66
End: 2021-04-14

## 2021-04-14 DIAGNOSIS — Z23 ENCOUNTER FOR IMMUNIZATION: Primary | ICD-10-CM

## 2021-04-14 PROCEDURE — 91300 SARS-COV-2 / COVID-19 MRNA VACCINE (PFIZER-BIONTECH) 30 MCG: CPT

## 2021-04-14 PROCEDURE — 0002A SARS-COV-2 / COVID-19 MRNA VACCINE (PFIZER-BIONTECH) 30 MCG: CPT

## 2021-04-21 ENCOUNTER — CLINICAL SUPPORT (OUTPATIENT)
Dept: INTERNAL MEDICINE CLINIC | Facility: CLINIC | Age: 66
End: 2021-04-21
Payer: MEDICARE

## 2021-04-21 DIAGNOSIS — E53.8 VITAMIN B12 DEFICIENCY: ICD-10-CM

## 2021-04-21 PROCEDURE — 96372 THER/PROPH/DIAG INJ SC/IM: CPT | Performed by: INTERNAL MEDICINE

## 2021-04-21 RX ADMIN — CYANOCOBALAMIN 1000 MCG: 1000 INJECTION INTRAMUSCULAR; SUBCUTANEOUS at 09:14

## 2021-04-23 DIAGNOSIS — Z01.810 PREOP CARDIOVASCULAR EXAM: ICD-10-CM

## 2021-04-23 RX ORDER — ROSUVASTATIN CALCIUM 10 MG/1
10 TABLET, COATED ORAL DAILY
Qty: 90 TABLET | Refills: 0 | Status: SHIPPED | OUTPATIENT
Start: 2021-04-23 | End: 2021-10-12

## 2021-05-03 DIAGNOSIS — F34.1 DYSTHYMIC DISORDER: ICD-10-CM

## 2021-05-03 RX ORDER — BUPROPION HYDROCHLORIDE 150 MG/1
150 TABLET ORAL DAILY
Qty: 30 TABLET | Refills: 0 | Status: SHIPPED | OUTPATIENT
Start: 2021-05-03 | End: 2021-05-28 | Stop reason: SDUPTHER

## 2021-05-05 DIAGNOSIS — F34.1 DYSTHYMIC DISORDER: ICD-10-CM

## 2021-05-05 RX ORDER — MIRTAZAPINE 15 MG/1
TABLET, FILM COATED ORAL
Qty: 30 TABLET | Refills: 0 | Status: SHIPPED | OUTPATIENT
Start: 2021-05-05 | End: 2021-08-04

## 2021-05-13 DIAGNOSIS — I10 ESSENTIAL HYPERTENSION, BENIGN: ICD-10-CM

## 2021-05-13 RX ORDER — AMILORIDE HYDROCHLORIDE 5 MG/1
TABLET ORAL
Qty: 90 TABLET | Refills: 0 | Status: SHIPPED | OUTPATIENT
Start: 2021-05-13 | End: 2021-08-17 | Stop reason: SDUPTHER

## 2021-05-18 ENCOUNTER — TELEPHONE (OUTPATIENT)
Dept: PULMONOLOGY | Facility: CLINIC | Age: 66
End: 2021-05-18

## 2021-05-18 DIAGNOSIS — J43.2 CENTRILOBULAR EMPHYSEMA (HCC): ICD-10-CM

## 2021-05-19 ENCOUNTER — CLINICAL SUPPORT (OUTPATIENT)
Dept: INTERNAL MEDICINE CLINIC | Facility: CLINIC | Age: 66
End: 2021-05-19
Payer: MEDICARE

## 2021-05-19 DIAGNOSIS — E53.8 VITAMIN B12 DEFICIENCY: Primary | ICD-10-CM

## 2021-05-19 PROCEDURE — 96372 THER/PROPH/DIAG INJ SC/IM: CPT

## 2021-05-19 RX ADMIN — CYANOCOBALAMIN 1000 MCG: 1000 INJECTION INTRAMUSCULAR; SUBCUTANEOUS at 09:31

## 2021-05-21 LAB
AFP-TM SERPL-MCNC: 6.9 NG/ML
BUN SERPL-MCNC: 7 MG/DL (ref 7–25)
CREAT SERPL-MCNC: 0.73 MG/DL (ref 0.7–1.25)
SL AMB EGFR AFRICAN AMERICAN: 113 ML/MIN/1.73M2
SL AMB EGFR NON AFRICAN AMERICAN: 97 ML/MIN/1.73M2

## 2021-05-26 ENCOUNTER — HOSPITAL ENCOUNTER (OUTPATIENT)
Dept: MRI IMAGING | Facility: HOSPITAL | Age: 66
Discharge: HOME | End: 2021-05-26
Attending: SURGERY
Payer: MEDICARE

## 2021-05-26 DIAGNOSIS — C22.0 HEPATOCELLULAR CARCINOMA (HCC): ICD-10-CM

## 2021-05-26 PROCEDURE — A9585 GADOBUTROL INJECTION: HCPCS | Performed by: SURGERY

## 2021-05-26 PROCEDURE — G1004 CDSM NDSC: HCPCS

## 2021-05-26 PROCEDURE — 74183 MRI ABD W/O CNTR FLWD CNTR: CPT

## 2021-05-26 RX ADMIN — GADOBUTROL 6 ML: 604.72 INJECTION INTRAVENOUS at 12:21

## 2021-05-28 DIAGNOSIS — F34.1 DYSTHYMIC DISORDER: ICD-10-CM

## 2021-05-28 RX ORDER — BUPROPION HYDROCHLORIDE 150 MG/1
150 TABLET ORAL DAILY
Qty: 30 TABLET | Refills: 2 | Status: SHIPPED | OUTPATIENT
Start: 2021-05-28 | End: 2021-10-18

## 2021-06-15 ENCOUNTER — OFFICE VISIT (OUTPATIENT)
Dept: SURGICAL ONCOLOGY | Facility: CLINIC | Age: 66
End: 2021-06-15
Payer: MEDICARE

## 2021-06-15 VITALS
TEMPERATURE: 98.5 F | HEIGHT: 69 IN | HEART RATE: 107 BPM | SYSTOLIC BLOOD PRESSURE: 138 MMHG | RESPIRATION RATE: 16 BRPM | DIASTOLIC BLOOD PRESSURE: 96 MMHG | BODY MASS INDEX: 20.88 KG/M2 | WEIGHT: 141 LBS

## 2021-06-15 DIAGNOSIS — C22.0 HEPATOCELLULAR CARCINOMA (HCC): Primary | ICD-10-CM

## 2021-06-15 PROCEDURE — 99214 OFFICE O/P EST MOD 30 MIN: CPT | Performed by: SURGERY

## 2021-06-15 NOTE — PROGRESS NOTES
Surgical Oncology Follow Up       [facility]  [id number] [place] [id number] [phone]    Reynaldo Purvis III  1955  [id number]  [facility] [address] [id number] [phone]    Diagnoses and all orders for this visit:    Hepatocellular carcinoma ([facility] )  -     MRI abdomen w wo contrast; Future  -     BUN; Future  -     AFP tumor marker; Future  -     Creatinine, serum; Future        Chief Complaint   Patient presents with    Follow-up       Return in about 3 months (around 9/15/2021) for Office Visit, Imaging - See orders, Labs - See Treatment Plan  Oncology History   Hepatocellular carcinoma ([facility] )   6/8/2020 Initial Diagnosis    Hepatocellular carcinoma, moderately differentiated     7/15/2020 Surgery    Microwave ablation of liver segment 5         Staging: HCC   Treatment history:  Ablation of segment 5 liver lesion, July 2020  Current treatment:  Observation  Disease status: ARLYN    History of Present Illness:  Patient returns in follow-up of his [facility]  He is doing well  His only complaint is anxiety since he is moving  He denies any significant abdominal pain, nausea or vomiting  MRI from May 26, 2021 reveals that the ablated lesion appear slightly smaller than before  There is a stable 1 cm arterially enhancing lesion in segment 5  This was either an LR 5 lesion or viable tumor  I personally reviewed films  His AFP level has risen to 6 9  Comes in now to discuss further therapy  Review of Systems  Complete ROS Surg Onc:   Complete ROS Surg Onc:   Constitutional: The patient denies new or recent history of general fatigue, no recent weight loss, no change in appetite  Eyes: No complaints of visual problems, no scleral icterus     ENT: no complaints of ear pain, no hoarseness, no difficulty swallowing,  no tinnitus and no new masses in head, oral cavity, or neck  Cardiovascular: No complaints of chest pain, no palpitations, no ankle edema  Respiratory: No complaints of shortness of breath, no cough  Gastrointestinal: No complaints of jaundice, no bloody stools, no pale stools  Genitourinary: No complaints of dysuria, no hematuria, no nocturia, no frequent urination, no urethral discharge  Musculoskeletal: No complaints of weakness, paralysis, joint stiffness or arthralgias  Integumentary: No complaints of rash, no new lesions  Neurological: No complaints of convulsions, no seizures, no dizziness  Hematologic/Lymphatic: No complaints of easy bruising  Endocrine:  No hot or cold intolerance  No polydipsia, polyphagia, or polyuria  Allergy/immunology:  No environmental allergies  No food allergies  Not immunocompromised  Skin:  No pallor or rash  No wound          Patient Active Problem List   Diagnosis    Hypertension    Emphysema/COPD ([place] [address] )    Crohn disease ([place] [address] )    Colostomy in place ([place] [address] )    Diarrhea    Severe protein-calorie malnutrition ([place] [address] )    Cellulitis    Emphysema of lung ([place] [address] )    Vasovagal syncope    Severe sepsis (HCC)    Pneumonia    Chest pain    Liver mass    Tobacco abuse    Abdominal pain    Hepatocellular carcinoma ([place] [address] )    Palliative care patient    Abdominal wall abscess    Observed sleep apnea    Acute pain of left wrist    Chronic, continuous use of opioids    Hypocalcemia    Left wrist pain    Kidney stone    Hypomagnesemia    Hypokalemia    GERD (gastroesophageal reflux disease)    Chronic obstructive pulmonary disease (HCC)    Mixed hyperlipidemia    Need for hepatitis C screening test    Vitamin B12 deficiency    Cataract     Past Medical History:   Diagnosis Date    LUCILLE (acute kidney injury) ([place] [address] ) 6/28/2017    Blindness of left eye     Since birth    Cancer [facility])     liver    Cataract     Colon polyp     Colostomy in place [facility]) 2017    COPD (chronic obstructive pulmonary disease) (HCC)     Crohn disease (HCC)     Emphysema of lung ([facility] )     Emphysema/COPD ([facility] )     Essential hypertension     GERD (gastroesophageal reflux disease)     Hypertension     Hypokalemia 2017    Hypomagnesemia 2017    Hyponatremia 2017    Kidney stone     Osteomyelitis ([facility] )     Pneumonia      Past Surgical History:   Procedure Laterality Date    CATARACT EXTRACTION Bilateral     CHOLECYSTECTOMY      COLECTOMY      10 inchs of ileum    COLONOSCOPY N/A 2017    Procedure: COLONOSCOPY;  Surgeon: Jamil Ponce MD;  [facility]; Service: Gastroenterology    COLOSTOMY      FINGER AMPUTATION      FINGER SURGERY Left     IR BIOPSY LIVER MASS  2020    LITHOTRIPSY      LIVER LOBECTOMY N/A 7/15/2020    Procedure: LIVER ABLATION, INTRAOPERATIVE U/S LIVER;  Surgeon: Javi Pickens MD;  [facility];  Service: Surgical Oncology     Family History   Problem Relation Age of Onset    Hypertension Father     Heart disease Sister      Social History     Socioeconomic History    Marital status: Single     Spouse name: Not on file    Number of children: Not on file    Years of education: Not on file    Highest education level: Not on file   Occupational History    Not on file   Tobacco Use    Smoking status: Former Smoker     Packs/day: 1 00     Types: Cigarettes     Quit date: 7/15/2020     Years since quittin 9    Smokeless tobacco: Never Used   Vaping Use    Vaping Use: Never used   Substance and Sexual Activity    Alcohol use:  Yes     Alcohol/week: 1 0 standard drinks     Types: 1 Cans of beer per week    Drug use: No    Sexual activity: Not Currently   Other Topics Concern    Not on file   Social History Narrative    Not on file     Social Determinants of Health     Financial Resource Strain:     Difficulty of Paying Living Expenses:    Food Insecurity:     Worried About Running Out of Food in the Last Year:    [address] in the Last Year:    Transportation Needs:     Lack of Transportation (Medical):      Lack of Transportation (Non-Medical):    Physical Activity:     Days of Exercise per Week:     Minutes of Exercise per Session:    Stress:     Feeling of Stress :    Social Connections:     Frequency of Communication with Friends and Family:     Frequency of Social Gatherings with Friends and Family:     Attends Synagogue Services:     Active Member of Clubs or Organizations:     Attends Club or Organization Meetings:     Marital Status:    Intimate Partner Violence:     Fear of Current or Ex-Partner:     Emotionally Abused:     Physically Abused:     Sexually Abused:        Current Outpatient Medications:     ALPRAZolam (XANAX) 0 25 mg tablet, Take 1 tablet (0 25 mg total) by mouth daily at bedtime as needed for anxiety, Disp: 90 tablet, Rfl: 0    AMILoride 5 mg tablet, TAKE 1 TABLET BY MOUTH EVERY DAY, Disp: 90 tablet, Rfl: 0    amLODIPine (NORVASC) 5 mg tablet, Take 1 tablet (5 mg total) by mouth daily, Disp: 90 tablet, Rfl: 0    buPROPion (WELLBUTRIN XL) 150 mg 24 hr tablet, Take 1 tablet (150 mg total) by mouth daily, Disp: 30 tablet, Rfl: 2    calcium carbonate (TUMS) 500 mg chewable tablet, Chew 1 tablet (500 mg total) daily, Disp:  , Rfl: 0    MAGNESIUM CHLORIDE PO, Take 1,000 mg by mouth daily, Disp: , Rfl:     mirtazapine (REMERON) 15 mg tablet, TAKE 1 TABLET BY MOUTH DAILY AT BEDTIME, Disp: 30 tablet, Rfl: 0    omeprazole (PriLOSEC) 20 mg delayed release capsule, Take 1 capsule (20 mg total) by mouth daily, Disp: 90 capsule, Rfl: 1    potassium chloride (MICRO-K) 10 MEQ CR capsule, Take 20 mEq by mouth 2 (two) times a day, Disp: , Rfl:     rosuvastatin (CRESTOR) 10 MG tablet, Take 1 tablet (10 mg total) by mouth daily, Disp: 90 tablet, Rfl: 0    umeclidinium-vilanterol (ANORO ELLIPTA) 62 5-25 MCG/INH inhaler, Inhale 1 puff daily, Disp: 60 each, Rfl: 3    VIT B12-METHIONINE-INOS-CHOL IM, Inject into a muscle every 30 (thirty) days, Disp: , Rfl:     Current Facility-Administered Medications:     cyanocobalamin injection 1,000 mcg, 1,000 mcg, Intramuscular, Q30 Days, Torri Coleman MD, 1,000 mcg at 05/19/21 0931  No Known Allergies  Vitals:    06/15/21 1124   BP: 138/96   Pulse: (!) 107   Resp: 16   Temp: 98 5 °F (36 9 °C)       Physical Exam  Constitutional: General appearance: The Patient is well-developed and well-nourished who appears the stated age in no acute distress  Patient is pleasant and talkative  HEENT:  Normocephalic  Sclerae are anicteric  Mucous membranes are moist  Neck is supple without adenopathy  No JVD  Chest: The lungs are clear to auscultation  Cardiac: Heart is regular rate  Abdomen: Abdomen is soft, non-tender, non-distended and without masses  Extremities: There is no clubbing or cyanosis  There is no edema  Symmetric  Neuro: Grossly nonfocal  Gait is normal      Lymphatic: No evidence of cervical adenopathy bilaterally  No evidence of axillary adenopathy bilaterally  No evidence of inguinal adenopathy bilaterally  Skin: Warm, anicteric  Psych:  Patient is pleasant and talkative  Breasts:        Pathology:  [unfilled]    Labs:   Ref Range & Units 5/19/21  9:42 AM   AFP-Tumor Marker <6 1 ng/mL 6  9High           Imaging  MRI abdomen w wo contrast    Result Date: 6/2/2021  Narrative: MRI - ABDOMEN - WITH AND WITHOUT CONTRAST INDICATION:  History of hepatocellular carcinoma COMPARISON: 2/11/2021, 11/2/2020 TECHNIQUE:  The following pulse sequences were obtained prior to and following the administration of intravenous contrast:     Coronal and axial T2 with TE of 90 and 180 respectively, axial T2 with fat saturation, axial FIESTA fat-sat, axial T1-weighted in-and-out-of phase, axial DWI/ADC, precontrast axial T1 with fat saturation, post-contrast dynamic axial T1 with fat saturation at 20, 70, and 180 seconds, coronal T1  with fat saturation and 7 minute delayed axial T1 with fat saturation  Imaging performed on 1 5T MRI   IV Contrast:  6 mL of Gadobutrol injection (SINGLE-DOSE) FINDINGS: LOWER CHEST:   Unremarkable  LIVER: Moderate hepatic steatosis  There is a round lesion in segment 8/5 measuring 1 9 x 2 0 cm, previously 2 0 x 2 0 cm  There is surrounding T1 hyperintensity measuring 3 9 x 4 5 cm, previously 4 1 x 4 6 cm  Evaluation for enhancement within the lesion is mildly limited due to misregistration on subtraction images though no solid nodular enhancement is visualized  There is a stable 1 cm arterially enhancing focus in segment 5 on series 12 image 59 just along the edge of the treatment zone  There is persistent enhancement through delayed imaging with mild T2 hyperintensity and restricted diffusion  There is no washout  There is THID in segments 4B and 5 surrounding the lesions  The hepatic veins and portal veins are patent  BILE DUCTS: There is mild focal biliary ductal dilatation in the region of the treated lesion, stable  GALLBLADDER:  Normal  PANCREAS: Normal  No main pancreatic ductal dilation  ADRENAL GLANDS:  Normal  SPLEEN:  Normal  KIDNEYS/PROXIMAL URETERS: No hydroureteronephrosis  No suspicious renal mass  Small left renal cyst  BOWEL:   No dilated loops of bowel  PERITONEUM/RETROPERITONEUM: No ascites  LYMPH NODES: No abdominal lymphadenopathy  VASCULAR STRUCTURES:  No aneurysm  ABDOMINAL WALL:  There is progressive enhancement in the right abdominal wall which appears to be associated with the costochondral junction  This was not present previously and may represent injury  OSSEOUS STRUCTURES:  No suspicious osseous lesion  Impression: 1  Stable liver lesion in segment 8/5 without gross nodular enhancement though subtraction imaging is mildly limited by misregistration  This is again consistent with a LR-TR Non-viable lesion   2   Stable 1 cm arterially enhancing lesion in segment 5 along the edge of the treatment zone with persistent enhancement on delayed images, restricted diffusion and T2 hyperintensity  It is uncertain whether this represents recurrent viable tumor following treatment (LR-TR Viable) or a development of a separate new lesion consistent with LIRADS-4  3   Moderate hepatic steatosis  4   Enhancement of a costochondral junction in the right abdominal wall, not present previously  This represents interval injury and clinical correlation is recommended  The study was marked in EPIC for significant notification  Workstation performed: [id number]     I reviewed the above laboratory and imaging data  Discussion/Summary: 26-year-old male with [address]  in a non cirrhotic liver   His Child score is B   His MELD score is 9  He underwent ablation of the segment 5 liver lesion    His AFP level has risen  The MRI suggests that the ablated lesion is not viable  It is unclear if this 2nd lesion is viable tumor or a new LR 4 lesion  This may be somewhat difficult to biopsy  We will plan on repeating the MRI and AFP level in 3 months  I will see him again once we have those studies  I also discussed that I will present his case at upper GI working group    If biopsy or some other treatment is recommended I will call him and we will proceed accordingly  [place] agree[place] to this   All his questions were answered

## 2021-06-15 NOTE — LETTER
Livia 15, 2021     Hal Reyes MD  [address]  [address]  [address] [place] [address]    Patient: Yrn Chin III   YOB: 1955   Date of Visit: 6/15/2021       Dear Dr Pam Thakur: Thank you for referring Tiny Bragg to me for evaluation  Below are my notes for this consultation  If you have questions, please do not hesitate to call me  I look forward to following your patient along with you  Sincerely,        Javi Pickens MD        CC: No Recipients  Javi Pickens MD  6/15/2021 11:59 AM  Incomplete               Surgical Oncology Follow Up       [address] [facility] [place]  [address] [facility] [address]  [phone]    Yrn Chin III  1955  [id number]  [address] [facility] [place] [id number] [facility] [address]  [phone]    Diagnoses and all orders for this visit:    Hepatocellular carcinoma [facility])        Chief Complaint   Patient presents with    Follow-up       No follow-ups on file  Oncology History   Hepatocellular carcinoma ([facility] )   6/8/2020 Initial Diagnosis    Hepatocellular carcinoma, moderately differentiated     7/15/2020 Surgery    Microwave ablation of liver segment 5         Staging: HCC   Treatment history:  Ablation of segment 5 liver lesion, July 2020  Current treatment:  Observation  Disease status: ARLYN    History of Present Illness:  Patient returns in follow-up of his [facility] [facility]  He is doing well  His only complaint is anxiety since he is moving  He denies any significant abdominal pain, nausea or vomiting  MRI from May 26, 2021 reveals that the ablated lesion appear slightly smaller than before  There is a stable 1 cm arterially enhancing lesion in segment 5  This was either an LR 5 lesion or viable tumor  I personally reviewed films  His AFP level has risen to 6 9  Comes in now to discuss further therapy      Review of Systems  Complete ROS Surg Onc:   Complete ROS Surg Onc:   Constitutional: The patient denies new or recent history of general fatigue, no recent weight loss, no change in appetite  Eyes: No complaints of visual problems, no scleral icterus  ENT: no complaints of ear pain, no hoarseness, no difficulty swallowing,  no tinnitus and no new masses in head, oral cavity, or neck  Cardiovascular: No complaints of chest pain, no palpitations, no ankle edema  Respiratory: No complaints of shortness of breath, no cough  Gastrointestinal: No complaints of jaundice, no bloody stools, no pale stools  Genitourinary: No complaints of dysuria, no hematuria, no nocturia, no frequent urination, no urethral discharge  Musculoskeletal: No complaints of weakness, paralysis, joint stiffness or arthralgias  Integumentary: No complaints of rash, no new lesions  Neurological: No complaints of convulsions, no seizures, no dizziness  Hematologic/Lymphatic: No complaints of easy bruising  Endocrine:  No hot or cold intolerance  No polydipsia, polyphagia, or polyuria  Allergy/immunology:  No environmental allergies  No food allergies  Not immunocompromised  Skin:  No pallor or rash  No wound          Patient Active Problem List   Diagnosis    Hypertension    [facility]D [facility] )    Crohn disease ([facility] )    Colostomy in place ([facility] )    Diarrhea    Severe protein-calorie malnutrition ([facility] )    Cellulitis    Emphysema of lung ([facility] )    Vasovagal syncope    Severe sepsis (HCC)    Pneumonia    Chest pain    Liver mass    Tobacco abuse    Abdominal pain    Hepatocellular carcinoma ([facility] )    Palliative care patient    Abdominal wall abscess    Observed sleep apnea    Acute pain of left wrist    Chronic, continuous use of opioids    Hypocalcemia    Left wrist pain    Kidney stone    Hypomagnesemia    Hypokalemia    GERD (gastroesophageal reflux disease)    Chronic obstructive pulmonary disease ([facility] )    Mixed hyperlipidemia    Need for hepatitis C screening test    Vitamin B12 deficiency    Cataract     Past Medical History:   Diagnosis Date    LUCILLE (acute kidney injury) ([facility] ) 2017    Blindness of left eye     Since birth    Cancer [facility])     liver    Cataract     Colon polyp     Colostomy in place [facility]) 2017    COPD (chronic obstructive pulmonary disease) (HCC)     Crohn disease (HCC)     Em[facility]ma [facility] lung ([facility] )     Emphysema/COPD ([place] [address] )     Essential hypertension     GERD (gastroesophageal reflux disease)     Hypertension     Hypokalemia 2017    Hypomagnesemia 2017    Hyponatremia 2017    Kidney stone     Osteomyelitis ([place] [address] )     Pneumonia      Past Surgical History:   Procedure Laterality Date    CATARACT EXTRACTION Bilateral     CHOLECYSTECTOMY      COLECTOMY      10 inchs of ileum    COLONOSCOPY N/A 2017    Procedure: COLONOSCOPY;  Surgeon: Isauro Gilmore MD;  [facility]; Service: Gastroenterology    COLOSTOMY      FINGER AMPUTATION      FINGER SURGERY Left     IR BIOPSY LIVER MASS  2020    LITHOTRIPSY      LIVER LOBECTOMY N/A 7/15/2020    Procedure: LIVER ABLATION, INTRAOPERATIVE U/S LIVER;  Surgeon: Katja Motta MD;  [facility];  Service: Surgical Oncology     Family History   Problem Relation Age of Onset    Hypertension Father     Heart disease Sister      Social History     Socioeconomic History    Marital status: Single     Spouse name: Not on file    Number of children: Not on file    Years of education: Not on file    Highest education level: Not on file   Occupational History    Not on file   Tobacco Use    Smoking status: Former Smoker     Packs/day: 1 00     Types: Cigarettes     Quit date: 7/15/2020     Years since quittin 9    Smokeless tobacco: Never Used   Vaping Use    Vaping Use: Never used   Substance and Sexual Activity    Alcohol use:  Yes     Alcohol/week: 1 0 standard drinks     Types: 1 Cans of beer per week    Drug use: No    Sexual activity: Not Currently   Other Topics Concern    Not on file   Social History Narrative    Not on file     Social Determinants of Health     Financial Resource Strain:     Difficulty of Paying Living Expenses:    Food Insecurity:     Worried About Running Out of Food in the Last Year:     [address] in the Last Year:    Transportation Needs:     Lack of Transportation (Medical):      Lack of Transportation (Non-Medical):    Physical Activity:     Days of Exercise per Week:     Minutes of Exercise per Session:    Stress:     Feeling of Stress :    Social Connections:     Frequency of Communication with Friends and Family:     Frequency of Social Gatherings with Friends and Family:     Attends Alevism Services:     Active Member of Clubs or Organizations:     Attends Club or Organization Meetings:     Marital Status:    Intimate Partner Violence:     Fear of Current or Ex-Partner:     Emotionally Abused:     Physically Abused:     Sexually Abused:        Current Outpatient Medications:     ALPRAZolam (XANAX) 0 25 mg tablet, Take 1 tablet (0 25 mg total) by mouth daily at bedtime as needed for anxiety, Disp: 90 tablet, Rfl: 0    AMILoride 5 mg tablet, TAKE 1 TABLET BY MOUTH EVERY DAY, Disp: 90 tablet, Rfl: 0    amLODIPine (NORVASC) 5 mg tablet, Take 1 tablet (5 mg total) by mouth daily, Disp: 90 tablet, Rfl: 0    buPROPion (WELLBUTRIN XL) 150 mg 24 hr tablet, Take 1 tablet (150 mg total) by mouth daily, Disp: 30 tablet, Rfl: 2    calcium carbonate (TUMS) 500 mg chewable tablet, Chew 1 tablet (500 mg total) daily, Disp:  , Rfl: 0    MAGNESIUM CHLORIDE PO, Take 1,000 mg by mouth daily, Disp: , Rfl:     mirtazapine (REMERON) 15 mg tablet, TAKE 1 TABLET BY MOUTH DAILY AT BEDTIME, Disp: 30 tablet, Rfl: 0    omeprazole (PriLOSEC) 20 mg delayed release capsule, Take 1 capsule (20 mg total) by mouth daily, Disp: 90 capsule, Rfl: 1    potassium chloride (MICRO-K) 10 MEQ CR capsule, Take 20 mEq by mouth 2 (two) times a day, Disp: , Rfl:     rosuvastatin (CRESTOR) 10 MG tablet, Take 1 tablet (10 mg total) by mouth daily, Disp: 90 tablet, Rfl: 0    umeclidinium-vilanterol (ANORO ELLIPTA) 62 5-25 MCG/INH inhaler, Inhale 1 puff daily, Disp: 60 each, Rfl: 3    VIT B12-METHIONINE-INOS-CHOL IM, Inject into a muscle every 30 (thirty) days, Disp: , Rfl:     Current Facility-Administered Medications:     cyanocobalamin injection 1,000 mcg, 1,000 mcg, Intramuscular, Q30 Days, Torri Coleman MD, 1,000 mcg at 05/19/21 0931  No Known Allergies  Vitals:    06/15/21 1124   BP: 138/96   Pulse: (!) 107   Resp: 16   Temp: 98 5 °F (36 9 °C)       Physical Exam  Constitutional: General appearance: The Patient is well-developed and well-nourished who appears the stated age in no acute distress  Patient is pleasant and talkative  HEENT:  Normocephalic  Sclerae are anicteric  Mucous membranes are moist  Neck is supple without adenopathy  No JVD  Chest: The lungs are clear to auscultation  Cardiac: Heart is regular rate  Abdomen: Abdomen is soft, non-tender, non-distended and without masses  Extremities: There is no clubbing or cyanosis  There is no edema  Symmetric  Neuro: Grossly nonfocal  Gait is normal      Lymphatic: No evidence of cervical adenopathy bilaterally  No evidence of axillary adenopathy bilaterally  No evidence of inguinal adenopathy bilaterally  Skin: Warm, anicteric  Psych:  Patient is pleasant and talkative  Breasts:        Pathology:  [unfilled]    Labs:   Ref Range & Units 5/19/21  9:42 AM   AFP-Tumor Marker <6 1 ng/mL 6  9High           Imaging  MRI abdomen w wo contrast    Result Date: 6/2/2021  Narrative: MRI - ABDOMEN - WITH AND WITHOUT CONTRAST INDICATION:  History of hepatocellular carcinoma COMPARISON: 2/11/2021, 11/2/2020 TECHNIQUE:  The following pulse sequences were obtained prior to and following the administration of intravenous contrast:     Coronal and axial T2 with TE of 90 and 180 respectively, axial T2 with fat saturation, axial FIESTA fat-sat, axial T1-weighted in-and-out-of phase, axial DWI/ADC, precontrast axial T1 with fat saturation, post-contrast dynamic axial T1 with fat saturation at 20, 70, and 180 seconds, coronal T1  with fat saturation and 7 minute delayed axial T1 with fat saturation  Imaging performed on 1 5T MRI   IV Contrast:  6 mL of Gadobutrol injection (SINGLE-DOSE) FINDINGS: LOWER CHEST:   Unremarkable  LIVER: Moderate hepatic steatosis  There is a round lesion in segment 8/5 measuring 1 9 x 2 0 cm, previously 2 0 x 2 0 cm  There is surrounding T1 hyperintensity measuring 3 9 x 4 5 cm, previously 4 1 x 4 6 cm  Evaluation for enhancement within the lesion is mildly limited due to misregistration on subtraction images though no solid nodular enhancement is visualized  There is a stable 1 cm arterially enhancing focus in segment 5 on series 12 image 59 just along the edge of the treatment zone  There is persistent enhancement through delayed imaging with mild T2 hyperintensity and restricted diffusion  There is no washout  There is THID in segments 4B and 5 surrounding the lesions  The hepatic veins and portal veins are patent  BILE DUCTS: There is mild focal biliary ductal dilatation in the region of the treated lesion, stable  GALLBLADDER:  Normal  PANCREAS: Normal  No main pancreatic ductal dilation  ADRENAL GLANDS:  Normal  SPLEEN:  Normal  KIDNEYS/PROXIMAL URETERS: No hydroureteronephrosis  No suspicious renal mass  Small left renal cyst  BOWEL:   No dilated loops of bowel  PERITONEUM/RETROPERITONEUM: No ascites  LYMPH NODES: No abdominal lymphadenopathy  VASCULAR STRUCTURES:  No aneurysm  ABDOMINAL WALL:  There is progressive enhancement in the right abdominal wall which appears to be associated with the costochondral junction    This was not present previously and may represent injury  OSSEOUS STRUCTURES:  No suspicious osseous lesion  Impression: 1  Stable liver lesion in segment 8/5 without gross nodular enhancement though subtraction imaging is mildly limited by misregistration  This is again consistent with a LR-TR Non-viable lesion  2   Stable 1 cm arterially enhancing lesion in segment 5 along the edge of the treatment zone with persistent enhancement on delayed images, restricted diffusion and T2 hyperintensity  It is uncertain whether this represents recurrent viable tumor following treatment (LR-TR Viable) or a development of a separate new lesion consistent with LIRADS-4  3   Moderate hepatic steatosis  4   Enhancement of a costochondral junction in the right abdominal wall, not present previously  This represents interval injury and clinical correlation is recommended  The study was marked in EPIC for significant notification  Workstation performed: [id number]     I reviewed the above laboratory and imaging data  Discussion/Summary: 80-year-old male with [address]  in a non cirrhotic liver   His Child score is B   His MELD score is 9  He underwent ablation of the segment 5 liver lesion    His AFP level has risen  The MRI suggests that the ablated lesion is not viable  It is unclear if this 2nd lesion is viable tumor or a new LR 4 lesion  This may be somewhat difficult to biopsy  We will plan on repeating the MRI and AFP level in 3 months  I will see him again once we have those studies  I also discussed that I will present his case at upper GI working group    If biopsy or some other treatment is recommended I will call him and we will proceed accordingly  Triny Saldivar is agreeable to this   All his questions were answered

## 2021-06-28 ENCOUNTER — OFFICE VISIT (OUTPATIENT)
Dept: INTERNAL MEDICINE CLINIC | Facility: CLINIC | Age: 66
End: 2021-06-28
Payer: MEDICARE

## 2021-06-28 VITALS
TEMPERATURE: 97.6 F | BODY MASS INDEX: 21.03 KG/M2 | HEART RATE: 94 BPM | DIASTOLIC BLOOD PRESSURE: 76 MMHG | OXYGEN SATURATION: 97 % | HEIGHT: 69 IN | WEIGHT: 142 LBS | SYSTOLIC BLOOD PRESSURE: 134 MMHG

## 2021-06-28 DIAGNOSIS — E87.6 HYPOKALEMIA: ICD-10-CM

## 2021-06-28 DIAGNOSIS — E83.42 HYPOMAGNESEMIA: ICD-10-CM

## 2021-06-28 DIAGNOSIS — I10 ESSENTIAL HYPERTENSION: Primary | ICD-10-CM

## 2021-06-28 DIAGNOSIS — Z11.59 NEED FOR HEPATITIS C SCREENING TEST: ICD-10-CM

## 2021-06-28 DIAGNOSIS — C22.0 HEPATOCELLULAR CARCINOMA (HCC): ICD-10-CM

## 2021-06-28 DIAGNOSIS — F34.1 DYSTHYMIC DISORDER: ICD-10-CM

## 2021-06-28 DIAGNOSIS — E78.2 MIXED HYPERLIPIDEMIA: ICD-10-CM

## 2021-06-28 DIAGNOSIS — E53.8 VITAMIN B12 DEFICIENCY: ICD-10-CM

## 2021-06-28 DIAGNOSIS — K50.018 CROHN'S DISEASE OF SMALL INTESTINE WITH OTHER COMPLICATION (HCC): ICD-10-CM

## 2021-06-28 DIAGNOSIS — Z93.3 COLOSTOMY IN PLACE (HCC): ICD-10-CM

## 2021-06-28 DIAGNOSIS — K21.9 GASTROESOPHAGEAL REFLUX DISEASE WITHOUT ESOPHAGITIS: ICD-10-CM

## 2021-06-28 DIAGNOSIS — E43 SEVERE PROTEIN-CALORIE MALNUTRITION (HCC): ICD-10-CM

## 2021-06-28 PROCEDURE — 99214 OFFICE O/P EST MOD 30 MIN: CPT | Performed by: INTERNAL MEDICINE

## 2021-06-28 PROCEDURE — 96372 THER/PROPH/DIAG INJ SC/IM: CPT

## 2021-06-28 RX ADMIN — CYANOCOBALAMIN 1000 MCG: 1000 INJECTION INTRAMUSCULAR; SUBCUTANEOUS at 09:57

## 2021-06-28 NOTE — PROGRESS NOTES
Assessment/Plan:             1  Essential hypertension  -     CBC and differential; Future  -     Comprehensive metabolic panel; Future  -     TSH, 3rd generation; Future    2  Mixed hyperlipidemia  -     Comprehensive metabolic panel; Future  -     Lipid Panel with Direct LDL reflex; Future  -     TSH, 3rd generation; Future    3  Colostomy in place [facility])    4  Severe protein-calorie malnutrition ([facility] )    5  Hepatocellular carcinoma ([place] [address] )    6  Crohn's disease of small intestine with other complication ([place] [address] )    7  Gastroesophageal reflux disease without esophagitis    8  Hypokalemia    9  Hypomagnesemia    10  Need for hepatitis C screening test  -     Hepatitis C antibody; Future    11  Dysthymic disorder    12  Vitamin B12 deficiency           Subjective:      Patient ID: Jenelle Green is a 72 y o  male  Follow-up on multiple medical problems to ensure they are stable on current medications      The following portions of the patient's history were reviewed and updated as appropriate: He  has a past medical history of LUCILLE (acute kidney injury) ([place] [address] ) (6/28/2017), Blindness of left eye, Cancer ([place] [address] ), Cataract, Colon polyp, Colostomy in place [facility]) (6/29/2017), COPD (chronic obstructive pulmonary disease) ([place] [address] ), Crohn disease ([place] [address] ), Emphysema of lung ([place] [address] ), Emphysema/COPD ([place] [address] ), Essential hypertension, GERD (gastroesophageal reflux disease), Hypertension, Hypokalemia (6/28/2017), Hypomagnesemia (6/29/2017), Hyponatremia (6/28/2017), Kidney stone, Osteomyelitis ([place] [address] ), and Pneumonia    He   Patient Active Problem List    Diagnosis Date Noted    Dysthymic disorder 06/28/2021    Crohn's disease of small intestine with other complication ([facility] ) 56/50/3250    Need for hepatitis C screening test 02/24/2021    Vitamin B12 deficiency 02/24/2021    Cataract     Chronic obstructive pulmonary disease ([facility] ) 11/24/2020    Mixed hyperlipidemia 11/24/2020    Kidney stone     Gastroesophageal reflux disease without esophagitis     Left wrist pain 09/29/2020    Hypocalcemia 09/12/2020    Acute pain of left wrist 09/10/2020    Chronic, continuous use of opioids 09/10/2020    Observed sleep apnea     Palliative care patient 07/31/2020    Abdominal wall abscess 07/31/2020    [facility] ) 06/23/2020    Abdominal pain 06/04/2020    Tobacco abuse 05/29/2020    Severe sepsis ([facility] ) 05/28/2020    Pneumonia 05/28/2020    Chest pain 05/28/2020    Liver mass 05/28/2020    Vasovagal syncope 04/03/2018    Emphysema of lung ([facility] )     Cellulitis 12/04/2017    Severe protein-calorie malnutrition ([facility] ) 07/01/2017    Colostomy in place [facility]) 06/29/2017    Diarrhea 06/29/2017    Hypomagnesemia 06/29/2017    Hypokalemia 06/28/2017    Essential hypertension     Emphysema/COPD ([facility] )     Crohn disease ([facility] )      He  has a past surgical history that includes Colostomy; Cholecystectomy; Colectomy; Colonoscopy (N/A, 6/30/2017); Lithotripsy; Finger surgery (Left); IR biopsy liver mass (6/8/2020); Cataract extraction (Bilateral); Liver lobectomy (N/A, 7/15/2020); and Finger amputation  His family history includes Heart disease in his sister; Hypertension in his father  He  reports that he quit smoking about a year ago  His smoking use included cigarettes  He smoked 1 00 pack per day  He has never used smokeless tobacco  He reports current alcohol use of about 1 0 standard drinks of alcohol per week  He reports that he does not use drugs    Current Outpatient Medications   Medication Sig Dispense Refill    ALPRAZolam (XANAX) 0 25 mg tablet Take 1 tablet (0 25 mg total) by mouth daily at bedtime as needed for anxiety 90 tablet 0    AMILoride 5 mg tablet TAKE 1 TABLET BY MOUTH EVERY DAY 90 tablet 0    amLODIPine (NORVASC) 5 mg tablet Take 1 tablet (5 mg total) by mouth daily 90 tablet 0    buPROPion (WELLBUTRIN XL) 150 mg 24 hr tablet Take 1 tablet (150 mg total) by mouth daily 30 tablet 2    calcium carbonate (TUMS) 500 mg chewable tablet Chew 1 tablet (500 mg total) daily  0    MAGNESIUM CHLORIDE PO Take 1,000 mg by mouth daily      mirtazapine (REMERON) 15 mg tablet TAKE 1 TABLET BY MOUTH DAILY AT BEDTIME 30 tablet 0    omeprazole (PriLOSEC) 20 mg delayed release capsule Take 1 capsule (20 mg total) by mouth daily 90 capsule 1    potassium chloride (MICRO-K) 10 MEQ CR capsule Take 20 mEq by mouth 2 (two) times a day      rosuvastatin (CRESTOR) 10 MG tablet Take 1 tablet (10 mg total) by mouth daily 90 tablet 0    umeclidinium-vilanterol (ANORO ELLIPTA) 62 5-25 MCG/INH inhaler Inhale 1 puff daily 60 each 3    VIT B12-METHIONINE-INOS-CHOL IM Inject into a muscle every 30 (thirty) days       Current Facility-Administered Medications   Medication Dose Route Frequency Provider Last Rate Last Admin    cyanocobalamin injection 1,000 mcg  1,000 mcg Intramuscular Q30 Days Torri Coleman MD   1,000 mcg at 05/19/21 6330     Current Outpatient Medications on File Prior to Visit   Medication Sig    ALPRAZolam (XANAX) 0 25 mg tablet Take 1 tablet (0 25 mg total) by mouth daily at bedtime as needed for anxiety    AMILoride 5 mg tablet TAKE 1 TABLET BY MOUTH EVERY DAY    amLODIPine (NORVASC) 5 mg tablet Take 1 tablet (5 mg total) by mouth daily    buPROPion (WELLBUTRIN XL) 150 mg 24 hr tablet Take 1 tablet (150 mg total) by mouth daily    calcium carbonate (TUMS) 500 mg chewable tablet Chew 1 tablet (500 mg total) daily    MAGNESIUM CHLORIDE PO Take 1,000 mg by mouth daily    mirtazapine (REMERON) 15 mg tablet TAKE 1 TABLET BY MOUTH DAILY AT BEDTIME    omeprazole (PriLOSEC) 20 mg delayed release capsule Take 1 capsule (20 mg total) by mouth daily    potassium chloride (MICRO-K) 10 MEQ CR capsule Take 20 mEq by mouth 2 (two) times a day    rosuvastatin (CRESTOR) 10 MG tablet Take 1 tablet (10 mg total) by mouth daily    umeclidinium-vilanterol (ANORO ELLIPTA) 62 5-25 MCG/INH inhaler Inhale 1 puff daily    VIT B12-METHIONINE-INOS-CHOL IM Inject into a muscle every 30 (thirty) days     Current Facility-Administered Medications on File Prior to Visit   Medication    cyanocobalamin injection 1,000 mcg     He has No Known Allergies       Review of Systems   Constitutional: Negative for chills and fever  HENT: Negative for congestion, ear pain and sore throat  Eyes: Negative for pain  Respiratory: Negative for cough and shortness of breath  Cardiovascular: Negative for chest pain and leg swelling  Gastrointestinal: Negative for abdominal pain, nausea and vomiting  Endocrine: Negative for polyuria  Genitourinary: Negative for difficulty urinating, frequency and urgency  Musculoskeletal: Negative for arthralgias and back pain  Skin: Negative for rash  Neurological: Negative for weakness and headaches  Psychiatric/Behavioral: Negative for sleep disturbance  The patient is not nervous/anxious  Objective:      /76 (BP Location: Right arm, Patient Position: Sitting, Cuff Size: Standard)   Pulse 94   Temp 97 6 °F (36 4 °C) (Temporal)   Ht 5' 9" (1 753 m)   Wt 64 4 kg (142 lb)   SpO2 97%   BMI 20 97 kg/m²     Recent Results (from the past 1344 hour(s))   BUN    Collection Time: 05/19/21  9:42 AM   Result Value Ref Range    BUN 7 7 - 25 mg/dL   Creatinine, serum    Collection Time: 05/19/21  9:42 AM   Result Value Ref Range    Creatinine 0 73 0 70 - 1 25 mg/dL    eGFR Non  97 > OR = 60 mL/min/1 73m2    eGFR  113 > OR = 60 mL/min/1 73m2   AFP tumor marker    Collection Time: 05/19/21  9:42 AM   Result Value Ref Range    AFP-Tumor Marker 6 9 (H) <6 1 ng/mL        Physical Exam  Constitutional:       Appearance: Normal appearance  HENT:      Head: Normocephalic  Right Ear: Tympanic membrane, ear canal and external ear normal       Left Ear: Tympanic membrane, ear canal and external ear normal       Nose: Nose normal  No congestion        Mouth/Throat: Mouth: Mucous membranes are moist       Pharynx: Oropharynx is clear  No oropharyngeal exudate or posterior oropharyngeal erythema  Eyes:      Extraocular Movements: Extraocular movements intact  Conjunctiva/sclera: Conjunctivae normal       Pupils: Pupils are equal, round, and reactive to light  Cardiovascular:      Rate and Rhythm: Normal rate and regular rhythm  Heart sounds: Normal heart sounds  No murmur heard  Pulmonary:      Effort: Pulmonary effort is normal       Breath sounds: Normal breath sounds  No wheezing or rales  Abdominal:      General: Bowel sounds are normal  There is no distension  Palpations: Abdomen is soft  Tenderness: There is no abdominal tenderness  Musculoskeletal:         General: Normal range of motion  Cervical back: Normal range of motion and neck supple  Right lower leg: No edema  Left lower leg: No edema  Lymphadenopathy:      Cervical: No cervical adenopathy  Skin:     General: Skin is warm  Neurological:      General: No focal deficit present  Mental Status: He is alert and oriented to person, place, and time

## 2021-07-01 ENCOUNTER — DOCUMENTATION (OUTPATIENT)
Dept: HEMATOLOGY ONCOLOGY | Facility: CLINIC | Age: 66
End: 2021-07-01

## 2021-07-01 NOTE — PROGRESS NOTES
RECTAL/GI MULTIDISCIPLINARY CASE REVIEW    DATE: 7/1/21      PRESENTING DOCTOR: Dr Zara Patrick      DIAGNOSIS: Possible Recurrent Hepatocellular Carcinoma      Elsa Martinez was presented at the Rectal/GI Multidisciplinary Conference today  PHYSICIAN RECOMMENDED PLAN:    -Follow up MRI and office visit with Dr Zara Patrick to evaluate liver lesions  -If there is increase in size can consider percutaneous ablation by Interventional Radiology    -MRI scheduled 9/15/21  -Office visit with Dr Zara Patrick scheduled 10/5/21    Team agreed to plan  The final treatment plan will be left to the discretion of the patient and the treating physician  DISCLAIMERS:  TO THE TREATING PHYSICIAN:  This conference is a meeting of clinicians from various specialty areas who evaluate and discuss patients for whom a multidisciplinary treatment approach is being considered  Please note that the above opinion was a consensus of the conference attendees and is intended only to assist in quality care of the discussed patient  The responsibility for follow up on the input given during the conference, along with any final decisions regarding plan of care, is that of the patient and the patient's provider  Accordingly, appointments have only been recommended based on this information and have NOT been scheduled unless otherwise noted  TO THE PATIENT:  This summary is a brief record of major aspects of your cancer treatment  You may choose to share a copy with any of your doctors or nurses  However, this is not a detailed or comprehensive record of your care        NCCN guidelines were readily available for review at this discussion

## 2021-07-02 DIAGNOSIS — K21.9 GASTROESOPHAGEAL REFLUX DISEASE WITHOUT ESOPHAGITIS: ICD-10-CM

## 2021-07-02 RX ORDER — OMEPRAZOLE 20 MG/1
CAPSULE, DELAYED RELEASE ORAL
Qty: 90 CAPSULE | Refills: 1 | Status: SHIPPED | OUTPATIENT
Start: 2021-07-02 | End: 2022-01-01

## 2021-07-04 DIAGNOSIS — I10 ESSENTIAL HYPERTENSION: ICD-10-CM

## 2021-07-07 RX ORDER — AMLODIPINE BESYLATE 5 MG/1
TABLET ORAL
Qty: 90 TABLET | Refills: 0 | Status: SHIPPED | OUTPATIENT
Start: 2021-07-07 | End: 2021-09-27

## 2021-07-08 DIAGNOSIS — F34.1 DYSTHYMIC DISORDER: ICD-10-CM

## 2021-07-08 RX ORDER — ALPRAZOLAM 0.25 MG/1
0.25 TABLET ORAL
Qty: 90 TABLET | Refills: 0 | Status: SHIPPED | OUTPATIENT
Start: 2021-07-08 | End: 2021-09-29 | Stop reason: SDUPTHER

## 2021-07-13 ENCOUNTER — OFFICE VISIT (OUTPATIENT)
Dept: PULMONOLOGY | Facility: CLINIC | Age: 66
End: 2021-07-13
Payer: MEDICARE

## 2021-07-13 VITALS
HEIGHT: 69 IN | HEART RATE: 83 BPM | DIASTOLIC BLOOD PRESSURE: 76 MMHG | WEIGHT: 135 LBS | SYSTOLIC BLOOD PRESSURE: 118 MMHG | BODY MASS INDEX: 19.99 KG/M2 | TEMPERATURE: 97.7 F | OXYGEN SATURATION: 96 %

## 2021-07-13 DIAGNOSIS — R06.00 DOE (DYSPNEA ON EXERTION): ICD-10-CM

## 2021-07-13 DIAGNOSIS — C22.0 HEPATOCELLULAR CARCINOMA (HCC): ICD-10-CM

## 2021-07-13 DIAGNOSIS — J43.2 CENTRILOBULAR EMPHYSEMA (HCC): Primary | ICD-10-CM

## 2021-07-13 PROCEDURE — 99214 OFFICE O/P EST MOD 30 MIN: CPT | Performed by: INTERNAL MEDICINE

## 2021-07-13 NOTE — PROGRESS NOTES
Office Progress Note - Pulmonary    Elodie Sicard III 72 y o  male MRN: [id number]    Encounter: [id number]      Assessment:   Centrilobular emphysema   Dyspnea on exertion   Hepatocellular carcinoma status post resection  Plan:   Cain Jose Ellipta once a day   Albuterol rescue inhaler 2 inhalations 4 times a day as needed   Follow-up in 6 months    Discussion:   The patient's COPD is in remission  I have maintained him on the Anoro Ellipta 1 inhalation once a day  He will use the albuterol rescue inhaler 2 inhalations 4 times a day as needed  There is no evidence of recurrence of the hepatocellular carcinoma on the follow-up imaging studies and tumor marker levels  I will see him in 6 months in a follow-up visit  Subjective: The patient is here for follow-up visit  The last time he was seen a year ago  He had hepatocellular carcinoma resected  He was having followups every 3 months with MRI of the abdomen and on for to protein level  So far no evidence of recurrence  He is on Anoro Ellipta 1 inhalation once a day  He has dyspnea on exertion  It is worse the allergy season  He has no nocturnal symptoms  Review of systems:  A 12 point system review is done and aside from what is stated above the rest of the review of systems is negative  Family history and social history are reviewed  Medications list is reviewed  Vitals: Blood pressure 118/76, pulse 83, temperature 97 7 °F (36 5 °C), height 5' 9" (1 753 m), weight 61 2 kg (135 lb), SpO2 96 %  ,     Physical Exam  Gen: Awake, alert, oriented x 3, no acute distress  HEENT: Mucous membranes moist, no oral lesions, no thrush  NECK: No accessory muscle use, JVP not elevated  Cardiac: Regular, single S1, single S2, no murmurs, no rubs, no gallops  Lungs:  Diminished breath sounds  No wheezing rhonchi    Abdomen: normoactive bowel sounds, soft nontender, nondistended, no rebound or rigidity, no guarding  Extremities: no cyanosis, no clubbing, no edema  Neuro:  Grossly nonfocal   Skin:  No rash      Lab Results   Component Value Date    WBC 8 4 02/19/2021    HGB 14 2 02/19/2021    HCT 42 0 02/19/2021     9 (H) 02/19/2021     02/19/2021     Lab Results   Component Value Date    SODIUM 142 02/19/2021    K 3 9 02/19/2021     02/19/2021    CO2 25 02/19/2021    BUN 7 05/19/2021    CREATININE 0 73 05/19/2021    GLUC 75 02/19/2021    CALCIUM 8 1 (L) 02/19/2021

## 2021-07-28 ENCOUNTER — CLINICAL SUPPORT (OUTPATIENT)
Dept: INTERNAL MEDICINE CLINIC | Facility: CLINIC | Age: 66
End: 2021-07-28
Payer: MEDICARE

## 2021-07-28 DIAGNOSIS — E53.8 VITAMIN B12 DEFICIENCY: ICD-10-CM

## 2021-07-28 PROCEDURE — 96372 THER/PROPH/DIAG INJ SC/IM: CPT

## 2021-07-28 RX ADMIN — CYANOCOBALAMIN 1000 MCG: 1000 INJECTION INTRAMUSCULAR; SUBCUTANEOUS at 15:26

## 2021-08-04 DIAGNOSIS — F34.1 DYSTHYMIC DISORDER: ICD-10-CM

## 2021-08-04 RX ORDER — MIRTAZAPINE 15 MG/1
TABLET, FILM COATED ORAL
Qty: 30 TABLET | Refills: 2 | Status: SHIPPED | OUTPATIENT
Start: 2021-08-04 | End: 2021-11-01

## 2021-08-17 DIAGNOSIS — I10 ESSENTIAL HYPERTENSION, BENIGN: ICD-10-CM

## 2021-08-18 RX ORDER — AMILORIDE HYDROCHLORIDE 5 MG/1
5 TABLET ORAL DAILY
Qty: 90 TABLET | Refills: 1 | Status: SHIPPED | OUTPATIENT
Start: 2021-08-18 | End: 2022-02-14

## 2021-08-30 ENCOUNTER — CLINICAL SUPPORT (OUTPATIENT)
Dept: INTERNAL MEDICINE CLINIC | Facility: CLINIC | Age: 66
End: 2021-08-30
Payer: MEDICARE

## 2021-08-30 DIAGNOSIS — E53.8 VITAMIN B12 DEFICIENCY: Primary | ICD-10-CM

## 2021-08-30 RX ADMIN — CYANOCOBALAMIN 1000 MCG: 1000 INJECTION INTRAMUSCULAR; SUBCUTANEOUS at 09:42

## 2021-09-10 DIAGNOSIS — J43.2 CENTRILOBULAR EMPHYSEMA (HCC): ICD-10-CM

## 2021-09-10 RX ORDER — UMECLIDINIUM BROMIDE AND VILANTEROL TRIFENATATE 62.5; 25 UG/1; UG/1
POWDER RESPIRATORY (INHALATION)
Qty: 60 BLISTER | Refills: 3 | Status: SHIPPED | OUTPATIENT
Start: 2021-09-10 | End: 2021-12-10

## 2021-09-20 ENCOUNTER — TELEPHONE (OUTPATIENT)
Dept: HEMATOLOGY ONCOLOGY | Facility: CLINIC | Age: 66
End: 2021-09-20

## 2021-09-20 NOTE — TELEPHONE ENCOUNTER
Appointment Confirmation     Appointment with  Dr Keron Cuellar   Appointment date & time  10/14 at 10:30 am    Location [place]   Patient verbilized Understanding  PATIENT WILL BE GOING TO THE OFFICE THIS MORNING TO  A COPY OF HIS LAB ORDERS

## 2021-09-22 ENCOUNTER — CLINICAL SUPPORT (OUTPATIENT)
Dept: INTERNAL MEDICINE CLINIC | Facility: CLINIC | Age: 66
End: 2021-09-22
Payer: MEDICARE

## 2021-09-22 DIAGNOSIS — E53.8 VITAMIN B12 DEFICIENCY: ICD-10-CM

## 2021-09-22 DIAGNOSIS — Z23 FLU VACCINE NEED: Primary | ICD-10-CM

## 2021-09-22 PROCEDURE — 96372 THER/PROPH/DIAG INJ SC/IM: CPT

## 2021-09-22 PROCEDURE — 90662 IIV NO PRSV INCREASED AG IM: CPT

## 2021-09-22 PROCEDURE — G0008 ADMIN INFLUENZA VIRUS VAC: HCPCS

## 2021-09-22 RX ADMIN — CYANOCOBALAMIN 1000 MCG: 1000 INJECTION INTRAMUSCULAR; SUBCUTANEOUS at 09:41

## 2021-09-27 DIAGNOSIS — I10 ESSENTIAL HYPERTENSION: ICD-10-CM

## 2021-09-27 RX ORDER — AMLODIPINE BESYLATE 5 MG/1
TABLET ORAL
Qty: 90 TABLET | Refills: 0 | Status: SHIPPED | OUTPATIENT
Start: 2021-09-27 | End: 2021-12-22

## 2021-09-29 DIAGNOSIS — F34.1 DYSTHYMIC DISORDER: ICD-10-CM

## 2021-09-30 RX ORDER — ALPRAZOLAM 0.25 MG/1
0.25 TABLET ORAL
Qty: 90 TABLET | Refills: 0 | Status: SHIPPED | OUTPATIENT
Start: 2021-09-30 | End: 2022-01-18 | Stop reason: SDUPTHER

## 2021-10-01 LAB
AFP-TM SERPL-MCNC: 5.3 NG/ML
BUN SERPL-MCNC: 5 MG/DL (ref 7–25)
CREAT SERPL-MCNC: 0.75 MG/DL (ref 0.7–1.25)
SL AMB EGFR AFRICAN AMERICAN: 112 ML/MIN/1.73M2
SL AMB EGFR NON AFRICAN AMERICAN: 96 ML/MIN/1.73M2

## 2021-10-07 ENCOUNTER — HOSPITAL ENCOUNTER (OUTPATIENT)
Dept: MRI IMAGING | Facility: HOSPITAL | Age: 66
Discharge: HOME | End: 2021-10-07
Attending: SURGERY
Payer: MEDICARE

## 2021-10-07 DIAGNOSIS — C22.0 HEPATOCELLULAR CARCINOMA (HCC): ICD-10-CM

## 2021-10-07 PROCEDURE — 74183 MRI ABD W/O CNTR FLWD CNTR: CPT

## 2021-10-07 PROCEDURE — A9585 GADOBUTROL INJECTION: HCPCS | Performed by: SURGERY

## 2021-10-07 PROCEDURE — G1004 CDSM NDSC: HCPCS

## 2021-10-07 RX ADMIN — GADOBUTROL 6 ML: 604.72 INJECTION INTRAVENOUS at 14:49

## 2021-10-09 LAB
ALBUMIN SERPL-MCNC: 4 G/DL (ref 3.6–5.1)
ALBUMIN/GLOB SERPL: 1.4 (CALC) (ref 1–2.5)
ALP SERPL-CCNC: 109 U/L (ref 35–144)
ALT SERPL-CCNC: 29 U/L (ref 9–46)
AST SERPL-CCNC: 48 U/L (ref 10–35)
BASOPHILS # BLD AUTO: 64 CELLS/UL (ref 0–200)
BASOPHILS NFR BLD AUTO: 0.8 %
BILIRUB SERPL-MCNC: 0.7 MG/DL (ref 0.2–1.2)
BUN SERPL-MCNC: 7 MG/DL (ref 7–25)
BUN/CREAT SERPL: 11 (CALC) (ref 6–22)
CALCIUM SERPL-MCNC: 8.6 MG/DL (ref 8.6–10.3)
CHLORIDE SERPL-SCNC: 104 MMOL/L (ref 98–110)
CHOLEST SERPL-MCNC: 173 MG/DL
CHOLEST/HDLC SERPL: 1.5 (CALC)
CO2 SERPL-SCNC: 28 MMOL/L (ref 20–32)
CREAT SERPL-MCNC: 0.65 MG/DL (ref 0.7–1.25)
EOSINOPHIL # BLD AUTO: 48 CELLS/UL (ref 15–500)
EOSINOPHIL NFR BLD AUTO: 0.6 %
ERYTHROCYTE [DISTWIDTH] IN BLOOD BY AUTOMATED COUNT: 12 % (ref 11–15)
GLOBULIN SER CALC-MCNC: 2.8 G/DL (CALC) (ref 1.9–3.7)
GLUCOSE SERPL-MCNC: 84 MG/DL (ref 65–99)
HCT VFR BLD AUTO: 43.8 % (ref 38.5–50)
HCV AB S/CO SERPL IA: 0.03
HCV AB SERPL QL IA: NORMAL
HDLC SERPL-MCNC: 114 MG/DL
HGB BLD-MCNC: 15 G/DL (ref 13.2–17.1)
LDLC SERPL CALC-MCNC: 45 MG/DL (CALC)
LYMPHOCYTES # BLD AUTO: 1720 CELLS/UL (ref 850–3900)
LYMPHOCYTES NFR BLD AUTO: 21.5 %
MCH RBC QN AUTO: 35.8 PG (ref 27–33)
MCHC RBC AUTO-ENTMCNC: 34.2 G/DL (ref 32–36)
MCV RBC AUTO: 104.5 FL (ref 80–100)
MONOCYTES # BLD AUTO: 584 CELLS/UL (ref 200–950)
MONOCYTES NFR BLD AUTO: 7.3 %
NEUTROPHILS # BLD AUTO: 5584 CELLS/UL (ref 1500–7800)
NEUTROPHILS NFR BLD AUTO: 69.8 %
NONHDLC SERPL-MCNC: 59 MG/DL (CALC)
PLATELET # BLD AUTO: 166 THOUSAND/UL (ref 140–400)
PMV BLD REES-ECKER: 9.9 FL (ref 7.5–12.5)
POTASSIUM SERPL-SCNC: 4 MMOL/L (ref 3.5–5.3)
PROT SERPL-MCNC: 6.8 G/DL (ref 6.1–8.1)
RBC # BLD AUTO: 4.19 MILLION/UL (ref 4.2–5.8)
SL AMB EGFR AFRICAN AMERICAN: 118 ML/MIN/1.73M2
SL AMB EGFR NON AFRICAN AMERICAN: 101 ML/MIN/1.73M2
SODIUM SERPL-SCNC: 144 MMOL/L (ref 135–146)
TRIGL SERPL-MCNC: 68 MG/DL
TSH SERPL-ACNC: 2.64 MIU/L (ref 0.4–4.5)
WBC # BLD AUTO: 8 THOUSAND/UL (ref 3.8–10.8)

## 2021-10-12 ENCOUNTER — OFFICE VISIT (OUTPATIENT)
Dept: INTERNAL MEDICINE CLINIC | Facility: CLINIC | Age: 66
End: 2021-10-12
Payer: MEDICARE

## 2021-10-12 VITALS
BODY MASS INDEX: 20.44 KG/M2 | DIASTOLIC BLOOD PRESSURE: 78 MMHG | HEIGHT: 69 IN | SYSTOLIC BLOOD PRESSURE: 130 MMHG | TEMPERATURE: 99.6 F | HEART RATE: 89 BPM | OXYGEN SATURATION: 96 % | WEIGHT: 138 LBS

## 2021-10-12 DIAGNOSIS — F34.1 DYSTHYMIC DISORDER: ICD-10-CM

## 2021-10-12 DIAGNOSIS — I10 ESSENTIAL HYPERTENSION: Primary | ICD-10-CM

## 2021-10-12 DIAGNOSIS — J44.9 CHRONIC OBSTRUCTIVE PULMONARY DISEASE, UNSPECIFIED COPD TYPE (HCC): ICD-10-CM

## 2021-10-12 DIAGNOSIS — E87.6 HYPOKALEMIA: ICD-10-CM

## 2021-10-12 DIAGNOSIS — K50.018 CROHN'S DISEASE OF SMALL INTESTINE WITH OTHER COMPLICATION (HCC): ICD-10-CM

## 2021-10-12 DIAGNOSIS — Z93.3 COLOSTOMY IN PLACE (HCC): ICD-10-CM

## 2021-10-12 DIAGNOSIS — K21.9 GASTROESOPHAGEAL REFLUX DISEASE WITHOUT ESOPHAGITIS: ICD-10-CM

## 2021-10-12 DIAGNOSIS — E43 SEVERE PROTEIN-CALORIE MALNUTRITION (HCC): ICD-10-CM

## 2021-10-12 DIAGNOSIS — Z01.810 PREOP CARDIOVASCULAR EXAM: ICD-10-CM

## 2021-10-12 DIAGNOSIS — C22.0 HEPATOCELLULAR CARCINOMA (HCC): ICD-10-CM

## 2021-10-12 DIAGNOSIS — E78.2 MIXED HYPERLIPIDEMIA: ICD-10-CM

## 2021-10-12 DIAGNOSIS — E53.8 VITAMIN B12 DEFICIENCY: ICD-10-CM

## 2021-10-12 PROCEDURE — 96372 THER/PROPH/DIAG INJ SC/IM: CPT | Performed by: INTERNAL MEDICINE

## 2021-10-12 PROCEDURE — 99214 OFFICE O/P EST MOD 30 MIN: CPT | Performed by: INTERNAL MEDICINE

## 2021-10-12 RX ORDER — ROSUVASTATIN CALCIUM 10 MG/1
TABLET, COATED ORAL
Qty: 90 TABLET | Refills: 1 | Status: SHIPPED | OUTPATIENT
Start: 2021-10-12 | End: 2022-04-12

## 2021-10-12 RX ADMIN — CYANOCOBALAMIN 1000 MCG: 1000 INJECTION INTRAMUSCULAR; SUBCUTANEOUS at 09:54

## 2021-10-14 ENCOUNTER — OFFICE VISIT (OUTPATIENT)
Dept: SURGICAL ONCOLOGY | Facility: CLINIC | Age: 66
End: 2021-10-14
Payer: MEDICARE

## 2021-10-14 VITALS
SYSTOLIC BLOOD PRESSURE: 128 MMHG | WEIGHT: 139 LBS | BODY MASS INDEX: 20.59 KG/M2 | TEMPERATURE: 98.5 F | HEART RATE: 80 BPM | OXYGEN SATURATION: 93 % | HEIGHT: 69 IN | RESPIRATION RATE: 18 BRPM | DIASTOLIC BLOOD PRESSURE: 80 MMHG

## 2021-10-14 DIAGNOSIS — C22.0 HEPATOCELLULAR CARCINOMA (HCC): Primary | ICD-10-CM

## 2021-10-14 PROCEDURE — 99214 OFFICE O/P EST MOD 30 MIN: CPT | Performed by: SURGERY

## 2021-10-18 DIAGNOSIS — F34.1 DYSTHYMIC DISORDER: ICD-10-CM

## 2021-10-18 RX ORDER — BUPROPION HYDROCHLORIDE 150 MG/1
TABLET ORAL
Qty: 90 TABLET | Refills: 1 | Status: SHIPPED | OUTPATIENT
Start: 2021-10-18 | End: 2022-04-12

## 2021-10-31 DIAGNOSIS — F34.1 DYSTHYMIC DISORDER: ICD-10-CM

## 2021-11-01 RX ORDER — MIRTAZAPINE 15 MG/1
TABLET, FILM COATED ORAL
Qty: 90 TABLET | Refills: 1 | Status: SHIPPED | OUTPATIENT
Start: 2021-11-01

## 2021-11-09 ENCOUNTER — CLINICAL SUPPORT (OUTPATIENT)
Dept: INTERNAL MEDICINE CLINIC | Facility: CLINIC | Age: 66
End: 2021-11-09
Payer: MEDICARE

## 2021-11-09 DIAGNOSIS — E53.8 VITAMIN B12 DEFICIENCY: Primary | ICD-10-CM

## 2021-11-09 PROCEDURE — 96372 THER/PROPH/DIAG INJ SC/IM: CPT | Performed by: INTERNAL MEDICINE

## 2021-11-09 RX ADMIN — CYANOCOBALAMIN 1000 MCG: 1000 INJECTION INTRAMUSCULAR; SUBCUTANEOUS at 14:28

## 2021-12-08 LAB
ALBUMIN SERPL-MCNC: 4.2 G/DL (ref 3.6–5.1)
ALBUMIN/GLOB SERPL: 1.4 (CALC) (ref 1–2.5)
ALP SERPL-CCNC: 60 U/L (ref 35–144)
ALT SERPL-CCNC: 32 U/L (ref 9–46)
AST SERPL-CCNC: 56 U/L (ref 10–35)
BILIRUB DIRECT SERPL-MCNC: 0.3 MG/DL
BILIRUB INDIRECT SERPL-MCNC: 0.8 MG/DL (CALC) (ref 0.2–1.2)
BILIRUB SERPL-MCNC: 1.1 MG/DL (ref 0.2–1.2)
GLOBULIN SER CALC-MCNC: 3 G/DL (CALC) (ref 1.9–3.7)
PROT SERPL-MCNC: 7.2 G/DL (ref 6.1–8.1)

## 2021-12-10 ENCOUNTER — OFFICE VISIT (OUTPATIENT)
Dept: INTERNAL MEDICINE CLINIC | Facility: CLINIC | Age: 66
End: 2021-12-10
Payer: MEDICARE

## 2021-12-10 VITALS
SYSTOLIC BLOOD PRESSURE: 136 MMHG | HEIGHT: 69 IN | OXYGEN SATURATION: 96 % | BODY MASS INDEX: 21.03 KG/M2 | HEART RATE: 96 BPM | WEIGHT: 142 LBS | DIASTOLIC BLOOD PRESSURE: 84 MMHG | TEMPERATURE: 99.8 F

## 2021-12-10 DIAGNOSIS — E53.8 VITAMIN B12 DEFICIENCY: ICD-10-CM

## 2021-12-10 DIAGNOSIS — K21.9 GASTROESOPHAGEAL REFLUX DISEASE WITHOUT ESOPHAGITIS: ICD-10-CM

## 2021-12-10 DIAGNOSIS — I10 ESSENTIAL HYPERTENSION: Primary | ICD-10-CM

## 2021-12-10 DIAGNOSIS — Z00.00 MEDICARE ANNUAL WELLNESS VISIT, SUBSEQUENT: ICD-10-CM

## 2021-12-10 DIAGNOSIS — F34.1 DYSTHYMIC DISORDER: ICD-10-CM

## 2021-12-10 DIAGNOSIS — C22.0 HEPATOCELLULAR CARCINOMA (HCC): ICD-10-CM

## 2021-12-10 DIAGNOSIS — E78.2 MIXED HYPERLIPIDEMIA: ICD-10-CM

## 2021-12-10 DIAGNOSIS — J44.9 CHRONIC OBSTRUCTIVE PULMONARY DISEASE, UNSPECIFIED COPD TYPE (HCC): ICD-10-CM

## 2021-12-10 DIAGNOSIS — K50.018 CROHN'S DISEASE OF SMALL INTESTINE WITH OTHER COMPLICATION (HCC): ICD-10-CM

## 2021-12-10 DIAGNOSIS — Z93.3 COLOSTOMY IN PLACE (HCC): ICD-10-CM

## 2021-12-10 PROCEDURE — 99214 OFFICE O/P EST MOD 30 MIN: CPT | Performed by: INTERNAL MEDICINE

## 2021-12-10 PROCEDURE — G0438 PPPS, INITIAL VISIT: HCPCS | Performed by: INTERNAL MEDICINE

## 2021-12-10 PROCEDURE — 1123F ACP DISCUSS/DSCN MKR DOCD: CPT | Performed by: INTERNAL MEDICINE

## 2021-12-10 RX ORDER — FLUTICASONE FUROATE AND VILANTEROL TRIFENATATE 100; 25 UG/1; UG/1
1 POWDER RESPIRATORY (INHALATION) DAILY
Qty: 60 BLISTER | Refills: 2 | Status: SHIPPED | OUTPATIENT
Start: 2021-12-10 | End: 2022-01-11

## 2021-12-22 DIAGNOSIS — I10 ESSENTIAL HYPERTENSION: ICD-10-CM

## 2021-12-22 RX ORDER — AMLODIPINE BESYLATE 5 MG/1
TABLET ORAL
Qty: 90 TABLET | Refills: 0 | Status: SHIPPED | OUTPATIENT
Start: 2021-12-22 | End: 2022-02-04 | Stop reason: SDUPTHER

## 2022-01-01 DIAGNOSIS — K21.9 GASTROESOPHAGEAL REFLUX DISEASE WITHOUT ESOPHAGITIS: ICD-10-CM

## 2022-01-01 RX ORDER — OMEPRAZOLE 20 MG/1
CAPSULE, DELAYED RELEASE ORAL
Qty: 90 CAPSULE | Refills: 1 | Status: SHIPPED | OUTPATIENT
Start: 2022-01-01 | End: 2022-08-02 | Stop reason: SDUPTHER

## 2022-01-06 LAB
AFP-TM SERPL-MCNC: 5.6 NG/ML
BUN SERPL-MCNC: 7 MG/DL (ref 7–25)
CREAT SERPL-MCNC: 0.83 MG/DL (ref 0.7–1.25)
SL AMB EGFR AFRICAN AMERICAN: 106 ML/MIN/1.73M2
SL AMB EGFR NON AFRICAN AMERICAN: 92 ML/MIN/1.73M2

## 2022-01-10 ENCOUNTER — CLINICAL SUPPORT (OUTPATIENT)
Dept: INTERNAL MEDICINE CLINIC | Facility: CLINIC | Age: 67
End: 2022-01-10
Payer: MEDICARE

## 2022-01-10 DIAGNOSIS — E53.8 VITAMIN B12 DEFICIENCY: ICD-10-CM

## 2022-01-10 PROCEDURE — 96372 THER/PROPH/DIAG INJ SC/IM: CPT

## 2022-01-10 RX ADMIN — CYANOCOBALAMIN 1000 MCG: 1000 INJECTION INTRAMUSCULAR; SUBCUTANEOUS at 10:11

## 2022-01-11 ENCOUNTER — OFFICE VISIT (OUTPATIENT)
Dept: PULMONOLOGY | Facility: CLINIC | Age: 67
End: 2022-01-11
Payer: MEDICARE

## 2022-01-11 VITALS
SYSTOLIC BLOOD PRESSURE: 122 MMHG | HEART RATE: 97 BPM | BODY MASS INDEX: 20.14 KG/M2 | OXYGEN SATURATION: 95 % | DIASTOLIC BLOOD PRESSURE: 82 MMHG | TEMPERATURE: 97.5 F | HEIGHT: 69 IN | WEIGHT: 136 LBS

## 2022-01-11 DIAGNOSIS — F17.211 NICOTINE DEPENDENCE, CIGARETTES, IN REMISSION: ICD-10-CM

## 2022-01-11 DIAGNOSIS — J43.2 CENTRILOBULAR EMPHYSEMA (HCC): Primary | ICD-10-CM

## 2022-01-11 DIAGNOSIS — R06.00 DOE (DYSPNEA ON EXERTION): ICD-10-CM

## 2022-01-11 PROCEDURE — 99214 OFFICE O/P EST MOD 30 MIN: CPT | Performed by: INTERNAL MEDICINE

## 2022-01-11 RX ORDER — TIOTROPIUM BROMIDE AND OLODATEROL 3.124; 2.736 UG/1; UG/1
2 SPRAY, METERED RESPIRATORY (INHALATION) DAILY
Qty: 4 G | Refills: 5 | Status: SHIPPED | OUTPATIENT
Start: 2022-01-11

## 2022-01-11 RX ORDER — IPRATROPIUM/ALBUTEROL SULFATE 20-100 MCG
1 MIST INHALER (GRAM) INHALATION 4 TIMES DAILY
Qty: 4 G | Refills: 5 | Status: SHIPPED | OUTPATIENT
Start: 2022-01-11

## 2022-01-11 NOTE — PROGRESS NOTES
Office Progress Note - Pulmonary    Rah Kent III 77 y o  male MRN: [id number]    Encounter: [id number]      Assessment:   Centrilobular emphysema   Dyspnea on exertion   History of extensive tobacco use   Hepatocellular carcinoma status post resection  Plan:     Stilto Respimat 2 inhalations once a day   Combivent 2 inhalations 4 times a day as needed   Follow-up in 6 months  Discussion:   The patient has moderate COPD  Currently he is not on any inhalers because the insurance is not covering his Anoro or Breo  I have started him on Stilto Respimat hopefully he will have less co-payment and be able to afford it  Also started him on Combivent 2 inhalations 4 times a day as needed  The patient will call my office if these 2 medications also have high copayments  In that case will switch him to albuterol inhaler and ipratropium inhaler separate  He has received the influenza vaccine for this season  He has received the 1st 2 doses of the COVID vaccine  He is planning on scheduling the booster dose for the COVID vaccine  I will see him in 6 months in a follow-up visit  Subjective: The patient is here for a follow-up visit  He has dyspnea on exertion which is chronic and has not changed  He has occasional cough  No significant sputum production  He was unable to afford the Anoro as the coverage has changed  He was prescribed a Breo and the co-payment was high  For this reason he has been using the albuterol of his daughter on as needed basis  Review of systems:  A 12 point system review is done and aside from what is stated above the rest of the review of systems is negative  Family history and social history are reviewed  Medications list is reviewed  Vitals: Blood pressure 122/82, pulse 97, temperature 97 5 °F (36 4 °C), height 5' 9" (1 753 m), weight 61 7 kg (136 lb), SpO2 95 %  ,     Physical Exam  Gen: Awake, alert, oriented x 3, no acute distress  HEENT: Mucous membranes moist, no oral lesions, no thrush  NECK: No accessory muscle use, JVP not elevated  Cardiac: Regular, single S1, single S2, no murmurs, no rubs, no gallops  Lungs:  Coarse breath sounds  No wheezing or rhonchi  Abdomen: normoactive bowel sounds, soft nontender, nondistended, no rebound or rigidity, no guarding  Extremities: no cyanosis, no clubbing, no edema  Neuro:  Grossly nonfocal   Skin:  No rash      Lab Results   Component Value Date    WBC 8 0 10/08/2021    HGB 15 0 10/08/2021    HCT 43 8 10/08/2021     5 (H) 10/08/2021     10/08/2021     Lab Results   Component Value Date    SODIUM 144 10/08/2021    K 4 0 10/08/2021     10/08/2021    CO2 28 10/08/2021    BUN 7 01/05/2022    CREATININE 0 83 01/05/2022    GLUC 84 10/08/2021    CALCIUM 8 6 10/08/2021

## 2022-01-14 ENCOUNTER — HOSPITAL ENCOUNTER (OUTPATIENT)
Dept: MRI IMAGING | Facility: HOSPITAL | Age: 67
Discharge: HOME | End: 2022-01-14
Attending: SURGERY
Payer: MEDICARE

## 2022-01-14 DIAGNOSIS — C22.0 HEPATOCELLULAR CARCINOMA (HCC): ICD-10-CM

## 2022-01-14 PROCEDURE — A9585 GADOBUTROL INJECTION: HCPCS | Performed by: SURGERY

## 2022-01-14 PROCEDURE — 74183 MRI ABD W/O CNTR FLWD CNTR: CPT

## 2022-01-14 PROCEDURE — G1004 CDSM NDSC: HCPCS

## 2022-01-14 RX ADMIN — GADOBUTROL 6 ML: 604.72 INJECTION INTRAVENOUS at 10:15

## 2022-01-18 DIAGNOSIS — F34.1 DYSTHYMIC DISORDER: ICD-10-CM

## 2022-01-19 RX ORDER — ALPRAZOLAM 0.25 MG/1
0.25 TABLET ORAL
Qty: 90 TABLET | Refills: 0 | Status: SHIPPED | OUTPATIENT
Start: 2022-01-19 | End: 2022-04-14 | Stop reason: SDUPTHER

## 2022-01-21 ENCOUNTER — OFFICE VISIT (OUTPATIENT)
Dept: SURGICAL ONCOLOGY | Facility: CLINIC | Age: 67
End: 2022-01-21
Payer: MEDICARE

## 2022-01-21 VITALS
RESPIRATION RATE: 17 BRPM | BODY MASS INDEX: 20.37 KG/M2 | WEIGHT: 137.5 LBS | SYSTOLIC BLOOD PRESSURE: 122 MMHG | DIASTOLIC BLOOD PRESSURE: 78 MMHG | OXYGEN SATURATION: 93 % | TEMPERATURE: 96.9 F | HEART RATE: 82 BPM | HEIGHT: 69 IN

## 2022-01-21 DIAGNOSIS — Z08 ENCOUNTER FOR FOLLOW-UP SURVEILLANCE OF LIVER CANCER: Primary | ICD-10-CM

## 2022-01-21 DIAGNOSIS — Z85.05 ENCOUNTER FOR FOLLOW-UP SURVEILLANCE OF LIVER CANCER: Primary | ICD-10-CM

## 2022-01-21 DIAGNOSIS — Z85.05 PERSONAL HISTORY OF LIVER CANCER: ICD-10-CM

## 2022-01-21 PROCEDURE — 99213 OFFICE O/P EST LOW 20 MIN: CPT

## 2022-01-21 NOTE — PROGRESS NOTES
Surgical Oncology Follow Up       [address]  [facility] [place]  [address] [facility]  [place] [address]    Santiago Elkins III  1955  [id number]  [address] [facility]  [facility] [place]  [address] [facility] [address]  [place] [address]    Diagnoses and all orders for this visit:    Encounter for follow-up surveillance of liver cancer    Personal history of liver cancer  -     MRI abdomen w wo contrast; Future  -     BUN; Future  -     Creatinine, serum; Future  -     AFP tumor marker; Future        Chief Complaint   Patient presents with    Follow-up       Return in about 6 months (around 7/21/2022) for Office Visit, Imaging - See orders, Labs - See Treatment Plan  Oncology History   Personal history of liver cancer   6/8/2020 Initial Diagnosis    Hepatocellular carcinoma, moderately differentiated     7/15/2020 Surgery    Microwave ablation of liver segment 5         Staging: [facility]   Treatment history:  Microwave ablation of segment 5 liver lesion, July 2020  Current treatment:  Observation  Disease status:  ARLYN    History of Present Illness:  Patient returns for surveillance of his [facility]  He is ARLYN at 1-1/2 years status post ablation, and has been maintaining follow-up every 3-4 months  He is doing well, however he does report decreased appetite and weight loss secondary to anxiety  Denies abdominal pain, nausea, or vomiting  MRI from January 14, 2022, shows no evidence of recurrence  Additional LR 3 liver lesion is stable  I have personally reviewed the imaging  Recent AFP level is normal at 5 6  Review of Systems   Complete ROS Surg Onc:   Constitutional: The patient denies new or recent history of general fatigue  Reports recent decrease in appetite and weight loss  Eyes: No complaints of visual problems, no scleral icterus     ENT: no complaints of ear pain, no hoarseness, no difficulty swallowing,  no tinnitus and no new masses in head, oral cavity, or neck  Cardiovascular: No complaints of chest pain, no palpitations, no ankle edema  Respiratory: No complaints of shortness of breath, no cough  Gastrointestinal: No complaints of jaundice, no bloody stools, no pale stools  Genitourinary: No complaints of dysuria, no hematuria, no nocturia, no frequent urination, no urethral discharge  Musculoskeletal: No complaints of weakness, paralysis, joint stiffness or arthralgias  Integumentary: No complaints of rash, no new lesions  Neurological: No complaints of convulsions, no seizures, no dizziness  Hematologic/Lymphatic: No complaints of easy bruising  Endocrine:  No hot or cold intolerance  No polydipsia, polyphagia, or polyuria  Allergy/immunology:  No environmental allergies  No food allergies  Not immunocompromised  Skin:  No pallor or rash  No wound          Patient Active Problem List   Diagnosis    Essential hypertension    Emphysema/COPD ([facility] )    Crohn disease ([facility] )    Colostomy in place ([facility] )    Diarrhea    Severe protein-calorie malnutrition ([facility] )    Cellulitis    Emphysema of lung ([facility] )    Vasovagal syncope    Severe sepsis (HCC)    Pneumonia    Chest pain    Liver mass    Tobacco abuse    Abdominal pain    Personal history of liver cancer    Palliative care patient    Abdominal wall abscess    Observed sleep apnea    Acute pain of left wrist    Chronic, continuous use of opioids    Hypocalcemia    Left wrist pain    Kidney stone    Hypomagnesemia    Hypokalemia    Gastroesophageal reflux disease without esophagitis    Chronic obstructive pulmonary disease (HCC)    Mixed hyperlipidemia    Need for hepatitis C screening test    Vitamin B12 deficiency    Cataract    Dysthymic disorder    Crohn's disease of small intestine with other complication ([facility] )    Encounter for follow-up surveillance of liver cancer     Past Medical History:   Diagnosis Date    LUCILLE (acute kidney injury) ([facility] ) 2017    Blindness of left eye     Since birth   [facility])     liver    Cataract     Colon polyp     Colostomy in place [facility]) 2017    COPD (chronic obstructive pulmonary disease) (HCC)     Crohn disease (HCC)     Emphysema of lung ([facility] )     Emphysema/COPD ([place] [address] )     Essential hypertension     GERD (gastroesophageal reflux disease)     Hypertension     Hypokalemia 2017    Hypomagnesemia 2017    Hyponatremia 2017    Kidney stone     Osteomyelitis ([place] [address] )     Pneumonia      Past Surgical History:   Procedure Laterality Date    CATARACT EXTRACTION Bilateral     CHOLECYSTECTOMY      COLECTOMY      10 inchs of ileum    COLONOSCOPY N/A 2017    Procedure: COLONOSCOPY;  Surgeon: Nathen Hope MD;  [facility]: [facility]; Service: Gastroenterology    COLOSTOMY      FINGER AMPUTATION      FINGER SURGERY Left     IR BIOPSY LIVER MASS  2020    LITHOTRIPSY      LIVER LOBECTOMY N/A 7/15/2020    Procedure: LIVER ABLATION, INTRAOPERATIVE U/S LIVER;  Surgeon: Sriram Steinberg MD;  [facility];  Service: Surgical Oncology     Family History   Problem Relation Age of Onset    Hypertension Father     Heart disease Sister      Social History     Socioeconomic History    Marital status:      Spouse name: Not on file    Number of children: Not on file    Years of education: Not on file    Highest education level: Not on file   Occupational History    Not on file   Tobacco Use    Smoking status: Former Smoker     Packs/day: 1 00     Years: 50 00     Pack years: 50 00     Types: Cigarettes     Quit date: 7/15/2020     Years since quittin 5    Smokeless tobacco: Never Used   Vaping Use    Vaping Use: Never used   Substance and Sexual Activity    Alcohol use:  Yes     Alcohol/week: 1 0 standard drink     Types: 1 Cans of beer per week    Drug use: No    Sexual activity: Not Currently   Other Topics Concern    Not on file Social History Narrative    Not on file     Social Determinants of Health     Financial Resource Strain: Not on file   Food Insecurity: Not on file   Transportation Needs: Not on file   Physical Activity: Not on file   Stress: Not on file   Social Connections: Not on file   Intimate Partner Violence: Not on file   Housing Stability: Not on file       Current Outpatient Medications:     ALPRAZolam (XANAX) 0 25 mg tablet, Take 1 tablet (0 25 mg total) by mouth daily at bedtime as needed for anxiety, Disp: 90 tablet, Rfl: 0    AMILoride 5 mg tablet, Take 1 tablet (5 mg total) by mouth daily, Disp: 90 tablet, Rfl: 1    amLODIPine (NORVASC) 5 mg tablet, TAKE 1 TABLET BY MOUTH EVERY DAY, Disp: 90 tablet, Rfl: 0    buPROPion (WELLBUTRIN XL) 150 mg 24 hr tablet, TAKE 1 TABLET BY MOUTH EVERY DAY, Disp: 90 tablet, Rfl: 1    calcium carbonate (TUMS) 500 mg chewable tablet, Chew 1 tablet (500 mg total) daily, Disp:  , Rfl: 0    ipratropium-albuterol (Combivent Respimat) inhaler, Inhale 1 puff 4 (four) times a day, Disp: 4 g, Rfl: 5    MAGNESIUM CHLORIDE PO, Take 1,000 mg by mouth daily, Disp: , Rfl:     mirtazapine (REMERON) 15 mg tablet, TAKE 1 TABLET BY MOUTH EVERYDAY AT BEDTIME, Disp: 90 tablet, Rfl: 1    omeprazole (PriLOSEC) 20 mg delayed release capsule, TAKE 1 CAPSULE BY MOUTH EVERY DAY, Disp: 90 capsule, Rfl: 1    potassium chloride (MICRO-K) 10 MEQ CR capsule, Take 20 mEq by mouth 2 (two) times a day, Disp: , Rfl:     rosuvastatin (CRESTOR) 10 MG tablet, TAKE 1 TABLET BY MOUTH EVERY DAY, Disp: 90 tablet, Rfl: 1    tiotropium-olodaterol (Stiolto Respimat) 2 5-2 5 MCG/ACT inhaler, Inhale 2 puffs daily, Disp: 4 g, Rfl: 5    VIT B12-METHIONINE-INOS-CHOL IM, Inject into a muscle every 30 (thirty) days, Disp: , Rfl:     Current Facility-Administered Medications:     cyanocobalamin injection 1,000 mcg, 1,000 mcg, Intramuscular, Q30 Days, Torri Coleman MD, 1,000 mcg at 01/10/22 1011  No Known Allergies  Vitals:    01/21/22 0950   BP: 122/78   Pulse: 82   Resp: 17   Temp: (!) 96 9 °F (36 1 °C)   SpO2: 93%       Physical Exam  Constitutional: General appearance: The patient is very thin, appears the stated age in no acute distress  HEENT:  Normocephalic  Sclerae are anicteric  Mucous membranes are moist  Neck is supple without adenopathy  No JVD  Chest: The lungs are clear to auscultation  Cardiac: Heart is regular rate  Abdomen: Abdomen is soft, non-tender, non-distended and without masses  Mild hepatomegaly on palpation  Extremities: There is no clubbing or cyanosis  There is no edema  Symmetric  Neuro: Grossly nonfocal  Gait is normal      Lymphatic: No evidence of cervical adenopathy bilaterally  No evidence of axillary adenopathy bilaterally  No evidence of inguinal adenopathy bilaterally  Skin: Warm, anicteric  Psych:  Patient is pleasant and talkative  Labs:  AFP 5 6  (1/5/2022)      Imaging  MRI abdomen w wo contrast    Result Date: 1/14/2022  Narrative: MRI - ABDOMEN - WITH AND WITHOUT CONTRAST INDICATION:  Follow-up of hepatocellular carcinoma  COMPARISON: None TECHNIQUE:  The following pulse sequences were obtained prior to and following the administration of intravenous contrast:     Coronal and axial T2 with TE of 90 and 180 respectively, axial T2 with fat saturation, axial FIESTA fat-sat, axial T1-weighted in-and-out-of phase, axial DWI/ADC, precontrast axial T1 with fat saturation, post-contrast dynamic axial T1 with fat saturation at 20, 70, and 180 seconds, coronal T1  with fat saturation and 7 minute delayed axial T1 with fat saturation  IV Contrast:  6 mL of Gadobutrol injection (SINGLE-DOSE) FINDINGS: LOWER CHEST:   Unremarkable  LIVER: Unchanged size of the T1 hyperintense segment 5/8 treatment zone measuring 4 2 x 3 0 cm #11/39 without apparent enhancement  The adjacent segment 5 lesion #12/48 is unchanged in size with diameter 8 mm   Milagroa Hankamer diffuse arterial phase enhancement without washout or capsule in keeping with a LI-RADS revealed lesion  No new hepatic findings  The hepatic vasculature is patent  Severe hepatic steatosis  BILE DUCTS: No intrahepatic or extrahepatic bile duct dilation  GALLBLADDER:  Cholecystectomy  PANCREAS:  Unremarkable  ADRENAL GLANDS:  Normal  SPLEEN:  Normal  KIDNEYS/PROXIMAL URETERS: No hydroureteronephrosis  No suspicious renal mass  Simple 10 mm left renal cortical cyst  BOWEL:   No dilated loops of bowel  PERITONEUM/RETROPERITONEUM: No ascites  LYMPH NODES: No abdominal lymphadenopathy  VASCULAR STRUCTURES:  Atherosclerotic changes of the aorta without aneurysm ABDOMINAL WALL:  Unremarkable  OSSEOUS STRUCTURES:  No suspicious osseous lesion  Impression: Unchanged appearance of the right hepatic treatment zone without findings of viable tumor, LR-TR nonviable  Unchanged 8 mm LI-RADS 3 lesion segment 5 adjacent to the treatment 7  Continue surveillance  Workstation performed: [id number]     I reviewed the above laboratory and imaging data  Discussion/Summary: This is a pleasant 63-year-old male returning in follow-up of his [facility] [address]  He is doing well, but does complain of weight loss and decreased appetite secondary to anxiety issues  I have encouraged him to use Boost or Ensure supplements daily to ensure proper nutrition and weight maintenance  Patient was strongly advised to avoid any further weight loss, as he is already quite thin  I have asked the patient to monitor his weight, and call if he continues to lose weight, as I will provide a referral to Oncology nutrition or consider further work-up  Patient has been maintaining surveillance with imaging and labs every 3 months  He would like to stretch these intervals out at this point    I believe it is reasonable to move to six-month follow-up intervals, assuming he does not continue to lose weight, and will have him see Dr Vito Mack with repeat labs and MRI in 6 months  I have instructed him to call with any new symptoms in the meantime  Patient is agreeable to plan, all questions answered

## 2022-01-25 ENCOUNTER — PATIENT MESSAGE (OUTPATIENT)
Dept: INTERNAL MEDICINE CLINIC | Facility: CLINIC | Age: 67
End: 2022-01-25

## 2022-01-25 DIAGNOSIS — E87.6 HYPOKALEMIA: ICD-10-CM

## 2022-01-25 DIAGNOSIS — E87.6 HYPOKALEMIA: Primary | ICD-10-CM

## 2022-01-25 RX ORDER — POTASSIUM CHLORIDE 750 MG/1
20 CAPSULE, EXTENDED RELEASE ORAL 2 TIMES DAILY
Qty: 60 CAPSULE | Refills: 2 | Status: SHIPPED | OUTPATIENT
Start: 2022-01-25 | End: 2022-04-04

## 2022-01-25 RX ORDER — POTASSIUM CHLORIDE 750 MG/1
20 CAPSULE, EXTENDED RELEASE ORAL 2 TIMES DAILY
Qty: 60 CAPSULE | Refills: 2 | Status: SHIPPED | OUTPATIENT
Start: 2022-01-25 | End: 2022-01-25 | Stop reason: SDUPTHER

## 2022-01-25 NOTE — TELEPHONE ENCOUNTER
2/14/2022 next visit    12/10/2021 last visit EXAMINATION TYPE: XR chest 2V

 

DATE OF EXAM: 9/15/2017

 

COMPARISON: 9/14/2017

 

TECHNIQUE: PA and lateral views submitted.

 

HISTORY: Chest tube removal

 

FINDINGS:

There is diffuse subcutaneous emphysema. Pleural-based thickening on the left noted with suspected ri
b deformity. Small amount of pneumomediastinum present. Atherosclerotic change aorta. Underlying COPD
 and chronic interstitial lung disease suspected. Tiny granuloma in the right upper lobe. Degenerativ
e change of the spine.

 

IMPRESSION:

1. No sizable pneumothorax.

2. Pleural thickening is suspected rib fractures on the left with diffuse subcutaneous emphysema. Sma
ll amount of pneumomediastinum persists.

## 2022-02-04 DIAGNOSIS — I10 ESSENTIAL HYPERTENSION: ICD-10-CM

## 2022-02-04 RX ORDER — AMLODIPINE BESYLATE 5 MG/1
5 TABLET ORAL DAILY
Qty: 90 TABLET | Refills: 0 | Status: SHIPPED | OUTPATIENT
Start: 2022-02-04 | End: 2022-03-15 | Stop reason: SDUPTHER

## 2022-02-09 ENCOUNTER — OFFICE VISIT (OUTPATIENT)
Dept: CARDIOLOGY CLINIC | Facility: CLINIC | Age: 67
End: 2022-02-09
Payer: MEDICARE

## 2022-02-09 VITALS
BODY MASS INDEX: 20.2 KG/M2 | HEART RATE: 98 BPM | WEIGHT: 136.4 LBS | DIASTOLIC BLOOD PRESSURE: 90 MMHG | HEIGHT: 69 IN | SYSTOLIC BLOOD PRESSURE: 146 MMHG

## 2022-02-09 DIAGNOSIS — I10 ESSENTIAL HYPERTENSION: Primary | ICD-10-CM

## 2022-02-09 PROCEDURE — 99213 OFFICE O/P EST LOW 20 MIN: CPT | Performed by: INTERNAL MEDICINE

## 2022-02-09 PROCEDURE — 93000 ELECTROCARDIOGRAM COMPLETE: CPT | Performed by: INTERNAL MEDICINE

## 2022-02-09 NOTE — PROGRESS NOTES
General Cardiology - Outpatient Progress Note   Marlen Banks III 77 y o  male   MRN: [id number]  @ Encounter: [id number]    Devyn Romero MD  Consults      Risk factors:  HTN  HLD    Interval History:   Since last encounter, patient reports feeling fine  Denies lightheadedness, dizziness, syncope, headache, vision changes, diaphoresis, chest pain, palpitations, PND, orthopnea, nausea, vomiting, abdominal pain or lower extremity edema  He continues to have shortness of breath only with heavy yardwork around the home  He has been taking all his medications, but has been unable to afford his COPD inhalers due to an insurance issue  Cardiac History Summary:   Andre Luna is a 77y o  year old male with a history of HCC, HTN, HLD  He was initially referred for preoperative evaluation after his initial diagnosis of liver cancer with incidental findings of coronary calcification on CT  Objective:  Review of Systems  Review of system was conducted and was negative except for as stated in the interval history      Physical Exam:  Vitals: Blood pressure 146/90, pulse 98, height 5' 9" (1 753 m), weight 61 9 kg (136 lb 6 4 oz)  , Body mass index is 20 14 kg/m²      GEN: Marlen Banks III appears well, alert and oriented x 3, pleasant and cooperative   HEENT:  Normocephalic, atraumatic, anicteric, moist mucous membranes  NECK:  No JVD or carotid bruits   HEART: Normal rhythm, normal rate, normal S1 and S2, no murmurs, clicks, gallops or rubs   LUNGS: Clear to auscultation bilaterally; no wheezes, rales, or rhonchi; respiration nonlabored   ABDOMEN:  Normoactive bowel sounds, soft, no tenderness, no distention  EXTREMITIES: radial pulses  palpable; no edema  NEURO: no gross focal findings; cranial nerves grossly intact   SKIN:  Dry, intact, warm to touch    Home Medications: (Not in a hospital admission)      Current Outpatient Medications:     ALPRAZolam (XANAX) 0 25 mg tablet, Take 1 tablet (0 25 mg total) by mouth daily at bedtime as needed for anxiety, Disp: 90 tablet, Rfl: 0    AMILoride 5 mg tablet, Take 1 tablet (5 mg total) by mouth daily, Disp: 90 tablet, Rfl: 1    amLODIPine (NORVASC) 5 mg tablet, Take 1 tablet (5 mg total) by mouth daily, Disp: 90 tablet, Rfl: 0    buPROPion (WELLBUTRIN XL) 150 mg 24 hr tablet, TAKE 1 TABLET BY MOUTH EVERY DAY, Disp: 90 tablet, Rfl: 1    calcium carbonate (TUMS) 500 mg chewable tablet, Chew 1 tablet (500 mg total) daily, Disp:  , Rfl: 0    Fluticasone-Salmeterol,sensor, (AirDuo Digihaler) 232-14 MCG/ACT AEPB, Inhale 1 puff 2 (two) times a day Rinse mouth after use , Disp: 3 each, Rfl: 0    ipratropium-albuterol (Combivent Respimat) inhaler, Inhale 1 puff 4 (four) times a day, Disp: 4 g, Rfl: 5    MAGNESIUM CHLORIDE PO, Take 1,000 mg by mouth daily, Disp: , Rfl:     mirtazapine (REMERON) 15 mg tablet, TAKE 1 TABLET BY MOUTH EVERYDAY AT BEDTIME, Disp: 90 tablet, Rfl: 1    omeprazole (PriLOSEC) 20 mg delayed release capsule, TAKE 1 CAPSULE BY MOUTH EVERY DAY, Disp: 90 capsule, Rfl: 1    potassium chloride (MICRO-K) 10 MEQ CR capsule, Take 2 capsules (20 mEq total) by mouth 2 (two) times a day, Disp: 60 capsule, Rfl: 2    rosuvastatin (CRESTOR) 10 MG tablet, TAKE 1 TABLET BY MOUTH EVERY DAY, Disp: 90 tablet, Rfl: 1    VIT B12-METHIONINE-INOS-CHOL IM, Inject into a muscle every 30 (thirty) days, Disp: , Rfl:     tiotropium-olodaterol (Stiolto Respimat) 2 5-2 5 MCG/ACT inhaler, Inhale 2 puffs daily (Patient not taking: Reported on 2/9/2022 ), Disp: 4 g, Rfl: 5    Current Facility-Administered Medications:     cyanocobalamin injection 1,000 mcg, 1,000 mcg, Intramuscular, Q30 Days, Torri Coleman MD, 1,000 mcg at 01/10/22 1011    Labs & Results:  Lab Results   Component Value Date    TROPONINI <0 02 06/05/2020    TROPONINI <0 02 06/04/2020    TROPONINI <0 02 06/04/2020     Lab Results   Component Value Date    TRIG 68 10/08/2021    TRIG 93 02/19/2021     10/08/2021    HDL 94 2021    LDLCALC 45 10/08/2021    LDLCALC 27 2021    HGBA1C 5 3 2020     Lab Results   Component Value Date    K 4 0 10/08/2021    CO2 28 10/08/2021     10/08/2021    BUN 7 2022    CREATININE 0 83 2022    ALT 32 2021    AST 56 (H) 2021    TSH 2 64 10/08/2021     Lab Results   Component Value Date    WBC 8 0 10/08/2021    HGB 15 0 10/08/2021    HCT 43 8 10/08/2021     5 (H) 10/08/2021     10/08/2021     Lab Results   Component Value Date    INR 1 09 2020       EKG:  Date:   Interpretation: NSR with septal infarct pattern      Imaging: I have personally reviewed pertinent reports  ECHO:  Results for orders placed during the hospital encounter of 07/10/20    Echo complete with contrast if indicated    Narrative  [facility] [address]  [facility], [address] [facility]  [phone]    Transthoracic Echocardiogram  2D, M-mode, Doppler, and Color Doppler    Study date:  10-Jul-2020    Patient: Ramey Carrel  MR number: [id number]  Account number: [de-identified]  : 1955  Age: 59 years  Gender: Male  Status: Outpatient  Location: Echo lab  Height: 69 in  Weight: 129 lb  BP: 120/ 76 mmHg    Indications: Hepatocellular Carcinoma    Diagnoses: C22 0 - Liver cell carcinoma    Sonographer:  SHAKEEL Tang  Primary Physician:  Melissa Camarillo MD  Referring Physician:  Angela Power MD  Group:  [facility]  Interpreting Physician:  Meryle Peacemaker, MD    SUMMARY    LEFT VENTRICLE:  Systolic function was normal by visual assessment  Ejection fraction was estimated to be 70 %  There were no regional wall motion abnormalities  Doppler parameters were consistent with abnormal left ventricular relaxation (grade 1 diastolic dysfunction)      RIGHT VENTRICLE:  The size was normal   Systolic function was normal     HISTORY: PRIOR HISTORY: HTN,COPD,Heptatocellular Carcinoma    PROCEDURE: The procedure was performed in the echo lab  This was a routine study  The transthoracic approach was used  The study included complete 2D imaging, M-mode, complete spectral Doppler, and color Doppler  The heart rate was 103  bpm, at the start of the study  Echocardiographic views were limited due to low windows and lung interference  This was a technically difficult study  LEFT VENTRICLE: Size was normal  Systolic function was normal by visual assessment  Ejection fraction was estimated to be 70 %  There were no regional wall motion abnormalities  Wall thickness was normal  DOPPLER: Doppler parameters were  consistent with abnormal left ventricular relaxation (grade 1 diastolic dysfunction)  RIGHT VENTRICLE: The size was normal  Systolic function was normal  Wall thickness was normal     LEFT ATRIUM: Size was normal     RIGHT ATRIUM: Size was normal     MITRAL VALVE: Valve structure was normal  There was normal leaflet separation  DOPPLER: The transmitral velocity was within the normal range  There was no evidence for stenosis  There was no significant regurgitation  AORTIC VALVE: The valve was trileaflet  Leaflets exhibited normal thickness and normal cuspal separation  DOPPLER: Transaortic velocity was within the normal range  There was no evidence for stenosis  There was no significant  regurgitation  TRICUSPID VALVE: The valve structure was normal  There was normal leaflet separation  DOPPLER: The transtricuspid velocity was within the normal range  There was no evidence for stenosis  There was no significant regurgitation  PULMONIC VALVE: Leaflets exhibited normal thickness, no calcification, and normal cuspal separation  DOPPLER: The transpulmonic velocity was within the normal range  There was no significant regurgitation  PERICARDIUM: There was no pericardial effusion  The pericardium was normal in appearance  AORTA: The root exhibited normal size      SYSTEMIC VEINS: IVC: The inferior vena cava was normal in size  Respirophasic changes were normal     MEASUREMENT TABLES    OTHER ECHO MEASUREMENTS  (Reference normals)  Estimated CVP   5 mmHg   (--)    SYSTEM MEASUREMENT TABLES    2D  %FS: 34 42 %  Ao Diam: 3 35 cm  EDV(Teich): 73 75 ml  EF(Teich): 63 99 %  ESV(Teich): 26 56 ml  IVSd: 0 84 cm  LA Diam: 2 11 cm  LVEDV MOD A4C: 82 01 ml  LVEF MOD A4C: 65 %  LVESV MOD A4C: 28 71 ml  LVIDd: 4 09 cm  LVIDs: 2 68 cm  LVLd A4C: 8 29 cm  LVLs A4C: 6 98 cm  LVPWd: 0 66 cm  RVIDd: 2 64 cm  SV MOD A4C: 53 3 ml  SV(Teich): 47 19 ml    MM  TAPSE: 2 11 cm    PW  E': 0 08 m/s  E/E': 7 3  MV A Marito: 0 68 m/s  MV Dec Kinney: 3 17 m/s2  MV DecT: 174 52 ms  MV E Marito: 0 55 m/s  MV E/A Ratio: 0 82  MV PHT: 50 61 ms  MVA By PHT: 4 35 cm2    [facility]    Prepared and electronically signed by    Faith Milton MD  Signed 10-Jul-2020 14:53:10    No results found for this or any previous visit  KIERRA:  No results found for this or any previous visit  No results found for this or any previous visit  CMR:  No results found for this or any previous visit  No results found for this or any previous visit  No results found for this or any previous visit  Assessment/Plan     Hepatocellular Carcinoma s/p resection    Coronary calcifications on CT imaging    Hypertension    Dyslipidemia    COPD    Mr Robert Verdin presents for annual follow-up  His blood pressure is elevated this visit to 140/90, however this is without taking his medication this morning  He reports systolic blood pressures mostly in the 120-140 range at home  Will recommend continuing current doses of amlodipine and amiloride  Has been safely maintained on moderate intensity statin with Rosuvastatin 10mg with very well controlled lipid levels  ECG today is overall unchanged, showing sinus rhythm with a slightly leftward axis and septal Q waves       He has no complaints, and there is no indication for further testing at this time  Follow up in 1 year      Lien Hou MD  Cardiology Fellow

## 2022-02-13 DIAGNOSIS — I10 ESSENTIAL HYPERTENSION, BENIGN: ICD-10-CM

## 2022-02-14 ENCOUNTER — CLINICAL SUPPORT (OUTPATIENT)
Dept: INTERNAL MEDICINE CLINIC | Facility: CLINIC | Age: 67
End: 2022-02-14
Payer: MEDICARE

## 2022-02-14 DIAGNOSIS — E53.8 VITAMIN B12 DEFICIENCY: Primary | ICD-10-CM

## 2022-02-14 RX ORDER — AMILORIDE HYDROCHLORIDE 5 MG/1
TABLET ORAL
Qty: 90 TABLET | Refills: 1 | Status: SHIPPED | OUTPATIENT
Start: 2022-02-14

## 2022-02-14 RX ADMIN — CYANOCOBALAMIN 1000 MCG: 1000 INJECTION INTRAMUSCULAR; SUBCUTANEOUS at 09:48

## 2022-03-15 ENCOUNTER — OFFICE VISIT (OUTPATIENT)
Dept: INTERNAL MEDICINE CLINIC | Facility: CLINIC | Age: 67
End: 2022-03-15
Payer: MEDICARE

## 2022-03-15 VITALS
SYSTOLIC BLOOD PRESSURE: 142 MMHG | DIASTOLIC BLOOD PRESSURE: 86 MMHG | WEIGHT: 137.6 LBS | TEMPERATURE: 98.6 F | HEIGHT: 69 IN | BODY MASS INDEX: 20.38 KG/M2 | OXYGEN SATURATION: 97 % | HEART RATE: 90 BPM

## 2022-03-15 DIAGNOSIS — E53.8 VITAMIN B12 DEFICIENCY: ICD-10-CM

## 2022-03-15 DIAGNOSIS — F34.1 DYSTHYMIC DISORDER: ICD-10-CM

## 2022-03-15 DIAGNOSIS — K50.018 CROHN'S DISEASE OF SMALL INTESTINE WITH OTHER COMPLICATION (HCC): ICD-10-CM

## 2022-03-15 DIAGNOSIS — Z93.3 COLOSTOMY IN PLACE (HCC): ICD-10-CM

## 2022-03-15 DIAGNOSIS — J44.9 CHRONIC OBSTRUCTIVE PULMONARY DISEASE, UNSPECIFIED COPD TYPE (HCC): ICD-10-CM

## 2022-03-15 DIAGNOSIS — E78.2 MIXED HYPERLIPIDEMIA: ICD-10-CM

## 2022-03-15 DIAGNOSIS — E87.6 HYPOKALEMIA: ICD-10-CM

## 2022-03-15 DIAGNOSIS — K21.9 GASTROESOPHAGEAL REFLUX DISEASE WITHOUT ESOPHAGITIS: ICD-10-CM

## 2022-03-15 DIAGNOSIS — I10 ESSENTIAL HYPERTENSION: Primary | ICD-10-CM

## 2022-03-15 DIAGNOSIS — C22.0 HEPATOCELLULAR CARCINOMA (HCC): ICD-10-CM

## 2022-03-15 DIAGNOSIS — Z23 ENCOUNTER FOR IMMUNIZATION: ICD-10-CM

## 2022-03-15 PROCEDURE — 96372 THER/PROPH/DIAG INJ SC/IM: CPT | Performed by: INTERNAL MEDICINE

## 2022-03-15 PROCEDURE — 99214 OFFICE O/P EST MOD 30 MIN: CPT | Performed by: INTERNAL MEDICINE

## 2022-03-15 RX ORDER — CYANOCOBALAMIN 1000 UG/ML
1000 INJECTION INTRAMUSCULAR; SUBCUTANEOUS
Status: SHIPPED | OUTPATIENT
Start: 2022-03-15

## 2022-03-15 RX ORDER — AMLODIPINE BESYLATE 5 MG/1
5 TABLET ORAL DAILY
Qty: 90 TABLET | Refills: 1 | Status: SHIPPED | OUTPATIENT
Start: 2022-03-15

## 2022-03-15 RX ADMIN — CYANOCOBALAMIN 1000 MCG: 1000 INJECTION INTRAMUSCULAR; SUBCUTANEOUS at 16:56

## 2022-03-15 RX ADMIN — CYANOCOBALAMIN 1000 MCG: 1000 INJECTION INTRAMUSCULAR; SUBCUTANEOUS at 16:59

## 2022-03-15 NOTE — PROGRESS NOTES
Assessment/Plan:             1  Essential hypertension  -     amLODIPine (NORVASC) 5 mg tablet; Take 1 tablet (5 mg total) by mouth daily    2  Colostomy in place [facility])    3  Crohn's disease of small intestine with other complication ([place] [address] )    4  Dysthymic disorder    5  Chronic obstructive pulmonary disease, unspecified COPD type ([place] [address] )    6  Gastroesophageal reflux disease without esophagitis    7  Hepatocellular carcinoma ([facility] )    8  Vitamin B12 deficiency    9  Hypokalemia    10  Mixed hyperlipidemia           Subjective:      Patient ID: Babatunde Lopez is a 77 y o  male  Follow-up on multiple medical problems to ensure the stable on current medications      The following portions of the patient's history were reviewed and updated as appropriate: He  has a past medical history of LUCILLE (acute kidney injury) ([place] [address] ) (6/28/2017), Blindness of left eye, Cancer ([place] [address] ), Cataract, Colon polyp, Colostomy in place [facility]) (6/29/2017), COPD (chronic obstructive pulmonary disease) ([place] [address] ), Crohn disease ([place] [address] ), Emphysema of lung ([place] [address] ), Emphysema/COPD ([place] [address] ), Essential hypertension, GERD (gastroesophageal reflux disease), Hypertension, Hypokalemia (6/28/2017), Hypomagnesemia (6/29/2017), Hyponatremia (6/28/2017), Kidney stone, Osteomyelitis ([facility] ), and Pneumonia    He   Patient Active Problem List    Diagnosis Date Noted    Hepatocellular carcinoma ([place] [address] ) 03/15/2022    Encounter for follow-up surveillance of liver cancer 01/21/2022    Dysthymic disorder 06/28/2021    Crohn's disease of small intestine with other complication ([facility] ) 29/58/2048    Need for hepatitis C screening test 02/24/2021    Vitamin B12 deficiency 02/24/2021    Cataract     [facility] ) 11/24/2020    Mixed hyperlipidemia 11/24/2020    Kidney stone     Gastroesophageal reflux disease without esophagitis     Left wrist pain 09/29/2020    Hypocalcemia 09/12/2020    Acute pain of left wrist 09/10/2020    Chronic, continuous use of opioids 09/10/2020    Observed sleep apnea     Palliative care patient 07/31/2020    Abdominal wall abscess 07/31/2020    Personal history of liver cancer 06/23/2020    Abdominal pain 06/04/2020    Tobacco abuse 05/29/2020    Severe sepsis ([facility] ) 05/28/2020    Pneumonia 05/28/2020    Chest pain 05/28/2020    Liver mass 05/28/2020    Vasovagal syncope 04/03/2018    Emphysema of lung ([facility] )     Cellulitis 12/04/2017    Severe protein-calorie malnutrition ([facility] ) 07/01/2017    Colostomy in place [facility]) 06/29/2017    Diarrhea 06/29/2017    Hypomagnesemia 06/29/2017    Hypokalemia 06/28/2017    Essential hypertension     Emphysema/COPD ([place] [address] )     Crohn disease ([place] [address] )      He  has a past surgical history that includes Colostomy; Cholecystectomy; Colectomy; Colonoscopy (N/A, 6/30/2017); Lithotripsy; Finger surgery (Left); IR biopsy liver mass (6/8/2020); Cataract extraction (Bilateral); Liver lobectomy (N/A, 7/15/2020); and Finger amputation  His family history includes Heart disease in his sister; Hypertension in his father  He  reports that he quit smoking about 20 months ago  His smoking use included cigarettes  He has a 50 00 pack-year smoking history  He has never used smokeless tobacco  He reports current alcohol use of about 1 0 standard drink of alcohol per week  He reports that he does not use drugs    Current Outpatient Medications   Medication Sig Dispense Refill    ALPRAZolam (XANAX) 0 25 mg tablet Take 1 tablet (0 25 mg total) by mouth daily at bedtime as needed for anxiety 90 tablet 0    AMILoride 5 mg tablet TAKE 1 TABLET BY MOUTH EVERY DAY 90 tablet 1    amLODIPine (NORVASC) 5 mg tablet Take 1 tablet (5 mg total) by mouth daily 90 tablet 1    buPROPion (WELLBUTRIN XL) 150 mg 24 hr tablet TAKE 1 TABLET BY MOUTH EVERY DAY 90 tablet 1    calcium carbonate (TUMS) 500 mg chewable tablet Chew 1 tablet (500 mg total) daily  0    ipratropium-albuterol (Combivent Respimat) inhaler Inhale 1 puff 4 (four) times a day 4 g 5    MAGNESIUM CHLORIDE PO Take 1,000 mg by mouth daily      mirtazapine (REMERON) 15 mg tablet TAKE 1 TABLET BY MOUTH EVERYDAY AT BEDTIME 90 tablet 1    omeprazole (PriLOSEC) 20 mg delayed release capsule TAKE 1 CAPSULE BY MOUTH EVERY DAY 90 capsule 1    potassium chloride (MICRO-K) 10 MEQ CR capsule Take 2 capsules (20 mEq total) by mouth 2 (two) times a day 60 capsule 2    rosuvastatin (CRESTOR) 10 MG tablet TAKE 1 TABLET BY MOUTH EVERY DAY 90 tablet 1    VIT B12-METHIONINE-INOS-CHOL IM Inject into a muscle every 30 (thirty) days      Fluticasone-Salmeterol,sensor, (AirDuo Digihaler) 232-14 MCG/ACT AEPB Inhale 1 puff 2 (two) times a day Rinse mouth after use  (Patient not taking: Reported on 3/15/2022 ) 1 each 0    tiotropium-olodaterol (Stiolto Respimat) 2 5-2 5 MCG/ACT inhaler Inhale 2 puffs daily (Patient not taking: Reported on 3/15/2022 ) 4 g 5     No current facility-administered medications for this visit       Current Outpatient Medications on File Prior to Visit   Medication Sig    ALPRAZolam (XANAX) 0 25 mg tablet Take 1 tablet (0 25 mg total) by mouth daily at bedtime as needed for anxiety    AMILoride 5 mg tablet TAKE 1 TABLET BY MOUTH EVERY DAY    buPROPion (WELLBUTRIN XL) 150 mg 24 hr tablet TAKE 1 TABLET BY MOUTH EVERY DAY    calcium carbonate (TUMS) 500 mg chewable tablet Chew 1 tablet (500 mg total) daily    ipratropium-albuterol (Combivent Respimat) inhaler Inhale 1 puff 4 (four) times a day    MAGNESIUM CHLORIDE PO Take 1,000 mg by mouth daily    mirtazapine (REMERON) 15 mg tablet TAKE 1 TABLET BY MOUTH EVERYDAY AT BEDTIME    omeprazole (PriLOSEC) 20 mg delayed release capsule TAKE 1 CAPSULE BY MOUTH EVERY DAY    potassium chloride (MICRO-K) 10 MEQ CR capsule Take 2 capsules (20 mEq total) by mouth 2 (two) times a day    rosuvastatin (CRESTOR) 10 MG tablet TAKE 1 TABLET BY MOUTH EVERY DAY    VIT B12-METHIONINE-INOS-CHOL IM Inject into a muscle every 30 (thirty) days    [DISCONTINUED] amLODIPine (NORVASC) 5 mg tablet Take 1 tablet (5 mg total) by mouth daily    Fluticasone-Salmeterol,sensor, (AirDuo Digihaler) 232-14 MCG/ACT AEPB Inhale 1 puff 2 (two) times a day Rinse mouth after use  (Patient not taking: Reported on 3/15/2022 )    tiotropium-olodaterol (Stiolto Respimat) 2 5-2 5 MCG/ACT inhaler Inhale 2 puffs daily (Patient not taking: Reported on 3/15/2022 )     No current facility-administered medications on file prior to visit  He has No Known Allergies       Review of Systems   Constitutional: Negative for chills and fever  HENT: Negative for congestion, ear pain and sore throat  Eyes: Negative for pain  Respiratory: Negative for cough and shortness of breath  Cardiovascular: Negative for chest pain and leg swelling  Gastrointestinal: Negative for abdominal pain, nausea and vomiting  Endocrine: Negative for polyuria  Genitourinary: Negative for difficulty urinating, frequency and urgency  Musculoskeletal: Negative for arthralgias and back pain  Skin: Negative for rash  Neurological: Negative for weakness and headaches  Psychiatric/Behavioral: Negative for sleep disturbance  The patient is not nervous/anxious  Objective:      /86 (BP Location: Left arm, Patient Position: Sitting)   Pulse 90   Temp 98 6 °F (37 °C)   Ht 5' 9" (1 753 m)   Wt 62 4 kg (137 lb 9 6 oz)   SpO2 97%   BMI 20 32 kg/m²     No results found for this or any previous visit (from the past 1344 hour(s))  Physical Exam  Constitutional:       Appearance: Normal appearance  HENT:      Head: Normocephalic  Right Ear: Tympanic membrane, ear canal and external ear normal       Left Ear: Tympanic membrane, ear canal and external ear normal       Nose: Nose normal  No congestion  Mouth/Throat:      Mouth: Mucous membranes are moist       Pharynx: Oropharynx is clear   No oropharyngeal exudate or posterior oropharyngeal erythema  Eyes:      Extraocular Movements: Extraocular movements intact  Conjunctiva/sclera: Conjunctivae normal       Pupils: Pupils are equal, round, and reactive to light  Cardiovascular:      Rate and Rhythm: Normal rate and regular rhythm  Heart sounds: Normal heart sounds  No murmur heard  Pulmonary:      Effort: Pulmonary effort is normal       Breath sounds: Normal breath sounds  No wheezing or rales  Abdominal:      General: Bowel sounds are normal  There is no distension  Palpations: Abdomen is soft  Tenderness: There is no abdominal tenderness  Musculoskeletal:         General: Normal range of motion  Cervical back: Normal range of motion and neck supple  Right lower leg: No edema  Left lower leg: No edema  Lymphadenopathy:      Cervical: No cervical adenopathy  Skin:     General: Skin is warm  Neurological:      General: No focal deficit present  Mental Status: He is alert and oriented to person, place, and time

## 2022-04-04 DIAGNOSIS — E87.6 HYPOKALEMIA: ICD-10-CM

## 2022-04-04 RX ORDER — POTASSIUM CHLORIDE 750 MG/1
20 CAPSULE, EXTENDED RELEASE ORAL 2 TIMES DAILY
Qty: 60 CAPSULE | Refills: 2 | Status: SHIPPED | OUTPATIENT
Start: 2022-04-04 | End: 2022-04-29 | Stop reason: SDUPTHER

## 2022-04-05 ENCOUNTER — CLINICAL SUPPORT (OUTPATIENT)
Dept: INTERNAL MEDICINE CLINIC | Facility: CLINIC | Age: 67
End: 2022-04-05
Payer: MEDICARE

## 2022-04-05 DIAGNOSIS — E53.8 B12 DEFICIENCY: Primary | ICD-10-CM

## 2022-04-05 RX ADMIN — CYANOCOBALAMIN 1000 MCG: 1000 INJECTION INTRAMUSCULAR; SUBCUTANEOUS at 10:07

## 2022-04-12 DIAGNOSIS — F34.1 DYSTHYMIC DISORDER: ICD-10-CM

## 2022-04-12 DIAGNOSIS — Z01.810 PREOP CARDIOVASCULAR EXAM: ICD-10-CM

## 2022-04-12 RX ORDER — BUPROPION HYDROCHLORIDE 150 MG/1
TABLET ORAL
Qty: 90 TABLET | Refills: 1 | Status: SHIPPED | OUTPATIENT
Start: 2022-04-12

## 2022-04-12 RX ORDER — ROSUVASTATIN CALCIUM 10 MG/1
TABLET, COATED ORAL
Qty: 90 TABLET | Refills: 1 | Status: SHIPPED | OUTPATIENT
Start: 2022-04-12

## 2022-04-14 DIAGNOSIS — F34.1 DYSTHYMIC DISORDER: ICD-10-CM

## 2022-04-14 RX ORDER — ALPRAZOLAM 0.25 MG/1
0.25 TABLET ORAL
Qty: 90 TABLET | Refills: 0 | Status: SHIPPED | OUTPATIENT
Start: 2022-04-14 | End: 2022-08-02

## 2022-05-04 ENCOUNTER — CLINICAL SUPPORT (OUTPATIENT)
Dept: INTERNAL MEDICINE CLINIC | Facility: CLINIC | Age: 67
End: 2022-05-04
Payer: MEDICARE

## 2022-05-04 DIAGNOSIS — E53.8 B12 DEFICIENCY: Primary | ICD-10-CM

## 2022-05-04 RX ADMIN — CYANOCOBALAMIN 1000 MCG: 1000 INJECTION INTRAMUSCULAR; SUBCUTANEOUS at 09:39

## 2022-06-08 ENCOUNTER — CLINICAL SUPPORT (OUTPATIENT)
Dept: INTERNAL MEDICINE CLINIC | Facility: CLINIC | Age: 67
End: 2022-06-08
Payer: MEDICARE

## 2022-06-08 DIAGNOSIS — E53.8 B12 DEFICIENCY: Primary | ICD-10-CM

## 2022-06-08 RX ADMIN — CYANOCOBALAMIN 1000 MCG: 1000 INJECTION INTRAMUSCULAR; SUBCUTANEOUS at 10:32

## 2022-07-06 ENCOUNTER — RA CDI HCC (OUTPATIENT)
Dept: OTHER | Facility: HOSPITAL | Age: 67
End: 2022-07-06

## 2022-07-06 NOTE — PROGRESS NOTES
[address]  coding opportunities       Chart reviewed, no opportunity found: CHART REVIEWED, NO OPPORTUNITY FOUND        Patients Insurance     Medicare Insurance: Medicare

## 2022-07-07 LAB
ALBUMIN SERPL-MCNC: 4.2 G/DL (ref 3.6–5.1)
ALBUMIN/GLOB SERPL: 1.3 (CALC) (ref 1–2.5)
ALP SERPL-CCNC: 54 U/L (ref 35–144)
ALT SERPL-CCNC: 28 U/L (ref 9–46)
AST SERPL-CCNC: 62 U/L (ref 10–35)
BASOPHILS # BLD AUTO: 40 CELLS/UL (ref 0–200)
BASOPHILS NFR BLD AUTO: 0.6 %
BILIRUB SERPL-MCNC: 1.2 MG/DL (ref 0.2–1.2)
BUN SERPL-MCNC: 6 MG/DL (ref 7–25)
BUN/CREAT SERPL: 8 (CALC) (ref 6–22)
CALCIUM SERPL-MCNC: 8.5 MG/DL (ref 8.6–10.3)
CHLORIDE SERPL-SCNC: 102 MMOL/L (ref 98–110)
CHOLEST SERPL-MCNC: 168 MG/DL
CHOLEST/HDLC SERPL: 1.5 (CALC)
CO2 SERPL-SCNC: 26 MMOL/L (ref 20–32)
CREAT SERPL-MCNC: 0.79 MG/DL (ref 0.7–1.25)
EOSINOPHIL # BLD AUTO: 87 CELLS/UL (ref 15–500)
EOSINOPHIL NFR BLD AUTO: 1.3 %
ERYTHROCYTE [DISTWIDTH] IN BLOOD BY AUTOMATED COUNT: 12.6 % (ref 11–15)
GLOBULIN SER CALC-MCNC: 3.2 G/DL (CALC) (ref 1.9–3.7)
GLUCOSE SERPL-MCNC: 82 MG/DL (ref 65–99)
HCT VFR BLD AUTO: 42.3 % (ref 38.5–50)
HDLC SERPL-MCNC: 113 MG/DL
HGB BLD-MCNC: 14.9 G/DL (ref 13.2–17.1)
LDLC SERPL CALC-MCNC: 40 MG/DL (CALC)
LYMPHOCYTES # BLD AUTO: 1528 CELLS/UL (ref 850–3900)
LYMPHOCYTES NFR BLD AUTO: 22.8 %
MCH RBC QN AUTO: 35.6 PG (ref 27–33)
MCHC RBC AUTO-ENTMCNC: 35.2 G/DL (ref 32–36)
MCV RBC AUTO: 101.2 FL (ref 80–100)
MONOCYTES # BLD AUTO: 462 CELLS/UL (ref 200–950)
MONOCYTES NFR BLD AUTO: 6.9 %
NEUTROPHILS # BLD AUTO: 4583 CELLS/UL (ref 1500–7800)
NEUTROPHILS NFR BLD AUTO: 68.4 %
NONHDLC SERPL-MCNC: 55 MG/DL (CALC)
PLATELET # BLD AUTO: 126 THOUSAND/UL (ref 140–400)
PMV BLD REES-ECKER: 10.1 FL (ref 7.5–12.5)
POTASSIUM SERPL-SCNC: 4 MMOL/L (ref 3.5–5.3)
PROT SERPL-MCNC: 7.4 G/DL (ref 6.1–8.1)
RBC # BLD AUTO: 4.18 MILLION/UL (ref 4.2–5.8)
SL AMB EGFR AFRICAN AMERICAN: 108 ML/MIN/1.73M2
SL AMB EGFR NON AFRICAN AMERICAN: 94 ML/MIN/1.73M2
SODIUM SERPL-SCNC: 140 MMOL/L (ref 135–146)
TRIGL SERPL-MCNC: 66 MG/DL
TSH SERPL-ACNC: 2.71 MIU/L (ref 0.4–4.5)
WBC # BLD AUTO: 6.7 THOUSAND/UL (ref 3.8–10.8)

## 2022-07-12 ENCOUNTER — OFFICE VISIT (OUTPATIENT)
Dept: INTERNAL MEDICINE CLINIC | Facility: CLINIC | Age: 67
End: 2022-07-12
Payer: MEDICARE

## 2022-07-12 VITALS
WEIGHT: 142.6 LBS | TEMPERATURE: 98.7 F | HEART RATE: 79 BPM | DIASTOLIC BLOOD PRESSURE: 80 MMHG | HEIGHT: 69 IN | SYSTOLIC BLOOD PRESSURE: 122 MMHG | BODY MASS INDEX: 21.12 KG/M2 | OXYGEN SATURATION: 97 %

## 2022-07-12 DIAGNOSIS — K50.018 CROHN'S DISEASE OF SMALL INTESTINE WITH OTHER COMPLICATION (HCC): ICD-10-CM

## 2022-07-12 DIAGNOSIS — E78.2 MIXED HYPERLIPIDEMIA: ICD-10-CM

## 2022-07-12 DIAGNOSIS — E87.6 HYPOKALEMIA: ICD-10-CM

## 2022-07-12 DIAGNOSIS — Z93.3 COLOSTOMY IN PLACE (HCC): ICD-10-CM

## 2022-07-12 DIAGNOSIS — C22.0 HEPATOCELLULAR CARCINOMA (HCC): ICD-10-CM

## 2022-07-12 DIAGNOSIS — D69.6 PLATELETS DECREASED (HCC): ICD-10-CM

## 2022-07-12 DIAGNOSIS — E43 SEVERE PROTEIN-CALORIE MALNUTRITION (HCC): ICD-10-CM

## 2022-07-12 DIAGNOSIS — F34.1 DYSTHYMIC DISORDER: ICD-10-CM

## 2022-07-12 DIAGNOSIS — J43.9 PULMONARY EMPHYSEMA, UNSPECIFIED EMPHYSEMA TYPE (HCC): ICD-10-CM

## 2022-07-12 DIAGNOSIS — E53.8 VITAMIN B12 DEFICIENCY: ICD-10-CM

## 2022-07-12 DIAGNOSIS — K21.9 GASTROESOPHAGEAL REFLUX DISEASE WITHOUT ESOPHAGITIS: ICD-10-CM

## 2022-07-12 DIAGNOSIS — I10 ESSENTIAL HYPERTENSION: Primary | ICD-10-CM

## 2022-07-12 PROCEDURE — 99214 OFFICE O/P EST MOD 30 MIN: CPT | Performed by: INTERNAL MEDICINE

## 2022-07-12 RX ORDER — FLUTICASONE PROPIONATE AND SALMETEROL 232; 14 UG/1; UG/1
1 POWDER, METERED RESPIRATORY (INHALATION) 2 TIMES DAILY
Qty: 1 EACH | Refills: 2 | Status: SHIPPED | OUTPATIENT
Start: 2022-07-12 | End: 2022-08-02 | Stop reason: SDUPTHER

## 2022-07-12 NOTE — PROGRESS NOTES
Assessment/Plan:             1  Essential hypertension    2  Pulmonary emphysema, unspecified emphysema type ([facility] )  -     Fluticasone-Salmeterol,sensor, (Maria L Nephew) 232-14 MCG/ACT AEPB; Inhale 1 puff 2 (two) times a day Rinse mouth after use  3  Platelets decreased ([facility] )    4  Severe protein-calorie malnutrition ([facility] )    5  Crohn's disease of small intestine with other complication ([facility] )    6  Hepatocellular carcinoma ([facility] )    7  Mixed hyperlipidemia    8  Dysthymic disorder    9  Gastroesophageal reflux disease without esophagitis    10  Hypokalemia    11  Colostomy in place ([facility] )    12  Vitamin B12 deficiency         Subjective:      Patient ID: Simon Milton is a 77 y o  male  Follow-up on blood test done on 07/06/2022 test discussed with him      The following portions of the patient's history were reviewed and updated as appropriate: He  has a past medical history of LUCILLE (acute kidney injury) ([facility] ) (6/28/2017), Blindness of left eye, Cancer ([facility] ), Cataract, Colon polyp, Colostomy in place [facility]) (6/29/2017), COPD (chronic obstructive pulmonary disease) ([facility] ), Crohn disease ([facility] ), Emphysema of lung ([facility] ), Emphysema/COPD ([facility] ), Essential hypertension, GERD (gastroesophageal reflux disease), Hypertension, Hypokalemia (6/28/2017), Hypomagnesemia (6/29/2017), Hyponatremia (6/28/2017), Kidney stone, Osteomyelitis ([facility] ), and Pneumonia    He   Patient Active Problem List    Diagnosis Date Noted    Platelets decreased ([facility] ) 07/12/2022    Hepatocellular carcinoma ([facility] ) 03/15/2022    Encounter for follow-up surveillance of liver cancer 01/21/2022    Dysthymic disorder 06/28/2021    Crohn's disease of small intestine with other complication ([facility] ) 52/28/3673    Need for hepatitis C screening test 02/24/2021    Vitamin B12 deficiency 02/24/2021    Cataract     Chronic obstructive pulmonary disease ([facility] ) 11/24/2020    Mixed hyperlipidemia 11/24/2020    Kidney stone     Gastroesophageal reflux disease without esophagitis     Left wrist pain 09/29/2020    Hypocalcemia 09/12/2020    Acute pain of left wrist 09/10/2020    Chronic, continuous use of opioids 09/10/2020    Observed sleep apnea     Palliative care patient 07/31/2020    Abdominal wall abscess 07/31/2020    Personal history of liver cancer 06/23/2020    Abdominal pain 06/04/2020    Tobacco abuse 05/29/2020    Severe sepsis ([facility] ) 05/28/2020    Pneumonia 05/28/2020    Chest pain 05/28/2020    Liver mass 05/28/2020    Vasovagal syncope 04/03/2018    Emphysema of lung ([facility] )     Cellulitis 12/04/2017    Severe protein-calorie malnutrition ([facility] ) 07/01/2017    Colostomy in place [facility]) 06/29/2017    Diarrhea 06/29/2017    Hypomagnesemia 06/29/2017    Hypokalemia 06/28/2017    Essential hypertension     Emphysema/COPD ([facility] )     Crohn disease ([facility] )      He  has a past surgical history that includes Colostomy; Cholecystectomy; Colectomy; Colonoscopy (N/A, 6/30/2017); Lithotripsy; Finger surgery (Left); IR biopsy liver mass (6/8/2020); Cataract extraction (Bilateral); Liver lobectomy (N/A, 7/15/2020); and Finger amputation  His family history includes Heart disease in his sister; Hypertension in his father  He  reports that he quit smoking about 1 years ago  His smoking use included cigarettes  He has a 50 00 pack-year smoking history  He has never used smokeless tobacco  He reports current alcohol use of about 7 0 standard drinks of alcohol per week  He reports that he does not use drugs    Current Outpatient Medications   Medication Sig Dispense Refill    ALPRAZolam (XANAX) 0 25 mg tablet Take 1 tablet (0 25 mg total) by mouth daily at bedtime as needed for anxiety 90 tablet 0    AMILoride 5 mg tablet TAKE 1 TABLET BY MOUTH EVERY DAY 90 tablet 1    amLODIPine (NORVASC) 5 mg tablet Take 1 tablet (5 mg total) by mouth daily 90 tablet 1    buPROPion (WELLBUTRIN XL) 150 mg 24 hr tablet TAKE 1 TABLET BY MOUTH EVERY DAY 90 tablet 1  calcium carbonate (TUMS) 500 mg chewable tablet Chew 1 tablet (500 mg total) daily  0    Fluticasone-Salmeterol,sensor, ([employer]) 232-14 MCG/ACT AEPB Inhale 1 puff 2 (two) times a day Rinse mouth after use   1 each 2    MAGNESIUM CHLORIDE PO Take 1,000 mg by mouth daily      mirtazapine (REMERON) 15 mg tablet TAKE 1 TABLET BY MOUTH EVERYDAY AT BEDTIME 90 tablet 1    omeprazole (PriLOSEC) 20 mg delayed release capsule TAKE 1 CAPSULE BY MOUTH EVERY DAY 90 capsule 1    potassium chloride (MICRO-K) 10 MEQ CR capsule Take 2 capsules (20 mEq total) by mouth 2 (two) times a day 60 capsule 2    rosuvastatin (CRESTOR) 10 MG tablet TAKE 1 TABLET BY MOUTH EVERY DAY 90 tablet 1    VIT B12-METHIONINE-INOS-CHOL IM Inject into a muscle every 30 (thirty) days      ipratropium-albuterol (Combivent Respimat) inhaler Inhale 1 puff 4 (four) times a day (Patient not taking: Reported on 7/12/2022) 4 g 5    tiotropium-olodaterol (Stiolto Respimat) 2 5-2 5 MCG/ACT inhaler Inhale 2 puffs daily (Patient not taking: No sig reported) 4 g 5     Current Facility-Administered Medications   Medication Dose Route Frequency Provider Last Rate Last Admin    cyanocobalamin injection 1,000 mcg  1,000 mcg Intramuscular Q30 Days Torri Coleman MD   1,000 mcg at 06/08/22 1032     Current Outpatient Medications on File Prior to Visit   Medication Sig    ALPRAZolam (XANAX) 0 25 mg tablet Take 1 tablet (0 25 mg total) by mouth daily at bedtime as needed for anxiety    AMILoride 5 mg tablet TAKE 1 TABLET BY MOUTH EVERY DAY    amLODIPine (NORVASC) 5 mg tablet Take 1 tablet (5 mg total) by mouth daily    buPROPion (WELLBUTRIN XL) 150 mg 24 hr tablet TAKE 1 TABLET BY MOUTH EVERY DAY    calcium carbonate (TUMS) 500 mg chewable tablet Chew 1 tablet (500 mg total) daily    MAGNESIUM CHLORIDE PO Take 1,000 mg by mouth daily    mirtazapine (REMERON) 15 mg tablet TAKE 1 TABLET BY MOUTH EVERYDAY AT BEDTIME    omeprazole (PriLOSEC) 20 mg delayed release capsule TAKE 1 CAPSULE BY MOUTH EVERY DAY    potassium chloride (MICRO-K) 10 MEQ CR capsule Take 2 capsules (20 mEq total) by mouth 2 (two) times a day    rosuvastatin (CRESTOR) 10 MG tablet TAKE 1 TABLET BY MOUTH EVERY DAY    VIT B12-METHIONINE-INOS-CHOL IM Inject into a muscle every 30 (thirty) days    ipratropium-albuterol (Combivent Respimat) inhaler Inhale 1 puff 4 (four) times a day (Patient not taking: Reported on 7/12/2022)    tiotropium-olodaterol (Stiolto Respimat) 2 5-2 5 MCG/ACT inhaler Inhale 2 puffs daily (Patient not taking: No sig reported)    [DISCONTINUED] Fluticasone-Salmeterol,sensor, ([employer]ihal[employer]) 232-14 MCG/ACT AEPB Inhale 1 puff 2 (two) times a day Rinse mouth after use  (Patient not taking: No sig reported)     Current Facility-Administered Medications on File Prior to Visit   Medication    cyanocobalamin injection 1,000 mcg     He has No Known Allergies       Review of Systems   Constitutional: Negative for chills and fever  HENT: Negative for congestion, ear pain and sore throat  Eyes: Negative for pain  Respiratory: Negative for cough and shortness of breath  Cardiovascular: Negative for chest pain and leg swelling  Gastrointestinal: Negative for abdominal pain, nausea and vomiting  Endocrine: Negative for polyuria  Genitourinary: Negative for difficulty urinating, frequency and urgency  Musculoskeletal: Negative for arthralgias and back pain  Skin: Negative for rash  Neurological: Negative for weakness and headaches  Psychiatric/Behavioral: Negative for sleep disturbance  The patient is not nervous/anxious            Objective:      /80 (BP Location: Right arm, Patient Position: Sitting, Cuff Size: Standard)   Pulse 79   Temp 98 7 °F (37 1 °C) (Temporal)   Ht 5' 9" (1 753 m)   Wt 64 7 kg (142 lb 9 6 oz)   SpO2 97% Comment: RA  BMI 21 06 kg/m²     Recent Results (from the past 1344 hour(s))   Lipid Panel with Direct LDL reflex Collection Time: 07/06/22  1:09 PM   Result Value Ref Range    Total Cholesterol 168 <200 mg/dL     > OR = 40 mg/dL    Triglycerides 66 <150 mg/dL    LDL Calculated 40 mg/dL (calc)    Chol HDLC Ratio 1 5 <5 0 (calc)    Non-HDL Cholesterol 55 <130 mg/dL (calc)   Comprehensive metabolic panel    Collection Time: 07/06/22  1:09 PM   Result Value Ref Range    Glucose, Random 82 65 - 99 mg/dL    BUN 6 (L) 7 - 25 mg/dL    Creatinine 0 79 0 70 - 1 25 mg/dL    eGFR Non  94 > OR = 60 mL/min/1 73m2    eGFR  108 > OR = 60 mL/min/1 73m2    SL AMB BUN/CREATININE RATIO 8 6 - 22 (calc)    Sodium 140 135 - 146 mmol/L    Potassium 4 0 3 5 - 5 3 mmol/L    Chloride 102 98 - 110 mmol/L    CO2 26 20 - 32 mmol/L    Calcium 8 5 (L) 8 6 - 10 3 mg/dL    Protein, Total 7 4 6 1 - 8 1 g/dL    Albumin 4 2 3 6 - 5 1 g/dL    Globulin 3 2 1 9 - 3 7 g/dL (calc)    Albumin/Globulin Ratio 1 3 1 0 - 2 5 (calc)    TOTAL BILIRUBIN 1 2 0 2 - 1 2 mg/dL    Alkaline Phosphatase 54 35 - 144 U/L    AST 62 (H) 10 - 35 U/L    ALT 28 9 - 46 U/L   CBC and differential    Collection Time: 07/06/22  1:09 PM   Result Value Ref Range    White Blood Cell Count 6 7 3 8 - 10 8 Thousand/uL    Red Blood Cell Count 4 18 (L) 4 20 - 5 80 Million/uL    Hemoglobin 14 9 13 2 - 17 1 g/dL    HCT 42 3 38 5 - 50 0 %     2 (H) 80 0 - 100 0 fL    MCH 35 6 (H) 27 0 - 33 0 pg    MCHC 35 2 32 0 - 36 0 g/dL    RDW 12 6 11 0 - 15 0 %    Platelet Count 817 (L) 140 - 400 Thousand/uL    SL AMB MPV 10 1 7 5 - 12 5 fL    Neutrophils (Absolute) 4,583 1,500 - 7,800 cells/uL    Lymphocytes (Absolute) 1,528 850 - 3,900 cells/uL    Monocytes (Absolute) 462 200 - 950 cells/uL    Eosinophils (Absolute) 87 15 - 500 cells/uL    Basophils ABS 40 0 - 200 cells/uL    Neutrophils 68 4 %    Lymphocytes 22 8 %    Monocytes 6 9 %    Eosinophils 1 3 %    Basophils PCT 0 6 %   TSH, 3rd generation    Collection Time: 07/06/22  1:09 PM   Result Value Ref Range TSH 2 71 0 40 - 4 50 mIU/L        Physical Exam  Constitutional:       Appearance: Normal appearance  HENT:      Head: Normocephalic  Right Ear: Tympanic membrane, ear canal and external ear normal       Left Ear: Tympanic membrane, ear canal and external ear normal       Nose: Nose normal  No congestion  Mouth/Throat:      Mouth: Mucous membranes are moist       Pharynx: Oropharynx is clear  No oropharyngeal exudate or posterior oropharyngeal erythema  Eyes:      Extraocular Movements: Extraocular movements intact  Conjunctiva/sclera: Conjunctivae normal       Pupils: Pupils are equal, round, and reactive to light  Cardiovascular:      Rate and Rhythm: Normal rate and regular rhythm  Heart sounds: Normal heart sounds  No murmur heard  Pulmonary:      Effort: Pulmonary effort is normal       Breath sounds: Normal breath sounds  No wheezing or rales  Abdominal:      General: Bowel sounds are normal  There is no distension  Palpations: Abdomen is soft  Tenderness: There is no abdominal tenderness  Musculoskeletal:         General: Normal range of motion  Cervical back: Normal range of motion and neck supple  Right lower leg: No edema  Left lower leg: No edema  Lymphadenopathy:      Cervical: No cervical adenopathy  Skin:     General: Skin is warm  Neurological:      General: No focal deficit present  Mental Status: He is alert and oriented to person, place, and time

## 2022-07-13 RX ADMIN — CYANOCOBALAMIN 1000 MCG: 1000 INJECTION, SOLUTION INTRAMUSCULAR; SUBCUTANEOUS at 09:42

## 2022-07-16 LAB
AFP-TM SERPL-MCNC: 6.3 NG/ML
BUN SERPL-MCNC: 7 MG/DL (ref 7–25)
CREAT SERPL-MCNC: 0.85 MG/DL (ref 0.7–1.35)
GFR/BSA.PRED SERPLBLD CYS-BASED-ARV: 96 ML/MIN/1.73M2

## 2022-07-21 ENCOUNTER — HOSPITAL ENCOUNTER (OUTPATIENT)
Dept: MRI IMAGING | Facility: HOSPITAL | Age: 67
Discharge: HOME | End: 2022-07-21
Payer: MEDICARE

## 2022-07-21 DIAGNOSIS — Z85.05 PERSONAL HISTORY OF LIVER CANCER: ICD-10-CM

## 2022-07-21 PROCEDURE — 74183 MRI ABD W/O CNTR FLWD CNTR: CPT

## 2022-07-21 PROCEDURE — A9585 GADOBUTROL INJECTION: HCPCS

## 2022-07-21 RX ADMIN — GADOBUTROL 6 ML: 604.72 INJECTION INTRAVENOUS at 07:12

## 2022-08-02 ENCOUNTER — OFFICE VISIT (OUTPATIENT)
Dept: SURGICAL ONCOLOGY | Facility: CLINIC | Age: 67
End: 2022-08-02
Payer: MEDICARE

## 2022-08-02 ENCOUNTER — TELEPHONE (OUTPATIENT)
Dept: INTERVENTIONAL RADIOLOGY/VASCULAR | Facility: CLINIC | Age: 67
End: 2022-08-02

## 2022-08-02 VITALS
TEMPERATURE: 97.2 F | SYSTOLIC BLOOD PRESSURE: 110 MMHG | BODY MASS INDEX: 21.18 KG/M2 | WEIGHT: 143 LBS | RESPIRATION RATE: 14 BRPM | HEART RATE: 104 BPM | OXYGEN SATURATION: 95 % | HEIGHT: 69 IN | DIASTOLIC BLOOD PRESSURE: 70 MMHG

## 2022-08-02 DIAGNOSIS — J43.9 PULMONARY EMPHYSEMA, UNSPECIFIED EMPHYSEMA TYPE (HCC): ICD-10-CM

## 2022-08-02 DIAGNOSIS — C22.0 HEPATOCELLULAR CARCINOMA (HCC): Primary | ICD-10-CM

## 2022-08-02 DIAGNOSIS — E87.6 HYPOKALEMIA: ICD-10-CM

## 2022-08-02 DIAGNOSIS — K21.9 GASTROESOPHAGEAL REFLUX DISEASE WITHOUT ESOPHAGITIS: ICD-10-CM

## 2022-08-02 PROCEDURE — 99215 OFFICE O/P EST HI 40 MIN: CPT | Performed by: SURGERY

## 2022-08-02 NOTE — TELEPHONE ENCOUNTER
IR Clinic Consult:    Patient clinical visit in 1 week      Schedule visit for the first available IR Physician: No                *If no, schedule for:   Ana James    Type of Visit: Virtual Brief Visit - Telephone or video    Additional Details:

## 2022-08-02 NOTE — PROGRESS NOTES
Surgical Oncology Follow Up       [address] [facility]  [facility] [place]  [address]   [place] [address]    Timi Elkins III  1955  [id number]  [address] [facility] SURGICAL ONCOLOGY [place]  [address]  [place] [address]    Diagnoses and all orders for this visit:    [facility]at[facility] ([facility] )  -     MRI abdomen w wo contrast; Future  -     BUN; Future  -     AFP tumor marker; Future  -     Creatinine, serum; Future  -     Ambulatory Referral to Interventional Radiology; Future        No chief complaint on file  Return in about 4 months (around 12/2/2022) for Office Visit, Imaging - See orders, Labs - See Treatment Plan  Oncology History   Personal history of liver cancer   6/8/2020 Initial Diagnosis    Hepatocellular carcinoma, moderately differentiated     7/15/2020 Surgery    Microwave ablation of liver segment 5            Staging: HCC   Treatment history:  Ablation of segment 5 liver lesion, July 2020  Current treatment:  Observation  Disease status: ARLYN    History of Present Illness:  Patient returns in follow-up of his [facility]  He is doing well at this time with no complaints  He denies any abdominal pain, nausea or vomiting  His only issue is he is having some right-sided flank pain, but he does not remember falling  MRI from July 21, 2022 reveals the ablated lesion in segment 5 is nonviable  Adjacent to this is a 1 8 cm lesion that is LR 5  I personally reviewed the films  His AFP level has increased slightly to 6 3  Review of Systems  Complete ROS Surg Onc:   Complete ROS Surg Onc:   Constitutional: The patient denies new or recent history of general fatigue, no recent weight loss, no change in appetite  Eyes: No complaints of visual problems, no scleral icterus     ENT: no complaints of ear pain, no hoarseness, no difficulty swallowing,  no tinnitus and no new masses in head, oral cavity, or neck  Cardiovascular: No complaints of chest pain, no palpitations, no ankle edema  Respiratory: No complaints of shortness of breath, no cough  Gastrointestinal: No complaints of jaundice, no bloody stools, no pale stools  Genitourinary: No complaints of dysuria, no hematuria, no nocturia, no frequent urination, no urethral discharge  Musculoskeletal: No complaints of weakness, paralysis, joint stiffness or arthralgias  Integumentary: No complaints of rash, no new lesions  Neurological: No complaints of convulsions, no seizures, no dizziness  Hematologic/Lymphatic: No complaints of easy bruising  Endocrine:  No hot or cold intolerance  No polydipsia, polyphagia, or polyuria  Allergy/immunology:  No environmental allergies  No food allergies  Not immunocompromised  Skin:  No pallor or rash  No wound          Patient Active Problem List   Diagnosis    Essential hypertension    [facility]    Crohn disease ([facility] )    Colostomy in place ([facility] )    Diarrhea    Severe protein-calorie malnutrition ([facility] )    Cellulitis    Emphysema of lung ([facility] )    Vasovagal syncope    Severe sepsis (HCC)    Pneumonia    Chest pain    Liver mass    Tobacco abuse    Abdominal pain    Personal history of liver cancer    Palliative care patient    Abdominal wall abscess    Observed sleep apnea    Acute pain of left wrist    Chronic, continuous use of opioids    Hypocalcemia    Left wrist pain    Kidney stone    Hypomagnesemia    Hypokalemia    Gastroesophageal reflux disease without esophagitis    Chronic obstructive pulmonary disease (HCC)    Mixed hyperlipidemia    Need for hepatitis C screening test    Vitamin B12 deficiency    Cataract    Dysthymic disorder    Crohn's disease of small intestine with other complication ([facility] )    Encounter for follow-up surveillance of liver cancer    Hepatocellular carcinoma ([facility] )    Platelets decreased ([facility] )     Past Medical History: Diagnosis Date    LUCILLE (acute kidney injury) ([facility] ) 2017    Blindness of left eye     Since birth   [facility])     liver    Cataract     Colon polyp     Colostomy in place [facility]) 2017    COPD (chronic obstructive pulmonary disease) (HCC)     Crohn disease (HCC)     Emphysema of lung ([facility] )     Emphysema/COPD ([place] [address] )     Essential hypertension     GERD (gastroesophageal reflux disease)     Hypertension     Hypokalemia 2017    Hypomagnesemia 2017    Hyponatremia 2017    Kidney stone     Osteomyelitis ([place] [address] )     Pneumonia      Past Surgical History:   Procedure Laterality Date    CATARACT EXTRACTION Bilateral     CHOLECYSTECTOMY      COLECTOMY      10 inchs of ileum    COLONOSCOPY N/A 2017    Procedure: COLONOSCOPY;  Surgeon: Jacqui Mcpherson MD;  [facility]; Service: Gastroenterology    COLOSTOMY      FINGER AMPUTATION      FINGER SURGERY Left     IR BIOPSY LIVER MASS  2020    LITHOTRIPSY      LIVER LOBECTOMY N/A 7/15/2020    Procedure: LIVER ABLATION, INTRAOPERATIVE U/S LIVER;  Surgeon: Eleanor Malin MD;  [facility]: [facility];  Service: Surgical Oncology     Family History   Problem Relation Age of Onset    Hypertension Father     Heart disease Sister      Social History     Socioeconomic History    Marital status:      Spouse name: Not on file    Number of children: Not on file    Years of education: Not on file    Highest education level: Not on file   Occupational History    Not on file   Tobacco Use    Smoking status: Former Smoker     Packs/day: 1 00     Years: 50 00     Pack years: 50 00     Types: Cigarettes     Quit date: 7/15/2020     Years since quittin 0    Smokeless tobacco: Never Used   Vaping Use    Vaping Use: Never used   Substance and Sexual Activity    Alcohol use:  Yes     Alcohol/week: 7 0 standard drinks     Types: 7 Cans of beer per week    Drug use: No    Sexual activity: Not Currently   Other Topics Concern    Not on file   Social History Narrative    Not on file     Social Determinants of Health     Financial Resource Strain: Not on file   Food Insecurity: Not on file   Transportation Needs: Not on file   Physical Activity: Not on file   Stress: Not on file   Social Connections: Not on file   Intimate Partner Violence: Not on file   Housing Stability: Not on file       Current Outpatient Medications:     ALPRAZolam (XANAX) 0 25 mg tablet, Take 1 tablet (0 25 mg total) by mouth daily at bedtime as needed for anxiety, Disp: 90 tablet, Rfl: 0    AMILoride 5 mg tablet, TAKE 1 TABLET BY MOUTH EVERY DAY, Disp: 90 tablet, Rfl: 1    amLODIPine (NORVASC) 5 mg tablet, Take 1 tablet (5 mg total) by mouth daily, Disp: 90 tablet, Rfl: 1    buPROPion (WELLBUTRIN XL) 150 mg 24 hr tablet, TAKE 1 TABLET BY MOUTH EVERY DAY, Disp: 90 tablet, Rfl: 1    calcium carbonate (TUMS) 500 mg chewable tablet, Chew 1 tablet (500 mg total) daily, Disp:  , Rfl: 0    Fluticasone-Salmeterol,sensor, (AirDuo Digihaler) 232-14 MCG/ACT AEPB, Inhale 1 puff 2 (two) times a day Rinse mouth after use , Disp: 1 each, Rfl: 2    ipratropium-albuterol (Combivent Respimat) inhaler, Inhale 1 puff 4 (four) times a day, Disp: 4 g, Rfl: 5    MAGNESIUM CHLORIDE PO, Take 1,000 mg by mouth daily, Disp: , Rfl:     mirtazapine (REMERON) 15 mg tablet, TAKE 1 TABLET BY MOUTH EVERYDAY AT BEDTIME, Disp: 90 tablet, Rfl: 1    omeprazole (PriLOSEC) 20 mg delayed release capsule, TAKE 1 CAPSULE BY MOUTH EVERY DAY, Disp: 90 capsule, Rfl: 1    potassium chloride (MICRO-K) 10 MEQ CR capsule, Take 2 capsules (20 mEq total) by mouth 2 (two) times a day, Disp: 60 capsule, Rfl: 2    rosuvastatin (CRESTOR) 10 MG tablet, TAKE 1 TABLET BY MOUTH EVERY DAY, Disp: 90 tablet, Rfl: 1    tiotropium-olodaterol (Stiolto Respimat) 2 5-2 5 MCG/ACT inhaler, Inhale 2 puffs daily, Disp: 4 g, Rfl: 5    VIT B12-METHIONINE-INOS-CHOL IM, Inject into a muscle every 30 (thirty) days, Disp: , Rfl:     Current Facility-Administered Medications:     cyanocobalamin injection 1,000 mcg, 1,000 mcg, Intramuscular, Q30 Days, Torri Coleman MD, 1,000 mcg at 07/13/22 0942  No Known Allergies  Vitals:    08/02/22 0943   BP: 110/70   Pulse: 104   Resp: 14   Temp: (!) 97 2 °F (36 2 °C)   SpO2: 95%       Physical Exam  Constitutional: General appearance: The Patient is well-developed and well-nourished who appears the stated age in no acute distress  Patient is pleasant and talkative  HEENT:  Normocephalic  Sclerae are anicteric  Mucous membranes are moist  Neck is supple without adenopathy  No JVD  Chest: The lungs are clear to auscultation  Cardiac: Heart is regular rate  Abdomen: Abdomen is soft, non-tender, non-distended and without masses  Reproducible right flank tenderness    Extremities: There is no clubbing or cyanosis  There is no edema  Symmetric  Neuro: Grossly nonfocal  Gait is normal      Lymphatic: No evidence of cervical adenopathy bilaterally  No evidence of axillary adenopathy bilaterally  No evidence of inguinal adenopathy bilaterally  Skin: Warm, anicteric  Psych:  Patient is pleasant and talkative  Breasts:        Pathology:  [unfilled]    Labs:      Imaging  MRI abdomen w wo contrast    Result Date: 7/26/2022  Narrative: MRI - ABDOMEN - WITH AND WITHOUT CONTRAST INDICATION: 77 years / Male  [id number] [id number]: Personal history of malignant neoplasm of liver  Status post ablation of segment 5 lesion on 7/15/2020  COMPARISON: MR abdomen 1/14/2022   TECHNIQUE:  The following pulse sequences were obtained prior to and following the administration of intravenous contrast:     Coronal and axial T2 with TE of 90 and 180 respectively, axial T2 with fat saturation, axial FIESTA fat-sat, axial T1-weighted in-and-out-of phase, axial DWI/ADC, precontrast axial T1 with fat saturation, post-contrast dynamic axial T1 with fat saturation at 20, 70, and 180 seconds, coronal T1  with fat saturation and 7 minute delayed axial T1 with fat saturation  IV Contrast:  6 mL of Gadobutrol injection (SINGLE-DOSE) FINDINGS: LOWER CHEST:   Unremarkable  LIVER: Hepatic steatosis  Observation #1: Treatment modality: Microwave ablation on 7/15/2020 Location/size: Segment 5 (series 10, image 31), 4 2 x 3 0 cm, unchanged Pretreatment LI-RADS category: LR-M Enhancement in a nodular, mass-like, or thick irregular pattern: No  Size of enhancing component: None Enhancement characteristics: *  APHE (Arterial phase hyperenhancement): No  *  Nonperipheral "Washout": No  *  Other: N/A Treatment Response Category: LR-TR Nonviable Observation #2: Location: Segment 5; Size: 1 8 cm (series 11, image 43) Nonrim APHE (Arterial phase hyperenhancement): Yes  Threshold Growth: Yes, previously 0 8 cm  Nonperipheral "Washout" appearance: No  Enhancing "Capsule" appearance: No Ancillary features: *  Favoring malignancy: Restricted diffusion, moderate T2 hyperintensity *  Favoring benignity: None  LI-RADS  category: LR-5 The hepatic veins and portal veins are patent  BILE DUCTS: Stable mild dilatation of the biliary radicals the right hepatic lobe peripheral to the treatment zone  Otherwise, no intrahepatic or extra hepatic bile duct dilatation GALLBLADDER:  Cholecystectomy  PANCREAS:  Unremarkable  ADRENAL GLANDS:  Normal  SPLEEN:  Normal  KIDNEYS/PROXIMAL URETERS: No hydroureteronephrosis  No suspicious renal mass  Left mid renal simple cyst  BOWEL:   No dilated loops of bowel  PERITONEUM/RETROPERITONEUM: No mass  No ascites  LYMPH NODES: No abdominal lymphadenopathy  VASCULAR STRUCTURES:  No aneurysm  Aortic atherosclerosis  ABDOMINAL WALL:  Unremarkable  OSSEOUS STRUCTURES:  No suspicious osseous lesion  Impression: 1  Unchanged size and appearance of the right hepatic lobe treatment zone without evidence of viable tumor, LR-TR nonviable   2   Increase in size of a now 1 8 cm segment 5 lesion adjacent to the treatment zone, upgraded to LI-RADS 5 due to threshold growth  The study was marked in EPIC for significant notification  Workstation performed: [id number]     I reviewed the above laboratory and imaging data  Discussion/Summary: 78-year-old male with [address]  in a non cirrhotic liver   His Child score is B   His MELD score is 9  His hepatitis C antibody is nonreactive   He underwent ablation of the segment 5 liver lesion  His AFP is increased and the 2nd lesion has increased in size and is now LR 5  We had a long discussion regarding treatment options  Since this is under 2 cm in size, I think this can be percutaneously ablated  I will set him up with IR to discuss this  I would hold on Y 90 or TACE encases disease becomes multifocal   I will plan on seeing him again in 4 months with a repeat MRI and AFP level  Regarding the flank discomfort, this is likely musculoskeletal   He is going to follow up with his family physician    I do not think this is related to his tumor  All his questions were answered

## 2022-08-03 RX ORDER — FLUTICASONE PROPIONATE AND SALMETEROL 232; 14 UG/1; UG/1
1 POWDER, METERED RESPIRATORY (INHALATION) 2 TIMES DAILY
Qty: 1 EACH | Refills: 0 | Status: SHIPPED | OUTPATIENT
Start: 2022-08-03 | End: 2022-09-06

## 2022-08-03 RX ORDER — POTASSIUM CHLORIDE 750 MG/1
20 CAPSULE, EXTENDED RELEASE ORAL 2 TIMES DAILY
Qty: 60 CAPSULE | Refills: 0 | Status: SHIPPED | OUTPATIENT
Start: 2022-08-03 | End: 2022-09-22 | Stop reason: SDUPTHER

## 2022-08-05 RX ORDER — OMEPRAZOLE 20 MG/1
20 CAPSULE, DELAYED RELEASE ORAL DAILY
Qty: 90 CAPSULE | Refills: 0 | Status: SHIPPED | OUTPATIENT
Start: 2022-08-05

## 2022-08-09 ENCOUNTER — CLINICAL SUPPORT (OUTPATIENT)
Dept: INTERNAL MEDICINE CLINIC | Facility: CLINIC | Age: 67
End: 2022-08-09
Payer: MEDICARE

## 2022-08-09 DIAGNOSIS — E53.8 B12 DEFICIENCY: Primary | ICD-10-CM

## 2022-08-09 RX ADMIN — CYANOCOBALAMIN 1000 MCG: 1000 INJECTION, SOLUTION INTRAMUSCULAR; SUBCUTANEOUS at 09:21

## 2022-08-11 ENCOUNTER — TELEMEDICINE (OUTPATIENT)
Dept: INTERVENTIONAL RADIOLOGY/VASCULAR | Facility: CLINIC | Age: 67
End: 2022-08-11
Payer: MEDICARE

## 2022-08-11 DIAGNOSIS — C22.0 HEPATOCELLULAR CARCINOMA (HCC): Primary | ICD-10-CM

## 2022-08-11 PROCEDURE — 99213 OFFICE O/P EST LOW 20 MIN: CPT | Performed by: RADIOLOGY

## 2022-08-11 NOTE — PROGRESS NOTES
Virtual Regular Visit    Verification of patient location:    Patient is located in the following state in which I hold an active license [place]      Assessment/Plan:    Problem List Items Addressed This Visit        Digestive    Hepatocellular carcinoma ([address] ) - Primary    Relevant Orders    IR microwave ablation      Mr Carlene Lozano is a 71-year-old male with previously ablated segment 5 [address]  which is nonviable  Recent MRI imaging demonstrates adjacent segment 5 lesion characterized as HCC (LR 5) measuring 1 8 cm  I described procedural details, risk and benefits  The patient would like to proceed with microwave ablation  Reason for visit is   Chief Complaint   Patient presents with    Virtual Regular Visit        Encounter provider Nazia Manriquez DO    Provider located at [address] [place] [address],Third Floor  [address] [facility]  [address] [place] [address]  [phone]      Recent Visits  No visits were found meeting these conditions  Showing recent visits within past 7 days and meeting all other requirements  Today's Visits  Date Type Provider Dept   08/11/22 Telemedicine Nazia Manriquez DO [facility]   Showing today's visits and meeting all other requirements  Future Appointments  No visits were found meeting these conditions  Showing future appointments within next 150 days and meeting all other requirements       The patient was identified by name and date of birth  Ariella Gómez III was informed that this is a telemedicine visit and that the visit is being conducted through telephone and patient was informed that this is not a secure, HIPAA-compliant platform  He agrees to proceed     My office door was closed  No one else was in the room  He acknowledged consent and understanding of privacy and security of the video platform  The patient has agreed to participate and understands they can discontinue the visit at any time      Patient is aware this is a billable service  CC: HCC    HPI: 77 y M w/ history of [place] [address]  with treated seg 5 lesion by ablation  Surveillance MRI on 7/21/2022 shows nonviable tumor in the previous ablation zone  Adjacent to the ablation zone in segment 5, there is a 1 8 cm LR5 lesion  He was referred by Dr Hayden Camarena to discuss ablation  Overall, he denies current complaints  He feels good  He understands the procedure  He denies abdominal pain, confusion, chest pain or shortness of breath  No jaundice  No confusion  Past Medical History:   Diagnosis Date    LUCILLE (acute kidney injury) ([place] [address] ) 6/28/2017    Blindness of left eye     Since birth    Cancer [facility])     liver    Cataract     Colon polyp     Colostomy in place [facility]) 6/29/2017    COPD (chronic obstructive pulmonary disease) (HCC)     Crohn disease (HCC)     Emphysema of lung ([place] [address] )     Emphysema/COPD ([place] [address] )     Essential hypertension     GERD (gastroesophageal reflux disease)     Hypertension     Hypokalemia 6/28/2017    Hypomagnesemia 6/29/2017    Hyponatremia 6/28/2017    Kidney stone     Osteomyelitis ([place] [address] )     Pneumonia        Past Surgical History:   Procedure Laterality Date    CATARACT EXTRACTION Bilateral     CHOLECYSTECTOMY      COLECTOMY      10 inchs of ileum    COLONOSCOPY N/A 6/30/2017    Procedure: COLONOSCOPY;  Surgeon: Cherylene Needy, MD;  [facility];   Service: Gastroenterology    COLOSTOMY      FINGER AMPUTATION      FINGER SURGERY Left     IR BIOPSY LIVER MASS  6/8/2020    LITHOTRIPSY      LIVER LOBECTOMY N/A 7/15/2020    Procedure: LIVER ABLATION, INTRAOPERATIVE U/S LIVER;  Surgeon: Sherly Aguilar MD;  [facility];  Service: Surgical Oncology       Current Outpatient Medications   Medication Sig Dispense Refill    ALPRAZolam (XANAX) 0 25 mg tablet Take 1 tablet (0 25 mg total) by mouth daily at bedtime as needed for anxiety 90 tablet 0    AMILoride 5 mg tablet TAKE 1 TABLET BY MOUTH EVERY DAY 90 tablet 1    amLODIPine (NORVASC) 5 mg tablet Take 1 tablet (5 mg total) by mouth daily 90 tablet 1    buPROPion (WELLBUTRIN XL) 150 mg 24 hr tablet TAKE 1 TABLET BY MOUTH EVERY DAY 90 tablet 1    calcium carbonate (TUMS) 500 mg chewable tablet Chew 1 tablet (500 mg total) daily  0    Fluticasone-Salmeterol,sensor, (AirDuo Digihaler) 232-14 MCG/ACT AEPB Inhale 1 puff 2 (two) times a day Rinse mouth after use  1 each 0    ipratropium-albuterol (Combivent Respimat) inhaler Inhale 1 puff 4 (four) times a day 4 g 5    MAGNESIUM CHLORIDE PO Take 1,000 mg by mouth daily      mirtazapine (REMERON) 15 mg tablet TAKE 1 TABLET BY MOUTH EVERYDAY AT BEDTIME 90 tablet 1    omeprazole (PriLOSEC) 20 mg delayed release capsule Take 1 capsule (20 mg total) by mouth daily 90 capsule 0    potassium chloride (MICRO-K) 10 MEQ CR capsule Take 2 capsules (20 mEq total) by mouth 2 (two) times a day 60 capsule 0    rosuvastatin (CRESTOR) 10 MG tablet TAKE 1 TABLET BY MOUTH EVERY DAY 90 tablet 1    tiotropium-olodaterol (Stiolto Respimat) 2 5-2 5 MCG/ACT inhaler Inhale 2 puffs daily 4 g 5    VIT B12-METHIONINE-INOS-CHOL IM Inject into a muscle every 30 (thirty) days       Current Facility-Administered Medications   Medication Dose Route Frequency Provider Last Rate Last Admin    cyanocobalamin injection 1,000 mcg  1,000 mcg Intramuscular Q30 Days Torri Coleman MD   1,000 mcg at 08/09/22 0921        No Known Allergies    Review of Systems   All other systems reviewed and are negative  I spent 30 minutes with patient today in which greater than 50% of the time was spent in counseling/coordination of care regarding [facility]

## 2022-08-25 DIAGNOSIS — F34.1 DYSTHYMIC DISORDER: ICD-10-CM

## 2022-08-25 RX ORDER — ALPRAZOLAM 0.25 MG/1
0.25 TABLET ORAL
Qty: 90 TABLET | Refills: 0 | Status: SHIPPED | OUTPATIENT
Start: 2022-08-25 | End: 2022-11-23

## 2022-08-25 NOTE — TELEPHONE ENCOUNTER
09/01/22 nov   07/12/22 lov
Normal rate, regular rhythm.  Heart sounds S1, S2.  No murmurs, rubs or gallops.

## 2022-09-02 ENCOUNTER — CLINICAL SUPPORT (OUTPATIENT)
Dept: INTERNAL MEDICINE CLINIC | Facility: CLINIC | Age: 67
End: 2022-09-02
Payer: MEDICARE

## 2022-09-02 DIAGNOSIS — E53.8 B12 DEFICIENCY: Primary | ICD-10-CM

## 2022-09-02 RX ADMIN — CYANOCOBALAMIN 1000 MCG: 1000 INJECTION, SOLUTION INTRAMUSCULAR; SUBCUTANEOUS at 09:28

## 2022-09-06 ENCOUNTER — OFFICE VISIT (OUTPATIENT)
Dept: PULMONOLOGY | Facility: CLINIC | Age: 67
End: 2022-09-06
Payer: MEDICARE

## 2022-09-06 VITALS
SYSTOLIC BLOOD PRESSURE: 120 MMHG | TEMPERATURE: 97.3 F | BODY MASS INDEX: 20.88 KG/M2 | OXYGEN SATURATION: 97 % | WEIGHT: 141 LBS | HEART RATE: 83 BPM | RESPIRATION RATE: 18 BRPM | HEIGHT: 69 IN | DIASTOLIC BLOOD PRESSURE: 70 MMHG

## 2022-09-06 DIAGNOSIS — J43.2 CENTRILOBULAR EMPHYSEMA (HCC): ICD-10-CM

## 2022-09-06 DIAGNOSIS — J44.9 CHRONIC OBSTRUCTIVE PULMONARY DISEASE, UNSPECIFIED COPD TYPE (HCC): Primary | ICD-10-CM

## 2022-09-06 DIAGNOSIS — C22.0 HEPATOCELLULAR CARCINOMA (HCC): ICD-10-CM

## 2022-09-06 DIAGNOSIS — R06.09 DOE (DYSPNEA ON EXERTION): ICD-10-CM

## 2022-09-06 DIAGNOSIS — F17.211 NICOTINE DEPENDENCE, CIGARETTES, IN REMISSION: ICD-10-CM

## 2022-09-06 PROCEDURE — 99214 OFFICE O/P EST MOD 30 MIN: CPT | Performed by: INTERNAL MEDICINE

## 2022-09-06 NOTE — PROGRESS NOTES
Office Progress Note - Pulmonary    Lebron Ros III 77 y o  male MRN: [id number]    Encounter: [id number]      Assessment:   Chronic obstructive pulmonary disease   Centrilobular emphysema   Dyspnea on exertion   Hepatocellular carcinoma   History of tobacco use per    Plan:     Anoro Ellipta 1 inhalation once a day   Albuterol/ipratropium Respimat 2 inhalations 4 times a day as needed   Regular walks to improve stamina per   Follow-up in 6 months  Discussion:   The patient's COPD is untreated  His dyspnea on exertion is due to his COPD  I had a long discussion with him regarding the benefit of having a maintenance inhaler for his moderate COPD  Since his prescription plan is fixed he will be able to afford Anoro  I have canceled all the previous prescriptions and start him on Anoro Ellipta 1 inhalation once a day  I told him if there is any issues with his prescriptions to call my office  He will use albuterol/ipratropium Respimat 4 times a day as needed  Will see him in 6 months in a follow-up visit  Subjective: The patient is here for follow-up visit  He has shortness of breath on exertion  He has occasional cough  He was unable to get his inhalers  Denies nocturnal symptoms  He remains off cigarettes for now 8 months  Occasionally he uses his rescue inhaler  Review of systems:  A 12 point system review is done and aside from what is stated above the rest of the review of systems is negative  Family history and social history are reviewed  Medications list is reviewed  Vitals: Blood pressure 120/70, pulse 83, temperature (!) 97 3 °F (36 3 °C), temperature source Tympanic, resp  rate 18, height 5' 9" (1 753 m), weight 64 kg (141 lb), SpO2 97 %  ,     Physical Exam  Gen: Awake, alert, oriented x 3, no acute distress  HEENT: Mucous membranes moist, no oral lesions, no thrush  NECK: No accessory muscle use, JVP not elevated  Cardiac: Regular, single S1, single S2, no murmurs, no rubs, no gallops  Lungs:  Decreased breath sounds  No wheezing or rhonchi  Abdomen: normoactive bowel sounds, soft nontender, nondistended, no rebound or rigidity, no guarding  Extremities: no cyanosis, no clubbing, no edema  Neuro:  Grossly nonfocal   Skin:  No rash      Lab Results   Component Value Date    SODIUM 140 07/06/2022    K 4 0 07/06/2022     07/06/2022    CO2 26 07/06/2022    BUN 7 07/15/2022    CREATININE 0 85 07/15/2022    GLUC 82 07/06/2022    CALCIUM 8 5 (L) 07/06/2022     Lab Results   Component Value Date    WBC 6 7 07/06/2022    HGB 14 9 07/06/2022    HCT 42 3 07/06/2022     2 (H) 07/06/2022     (L) 07/06/2022

## 2022-09-12 NOTE — PRE-PROCEDURE INSTRUCTIONS
Pre-procedure Instructions for Interventional Radiology  [address] [place] [address] [address] [phone]    You are scheduled for a/an Microwave Ablation of Liver Mass  On Weds 9/21/22  Your tentative arrival time is 0715  Short stay will notify you the day before your procedure with the exact arrival time and the location to arrive  To prepare for your procedure:  1  Please arrange for someone to drive you home after the procedure and stay with you until the next morning if you are instructed to do so  This is typically for patients receiving some type of sedative or anesthetic for the procedure  2  DO NOT EAT OR DRINK ANYTHING after midnight on the evening before your procedure including candy & gum   3  ONLY SIPS OF WATER with your medications are allowed on the morning of your procedure  4  TAKE ALL OF YOUR REGULAR MEDICATIONS THE MORNING OF YOUR PROCEDURE with sips of water! We may call you to stop some of your blood sugar, blood pressure and blood thinning medications depending on the procedure  Please take all of these medications unless we instruct you to stop them  5  If you have an allergy to x-ray dye, please contact Interventional Radiology for an x-ray dye preparation which usually consists of an oral steroid and Benadryl  The day of your procedure:  1  Bring a list of the medications you take at home  2  Bring medications you take for breathing problems (such as inhalers), medications for chest pain, or both  3  Bring a case for your glasses or contacts  4  Bring your insurance card and a form of photo ID   5  Please leave all valuables such as credit cards and jewelry at home  6  Report to the registration desk in the main lobby at the [facility]  Ask to be directed to [facility]    7  While your procedure is being performed, your family may wait in the Radiology Waiting Room on the 1st floor in Radiology  if they need to leave, they may provide a number to be called following the procedure  8  Be prepared to stay overnight just in case  Sometimes procedures will indicate the need for further observation or treatment  9  If you are scheduled for a follow-up visit with the Interventional Radiologist after your procedure, you will be called with a date and time  10  Covid Vaccine completed      Special Instructions (Medications to stop taking before your procedure etc ):

## 2022-09-21 ENCOUNTER — ANESTHESIA (OUTPATIENT)
Dept: RADIOLOGY | Facility: HOSPITAL | Age: 67
End: 2022-09-21

## 2022-09-21 ENCOUNTER — ANESTHESIA EVENT (OUTPATIENT)
Dept: RADIOLOGY | Facility: HOSPITAL | Age: 67
End: 2022-09-21

## 2022-09-21 ENCOUNTER — HOSPITAL ENCOUNTER (OUTPATIENT)
Dept: RADIOLOGY | Facility: HOSPITAL | Age: 67
Discharge: HOME | End: 2022-09-21
Attending: RADIOLOGY
Payer: MEDICARE

## 2022-09-21 ENCOUNTER — TELEPHONE (OUTPATIENT)
Dept: INTERVENTIONAL RADIOLOGY/VASCULAR | Facility: CLINIC | Age: 67
End: 2022-09-21

## 2022-09-21 VITALS
RESPIRATION RATE: 19 BRPM | BODY MASS INDEX: 20.73 KG/M2 | TEMPERATURE: 98.3 F | HEIGHT: 69 IN | DIASTOLIC BLOOD PRESSURE: 82 MMHG | SYSTOLIC BLOOD PRESSURE: 154 MMHG | WEIGHT: 140 LBS | HEART RATE: 91 BPM | OXYGEN SATURATION: 92 %

## 2022-09-21 DIAGNOSIS — C22.0 HEPATOCELLULAR CARCINOMA (HCC): ICD-10-CM

## 2022-09-21 LAB
ANION GAP SERPL CALCULATED.3IONS-SCNC: 6 MMOL/L (ref 4–13)
BUN SERPL-MCNC: 5 MG/DL (ref 5–25)
CALCIUM SERPL-MCNC: 8.2 MG/DL (ref 8.3–10.1)
CHLORIDE SERPL-SCNC: 107 MMOL/L (ref 96–108)
CO2 SERPL-SCNC: 26 MMOL/L (ref 21–32)
CREAT SERPL-MCNC: 0.93 MG/DL (ref 0.6–1.3)
ERYTHROCYTE [DISTWIDTH] IN BLOOD BY AUTOMATED COUNT: 12.3 % (ref 11.6–15.1)
GFR SERPL CREATININE-BSD FRML MDRD: 85 ML/MIN/1.73SQ M
GLUCOSE P FAST SERPL-MCNC: 89 MG/DL (ref 65–99)
GLUCOSE SERPL-MCNC: 89 MG/DL (ref 65–140)
HCT VFR BLD AUTO: 40.4 % (ref 36.5–49.3)
HGB BLD-MCNC: 13.9 G/DL (ref 12–17)
INR PPP: 1 (ref 0.84–1.19)
MCH RBC QN AUTO: 35.5 PG (ref 26.8–34.3)
MCHC RBC AUTO-ENTMCNC: 34.4 G/DL (ref 31.4–37.4)
MCV RBC AUTO: 103 FL (ref 82–98)
PLATELET # BLD AUTO: 81 THOUSANDS/UL (ref 149–390)
PMV BLD AUTO: 10.2 FL (ref 8.9–12.7)
POTASSIUM SERPL-SCNC: 3.4 MMOL/L (ref 3.5–5.3)
PROTHROMBIN TIME: 13.4 SECONDS (ref 11.6–14.5)
RBC # BLD AUTO: 3.92 MILLION/UL (ref 3.88–5.62)
SODIUM SERPL-SCNC: 139 MMOL/L (ref 135–147)
WBC # BLD AUTO: 5.15 THOUSAND/UL (ref 4.31–10.16)

## 2022-09-21 PROCEDURE — 85610 PROTHROMBIN TIME: CPT | Performed by: RADIOLOGY

## 2022-09-21 PROCEDURE — 85027 COMPLETE CBC AUTOMATED: CPT | Performed by: RADIOLOGY

## 2022-09-21 PROCEDURE — 80048 BASIC METABOLIC PNL TOTAL CA: CPT | Performed by: RADIOLOGY

## 2022-09-21 PROCEDURE — C1886 CATHETER, ABLATION: HCPCS

## 2022-09-21 PROCEDURE — 47382 PERCUT ABLATE LIVER RF: CPT | Performed by: INTERNAL MEDICINE

## 2022-09-21 PROCEDURE — 77013 CT GUIDE FOR TISSUE ABLATION: CPT

## 2022-09-21 PROCEDURE — 47382 PERCUT ABLATE LIVER RF: CPT

## 2022-09-21 PROCEDURE — 77013 CT GUIDE FOR TISSUE ABLATION: CPT | Performed by: INTERNAL MEDICINE

## 2022-09-21 RX ORDER — OXYCODONE HYDROCHLORIDE 5 MG/1
5 TABLET ORAL EVERY 4 HOURS PRN
Status: DISCONTINUED | OUTPATIENT
Start: 2022-09-21 | End: 2022-09-22 | Stop reason: HOSPADM

## 2022-09-21 RX ORDER — GLYCOPYRROLATE 0.2 MG/ML
INJECTION INTRAMUSCULAR; INTRAVENOUS AS NEEDED
Status: DISCONTINUED | OUTPATIENT
Start: 2022-09-21 | End: 2022-09-21

## 2022-09-21 RX ORDER — FENTANYL CITRATE 50 UG/ML
INJECTION, SOLUTION INTRAMUSCULAR; INTRAVENOUS AS NEEDED
Status: DISCONTINUED | OUTPATIENT
Start: 2022-09-21 | End: 2022-09-21

## 2022-09-21 RX ORDER — CEFAZOLIN SODIUM 1 G/3ML
INJECTION, POWDER, FOR SOLUTION INTRAMUSCULAR; INTRAVENOUS AS NEEDED
Status: DISCONTINUED | OUTPATIENT
Start: 2022-09-21 | End: 2022-09-21

## 2022-09-21 RX ORDER — DEXAMETHASONE SODIUM PHOSPHATE 10 MG/ML
INJECTION, SOLUTION INTRAMUSCULAR; INTRAVENOUS AS NEEDED
Status: DISCONTINUED | OUTPATIENT
Start: 2022-09-21 | End: 2022-09-21

## 2022-09-21 RX ORDER — LIDOCAINE HYDROCHLORIDE 10 MG/ML
INJECTION, SOLUTION EPIDURAL; INFILTRATION; INTRACAUDAL; PERINEURAL AS NEEDED
Status: DISCONTINUED | OUTPATIENT
Start: 2022-09-21 | End: 2022-09-21

## 2022-09-21 RX ORDER — FENTANYL CITRATE/PF 50 MCG/ML
50 SYRINGE (ML) INJECTION
Status: DISCONTINUED | OUTPATIENT
Start: 2022-09-21 | End: 2022-09-21 | Stop reason: HOSPADM

## 2022-09-21 RX ORDER — SODIUM CHLORIDE 9 MG/ML
75 INJECTION, SOLUTION INTRAVENOUS CONTINUOUS
Status: DISCONTINUED | OUTPATIENT
Start: 2022-09-21 | End: 2022-09-22 | Stop reason: HOSPADM

## 2022-09-21 RX ORDER — HYDROMORPHONE HCL/PF 1 MG/ML
0.5 SYRINGE (ML) INJECTION
Status: DISCONTINUED | OUTPATIENT
Start: 2022-09-21 | End: 2022-09-21 | Stop reason: HOSPADM

## 2022-09-21 RX ORDER — ONDANSETRON 2 MG/ML
4 INJECTION INTRAMUSCULAR; INTRAVENOUS ONCE AS NEEDED
Status: DISCONTINUED | OUTPATIENT
Start: 2022-09-21 | End: 2022-09-21 | Stop reason: HOSPADM

## 2022-09-21 RX ORDER — ONDANSETRON 2 MG/ML
INJECTION INTRAMUSCULAR; INTRAVENOUS AS NEEDED
Status: DISCONTINUED | OUTPATIENT
Start: 2022-09-21 | End: 2022-09-21

## 2022-09-21 RX ORDER — METRONIDAZOLE 500 MG/100ML
500 INJECTION, SOLUTION INTRAVENOUS ONCE
Status: COMPLETED | OUTPATIENT
Start: 2022-09-21 | End: 2022-09-21

## 2022-09-21 RX ORDER — ACETAMINOPHEN 325 MG/1
650 TABLET ORAL EVERY 4 HOURS PRN
Status: DISCONTINUED | OUTPATIENT
Start: 2022-09-21 | End: 2022-09-22 | Stop reason: HOSPADM

## 2022-09-21 RX ORDER — CEFAZOLIN SODIUM 1 G/50ML
1000 SOLUTION INTRAVENOUS ONCE
Status: DISCONTINUED | OUTPATIENT
Start: 2022-09-21 | End: 2022-09-22 | Stop reason: HOSPADM

## 2022-09-21 RX ORDER — PROPOFOL 10 MG/ML
INJECTION, EMULSION INTRAVENOUS AS NEEDED
Status: DISCONTINUED | OUTPATIENT
Start: 2022-09-21 | End: 2022-09-21

## 2022-09-21 RX ORDER — MIDAZOLAM HYDROCHLORIDE 2 MG/2ML
INJECTION, SOLUTION INTRAMUSCULAR; INTRAVENOUS AS NEEDED
Status: DISCONTINUED | OUTPATIENT
Start: 2022-09-21 | End: 2022-09-21

## 2022-09-21 RX ADMIN — PROPOFOL 150 MG: 10 INJECTION, EMULSION INTRAVENOUS at 08:46

## 2022-09-21 RX ADMIN — DEXAMETHASONE SODIUM PHOSPHATE 10 MG: 10 INJECTION, SOLUTION INTRAMUSCULAR; INTRAVENOUS at 08:50

## 2022-09-21 RX ADMIN — ONDANSETRON 4 MG: 2 INJECTION INTRAMUSCULAR; INTRAVENOUS at 08:40

## 2022-09-21 RX ADMIN — SODIUM CHLORIDE 75 ML/HR: 0.9 INJECTION, SOLUTION INTRAVENOUS at 07:33

## 2022-09-21 RX ADMIN — CEFAZOLIN 1000 MG: 1 INJECTION, POWDER, FOR SOLUTION INTRAMUSCULAR; INTRAVENOUS at 08:40

## 2022-09-21 RX ADMIN — LIDOCAINE HYDROCHLORIDE 50 MG: 10 INJECTION, SOLUTION EPIDURAL; INFILTRATION; INTRACAUDAL; PERINEURAL at 08:46

## 2022-09-21 RX ADMIN — MIDAZOLAM 2 MG: 1 INJECTION INTRAMUSCULAR; INTRAVENOUS at 08:42

## 2022-09-21 RX ADMIN — METRONIDAZOLE: 500 SOLUTION INTRAVENOUS at 08:50

## 2022-09-21 RX ADMIN — IOHEXOL 100 ML: 350 INJECTION, SOLUTION INTRAVENOUS at 10:12

## 2022-09-21 RX ADMIN — FENTANYL CITRATE 100 MCG: 50 INJECTION INTRAMUSCULAR; INTRAVENOUS at 08:46

## 2022-09-21 RX ADMIN — GLYCOPYRROLATE 0.1 MG: 0.2 INJECTION, SOLUTION INTRAMUSCULAR; INTRAVENOUS at 08:40

## 2022-09-21 RX ADMIN — SODIUM CHLORIDE: 0.9 INJECTION, SOLUTION INTRAVENOUS at 09:53

## 2022-09-21 NOTE — ANESTHESIA PREPROCEDURE EVALUATION
Procedure:  IR MICROWAVE ABLATION    Relevant Problems   ANESTHESIA (within normal limits)      CARDIO   (+) Chest pain   (+) Essential hypertension   (+) Mixed hyperlipidemia      ENDO   (-) Diabetes mellitus, type 2 (HCC)   (-) Hyperthyroidism   (-) Hypothyroidism      GI/HEPATIC   (+) Gastroesophageal reflux disease without esophagitis   (+) Hepatocellular carcinoma (HCC)      /RENAL   (+) Kidney stone      HEMATOLOGY   (+) Platelets decreased (HCC)      MUSCULOSKELETAL (within normal limits)      NEURO/PSYCH   (+) Chronic, continuous use of opioids   (+) Dysthymic disorder   (+) Encounter for follow-up surveillance of liver cancer   (+) Personal history of liver cancer      PULMONARY   (+) Chronic obstructive pulmonary disease (HCC)   (+) Emphysema of lung (HCC)   (+) Emphysema/COPD (HCC)   (+) Observed sleep apnea   (+) Pneumonia   (-) Asthma        PFTs 6/25/2020  Results:  FEV1/FVC Ratio: 56 %  Forced Vital Capacity: 3 67 L    85 % predicted  FEV1: 2 05 L     62 % predicted     Lung volumes by body plethysmography:   Total Lung Capacity 104 % predicted   Residual volume 144 % predicted     DLCO corrected for patients hemoglobin level: 60 %     Interpretation:     · Moderate obstructive airflow defect     · Elevated residual volume indicating air trapping     · Moderate decrease in diffusion capacity     · Flow volume loop consistent with obstruction      TTE 7/10/2020  SUMMARY     LEFT VENTRICLE:  Systolic function was normal by visual assessment  Ejection fraction was estimated to be 70 %  There were no regional wall motion abnormalities    Doppler parameters were consistent with abnormal left ventricular relaxation (grade 1 diastolic dysfunction)      RIGHT VENTRICLE:  The size was normal   Systolic function was normal       Physical Exam    Airway    Mallampati score: III  TM Distance: >3 FB  Neck ROM: full     Dental   Comment: Edentulous, upper dentures and lower dentures, Cardiovascular      Pulmonary      Other Findings        Anesthesia Plan  ASA Score- 3     Anesthesia Type- general with ASA Monitors  Additional Monitors:   Airway Plan: LMA  Plan Factors-Exercise tolerance (METS): >4 METS  Chart reviewed  EKG reviewed  Existing labs reviewed  Patient summary reviewed  Patient is not a current smoker  Induction- intravenous  Postoperative Plan- Plan for postoperative opioid use  Informed Consent- Anesthetic plan and risks discussed with patient  I personally reviewed this patient with the CRNA  Discussed and agreed on the Anesthesia Plan with the CRNA  Batsheva Sumner

## 2022-09-21 NOTE — BRIEF OP NOTE (RAD/CATH)
INTERVENTIONAL RADIOLOGY PROCEDURE NOTE    Date: 9/21/2022    Procedure: IR MICROWAVE ABLATION    Preoperative diagnosis:   1  Hepatocellular carcinoma (HCC)         Postoperative diagnosis: Same  Surgeon: Geryl Gaucher, MD     Assistant: None  No qualified resident was available  Blood loss: 5 mL    Specimens: None     Findings: Ultrasound and CT-guided microwave ablation of a segment 5 hepatic lesion  A new LR-3 lesion measuring 1 0 cm was identified at the peripheral segment 6 of the liver on the post-procedural imaging  Appropriate follow-up is recommended  Please see the radiology report for details  Complications: None immediate      Anesthesia: general     Recommendations:  - Bed rest for 4 hours  - Monitor for bleeding  - Discharge today if no issues  - We will follow-up with patient in IR clinic in 2 weeks

## 2022-09-21 NOTE — TELEPHONE ENCOUNTER
IR Clinic Follow-Up:    Patient clinical visit in 2 week  Schedule visit for the first available IR Physician: No                *If no, schedule for:   [place]    Type of Visit: Virtual Regular Visit - Video    Additional Details: Follow-up after liver ablation

## 2022-09-21 NOTE — ANESTHESIA POSTPROCEDURE EVALUATION
Post-Op Assessment Note    CV Status:  Stable  Pain Score: 0    Pain management: adequate     Mental Status:  Awake and sleepy   Hydration Status:  Euvolemic   PONV Controlled:  Controlled   Airway Patency:  Patent and adequate      Post Op Vitals Reviewed: Yes      Staff: CRNA         No complications documented      BP   132/79   Temp 97 3   Pulse  79   Resp   20   SpO2   100

## 2022-09-21 NOTE — H&P
Interventional Radiology  History and Physical 9/21/2022     [facility]   1955   [id number]    Assessment/Plan:  77year-old male with history of [address]  with a prior segment 5 lesion treated by ablation  Recent surveillance MRI on 7/21/22 showed nonviable tumor in the previous ablation zone, however next to the ablation zone was an 1 8 cm LR-5 lesion  We will proceed with ablation of the segment 5 LR-5 lesion measuring 1 8 cm today  Problem List Items Addressed This Visit        Digestive    Hepatocellular carcinoma ([address] )    Relevant Orders    IR microwave ablation             Subjective:     Patient ID: Michelle Martini is a 77 y o  male  History of Present Illness  77year-old male with history of [address]  with a prior segment 5 lesion treated by ablation  Recent surveillance MRI on 7/21/22 showed nonviable tumor in the previous ablation zone, however next to the ablation zone was an 1 8 cm LR-5 lesion  Today, the patient states he is doing well and denies any symptoms  Review of Systems   All other systems reviewed and are negative  Past Medical History:   Diagnosis Date    LUCILLE (acute kidney injury) ([address] ) 6/28/2017    Blindness of left eye     Since birth    Cancer [facility])     liver    Cataract     Colon polyp     Colostomy in place [facility]) 6/29/2017    COPD (chronic obstructive pulmonary disease) (HCC)     Crohn disease (HCC)     Emphysema of lung ([address] )     Emphysema/COPD ([address] )     Essential hypertension     GERD (gastroesophageal reflux disease)     Hypertension     Hypokalemia 6/28/2017    Hypomagnesemia 6/29/2017    Hyponatremia 6/28/2017    Kidney stone     Osteomyelitis ([address] )     Pneumonia         Past Surgical History:   Procedure Laterality Date    CATARACT EXTRACTION Bilateral     CHOLECYSTECTOMY      COLECTOMY      10 inchs of ileum    COLONOSCOPY N/A 6/30/2017    Procedure: COLONOSCOPY;  Surgeon: Ky Gramajo MD;  [facility];   Service: Gastroenterology    COLOSTOMY      FINGER AMPUTATION      FINGER SURGERY Left     IR BIOPSY LIVER MASS  2020    LITHOTRIPSY      LIVER LOBECTOMY N/A 7/15/2020    Procedure: LIVER ABLATION, INTRAOPERATIVE U/S LIVER;  Surgeon: Sriram Steinberg MD;  [facility];  Service: Surgical Oncology        Social History     Tobacco Use   Smoking Status Former Smoker    Packs/day: 1 50    Years: 51 00    Pack years: 76 50    Types: Cigarettes    Start date:     Quit date: 2022    Years since quittin 7   Smokeless Tobacco Never Used        Social History     Substance and Sexual Activity   Alcohol Use Yes    Alcohol/week: 7 0 standard drinks    Types: 7 Cans of beer per week        Social History     Substance and Sexual Activity   Drug Use No        No Known Allergies    Current Outpatient Medications   Medication Sig Dispense Refill    ALPRAZolam (XANAX) 0 25 mg tablet Take 1 tablet (0 25 mg total) by mouth daily at bedtime as needed for anxiety 90 tablet 0    AMILoride 5 mg tablet TAKE 1 TABLET BY MOUTH EVERY DAY 90 tablet 1    amLODIPine (NORVASC) 5 mg tablet Take 1 tablet (5 mg total) by mouth daily 90 tablet 1    buPROPion (WELLBUTRIN XL) 150 mg 24 hr tablet TAKE 1 TABLET BY MOUTH EVERY DAY 90 tablet 1    calcium carbonate (TUMS) 500 mg chewable tablet Chew 1 tablet (500 mg total) daily  0    MAGNESIUM CHLORIDE PO Take 1,000 mg by mouth daily      omeprazole (PriLOSEC) 20 mg delayed release capsule Take 1 capsule (20 mg total) by mouth daily 90 capsule 0    potassium chloride (MICRO-K) 10 MEQ CR capsule Take 2 capsules (20 mEq total) by mouth 2 (two) times a day 60 capsule 0    rosuvastatin (CRESTOR) 10 MG tablet TAKE 1 TABLET BY MOUTH EVERY DAY 90 tablet 1    umeclidinium-vilanterol (Anoro Ellipta) 62 5-25 MCG/INH inhaler Inhale 1 puff daily 180 each 3    VIT B12-METHIONINE-INOS-CHOL IM Inject into a muscle every 30 (thirty) days      mirtazapine (REMERON) 15 mg tablet TAKE 1 TABLET BY MOUTH EVERYDAY AT BEDTIME 90 tablet 1     Current Facility-Administered Medications   Medication Dose Route Frequency Provider Last Rate Last Admin    ceFAZolin (ANCEF) IVPB (premix in dextrose) 1,000 mg 50 mL  1,000 mg Intravenous Once Jacques Howard DO        metroNIDAZOLE (FLAGYL) IVPB (premix) 500 mg 100 mL  500 mg Intravenous Once Jacques Howard DO        sodium chloride 0 9 % infusion  75 mL/hr Intravenous Continuous Lexii Bear MD 75 mL/hr at 09/21/22 0733 75 mL/hr at 09/21/22 0733          Objective:    Vitals:    09/21/22 0731   BP: 167/87   Pulse: 83   Resp: 18   Temp: 97 8 °F (36 6 °C)   TempSrc: Oral   SpO2: 96%   Weight: 63 5 kg (140 lb)   Height: 5' 9" (1 753 m)        Physical Exam  Constitutional:       Appearance: Normal appearance  HENT:      Head: Normocephalic and atraumatic  Nose: Nose normal    Eyes:      Extraocular Movements: Extraocular movements intact  Cardiovascular:      Rate and Rhythm: Normal rate  Pulses: Normal pulses  Pulmonary:      Effort: Pulmonary effort is normal    Neurological:      Mental Status: He is alert  No results found for: BNP   Lab Results   Component Value Date    WBC 5 15 09/21/2022    HGB 13 9 09/21/2022    HCT 40 4 09/21/2022     (H) 09/21/2022    PLT 81 (L) 09/21/2022     Lab Results   Component Value Date    INR 1 00 09/21/2022    INR 1 09 07/08/2020    INR 1 02 06/08/2020    PROTIME 13 4 09/21/2022    PROTIME 14 1 07/08/2020    PROTIME 13 0 06/08/2020     Lab Results   Component Value Date    PTT 32 07/08/2020         I have personally reviewed pertinent imaging and laboratory results  Code Status: Prior  Advance Directive and Living Will:      Power of :    POLST:      This text is generated with voice recognition software  There may be translation, syntax,  or grammatical errors  If you have any questions, please contact the dictating provider

## 2022-09-21 NOTE — SEDATION DOCUMENTATION
IR liver microwave ablation by Dr Antonio  Anesthesia present throughout case  Band-aid placed on ablation site  Plan is for patient to recover in PACU  This patient is to be discharged home today when criteria is met, she will be accompanied by her   She has a scheduled follow up appointment with Dr Mohamud next Thursday as per patient   SHe has her prescriptions that were sent to her parmacy by Dr Mohamud's office   No heavy lifting , pushing, or pulling   continue using incentive spirometer while at home   no ice or hot packs to incisions  Please provide drain care to patient and  prior to discharge home   KEEP all dressings and wounds dry !!!

## 2022-09-22 DIAGNOSIS — E87.6 HYPOKALEMIA: ICD-10-CM

## 2022-09-24 ENCOUNTER — HOSPITAL ENCOUNTER (EMERGENCY)
Facility: HOSPITAL | Age: 67
Discharge: HOME | End: 2022-09-24
Attending: EMERGENCY MEDICINE | Admitting: EMERGENCY MEDICINE
Payer: MEDICARE

## 2022-09-24 ENCOUNTER — APPOINTMENT (EMERGENCY)
Dept: CT IMAGING | Facility: HOSPITAL | Age: 67
End: 2022-09-24
Payer: MEDICARE

## 2022-09-24 VITALS
TEMPERATURE: 98.7 F | RESPIRATION RATE: 18 BRPM | HEART RATE: 85 BPM | DIASTOLIC BLOOD PRESSURE: 71 MMHG | OXYGEN SATURATION: 96 % | BODY MASS INDEX: 22.24 KG/M2 | WEIGHT: 150.57 LBS | SYSTOLIC BLOOD PRESSURE: 134 MMHG

## 2022-09-24 DIAGNOSIS — R55 SYNCOPE: Primary | ICD-10-CM

## 2022-09-24 DIAGNOSIS — S80.02XA CONTUSION OF LEFT KNEE, INITIAL ENCOUNTER: ICD-10-CM

## 2022-09-24 LAB
ALBUMIN SERPL BCP-MCNC: 3.7 G/DL (ref 3.5–5)
ALP SERPL-CCNC: 43 U/L (ref 34–104)
ALT SERPL W P-5'-P-CCNC: 43 U/L (ref 7–52)
ANION GAP SERPL CALCULATED.3IONS-SCNC: 13 MMOL/L (ref 4–13)
AST SERPL W P-5'-P-CCNC: 89 U/L (ref 13–39)
BASOPHILS # BLD AUTO: 0.03 THOUSANDS/ΜL (ref 0–0.1)
BASOPHILS NFR BLD AUTO: 0 % (ref 0–1)
BILIRUB SERPL-MCNC: 1.54 MG/DL (ref 0.2–1)
BUN SERPL-MCNC: 7 MG/DL (ref 5–25)
CALCIUM SERPL-MCNC: 7.9 MG/DL (ref 8.4–10.2)
CHLORIDE SERPL-SCNC: 94 MMOL/L (ref 96–108)
CO2 SERPL-SCNC: 24 MMOL/L (ref 21–32)
CREAT SERPL-MCNC: 0.93 MG/DL (ref 0.6–1.3)
EOSINOPHIL # BLD AUTO: 0.02 THOUSAND/ΜL (ref 0–0.61)
EOSINOPHIL NFR BLD AUTO: 0 % (ref 0–6)
ERYTHROCYTE [DISTWIDTH] IN BLOOD BY AUTOMATED COUNT: 11.9 % (ref 11.6–15.1)
GFR SERPL CREATININE-BSD FRML MDRD: 85 ML/MIN/1.73SQ M
GLUCOSE SERPL-MCNC: 92 MG/DL (ref 65–140)
HCT VFR BLD AUTO: 37.2 % (ref 36.5–49.3)
HGB BLD-MCNC: 13 G/DL (ref 12–17)
IMM GRANULOCYTES # BLD AUTO: 0.03 THOUSAND/UL (ref 0–0.2)
IMM GRANULOCYTES NFR BLD AUTO: 0 % (ref 0–2)
LYMPHOCYTES # BLD AUTO: 1.59 THOUSANDS/ΜL (ref 0.6–4.47)
LYMPHOCYTES NFR BLD AUTO: 22 % (ref 14–44)
MAGNESIUM SERPL-MCNC: 1 MG/DL (ref 1.9–2.7)
MCH RBC QN AUTO: 35.3 PG (ref 26.8–34.3)
MCHC RBC AUTO-ENTMCNC: 34.9 G/DL (ref 31.4–37.4)
MCV RBC AUTO: 101 FL (ref 82–98)
MONOCYTES # BLD AUTO: 0.4 THOUSAND/ΜL (ref 0.17–1.22)
MONOCYTES NFR BLD AUTO: 6 % (ref 4–12)
NEUTROPHILS # BLD AUTO: 5.09 THOUSANDS/ΜL (ref 1.85–7.62)
NEUTS SEG NFR BLD AUTO: 72 % (ref 43–75)
NRBC BLD AUTO-RTO: 0 /100 WBCS
PLATELET # BLD AUTO: 66 THOUSANDS/UL (ref 149–390)
PMV BLD AUTO: 10.8 FL (ref 8.9–12.7)
POTASSIUM SERPL-SCNC: 3.2 MMOL/L (ref 3.5–5.3)
PROT SERPL-MCNC: 6.7 G/DL (ref 6.4–8.4)
RBC # BLD AUTO: 3.68 MILLION/UL (ref 3.88–5.62)
SODIUM SERPL-SCNC: 131 MMOL/L (ref 135–147)
WBC # BLD AUTO: 7.16 THOUSAND/UL (ref 4.31–10.16)

## 2022-09-24 PROCEDURE — 96366 THER/PROPH/DIAG IV INF ADDON: CPT

## 2022-09-24 PROCEDURE — 96365 THER/PROPH/DIAG IV INF INIT: CPT

## 2022-09-24 PROCEDURE — 80053 COMPREHEN METABOLIC PANEL: CPT | Performed by: EMERGENCY MEDICINE

## 2022-09-24 PROCEDURE — 36415 COLL VENOUS BLD VENIPUNCTURE: CPT | Performed by: EMERGENCY MEDICINE

## 2022-09-24 PROCEDURE — 99285 EMERGENCY DEPT VISIT HI MDM: CPT | Performed by: EMERGENCY MEDICINE

## 2022-09-24 PROCEDURE — 74177 CT ABD & PELVIS W/CONTRAST: CPT

## 2022-09-24 PROCEDURE — G1004 CDSM NDSC: HCPCS

## 2022-09-24 PROCEDURE — 70450 CT HEAD/BRAIN W/O DYE: CPT

## 2022-09-24 PROCEDURE — 99285 EMERGENCY DEPT VISIT HI MDM: CPT

## 2022-09-24 PROCEDURE — 93005 ELECTROCARDIOGRAM TRACING: CPT

## 2022-09-24 PROCEDURE — 96368 THER/DIAG CONCURRENT INF: CPT

## 2022-09-24 PROCEDURE — 85025 COMPLETE CBC W/AUTO DIFF WBC: CPT | Performed by: EMERGENCY MEDICINE

## 2022-09-24 PROCEDURE — 96367 TX/PROPH/DG ADDL SEQ IV INF: CPT

## 2022-09-24 PROCEDURE — 83735 ASSAY OF MAGNESIUM: CPT | Performed by: EMERGENCY MEDICINE

## 2022-09-24 RX ORDER — POTASSIUM CHLORIDE 20MEQ/15ML
40 LIQUID (ML) ORAL ONCE
Status: COMPLETED | OUTPATIENT
Start: 2022-09-24 | End: 2022-09-24

## 2022-09-24 RX ORDER — MAGNESIUM SULFATE HEPTAHYDRATE 40 MG/ML
2 INJECTION, SOLUTION INTRAVENOUS ONCE
Status: COMPLETED | OUTPATIENT
Start: 2022-09-24 | End: 2022-09-24

## 2022-09-24 RX ADMIN — MAGNESIUM SULFATE HEPTAHYDRATE 2 G: 40 INJECTION, SOLUTION INTRAVENOUS at 21:36

## 2022-09-24 RX ADMIN — THIAMINE HYDROCHLORIDE 100 MG: 100 INJECTION, SOLUTION INTRAMUSCULAR; INTRAVENOUS at 19:30

## 2022-09-24 RX ADMIN — SODIUM CHLORIDE, SODIUM LACTATE, POTASSIUM CHLORIDE, AND CALCIUM CHLORIDE 500 ML: .6; .31; .03; .02 INJECTION, SOLUTION INTRAVENOUS at 19:19

## 2022-09-24 RX ADMIN — POTASSIUM CHLORIDE 40 MEQ: 20 SOLUTION ORAL at 21:34

## 2022-09-24 RX ADMIN — IOHEXOL 100 ML: 350 INJECTION, SOLUTION INTRAVENOUS at 19:57

## 2022-09-24 NOTE — ED PROCEDURE NOTE
PROCEDURE  ECG 12 Lead Documentation Only    Date/Time: 9/24/2022 7:59 PM  Performed by: Cherrie Garcia MD  Authorized by: Cherrie Garcia MD     Indications / Diagnosis:  Syncope  ECG reviewed by me, the ED Provider: yes    Patient location:  ED and bedside  Previous ECG:     Previous ECG:  Unavailable    Comparison to cardiac monitor: Yes    Interpretation:     Interpretation: non-specific    Rate:     ECG rate:  80    ECG rate assessment: normal    Rhythm:     Rhythm: sinus rhythm    Ectopy:     Ectopy: none    QRS:     QRS axis:  Normal    QRS intervals:   Wide  Conduction:     Conduction: abnormal      Abnormal conduction: non-specific intraventricular conduction delay    ST segments:     ST segments:  Normal  T waves:     T waves: flattening      Flattening:  AVL, V1 and V2  Q waves:     Q waves:  V2  Other findings:     Other findings: U wave    Comments:      No ecg signs of ischemia/ injury / r heart strain / cassandra/pericarditis-  No ecg signs of short- long qtc /wpw/brugada syndrome/ hcm/ epsilon waves          Cherrie Garcia MD  09/24/22 2002

## 2022-09-25 LAB
ATRIAL RATE: 80 BPM
P AXIS: 59 DEGREES
PR INTERVAL: 154 MS
QRS AXIS: -28 DEGREES
QRSD INTERVAL: 102 MS
QT INTERVAL: 398 MS
QTC INTERVAL: 459 MS
T WAVE AXIS: 72 DEGREES
VENTRICULAR RATE: 80 BPM

## 2022-09-25 PROCEDURE — 93010 ELECTROCARDIOGRAM REPORT: CPT | Performed by: INTERNAL MEDICINE

## 2022-09-25 NOTE — DISCHARGE INSTRUCTIONS
Diagnosis: syncope passing out spell     - activity as tolerated     - please continue  to take your daily medications - daily potassium  and magnesium    - please return to  the er for any further passing out - or any new/ worsening/concerning symptoms to you     - if alcohol becomes a problem - please call [facility] drug and alcohol to get help  [phone]

## 2022-09-25 NOTE — ED PROVIDER NOTES
History  Chief Complaint   Patient presents with    Syncope     Pt arrives via EMS after falling at home  Pt had liver surgery on Wednesday, had a fall after coming home  pt states he hurt his left knee on his fall from Wednesday  Pt then fell again today  Pt states he lost his balance an d that's why he fell  No thinners, no LOC, no headstrike  Pt is a drinker, his last drink was last night  Alert and oriented at this time      77 yr male- with crohns dz/ ileostomy--  Hepatocellular ca - s/p ir percutaneous  Microwave ablation of liver lesion several days ago -- states fell walking up stairs 2 days ago  With left knee injury - only with lateral pain upon ambulation -- no other injury -- states today upon walking left knee gave out and he fell --- er md called daughter karie-- states pt is daily alcohol drinker -- and 2 days ago and tonight-  Was standing to gazed look in his eyes- than passed out--  No sz activity -- came back to baseline after 5-10 minutes- no injury tonight from episode-- pt currently denies any head/neck/chest pain/ abd/ back pain - no fevers- no new cough /hemoptysis/ no hematemesis/ melena/brbpr- no gu comps       History provided by:  Patient and relative   used: No    Syncope  Most recent episode: Today  Associated symptoms: no dizziness, no headaches, no seizures and no weakness        Prior to Admission Medications   Prescriptions Last Dose Informant Patient Reported? Taking?    ALPRAZolam (XANAX) 0 25 mg tablet  Self No No   Sig: Take 1 tablet (0 25 mg total) by mouth daily at bedtime as needed for anxiety   AMILoride 5 mg tablet  Self No No   Sig: TAKE 1 TABLET BY MOUTH EVERY DAY   MAGNESIUM CHLORIDE PO  Self Yes No   Sig: Take 1,000 mg by mouth daily   VIT B12-METHIONINE-INOS-CHOL IM  Self Yes No   Sig: Inject into a muscle every 30 (thirty) days   amLODIPine (NORVASC) 5 mg tablet  Self No No   Sig: Take 1 tablet (5 mg total) by mouth daily   buPROPion [facility] XL) 150 mg 24 hr tablet  Self No No   Sig: TAKE 1 TABLET BY MOUTH EVERY DAY   calcium carbonate (TUMS) 500 mg chewable tablet  Self No No   Sig: Chew 1 tablet (500 mg total) daily   mirtazapine (REMERON) 15 mg tablet  Self No No   Sig: TAKE 1 TABLET BY MOUTH EVERYDAY AT BEDTIME   omeprazole (PriLOSEC) 20 mg delayed release capsule  Self No No   Sig: Take 1 capsule (20 mg total) by mouth daily   potassium chloride (MICRO-K) 10 MEQ CR capsule  Self No No   Sig: Take 2 capsules (20 mEq total) by mouth 2 (two) times a day   rosuvastatin (CRESTOR) 10 MG tablet  Self No No   Sig: TAKE 1 TABLET BY MOUTH EVERY DAY   umeclidinium-vilanterol (Anoro Ellipta) 62 5-25 MCG/INH inhaler   No No   Sig: Inhale 1 puff daily      Facility-Administered Medications: None       Past Medical History:   Diagnosis Date    [facility] 6/28/2017    Blindness of left eye     Since birth    Cancer [facility])     liver    Cataract     Colon polyp     Colostomy in place [facility]) 6/29/2017    COPD (chronic obstructive pulmonary disease) (HCC)     Crohn disease (HCC)     [facility]physema [facility] lung [facility]     [facility]COPD [facility]     Essential hypertension     GERD (gastroesophageal reflux disease)     Hypertension     Hypokalemia 6/28/2017    Hypomagnesemia 6/29/2017    Hyponatremia 6/28/2017    Kidney stone     Osteomyelitis ([facility]     Pneumonia        Past Surgical History:   Procedure Laterality Date    CATARACT EXTRACTION Bilateral     CHOLECYSTECTOMY      COLECTOMY      10 inchs of ileum    COLONOSCOPY N/A 6/30/2017    Procedure: COLONOSCOPY;  Surgeon: Ayala Prince MD;  [facility];   Service: Gastroenterology    COLOSTOMY      FINGER AMPUTATION      FINGER SURGERY Left     IR BIOPSY LIVER MASS  6/8/2020    IR MICROWAVE ABLATION  9/21/2022    LITHOTRIPSY      LIVER LOBECTOMY N/A 7/15/2020    Procedure: LIVER ABLATION, INTRAOPERATIVE U/S LIVER;  Surgeon: Mac Cannon MD;  [facility];  Service: Surgical Oncology       Family History   Problem Relation Age of Onset    Hypertension Father     Heart disease Sister      I have reviewed and agree with the history as documented  E-Cigarette/Vaping    E-Cigarette Use Never User      E-Cigarette/Vaping Substances    Nicotine No     THC No     CBD No     Flavoring No      Social History     Tobacco Use    Smoking status: Former Smoker     Packs/day: 1 50     Years: 51 00     Pack years: 76 50     Types: Cigarettes     Start date:      Quit date: 2022     Years since quittin 7    Smokeless tobacco: Never Used   Vaping Use    Vaping Use: Never used   Substance Use Topics    Alcohol use: Yes     Alcohol/week: 7 0 standard drinks     Types: 7 Cans of beer per week    Drug use: No       Review of Systems   Constitutional: Negative  HENT: Negative  Eyes: Negative  Respiratory: Negative  Cardiovascular: Positive for syncope  Gastrointestinal: Negative  Endocrine: Negative  Genitourinary: Negative  Musculoskeletal: Negative  Left knee pain upon ambulation    Skin: Negative  Allergic/Immunologic: Negative  Neurological: Positive for syncope  Negative for dizziness, tremors, seizures, facial asymmetry, speech difficulty, weakness, light-headedness, numbness and headaches  Hematological: Negative  Psychiatric/Behavioral: Negative  Physical Exam  Physical Exam  Vitals and nursing note reviewed  Constitutional:       General: He is not in acute distress  Appearance: Normal appearance  He is not ill-appearing, toxic-appearing or diaphoretic  Comments: avss-  Pulse ox 96 % on ra- interpretation is normal- no intervention    HENT:      Head: Normocephalic and atraumatic  Comments: No scalp tenderness/contusion/ hematoma     Right Ear: Tympanic membrane, ear canal and external ear normal  There is no impacted cerumen        Left Ear: Tympanic membrane, ear canal and external ear normal  There is no impacted cerumen  Nose: Nose normal  No congestion or rhinorrhea  Mouth/Throat:      Mouth: Mucous membranes are moist       Pharynx: Oropharynx is clear  No oropharyngeal exudate or posterior oropharyngeal erythema  Eyes:      General: No scleral icterus  Right eye: No discharge  Left eye: No discharge  Extraocular Movements: Extraocular movements intact  Conjunctiva/sclera: Conjunctivae normal       Pupils: Pupils are equal, round, and reactive to light  Comments: Mm pink- old appearing r eye infraorbital ecchymosis-    Neck:      Vascular: No carotid bruit  Comments: No pmt c/t/l/s spine   Cardiovascular:      Rate and Rhythm: Normal rate and regular rhythm  Pulses: Normal pulses  Heart sounds: Normal heart sounds  No murmur heard  No friction rub  No gallop  Pulmonary:      Effort: Pulmonary effort is normal  No respiratory distress  Breath sounds: Normal breath sounds  No stridor  No wheezing, rhonchi or rales  Chest:      Chest wall: No tenderness  Abdominal:      General: Bowel sounds are normal  There is no distension  Palpations: Abdomen is soft  There is no mass  Tenderness: There is no abdominal tenderness  There is no right CVA tenderness, left CVA tenderness, guarding or rebound  Hernia: No hernia is present  Comments: lle ileostomy - abd is soft nt/nd- no hsm- no cva tenderness - no ascites- no peritoneal signs- no puslatile abd mass/bruit/ tenderness    Musculoskeletal:         General: Signs of injury present  No swelling, tenderness or deformity  Normal range of motion  Cervical back: Normal range of motion and neck supple  No rigidity or tenderness  Right lower leg: No edema  Left lower leg: No edema        Comments: Equal bilateral radial/dp pulses -- left knee-  Lower ant thigh abrasion - nt at knee- normal flex/ext- no patellar/ fibular head tenderness no lig laxity   Lymphadenopathy: Cervical: No cervical adenopathy  Skin:     General: Skin is warm and dry  Capillary Refill: Capillary refill takes less than 2 seconds  Coloration: Skin is not jaundiced or pale  Findings: No bruising, erythema, lesion or rash  Neurological:      General: No focal deficit present  Mental Status: He is alert and oriented to person, place, and time  Mental status is at baseline  Cranial Nerves: No cranial nerve deficit  Sensory: No sensory deficit  Motor: No weakness  Comments: Normal non focal neuro exam - no nystagmus- no ophthalmoplegia   Psychiatric:         Mood and Affect: Mood normal          Behavior: Behavior normal          Thought Content:  Thought content normal          Judgment: Judgment normal          Vital Signs  ED Triage Vitals   Temperature Pulse Respirations Blood Pressure SpO2   09/24/22 1756 09/24/22 1753 09/24/22 1753 09/24/22 1753 09/24/22 1753   98 7 °F (37 1 °C) 75 18 130/73 98 %      Temp Source Heart Rate Source Patient Position - Orthostatic VS BP Location FiO2 (%)   09/24/22 1756 09/24/22 1753 09/24/22 1753 09/24/22 1753 --   Oral Monitor Lying Right arm       Pain Score       09/24/22 1753       8           Vitals:    09/24/22 1753 09/24/22 1900 09/24/22 2100   BP: 130/73 113/72 116/69   Pulse: 75 83 82   Patient Position - Orthostatic VS: Lying Lying Lying         Visual Acuity      ED Medications  Medications   magnesium sulfate 2 g/50 mL IVPB (premix) 2 g (2 g Intravenous New Bag 9/24/22 2136)   lactated ringers bolus 500 mL (0 mL Intravenous Stopped 9/24/22 2019)   thiamine (VITAMIN B1) 100 mg in sodium chloride 0 9 % 50 mL IVPB (0 mg Intravenous Stopped 9/24/22 2000)   potassium chloride oral solution 40 mEq (40 mEq Oral Given 9/24/22 2134)   iohexol (OMNIPAQUE) 350 MG/ML injection (SINGLE-DOSE) 100 mL (100 mL Intravenous Given 9/24/22 1957)       Diagnostic Studies  Results Reviewed     Procedure Component Value Units Date/Time CBC and differential [[id number]]  (Abnormal) Collected: 09/24/22 1852    Lab Status: Final result Specimen: Blood from Arm, Right Updated: 09/24/22 1935     WBC 7 16 Thousand/uL      RBC 3 68 Million/uL      Hemoglobin 13 0 g/dL      Hematocrit 37 2 %       fL      MCH 35 3 pg      MCHC 34 9 g/dL      RDW 11 9 %      MPV 10 8 fL      Platelets 66 Thousands/uL      nRBC 0 /100 WBCs      Neutrophils Relative 72 %      Immat GRANS % 0 %      Lymphocytes Relative 22 %      Monocytes Relative 6 %      Eosinophils Relative 0 %      Basophils Relative 0 %      Neutrophils Absolute 5 09 Thousands/µL      Immature Grans Absolute 0 03 Thousand/uL      Lymphocytes Absolute 1 59 Thousands/µL      Monocytes Absolute 0 40 Thousand/µL      Eosinophils Absolute 0 02 Thousand/µL      Basophils Absolute 0 03 Thousands/µL     Comprehensive metabolic panel [[id number]]  (Abnormal) Collected: 09/24/22 1852    Lab Status: Final result Specimen: Blood from Arm, Right Updated: 09/24/22 1932     Sodium 131 mmol/L      Potassium 3 2 mmol/L      Chloride 94 mmol/L      CO2 24 mmol/L      ANION GAP 13 mmol/L      BUN 7 mg/dL      Creatinine 0 93 mg/dL      Glucose 92 mg/dL      Calcium 7 9 mg/dL      AST 89 U/L      ALT 43 U/L      Alkaline Phosphatase 43 U/L      Total Protein 6 7 g/dL      Albumin 3 7 g/dL      Total Bilirubin 1 54 mg/dL      eGFR 85 ml/min/1 73sq m     Narrative:      [facility] guidelines for Chronic Kidney Disease (CKD):     Stage 1 with normal or high GFR (GFR > 90 mL/min/1 73 square meters)    Stage 2 Mild CKD (GFR = 60-89 mL/min/1 73 square meters)    Stage 3A Moderate CKD (GFR = 45-59 mL/min/1 73 square meters)    Stage 3B Moderate CKD (GFR = 30-44 mL/min/1 73 square meters)    Stage 4 Severe CKD (GFR = 15-29 mL/min/1 73 square meters)    Stage 5 End Stage CKD (GFR <15 mL/min/1 73 square meters)  Note: GFR calculation is accurate only with a steady state creatinine    Magnesium [[id number]]  (Abnormal) Collected: 09/24/22 1852    Lab Status: Final result Specimen: Blood from Arm, Right Updated: 09/24/22 1932     Magnesium 1 0 mg/dL                  CT head without contrast   Final Result by Sheela Ceja MD (09/24 2046)      No acute intracranial abnormality  Workstation performed: [id number]         CT abdomen pelvis with contrast   Final Result by Tegan Abdi DO (09/24 2105)      Expected posttreatment changes in the right hepatic lobe without evidence of large volume hemorrhage  Nonacute findings, as described            Workstation performed: [id number]                    Procedures  Procedures         ED Course  ED Course as of 09/25/22 1158   Sat Sep 24, 2022   1910 Er md note- 9/2/122 outpt labs reviewed by er md   2003 Er md note-  prehospital  ecg reviewed and compared by er md - no sign changes   2207 -- er md note- discussed  alcohol usage after discussion with daughter -- pt  does not feel that he has a problem  with alcohol and  does not feel that he needs any alcohol rehab -- pt ambulate and attempt to d/c - - pt states is on magnesium and potassium supplementation - will attempt ambulation after infusions are done        2314 - er md note- pt tolerated ambulation with no problems prior to er d/c                                             MDM    Disposition  Final diagnoses:   None     ED Disposition     None      Follow-up Information    None         Patient's Medications   Discharge Prescriptions    No medications on file       No discharge procedures on file      PDMP Review       Value Time User    PDMP Reviewed  Yes 7/12/2022 10:11 AM Kareen Phillips MD          ED Provider  Electronically Signed by           Haven Garza MD  09/25/22 1156

## 2022-09-26 DIAGNOSIS — I10 ESSENTIAL HYPERTENSION, BENIGN: ICD-10-CM

## 2022-09-26 RX ORDER — POTASSIUM CHLORIDE 750 MG/1
20 CAPSULE, EXTENDED RELEASE ORAL 2 TIMES DAILY
Qty: 60 CAPSULE | Refills: 0 | Status: SHIPPED | OUTPATIENT
Start: 2022-09-26 | End: 2022-10-19 | Stop reason: SDUPTHER

## 2022-09-28 ENCOUNTER — CLINICAL SUPPORT (OUTPATIENT)
Dept: INTERNAL MEDICINE CLINIC | Facility: CLINIC | Age: 67
End: 2022-09-28
Payer: MEDICARE

## 2022-09-28 DIAGNOSIS — E53.8 B12 DEFICIENCY: Primary | ICD-10-CM

## 2022-09-28 RX ORDER — CYANOCOBALAMIN 1000 UG/ML
1000 INJECTION, SOLUTION INTRAMUSCULAR; SUBCUTANEOUS
Status: SHIPPED | OUTPATIENT
Start: 2022-09-28

## 2022-09-28 RX ORDER — AMILORIDE HYDROCHLORIDE 5 MG/1
5 TABLET ORAL DAILY
Qty: 90 TABLET | Refills: 0 | Status: SHIPPED | OUTPATIENT
Start: 2022-09-28

## 2022-09-28 RX ADMIN — CYANOCOBALAMIN 1000 MCG: 1000 INJECTION, SOLUTION INTRAMUSCULAR; SUBCUTANEOUS at 10:31

## 2022-10-12 DIAGNOSIS — Z01.810 PREOP CARDIOVASCULAR EXAM: ICD-10-CM

## 2022-10-12 DIAGNOSIS — F34.1 DYSTHYMIC DISORDER: ICD-10-CM

## 2022-10-12 PROBLEM — Z11.59 NEED FOR HEPATITIS C SCREENING TEST: Status: RESOLVED | Noted: 2021-02-24 | Resolved: 2022-10-12

## 2022-10-13 RX ORDER — BUPROPION HYDROCHLORIDE 150 MG/1
150 TABLET ORAL DAILY
Qty: 90 TABLET | Refills: 0 | Status: SHIPPED | OUTPATIENT
Start: 2022-10-13

## 2022-10-13 RX ORDER — ROSUVASTATIN CALCIUM 10 MG/1
10 TABLET, COATED ORAL DAILY
Qty: 90 TABLET | Refills: 0 | Status: SHIPPED | OUTPATIENT
Start: 2022-10-13

## 2022-10-19 DIAGNOSIS — E87.6 HYPOKALEMIA: ICD-10-CM

## 2022-10-19 RX ORDER — POTASSIUM CHLORIDE 750 MG/1
20 CAPSULE, EXTENDED RELEASE ORAL 2 TIMES DAILY
Qty: 60 CAPSULE | Refills: 0 | Status: SHIPPED | OUTPATIENT
Start: 2022-10-19

## 2022-10-20 DIAGNOSIS — I10 ESSENTIAL HYPERTENSION: ICD-10-CM

## 2022-10-20 RX ORDER — AMLODIPINE BESYLATE 5 MG/1
5 TABLET ORAL DAILY
Qty: 90 TABLET | Refills: 0 | Status: SHIPPED | OUTPATIENT
Start: 2022-10-20

## 2022-11-01 ENCOUNTER — APPOINTMENT (EMERGENCY)
Dept: RADIOLOGY | Facility: HOSPITAL | Age: 67
End: 2022-11-01

## 2022-11-01 ENCOUNTER — APPOINTMENT (EMERGENCY)
Dept: CT IMAGING | Facility: HOSPITAL | Age: 67
End: 2022-11-01

## 2022-11-01 ENCOUNTER — HOSPITAL ENCOUNTER (INPATIENT)
Facility: HOSPITAL | Age: 67
LOS: 11 days | Discharge: HOME | End: 2022-11-13
Attending: EMERGENCY MEDICINE | Admitting: INTERNAL MEDICINE

## 2022-11-01 DIAGNOSIS — Z87.898 HISTORY OF ALCOHOL USE DISORDER: ICD-10-CM

## 2022-11-01 DIAGNOSIS — F10.20 ALCOHOL USE DISORDER, MODERATE, DEPENDENCE (HCC): ICD-10-CM

## 2022-11-01 DIAGNOSIS — R19.5 FECAL OCCULT BLOOD TEST POSITIVE: ICD-10-CM

## 2022-11-01 DIAGNOSIS — E03.8 SUBCLINICAL HYPOTHYROIDISM: ICD-10-CM

## 2022-11-01 DIAGNOSIS — E83.42 HYPOMAGNESEMIA: ICD-10-CM

## 2022-11-01 DIAGNOSIS — E87.1 HYPONATREMIA: ICD-10-CM

## 2022-11-01 DIAGNOSIS — D64.9 ANEMIA: ICD-10-CM

## 2022-11-01 DIAGNOSIS — E83.51 HYPOCALCEMIA: ICD-10-CM

## 2022-11-01 DIAGNOSIS — Z87.19 HISTORY OF CROHN'S DISEASE: ICD-10-CM

## 2022-11-01 DIAGNOSIS — I26.94 MULTIPLE SUBSEGMENTAL PULMONARY EMBOLI WITHOUT ACUTE COR PULMONALE (HCC): ICD-10-CM

## 2022-11-01 DIAGNOSIS — R55 SYNCOPE: Primary | ICD-10-CM

## 2022-11-01 DIAGNOSIS — K50.018 CROHN'S DISEASE OF SMALL INTESTINE WITH OTHER COMPLICATION (HCC): Chronic | ICD-10-CM

## 2022-11-01 DIAGNOSIS — I47.1 SUPRAVENTRICULAR TACHYCARDIA (HCC): ICD-10-CM

## 2022-11-01 DIAGNOSIS — R41.82 ALTERED MENTAL STATUS: ICD-10-CM

## 2022-11-01 LAB
ALBUMIN SERPL BCP-MCNC: 3.6 G/DL (ref 3.5–5)
ALP SERPL-CCNC: 52 U/L (ref 34–104)
ALT SERPL W P-5'-P-CCNC: 19 U/L (ref 7–52)
ANION GAP SERPL CALCULATED.3IONS-SCNC: 13 MMOL/L (ref 4–13)
AST SERPL W P-5'-P-CCNC: 45 U/L (ref 13–39)
ATRIAL RATE: 87 BPM
BILIRUB SERPL-MCNC: 1.5 MG/DL (ref 0.2–1)
BUN SERPL-MCNC: 8 MG/DL (ref 5–25)
CALCIUM SERPL-MCNC: 6.7 MG/DL (ref 8.4–10.2)
CARDIAC TROPONIN I PNL SERPL HS: 4 NG/L
CHLORIDE SERPL-SCNC: 93 MMOL/L (ref 96–108)
CO2 SERPL-SCNC: 21 MMOL/L (ref 21–32)
CREAT SERPL-MCNC: 0.95 MG/DL (ref 0.6–1.3)
ETHANOL SERPL-MCNC: 231 MG/DL
GFR SERPL CREATININE-BSD FRML MDRD: 82 ML/MIN/1.73SQ M
GLUCOSE SERPL-MCNC: 79 MG/DL (ref 65–140)
P AXIS: 58 DEGREES
POTASSIUM SERPL-SCNC: 4.3 MMOL/L (ref 3.5–5.3)
PR INTERVAL: 154 MS
PROT SERPL-MCNC: 6.7 G/DL (ref 6.4–8.4)
QRS AXIS: 16 DEGREES
QRSD INTERVAL: 92 MS
QT INTERVAL: 384 MS
QTC INTERVAL: 462 MS
SODIUM SERPL-SCNC: 127 MMOL/L (ref 135–147)
T WAVE AXIS: 62 DEGREES
VENTRICULAR RATE: 87 BPM

## 2022-11-01 RX ORDER — CALCIUM GLUCONATE 20 MG/ML
1 INJECTION, SOLUTION INTRAVENOUS ONCE
Status: COMPLETED | OUTPATIENT
Start: 2022-11-02 | End: 2022-11-02

## 2022-11-01 RX ADMIN — THIAMINE HYDROCHLORIDE 100 MG: 100 INJECTION, SOLUTION INTRAMUSCULAR; INTRAVENOUS at 23:26

## 2022-11-01 RX ADMIN — SODIUM CHLORIDE, SODIUM LACTATE, POTASSIUM CHLORIDE, AND CALCIUM CHLORIDE 500 ML: .6; .31; .03; .02 INJECTION, SOLUTION INTRAVENOUS at 23:55

## 2022-11-01 RX ADMIN — SODIUM CHLORIDE, SODIUM LACTATE, POTASSIUM CHLORIDE, AND CALCIUM CHLORIDE 1000 ML: .6; .31; .03; .02 INJECTION, SOLUTION INTRAVENOUS at 23:03

## 2022-11-01 NOTE — Clinical Note
Case was discussed with Thor Escobar and the patient's admission status was agreed to be Admission Status: inpatient status to the service of Dr Corena Ahumada

## 2022-11-02 ENCOUNTER — APPOINTMENT (INPATIENT)
Dept: NON INVASIVE DIAGNOSTICS | Facility: HOSPITAL | Age: 67
End: 2022-11-02

## 2022-11-02 PROBLEM — E87.8 ELECTROLYTE ABNORMALITY: Status: ACTIVE | Noted: 2022-11-02

## 2022-11-02 PROBLEM — Z87.898 HISTORY OF ALCOHOL USE DISORDER: Status: ACTIVE | Noted: 2022-11-02

## 2022-11-02 LAB
2HR DELTA HS TROPONIN: -1 NG/L
4HR DELTA HS TROPONIN: -1 NG/L
ALBUMIN SERPL BCP-MCNC: 3.3 G/DL (ref 3.5–5)
ALBUMIN SERPL BCP-MCNC: 3.3 G/DL (ref 3.5–5)
ALP SERPL-CCNC: 54 U/L (ref 34–104)
ALP SERPL-CCNC: 56 U/L (ref 34–104)
ALT SERPL W P-5'-P-CCNC: 18 U/L (ref 7–52)
ALT SERPL W P-5'-P-CCNC: 19 U/L (ref 7–52)
ANION GAP SERPL CALCULATED.3IONS-SCNC: 10 MMOL/L (ref 4–13)
ANION GAP SERPL CALCULATED.3IONS-SCNC: 11 MMOL/L (ref 4–13)
ANION GAP SERPL CALCULATED.3IONS-SCNC: 11 MMOL/L (ref 4–13)
AST SERPL W P-5'-P-CCNC: 42 U/L (ref 13–39)
AST SERPL W P-5'-P-CCNC: 44 U/L (ref 13–39)
BASOPHILS # BLD AUTO: 0.02 THOUSANDS/ÂΜL (ref 0–0.1)
BASOPHILS # BLD AUTO: 0.03 THOUSANDS/ÂΜL (ref 0–0.1)
BASOPHILS NFR BLD AUTO: 0 % (ref 0–1)
BASOPHILS NFR BLD AUTO: 0 % (ref 0–1)
BILIRUB SERPL-MCNC: 1.77 MG/DL (ref 0.2–1)
BILIRUB SERPL-MCNC: 2.1 MG/DL (ref 0.2–1)
BILIRUB UR QL STRIP: NEGATIVE
BUN SERPL-MCNC: 6 MG/DL (ref 5–25)
CALCIUM ALBUM COR SERPL-MCNC: 7.6 MG/DL (ref 8.3–10.1)
CALCIUM ALBUM COR SERPL-MCNC: 8 MG/DL (ref 8.3–10.1)
CALCIUM SERPL-MCNC: 6.8 MG/DL (ref 8.4–10.2)
CALCIUM SERPL-MCNC: 7 MG/DL (ref 8.4–10.2)
CALCIUM SERPL-MCNC: 7.4 MG/DL (ref 8.4–10.2)
CARDIAC TROPONIN I PNL SERPL HS: 3 NG/L
CARDIAC TROPONIN I PNL SERPL HS: 3 NG/L
CHLORIDE SERPL-SCNC: 100 MMOL/L (ref 96–108)
CHLORIDE SERPL-SCNC: 97 MMOL/L (ref 96–108)
CHLORIDE SERPL-SCNC: 99 MMOL/L (ref 96–108)
CLARITY UR: CLEAR
CO2 SERPL-SCNC: 21 MMOL/L (ref 21–32)
CO2 SERPL-SCNC: 22 MMOL/L (ref 21–32)
CO2 SERPL-SCNC: 23 MMOL/L (ref 21–32)
COLOR UR: NORMAL
CREAT SERPL-MCNC: 0.72 MG/DL (ref 0.6–1.3)
CREAT SERPL-MCNC: 0.76 MG/DL (ref 0.6–1.3)
CREAT SERPL-MCNC: 0.83 MG/DL (ref 0.6–1.3)
EOSINOPHIL # BLD AUTO: 0.01 THOUSAND/ÂΜL (ref 0–0.61)
EOSINOPHIL # BLD AUTO: 0.01 THOUSAND/ÂΜL (ref 0–0.61)
EOSINOPHIL NFR BLD AUTO: 0 % (ref 0–6)
EOSINOPHIL NFR BLD AUTO: 0 % (ref 0–6)
ERYTHROCYTE [DISTWIDTH] IN BLOOD BY AUTOMATED COUNT: 11.9 % (ref 11.6–15.1)
ERYTHROCYTE [DISTWIDTH] IN BLOOD BY AUTOMATED COUNT: 12.2 % (ref 11.6–15.1)
FLUAV RNA RESP QL NAA+PROBE: NEGATIVE
FLUBV RNA RESP QL NAA+PROBE: NEGATIVE
GFR SERPL CREATININE-BSD FRML MDRD: 91 ML/MIN/1.73SQ M
GFR SERPL CREATININE-BSD FRML MDRD: 94 ML/MIN/1.73SQ M
GFR SERPL CREATININE-BSD FRML MDRD: 96 ML/MIN/1.73SQ M
GLUCOSE SERPL-MCNC: 90 MG/DL (ref 65–140)
GLUCOSE SERPL-MCNC: 94 MG/DL (ref 65–140)
GLUCOSE SERPL-MCNC: 98 MG/DL (ref 65–140)
GLUCOSE UR STRIP-MCNC: NEGATIVE MG/DL
HCT VFR BLD AUTO: 35.6 % (ref 36.5–49.3)
HCT VFR BLD AUTO: 35.9 % (ref 36.5–49.3)
HGB BLD-MCNC: 12.4 G/DL (ref 12–17)
HGB BLD-MCNC: 12.5 G/DL (ref 12–17)
HGB UR QL STRIP.AUTO: NEGATIVE
IMM GRANULOCYTES # BLD AUTO: 0.04 THOUSAND/UL (ref 0–0.2)
IMM GRANULOCYTES # BLD AUTO: 0.04 THOUSAND/UL (ref 0–0.2)
IMM GRANULOCYTES NFR BLD AUTO: 1 % (ref 0–2)
IMM GRANULOCYTES NFR BLD AUTO: 1 % (ref 0–2)
KETONES UR STRIP-MCNC: NEGATIVE MG/DL
LEUKOCYTE ESTERASE UR QL STRIP: NEGATIVE
LYMPHOCYTES # BLD AUTO: 1.4 THOUSANDS/ÂΜL (ref 0.6–4.47)
LYMPHOCYTES # BLD AUTO: 1.43 THOUSANDS/ÂΜL (ref 0.6–4.47)
LYMPHOCYTES NFR BLD AUTO: 18 % (ref 14–44)
LYMPHOCYTES NFR BLD AUTO: 22 % (ref 14–44)
MAGNESIUM SERPL-MCNC: 0.5 MG/DL (ref 1.9–2.7)
MAGNESIUM SERPL-MCNC: 2.5 MG/DL (ref 1.9–2.7)
MCH RBC QN AUTO: 34.5 PG (ref 26.8–34.3)
MCH RBC QN AUTO: 35.4 PG (ref 26.8–34.3)
MCHC RBC AUTO-ENTMCNC: 34.8 G/DL (ref 31.4–37.4)
MCHC RBC AUTO-ENTMCNC: 34.8 G/DL (ref 31.4–37.4)
MCV RBC AUTO: 102 FL (ref 82–98)
MCV RBC AUTO: 99 FL (ref 82–98)
MONOCYTES # BLD AUTO: 0.34 THOUSAND/ÂΜL (ref 0.17–1.22)
MONOCYTES # BLD AUTO: 0.57 THOUSAND/ÂΜL (ref 0.17–1.22)
MONOCYTES NFR BLD AUTO: 5 % (ref 4–12)
MONOCYTES NFR BLD AUTO: 7 % (ref 4–12)
NEUTROPHILS # BLD AUTO: 4.58 THOUSANDS/ÂΜL (ref 1.85–7.62)
NEUTROPHILS # BLD AUTO: 5.89 THOUSANDS/ÂΜL (ref 1.85–7.62)
NEUTS SEG NFR BLD AUTO: 72 % (ref 43–75)
NEUTS SEG NFR BLD AUTO: 74 % (ref 43–75)
NITRITE UR QL STRIP: NEGATIVE
NRBC BLD AUTO-RTO: 0 /100 WBCS
NRBC BLD AUTO-RTO: 0 /100 WBCS
OSMOLALITY UR/SERPL-RTO: 309 MMOL/KG (ref 282–298)
OSMOLALITY UR: 147 MMOL/KG
PH UR STRIP.AUTO: 5 [PH]
PHOSPHATE SERPL-MCNC: 3.3 MG/DL (ref 2.3–4.1)
PLATELET # BLD AUTO: 64 THOUSANDS/UL (ref 149–390)
PLATELET # BLD AUTO: 65 THOUSANDS/UL (ref 149–390)
PMV BLD AUTO: 10.8 FL (ref 8.9–12.7)
PMV BLD AUTO: 11.4 FL (ref 8.9–12.7)
POTASSIUM SERPL-SCNC: 3.4 MMOL/L (ref 3.5–5.3)
POTASSIUM SERPL-SCNC: 4 MMOL/L (ref 3.5–5.3)
POTASSIUM SERPL-SCNC: 4.1 MMOL/L (ref 3.5–5.3)
PROT SERPL-MCNC: 6.1 G/DL (ref 6.4–8.4)
PROT SERPL-MCNC: 6.2 G/DL (ref 6.4–8.4)
PROT UR STRIP-MCNC: NEGATIVE MG/DL
PTH-INTACT SERPL-MCNC: 55.5 PG/ML (ref 18.4–80.1)
RBC # BLD AUTO: 3.53 MILLION/UL (ref 3.88–5.62)
RBC # BLD AUTO: 3.59 MILLION/UL (ref 3.88–5.62)
RSV RNA RESP QL NAA+PROBE: NEGATIVE
SARS-COV-2 RNA RESP QL NAA+PROBE: NEGATIVE
SODIUM 24H UR-SCNC: <10 MOL/L
SODIUM SERPL-SCNC: 130 MMOL/L (ref 135–147)
SODIUM SERPL-SCNC: 132 MMOL/L (ref 135–147)
SODIUM SERPL-SCNC: 132 MMOL/L (ref 135–147)
SP GR UR STRIP.AUTO: 1 (ref 1–1.03)
TSH SERPL DL<=0.05 MIU/L-ACNC: 3.71 UIU/ML (ref 0.45–4.5)
UROBILINOGEN UR STRIP-ACNC: <2 MG/DL
WBC # BLD AUTO: 6.39 THOUSAND/UL (ref 4.31–10.16)
WBC # BLD AUTO: 7.97 THOUSAND/UL (ref 4.31–10.16)

## 2022-11-02 RX ORDER — ENOXAPARIN SODIUM 100 MG/ML
40 INJECTION SUBCUTANEOUS DAILY
Status: DISCONTINUED | OUTPATIENT
Start: 2022-11-02 | End: 2022-11-07

## 2022-11-02 RX ORDER — MAGNESIUM CHLORIDE 64 MG
128 TABLET, DELAYED RELEASE (ENTERIC COATED) ORAL DAILY
Status: DISCONTINUED | OUTPATIENT
Start: 2022-11-02 | End: 2022-11-02

## 2022-11-02 RX ORDER — POLYETHYLENE GLYCOL 3350 17 G/17G
17 POWDER, FOR SOLUTION ORAL DAILY
Status: DISCONTINUED | OUTPATIENT
Start: 2022-11-02 | End: 2022-11-09

## 2022-11-02 RX ORDER — CALCIUM GLUCONATE 20 MG/ML
2 INJECTION, SOLUTION INTRAVENOUS ONCE
Status: COMPLETED | OUTPATIENT
Start: 2022-11-02 | End: 2022-11-02

## 2022-11-02 RX ORDER — POTASSIUM CHLORIDE 20 MEQ/1
40 TABLET, EXTENDED RELEASE ORAL 2 TIMES DAILY
Status: COMPLETED | OUTPATIENT
Start: 2022-11-02 | End: 2022-11-02

## 2022-11-02 RX ORDER — MAGNESIUM SULFATE HEPTAHYDRATE 40 MG/ML
2 INJECTION, SOLUTION INTRAVENOUS ONCE
Status: COMPLETED | OUTPATIENT
Start: 2022-11-02 | End: 2022-11-02

## 2022-11-02 RX ORDER — AMLODIPINE BESYLATE 5 MG/1
5 TABLET ORAL DAILY
Status: DISCONTINUED | OUTPATIENT
Start: 2022-11-02 | End: 2022-11-04

## 2022-11-02 RX ORDER — LANOLIN ALCOHOL/MO/W.PET/CERES
100 CREAM (GRAM) TOPICAL DAILY
Status: DISCONTINUED | OUTPATIENT
Start: 2022-11-02 | End: 2022-11-11

## 2022-11-02 RX ORDER — SODIUM CHLORIDE 9 MG/ML
125 INJECTION, SOLUTION INTRAVENOUS CONTINUOUS
Status: DISCONTINUED | OUTPATIENT
Start: 2022-11-02 | End: 2022-11-05

## 2022-11-02 RX ORDER — CYANOCOBALAMIN 1000 UG/ML
1000 INJECTION, SOLUTION INTRAMUSCULAR; SUBCUTANEOUS
Status: DISCONTINUED | OUTPATIENT
Start: 2022-11-02 | End: 2022-11-13 | Stop reason: HOSPADM

## 2022-11-02 RX ORDER — PRAVASTATIN SODIUM 80 MG/1
80 TABLET ORAL
Status: DISCONTINUED | OUTPATIENT
Start: 2022-11-02 | End: 2022-11-13 | Stop reason: HOSPADM

## 2022-11-02 RX ORDER — FOLIC ACID 1 MG/1
1 TABLET ORAL DAILY
Status: DISCONTINUED | OUTPATIENT
Start: 2022-11-02 | End: 2022-11-13 | Stop reason: HOSPADM

## 2022-11-02 RX ORDER — AMILORIDE HYDROCHLORIDE 5 MG/1
5 TABLET ORAL DAILY
Status: DISCONTINUED | OUTPATIENT
Start: 2022-11-02 | End: 2022-11-04

## 2022-11-02 RX ORDER — BUPROPION HYDROCHLORIDE 150 MG/1
150 TABLET ORAL DAILY
Status: DISCONTINUED | OUTPATIENT
Start: 2022-11-02 | End: 2022-11-13 | Stop reason: HOSPADM

## 2022-11-02 RX ORDER — MIRTAZAPINE 15 MG/1
15 TABLET, FILM COATED ORAL
Status: DISCONTINUED | OUTPATIENT
Start: 2022-11-02 | End: 2022-11-02

## 2022-11-02 RX ORDER — PANTOPRAZOLE SODIUM 40 MG/1
40 TABLET, DELAYED RELEASE ORAL
Status: DISCONTINUED | OUTPATIENT
Start: 2022-11-02 | End: 2022-11-13 | Stop reason: HOSPADM

## 2022-11-02 RX ORDER — POTASSIUM CHLORIDE 20MEQ/15ML
20 LIQUID (ML) ORAL DAILY
Status: DISCONTINUED | OUTPATIENT
Start: 2022-11-02 | End: 2022-11-13 | Stop reason: HOSPADM

## 2022-11-02 RX ORDER — MIRTAZAPINE 15 MG/1
30 TABLET, FILM COATED ORAL
Status: DISCONTINUED | OUTPATIENT
Start: 2022-11-02 | End: 2022-11-13 | Stop reason: HOSPADM

## 2022-11-02 RX ADMIN — MIRTAZAPINE 30 MG: 15 TABLET, FILM COATED ORAL at 20:45

## 2022-11-02 RX ADMIN — POTASSIUM CHLORIDE 40 MEQ: 1500 TABLET, EXTENDED RELEASE ORAL at 11:46

## 2022-11-02 RX ADMIN — BUPROPION HYDROCHLORIDE 150 MG: 150 TABLET, FILM COATED, EXTENDED RELEASE ORAL at 09:40

## 2022-11-02 RX ADMIN — MULTIPLE VITAMINS W/ MINERALS TAB 1 TABLET: TAB ORAL at 09:34

## 2022-11-02 RX ADMIN — CALCIUM GLUCONATE 1 G: 20 INJECTION, SOLUTION INTRAVENOUS at 00:40

## 2022-11-02 RX ADMIN — SODIUM CHLORIDE 100 ML/HR: 0.9 INJECTION, SOLUTION INTRAVENOUS at 11:54

## 2022-11-02 RX ADMIN — MAGNESIUM SULFATE HEPTAHYDRATE 2 G: 40 INJECTION, SOLUTION INTRAVENOUS at 11:43

## 2022-11-02 RX ADMIN — THIAMINE HCL TAB 100 MG 100 MG: 100 TAB at 09:31

## 2022-11-02 RX ADMIN — PANTOPRAZOLE SODIUM 40 MG: 40 TABLET, DELAYED RELEASE ORAL at 04:55

## 2022-11-02 RX ADMIN — SODIUM CHLORIDE 100 ML/HR: 0.9 INJECTION, SOLUTION INTRAVENOUS at 02:21

## 2022-11-02 RX ADMIN — CALCIUM GLUCONATE 2 G: 20 INJECTION, SOLUTION INTRAVENOUS at 16:05

## 2022-11-02 RX ADMIN — CYANOCOBALAMIN 1000 MCG: 1000 INJECTION, SOLUTION INTRAMUSCULAR; SUBCUTANEOUS at 14:00

## 2022-11-02 RX ADMIN — ENOXAPARIN SODIUM 40 MG: 40 INJECTION SUBCUTANEOUS at 09:33

## 2022-11-02 RX ADMIN — PRAVASTATIN SODIUM 80 MG: 80 TABLET ORAL at 15:35

## 2022-11-02 RX ADMIN — AMILORIDE HYDROCLORIDE 5 MG: 5 TABLET ORAL at 09:31

## 2022-11-02 RX ADMIN — FOLIC ACID 1 MG: 1 TABLET ORAL at 09:32

## 2022-11-02 RX ADMIN — MAGNESIUM SULFATE HEPTAHYDRATE 2 G: 40 INJECTION, SOLUTION INTRAVENOUS at 14:00

## 2022-11-02 RX ADMIN — AMLODIPINE BESYLATE 5 MG: 5 TABLET ORAL at 09:31

## 2022-11-02 RX ADMIN — UMECLIDINIUM BROMIDE AND VILANTEROL TRIFENATATE 1 PUFF: 62.5; 25 POWDER RESPIRATORY (INHALATION) at 11:55

## 2022-11-02 RX ADMIN — POTASSIUM CHLORIDE 40 MEQ: 1500 TABLET, EXTENDED RELEASE ORAL at 17:20

## 2022-11-02 NOTE — ASSESSMENT & PLAN NOTE
Patient reports loss of consciousness while out drinking with friends last night  He stood up to go to the bathroom and reports losing consciousness and hitting the floor  Reports poor oral intake and loss of appetite recently due to generalized anxiety  He has been experiencing lightheadedness and dizziness upon rising from sitting positing while at home  Denies loss of consciousness at home  He has not had any chest pain or shortness of breath with these episodes  No history of seizures per chart review, no history of CAD or MI  Brought to the emergency department by EMS  Found to be hyponatremic with osmolality profile fitting hypovolemic hyponatremia  Hyponatremia improved with IVF  Orthostatics positive in the ED  EKG shows NSR with incomplete RBBB and nonspecific ST segment abnormality  Troponins negative  Syncope most likely in the setting of poor oral intake and vasovagal response    Plan:  Continue IVF  Will check orthostatics prior to discharge  On continuous telemetry    Echocardiogram ordered  Encourage cessation of alcohol consumption and better oral nutrition

## 2022-11-02 NOTE — ED PROVIDER NOTES
History  Chief Complaint   Patient presents with   • Dizziness     Reports dizziness, lightheaded   • Syncope     Reports dizziness and lightheadedness past 2 days, reports syncopal episode walking outside, was assisted up and had a second syncopal episode   (+) head strike/LOC  Reports recent same events  Denies CP/SOB    Hx COPD     80-year-old male, with past medical history of anxiety, liver mass (surgically removed), COPD, hypertension, hypokalemia, hypomagnesemia, hyponatremia, Crohn's, alcohol use who presents to the emergency department for syncopal episodes  Patient reports he was at a bar this evening after having a few drinks when he stood up from a bar stool to walk out when he passed out  Was witnessed by people at the bar  They put him back on the barstool  He reports he felt lightheaded and dizzy prior to passing out but denies any chest pain, headache, abdominal pain, back pain, shortness of breath, palpitations prior to passing out  Patient states that he had another syncopal episode in the bar that was witnessed  Patient has been taking the emergency department for evaluation  Patient's family reports he has had multiple episodes of syncope at home  Patient reports that he has anxiety and has had decreased appetite due to that  Patient's family reports he drinks more than 8 alcoholic beverages per day  Patient's family also reports that he has had weight loss for the last 3 months  Patient denies any fever, chills, chest pain, shortness of breath, abdominal pain, nausea, vomiting, ostomy output changes, dysuria, hematuria, from ostomy, focal weakness, headaches, palpitations, focal sensory loss, back pain  Patient also reports that he had a biopsy of the liver done few months ago  Patient states about 2-3 weeks ago his posted get the results of that biopsy via virtual appointment    Patient states he was unable to make the appointment due to having to take his grandkids to school  History provided by:  Patient and relative  Syncope  Episode history:  Multiple  Most recent episode: Today  Chronicity:  Recurrent  Context: standing up    Witnessed: yes    Relieved by:  Nothing  Worsened by:  Nothing  Ineffective treatments:  None tried  Associated symptoms: no chest pain, no difficulty breathing, no fever, no focal sensory loss, no focal weakness, no headaches, no nausea, no palpitations, no shortness of breath, no vomiting and no weakness        Prior to Admission Medications   Prescriptions Last Dose Informant Patient Reported? Taking?    ALPRAZolam (XANAX) 0 25 mg tablet Past Week at Unknown time Self No Yes   Sig: Take 1 tablet (0 25 mg total) by mouth daily at bedtime as needed for anxiety   AMILoride 5 mg tablet 11/2/2022 at Unknown time  No Yes   Sig: Take 1 tablet (5 mg total) by mouth daily   MAGNESIUM CHLORIDE PO 11/2/2022 at Unknown time Self Yes Yes   Sig: Take 1,000 mg by mouth daily   VIT B12-METHIONINE-INOS-CHOL IM  Self Yes No   Sig: Inject into a muscle every 30 (thirty) days   amLODIPine (NORVASC) 5 mg tablet 11/2/2022 at Unknown time  No Yes   Sig: Take 1 tablet (5 mg total) by mouth daily   buPROPion (WELLBUTRIN XL) 150 mg 24 hr tablet 11/2/2022 at Unknown time  No Yes   Sig: Take 1 tablet (150 mg total) by mouth daily   calcium carbonate (TUMS) 500 mg chewable tablet Past Month at Unknown time Self No Yes   Sig: Chew 1 tablet (500 mg total) daily   mirtazapine (REMERON) 15 mg tablet Past Month at Unknown time Self No Yes   Sig: TAKE 1 TABLET BY MOUTH EVERYDAY AT BEDTIME   omeprazole (PriLOSEC) 20 mg delayed release capsule 11/2/2022 at Unknown time Self No Yes   Sig: Take 1 capsule (20 mg total) by mouth daily   potassium chloride (MICRO-K) 10 MEQ CR capsule 11/2/2022 at Unknown time  No Yes   Sig: Take 2 capsules (20 mEq total) by mouth 2 (two) times a day   rosuvastatin (CRESTOR) 10 MG tablet 11/2/2022 at Unknown time  No Yes   Sig: Take 1 tablet (10 mg total) by mouth daily   umeclidinium-vilanterol (Anoro Ellipta) 62 5-25 MCG/INH inhaler 11/2/2022 at Unknown time  No Yes   Sig: Inhale 1 puff daily      Facility-Administered Medications Last Administration Doses Remaining   cyanocobalamin injection 1,000 mcg 9/28/2022 10:31 AM           Past Medical History:   Diagnosis Date   • LUCILLE (acute kidney injury) ([facility] ) 6/28/2017   • Blindness of left eye     Since birth   • Cancer [facility])     liver   • Cataract    • Colon polyp    • Colostomy in place [facility]) 6/29/2017   • COPD (chronic obstructive pulmonary disease) ([facility] )    • Crohn disease ([facility] )    • Emphysema of lung ([facility] )    • Emphysema/COPD ([facility] )    • Essential hypertension    • GERD (gastroesophageal reflux disease)    • Hypertension    • Hypokalemia 6/28/2017   • Hypomagnesemia 6/29/2017   • Hyponatremia 6/28/2017   • Kidney stone    • Osteomyelitis ([facility] )    • Pneumonia        Past Surgical History:   Procedure Laterality Date   • CATARACT EXTRACTION Bilateral    • CHOLECYSTECTOMY     • COLECTOMY      10 inchs of ileum   • COLONOSCOPY N/A 6/30/2017    Procedure: COLONOSCOPY;  Surgeon: Angie Montoya MD;  Location: [facility]; Service: Gastroenterology   • COLOSTOMY     • FINGER AMPUTATION     • FINGER SURGERY Left    • IR BIOPSY LIVER MASS  6/8/2020   • IR MICROWAVE ABLATION  9/21/2022   • LITHOTRIPSY     • LIVER LOBECTOMY N/A 7/15/2020    Procedure: LIVER ABLATION, INTRAOPERATIVE U/S LIVER;  Surgeon: Ralph De Leon MD;  Location: [facility];  Service: Surgical Oncology       Family History   Problem Relation Age of Onset   • Hypertension Father    • Heart disease Sister      I have reviewed and agree with the history as documented      E-Cigarette/Vaping   • E-Cigarette Use Never User      E-Cigarette/Vaping Substances   • Nicotine No    • THC No    • CBD No    • Flavoring No      Social History     Tobacco Use   • Smoking status: Former Smoker     Packs/day: 1 50     Years: 51 00     Pack years: 76 50     Types: Cigarettes Start date:      Quit date: 2022     Years since quittin 8   • Smokeless tobacco: Never Used   Vaping Use   • Vaping Use: Never used   Substance Use Topics   • Alcohol use: Yes     Alcohol/week: 59 0 standard drinks     Types: 49 Cans of beer, 10 Shots of liquor per week     Comment: drinks 6 or more beers plus hard liquor daily   • Drug use: No        Review of Systems   Constitutional: Positive for appetite change and unexpected weight change  Negative for chills and fever  HENT: Negative for ear pain and sore throat  Eyes: Negative for pain and visual disturbance  Respiratory: Positive for cough (chronic)  Negative for shortness of breath  Cardiovascular: Positive for syncope  Negative for chest pain, palpitations and leg swelling  Gastrointestinal: Negative for abdominal pain, constipation, diarrhea, nausea and vomiting  Genitourinary: Negative for dysuria and hematuria  Musculoskeletal: Negative for arthralgias and back pain  Skin: Negative for color change and rash  Neurological: Positive for syncope  Negative for focal weakness, weakness and headaches  All other systems reviewed and are negative  Physical Exam  ED Triage Vitals [22]   Temperature Pulse Respirations Blood Pressure SpO2   97 8 °F (36 6 °C) 88 17 108/79 98 %      Temp Source Heart Rate Source Patient Position - Orthostatic VS BP Location FiO2 (%)   Oral Monitor -- -- --      Pain Score       --             Orthostatic Vital Signs  Vitals:    22 2300   BP: 108/79 121/64   Pulse: 88 79       Physical Exam  Vitals and nursing note reviewed  Constitutional:       Appearance: He is well-developed  HENT:      Head: Normocephalic and atraumatic  Eyes:      Conjunctiva/sclera: Conjunctivae normal    Cardiovascular:      Rate and Rhythm: Normal rate and regular rhythm  Pulses: Normal pulses  Heart sounds: No murmur heard    Pulmonary:      Effort: Pulmonary effort is normal  No respiratory distress  Comments: Diminished breath sounds throughout  Abdominal:      General: Bowel sounds are normal       Palpations: Abdomen is soft  Tenderness: There is no abdominal tenderness  Comments: Ostomy present to [facility]   Musculoskeletal:      Cervical back: Neck supple  Right lower leg: No edema  Left lower leg: No edema  Skin:     General: Skin is warm and dry  Neurological:      Mental Status: He is alert  Cranial Nerves: Cranial nerves are intact  Sensory: Sensation is intact  Motor: Motor function is intact  Coordination: Heel to Shin Test normal          ED Medications  Medications   thiamine (VITAMIN B1) 100 mg in sodium chloride 0 9 % 50 mL IVPB (0 mg Intravenous Stopped 11/2/22 0016)   calcium gluconate 1 g in sodium chloride 0 9% 50 mL (premix) (1 g Intravenous New Bag 11/2/22 0040)   lactated ringers bolus 500 mL (0 mL Intravenous Stopped 11/2/22 0040)       Diagnostic Studies  Results Reviewed     Procedure Component Value Units Date/Time    FLU/RSV/COVID - if FLU/RSV clinically relevant [[id number]]  (Normal) Collected: 11/01/22 2300    Lab Status: Final result Specimen: Nares from Nose Updated: 11/02/22 0114     SARS-CoV-2 Negative     INFLUENZA A PCR Negative     INFLUENZA B PCR Negative     RSV PCR Negative    Narrative:      FOR PEDIATRIC PATIENTS - copy/paste COVID Guidelines URL to browser: [email or web address]  [employer]    SARS-CoV-2 assay is a Nucleic Acid Amplification assay intended for the  qualitative detection of nucleic acid from SARS-CoV-2 in nasopharyngeal  swabs  Results are for the presumptive identification of SARS-CoV-2 RNA  Positive results are indicative of infection with SARS-CoV-2, the virus  causing COVID-19, but do not rule out bacterial infection or co-infection  with other viruses   Laboratories within the [place] and its  territories are required to report all positive results to the appropriate  public health authorities  Negative results do not preclude SARS-CoV-2  infection and should not be used as the sole basis for treatment or other  patient management decisions  Negative results must be combined with  clinical observations, patient history, and epidemiological information  This test has not been FDA cleared or approved  This test has been authorized by FDA under an Emergency Use Authorization  (EUA)  This test is only authorized for the duration of time the  declaration that circumstances exist justifying the authorization of the  emergency use of an in vitro diagnostic tests for detection of SARS-CoV-2  virus and/or diagnosis of COVID-19 infection under section 564(b)(1) of  the Act, 21 U  S C  426ZTA-4(U)(3), unless the authorization is terminated  or revoked sooner  The test has been validated but independent review by FDA  and CLIA is pending  Test performed using [employer] GeneXpert: This RT-PCR assay targets N2,  a region unique to SARS-CoV-2  A conserved region in the E-gene was chosen  for pan-Sarbecovirus detection which includes SARS-CoV-2  According to CMS-2020-01-R, this platform meets the definition of high-throughput technology  Osmolality-"If this is regarding a toxic alcohol, please STOP and consult medical  for further guidance " [[id number]] Collected: 11/01/22 2300    Lab Status:  In process Specimen: Blood from Arm, Right Updated: 11/02/22 0048    Sodium, urine, random [[id number]] Collected: 11/02/22 0015    Lab Status: Final result Specimen: Urine, Other Updated: 11/02/22 0044     Sodium, Ur <10    Osmolality, urine [[id number]]  (Abnormal) Collected: 11/02/22 0015    Lab Status: Final result Specimen: Urine, Other Updated: 11/02/22 0040     Osmolality, Ur 147 mmol/KG     UA w Reflex to Microscopic w Reflex to Culture [[id number]] Collected: 11/02/22 0013    Lab Status: Final result Specimen: Urine, Other Updated: 11/02/22 0034     Color, UA Light Yellow     Clarity, UA Clear     Specific Gravity, UA 1 004     pH, UA 5 0     Leukocytes, UA Negative     Nitrite, UA Negative     Protein, UA Negative mg/dl      Glucose, UA Negative mg/dl      Ketones, UA Negative mg/dl      Urobilinogen, UA <2 0 mg/dl      Bilirubin, UA Negative     Occult Blood, UA Negative    CBC and differential [[id number]]  (Abnormal) Collected: 11/01/22 2300    Lab Status: Final result Specimen: Blood from Arm, Right Updated: 11/02/22 0011     WBC 7 97 Thousand/uL      RBC 3 59 Million/uL      Hemoglobin 12 4 g/dL      Hematocrit 35 6 %      MCV 99 fL      MCH 34 5 pg      MCHC 34 8 g/dL      RDW 11 9 %      MPV 10 8 fL      Platelets 64 Thousands/uL      nRBC 0 /100 WBCs      Neutrophils Relative 74 %      Immat GRANS % 1 %      Lymphocytes Relative 18 %      Monocytes Relative 7 %      Eosinophils Relative 0 %      Basophils Relative 0 %      Neutrophils Absolute 5 89 Thousands/µL      Immature Grans Absolute 0 04 Thousand/uL      Lymphocytes Absolute 1 43 Thousands/µL      Monocytes Absolute 0 57 Thousand/µL      Eosinophils Absolute 0 01 Thousand/µL      Basophils Absolute 0 03 Thousands/µL     HS Troponin I 4hr [[id number]]     Lab Status: No result Specimen: Blood     TSH, 3rd generation with Free T4 reflex [[id number]]  (Normal) Collected: 11/01/22 2300    Lab Status: Final result Specimen: Blood from Arm, Right Updated: 11/02/22 0001     TSH 3RD GENERATON 3 708 uIU/mL     Narrative:      Patients undergoing fluorescein dye angiography may retain small amounts of fluorescein in the body for 48-72 hours post procedure  Samples containing fluorescein can produce falsely depressed TSH values  If the patient had this procedure,a specimen should be resubmitted post fluorescein clearance        HS Troponin 0hr (reflex protocol) [[id number]]  (Normal) Collected: 11/01/22 2300    Lab Status: Final result Specimen: Blood from Arm, Right Updated: 11/01/22 9357 hs TnI 0hr 4 ng/L     HS Troponin I 2hr [[id number]]     Lab Status: No result Specimen: Blood     Comprehensive metabolic panel [[id number]]  (Abnormal) Collected: 11/01/22 2300    Lab Status: Final result Specimen: Blood from Arm, Right Updated: 11/01/22 2325     Sodium 127 mmol/L      Potassium 4 3 mmol/L      Chloride 93 mmol/L      CO2 21 mmol/L      ANION GAP 13 mmol/L      BUN 8 mg/dL      Creatinine 0 95 mg/dL      Glucose 79 mg/dL      Calcium 6 7 mg/dL      AST 45 U/L      ALT 19 U/L      Alkaline Phosphatase 52 U/L      Total Protein 6 7 g/dL      Albumin 3 6 g/dL      Total Bilirubin 1 50 mg/dL      eGFR 82 ml/min/1 73sq m     Narrative:      [place] guidelines for Chronic Kidney Disease (CKD):   •  Stage 1 with normal or high GFR (GFR > 90 mL/min/1 73 square meters)  •  Stage 2 Mild CKD (GFR = 60-89 mL/min/1 73 square meters)  •  Stage 3A Moderate CKD (GFR = 45-59 mL/min/1 73 square meters)  •  Stage 3B Moderate CKD (GFR = 30-44 mL/min/1 73 square meters)  •  Stage 4 Severe CKD (GFR = 15-29 mL/min/1 73 square meters)  •  Stage 5 End Stage CKD (GFR <15 mL/min/1 73 square meters)  Note: GFR calculation is accurate only with a steady state creatinine    Ethanol [[id number]]  (Abnormal) Collected: 11/01/22 2300    Lab Status: Final result Specimen: Blood from Arm, Right Updated: 11/01/22 2325     Ethanol Lvl 231 mg/dL                  XR chest 1 view portable   ED Interpretation by Suad Santos DO (11/01 2307)   No acute cardiopulmonary process  CT head without contrast   Final Result by Sam Lynch DO (11/01 2315)      No acute intracranial abnormality  Chronic microangiopathic changes                    Workstation performed: [id number]               Procedures  ECG 12 Lead Documentation Only    Date/Time: 11/1/2022 11:30 PM  Performed by: Suad Santos DO  Authorized by: Suad Santos DO     Patient location:  ED  Previous ECG:     Previous ECG: Compared to current    Comparison ECG info:  9/24/22 NSR incomplete RBBB    Similarity:  No change  Interpretation:     Interpretation: abnormal    Rate:     ECG rate:  Eighty-seven    ECG rate assessment: normal    Rhythm:     Rhythm: sinus rhythm    Ectopy:     Ectopy: none    QRS:     QRS axis:  Normal    QRS intervals:  Normal  Conduction:     Conduction: abnormal      Abnormal conduction: incomplete RBBB    ST segments:     ST segments:  Non-specific  T waves:     T waves: normal            ED Course  ED Course as of 11/02/22 0120   Tue Nov 01, 2022   2221 Blood Pressure: 108/79   2221 Temperature: 97 8 °F (36 6 °C)  Oral temp   2221 Pulse: 88   2221 Respirations: 17   2221 SpO2: 98 %   2222 80 yo M presents for syncopal episode x2 today  Patient reports drinking alcohol today  Patient drinks alcohol daily  Patient has been having syncopal episodes for few weeks per family  Associated decreased p o  intake, weight loss  Has chronic cough that is unchanged  No fever, chills, chest pain, shortness of breath, abdominal pain, back pain, palpitation, nausea, vomiting, headache  No blood from ostomy  Physical exam:  Ostomy to left lower quadrant  Diminished breath sounds throughout  Concern for:  Arrhythmia versus electrolyte disturbance versus ACS versus CVA   2230 EKG does not show any acute ischemic changes or any arrhythmias  Compared to prior from September 24, 2022   2307 XR chest 1 view portable  No acute cardiopulmonary process  2307 Given significant alcohol use will give thiamine and 1 L LR   2333 MEDICAL ALCOHOL(!): 231   2333 Comprehensive metabolic panel(!)  Hyponatremia to 127  Will order hyponatremia labs (serum osmoles, urine osmoles, urine sodium)  Hypocalcemia to 6 7  Albumin normal 3 6  Will give calcium gluconate  2336 hs TnI 0hr: 4  Since episodes started prior to arrival will await 2 hour troponin  2343 CT head without contrast  No acute intracranial abnormality  Chronic microangiopathic changes  Wed Nov 02, 2022   0002 Heart score 4   0009 TSH 3RD GENERATON: 3 708  Unlikely hyper or hypothyroidism due to this resolved  0035 CBC and differential(!)  Platelets 64  On review of previous labs seems to be at baseline  0039 UA w Reflex to Microscopic w Reflex to Culture  Unremarkable   0045 Osmolality, Ur(!): 147   0046 SODIUM URINE: <10   0100 Results discussed with patient  Patient expressed understanding    0101 Plan admit patient for hyponatremia, hypocalcemia, and syncopal episodes  Plan was discussed with patient  Patient expresses understanding is agreeable with plan  Patient was given the opportunity to ask questions emergency department  All questions and concerns were addressed in the emergency department    0101 Patient was discussed with admitting team   They agree with admission for hyponatremia, hypocalcemia, syncopal episodes               HEART Risk Score    Flowsheet Row Most Recent Value   Heart Score Risk Calculator    History 1 Filed at: 11/02/2022 0001   ECG 0 Filed at: 11/02/2022 0001   Age 2 Filed at: 11/02/2022 0001   Risk Factors 1 Filed at: 11/02/2022 0001   Troponin 0 Filed at: 11/02/2022 0001   HEART Score 4 Filed at: 11/02/2022 0001                MDM  Number of Diagnoses or Management Options  Alcohol use disorder, moderate, dependence ([facility] ): established and worsening  Hypocalcemia: established and worsening  Hyponatremia: established and worsening  Syncope: new and requires workup  Diagnosis management comments: See ED course for more details on medical decision making       Amount and/or Complexity of Data Reviewed  Clinical lab tests: ordered and reviewed  Decide to obtain previous medical records or to obtain history from someone other than the patient: yes  Obtain history from someone other than the patient: yes  Review and summarize past medical records: yes  Discuss the patient with other providers: yes  Independent visualization of images, tracings, or specimens: yes        Disposition  Final diagnoses:   Syncope   Hyponatremia   Hypocalcemia   Alcohol use disorder, moderate, dependence ([facility] )     Time reflects when diagnosis was documented in both MDM as applicable and the Disposition within this note     Time User Action Codes Description Comment    11/2/2022 12:57 AM Navas Rj Add [R55] Syncope     11/2/2022 12:57 AM Navas Rj Add [E87 1] Hyponatremia     11/2/2022 12:57 AM Fredi Coyolier N Add [E83 51] Hypocalcemia     11/2/2022 12:58 AM Navas Rj Add [F10 20] Alcohol use disorder, moderate, dependence [facility])       ED Disposition     ED Disposition   Admit    Condition   Stable    Date/Time   Wed Nov 2, 2022 12:59 AM    Comment   Case was discussed with eVrona Hoff and the patient's admission status was agreed to be Admission Status: inpatient status to the service of Dr Calderon Overall   Follow-up Information    None         Patient's Medications   Discharge Prescriptions    No medications on file     No discharge procedures on file  PDMP Review       Value Time User    PDMP Reviewed  Yes 11/1/2022 10:25 PM Martinez Espitia MD           ED Provider  Attending physically available and evaluated Connor Corey III  I managed the patient along with the ED Attending      Electronically Signed by         Nate Us DO  11/02/22 0122

## 2022-11-02 NOTE — H&P
[facility]  [facility] 1955, 79 y o  male MRN: [id number]  Unit/Bed#: W -01 Encounter: [id number]  Primary Care Provider: Carrol Apgar, MD   Date and time admitted to hospital: 2022  9:35 PM    * Hyponatremia  Assessment & Plan  · Baseline 135s -140s, , w/ IVF now 132  · Likely 2/2 to low PO intake and increase alcohol 231  · Encourage food and beverage hydration aside from alcohol   · Unsure if he is taking Nacl tabs due to differing history to providers  · Plan for dc likely today as Na is almost normalized, repeat BMP at noon    Syncope  Assessment & Plan  · H/O Vasovagal 2/2 to poor oral intake , no indications for cardiac origin   · Orthostatics   · Tele   · nursing 02 eval on ambulation to check       History of alcohol use disorder  Assessment & Plan  · Minimum of 6 cans / bottles of alcohol daily   · Alcohol level   · CIWA   · Thiamine / Folate daily   · Does not want to pursue rehab  · Does not admit to alcohol use problem - pre contemplative phase of change    Electrolyte abnormality  Assessment & Plan  · Hypokalemia --> replete   · Hypocalcemia --> replete when I ben returns  · Likely d/t poor nutrition     Platelets decreased ([address] )  Assessment & Plan  2/2 to alcohol use     No bleeding at this time, no interventions planned    Crohn's disease of small intestine with other complication (HCC)  Assessment & Plan  · W/ Ostomy   · Ostomy care    Chronic obstructive pulmonary disease (HCC)  Assessment & Plan  · On Room Air   · Continue Anoro Ellipta    Essential hypertension  Assessment & Plan  · Continue amilorides 5 mg qd   · Continue home med amlodipine 5 mg qd       Nutrition Assessment and Intervention:     Reviewed food recall journal    Ordered nutritional assessment labs      Physical Activity Assessment and Intervention:    Activity journal reviewed      Tobacco and Toxic Substance Assessment and Intervention:     Alcohol and drug use screening performed    Alcohol cessation counseling provided          VTE Pharmacologic Prophylaxis: VTE Score: 5 High Risk (Score >/= 5) - Pharmacological DVT Prophylaxis Ordered: enoxaparin (Lovenox)  Sequential Compression Devices Ordered  Code Status: Level 1 - Full Code   Discussion with family: Patient declined call to   Anticipated Length of Stay: Patient will be admitted on an inpatient basis with an anticipated length of stay of greater than 2 midnights secondary to syncope / hyponatremia  Chief Complaint: 2 "black outs"     History of Present Illness:  Bruno Mayberry is a 79 y o  male with a PMH of [address]  s/p removal, Hypokalemia,  Crohns w/ ostomy, Kidney stones, Thrombocytopenia, Alcohol use, syncope  who presents with 2 episodes of black out which had never happened before  He states that he was in on of his clubs where he was with friends and drinking about 6 alcoholic drinks  (Per daughter who has told ED provider) the pt usually drinks before going to the club and may club hooping so the drink count daily is much higher)  He was drinking where he passed out and was brought to the ED  Never has black outs per our conversation nor does he take sodium tabs (he told ED provider that he is supposed to be on it)  Poor historian  Does not provide more history  Admitted for hyponatremia requiring IVF and syncope workup  Review of Systems:  Review of Systems   Constitutional: Negative for chills and fever  HENT: Negative for ear pain and sore throat  Eyes: Negative for pain and visual disturbance  Respiratory: Negative for cough and shortness of breath  Cardiovascular: Negative for chest pain and palpitations  Gastrointestinal: Negative for abdominal pain, diarrhea and vomiting  Genitourinary: Negative for dysuria and hematuria  Musculoskeletal: Negative for arthralgias and back pain  Skin: Negative for color change and rash     Neurological: Positive for syncope and light-headedness  Negative for seizures  Psychiatric/Behavioral: Negative for confusion  The patient is not hyperactive  All other systems reviewed and are negative  Past Medical and Surgical History:   Past Medical History:   Diagnosis Date   • [facility] 6/28/2017   • Blindness of left eye     Since birth   • Cancer [facility])     liver   • Cataract    • Colon polyp    • Colostomy in place [facility]) 6/29/2017   • COPD (chronic obstructive pulmonary disease) (HCC)    • Crohn disease ([facility] )    • Emphysema of lung (HCC)    • Emphysema/COPD (HCC)    • Essential hypertension    • GERD (gastroesophageal reflux disease)    • Hypertension    • Hypokalemia 6/28/2017   • Hypomagnesemia 6/29/2017   • Hyponatremia 6/28/2017   • Kidney stone    • Osteomyelitis ([place] [address] )    • Pneumonia        Past Surgical History:   Procedure Laterality Date   • CATARACT EXTRACTION Bilateral    • CHOLECYSTECTOMY     • COLECTOMY      10 inchs of ileum   • COLONOSCOPY N/A 6/30/2017    Procedure: COLONOSCOPY;  Surgeon: Nimo Avendano MD;  Location: [facility]; Service: Gastroenterology   • COLOSTOMY     • FINGER AMPUTATION     • FINGER SURGERY Left    • IR BIOPSY LIVER MASS  6/8/2020   • IR MICROWAVE ABLATION  9/21/2022   • LITHOTRIPSY     • LIVER LOBECTOMY N/A 7/15/2020    Procedure: LIVER ABLATION, INTRAOPERATIVE U/S LIVER;  Surgeon: Prem Metzger MD;  Location: [facility];  Service: Surgical Oncology       Meds/Allergies:  Prior to Admission medications    Medication Sig Start Date End Date Taking?  Authorizing Provider   ALPRAZolam Osie Cools) 0 25 mg tablet Take 1 tablet (0 25 mg total) by mouth daily at bedtime as needed for anxiety 8/25/22 11/23/22 Yes Jennine Hatchet, MD   AMILoride 5 mg tablet Take 1 tablet (5 mg total) by mouth daily 9/28/22  Yes Yasmin Johnson MD   amLODIPine (NORVASC) 5 mg tablet Take 1 tablet (5 mg total) by mouth daily 10/20/22  Yes Yasmin Johnson MD   buPROPion (WELLBUTRIN XL) 150 mg 24 hr tablet Take 1 tablet (150 mg total) by mouth daily 10/13/22  Yes Darcy Caballero MD   calcium carbonate (TUMS) 500 mg chewable tablet Chew 1 tablet (500 mg total) daily 20  Yes Vonda Webster PA-C   MAGNESIUM CHLORIDE PO Take 1,000 mg by mouth daily   Yes Historical Provider, MD   mirtazapine (REMERON) 15 mg tablet TAKE 1 TABLET BY MOUTH EVERYDAY AT BEDTIME 21  Yes Darcy Caballero MD   omeprazole (PriLOSEC) 20 mg delayed release capsule Take 1 capsule (20 mg total) by mouth daily 22  Yes Darcy Caballero MD   potassium chloride (MICRO-K) 10 MEQ CR capsule Take 2 capsules (20 mEq total) by mouth 2 (two) times a day 10/19/22  Yes Darcy Caballero MD   rosuvastatin (CRESTOR) 10 MG tablet Take 1 tablet (10 mg total) by mouth daily 10/13/22  Yes Darcy Caballero MD   umeclidinium-vilanterol (Anoro Ellipta) 62 5-25 MCG/INH inhaler Inhale 1 puff daily 22 Yes Margaret Wilkerson MD   VIT B12-METHIONINE-INOS-CHOL IM Inject into a muscle every 30 (thirty) days    Historical Provider, MD     I have reviewed home medications with patient personally  Allergies: No Known Allergies    Social History:  Marital Status:    Occupation: Unknown  Patient Pre-hospital Living Situation: Home?   Patient Pre-hospital Level of Mobility: walks  Patient Pre-hospital Diet Restrictions: none  Substance Use History:   Social History     Substance and Sexual Activity   Alcohol Use Yes   • Alcohol/week: 59 0 standard drinks   • Types: 49 Cans of beer, 10 Shots of liquor per week    Comment: drinks 6 or more beers plus hard liquor daily     Social History     Tobacco Use   Smoking Status Former Smoker   • Packs/day: 1 50   • Years: 51 00   • Pack years: 76 50   • Types: Cigarettes   • Start date:    • Quit date: 2022   • Years since quittin 8   Smokeless Tobacco Never Used     Social History     Substance and Sexual Activity   Drug Use No       Family History:  Family History   Problem Relation Age of Onset   • Hypertension Father    • Heart disease Sister        Physical Exam:     Vitals:   Blood Pressure: 119/71 (11/02/22 2126)  Pulse: 85 (11/02/22 0637)  Temperature: 97 5 °F (36 4 °C) (11/02/22 0637)  Temp Source: Oral (11/02/22 0219)  Respirations: 18 (11/02/22 0296)  Weight - Scale: 63 7 kg (140 lb 6 9 oz) (11/01/22 2142)  SpO2: 96 % (11/02/22 0600)    Physical Exam  Vitals and nursing note reviewed  Constitutional:       General: He is not in acute distress  Appearance: He is well-developed  He is not ill-appearing, toxic-appearing or diaphoretic  HENT:      Head: Normocephalic and atraumatic  Eyes:      Extraocular Movements: Extraocular movements intact  Conjunctiva/sclera: Conjunctivae normal    Cardiovascular:      Rate and Rhythm: Normal rate and regular rhythm  Heart sounds: No murmur heard  Pulmonary:      Effort: Pulmonary effort is normal  No respiratory distress  Breath sounds: Normal breath sounds  No wheezing or rales  Abdominal:      General: There is no distension  Palpations: Abdomen is soft  Tenderness: There is no abdominal tenderness  There is no guarding  Musculoskeletal:      Cervical back: Neck supple  Right lower leg: No edema  Left lower leg: No edema  Skin:     General: Skin is warm and dry  Neurological:      Mental Status: He is alert and oriented to person, place, and time            Additional Data:     Lab Results:  Results from last 7 days   Lab Units 11/02/22  0450   WBC Thousand/uL 6 39   HEMOGLOBIN g/dL 12 5   HEMATOCRIT % 35 9*   PLATELETS Thousands/uL 65*   NEUTROS PCT % 72   LYMPHS PCT % 22   MONOS PCT % 5   EOS PCT % 0     Results from last 7 days   Lab Units 11/02/22  0450 11/01/22  2300   SODIUM mmol/L 132* 127*   POTASSIUM mmol/L 3 4* 4 3   CHLORIDE mmol/L 100 93*   CO2 mmol/L 21 21   BUN mg/dL 6 8   CREATININE mg/dL 0 83 0 95   ANION GAP mmol/L 11 13   CALCIUM mg/dL 6 8* 6 7*   ALBUMIN g/dL  --  3 6   TOTAL BILIRUBIN mg/dL  --  1 50*   ALK PHOS U/L  --  52   ALT U/L  --  19   AST U/L  --  45*   GLUCOSE RANDOM mg/dL 98 79                       Imaging: Reviewed radiology reports from this admission including: all imaging this admission and previous admissio n  XR chest 1 view portable   ED Interpretation by Levar Shah DO (11/01 2307)   No acute cardiopulmonary process  CT head without contrast   Final Result by Gosia Bowers DO (11/01 2315)      No acute intracranial abnormality  Chronic microangiopathic changes  Workstation performed: [id number]             EKG and Other Studies Reviewed on Admission:   · EKG: NSR  HR 87     ** Please Note: This note has been constructed using a voice recognition system   **

## 2022-11-02 NOTE — ASSESSMENT & PLAN NOTE
· Baseline 135s -140s, , w/ IVF now 132, correcting at appropriate rate of 6-8 meq first 24hrs  · Likely 2/2 to low PO intake and increase alcohol 231  · Encourage food and beverage hydration aside from alcohol    Plan:  CMP ordered q12h  Continue IV hydration, will discontinue once at baseline and encourage oral intake

## 2022-11-02 NOTE — PLAN OF CARE
Problem: METABOLIC, FLUID AND ELECTROLYTES - ADULT  Goal: Electrolytes maintained within normal limits  Description: INTERVENTIONS:  - Monitor labs and assess patient for signs and symptoms of electrolyte imbalances  - Administer electrolyte replacement as ordered  - Monitor response to electrolyte replacements, including repeat lab results as appropriate  - Instruct patient on fluid and nutrition as appropriate  Outcome: Progressing  Goal: Fluid balance maintained  Description: INTERVENTIONS:  - Monitor labs   - Monitor I/O and WT  - Instruct patient on fluid and nutrition as appropriate  - Assess for signs & symptoms of volume excess or deficit  Outcome: Progressing  Goal: Glucose maintained within target range  Description: INTERVENTIONS:  - Monitor Blood Glucose as ordered  - Assess for signs and symptoms of hyperglycemia and hypoglycemia  - Administer ordered medications to maintain glucose within target range  - Assess nutritional intake and initiate nutrition service referral as needed  Outcome: Progressing     Problem: PAIN - ADULT  Goal: Verbalizes/displays adequate comfort level or baseline comfort level  Description: Interventions:  - Encourage patient to monitor pain and request assistance  - Assess pain using appropriate pain scale  - Administer analgesics based on type and severity of pain and evaluate response  - Implement non-pharmacological measures as appropriate and evaluate response  - Consider cultural and social influences on pain and pain management  - Notify physician/advanced practitioner if interventions unsuccessful or patient reports new pain  Outcome: Progressing     Problem: INFECTION - ADULT  Goal: Absence or prevention of progression during hospitalization  Description: INTERVENTIONS:  - Assess and monitor for signs and symptoms of infection  - Monitor lab/diagnostic results  - Monitor all insertion sites, i e  indwelling lines, tubes, and drains  - Monitor endotracheal if appropriate and nasal secretions for changes in amount and color  - Kamas appropriate cooling/warming therapies per order  - Administer medications as ordered  - Instruct and encourage patient and family to use good hand hygiene technique  - Identify and instruct in appropriate isolation precautions for identified infection/condition  Outcome: Progressing  Goal: Absence of fever/infection during neutropenic period  Description: INTERVENTIONS:  - Monitor WBC    Outcome: Progressing     Problem: SAFETY ADULT  Goal: Patient will remain free of falls  Description: INTERVENTIONS:  - Educate patient/family on patient safety including physical limitations  - Instruct patient to call for assistance with activity   - Consult OT/PT to assist with strengthening/mobility   - Keep Call bell within reach  - Keep bed low and locked with side rails adjusted as appropriate  - Keep care items and personal belongings within reach  - Initiate and maintain comfort rounds  - Make Fall Risk Sign visible to staff  - Offer Toileting every 2 Hours, in advance of need  - Initiate/Maintain bed alarm  - Apply yellow socks and bracelet for high fall risk patients  - Consider moving patient to room near nurses station  Outcome: Progressing  Goal: Maintain or return to baseline ADL function  Description: INTERVENTIONS:  -  Assess patient's ability to carry out ADLs; assess patient's baseline for ADL function and identify physical deficits which impact ability to perform ADLs (bathing, care of mouth/teeth, toileting, grooming, dressing, etc )  - Assess/evaluate cause of self-care deficits   - Assess range of motion  - Assess patient's mobility; develop plan if impaired  - Assess patient's need for assistive devices and provide as appropriate  - Encourage maximum independence but intervene and supervise when necessary  - Involve family in performance of ADLs  - Assess for home care needs following discharge   - Consider OT consult to assist with ADL evaluation and planning for discharge  - Provide patient education as appropriate  Outcome: Progressing  Goal: Maintains/Returns to pre admission functional level  Description: INTERVENTIONS:  - Perform BMAT or MOVE assessment daily    - Set and communicate daily mobility goal to care team and patient/family/caregiver  - Collaborate with rehabilitation services on mobility goals if consulted  - Perform Range of Motion 3 times a day  - Reposition patient every 2 hours  - Dangle patient 3 times a day  - Stand patient 3 times a day  - Ambulate patient 3 times a day  - Out of bed to chair 3 times a day   - Out of bed for meals 3 times a day  - Out of bed for toileting  - Record patient progress and toleration of activity level   Outcome: Progressing     Problem: DISCHARGE PLANNING  Goal: Discharge to home or other facility with appropriate resources  Description: INTERVENTIONS:  - Identify barriers to discharge w/patient and caregiver  - Arrange for needed discharge resources and transportation as appropriate  - Identify discharge learning needs (meds, wound care, etc )  - Arrange for interpretive services to assist at discharge as needed  - Refer to Case Management Department for coordinating discharge planning if the patient needs post-hospital services based on physician/advanced practitioner order or complex needs related to functional status, cognitive ability, or social support system  Outcome: Progressing     Problem: Knowledge Deficit  Goal: Patient/family/caregiver demonstrates understanding of disease process, treatment plan, medications, and discharge instructions  Description: Complete learning assessment and assess knowledge base    Interventions:  - Provide teaching at level of understanding  - Provide teaching via preferred learning methods  Outcome: Progressing     Problem: MOBILITY - ADULT  Goal: Maintain or return to baseline ADL function  Description: INTERVENTIONS:  -  Assess patient's ability to carry out ADLs; assess patient's baseline for ADL function and identify physical deficits which impact ability to perform ADLs (bathing, care of mouth/teeth, toileting, grooming, dressing, etc )  - Assess/evaluate cause of self-care deficits   - Assess range of motion  - Assess patient's mobility; develop plan if impaired  - Assess patient's need for assistive devices and provide as appropriate  - Encourage maximum independence but intervene and supervise when necessary  - Involve family in performance of ADLs  - Assess for home care needs following discharge   - Consider OT consult to assist with ADL evaluation and planning for discharge  - Provide patient education as appropriate  Outcome: Progressing  Goal: Maintains/Returns to pre admission functional level  Description: INTERVENTIONS:  - Perform BMAT or MOVE assessment daily    - Set and communicate daily mobility goal to care team and patient/family/caregiver  - Collaborate with rehabilitation services on mobility goals if consulted  - Perform Range of Motion 3 times a day  - Reposition patient every 2 hours    - Dangle patient 3 times a day  - Stand patient 2 times a day  - Ambulate patient 3 times a day  - Out of bed to chair 3 times a day   - Out of bed for meals 3 times a day  - Out of bed for toileting  - Record patient progress and toleration of activity level   Outcome: Progressing     Problem: Potential for Falls  Goal: Patient will remain free of falls  Description: INTERVENTIONS:  - Educate patient/family on patient safety including physical limitations  - Instruct patient to call for assistance with activity   - Consult OT/PT to assist with strengthening/mobility   - Keep Call bell within reach  - Keep bed low and locked with side rails adjusted as appropriate  - Keep care items and personal belongings within reach  - Initiate and maintain comfort rounds  - Make Fall Risk Sign visible to staff  - Offer Toileting every 3 Hours, in advance of need  - Initiate/Maintain bed alarm  - Apply yellow socks and bracelet for high fall risk patients  - Consider moving patient to room near nurses station  Outcome: Progressing     Problem: Nutrition/Hydration-ADULT  Goal: Nutrient/Hydration intake appropriate for improving, restoring or maintaining nutritional needs  Description: Monitor and assess patient's nutrition/hydration status for malnutrition  Collaborate with interdisciplinary team and initiate plan and interventions as ordered  Monitor patient's weight and dietary intake as ordered or per policy  Utilize nutrition screening tool and intervene as necessary  Determine patient's food preferences and provide high-protein, high-caloric foods as appropriate       INTERVENTIONS:  - Monitor oral intake, urinary output, labs, and treatment plans  - Assess nutrition and hydration status and recommend course of action  - Evaluate amount of meals eaten  - Assist patient with eating if necessary   - Allow adequate time for meals  - Recommend/ encourage appropriate diets, oral nutritional supplements, and vitamin/mineral supplements  - Order, calculate, and assess calorie counts as needed  - Recommend, monitor, and adjust tube feedings and TPN/PPN based on assessed needs  - Assess need for intravenous fluids  - Provide specific nutrition/hydration education as appropriate  - Include patient/family/caregiver in decisions related to nutrition  Outcome: Progressing

## 2022-11-02 NOTE — PROGRESS NOTES
[facility]  Progress Note - Elena Dill III 1955, 79 y o  male MRN: [id number]  Unit/Bed#: S -01 Encounter: [id number]  Primary Care Provider: Tori Kingsley MD   Date and time admitted to hospital: 11/1/2022  9:35 PM    * Syncope  Assessment & Plan  Patient reports loss of consciousness while out drinking with friends last night  He stood up to go to the bathroom and reports losing consciousness and hitting the floor  Reports poor oral intake and loss of appetite recently due to generalized anxiety  He has been experiencing lightheadedness and dizziness upon rising from sitting positing while at home  Denies loss of consciousness at home  He has not had any chest pain or shortness of breath with these episodes  No history of seizures per chart review, no history of CAD or MI  Brought to the emergency department by EMS  Found to be hyponatremic with osmolality profile fitting hypovolemic hyponatremia  Hyponatremia improved with IVF  Orthostatics positive in the ED  EKG shows NSR with incomplete RBBB and nonspecific ST segment abnormality  Troponins negative  Syncope most likely in the setting of poor oral intake and vasovagal response    Plan:  Continue IVF  Will check orthostatics prior to discharge  On continuous telemetry  Echocardiogram ordered  Encourage cessation of alcohol consumption and better oral nutrition        History of alcohol use disorder  Assessment & Plan  · Minimum of 6 cans / bottles of alcohol daily   · Alcohol level   · CIWA   · Thiamine / Folate daily, B12 shot ordered   · Does not want to pursue rehab     · Does not admit to alcohol use problem - pre contemplative phase of change      Electrolyte abnormality  Assessment & Plan  Hypokalemia of 3 4 and calcium of 6 8 POA  Likely secondary to poor nutrition and oral intake    Plan  CMP ordered for the morning  Replete electrolytes as necessary      Platelets decreased [facility])  Assessment & Plan  2/2 to alcohol use  No bleeding at this time, no interventions planned    Crohn's disease of small intestine with other complication ([facility] [address] )  Assessment & Plan  · W/ Ostomy  Patient denies abnormal or excessive output  · Ostomy care    Chronic obstructive pulmonary disease (HCC)  Assessment & Plan  · Stable on room air  · Continue Anoro Ellipta    Hyponatremia  Assessment & Plan  · Baseline 135s -140s, , w/ IVF now 132, correcting at appropriate rate of 6-8 meq first 24hrs  · Likely 2/2 to low PO intake and increase alcohol 231  · Encourage food and beverage hydration aside from alcohol    Plan:  CMP ordered q12h  Continue IV hydration, will discontinue once at baseline and encourage oral intake    Essential hypertension  Assessment & Plan  · Continue amiloride 5 mg qd   · Continue home med amlodipine 5 mg qd             VTE Pharmacologic Prophylaxis: VTE Score: 5 Moderate Risk (Score 3-4) - Pharmacological DVT Prophylaxis Ordered: enoxaparin (Lovenox)  Patient Centered Rounds: I performed bedside rounds with nursing staff today  Discussions with Specialists or Other Care Team Provider:     Education and Discussions with Family / Patient: Updated  (daughter) via phone  Current Length of Stay: 0 day(s)  Current Patient Status: Inpatient   Discharge Plan: Anticipate discharge in 24-48 hrs to home  Code Status: Level 1 - Full Code    Subjective:   Patient feeling well this morning  He is not having any lightheadedness or dizziness  Patient denies any palpitations, chest pain or shortness of breath  Patient endorses having up to 3 drinks per day but daughter believes it is more likely 8-10  Daughter states patient is at the bar at 11 am on most days to have a couple drinks, will come home for light lunch and then go back to the bar and continue drinking    Patient says he has been lightheaded in his house the past couple days when standing up but he has never fully lost consciousness before  Daughter believes this is approximately the 7th time he has blacked out  He states that he has had poor appetite lately due to generalized anxiety  He is not having any abdominal pain or nausea  Patient denies pain with urination  He reports no fever, chills or sweats  Denies weakness or numbness in any of his extremities  Objective:     Vitals:   Temp (24hrs), Av 8 °F (36 6 °C), Min:97 5 °F (36 4 °C), Max:98 8 °F (37 1 °C)    Temp:  [97 5 °F (36 4 °C)-98 8 °F (37 1 °C)] 98 8 °F (37 1 °C)  HR:  [] 98  Resp:  [17-19] 19  BP: ()/(64-85) 154/84  SpO2:  [94 %-98 %] 96 %  Body mass index is 20 74 kg/m²  Input and Output Summary (last 24 hours): Intake/Output Summary (Last 24 hours) at 2022 1224  Last data filed at 2022 0040  Gross per 24 hour   Intake 750 ml   Output --   Net 750 ml       Physical Exam:   Physical Exam  Constitutional:       General: He is not in acute distress  Appearance: Normal appearance  He is normal weight  He is not ill-appearing or toxic-appearing  HENT:      Head: Normocephalic and atraumatic  Eyes:      General: No scleral icterus  Extraocular Movements: Extraocular movements intact  Conjunctiva/sclera: Conjunctivae normal       Pupils: Pupils are equal, round, and reactive to light  Cardiovascular:      Rate and Rhythm: Normal rate and regular rhythm  Pulses: Normal pulses  Heart sounds: Normal heart sounds  No murmur heard  No gallop  Pulmonary:      Effort: Pulmonary effort is normal  No respiratory distress  Breath sounds: No stridor  Wheezing present  No rhonchi or rales  Comments: Mild wheezing heard bilaterally  Air entry normal    Chest:      Chest wall: No tenderness  Abdominal:      General: Abdomen is flat  Bowel sounds are normal  There is no distension  Palpations: Abdomen is soft  Tenderness: There is no abdominal tenderness     Musculoskeletal: General: Normal range of motion  Cervical back: Normal range of motion  Right lower leg: No edema  Left lower leg: No edema  Skin:     General: Skin is warm  Coloration: Skin is not jaundiced or pale  Findings: No rash  Neurological:      General: No focal deficit present  Mental Status: He is alert and oriented to person, place, and time  Mental status is at baseline  Cranial Nerves: No cranial nerve deficit  Sensory: No sensory deficit  Motor: No weakness  Comments: Pronator drift negative  Strength 5/5 bilaterally    Mild tremor noted with outstretched arms   Psychiatric:         Mood and Affect: Mood normal          Behavior: Behavior normal           Additional Data:     Labs:  Results from last 7 days   Lab Units 11/02/22  0450   WBC Thousand/uL 6 39   HEMOGLOBIN g/dL 12 5   HEMATOCRIT % 35 9*   PLATELETS Thousands/uL 65*   NEUTROS PCT % 72   LYMPHS PCT % 22   MONOS PCT % 5   EOS PCT % 0     Results from last 7 days   Lab Units 11/02/22  0450 11/01/22  2300   SODIUM mmol/L 132* 127*   POTASSIUM mmol/L 3 4* 4 3   CHLORIDE mmol/L 100 93*   CO2 mmol/L 21 21   BUN mg/dL 6 8   CREATININE mg/dL 0 83 0 95   ANION GAP mmol/L 11 13   CALCIUM mg/dL 6 8* 6 7*   ALBUMIN g/dL  --  3 6   TOTAL BILIRUBIN mg/dL  --  1 50*   ALK PHOS U/L  --  52   ALT U/L  --  19   AST U/L  --  45*   GLUCOSE RANDOM mg/dL 98 79                       Lines/Drains:  Invasive Devices  Report    Peripheral Intravenous Line  Duration           Peripheral IV 11/02/22 Left;Upper;Ventral (anterior) Arm <1 day          Drain  Duration           Colostomy LLQ -- days                  Telemetry:  Telemetry Orders (From admission, onward)             24 Hour Telemetry Monitoring  Continuous x 24 Hours (Telem)        References:    Telemetry Guidelines   Question:  Reason for 24 Hour Telemetry  Answer:  Syncope suspected to be cardiac in origin                 Telemetry Reviewed: Reviewed  Indication for Continued Telemetry Use: Syncope             Imaging: Reviewed radiology reports from this admission including: chest xray and chest CT scan    Recent Cultures (last 7 days):         Last 24 Hours Medication List:   Current Facility-Administered Medications   Medication Dose Route Frequency Provider Last Rate   • AMILoride  5 mg Oral Daily Johanny Hills MD     • amLODIPine  5 mg Oral Daily Johanny Hills MD     • buPROPion  150 mg Oral Daily Johanny Hills MD     • cyanocobalamin  1,000 mcg Intramuscular Q30 Days Josue Jaramillo MD     • enoxaparin  40 mg Subcutaneous Daily Johanny Hills MD     • folic acid  1 mg Oral Daily Johanny Hills MD     • magnesium sulfate  2 g Intravenous Once Adelia Novoa MD     • mirtazapine  30 mg Oral HS Johanny Hills MD     • multivitamin-minerals  1 tablet Oral Daily Johanny Hills MD     • pantoprazole  40 mg Oral Early Morning Johanny Hills MD     • polyethylene glycol  17 g Oral Daily Sheeba Iraheta MD     • potassium chloride  40 mEq Oral BID Johanny Hills MD     • potassium chloride  20 mEq Oral Daily Johanny Hills MD     • pravastatin  80 mg Oral Daily With Wolf Cornelius MD     • sodium chloride  100 mL/hr Intravenous Continuous Johanny Hills  mL/hr (11/02/22 1154)   • thiamine  100 mg Oral Daily Johanny Hills MD     • umeclidinium-vilanterol  1 puff Inhalation Daily Johanny Hills MD          Today, Patient Was Seen By: Josue Jaramillo    **Please Note: This note may have been constructed using a voice recognition system  **

## 2022-11-02 NOTE — ASSESSMENT & PLAN NOTE
· Minimum of 6 cans / bottles of alcohol daily   · Alcohol level   · CIWA   · Thiamine / Folate daily, B12 shot ordered   · Does not want to pursue rehab     · Does not admit to alcohol use problem - pre contemplative phase of change

## 2022-11-02 NOTE — ASSESSMENT & PLAN NOTE
· Minimum of 6 cans / bottles of alcohol daily   · Alcohol level   · CIWA   · Thiamine / Folate daily   · Does not want to pursue rehab     · Does not admit to alcohol use problem - pre contemplative phase of change

## 2022-11-02 NOTE — ASSESSMENT & PLAN NOTE
Hypokalemia of 3 4 and calcium of 6 8 POA  Likely secondary to poor nutrition and oral intake    Plan  CMP ordered for the morning  Replete electrolytes as necessary

## 2022-11-02 NOTE — ASSESSMENT & PLAN NOTE
· Baseline 135s -140s, , w/ IVF now 132  · Likely 2/2 to low PO intake and increase alcohol 231  · Encourage food and beverage hydration aside from alcohol   · Unsure if he is taking Nacl tabs due to differing history to providers  · Plan for dc likely today as Na is almost normalized, repeat BMP at noon

## 2022-11-02 NOTE — ASSESSMENT & PLAN NOTE
· Hypokalemia --> replete   · Hypocalcemia --> replete when I ben returns  · Likely d/t poor nutrition

## 2022-11-02 NOTE — PLAN OF CARE
Problem: METABOLIC, FLUID AND ELECTROLYTES - ADULT  Goal: Electrolytes maintained within normal limits  Description: INTERVENTIONS:  - Monitor labs and assess patient for signs and symptoms of electrolyte imbalances  - Administer electrolyte replacement as ordered  - Monitor response to electrolyte replacements, including repeat lab results as appropriate  - Instruct patient on fluid and nutrition as appropriate  Outcome: Progressing     Problem: METABOLIC, FLUID AND ELECTROLYTES - ADULT  Goal: Glucose maintained within target range  Description: INTERVENTIONS:  - Monitor Blood Glucose as ordered  - Assess for signs and symptoms of hyperglycemia and hypoglycemia  - Administer ordered medications to maintain glucose within target range  - Assess nutritional intake and initiate nutrition service referral as needed  Outcome: Progressing     Problem: INFECTION - ADULT  Goal: Absence or prevention of progression during hospitalization  Description: INTERVENTIONS:  - Assess and monitor for signs and symptoms of infection  - Monitor lab/diagnostic results  - Monitor all insertion sites, i e  indwelling lines, tubes, and drains  - Monitor endotracheal if appropriate and nasal secretions for changes in amount and color  - Mauldin appropriate cooling/warming therapies per order  - Administer medications as ordered  - Instruct and encourage patient and family to use good hand hygiene technique  - Identify and instruct in appropriate isolation precautions for identified infection/condition  Outcome: Progressing     Problem: SAFETY ADULT  Goal: Patient will remain free of falls  Description: INTERVENTIONS:  - Educate patient/family on patient safety including physical limitations  - Instruct patient to call for assistance with activity   - Consult OT/PT to assist with strengthening/mobility   - Keep Call bell within reach  - Keep bed low and locked with side rails adjusted as appropriate  - Keep care items and personal belongings within reach  - Initiate and maintain comfort rounds  - Make Fall Risk Sign visible to staff  - Offer Toileting every 2 Hours, in advance of need  - Initiate/Maintain bed alarm  - Obtain necessary fall risk management equipment: fall cushion   - Apply yellow socks and bracelet for high fall risk patients  - Consider moving patient to room near nurses station  Outcome: Progressing     Problem: SAFETY ADULT  Goal: Maintain or return to baseline ADL function  Description: INTERVENTIONS:  -  Assess patient's ability to carry out ADLs; assess patient's baseline for ADL function and identify physical deficits which impact ability to perform ADLs (bathing, care of mouth/teeth, toileting, grooming, dressing, etc )  - Assess/evaluate cause of self-care deficits   - Assess range of motion  - Assess patient's mobility; develop plan if impaired  - Assess patient's need for assistive devices and provide as appropriate  - Encourage maximum independence but intervene and supervise when necessary  - Involve family in performance of ADLs  - Assess for home care needs following discharge   - Consider OT consult to assist with ADL evaluation and planning for discharge  - Provide patient education as appropriate  Outcome: Progressing     Problem: DISCHARGE PLANNING  Goal: Discharge to home or other facility with appropriate resources  Description: INTERVENTIONS:  - Identify barriers to discharge w/patient and caregiver  - Arrange for needed discharge resources and transportation as appropriate  - Identify discharge learning needs (meds, wound care, etc )  - Arrange for interpretive services to assist at discharge as needed  - Refer to Case Management Department for coordinating discharge planning if the patient needs post-hospital services based on physician/advanced practitioner order or complex needs related to functional status, cognitive ability, or social support system  Outcome: Progressing     Problem: Knowledge Deficit  Goal: Patient/family/caregiver demonstrates understanding of disease process, treatment plan, medications, and discharge instructions  Description: Complete learning assessment and assess knowledge base    Interventions:  - Provide teaching at level of understanding  - Provide teaching via preferred learning methods  Outcome: Progressing

## 2022-11-02 NOTE — ED ATTENDING ATTESTATION
11/1/2022  IJaleel MD, saw and evaluated the patient  I have discussed the patient with the resident/non-physician practitioner and agree with the resident's/non-physician practitioner's findings, Plan of Care, and MDM as documented in the resident's/non-physician practitioner's note, except where noted  All available labs and Radiology studies were reviewed  I was present for key portions of any procedure(s) performed by the resident/non-physician practitioner and I was immediately available to provide assistance  At this point I agree with the current assessment done in the Emergency Department  I have conducted an independent evaluation of this patient a history and physical is as follows: Patient is a 79year old male with multiple syncopal episodes tonight at the bar  Has had dizziness and lightheadedness for past several days  No vertigo  No N/V  Has chronic diarrhea and has an ostomy  No GI bleeding  No travel  Has chronic cough  Has COPD  (+) etoh use  Was last seen in this ED on 9/24/22 for syncope  [facility] website checked on this patient and last Rx filled was on 8/25/22 for xanax for 90 day supply  Normocephalic  Mucous membranes somewhat moist  Lungs clear  Heart regular without murmur  Abdomen soft and nontender  Good bowel sounds  No edema  No rash noted  No jaundice  No focal deficits  Differential diagnosis including but not limited to: vasovagal syncope, cardiac arrhythmia, MI, ACS, doubt PE; metabolic abnormality, intracranial process, seizure, anemia, doubt GI bleed; dehydration, alcohol abuse, thyroid disease       ED Course         Critical Care Time  Procedures

## 2022-11-03 ENCOUNTER — APPOINTMENT (INPATIENT)
Dept: NON INVASIVE DIAGNOSTICS | Facility: HOSPITAL | Age: 67
End: 2022-11-03

## 2022-11-03 ENCOUNTER — RA CDI HCC (OUTPATIENT)
Dept: OTHER | Facility: HOSPITAL | Age: 67
End: 2022-11-03

## 2022-11-03 LAB
ALBUMIN SERPL BCP-MCNC: 3.1 G/DL (ref 3.5–5)
ALBUMIN SERPL BCP-MCNC: 3.5 G/DL (ref 3.5–5)
ALP SERPL-CCNC: 53 U/L (ref 34–104)
ALP SERPL-CCNC: 65 U/L (ref 34–104)
ALT SERPL W P-5'-P-CCNC: 19 U/L (ref 7–52)
ALT SERPL W P-5'-P-CCNC: 20 U/L (ref 7–52)
ANION GAP SERPL CALCULATED.3IONS-SCNC: 8 MMOL/L (ref 4–13)
ANION GAP SERPL CALCULATED.3IONS-SCNC: 9 MMOL/L (ref 4–13)
ANION GAP SERPL CALCULATED.3IONS-SCNC: 9 MMOL/L (ref 4–13)
AORTIC ROOT: 3 CM
APICAL FOUR CHAMBER EJECTION FRACTION: 60 %
ASCENDING AORTA: 2.8 CM
AST SERPL W P-5'-P-CCNC: 45 U/L (ref 13–39)
AST SERPL W P-5'-P-CCNC: 47 U/L (ref 13–39)
ATRIAL RATE: 82 BPM
BILIRUB SERPL-MCNC: 2.05 MG/DL (ref 0.2–1)
BILIRUB SERPL-MCNC: 2.43 MG/DL (ref 0.2–1)
BUN SERPL-MCNC: 6 MG/DL (ref 5–25)
BUN SERPL-MCNC: 6 MG/DL (ref 5–25)
BUN SERPL-MCNC: 7 MG/DL (ref 5–25)
CA-I BLD-SCNC: 0.92 MMOL/L (ref 1.12–1.32)
CA-I BLD-SCNC: 0.99 MMOL/L (ref 1.12–1.32)
CALCIUM ALBUM COR SERPL-MCNC: 8.2 MG/DL (ref 8.3–10.1)
CALCIUM SERPL-MCNC: 7.3 MG/DL (ref 8.4–10.2)
CALCIUM SERPL-MCNC: 7.3 MG/DL (ref 8.4–10.2)
CALCIUM SERPL-MCNC: 7.5 MG/DL (ref 8.4–10.2)
CHLORIDE SERPL-SCNC: 100 MMOL/L (ref 96–108)
CHLORIDE SERPL-SCNC: 99 MMOL/L (ref 96–108)
CHLORIDE SERPL-SCNC: 99 MMOL/L (ref 96–108)
CO2 SERPL-SCNC: 25 MMOL/L (ref 21–32)
CREAT SERPL-MCNC: 0.79 MG/DL (ref 0.6–1.3)
CREAT SERPL-MCNC: 0.79 MG/DL (ref 0.6–1.3)
CREAT SERPL-MCNC: 0.8 MG/DL (ref 0.6–1.3)
E WAVE DECELERATION TIME: 267 MS
ERYTHROCYTE [DISTWIDTH] IN BLOOD BY AUTOMATED COUNT: 12.2 % (ref 11.6–15.1)
FOLATE SERPL-MCNC: 16.3 NG/ML (ref 3.1–17.5)
FRACTIONAL SHORTENING: 33 (ref 28–44)
GFR SERPL CREATININE-BSD FRML MDRD: 92 ML/MIN/1.73SQ M
GLUCOSE SERPL-MCNC: 112 MG/DL (ref 65–140)
GLUCOSE SERPL-MCNC: 83 MG/DL (ref 65–140)
GLUCOSE SERPL-MCNC: 84 MG/DL (ref 65–140)
HCT VFR BLD AUTO: 36.3 % (ref 36.5–49.3)
HGB BLD-MCNC: 12.6 G/DL (ref 12–17)
INTERVENTRICULAR SEPTUM IN DIASTOLE (PARASTERNAL SHORT AXIS VIEW): 0.8 CM
INTERVENTRICULAR SEPTUM: 0.8 CM (ref 0.6–1.1)
LAAS-AP2: 8.6 CM2
LAAS-AP4: 11.5 CM2
LEFT ATRIUM SIZE: 2.3 CM
LEFT INTERNAL DIMENSION IN SYSTOLE: 2.8 CM (ref 2.1–4)
LEFT VENTRICULAR INTERNAL DIMENSION IN DIASTOLE: 4.2 CM (ref 3.5–6)
LEFT VENTRICULAR POSTERIOR WALL IN END DIASTOLE: 0.7 CM
LEFT VENTRICULAR STROKE VOLUME: 46 ML
LVSV (TEICH): 46 ML
MCH RBC QN AUTO: 35.2 PG (ref 26.8–34.3)
MCHC RBC AUTO-ENTMCNC: 34.7 G/DL (ref 31.4–37.4)
MCV RBC AUTO: 101 FL (ref 82–98)
MV E'TISSUE VEL-SEP: 9 CM/S
MV PEAK A VEL: 0.69 M/S
MV PEAK E VEL: 65 CM/S
MV STENOSIS PRESSURE HALF TIME: 77 MS
MV VALVE AREA P 1/2 METHOD: 2.86
P AXIS: 52 DEGREES
PLATELET # BLD AUTO: 69 THOUSANDS/UL (ref 149–390)
PMV BLD AUTO: 11.3 FL (ref 8.9–12.7)
POTASSIUM SERPL-SCNC: 3.9 MMOL/L (ref 3.5–5.3)
POTASSIUM SERPL-SCNC: 4 MMOL/L (ref 3.5–5.3)
POTASSIUM SERPL-SCNC: 4 MMOL/L (ref 3.5–5.3)
PR INTERVAL: 154 MS
PROT SERPL-MCNC: 5.9 G/DL (ref 6.4–8.4)
PROT SERPL-MCNC: 6.5 G/DL (ref 6.4–8.4)
QRS AXIS: -4 DEGREES
QRSD INTERVAL: 82 MS
QT INTERVAL: 384 MS
QTC INTERVAL: 448 MS
RBC # BLD AUTO: 3.58 MILLION/UL (ref 3.88–5.62)
RIGHT ATRIUM AREA SYSTOLE A4C: 12.4 CM2
RIGHT VENTRICLE ID DIMENSION: 3 CM
SL CV LEFT ATRIUM LENGTH A2C: 3.5 CM
SL CV LV EF: 60
SL CV PED ECHO LEFT VENTRICLE DIASTOLIC VOLUME (MOD BIPLANE) 2D: 77 ML
SL CV PED ECHO LEFT VENTRICLE SYSTOLIC VOLUME (MOD BIPLANE) 2D: 31 ML
SODIUM SERPL-SCNC: 133 MMOL/L (ref 135–147)
T WAVE AXIS: 51 DEGREES
VENTRICULAR RATE: 82 BPM
VIT B12 SERPL-MCNC: 2901 PG/ML (ref 100–900)
WBC # BLD AUTO: 5.4 THOUSAND/UL (ref 4.31–10.16)

## 2022-11-03 RX ORDER — ACETAMINOPHEN 325 MG/1
650 TABLET ORAL EVERY 6 HOURS PRN
Status: DISCONTINUED | OUTPATIENT
Start: 2022-11-03 | End: 2022-11-13 | Stop reason: HOSPADM

## 2022-11-03 RX ORDER — CALCIUM GLUCONATE 20 MG/ML
2 INJECTION, SOLUTION INTRAVENOUS ONCE
Status: COMPLETED | OUTPATIENT
Start: 2022-11-03 | End: 2022-11-03

## 2022-11-03 RX ADMIN — AMILORIDE HYDROCLORIDE 5 MG: 5 TABLET ORAL at 08:43

## 2022-11-03 RX ADMIN — CALCIUM GLUCONATE 2 G: 20 INJECTION, SOLUTION INTRAVENOUS at 10:08

## 2022-11-03 RX ADMIN — ACETAMINOPHEN 650 MG: 325 TABLET ORAL at 13:48

## 2022-11-03 RX ADMIN — MIRTAZAPINE 30 MG: 15 TABLET, FILM COATED ORAL at 22:18

## 2022-11-03 RX ADMIN — FOLIC ACID 1 MG: 1 TABLET ORAL at 08:42

## 2022-11-03 RX ADMIN — PANTOPRAZOLE SODIUM 40 MG: 40 TABLET, DELAYED RELEASE ORAL at 05:17

## 2022-11-03 RX ADMIN — AMLODIPINE BESYLATE 5 MG: 5 TABLET ORAL at 08:42

## 2022-11-03 RX ADMIN — POTASSIUM CHLORIDE 20 MEQ: 20 SOLUTION ORAL at 08:43

## 2022-11-03 RX ADMIN — PRAVASTATIN SODIUM 80 MG: 80 TABLET ORAL at 16:23

## 2022-11-03 RX ADMIN — MULTIPLE VITAMINS W/ MINERALS TAB 1 TABLET: TAB ORAL at 08:42

## 2022-11-03 RX ADMIN — THIAMINE HCL TAB 100 MG 100 MG: 100 TAB at 08:42

## 2022-11-03 RX ADMIN — ENOXAPARIN SODIUM 40 MG: 40 INJECTION SUBCUTANEOUS at 08:43

## 2022-11-03 RX ADMIN — BUPROPION HYDROCHLORIDE 150 MG: 150 TABLET, FILM COATED, EXTENDED RELEASE ORAL at 08:42

## 2022-11-03 RX ADMIN — UMECLIDINIUM BROMIDE AND VILANTEROL TRIFENATATE 1 PUFF: 62.5; 25 POWDER RESPIRATORY (INHALATION) at 09:51

## 2022-11-03 NOTE — ASSESSMENT & PLAN NOTE
· Baseline 135s -140s, , w/ IVF now 132, plateaued with fluids    · Likely 2/2 to low PO intake and increase alcohol 231  · Encourage food and beverage hydration aside from alcohol    Plan:  CMP ordered q12h  Continue IV hydration, will discontinue once at baseline and encourage oral intake

## 2022-11-03 NOTE — ASSESSMENT & PLAN NOTE
· Minimum of at least 6 cans / bottles of alcohol daily   · Now agreeable to [facility]'s rehab  Plan:   Will speak with case management about rehabilitation  · Alcohol level   · CIWA

## 2022-11-03 NOTE — ASSESSMENT & PLAN NOTE
Patient reports loss of consciousness while out drinking with friends last night  He stood up to go to the bathroom and reports losing consciousness and hitting the floor  Reports poor oral intake and loss of appetite recently due to generalized anxiety  He has been experiencing lightheadedness and dizziness upon rising from sitting positing while at home  Denies loss of consciousness at home  He has not had any chest pain or shortness of breath with these episodes  No history of seizures per chart review, no history of CAD or MI  Brought to the emergency department by EMS  Found to be hyponatremic with osmolality profile fitting hypovolemic hyponatremia  Hyponatremia improved with IVF  Orthostatics positive in the ED  EKG shows NSR with incomplete RBBB and nonspecific ST segment abnormality  Troponins negative  Syncope most likely in the setting of poor oral intake and vasovagal response  Telemetry shows episodes of sinus and supraventricular tachycardia with HR ranging from 150 to 200    Plan:  Continue IVF  Will check orthostatics prior to dscharge      Holter monitoring on discharge  Echocardiogram ordered  Encourage cessation of alcohol consumption and better oral nutrition  Replenish electrolytes

## 2022-11-03 NOTE — QUICK NOTE
Repeat BMP showed improved Mg now WNL, but Na 130  Decreased NSS 100cc/hour rate to 50cc/hour  Plan:  · Repeat BMP at 2330  · If Na down, stop fluids and place on fluid restriction 1500ml

## 2022-11-03 NOTE — PROGRESS NOTES
[facility]  Progress Note - Inderjit Tirado III 1955, 79 y o  male MRN: [id number]  Unit/Bed#: S -01 Encounter: [id number]  Primary Care Provider: Sandra Garcia MD   Date and time admitted to hospital: 11/1/2022  9:35 PM    * Syncope  Assessment & Plan  Patient reports loss of consciousness while out drinking with friends last night  He stood up to go to the bathroom and reports losing consciousness and hitting the floor  Reports poor oral intake and loss of appetite recently due to generalized anxiety  He has been experiencing lightheadedness and dizziness upon rising from sitting positing while at home  Denies loss of consciousness at home  He has not had any chest pain or shortness of breath with these episodes  No history of seizures per chart review, no history of CAD or MI  Brought to the emergency department by EMS  Found to be hyponatremic with osmolality profile fitting hypovolemic hyponatremia  Hyponatremia improved with IVF  Orthostatics positive in the ED  EKG shows NSR with incomplete RBBB and nonspecific ST segment abnormality  Troponins negative  Syncope most likely in the setting of poor oral intake and vasovagal response  Telemetry shows episodes of sinus and supraventricular tachycardia with HR ranging from 150 to 200  Orthostats continue to be positive  Echocardiogram negative for any cardiogenic cause of of his syncopal episodes    Plan:  Continue IVF  Will check orthostatics prior to dscharge  Holter monitoring on discharge  Encourage cessation of alcohol consumption and better oral nutrition  Replenish electrolytes        History of alcohol use disorder  Assessment & Plan  · Minimum of at least 6 cans / bottles of alcohol daily   · Now agreeable to [facility]  Plan:   Will speak with case management about rehabilitation  · Alcohol level   · CIWA   · Thiamine / Folate daily, B12 shot ordered    Electrolyte abnormality  Assessment & Plan  Hypokalemia of 3 4 and calcium of 6 8 POA  Likely secondary to poor nutrition and oral intake    Plan  CMP ordered for the morning  Replete electrolytes as necessary      Platelets decreased ([address] )  Assessment & Plan  2/2 to alcohol use  No bleeding at this time, no interventions planned    Crohn's disease of small intestine with other complication ([facility] [address] )  Assessment & Plan  · W/ Ostomy  Patient denies abnormal or excessive output  · Ostomy care    Chronic obstructive pulmonary disease (HCC)  Assessment & Plan  · Stable on room air  · Continue Anoro Ellipta    Hyponatremia  Assessment & Plan  · Baseline 135s -140s, , w/ IVF now 132, plateaued with fluids  · Likely 2/2 to low PO intake and increase alcohol 231  · Encourage food and beverage hydration aside from alcohol    Plan:  CMP ordered q12h  Continue IV hydration, will discontinue once at baseline and encourage oral intake    Essential hypertension  Assessment & Plan  · Continue amiloride 5 mg qd   · Continue home med amlodipine 5 mg qd           VTE Pharmacologic Prophylaxis: VTE Score: 5 High Risk (Score >/= 5) - Pharmacological DVT Prophylaxis Ordered: enoxaparin (Lovenox)  Sequential Compression Devices Ordered  Patient Centered Rounds: I performed bedside rounds with nursing staff today  Discussions with Specialists or Other Care Team Provider:     Education and Discussions with Family / Patient: Updated  (daughter) via phone  Current Length of Stay: 1 day(s)  Current Patient Status: Inpatient   Discharge Plan: Anticipate discharge in 24-48 hrs to rehab facility  Code Status: Level 1 - Full Code    Subjective:   Patient feeling well this morning and denies any further lightheadedness or loss of consciousness  Patient is now agreeable to rehab for alcohol use disorder  He had an uneventful night and his appetite has improved  Patient is not having any shortness of breath or chest pain    He denies palpitations  Patient does not complain of abdominal pain or nausea  No fevers, chills, or sweats reported  Objective:     Vitals:   Temp (24hrs), Av 7 °F (37 1 °C), Min:98 2 °F (36 8 °C), Max:99 1 °F (37 3 °C)    Temp:  [98 2 °F (36 8 °C)-99 1 °F (37 3 °C)] 98 2 °F (36 8 °C)  HR:  [] 107  Resp:  [17] 17  BP: (117-148)/() 136/83  SpO2:  [97 %-99 %] 99 %  Body mass index is 20 67 kg/m²  Input and Output Summary (last 24 hours): Intake/Output Summary (Last 24 hours) at 11/3/2022 1344  Last data filed at 11/3/2022 1101  Gross per 24 hour   Intake 1600 ml   Output 150 ml   Net 1450 ml       Physical Exam:   Physical Exam  Constitutional:       General: He is not in acute distress  Appearance: Normal appearance  He is normal weight  He is not ill-appearing or toxic-appearing  HENT:      Head: Normocephalic and atraumatic  Eyes:      General: No scleral icterus  Extraocular Movements: Extraocular movements intact  Conjunctiva/sclera: Conjunctivae normal       Pupils: Pupils are equal, round, and reactive to light  Cardiovascular:      Rate and Rhythm: Normal rate and regular rhythm  Pulses: Normal pulses  Heart sounds: Normal heart sounds  No murmur heard  No gallop  Pulmonary:      Effort: Pulmonary effort is normal  No respiratory distress  Breath sounds: Normal breath sounds  No stridor  No wheezing, rhonchi or rales  Chest:      Chest wall: No tenderness  Abdominal:      General: Abdomen is flat  Bowel sounds are normal  There is no distension  Palpations: Abdomen is soft  Tenderness: There is no abdominal tenderness  Musculoskeletal:         General: Normal range of motion  Cervical back: Normal range of motion  Right lower leg: No edema  Left lower leg: No edema  Skin:     General: Skin is warm  Coloration: Skin is not jaundiced or pale  Findings: No rash     Neurological:      General: No focal deficit present  Mental Status: He is alert and oriented to person, place, and time  Mental status is at baseline  Cranial Nerves: No cranial nerve deficit  Sensory: No sensory deficit  Motor: No weakness  Psychiatric:         Mood and Affect: Mood normal          Behavior: Behavior normal           Additional Data:     Labs:  Results from last 7 days   Lab Units 22  0535 22  0450   WBC Thousand/uL 5 40 6 39   HEMOGLOBIN g/dL 12 6 12 5   HEMATOCRIT % 36 3* 35 9*   PLATELETS Thousands/uL 69* 65*   NEUTROS PCT %  --  72   LYMPHS PCT %  --  22   MONOS PCT %  --  5   EOS PCT %  --  0     Results from last 7 days   Lab Units 22  0535   SODIUM mmol/L 133*  133*   POTASSIUM mmol/L 3 9  4 0   CHLORIDE mmol/L 99  99   CO2 mmol/L 25  25   BUN mg/dL 6  6   CREATININE mg/dL 0 79  0 79   ANION GAP mmol/L 9  9   CALCIUM mg/dL 7 3*  7 3*   ALBUMIN g/dL 3 5   TOTAL BILIRUBIN mg/dL 2 43*   ALK PHOS U/L 53   ALT U/L 19   AST U/L 45*   GLUCOSE RANDOM mg/dL 83  84                       Lines/Drains:  Invasive Devices  Report    Peripheral Intravenous Line  Duration           Peripheral IV 22 Left;Upper;Ventral (anterior) Arm 1 day          Drain  Duration           Colostomy LLQ -- days                  Telemetry:  Telemetry Orders (From admission, onward)             24 Hour Telemetry Monitoring  Continuous x 24 Hours (Telem)           References:    Telemetry Guidelines   Question:  Reason for 24 Hour Telemetry  Answer:  Syncope suspected to be cardiac in origin                 Telemetry Reviewed: Sinus Tachycardia and occasional SVT  Indication for Continued Telemetry Use: No indication for continued use  Will discontinue  Imaging: No pertinent imaging reviewed      Recent Cultures (last 7 days):         Last 24 Hours Medication List:   Current Facility-Administered Medications   Medication Dose Route Frequency Provider Last Rate   • acetaminophen  650 mg Oral Q6H PRN Estuardo Dent MD     • AMILoride  5 mg Oral Daily Lottie South MD     • amLODIPine  5 mg Oral Daily Lottie South MD     • buPROPion  150 mg Oral Daily Lottie South MD     • cyanocobalamin  1,000 mcg Intramuscular Q30 Days Estuardo Dent MD     • enoxaparin  40 mg Subcutaneous Daily Lottie South MD     • folic acid  1 mg Oral Daily Lottie South MD     • mirtazapine  30 mg Oral HS Lottie South MD     • multivitamin-minerals  1 tablet Oral Daily Lottie South MD     • pantoprazole  40 mg Oral Early Morning Lottie South MD     • polyethylene glycol  17 g Oral Daily Seth Khan MD     • potassium chloride  20 mEq Oral Daily Lottie Souht MD     • pravastatin  80 mg Oral Daily With Eber Knutson MD     • sodium chloride  50 mL/hr Intravenous Continuous Anna Aures, DO 50 mL/hr (11/02/22 2044)   • thiamine  100 mg Oral Daily Lottie South MD     • umeclidinium-vilanterol  1 puff Inhalation Daily Lottie South MD          Today, Patient Was Seen By: Estuardo Dent    **Please Note: This note may have been constructed using a voice recognition system  **

## 2022-11-03 NOTE — ASSESSMENT & PLAN NOTE
Patient reports loss of consciousness while out drinking with friends last night  He stood up to go to the bathroom and reports losing consciousness and hitting the floor  Reports poor oral intake and loss of appetite recently due to generalized anxiety  He has been experiencing lightheadedness and dizziness upon rising from sitting positing while at home  Denies loss of consciousness at home  He has not had any chest pain or shortness of breath with these episodes  No history of seizures per chart review, no history of CAD or MI  Brought to the emergency department by EMS  Found to be hyponatremic with osmolality profile fitting hypovolemic hyponatremia  Hyponatremia improved with IVF  Orthostatics positive in the ED  EKG shows NSR with incomplete RBBB and nonspecific ST segment abnormality  Troponins negative  Syncope most likely in the setting of poor oral intake and vasovagal response  Telemetry shows episodes of sinus and supraventricular tachycardia with HR ranging from 150 to 200  Echocardiogram negative for any cardiogenic cause of of his syncopal episodes  Orthostats negative x1    Plan:  Continue IVF  Will check orthostatics prior to dscharge       Holter monitoring on discharge  Encourage cessation of alcohol consumption and better oral nutrition  Replenish electrolytes  D/c likely in the morning

## 2022-11-04 PROBLEM — I47.1 SUPRAVENTRICULAR TACHYCARDIA (HCC): Status: ACTIVE | Noted: 2022-11-04

## 2022-11-04 PROBLEM — I47.10 SUPRAVENTRICULAR TACHYCARDIA: Status: ACTIVE | Noted: 2022-11-04

## 2022-11-04 LAB
ALBUMIN SERPL BCP-MCNC: 3 G/DL (ref 3.5–5)
ALBUMIN SERPL BCP-MCNC: 3.4 G/DL (ref 3.5–5)
ALP SERPL-CCNC: 72 U/L (ref 34–104)
ALP SERPL-CCNC: 82 U/L (ref 34–104)
ALT SERPL W P-5'-P-CCNC: 18 U/L (ref 7–52)
ALT SERPL W P-5'-P-CCNC: 21 U/L (ref 7–52)
ANION GAP SERPL CALCULATED.3IONS-SCNC: 11 MMOL/L (ref 4–13)
ANION GAP SERPL CALCULATED.3IONS-SCNC: 7 MMOL/L (ref 4–13)
AST SERPL W P-5'-P-CCNC: 40 U/L (ref 13–39)
AST SERPL W P-5'-P-CCNC: 46 U/L (ref 13–39)
BILIRUB SERPL-MCNC: 1.32 MG/DL (ref 0.2–1)
BILIRUB SERPL-MCNC: 1.82 MG/DL (ref 0.2–1)
BUN SERPL-MCNC: 7 MG/DL (ref 5–25)
BUN SERPL-MCNC: 8 MG/DL (ref 5–25)
CA-I BLD-SCNC: 0.9 MMOL/L (ref 1.12–1.32)
CALCIUM ALBUM COR SERPL-MCNC: 8.1 MG/DL (ref 8.3–10.1)
CALCIUM ALBUM COR SERPL-MCNC: 8.7 MG/DL (ref 8.3–10.1)
CALCIUM SERPL-MCNC: 7.6 MG/DL (ref 8.4–10.2)
CALCIUM SERPL-MCNC: 7.9 MG/DL (ref 8.4–10.2)
CHLORIDE SERPL-SCNC: 102 MMOL/L (ref 96–108)
CHLORIDE SERPL-SCNC: 99 MMOL/L (ref 96–108)
CHOLEST SERPL-MCNC: 87 MG/DL
CO2 SERPL-SCNC: 24 MMOL/L (ref 21–32)
CO2 SERPL-SCNC: 28 MMOL/L (ref 21–32)
CREAT SERPL-MCNC: 0.7 MG/DL (ref 0.6–1.3)
CREAT SERPL-MCNC: 0.81 MG/DL (ref 0.6–1.3)
GFR SERPL CREATININE-BSD FRML MDRD: 91 ML/MIN/1.73SQ M
GFR SERPL CREATININE-BSD FRML MDRD: 97 ML/MIN/1.73SQ M
GLUCOSE SERPL-MCNC: 77 MG/DL (ref 65–140)
GLUCOSE SERPL-MCNC: 93 MG/DL (ref 65–140)
HDLC SERPL-MCNC: 50 MG/DL
LDLC SERPL CALC-MCNC: 24 MG/DL (ref 0–100)
MAGNESIUM SERPL-MCNC: 0.9 MG/DL (ref 1.9–2.7)
POTASSIUM SERPL-SCNC: 3.6 MMOL/L (ref 3.5–5.3)
POTASSIUM SERPL-SCNC: 4 MMOL/L (ref 3.5–5.3)
PROT SERPL-MCNC: 5.8 G/DL (ref 6.4–8.4)
PROT SERPL-MCNC: 6.3 G/DL (ref 6.4–8.4)
SODIUM SERPL-SCNC: 134 MMOL/L (ref 135–147)
SODIUM SERPL-SCNC: 137 MMOL/L (ref 135–147)
TRIGL SERPL-MCNC: 66 MG/DL

## 2022-11-04 RX ORDER — AMLODIPINE BESYLATE 5 MG/1
5 TABLET ORAL DAILY
Status: DISCONTINUED | OUTPATIENT
Start: 2022-11-04 | End: 2022-11-13 | Stop reason: HOSPADM

## 2022-11-04 RX ORDER — CALCIUM GLUCONATE 20 MG/ML
2 INJECTION, SOLUTION INTRAVENOUS ONCE
Status: DISCONTINUED | OUTPATIENT
Start: 2022-11-04 | End: 2022-11-06

## 2022-11-04 RX ORDER — LANOLIN ALCOHOL/MO/W.PET/CERES
100 CREAM (GRAM) TOPICAL DAILY
Qty: 30 TABLET | Refills: 0 | Status: CANCELLED | OUTPATIENT
Start: 2022-11-05

## 2022-11-04 RX ORDER — CALCIUM GLUCONATE 20 MG/ML
2 INJECTION, SOLUTION INTRAVENOUS ONCE
Status: COMPLETED | OUTPATIENT
Start: 2022-11-04 | End: 2022-11-04

## 2022-11-04 RX ORDER — AMILORIDE HYDROCHLORIDE 5 MG/1
5 TABLET ORAL DAILY
Status: DISCONTINUED | OUTPATIENT
Start: 2022-11-04 | End: 2022-11-13 | Stop reason: HOSPADM

## 2022-11-04 RX ADMIN — PRAVASTATIN SODIUM 80 MG: 80 TABLET ORAL at 16:59

## 2022-11-04 RX ADMIN — FOLIC ACID 1 MG: 1 TABLET ORAL at 10:36

## 2022-11-04 RX ADMIN — POTASSIUM CHLORIDE 20 MEQ: 20 SOLUTION ORAL at 10:36

## 2022-11-04 RX ADMIN — AMILORIDE HYDROCLORIDE 5 MG: 5 TABLET ORAL at 10:36

## 2022-11-04 RX ADMIN — MULTIPLE VITAMINS W/ MINERALS TAB 1 TABLET: TAB ORAL at 10:38

## 2022-11-04 RX ADMIN — THIAMINE HCL TAB 100 MG 100 MG: 100 TAB at 10:36

## 2022-11-04 RX ADMIN — AMLODIPINE BESYLATE 5 MG: 5 TABLET ORAL at 10:36

## 2022-11-04 RX ADMIN — BUPROPION HYDROCHLORIDE 150 MG: 150 TABLET, FILM COATED, EXTENDED RELEASE ORAL at 10:36

## 2022-11-04 RX ADMIN — UMECLIDINIUM BROMIDE AND VILANTEROL TRIFENATATE 1 PUFF: 62.5; 25 POWDER RESPIRATORY (INHALATION) at 10:40

## 2022-11-04 RX ADMIN — MIRTAZAPINE 30 MG: 15 TABLET, FILM COATED ORAL at 21:07

## 2022-11-04 RX ADMIN — CALCIUM GLUCONATE 2 G: 20 INJECTION, SOLUTION INTRAVENOUS at 09:42

## 2022-11-04 RX ADMIN — SODIUM CHLORIDE 125 ML/HR: 0.9 INJECTION, SOLUTION INTRAVENOUS at 17:00

## 2022-11-04 RX ADMIN — PANTOPRAZOLE SODIUM 40 MG: 40 TABLET, DELAYED RELEASE ORAL at 05:18

## 2022-11-04 RX ADMIN — ENOXAPARIN SODIUM 40 MG: 40 INJECTION SUBCUTANEOUS at 10:36

## 2022-11-04 NOTE — ASSESSMENT & PLAN NOTE
· Minimum of at least 6 cans / bottles of alcohol daily   · Alcohol level   · Has spoken with [address]  Now agreeable to [facility] rehab      Plan:  · IP alcohol rehab - will work with pt regarding placement once he goes home  · PAUL Purse String (Simple) Text: Given the location of the defect and the characteristics of the surrounding skin a purse string simple closure was deemed most appropriate.  Undermining was performed circumferentially around the surgical defect.  A purse string suture was then placed and tightened.

## 2022-11-04 NOTE — DISCHARGE SUMMARY
[facility]  Discharge- George Morrow III 1955, 79 y o  male MRN: [id number]  Unit/Bed#: S -01 Encounter: [id number]  Primary Care Provider: Brent Chadwick MD   Date and time admitted to hospital: 11/1/2022  9:35 PM    * Syncope  Assessment & Plan  Patient reports loss of consciousness while out drinking with friends last night  He stood up to go to the bathroom and reports losing consciousness and hitting the floor  Reports poor oral intake and loss of appetite recently due to generalized anxiety  He has been experiencing lightheadedness and dizziness upon rising from sitting positing while at home  Denies loss of consciousness at home  He has not had any chest pain or shortness of breath with these episodes  No history of seizures per chart review, no history of CAD or MI  Brought to the emergency department by EMS  Found to be hyponatremic with osmolality profile fitting hypovolemic hyponatremia  Hyponatremia improved with IVF  Orthostatics positive in the ED  EKG shows NSR with incomplete RBBB and nonspecific ST segment abnormality  Troponins negative  Syncope most likely in the setting of poor oral intake and vasovagal response  Telemetry shows episodes of sinus and supraventricular tachycardia with HR ranging from 150 to 200  Echocardiogram negative for any cardiogenic cause of of his syncopal episodes  Orthostats negative x2    Plan:  Continue IVF  Will check orthostatics prior to dscharge       Holter monitoring on discharge  Encourage cessation of alcohol consumption and better oral nutrition  Replenish electrolytes  Clear for discharge        Supraventricular tachycardia [facility])  Assessment & Plan  Patient presents after syncopal episodes while out drinking  Found to have orthostatic hypotension secondary to intravascular volume depletion  Echocardiogram on 11/4 shows EF of 60%, normal LV function and nondilated left atrium  Telemetry reveals SVT with HR ranging from 120 to 200 for consecutive nights    Plan:  Continue telemetry   Monitor for improvement with hydration      History of alcohol use disorder  Assessment & Plan  · Minimum of at least 6 cans / bottles of alcohol daily   · Now agreeable to [facility] [place] [facility] rehab  Plan: Will speak with case management about rehabilitation  · Alcohol level   · CIWA       Electrolyte abnormality  Assessment & Plan  Hypokalemia of 3 4 and calcium of 6 8 POA  Likely secondary to poor nutrition and oral intake    Plan  CMP ordered for the morning  Replete electrolytes as necessary      Platelets decreased ([facility] [address] )  Assessment & Plan  2/2 to alcohol use  No bleeding at this time, no interventions planned    Crohn's disease of small intestine with other complication ([address] )  Assessment & Plan  · W/ Ostomy  Patient denies abnormal or excessive output  · Ostomy care    Chronic obstructive pulmonary disease (HCC)  Assessment & Plan  · Stable on room air  · Continue Anoro Ellipta    Hyponatremia  Assessment & Plan  · Baseline 135s -140s, , improved with fluids    · Likely 2/2 to low PO intake and increase alcohol 231  · Encourage food and beverage hydration aside from alcohol    Plan:  CMP ordered q12h  Continue IV hydration, will discontinue once at baseline and encourage oral intake    Essential hypertension  Assessment & Plan  On amiloride and amlodipine 5 mg each daily     Plan:  Continue home meds            Medical Problems             Resolved Problems  Date Reviewed: 11/4/2022   None               Discharging Resident: Quinten Goetz  Discharging Attending: Emmanuel Davila MD  PCP: Seyd Garsia MD  Admission Date:   Admission Orders (From admission, onward)     Ordered        11/02/22 0059  INPATIENT ADMISSION  Once                      Discharge Date: 11/04/22    Consultations During Hospital Stay:  · None    Procedures Performed:   · CT head without contrast  · CXR    Significant Findings / Test Results:   XR chest 1 view portable   ED Interpretation by Ashlyn Ojeda DO (11/01 2307)   No acute cardiopulmonary process  Final Result by Hakeme Calvillo MD (11/02 0719)      No acute cardiopulmonary disease  Workstation performed: [id number]         CT head without contrast   Final Result by Amy Augustin DO (11/01 2315)      No acute intracranial abnormality  Chronic microangiopathic changes  Workstation performed: [id number]         ·     Incidental Findings:   · none     Test Results Pending at Discharge (will require follow up): · None     Outpatient Tests Requested:  · CMP in one week    Complications:  None    Reason for Admission: Syncope    Hospital Course:   Jer Carrillo is a 79 y o  male patient who originally presented to the hospital on 11/1/2022 due to syncopal episode  Patient was brought to ED via EMS and found to be orthostatic positive and have significant electrolyte abnormalities including hypokalemia, hypomagnesemia, hypocalcemia, and hyponatremia  Electrolytes were repleted and he was started on IV normal saline for rehydration  Patient was reportedly out drinking with friends the night before when he stood up to go to the bathroom and lost consciousness resulting in headstrike on the floor  CT scan in the ED was negative for intracranial pathology  Echocardiogram was performed and he was placed on continuous telemetry to rule out cardiogenic causes of his syncope  Echo revealed normal EF, normal LV function and no valve dysfunction  Telemetry was significant for alternating episodes of tachycardia and SVT as high as 200 bpm   Patients electrolyte profile improved with replenishment and his orthostatic hypotension resolved  On day of discharge patient was feeling well and denied any sensation of lightheadedness or dizziness  Orthostats were negative and patient was deemed well enough hydrated and stable for discharge    Patient has history of alcohol abuse and was agreeable to being enrolled in [facility]  Please see above list of diagnoses and related plan for additional information  Condition at Discharge: stable    Discharge Day Visit / Exam:   * Please refer to separate progress note for these details *    Discussion with Family: Updated  (daughter) via phone  Discharge instructions/Information to patient and family:   See after visit summary for information provided to patient and family  Provisions for Follow-Up Care:  See after visit summary for information related to follow-up care and any pertinent home health orders  Disposition:   Acute Rehab at [address] Readmission:     Discharge Medications:  See after visit summary for reconciled discharge medications provided to patient and/or family        **Please Note: This note may have been constructed using a voice recognition system**

## 2022-11-04 NOTE — ASSESSMENT & PLAN NOTE
Patient presents after syncopal episodes while out drinking  Found to have orthostatic hypotension secondary to intravascular volume depletion  Echocardiogram on 11/4 shows EF of 60%, normal LV function and nondilated left atrium  Telemetry reveals SVT with HR ranging from 120 to 200 for consecutive nights    Plan:  Continue telemetry   Monitor for improvement with hydration

## 2022-11-04 NOTE — PLAN OF CARE
Problem: METABOLIC, FLUID AND ELECTROLYTES - ADULT  Goal: Electrolytes maintained within normal limits  Description: INTERVENTIONS:  - Monitor labs and assess patient for signs and symptoms of electrolyte imbalances  - Administer electrolyte replacement as ordered  - Monitor response to electrolyte replacements, including repeat lab results as appropriate  - Instruct patient on fluid and nutrition as appropriate  Outcome: Progressing  Goal: Fluid balance maintained  Description: INTERVENTIONS:  - Monitor labs   - Monitor I/O and WT  - Instruct patient on fluid and nutrition as appropriate  - Assess for signs & symptoms of volume excess or deficit  Outcome: Progressing  Goal: Glucose maintained within target range  Description: INTERVENTIONS:  - Monitor Blood Glucose as ordered  - Assess for signs and symptoms of hyperglycemia and hypoglycemia  - Administer ordered medications to maintain glucose within target range  - Assess nutritional intake and initiate nutrition service referral as needed  Outcome: Progressing     Problem: PAIN - ADULT  Goal: Verbalizes/displays adequate comfort level or baseline comfort level  Description: Interventions:  - Encourage patient to monitor pain and request assistance  - Assess pain using appropriate pain scale  - Administer analgesics based on type and severity of pain and evaluate response  - Implement non-pharmacological measures as appropriate and evaluate response  - Consider cultural and social influences on pain and pain management  - Notify physician/advanced practitioner if interventions unsuccessful or patient reports new pain  Outcome: Progressing     Problem: INFECTION - ADULT  Goal: Absence or prevention of progression during hospitalization  Description: INTERVENTIONS:  - Assess and monitor for signs and symptoms of infection  - Monitor lab/diagnostic results  - Monitor all insertion sites, i e  indwelling lines, tubes, and drains  - Monitor endotracheal if appropriate and nasal secretions for changes in amount and color  - Bates appropriate cooling/warming therapies per order  - Administer medications as ordered  - Instruct and encourage patient and family to use good hand hygiene technique  - Identify and instruct in appropriate isolation precautions for identified infection/condition  Outcome: Progressing  Goal: Absence of fever/infection during neutropenic period  Description: INTERVENTIONS:  - Monitor WBC    Outcome: Progressing     Problem: SAFETY ADULT  Goal: Patient will remain free of falls  Description: INTERVENTIONS:  - Educate patient/family on patient safety including physical limitations  - Instruct patient to call for assistance with activity   - Consult OT/PT to assist with strengthening/mobility   - Keep Call bell within reach  - Keep bed low and locked with side rails adjusted as appropriate  - Keep care items and personal belongings within reach  - Initiate and maintain comfort rounds  - Make Fall Risk Sign visible to staff  - Offer Toileting every  Hours, in advance of need  - Initiate/Maintain alarm  - Obtain necessary fall risk management equipment:   - Apply yellow socks and bracelet for high fall risk patients  - Consider moving patient to room near nurses station  Outcome: Progressing  Goal: Maintain or return to baseline ADL function  Description: INTERVENTIONS:  -  Assess patient's ability to carry out ADLs; assess patient's baseline for ADL function and identify physical deficits which impact ability to perform ADLs (bathing, care of mouth/teeth, toileting, grooming, dressing, etc )  - Assess/evaluate cause of self-care deficits   - Assess range of motion  - Assess patient's mobility; develop plan if impaired  - Assess patient's need for assistive devices and provide as appropriate  - Encourage maximum independence but intervene and supervise when necessary  - Involve family in performance of ADLs  - Assess for home care needs following discharge   - Consider OT consult to assist with ADL evaluation and planning for discharge  - Provide patient education as appropriate  Outcome: Progressing  Goal: Maintains/Returns to pre admission functional level  Description: INTERVENTIONS:  - Perform BMAT or MOVE assessment daily    - Set and communicate daily mobility goal to care team and patient/family/caregiver  - Collaborate with rehabilitation services on mobility goals if consulted  - Perform Range of Motion  times a day  - Reposition patient every  hours  - Dangle patient times a day  - Stand patient  times a day  - Ambulate patient  times a day  - Out of bed to chair  times a day   - Out of bed for meals times a day  - Out of bed for toileting  - Record patient progress and toleration of activity level   Outcome: Progressing     Problem: DISCHARGE PLANNING  Goal: Discharge to home or other facility with appropriate resources  Description: INTERVENTIONS:  - Identify barriers to discharge w/patient and caregiver  - Arrange for needed discharge resources and transportation as appropriate  - Identify discharge learning needs (meds, wound care, etc )  - Arrange for interpretive services to assist at discharge as needed  - Refer to Case Management Department for coordinating discharge planning if the patient needs post-hospital services based on physician/advanced practitioner order or complex needs related to functional status, cognitive ability, or social support system  Outcome: Progressing     Problem: Knowledge Deficit  Goal: Patient/family/caregiver demonstrates understanding of disease process, treatment plan, medications, and discharge instructions  Description: Complete learning assessment and assess knowledge base    Interventions:  - Provide teaching at level of understanding  - Provide teaching via preferred learning methods  Outcome: Progressing     Problem: MOBILITY - ADULT  Goal: Maintain or return to baseline ADL function  Description: INTERVENTIONS:  -  Assess patient's ability to carry out ADLs; assess patient's baseline for ADL function and identify physical deficits which impact ability to perform ADLs (bathing, care of mouth/teeth, toileting, grooming, dressing, etc )  - Assess/evaluate cause of self-care deficits   - Assess range of motion  - Assess patient's mobility; develop plan if impaired  - Assess patient's need for assistive devices and provide as appropriate  - Encourage maximum independence but intervene and supervise when necessary  - Involve family in performance of ADLs  - Assess for home care needs following discharge   - Consider OT consult to assist with ADL evaluation and planning for discharge  - Provide patient education as appropriate  Outcome: Progressing  Goal: Maintains/Returns to pre admission functional level  Description: INTERVENTIONS:  - Perform BMAT or MOVE assessment daily    - Set and communicate daily mobility goal to care team and patient/family/caregiver  - Collaborate with rehabilitation services on mobility goals if consulted  - Perform Range of Motion  times a day  - Reposition patient every  hours    - Dangle patient  times a day  - Stand patient  times a day  - Ambulate patient  times a day  - Out of bed to chair  times a day   - Out of bed for meals times a day  - Out of bed for toileting  - Record patient progress and toleration of activity level   Outcome: Progressing     Problem: Potential for Falls  Goal: Patient will remain free of falls  Description: INTERVENTIONS:  - Educate patient/family on patient safety including physical limitations  - Instruct patient to call for assistance with activity   - Consult OT/PT to assist with strengthening/mobility   - Keep Call bell within reach  - Keep bed low and locked with side rails adjusted as appropriate  - Keep care items and personal belongings within reach  - Initiate and maintain comfort rounds  - Make Fall Risk Sign visible to staff  - Offer Toileting every Hours, in advance of need  - Initiate/Maintain alarm  - Obtain necessary fall risk management equipment:   - Apply yellow socks and bracelet for high fall risk patients  - Consider moving patient to room near nurses station  Outcome: Progressing     Problem: Nutrition/Hydration-ADULT  Goal: Nutrient/Hydration intake appropriate for improving, restoring or maintaining nutritional needs  Description: Monitor and assess patient's nutrition/hydration status for malnutrition  Collaborate with interdisciplinary team and initiate plan and interventions as ordered  Monitor patient's weight and dietary intake as ordered or per policy  Utilize nutrition screening tool and intervene as necessary  Determine patient's food preferences and provide high-protein, high-caloric foods as appropriate       INTERVENTIONS:  - Monitor oral intake, urinary output, labs, and treatment plans  - Assess nutrition and hydration status and recommend course of action  - Evaluate amount of meals eaten  - Assist patient with eating if necessary   - Allow adequate time for meals  - Recommend/ encourage appropriate diets, oral nutritional supplements, and vitamin/mineral supplements  - Order, calculate, and assess calorie counts as needed  - Recommend, monitor, and adjust tube feedings and TPN/PPN based on assessed needs  - Assess need for intravenous fluids  - Provide specific nutrition/hydration education as appropriate  - Include patient/family/caregiver in decisions related to nutrition  Outcome: Progressing

## 2022-11-04 NOTE — ASSESSMENT & PLAN NOTE
Lab Results   Component Value Date    SODIUM 135 11/05/2022     · Baseline 135s -140s, , improved with fluids    · Likely 2/2 to low PO intake and increase alcohol 231  · Encourage food and beverage hydration aside from alcohol    Plan:  CMP initially monitored q12h  Continue IV hydration given low-normal, will discontinue once consistently baseline and encourage oral intake

## 2022-11-04 NOTE — DISCHARGE INSTR - AVS FIRST PAGE
Dear [facility],     It was our pleasure to care for you here at [facility]  It is our hope that we were always able to exceed the expected standards for your care during your stay  You were hospitalized due to loss of consciousness  You were cared for on the [facility] 3rd floor by Luis Alberto Justice DO under the service of Brook Guillen DO with the [facility] who covers for your primary care physician (PCP), Sebastian Lyon MD, while you were hospitalized  If you have any questions or concerns related to this hospitalization, you may contact us at [phone]  For follow up as well as any medication refills, we recommend that you follow up with your primary care physician  A registered nurse will reach out to you by phone within a few days after your discharge to answer any additional questions that you may have after going home    However, at this time we provide for you here, the most important instructions / recommendations at discharge:     Notable Medication Adjustments -   Please begin taking folic acid 1 mg  Please start Levothyroxine 50 mg daily  Please begin taking thiamine 250 mg daily for 5 days, upon finishing the 5 days please start taking thiamine 100 mg daily which can be purchased over the counter  Testing Required after Discharge -   CBC within 1 week  Magnesium within 1 week  Important follow up information -   Please follow up with your PCP within one week of discharge and ask about the results of your Holter monitor  Please also follow-up with her primary care physician in 4-6 weeks in regards to TSH level as it was elevated during hospital course and you were started on medication for thyroid  Please follow-up with your oncologist Dr Shaheed Tejeda in regards to imaging findings during hospital course as well as continuation of care  Please follow-up with Gastroenterology in regards to care in regard to history of Crohn's disease  Other Instructions -   Please refrain from drinking alcohol  Please refer back to the hospital in the event that symptoms of chest pain, shortness of breath, fatigue, palpitations, dizziness, syncope, lightheadedness return     Please review this entire after visit summary as additional general instructions including medication list, appointments, activity, diet, any pertinent wound care, and other additional recommendations from your care team that may be provided for you        Sincerely,     Patricia Vences MD

## 2022-11-04 NOTE — ASSESSMENT & PLAN NOTE
Lab Results   Component Value Date    K 3 5 11/05/2022    CORRECTEDCA 8 5 11/05/2022     Hypokalemia of 3 4 and calcium of 6 8 POA  Likely secondary to poor nutrition and oral intake    Plan  Continue to monitor CMP in AM labs  Replete electrolytes as necessary

## 2022-11-04 NOTE — ASSESSMENT & PLAN NOTE
Patient reports loss of consciousness while out drinking with friends last night  He stood up to go to the bathroom and reports losing consciousness and hitting the floor  Reports poor oral intake and loss of appetite recently due to generalized anxiety  He has been experiencing lightheadedness and dizziness upon rising from sitting positing while at home  Denies loss of consciousness at home  He has not had any chest pain or shortness of breath with these episodes  No history of seizures per chart review, no history of CAD or MI  Brought to the emergency department by EMS  Found to be hyponatremic with osmolality profile fitting hypovolemic hyponatremia  Hyponatremia improved with IVF  Orthostatics positive in the ED  EKG shows NSR with incomplete RBBB and nonspecific ST segment abnormality  Troponins negative  Syncope most likely in the setting of poor oral intake and vasovagal response  Telemetry shows episodes of sinus and supraventricular tachycardia with HR ranging from 150 to 200  Echocardiogram negative for any cardiogenic cause of of his syncopal episodes  Orthostats negative x3    Plan:  Continue IVF  Check orthostatics prior to discharge       Holter monitoring on discharge - will place referral to cardiology  Encourage cessation of alcohol consumption and better oral nutrition  Replenish electrolytes

## 2022-11-04 NOTE — CASE MANAGEMENT
Case Management Progress Note    Patient name Hunter Burton III  Location S /S -04 MRN [id number]  : 1955 Date 2022       LOS (days): 2  Geometric Mean LOS (GMLOS) (days): 2 60  Days to GMLOS:0        OBJECTIVE:        Current admission status: Inpatient  Preferred Pharmacy:   [facility], [address] [place] [id number]  Phone: [phone] Fax: [phone]    Primary Care Provider: Sebastian Lyon MD    Primary Insurance: MEDICARE  Secondary Insurance: [employer]    PROGRESS NOTE:    CM informed by [facility] [address], they will be at the hospital this evening to meet with pt regarding IP alcohol rehab      CM informed [facility] Resident Dr Wendy Fuller will f/u with pt once DC home from the hospital to continue working on placement for him

## 2022-11-04 NOTE — PROGRESS NOTES
[facility]  Progress Note - Larry Santizo III 1955, 79 y o  male MRN: [id number]  Unit/Bed#: S -01 Encounter: [id number]  Primary Care Provider: Lila Danielson MD   Date and time admitted to hospital: 11/1/2022  9:35 PM    * Syncope  Assessment & Plan  Patient reports loss of consciousness while out drinking with friends last night  He stood up to go to the bathroom and reports losing consciousness and hitting the floor  Reports poor oral intake and loss of appetite recently due to generalized anxiety  He has been experiencing lightheadedness and dizziness upon rising from sitting positing while at home  Denies loss of consciousness at home  He has not had any chest pain or shortness of breath with these episodes  No history of seizures per chart review, no history of CAD or MI  Brought to the emergency department by EMS  Found to be hyponatremic with osmolality profile fitting hypovolemic hyponatremia  Hyponatremia improved with IVF  Orthostatics positive in the ED  EKG shows NSR with incomplete RBBB and nonspecific ST segment abnormality  Troponins negative  Syncope most likely in the setting of poor oral intake and vasovagal response  Telemetry shows episodes of sinus and supraventricular tachycardia with HR ranging from 150 to 200  Echocardiogram negative for any cardiogenic cause of of his syncopal episodes  Orthostats negative x1    Plan:  Continue IVF  Will check orthostatics prior to dscharge       Holter monitoring on discharge  Encourage cessation of alcohol consumption and better oral nutrition  Replenish electrolytes  D/c likely in the morning        Supraventricular tachycardia [facility])  Assessment & Plan  Patient presents after syncopal episodes while out drinking  Found to have orthostatic hypotension secondary to intravascular volume depletion  Echocardiogram on 11/4 shows EF of 60%, normal LV function and nondilated left atrium  Telemetry reveals SVT with HR ranging from 120 to 200 for consecutive nights    Plan:  Continue telemetry   Monitor for improvement with hydration      History of alcohol use disorder  Assessment & Plan  · Minimum of at least 6 cans / bottles of alcohol daily   · Now agreeable to [facility] rehab  Plan: Will speak with case management about rehabilitation  · Alcohol level   · CIWA       Electrolyte abnormality  Assessment & Plan  Hypokalemia of 3 4 and calcium of 6 8 POA  Likely secondary to poor nutrition and oral intake    Plan  CMP ordered for the morning  Replete electrolytes as necessary      Platelets decreased ([address] )  Assessment & Plan  2/2 to alcohol use  No bleeding at this time, no interventions planned    Crohn's disease of small intestine with other complication ([address] )  Assessment & Plan  · W/ Ostomy  Patient denies abnormal or excessive output  · Ostomy care    Chronic obstructive pulmonary disease (HCC)  Assessment & Plan  · Stable on room air  · Continue Anoro Ellipta    Hyponatremia  Assessment & Plan  · Baseline 135s -140s, , w/ IVF now 132, plateaued with fluids  · Likely 2/2 to low PO intake and increase alcohol 231  · Encourage food and beverage hydration aside from alcohol    Plan:  CMP ordered q12h  Continue IV hydration, will discontinue once at baseline and encourage oral intake    Essential hypertension  Assessment & Plan  On amiloride and amlodipine 5 mg each daily     Plan:  Continue home meds              VTE Pharmacologic Prophylaxis: VTE Score: 5 Moderate Risk (Score 3-4) - Pharmacological DVT Prophylaxis Ordered: enoxaparin (Lovenox)  Patient Centered Rounds: I performed bedside rounds with nursing staff today  Discussions with Specialists or Other Care Team Provider:     Education and Discussions with Family / Patient: Attempted to update  (daughter) via phone  Unable to contact      Current Length of Stay: 2 day(s)  Current Patient Status: Inpatient   Discharge Plan: Anticipate discharge tomorrow to rehab facility  Code Status: Level 1 - Full Code    Subjective:   Patient doing well today  He is not having any more feelings of lightheadedness  His appetite has improved and he feels ready to go home  Patient denies and paraesthesias, numbness or weakness  Patient has been having bowel movements and urinating regularly  He denies chest pain or shortness of breath  Objective:     Vitals:   Temp (24hrs), Av 4 °F (36 9 °C), Min:97 9 °F (36 6 °C), Max:98 8 °F (37 1 °C)    Temp:  [97 9 °F (36 6 °C)-98 8 °F (37 1 °C)] 98 7 °F (37 1 °C)  HR:  [] 103  Resp:  [18] 18  BP: (125-143)/(65-84) 137/80  SpO2:  [97 %-100 %] 100 %  Body mass index is 20 67 kg/m²  Input and Output Summary (last 24 hours): Intake/Output Summary (Last 24 hours) at 2022 1336  Last data filed at 11/3/2022 195  Gross per 24 hour   Intake --   Output 175 ml   Net -175 ml       Physical Exam:   Physical Exam  Constitutional:       General: He is not in acute distress  Appearance: Normal appearance  He is normal weight  He is not ill-appearing or toxic-appearing  HENT:      Head: Normocephalic and atraumatic  Eyes:      General: No scleral icterus  Extraocular Movements: Extraocular movements intact  Conjunctiva/sclera: Conjunctivae normal       Pupils: Pupils are equal, round, and reactive to light  Cardiovascular:      Rate and Rhythm: Normal rate and regular rhythm  Pulses: Normal pulses  Heart sounds: Normal heart sounds  No murmur heard  No gallop  Pulmonary:      Effort: Pulmonary effort is normal  No respiratory distress  Breath sounds: Normal breath sounds  No stridor  No wheezing, rhonchi or rales  Chest:      Chest wall: No tenderness  Abdominal:      General: Abdomen is flat  Bowel sounds are normal  There is no distension  Palpations: Abdomen is soft  Tenderness: There is no abdominal tenderness     Musculoskeletal: General: Normal range of motion  Cervical back: Normal range of motion  Right lower leg: No edema  Left lower leg: No edema  Skin:     General: Skin is warm  Coloration: Skin is not jaundiced or pale  Findings: No rash  Neurological:      General: No focal deficit present  Mental Status: He is alert and oriented to person, place, and time  Mental status is at baseline  Cranial Nerves: No cranial nerve deficit  Sensory: No sensory deficit  Motor: No weakness  Psychiatric:         Mood and Affect: Mood normal          Behavior: Behavior normal           Additional Data:     Labs:  Results from last 7 days   Lab Units 11/03/22  0535 11/02/22  0450   WBC Thousand/uL 5 40 6 39   HEMOGLOBIN g/dL 12 6 12 5   HEMATOCRIT % 36 3* 35 9*   PLATELETS Thousands/uL 69* 65*   NEUTROS PCT %  --  72   LYMPHS PCT %  --  22   MONOS PCT %  --  5   EOS PCT %  --  0     Results from last 7 days   Lab Units 11/04/22  0545   SODIUM mmol/L 134*   POTASSIUM mmol/L 4 0   CHLORIDE mmol/L 99   CO2 mmol/L 24   BUN mg/dL 7   CREATININE mg/dL 0 70   ANION GAP mmol/L 11   CALCIUM mg/dL 7 6*   ALBUMIN g/dL 3 4*   TOTAL BILIRUBIN mg/dL 1 82*   ALK PHOS U/L 72   ALT U/L 18   AST U/L 40*   GLUCOSE RANDOM mg/dL 77                       Lines/Drains:  Invasive Devices  Report    Peripheral Intravenous Line  Duration           Peripheral IV 11/02/22 Left;Upper;Ventral (anterior) Arm 2 days          Drain  Duration           Colostomy LLQ -- days                      Imaging: No pertinent imaging reviewed      Recent Cultures (last 7 days):         Last 24 Hours Medication List:   Current Facility-Administered Medications   Medication Dose Route Frequency Provider Last Rate   • acetaminophen  650 mg Oral Q6H PRN Quinten Goetz MD     • AMILoride  5 mg Oral Daily Quinten Goetz MD     • amLODIPine  5 mg Oral Daily Quinten Goetz MD     • buPROPion  150 mg Oral Daily Stacia Soriano MD     • calcium gluconate  2 g Intravenous Once Dylan Ren MD     • cyanocobalamin  1,000 mcg Intramuscular Q30 Days Dylan Ren MD     • enoxaparin  40 mg Subcutaneous Daily Sam Manuel MD     • folic acid  1 mg Oral Daily Sam Manuel MD     • mirtazapine  30 mg Oral HS Sam Manuel MD     • multivitamin-minerals  1 tablet Oral Daily Sam Manuel MD     • pantoprazole  40 mg Oral Early Morning Sam Manuel MD     • polyethylene glycol  17 g Oral Daily Ramsey Danielle MD     • potassium chloride  20 mEq Oral Daily Sam Manuel MD     • pravastatin  80 mg Oral Daily With Benito Burnett MD     • sodium chloride  125 mL/hr Intravenous Continuous Dylan Ren  mL/hr (11/04/22 0941)   • thiamine  100 mg Oral Daily Sam Manuel MD     • umeclidinium-vilanterol  1 puff Inhalation Daily Sam Manuel MD          Today, Patient Was Seen By: Dylan Ren    **Please Note: This note may have been constructed using a voice recognition system  **

## 2022-11-05 LAB
ALBUMIN SERPL BCP-MCNC: 2.9 G/DL (ref 3.5–5)
ALP SERPL-CCNC: 66 U/L (ref 34–104)
ALT SERPL W P-5'-P-CCNC: 20 U/L (ref 7–52)
ANION GAP SERPL CALCULATED.3IONS-SCNC: 7 MMOL/L (ref 4–13)
AST SERPL W P-5'-P-CCNC: 38 U/L (ref 13–39)
BILIRUB SERPL-MCNC: 1.48 MG/DL (ref 0.2–1)
BUN SERPL-MCNC: 8 MG/DL (ref 5–25)
CALCIUM ALBUM COR SERPL-MCNC: 8.5 MG/DL (ref 8.3–10.1)
CALCIUM SERPL-MCNC: 7.6 MG/DL (ref 8.4–10.2)
CHLORIDE SERPL-SCNC: 100 MMOL/L (ref 96–108)
CO2 SERPL-SCNC: 28 MMOL/L (ref 21–32)
CREAT SERPL-MCNC: 0.8 MG/DL (ref 0.6–1.3)
GFR SERPL CREATININE-BSD FRML MDRD: 92 ML/MIN/1.73SQ M
GLUCOSE SERPL-MCNC: 86 MG/DL (ref 65–140)
MAGNESIUM SERPL-MCNC: 0.6 MG/DL (ref 1.9–2.7)
POTASSIUM SERPL-SCNC: 3.5 MMOL/L (ref 3.5–5.3)
PROT SERPL-MCNC: 5.6 G/DL (ref 6.4–8.4)
SODIUM SERPL-SCNC: 135 MMOL/L (ref 135–147)

## 2022-11-05 RX ORDER — SODIUM CHLORIDE 9 MG/ML
125 INJECTION, SOLUTION INTRAVENOUS CONTINUOUS
Status: DISCONTINUED | OUTPATIENT
Start: 2022-11-05 | End: 2022-11-07

## 2022-11-05 RX ORDER — MAGNESIUM SULFATE HEPTAHYDRATE 40 MG/ML
4 INJECTION, SOLUTION INTRAVENOUS ONCE
Status: COMPLETED | OUTPATIENT
Start: 2022-11-05 | End: 2022-11-05

## 2022-11-05 RX ADMIN — SODIUM CHLORIDE 125 ML/HR: 0.9 INJECTION, SOLUTION INTRAVENOUS at 08:55

## 2022-11-05 RX ADMIN — MAGNESIUM SULFATE HEPTAHYDRATE 4 G: 40 INJECTION, SOLUTION INTRAVENOUS at 10:45

## 2022-11-05 RX ADMIN — MULTIPLE VITAMINS W/ MINERALS TAB 1 TABLET: TAB ORAL at 08:56

## 2022-11-05 RX ADMIN — UMECLIDINIUM BROMIDE AND VILANTEROL TRIFENATATE 1 PUFF: 62.5; 25 POWDER RESPIRATORY (INHALATION) at 08:57

## 2022-11-05 RX ADMIN — SODIUM CHLORIDE 125 ML/HR: 0.9 INJECTION, SOLUTION INTRAVENOUS at 14:24

## 2022-11-05 RX ADMIN — AMLODIPINE BESYLATE 5 MG: 5 TABLET ORAL at 08:56

## 2022-11-05 RX ADMIN — PANTOPRAZOLE SODIUM 40 MG: 40 TABLET, DELAYED RELEASE ORAL at 05:47

## 2022-11-05 RX ADMIN — FOLIC ACID 1 MG: 1 TABLET ORAL at 08:56

## 2022-11-05 RX ADMIN — PRAVASTATIN SODIUM 80 MG: 80 TABLET ORAL at 17:16

## 2022-11-05 RX ADMIN — SODIUM CHLORIDE 125 ML/HR: 0.9 INJECTION, SOLUTION INTRAVENOUS at 20:27

## 2022-11-05 RX ADMIN — THIAMINE HCL TAB 100 MG 100 MG: 100 TAB at 08:56

## 2022-11-05 RX ADMIN — POTASSIUM CHLORIDE 20 MEQ: 20 SOLUTION ORAL at 08:56

## 2022-11-05 RX ADMIN — AMILORIDE HYDROCLORIDE 5 MG: 5 TABLET ORAL at 08:57

## 2022-11-05 RX ADMIN — BUPROPION HYDROCHLORIDE 150 MG: 150 TABLET, FILM COATED, EXTENDED RELEASE ORAL at 08:56

## 2022-11-05 RX ADMIN — SODIUM CHLORIDE 125 ML/HR: 0.9 INJECTION, SOLUTION INTRAVENOUS at 00:29

## 2022-11-05 RX ADMIN — MIRTAZAPINE 30 MG: 15 TABLET, FILM COATED ORAL at 21:58

## 2022-11-05 NOTE — ASSESSMENT & PLAN NOTE
· Mg 0 5 POA > 2 5 following repletion > 0 9 > 0 6 as of 11/5 AM  · Has received multiple dosages of IV Mg Sulfate    Plan:  · IV Mg Sulfate 4g  · Continue to monitor Mg level and replete as needed

## 2022-11-05 NOTE — PROGRESS NOTES
[facility]  Progress Note - [place] Hernandes III 1955, 79 y o  male MRN: [id number]  Unit/Bed#: S -01 Encounter: [id number]  Primary Care Provider: Codi Riley MD   Date and time admitted to hospital: 11/1/2022  9:35 PM    * Syncope  Assessment & Plan  Patient reports loss of consciousness while out drinking with friends last night  He stood up to go to the bathroom and reports losing consciousness and hitting the floor  Reports poor oral intake and loss of appetite recently due to generalized anxiety  He has been experiencing lightheadedness and dizziness upon rising from sitting positing while at home  Denies loss of consciousness at home  He has not had any chest pain or shortness of breath with these episodes  No history of seizures per chart review, no history of CAD or MI  Brought to the emergency department by EMS  Found to be hyponatremic with osmolality profile fitting hypovolemic hyponatremia  Hyponatremia improved with IVF  Orthostatics positive in the ED  EKG shows NSR with incomplete RBBB and nonspecific ST segment abnormality  Troponins negative  Syncope most likely in the setting of poor oral intake and vasovagal response  Telemetry shows episodes of sinus and supraventricular tachycardia with HR ranging from 150 to 200  Echocardiogram negative for any cardiogenic cause of of his syncopal episodes  Orthostats negative x3    Plan:  Continue IVF  Check orthostatics prior to discharge  Holter monitoring on discharge - will place referral to cardiology  Encourage cessation of alcohol consumption and better oral nutrition  Replenish electrolytes    Hyponatremia  Assessment & Plan  Lab Results   Component Value Date    SODIUM 135 11/05/2022     · Baseline 135s -140s, , improved with fluids    · Likely 2/2 to low PO intake and increase alcohol 231  · Encourage food and beverage hydration aside from alcohol    Plan:  CMP initially monitored q12h  Continue IV hydration given low-normal, will discontinue once consistently baseline and encourage oral intake    Hypomagnesemia  Assessment & Plan  · Mg 0 5 POA > 2 5 following repletion > 0 9 > 0 6 as of 11/5 AM  · Has received multiple dosages of IV Mg Sulfate    Plan:  · IV Mg Sulfate 4g  · Continue to monitor Mg level and replete as needed    Electrolyte abnormality  Assessment & Plan  Lab Results   Component Value Date    K 3 5 11/05/2022    CORRECTEDCA 8 5 11/05/2022     Hypokalemia of 3 4 and calcium of 6 8 POA  Likely secondary to poor nutrition and oral intake    Plan  Continue to monitor CMP in AM labs  Replete electrolytes as necessary      History of alcohol use disorder  Assessment & Plan  · Minimum of at least 6 cans / bottles of alcohol daily   · Alcohol level   · Has spoken with [address]  Now agreeable to [facility] rehab  Plan:  · IP alcohol rehab - will work with pt regarding placement once he goes home  · CIWA       Supraventricular tachycardia ([facility] [address] )  Assessment & Plan  Patient presents after syncopal episodes while out drinking  Found to have orthostatic hypotension secondary to intravascular volume depletion  Echocardiogram on 11/4 shows EF of 60%, normal LV function and nondilated left atrium  Telemetry reveals SVT with HR ranging from 120 to 200 for consecutive nights    Plan:  Continue telemetry   Monitor for improvement with hydration      Platelets decreased ([facility] [address] )  Assessment & Plan  2/2 to alcohol use  No bleeding at this time, no interventions planned    Crohn's disease of small intestine with other complication ([facility] [address] )  Assessment & Plan  · W/ Ostomy    Patient denies abnormal or excessive output  · Ostomy care    Chronic obstructive pulmonary disease (HCC)  Assessment & Plan  · Stable on room air  · Continue Anoro Ellipta    Essential hypertension  Assessment & Plan  On amiloride and amlodipine 5 mg each daily     Plan:  Continue home meds        VTE Pharmacologic Prophylaxis: VTE Score: 5 Moderate Risk (Score 3-4) - Pharmacological DVT Prophylaxis Ordered: enoxaparin (Lovenox)  Patient Centered Rounds: I performed bedside rounds with nursing staff today  Discussions with Specialists or Other Care Team Provider: None    Education and Discussions with Family / Patient: Attempted to update  (daughter) via phone  Unable to contact  Current Length of Stay: 3 day(s)  Current Patient Status: Inpatient   Discharge Plan: Anticipate discharge tomorrow to home  (Discharge delayed given critically low Magnesium levels  Will also continue to monitor on telemetry )    Code Status: Level 1 - Full Code    Subjective:   Patient seen and examined at bedside  Awake, alert, sitting up eating breakfast   Denies chest pain, palpitations, sob, abdominal pain, weakness, n/v, numbness/tingling, dysuria, or any other complaints at this time  Says he feels great and is ready to go home  Reports no issues with ostomy bag (I assessed to confirm no issues)  Understands to increase water intake and caution with alcohol intake given recent syncope  Objective:     Vitals:   Temp (24hrs), Av 2 °F (37 3 °C), Min:98 8 °F (37 1 °C), Max:99 4 °F (37 4 °C)    Temp:  [98 8 °F (37 1 °C)-99 4 °F (37 4 °C)] 98 8 °F (37 1 °C)  HR:  [] 94  Resp:  [17-18] 18  BP: (107-147)/(62-84) 135/77  SpO2:  [94 %-99 %] 94 %  Body mass index is 20 67 kg/m²  Input and Output Summary (last 24 hours): Intake/Output Summary (Last 24 hours) at 2022 1225  Last data filed at 2022 0855  Gross per 24 hour   Intake 1000 ml   Output 2201 ml   Net -1201 ml       Physical Exam:   Physical Exam  Vitals and nursing note reviewed  Constitutional:       General: He is not in acute distress  Appearance: Normal appearance  He is not ill-appearing or diaphoretic  HENT:      Head: Normocephalic and atraumatic        Right Ear: External ear normal       Left Ear: External ear normal  Nose: Nose normal       Mouth/Throat:      Mouth: Mucous membranes are moist    Eyes:      General:         Right eye: No discharge  Left eye: No discharge  Extraocular Movements: Extraocular movements intact  Conjunctiva/sclera: Conjunctivae normal    Cardiovascular:      Rate and Rhythm: Regular rhythm  Tachycardia present  Pulses: Normal pulses  Heart sounds: Normal heart sounds  No murmur heard  No friction rub  No gallop  Comments: Mildly tachycardic (105)  Pulmonary:      Effort: Pulmonary effort is normal  No respiratory distress  Breath sounds: Normal breath sounds  No stridor  No wheezing, rhonchi or rales  Abdominal:      General: Bowel sounds are normal  There is no distension  Palpations: Abdomen is soft  There is no mass  Tenderness: There is no abdominal tenderness  There is no guarding  Hernia: No hernia is present  Musculoskeletal:         General: Normal range of motion  Cervical back: Normal range of motion and neck supple  Right lower leg: No edema  Left lower leg: No edema  Skin:     General: Skin is warm and dry  Neurological:      Mental Status: He is alert and oriented to person, place, and time  Mental status is at baseline     Psychiatric:         Mood and Affect: Mood normal          Behavior: Behavior normal         Additional Data:     Labs:  Results from last 7 days   Lab Units 11/03/22  0535 11/02/22  0450   WBC Thousand/uL 5 40 6 39   HEMOGLOBIN g/dL 12 6 12 5   HEMATOCRIT % 36 3* 35 9*   PLATELETS Thousands/uL 69* 65*   NEUTROS PCT %  --  72   LYMPHS PCT %  --  22   MONOS PCT %  --  5   EOS PCT %  --  0     Results from last 7 days   Lab Units 11/05/22  0447   SODIUM mmol/L 135   POTASSIUM mmol/L 3 5   CHLORIDE mmol/L 100   CO2 mmol/L 28   BUN mg/dL 8   CREATININE mg/dL 0 80   ANION GAP mmol/L 7   CALCIUM mg/dL 7 6*   ALBUMIN g/dL 2 9*   TOTAL BILIRUBIN mg/dL 1 48*   ALK PHOS U/L 66   ALT U/L 20   AST U/L 38 GLUCOSE RANDOM mg/dL 86                       Lines/Drains:  Invasive Devices  Report    Peripheral Intravenous Line  Duration           Peripheral IV 22 Left;Upper;Ventral (anterior) Arm 3 days          Drain  Duration           Colostomy LLQ -- days    Colostomy LLQ -- days                  Telemetry:  Telemetry Orders (From admission, onward)             48 Hour Telemetry Monitoring  Continuous x 48 hours        References:    Telemetry Guidelines   Question:  Reason for 48 Hour Telemetry  Answer:  Arrhythmias Requiring Medical Therapy (eg  SVT, Vtach/fib, Bradycardia, Uncontrolled A-fib)                  Telemetry was already  during my evaluation this AM   Restarted telemetry and will monitor  Imaging:   XR chest 1 view portable   ED Interpretation by Handy Soni DO ([id number])   No acute cardiopulmonary process  Final Result by Jamie Cook MD ( [id number])      No acute cardiopulmonary disease  Workstation performed: [id number]         CT head without contrast   Final Result by Juan Urias DO ([id number])      No acute intracranial abnormality  Chronic microangiopathic changes                    Workstation performed: [id number]             Recent Cultures (last 7 days):         Last 24 Hours Medication List:   Current Facility-Administered Medications   Medication Dose Route Frequency Provider Last Rate   • acetaminophen  650 mg Oral Q6H PRN Lukas Titus MD     • AMILoride  5 mg Oral Daily Lukas Titus MD     • amLODIPine  5 mg Oral Daily Lukas Titus MD     • buPROPion  150 mg Oral Daily Fátima Cervantes MD     • calcium gluconate  2 g Intravenous Once Lukas Titus MD     • cyanocobalamin  1,000 mcg Intramuscular Q30 Days Lukas Titus MD     • enoxaparin  40 mg Subcutaneous Daily Fátima Cervantes MD     • folic acid  1 mg Oral Daily Fátima Cervantes MD     • magnesium sulfate  4 g Intravenous Once Ira Lowe DO     • mirtazapine  30 mg Oral HS Mary Bahena MD     • multivitamin-minerals  1 tablet Oral Daily Mary Bahena MD     • pantoprazole  40 mg Oral Early Morning Mary Bahena MD     • polyethylene glycol  17 g Oral Daily Steve Gupta MD     • potassium chloride  20 mEq Oral Daily Mary Bahena MD     • pravastatin  80 mg Oral Daily With Mo Ponce MD     • sodium chloride  125 mL/hr Intravenous Continuous Levar Perez DO     • thiamine  100 mg Oral Daily Mary Bahena MD     • umeclidinium-vilanterol  1 puff Inhalation Daily Mary Bahena MD          Today, Patient Was Seen By: Levar Perez    **Please Note: This note may have been constructed using a voice recognition system  **

## 2022-11-06 LAB
ALBUMIN SERPL BCP-MCNC: 3 G/DL (ref 3.5–5)
ALP SERPL-CCNC: 78 U/L (ref 34–104)
ALT SERPL W P-5'-P-CCNC: 23 U/L (ref 7–52)
ANION GAP SERPL CALCULATED.3IONS-SCNC: 8 MMOL/L (ref 4–13)
AST SERPL W P-5'-P-CCNC: 51 U/L (ref 13–39)
BILIRUB SERPL-MCNC: 1.22 MG/DL (ref 0.2–1)
BUN SERPL-MCNC: 7 MG/DL (ref 5–25)
CALCIUM ALBUM COR SERPL-MCNC: 8.2 MG/DL (ref 8.3–10.1)
CALCIUM SERPL-MCNC: 7.4 MG/DL (ref 8.4–10.2)
CHLORIDE SERPL-SCNC: 103 MMOL/L (ref 96–108)
CO2 SERPL-SCNC: 24 MMOL/L (ref 21–32)
CREAT SERPL-MCNC: 0.66 MG/DL (ref 0.6–1.3)
GFR SERPL CREATININE-BSD FRML MDRD: 100 ML/MIN/1.73SQ M
GLUCOSE SERPL-MCNC: 93 MG/DL (ref 65–140)
MAGNESIUM SERPL-MCNC: 1.2 MG/DL (ref 1.9–2.7)
POTASSIUM SERPL-SCNC: 3.5 MMOL/L (ref 3.5–5.3)
PROT SERPL-MCNC: 5.7 G/DL (ref 6.4–8.4)
SODIUM SERPL-SCNC: 135 MMOL/L (ref 135–147)

## 2022-11-06 RX ORDER — MAGNESIUM SULFATE HEPTAHYDRATE 40 MG/ML
4 INJECTION, SOLUTION INTRAVENOUS ONCE
Status: COMPLETED | OUTPATIENT
Start: 2022-11-06 | End: 2022-11-06

## 2022-11-06 RX ORDER — CARVEDILOL 3.12 MG/1
3.12 TABLET ORAL 2 TIMES DAILY WITH MEALS
Status: DISCONTINUED | OUTPATIENT
Start: 2022-11-06 | End: 2022-11-13 | Stop reason: HOSPADM

## 2022-11-06 RX ADMIN — SODIUM CHLORIDE 125 ML/HR: 0.9 INJECTION, SOLUTION INTRAVENOUS at 23:03

## 2022-11-06 RX ADMIN — FOLIC ACID 1 MG: 1 TABLET ORAL at 09:04

## 2022-11-06 RX ADMIN — DOXYLAMINE SUCCINATE 12.5 MG: 25 TABLET ORAL at 23:03

## 2022-11-06 RX ADMIN — THIAMINE HCL TAB 100 MG 100 MG: 100 TAB at 09:04

## 2022-11-06 RX ADMIN — AMLODIPINE BESYLATE 5 MG: 5 TABLET ORAL at 09:04

## 2022-11-06 RX ADMIN — PANTOPRAZOLE SODIUM 40 MG: 40 TABLET, DELAYED RELEASE ORAL at 04:38

## 2022-11-06 RX ADMIN — PRAVASTATIN SODIUM 80 MG: 80 TABLET ORAL at 17:46

## 2022-11-06 RX ADMIN — POTASSIUM CHLORIDE 20 MEQ: 20 SOLUTION ORAL at 09:04

## 2022-11-06 RX ADMIN — SODIUM CHLORIDE 125 ML/HR: 0.9 INJECTION, SOLUTION INTRAVENOUS at 03:30

## 2022-11-06 RX ADMIN — CALCIUM GLUCONATE 3 G: 98 INJECTION, SOLUTION INTRAVENOUS at 07:20

## 2022-11-06 RX ADMIN — MIRTAZAPINE 30 MG: 15 TABLET, FILM COATED ORAL at 22:11

## 2022-11-06 RX ADMIN — MULTIPLE VITAMINS W/ MINERALS TAB 1 TABLET: TAB ORAL at 09:04

## 2022-11-06 RX ADMIN — MAGNESIUM SULFATE HEPTAHYDRATE 4 G: 40 INJECTION, SOLUTION INTRAVENOUS at 09:04

## 2022-11-06 RX ADMIN — CARVEDILOL 3.12 MG: 3.12 TABLET, FILM COATED ORAL at 09:04

## 2022-11-06 RX ADMIN — AMILORIDE HYDROCLORIDE 5 MG: 5 TABLET ORAL at 09:05

## 2022-11-06 RX ADMIN — CARVEDILOL 3.12 MG: 3.12 TABLET, FILM COATED ORAL at 17:46

## 2022-11-06 RX ADMIN — BUPROPION HYDROCHLORIDE 150 MG: 150 TABLET, FILM COATED, EXTENDED RELEASE ORAL at 09:04

## 2022-11-06 RX ADMIN — SODIUM CHLORIDE 125 ML/HR: 0.9 INJECTION, SOLUTION INTRAVENOUS at 17:49

## 2022-11-06 RX ADMIN — UMECLIDINIUM BROMIDE AND VILANTEROL TRIFENATATE 1 PUFF: 62.5; 25 POWDER RESPIRATORY (INHALATION) at 09:05

## 2022-11-06 NOTE — ASSESSMENT & PLAN NOTE
Patient presents after syncopal episodes while out drinking  Found to have orthostatic hypotension secondary to intravascular volume depletion  Echocardiogram on 11/4 shows EF of 60%, normal LV function and nondilated left atrium  Telemetry reveals SVT with HR ranging from 120 to 200 for consecutive nights    Plan:  Continue telemetry   Monitor for improvement with correction of electrolytes  Carvedilol added

## 2022-11-06 NOTE — ASSESSMENT & PLAN NOTE
Lab Results   Component Value Date    K 3 5 11/06/2022    CORRECTEDCA 8 2 (L) 11/06/2022     Hypokalemia of 3 4 and calcium of 6 8 POA  Likely secondary to poor nutrition and oral intake    Plan  Continue to monitor CMP on AM labs  Replete electrolytes as necessary

## 2022-11-06 NOTE — ASSESSMENT & PLAN NOTE
· Minimum of at least 6 cans / bottles of alcohol daily   · Alcohol level   · Has spoken with [address]  Now agreeable to [address]  [facility]      Plan:  · IP alcohol rehab - will work with pt regarding placement once he goes home  · PAUL

## 2022-11-06 NOTE — PROGRESS NOTES
[facility]  Progress Note - Rachel Wilde III 1955, 79 y o  male MRN: [id number]  Unit/Bed#: S -01 Encounter: [id number]  Primary Care Provider: Gabby Andrew MD   Date and time admitted to hospital: 11/1/2022  9:35 PM    * Syncope  Assessment & Plan  Patient reports loss of consciousness while out drinking with friends last night  He stood up to go to the bathroom and reports losing consciousness and hitting the floor  Reports poor oral intake and loss of appetite recently due to generalized anxiety  He has been experiencing lightheadedness and dizziness upon rising from sitting positing while at home  Denies loss of consciousness at home  He has not had any chest pain or shortness of breath with these episodes  No history of seizures per chart review, no history of CAD or MI  Brought to the emergency department by EMS  Found to be hyponatremic with osmolality profile fitting hypovolemic hyponatremia  Hyponatremia improved with IVF  Orthostatics positive in the ED  EKG shows NSR with incomplete RBBB and nonspecific ST segment abnormality  Troponins negative  Syncope most likely in the setting of poor oral intake and vasovagal response  Telemetry shows episodes of sinus and supraventricular tachycardia with HR ranging from 150 to 200  Echocardiogram negative for any cardiogenic cause of of his syncopal episodes  Orthostats negative x3    Plan:  Continue IVF  Check orthostatics prior to discharge       Holter monitoring on discharge - will place referral to cardiology  Encourage cessation of alcohol consumption and better oral nutrition  Replenish electrolytes    Supraventricular tachycardia ([facility] [address] )  Assessment & Plan  Patient presents after syncopal episodes while out drinking  Found to have orthostatic hypotension secondary to intravascular volume depletion  Echocardiogram on 11/4 shows EF of 60%, normal LV function and nondilated left atrium  Telemetry reveals SVT with HR ranging from 120 to 200 for consecutive nights    Plan:  Continue telemetry   Monitor for improvement with correction of electrolytes  Carvedilol added      History of alcohol use disorder  Assessment & Plan  · Minimum of at least 6 cans / bottles of alcohol daily   · Alcohol level   · Has spoken with [address]  Now agreeable to [address] [place] [address]'s rehab  Plan:  · IP alcohol rehab - will work with pt regarding placement once he goes home  · CIWA       Electrolyte abnormality  Assessment & Plan  Lab Results   Component Value Date    K 3 5 11/06/2022    CORRECTEDCA 8 2 (L) 11/06/2022     Hypokalemia of 3 4 and calcium of 6 8 POA  Likely secondary to poor nutrition and oral intake    Plan  Continue to monitor CMP on AM labs  Replete electrolytes as necessary      Platelets decreased ([address] )  Assessment & Plan  2/2 to alcohol use  No bleeding at this time, no interventions planned    Crohn's disease of small intestine with other complication ([facility] [address] )  Assessment & Plan  · W/ Ostomy  Patient denies abnormal or excessive output  · Ostomy care    Chronic obstructive pulmonary disease (HCC)  Assessment & Plan  · Stable on room air  · Continue Anoro Ellipta    Hypomagnesemia  Assessment & Plan  · Mg 0 5 POA > 2 5 following repletion > 0 9 > 0 6 as of 11/5 AM  · Has received multiple dosages of IV Mg Sulfate    Plan:  · IV Mg Sulfate 4g  · Continue to monitor Mg level and replete as needed    Hyponatremia  Assessment & Plan  Lab Results   Component Value Date    SODIUM 135 11/06/2022     · Baseline 135s -140s, , improved with fluids    · Likely 2/2 to low PO intake and increase alcohol 231  · Encourage food and beverage hydration aside from alcohol    Plan:  CMP initially monitored q12h  Continue IV hydration given low-normal, will discontinue once consistently baseline and encourage oral intake    Essential hypertension  Assessment & Plan  On amiloride and amlodipine 5 mg each daily     Plan:  Continue home meds  Carvedilol added              VTE Pharmacologic Prophylaxis: VTE Score: 5 Moderate Risk (Score 3-4) - Pharmacological DVT Prophylaxis Ordered: enoxaparin (Lovenox)  Patient Centered Rounds: I performed bedside rounds with nursing staff today  Discussions with Specialists or Other Care Team Provider:     Education and Discussions with Family / Patient: Attempted to update  (daughter) via phone  Left voicemail  Current Length of Stay: 4 day(s)  Current Patient Status: Inpatient   Discharge Plan: Anticipate discharge in 24-48 hrs to home  Code Status: Level 1 - Full Code    Subjective:   Patient feeling well this morning  He was seen while eating breakfast and reports his appetite has been good  No overnight events reported  Patient denies any further sensation of lightheadedness/dizziness  He is not short of breath and has no chest pain  Patient denies feeling palpitations despite being tachycardic on telemetry  Patient continues to have regular bowel movements and urinate without issues  He feels ready to go home  Objective:     Vitals:   Temp (24hrs), Av 7 °F (37 6 °C), Min:99 7 °F (37 6 °C), Max:99 7 °F (37 6 °C)    Temp:  [99 7 °F (37 6 °C)] 99 7 °F (37 6 °C)  HR:  [] 107  Resp:  [17-18] 17  BP: (120-157)/() 122/65  SpO2:  [95 %-100 %] 96 %  Body mass index is 20 67 kg/m²  Input and Output Summary (last 24 hours): Intake/Output Summary (Last 24 hours) at 2022 1332  Last data filed at 2022 1138  Gross per 24 hour   Intake --   Output 1950 ml   Net -1950 ml       Physical Exam:   Physical Exam  Constitutional:       General: He is not in acute distress  Appearance: Normal appearance  He is normal weight  He is not ill-appearing or toxic-appearing  HENT:      Head: Normocephalic and atraumatic  Eyes:      General: No scleral icterus  Extraocular Movements: Extraocular movements intact        Conjunctiva/sclera: Conjunctivae normal  Pupils: Pupils are equal, round, and reactive to light  Cardiovascular:      Rate and Rhythm: Regular rhythm  Tachycardia present  Pulses: Normal pulses  Heart sounds: Normal heart sounds  No murmur heard  No gallop  Pulmonary:      Effort: Pulmonary effort is normal  No respiratory distress  Breath sounds: Normal breath sounds  No stridor  No wheezing, rhonchi or rales  Chest:      Chest wall: No tenderness  Abdominal:      General: Abdomen is flat  Bowel sounds are normal  There is no distension  Palpations: Abdomen is soft  Tenderness: There is no abdominal tenderness  Musculoskeletal:         General: Normal range of motion  Cervical back: Normal range of motion  Right lower leg: No edema  Left lower leg: No edema  Comments: Poor muscle mass   Skin:     General: Skin is warm  Coloration: Skin is not jaundiced or pale  Findings: No rash  Neurological:      General: No focal deficit present  Mental Status: He is alert and oriented to person, place, and time  Mental status is at baseline  Cranial Nerves: No cranial nerve deficit  Sensory: No sensory deficit  Motor: No weakness     Psychiatric:         Mood and Affect: Mood normal          Behavior: Behavior normal           Additional Data:     Labs:  Results from last 7 days   Lab Units 11/03/22  0535 11/02/22  0450   WBC Thousand/uL 5 40 6 39   HEMOGLOBIN g/dL 12 6 12 5   HEMATOCRIT % 36 3* 35 9*   PLATELETS Thousands/uL 69* 65*   NEUTROS PCT %  --  72   LYMPHS PCT %  --  22   MONOS PCT %  --  5   EOS PCT %  --  0     Results from last 7 days   Lab Units 11/06/22  0438   SODIUM mmol/L 135   POTASSIUM mmol/L 3 5   CHLORIDE mmol/L 103   CO2 mmol/L 24   BUN mg/dL 7   CREATININE mg/dL 0 66   ANION GAP mmol/L 8   CALCIUM mg/dL 7 4*   ALBUMIN g/dL 3 0*   TOTAL BILIRUBIN mg/dL 1 22*   ALK PHOS U/L 78   ALT U/L 23   AST U/L 51*   GLUCOSE RANDOM mg/dL 93 Lines/Drains:  Invasive Devices  Report    Peripheral Intravenous Line  Duration           Peripheral IV 11/02/22 Left;Upper;Ventral (anterior) Arm 4 days    Peripheral IV 11/06/22 Dorsal (posterior); Left Forearm <1 day          Drain  Duration           Colostomy LLQ -- days    Colostomy LLQ -- days                  Telemetry:  Telemetry Orders (From admission, onward)             48 Hour Telemetry Monitoring  Continuous x 48 hours        References:    Telemetry Guidelines   Question:  Reason for 48 Hour Telemetry  Answer:  Arrhythmias Requiring Medical Therapy (eg  SVT, Vtach/fib, Bradycardia, Uncontrolled A-fib)                 Telemetry Reviewed: Sinus Tachycardia  Indication for Continued Telemetry Use: Arrthymias requiring medical therapy             Imaging: No pertinent imaging reviewed      Recent Cultures (last 7 days):         Last 24 Hours Medication List:   Current Facility-Administered Medications   Medication Dose Route Frequency Provider Last Rate   • acetaminophen  650 mg Oral Q6H PRN Curly Larkin MD     • AMILoride  5 mg Oral Daily Curly Larkin MD     • amLODIPine  5 mg Oral Daily Curly Larkin MD     • buPROPion  150 mg Oral Daily Leti Cota MD     • carvedilol  3 125 mg Oral BID With Meals Darius Prescott MD     • cyanocobalamin  1,000 mcg Intramuscular Q30 Days Curly Larkin MD     • enoxaparin  40 mg Subcutaneous Daily Leti Cota MD     • folic acid  1 mg Oral Daily Leti Cota MD     • mirtazapine  30 mg Oral HS Leti Cota MD     • multivitamin-minerals  1 tablet Oral Daily Leti Cota MD     • pantoprazole  40 mg Oral Early Morning Leti Cota MD     • polyethylene glycol  17 g Oral Daily Frank Macario MD     • potassium chloride  20 mEq Oral Daily Leti Cota MD     • pravastatin  80 mg Oral Daily With Papito Bean MD     • sodium chloride  125 mL/hr Intravenous Continuous Aram Petit DO 125 mL/hr (11/06/22 0330)   • thiamine  100 mg Oral Daily Johanny Hills MD     • umeclidinium-vilanterol  1 puff Inhalation Daily Johanny Hills MD          Today, Patient Was Seen By: Josue Jaramillo    **Please Note: This note may have been constructed using a voice recognition system  **

## 2022-11-06 NOTE — ASSESSMENT & PLAN NOTE
Lab Results   Component Value Date    SODIUM 135 11/06/2022     · Baseline 135s -140s, , improved with fluids    · Likely 2/2 to low PO intake and increase alcohol 231  · Encourage food and beverage hydration aside from alcohol    Plan:  CMP initially monitored q12h  Continue IV hydration given low-normal, will discontinue once consistently baseline and encourage oral intake

## 2022-11-07 ENCOUNTER — APPOINTMENT (INPATIENT)
Dept: CT IMAGING | Facility: HOSPITAL | Age: 67
End: 2022-11-07

## 2022-11-07 PROBLEM — I26.94 MULTIPLE SUBSEGMENTAL PULMONARY EMBOLI WITHOUT ACUTE COR PULMONALE (HCC): Status: ACTIVE | Noted: 2022-11-07

## 2022-11-07 LAB
25(OH)D2 SERPL-MCNC: <1 NG/ML
25(OH)D3 SERPL-MCNC: 48 NG/ML
25(OH)D3+25(OH)D2 SERPL-MCNC: 48 NG/ML
ALBUMIN SERPL BCP-MCNC: 3.2 G/DL (ref 3.5–5)
ALP SERPL-CCNC: 67 U/L (ref 34–104)
ALT SERPL W P-5'-P-CCNC: 26 U/L (ref 7–52)
ANION GAP SERPL CALCULATED.3IONS-SCNC: 8 MMOL/L (ref 4–13)
AST SERPL W P-5'-P-CCNC: 45 U/L (ref 13–39)
BILIRUB SERPL-MCNC: 1.24 MG/DL (ref 0.2–1)
BUN SERPL-MCNC: 10 MG/DL (ref 5–25)
CALCIUM ALBUM COR SERPL-MCNC: 8.2 MG/DL (ref 8.3–10.1)
CALCIUM SERPL-MCNC: 7.6 MG/DL (ref 8.4–10.2)
CHLORIDE SERPL-SCNC: 105 MMOL/L (ref 96–108)
CO2 SERPL-SCNC: 23 MMOL/L (ref 21–32)
CREAT SERPL-MCNC: 0.64 MG/DL (ref 0.6–1.3)
GFR SERPL CREATININE-BSD FRML MDRD: 101 ML/MIN/1.73SQ M
GLUCOSE SERPL-MCNC: 81 MG/DL (ref 65–140)
MAGNESIUM SERPL-MCNC: 1.4 MG/DL (ref 1.9–2.7)
MAGNESIUM SERPL-MCNC: 2.1 MG/DL (ref 1.9–2.7)
POTASSIUM SERPL-SCNC: 4 MMOL/L (ref 3.5–5.3)
PROT SERPL-MCNC: 5.9 G/DL (ref 6.4–8.4)
SODIUM SERPL-SCNC: 136 MMOL/L (ref 135–147)

## 2022-11-07 RX ORDER — MAGNESIUM SULFATE HEPTAHYDRATE 40 MG/ML
4 INJECTION, SOLUTION INTRAVENOUS ONCE
Status: COMPLETED | OUTPATIENT
Start: 2022-11-07 | End: 2022-11-07

## 2022-11-07 RX ADMIN — THIAMINE HCL TAB 100 MG 100 MG: 100 TAB at 08:55

## 2022-11-07 RX ADMIN — AMLODIPINE BESYLATE 5 MG: 5 TABLET ORAL at 08:55

## 2022-11-07 RX ADMIN — POTASSIUM CHLORIDE 20 MEQ: 20 SOLUTION ORAL at 08:55

## 2022-11-07 RX ADMIN — ENOXAPARIN SODIUM 40 MG: 40 INJECTION SUBCUTANEOUS at 08:55

## 2022-11-07 RX ADMIN — CARVEDILOL 3.12 MG: 3.12 TABLET, FILM COATED ORAL at 16:20

## 2022-11-07 RX ADMIN — MIRTAZAPINE 30 MG: 15 TABLET, FILM COATED ORAL at 21:21

## 2022-11-07 RX ADMIN — CARVEDILOL 3.12 MG: 3.12 TABLET, FILM COATED ORAL at 08:59

## 2022-11-07 RX ADMIN — FOLIC ACID 1 MG: 1 TABLET ORAL at 08:55

## 2022-11-07 RX ADMIN — BUPROPION HYDROCHLORIDE 150 MG: 150 TABLET, FILM COATED, EXTENDED RELEASE ORAL at 08:55

## 2022-11-07 RX ADMIN — IOHEXOL 85 ML: 350 INJECTION, SOLUTION INTRAVENOUS at 11:12

## 2022-11-07 RX ADMIN — UMECLIDINIUM BROMIDE AND VILANTEROL TRIFENATATE 1 PUFF: 62.5; 25 POWDER RESPIRATORY (INHALATION) at 08:57

## 2022-11-07 RX ADMIN — PANTOPRAZOLE SODIUM 40 MG: 40 TABLET, DELAYED RELEASE ORAL at 05:28

## 2022-11-07 RX ADMIN — PRAVASTATIN SODIUM 80 MG: 80 TABLET ORAL at 16:20

## 2022-11-07 RX ADMIN — APIXABAN 10 MG: 5 TABLET, FILM COATED ORAL at 16:20

## 2022-11-07 RX ADMIN — MAGNESIUM SULFATE HEPTAHYDRATE 4 G: 40 INJECTION, SOLUTION INTRAVENOUS at 07:30

## 2022-11-07 RX ADMIN — MULTIPLE VITAMINS W/ MINERALS TAB 1 TABLET: TAB ORAL at 08:55

## 2022-11-07 RX ADMIN — AMILORIDE HYDROCLORIDE 5 MG: 5 TABLET ORAL at 08:58

## 2022-11-07 NOTE — ASSESSMENT & PLAN NOTE
· Mg 0 5 POA > 2 5 following repletion > 0 9 > 0 6 > 1 4 as of 11/7 AM  · Has received multiple dosages of IV Mg Sulfate  ·   Plan:  · IV Mg Sulfate 4g  · Continue to monitor Mg level and replete as needed

## 2022-11-07 NOTE — CASE MANAGEMENT
Case Management Progress Note    Patient name Rachel Wilde III  Location S /S -01 MRN [id number]  : 1955 Date 2022       LOS (days): 5  Geometric Mean LOS (GMLOS) (days): 2 60  Days to GMLOS:-2 8        OBJECTIVE:        Current admission status: Inpatient  Preferred Pharmacy:   [facility] [address]  [facility] [address]  Phone: [phone] Fax: [phone]    Primary Care Provider: Gabby Andrew MD    Primary Insurance: MEDICARE  Secondary Insurance: A[employer]    PROGRESS NOTE:    CM informed by Diogo Scanlon [facility]) that she met with pt on Friday late afternoon and he informed her that he "won't be drinking anymore", as this incident scared him  He confirmed he will reach out to her for help if needed    No current plan for IP alcohol rehab

## 2022-11-07 NOTE — ASSESSMENT & PLAN NOTE
Lab Results   Component Value Date    K 4 0 11/07/2022    CORRECTEDCA 8 2 (L) 11/07/2022     Hypokalemia of 3 4 and calcium of 6 8 POA  Likely secondary to poor nutrition and oral intake     Plan  Continue to monitor CMP on AM labs  Replete electrolytes as necessary

## 2022-11-07 NOTE — ASSESSMENT & PLAN NOTE
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

## 2022-11-07 NOTE — ASSESSMENT & PLAN NOTE
Lab Results   Component Value Date    SODIUM 136 11/07/2022     · Baseline 135s -140s, , improved with fluids    · Likely 2/2 to low PO intake and increase alcohol 231  · Encourage food and beverage hydration aside from alcohol     Plan:  CMP initially monitored q12h  Continue IV hydration given low-normal, will discontinue once consistently baseline and encourage oral intake

## 2022-11-07 NOTE — ASSESSMENT & PLAN NOTE
· Minimum of at least 6 cans / bottles of alcohol daily   · Alcohol level   · Has spoken with [address]  Now agreeable to [facility]'s rehab       Plan:  · IP alcohol rehab - will work with pt regarding placement once he goes home, currently patient is reporting that he does not want to attend rehab post discharge  · PAUL

## 2022-11-07 NOTE — ASSESSMENT & PLAN NOTE
· History of Crohn's disease W/ Ostomy    Patient denies abnormal or excessive output  · Ostomy care

## 2022-11-07 NOTE — PLAN OF CARE
Problem: METABOLIC, FLUID AND ELECTROLYTES - ADULT  Goal: Electrolytes maintained within normal limits  Description: INTERVENTIONS:  - Monitor labs and assess patient for signs and symptoms of electrolyte imbalances  - Administer electrolyte replacement as ordered  - Monitor response to electrolyte replacements, including repeat lab results as appropriate  - Instruct patient on fluid and nutrition as appropriate  Outcome: Progressing  Goal: Fluid balance maintained  Description: INTERVENTIONS:  - Monitor labs   - Monitor I/O and WT  - Instruct patient on fluid and nutrition as appropriate  - Assess for signs & symptoms of volume excess or deficit  Outcome: Progressing  Goal: Glucose maintained within target range  Description: INTERVENTIONS:  - Monitor Blood Glucose as ordered  - Assess for signs and symptoms of hyperglycemia and hypoglycemia  - Administer ordered medications to maintain glucose within target range  - Assess nutritional intake and initiate nutrition service referral as needed  Outcome: Progressing     Problem: PAIN - ADULT  Goal: Verbalizes/displays adequate comfort level or baseline comfort level  Description: Interventions:  - Encourage patient to monitor pain and request assistance  - Assess pain using appropriate pain scale  - Administer analgesics based on type and severity of pain and evaluate response  - Implement non-pharmacological measures as appropriate and evaluate response  - Consider cultural and social influences on pain and pain management  - Notify physician/advanced practitioner if interventions unsuccessful or patient reports new pain  Outcome: Progressing     Problem: INFECTION - ADULT  Goal: Absence or prevention of progression during hospitalization  Description: INTERVENTIONS:  - Assess and monitor for signs and symptoms of infection  - Monitor lab/diagnostic results  - Monitor all insertion sites, i e  indwelling lines, tubes, and drains  - Monitor endotracheal if appropriate and nasal secretions for changes in amount and color  - Peak appropriate cooling/warming therapies per order  - Administer medications as ordered  - Instruct and encourage patient and family to use good hand hygiene technique  - Identify and instruct in appropriate isolation precautions for identified infection/condition  Outcome: Progressing  Goal: Absence of fever/infection during neutropenic period  Description: INTERVENTIONS:  - Monitor WBC    Outcome: Progressing     Problem: SAFETY ADULT  Goal: Patient will remain free of falls  Description: INTERVENTIONS:  - Educate patient/family on patient safety including physical limitations  - Instruct patient to call for assistance with activity   - Consult OT/PT to assist with strengthening/mobility   - Keep Call bell within reach  - Keep bed low and locked with side rails adjusted as appropriate  - Keep care items and personal belongings within reach  - Initiate and maintain comfort rounds  - Make Fall Risk Sign visible to staff  - Apply yellow socks and bracelet for high fall risk patients  - Consider moving patient to room near nurses station  Outcome: Progressing  Goal: Maintain or return to baseline ADL function  Description: INTERVENTIONS:  -  Assess patient's ability to carry out ADLs; assess patient's baseline for ADL function and identify physical deficits which impact ability to perform ADLs (bathing, care of mouth/teeth, toileting, grooming, dressing, etc )  - Assess/evaluate cause of self-care deficits   - Assess range of motion  - Assess patient's mobility; develop plan if impaired  - Assess patient's need for assistive devices and provide as appropriate  - Encourage maximum independence but intervene and supervise when necessary  - Involve family in performance of ADLs  - Assess for home care needs following discharge   - Consider OT consult to assist with ADL evaluation and planning for discharge  - Provide patient education as appropriate  Outcome: Progressing  Goal: Maintains/Returns to pre admission functional level  Description: INTERVENTIONS:  - Perform BMAT or MOVE assessment daily    - Set and communicate daily mobility goal to care team and patient/family/caregiver  - Collaborate with rehabilitation services on mobility goals if consulted  - Out of bed for toileting  - Record patient progress and toleration of activity level   Outcome: Progressing     Problem: DISCHARGE PLANNING  Goal: Discharge to home or other facility with appropriate resources  Description: INTERVENTIONS:  - Identify barriers to discharge w/patient and caregiver  - Arrange for needed discharge resources and transportation as appropriate  - Identify discharge learning needs (meds, wound care, etc )  - Arrange for interpretive services to assist at discharge as needed  - Refer to Case Management Department for coordinating discharge planning if the patient needs post-hospital services based on physician/advanced practitioner order or complex needs related to functional status, cognitive ability, or social support system  Outcome: Progressing     Problem: Knowledge Deficit  Goal: Patient/family/caregiver demonstrates understanding of disease process, treatment plan, medications, and discharge instructions  Description: Complete learning assessment and assess knowledge base    Interventions:  - Provide teaching at level of understanding  - Provide teaching via preferred learning methods  Outcome: Progressing     Problem: MOBILITY - ADULT  Goal: Maintain or return to baseline ADL function  Description: INTERVENTIONS:  -  Assess patient's ability to carry out ADLs; assess patient's baseline for ADL function and identify physical deficits which impact ability to perform ADLs (bathing, care of mouth/teeth, toileting, grooming, dressing, etc )  - Assess/evaluate cause of self-care deficits   - Assess range of motion  - Assess patient's mobility; develop plan if impaired  - Assess patient's need for assistive devices and provide as appropriate  - Encourage maximum independence but intervene and supervise when necessary  - Involve family in performance of ADLs  - Assess for home care needs following discharge   - Consider OT consult to assist with ADL evaluation and planning for discharge  - Provide patient education as appropriate  Outcome: Progressing  Goal: Maintains/Returns to pre admission functional level  Description: INTERVENTIONS:  - Perform BMAT or MOVE assessment daily    - Set and communicate daily mobility goal to care team and patient/family/caregiver  - Collaborate with rehabilitation services on mobility goals if consulted  - Record patient progress and toleration of activity level   Outcome: Progressing     Problem: Potential for Falls  Goal: Patient will remain free of falls  Description: INTERVENTIONS:  - Educate patient/family on patient safety including physical limitations  - Instruct patient to call for assistance with activity   - Consult OT/PT to assist with strengthening/mobility   - Keep Call bell within reach  - Keep bed low and locked with side rails adjusted as appropriate  - Keep care items and personal belongings within reach  - Initiate and maintain comfort rounds  - Make Fall Risk Sign visible to staff  - Apply yellow socks and bracelet for high fall risk patients  - Consider moving patient to room near nurses station  Outcome: Progressing     Problem: Nutrition/Hydration-ADULT  Goal: Nutrient/Hydration intake appropriate for improving, restoring or maintaining nutritional needs  Description: Monitor and assess patient's nutrition/hydration status for malnutrition  Collaborate with interdisciplinary team and initiate plan and interventions as ordered  Monitor patient's weight and dietary intake as ordered or per policy  Utilize nutrition screening tool and intervene as necessary   Determine patient's food preferences and provide high-protein, high-caloric foods as appropriate       INTERVENTIONS:  - Monitor oral intake, urinary output, labs, and treatment plans  - Assess nutrition and hydration status and recommend course of action  - Evaluate amount of meals eaten  - Assist patient with eating if necessary   - Allow adequate time for meals  - Recommend/ encourage appropriate diets, oral nutritional supplements, and vitamin/mineral supplements  - Order, calculate, and assess calorie counts as needed  - Recommend, monitor, and adjust tube feedings and TPN/PPN based on assessed needs  - Assess need for intravenous fluids  - Provide specific nutrition/hydration education as appropriate  - Include patient/family/caregiver in decisions related to nutrition  Outcome: Progressing

## 2022-11-07 NOTE — CASE MANAGEMENT
Case Management Assessment & Discharge Planning Note    Patient name Batsheva Guzman III  Location S /S -17 MRN [id number]  : 1955 Date 2022       Current Admission Date: 2022  Current Admission Diagnosis:Syncope   Patient Active Problem List    Diagnosis Date Noted   • Multiple subsegmental pulmonary emboli without acute cor pulmonale ([address] ) 2022   • Supraventricular tachycardia ([address] ) 2022   • Electrolyte abnormality 2022   • History of alcohol use disorder 2022   • Platelets decreased ([facility] ) 2022   • Hepatocellular carcinoma ([facility] ) 03/15/2022   • Encounter for follow-up surveillance of liver cancer 2022   • Dysthymic disorder 2021   • Crohn's disease of small intestine with other complication ([facility] )    • Vitamin B12 deficiency 2021   • Cataract    • Chronic obstructive pulmonary disease ([facility] ) 2020   • Mixed hyperlipidemia 2020   • Kidney stone    • Gastroesophageal reflux disease without esophagitis    • Left wrist pain 2020   • Hypocalcemia 2020   • Acute pain of left wrist 09/10/2020   • Chronic, continuous use of opioids 09/10/2020   • Observed sleep apnea    • Palliative care patient 2020   • Abdominal wall abscess 2020   • Personal history of liver cancer 2020   • Abdominal pain 2020   • Tobacco abuse 2020   • Severe sepsis ([facility] ) 2020   • Pneumonia 2020   • Chest pain 2020   • Liver mass 2020   • Syncope 2018   • Emphysema of lung ([facility] )    • Cellulitis 2017   • Severe protein-calorie malnutrition ([address] ) 2017   • Colostomy in place [facility]) 2017   • Diarrhea 2017   • Hypomagnesemia 2017   • Hyponatremia 2017   • Hypokalemia 2017   • Essential hypertension    • Emphysema/COPD ([facility] )    • Crohn disease ([facility] )       LOS (days): 5  Geometric Mean LOS (GMLOS) (days): 2 60  Days to GMLOS:-3 OBJECTIVE:    Risk of Unplanned Readmission Score: 24 98         Current admission status: Inpatient       Preferred Pharmacy:   [facility] [place], [place] [address] [address]  Phone: [phone] Fax: [phone]    Primary Care Provider: Ramy Sow MD    Primary Insurance: MEDICARE  Secondary Insurance: AARP    ASSESSMENT:  Vahid Rajput Proxies    There are no active Health Care Proxies on file  Patient Information  Admitted from[de-identified] Home  Mental Status: Alert  During Assessment patient was accompanied by: Not accompanied during assessment  Assessment information provided by[de-identified] Patient  Primary Caregiver: Self  Support Systems: Daughter (and 2 grandchildren)  What city do you live in?: [facility] entry access options   Select all that apply : Stairs  Number of steps to enter home : 1  Type of Current Residence: [place]  In the last 12 months, was there a time when you were not able to pay the mortgage or rent on time?: No  In the last 12 months, how many places have you lived?: 1  In the last 12 months, was there a time when you did not have a steady place to sleep or slept in a shelter (including now)?: No  Homeless/housing insecurity resource given?: N/A  Living Arrangements: Lives w/ Daughter, Lives w/ Extended Family    Activities of Daily Living Prior to Admission  Functional Status: Independent  Completes ADLs independently?: Yes  Ambulates independently?: Yes  Does patient use assisted devices?: No  Does patient currently own DME?: Yes  What DME does the patient currently own?: Other (Comment) (ostomy supplies)  Does patient have a history of Outpatient Therapy (PT/OT)?: No  Does the patient have a history of Short-Term Rehab?: No  Does patient have a history of HHC?: No  Does patient currently have [address]?: No    Patient Information Continued  Does patient have prescription coverage?: Yes  Within the past 12 months, you worried that your food would run out before you got the money to buy more : Never true  Within the past 12 months, the food you bought just didn't last and you didn't have money to get more : Never true  Food insecurity resource given?: N/A  Does patient receive dialysis treatments?: No  Does patient have a history of substance abuse?: Yes  Historical substance use preference: Alcohol/ETOH  History of Withdrawal Symptoms: Delirium tremors  Is patient currently in treatment for substance abuse?: No  Treatment options provided (pt met with Mulu Garcia from [facility], confirmed he has her information  Pt is not interested in any interventions at this time )  Does patient have a history of Mental Health Diagnosis?: No    Means of Transportation  Means of Transport to Appts[de-identified] Drives Self  In the past 12 months, has lack of transportation kept you from medical appointments or from getting medications?: No  In the past 12 months, has lack of transportation kept you from meetings, work, or from getting things needed for daily living?: No  Was application for public transport provided?: N/A    DISCHARGE DETAILS:    Contacts  Patient Contacts: Patient  Contact Method:  In Person  Reason/Outcome: Continuity of Care, Discharge Planning    Other Referral/Resources/Interventions Provided:  Interventions: None Indicated    Treatment Team Recommendation: Home  Discharge Destination Plan[de-identified] Home     IMM Given (Date):: 11/07/22  IMM Given to[de-identified] Patient

## 2022-11-07 NOTE — PROGRESS NOTES
[facility]  Progress Note - Lebron Arguello III 1955, 79 y o  male MRN: [id number]  Unit/Bed#: S -01 Encounter: [id number]  Primary Care Provider: Geri Hernández MD   Date and time admitted to hospital: 11/1/2022  9:35 PM    * Syncope  Assessment & Plan  Patient reports loss of consciousness while out drinking with friends last night  He stood up to go to the bathroom and reports losing consciousness and hitting the floor  Reports poor oral intake and loss of appetite recently due to generalized anxiety  He has been experiencing lightheadedness and dizziness upon rising from sitting positing while at home  Denies loss of consciousness at home  He has not had any chest pain or shortness of breath with these episodes  No history of seizures per chart review, no history of CAD or MI  Brought to the emergency department by EMS  Found to be hyponatremic with osmolality profile fitting hypovolemic hyponatremia  Hyponatremia improved with IVF  Orthostatics positive in the ED  EKG shows NSR with incomplete RBBB and nonspecific ST segment abnormality  Troponins negative  Syncope most likely in the setting of poor oral intake and vasovagal response  Telemetry shows episodes of sinus and supraventricular tachycardia with HR ranging from 150 to 200  Echocardiogram negative for any cardiogenic cause of of his syncopal episodes  Orthostats negative x3     Plan:  Continue IVF  Check orthostatics prior to discharge  Holter monitoring on discharge - will place referral to cardiology  Encourage cessation of alcohol consumption and better oral nutrition  Replenish electrolytes    Multiple subsegmental pulmonary emboli without acute cor pulmonale (HCC)  Assessment & Plan  CTA chest:  No definite acute pulmonary emboli, but small segmental and subsegmental emboli particularly in the lower lobes  Acute/subacute fracture of the right posterior 10th rib with trace right effusion  Adjacent curvilinear consolidation is likely due to atelectasis  Acute/subacute fracture of the posterior left rib 12th rib  Low-attenuation partially enhancing subscapular lesion in the right hepatic lobe at the site of microwave ablation of hepatic cellular carcinoma, 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  Plan:  · Will start patient on 10 mg Eliquis b i d  For 7 days switching to 5 mg b i d  · Will discontinue patient Lovenox  · Patient is scheduled for MRI on 12/2/2022 to follow-up for hepatocellular carcinoma    Supraventricular tachycardia [facility])  Assessment & Plan  Patient presents after syncopal episodes while out drinking  Found to have orthostatic hypotension secondary to intravascular volume depletion  Echocardiogram on 11/4 shows EF of 60%, normal LV function and nondilated left atrium  Telemetry reveals SVT with HR ranging from 120 to 200 for consecutive nights     Plan:  Continue telemetry   Monitor for improvement with correction of electrolytes  Carvedilol added      History of alcohol use disorder  Assessment & Plan  · Minimum of at least 6 cans / bottles of alcohol daily   · Alcohol level   · Has spoken with [address] [place] [address] Street  Now agreeable to [facility]  Plan:  · IP alcohol rehab - will work with pt regarding placement once he goes home, currently patient is reporting that he does not want to attend rehab post discharge  · [facility]       Electrolyte abnormality  Assessment & Plan  Lab Results   Component Value Date    K 4 0 11/07/2022    CORRECTEDCA 8 2 (L) 11/07/2022     Hypokalemia of 3 4 and calcium of 6 8 POA  Likely secondary to poor nutrition and oral intake     Plan  Continue to monitor CMP on AM labs  Replete electrolytes as necessary      Platelets decreased ([address] )  Assessment & Plan  2/2 to alcohol use     No bleeding at this time, no interventions planned     Crohn's disease of small intestine with other complication [facility])  Assessment & Plan  · History of Crohn's disease W/ Ostomy  Patient denies abnormal or excessive output  · Ostomy care    Chronic obstructive pulmonary disease (HCC)  Assessment & Plan  · COPD Stable on room air  · Continue Anoro Ellipta    Hypomagnesemia  Assessment & Plan  · Mg 0 5 POA > 2 5 following repletion > 0 9 > 0 6 > 1 4 as of 11/7 AM  · Has received multiple dosages of IV Mg Sulfate  ·   Plan:  · IV Mg Sulfate 4g  · Continue to monitor Mg level and replete as needed    Hyponatremia  Assessment & Plan  Lab Results   Component Value Date    SODIUM 136 2022     · Baseline 135s -140s, , improved with fluids  · Likely 2/2 to low PO intake and increase alcohol 231  · Encourage food and beverage hydration aside from alcohol     Plan:  CMP initially monitored q12h  Continue IV hydration given low-normal, will discontinue once consistently baseline and encourage oral intake    Essential hypertension  Assessment & Plan  On Amiloride and Amlodipine 5 mg each daily     Plan:  Continue home meds  Carvedilol added          VTE Pharmacologic Prophylaxis: VTE Score: 5 High Risk (Score >/= 5) - Pharmacological DVT Prophylaxis Ordered: apixaban (Eliquis)  Sequential Compression Devices Ordered  Patient Centered Rounds: I performed bedside rounds with nursing staff today  Discussions with Specialists or Other Care Team Provider: N/A    Education and Discussions with Family / Patient: Patient declined call to   Current Length of Stay: 5 day(s)  Current Patient Status: Inpatient   Certification Statement: The patient will continue to require additional inpatient hospital stay due to Anticoagulation  Discharge Plan: Anticipate discharge in 24-48 hrs to home  Code Status: Level 1 - Full Code    Subjective:   No acute events overnight  Patient is requesting his breakfast   Patient denies any chest pain, palpitations, shortness of breath, abdominal pain or any other symptoms at this time      Objective:     Vitals:   Temp (24hrs), Av 4 °F (36 9 °C), Min:97 7 °F (36 5 °C), Max:99 °F (37 2 °C)    Temp:  [97 7 °F (36 5 °C)-99 °F (37 2 °C)] 99 °F (37 2 °C)  HR:  [76-98] 98  Resp:  [16-18] 18  BP: (123-149)/(71-82) 123/73  SpO2:  [100 %] 100 %  Body mass index is 20 67 kg/m²  Input and Output Summary (last 24 hours): Intake/Output Summary (Last 24 hours) at 11/7/2022 1444  Last data filed at 11/7/2022 8967  Gross per 24 hour   Intake 1220 ml   Output 1000 ml   Net 220 ml       Physical Exam:   Physical Exam  Constitutional:       General: He is not in acute distress  Appearance: Normal appearance  He is normal weight  He is not ill-appearing or toxic-appearing  HENT:      Head: Normocephalic and atraumatic  Eyes:      General: No scleral icterus  Extraocular Movements: Extraocular movements intact  Conjunctiva/sclera: Conjunctivae normal       Pupils: Pupils are equal, round, and reactive to light  Cardiovascular:      Rate and Rhythm: Regular rhythm  Tachycardia present  Pulses: Normal pulses  Heart sounds: Normal heart sounds  No murmur heard  No gallop  Pulmonary:      Effort: Pulmonary effort is normal  No respiratory distress  Breath sounds: Normal breath sounds  No stridor  No wheezing, rhonchi or rales  Chest:      Chest wall: No tenderness  Abdominal:      General: Abdomen is flat  Bowel sounds are normal  There is no distension  Palpations: Abdomen is soft  Tenderness: There is no abdominal tenderness  Musculoskeletal:         General: Normal range of motion  Cervical back: Normal range of motion  Right lower leg: No edema  Left lower leg: No edema  Comments: Poor muscle mass   Skin:     General: Skin is warm  Coloration: Skin is not jaundiced or pale  Findings: No rash  Neurological:      General: No focal deficit present  Mental Status: He is alert and oriented to person, place, and time  Mental status is at baseline  Cranial Nerves:  No cranial nerve deficit  Sensory: No sensory deficit  Motor: No weakness  Psychiatric:         Mood and Affect: Mood normal          Behavior: Behavior normal           Additional Data:     Labs:  Results from last 7 days   Lab Units 11/03/22  0535 11/02/22  0450   WBC Thousand/uL 5 40 6 39   HEMOGLOBIN g/dL 12 6 12 5   HEMATOCRIT % 36 3* 35 9*   PLATELETS Thousands/uL 69* 65*   NEUTROS PCT %  --  72   LYMPHS PCT %  --  22   MONOS PCT %  --  5   EOS PCT %  --  0     Results from last 7 days   Lab Units 11/07/22  0528   SODIUM mmol/L 136   POTASSIUM mmol/L 4 0   CHLORIDE mmol/L 105   CO2 mmol/L 23   BUN mg/dL 10   CREATININE mg/dL 0 64   ANION GAP mmol/L 8   CALCIUM mg/dL 7 6*   ALBUMIN g/dL 3 2*   TOTAL BILIRUBIN mg/dL 1 24*   ALK PHOS U/L 67   ALT U/L 26   AST U/L 45*   GLUCOSE RANDOM mg/dL 81                       Lines/Drains:  Invasive Devices  Report    Peripheral Intravenous Line  Duration           Peripheral IV 11/06/22 Dorsal (posterior); Left Forearm 1 day    Peripheral IV 11/07/22 Left Antecubital <1 day          Drain  Duration           Colostomy LLQ -- days    Colostomy LLQ -- days                  Telemetry:  Telemetry Orders (From admission, onward)             48 Hour Telemetry Monitoring  Continuous x 48 hours        References:    Telemetry Guidelines   Question:  Reason for 48 Hour Telemetry  Answer:  Arrhythmias Requiring Medical Therapy (eg  SVT, Vtach/fib, Bradycardia, Uncontrolled A-fib)                 Telemetry Reviewed: Sinus Tachycardia  Indication for Continued Telemetry Use: Arrthymias requiring medical therapy             Imaging: Reviewed radiology reports from this admission including: chest CT scan    Recent Cultures (last 7 days):         Last 24 Hours Medication List:   Current Facility-Administered Medications   Medication Dose Route Frequency Provider Last Rate   • acetaminophen  650 mg Oral Q6H PRN Jennifer Mahmood MD     • AMILoride  5 mg Oral Daily Jennifer Mahmood MD     • amLODIPine  5 mg Oral Daily Lukas Titus MD     • apixaban  10 mg Oral BID Santiago Price DO     • buPROPion  150 mg Oral Daily Fátima Cervantes MD     • carvedilol  3 125 mg Oral BID With Meals Zoraida Diaz MD     • cyanocobalamin  1,000 mcg Intramuscular Q30 Days Lukas Titus MD     • doxylamine  12 5 mg Oral HS PRN Cindy Lozano MD     • enoxaparin  40 mg Subcutaneous Daily Fátima Cervantes MD     • folic acid  1 mg Oral Daily Fátima Cervantes MD     • mirtazapine  30 mg Oral HS Fátima Cervantes MD     • multivitamin-minerals  1 tablet Oral Daily Fátima Cervantes MD     • pantoprazole  40 mg Oral Early Morning Fátima Cervantes MD     • polyethylene glycol  17 g Oral Daily Savita Molina MD     • potassium chloride  20 mEq Oral Daily Fátima Cervantes MD     • pravastatin  80 mg Oral Daily With Bruno Cummings MD     • thiamine  100 mg Oral Daily Fátima Cervantes MD     • umeclidinium-vilanterol  1 puff Inhalation Daily Fátima Cervantes MD          Today, Patient Was Seen By: Santiago Price    **Please Note: This note may have been constructed using a voice recognition system  **

## 2022-11-08 PROBLEM — D64.9 ANEMIA: Status: ACTIVE | Noted: 2022-11-08

## 2022-11-08 LAB
25(OH)D2 SERPL-MCNC: <1 NG/ML
25(OH)D3 SERPL-MCNC: 42 NG/ML
25(OH)D3+25(OH)D2 SERPL-MCNC: 42 NG/ML
ALBUMIN SERPL BCP-MCNC: 3 G/DL (ref 3.5–5)
ALP SERPL-CCNC: 81 U/L (ref 34–104)
ALT SERPL W P-5'-P-CCNC: 27 U/L (ref 7–52)
ANION GAP SERPL CALCULATED.3IONS-SCNC: 7 MMOL/L (ref 4–13)
AST SERPL W P-5'-P-CCNC: 44 U/L (ref 13–39)
BASOPHILS # BLD AUTO: 0.02 THOUSANDS/ÂΜL (ref 0–0.1)
BASOPHILS NFR BLD AUTO: 0 % (ref 0–1)
BILIRUB SERPL-MCNC: 0.98 MG/DL (ref 0.2–1)
BUN SERPL-MCNC: 10 MG/DL (ref 5–25)
CALCIUM ALBUM COR SERPL-MCNC: 8.4 MG/DL (ref 8.3–10.1)
CALCIUM SERPL-MCNC: 7.6 MG/DL (ref 8.4–10.2)
CHLORIDE SERPL-SCNC: 104 MMOL/L (ref 96–108)
CO2 SERPL-SCNC: 24 MMOL/L (ref 21–32)
CREAT SERPL-MCNC: 0.66 MG/DL (ref 0.6–1.3)
EOSINOPHIL # BLD AUTO: 0.03 THOUSAND/ÂΜL (ref 0–0.61)
EOSINOPHIL NFR BLD AUTO: 1 % (ref 0–6)
ERYTHROCYTE [DISTWIDTH] IN BLOOD BY AUTOMATED COUNT: 12.9 % (ref 11.6–15.1)
FERRITIN SERPL-MCNC: 819 NG/ML (ref 8–388)
GFR SERPL CREATININE-BSD FRML MDRD: 100 ML/MIN/1.73SQ M
GLUCOSE SERPL-MCNC: 86 MG/DL (ref 65–140)
HCT VFR BLD AUTO: 29.9 % (ref 36.5–49.3)
HCT VFR BLD AUTO: 30 % (ref 36.5–49.3)
HEMOCCULT STL QL: POSITIVE
HGB BLD-MCNC: 9.6 G/DL (ref 12–17)
HGB BLD-MCNC: 9.6 G/DL (ref 12–17)
IMM GRANULOCYTES # BLD AUTO: 0.03 THOUSAND/UL (ref 0–0.2)
IMM GRANULOCYTES NFR BLD AUTO: 1 % (ref 0–2)
IRON SATN MFR SERPL: 36 % (ref 20–50)
IRON SERPL-MCNC: 65 UG/DL (ref 65–175)
LYMPHOCYTES # BLD AUTO: 0.97 THOUSANDS/ÂΜL (ref 0.6–4.47)
LYMPHOCYTES NFR BLD AUTO: 19 % (ref 14–44)
MAGNESIUM SERPL-MCNC: 1.4 MG/DL (ref 1.9–2.7)
MCH RBC QN AUTO: 34.7 PG (ref 26.8–34.3)
MCHC RBC AUTO-ENTMCNC: 32.1 G/DL (ref 31.4–37.4)
MCV RBC AUTO: 108 FL (ref 82–98)
MONOCYTES # BLD AUTO: 0.62 THOUSAND/ÂΜL (ref 0.17–1.22)
MONOCYTES NFR BLD AUTO: 12 % (ref 4–12)
NEUTROPHILS # BLD AUTO: 3.45 THOUSANDS/ÂΜL (ref 1.85–7.62)
NEUTS SEG NFR BLD AUTO: 67 % (ref 43–75)
NRBC BLD AUTO-RTO: 0 /100 WBCS
PLATELET # BLD AUTO: 110 THOUSANDS/UL (ref 149–390)
PMV BLD AUTO: 10.5 FL (ref 8.9–12.7)
POTASSIUM SERPL-SCNC: 3.8 MMOL/L (ref 3.5–5.3)
PROT SERPL-MCNC: 5.8 G/DL (ref 6.4–8.4)
RBC # BLD AUTO: 2.77 MILLION/UL (ref 3.88–5.62)
SODIUM SERPL-SCNC: 135 MMOL/L (ref 135–147)
TIBC SERPL-MCNC: 183 UG/DL (ref 250–450)
WBC # BLD AUTO: 5.12 THOUSAND/UL (ref 4.31–10.16)

## 2022-11-08 RX ORDER — MAGNESIUM SULFATE HEPTAHYDRATE 40 MG/ML
4 INJECTION, SOLUTION INTRAVENOUS ONCE
Status: COMPLETED | OUTPATIENT
Start: 2022-11-08 | End: 2022-11-08

## 2022-11-08 RX ADMIN — PRAVASTATIN SODIUM 80 MG: 80 TABLET ORAL at 17:18

## 2022-11-08 RX ADMIN — MAGNESIUM SULFATE HEPTAHYDRATE 4 G: 40 INJECTION, SOLUTION INTRAVENOUS at 10:22

## 2022-11-08 RX ADMIN — THIAMINE HCL TAB 100 MG 100 MG: 100 TAB at 08:30

## 2022-11-08 RX ADMIN — POTASSIUM CHLORIDE 20 MEQ: 20 SOLUTION ORAL at 08:30

## 2022-11-08 RX ADMIN — MULTIPLE VITAMINS W/ MINERALS TAB 1 TABLET: TAB ORAL at 08:30

## 2022-11-08 RX ADMIN — AMILORIDE HYDROCLORIDE 5 MG: 5 TABLET ORAL at 08:31

## 2022-11-08 RX ADMIN — PANTOPRAZOLE SODIUM 40 MG: 40 TABLET, DELAYED RELEASE ORAL at 04:31

## 2022-11-08 RX ADMIN — AMLODIPINE BESYLATE 5 MG: 5 TABLET ORAL at 08:30

## 2022-11-08 RX ADMIN — CARVEDILOL 3.12 MG: 3.12 TABLET, FILM COATED ORAL at 08:30

## 2022-11-08 RX ADMIN — APIXABAN 10 MG: 5 TABLET, FILM COATED ORAL at 08:30

## 2022-11-08 RX ADMIN — APIXABAN 10 MG: 5 TABLET, FILM COATED ORAL at 17:19

## 2022-11-08 RX ADMIN — UMECLIDINIUM BROMIDE AND VILANTEROL TRIFENATATE 1 PUFF: 62.5; 25 POWDER RESPIRATORY (INHALATION) at 08:32

## 2022-11-08 RX ADMIN — FOLIC ACID 1 MG: 1 TABLET ORAL at 08:30

## 2022-11-08 RX ADMIN — CARVEDILOL 3.12 MG: 3.12 TABLET, FILM COATED ORAL at 17:18

## 2022-11-08 RX ADMIN — BUPROPION HYDROCHLORIDE 150 MG: 150 TABLET, FILM COATED, EXTENDED RELEASE ORAL at 08:30

## 2022-11-08 RX ADMIN — MIRTAZAPINE 30 MG: 15 TABLET, FILM COATED ORAL at 21:45

## 2022-11-08 NOTE — ASSESSMENT & PLAN NOTE
· Minimum of at least 6 cans / bottles of alcohol daily   · Alcohol level   · Has spoken with [address]  Now agreeable to [facility]'s rehab       Plan:  · IP alcohol rehab - will work with pt regarding placement once he goes home, currently patient is reporting that he does not want to attend rehab post discharge  · [facility]

## 2022-11-08 NOTE — ASSESSMENT & PLAN NOTE
· Mg 0 5 POA > 2 5 following repletion > 0 9 > 0 6 > 1 4 as of 11/8 AM  · Has received multiple dosages of IV Mg Sulfate  Plan:  · IV Mg Sulfate 4g  · Continue to monitor Mg level and replete as needed

## 2022-11-08 NOTE — PLAN OF CARE
Problem: PAIN - ADULT  Goal: Verbalizes/displays adequate comfort level or baseline comfort level  Description: Interventions:  - Encourage patient to monitor pain and request assistance  - Assess pain using appropriate pain scale  - Administer analgesics based on type and severity of pain and evaluate response  - Implement non-pharmacological measures as appropriate and evaluate response  - Consider cultural and social influences on pain and pain management  - Notify physician/advanced practitioner if interventions unsuccessful or patient reports new pain  Outcome: Progressing     Problem: INFECTION - ADULT  Goal: Absence or prevention of progression during hospitalization  Description: INTERVENTIONS:  - Assess and monitor for signs and symptoms of infection  - Monitor lab/diagnostic results  - Monitor all insertion sites, i e  indwelling lines, tubes, and drains  - Monitor endotracheal if appropriate and nasal secretions for changes in amount and color  - [place] appropriate cooling/warming therapies per order  - Administer medications as ordered  - Instruct and encourage patient and family to use good hand hygiene technique  - Identify and instruct in appropriate isolation precautions for identified infection/condition  Outcome: Progressing     Problem: SAFETY ADULT  Goal: Patient will remain free of falls  Description: INTERVENTIONS:  - Educate patient/family on patient safety including physical limitations  - Instruct patient to call for assistance with activity   - Consult OT/PT to assist with strengthening/mobility   - Keep Call bell within reach  - Keep bed low and locked with side rails adjusted as appropriate  - Keep care items and personal belongings within reach  - Initiate and maintain comfort rounds  - Make Fall Risk Sign visible to staff  - Apply yellow socks and bracelet for high fall risk patients  - Consider moving patient to room near nurses station  Outcome: Progressing

## 2022-11-08 NOTE — ASSESSMENT & PLAN NOTE
On Amiloride and Amlodipine 5 mg each daily      Plan:  Continue home meds  Carvedilol added recently admitted to nursing home for UTI and has been refusing her oral medications and per EMS has not had a BM for 6 days. Was noted to have BS at the nursing home in the 600s did get insulin and BS of 324 for EMS.

## 2022-11-08 NOTE — PROGRESS NOTES
[facility]  Progress Note - Ariella Gómez III 1955, 79 y o  male MRN: [id number]  Unit/Bed#: S -01 Encounter: [id number]  Primary Care Provider: Ramy Sow MD   Date and time admitted to hospital: 11/1/2022  9:35 PM    * Syncope  Assessment & Plan  Patient reports loss of consciousness while out drinking with friends last night  He stood up to go to the bathroom and reports losing consciousness and hitting the floor  Reports poor oral intake and loss of appetite recently due to generalized anxiety  He has been experiencing lightheadedness and dizziness upon rising from sitting positing while at home  Denies loss of consciousness at home  He has not had any chest pain or shortness of breath with these episodes  No history of seizures per chart review, no history of CAD or MI  Brought to the emergency department by EMS  Found to be hyponatremic with osmolality profile fitting hypovolemic hyponatremia  Hyponatremia improved with IVF  Orthostatics positive in the ED  EKG shows NSR with incomplete RBBB and nonspecific ST segment abnormality  Troponins negative  Syncope most likely in the setting of poor oral intake and vasovagal response  Telemetry shows episodes of sinus and supraventricular tachycardia with HR ranging from 150 to 200  Echocardiogram negative for any cardiogenic cause of of his syncopal episodes  Orthostats negative x3     Plan:  Continue IVF      Check orthostatics prior to discharge  Holter monitoring on discharge - will place referral to cardiology  Encourage cessation of alcohol consumption and better oral nutrition  Replenish electrolytes    Anemia  Assessment & Plan  Acute Macrocytic Anemia with a Hbg Hemoglobin of 9 6 noted on 11/8/22,   · Repeat H&H confirmed acute drop in Hbg  · Denies an dark or black stools present in ostomy bag  · Patient is asymptomatic denying dizziness, headaches, lightheadedness, SOB, weakness or fatigue  Plan:  · Iron Panel Studies, Pending  · FOBT, Pending - if positive will consult GI given Hx of Chron's s/p resection and chronic ileostomy  · Continue to trend hemoglobin levels    Multiple subsegmental pulmonary emboli without acute cor pulmonale (HCC)  Assessment & Plan  CTA chest:  No definite acute pulmonary emboli, but small segmental and subsegmental emboli particularly in the lower lobes  Acute/subacute fracture of the right posterior 10th rib with trace right effusion  Adjacent curvilinear consolidation is likely due to atelectasis  Acute/subacute fracture of the posterior left rib 12th rib  Low-attenuation partially enhancing subscapular lesion in the right hepatic lobe at the site of microwave ablation of hepatic cellular carcinoma, 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  Plan:  · Will start patient on 10 mg Eliquis b i d  For 7 days switching to 5 mg b i d   · Price check pending  · Will discontinue patient Lovenox  · Patient is scheduled for MRI on 12/2/2022 to follow-up for hepatocellular carcinoma     Supraventricular tachycardia [facility])  Assessment & Plan  Patient presents after syncopal episodes while out drinking  Found to have orthostatic hypotension secondary to intravascular volume depletion  Echocardiogram on 11/4 shows EF of 60%, normal LV function and nondilated left atrium  Telemetry reveals SVT with HR ranging from 120 to 200 for consecutive nights      Plan:  Continue telemetry   Monitor for improvement with correction of electrolytes  Carvedilol added      History of alcohol use disorder  Assessment & Plan  · Minimum of at least 6 cans / bottles of alcohol daily   · Alcohol level   · Has spoken with [address]  Now agreeable to [facility] rehab       Plan:  · IP alcohol rehab - will work with pt regarding placement once he goes home, currently patient is reporting that he does not want to attend rehab post discharge  · [facility]       Electrolyte abnormality  Assessment & Plan  Lab Results   Component Value Date    K 3 8 11/08/2022    CORRECTEDCA 8 4 11/08/2022     Hypokalemia of 3 4 and calcium of 6 8 POA  Likely secondary to poor nutrition and oral intake  Recent lab studies showed normal K of 3 8, and Corrected Calcim of 8 4  Plan  · Continue to monitor CMP on AM labs  · Replete electrolytes as necessary      Platelets decreased ([address] )  Assessment & Plan  2/2 to alcohol use  No bleeding at this time, no interventions planned     Crohn's disease of small intestine with other complication [facility])  Assessment & Plan  · History of Crohn's disease W/ Ostomy  Patient denies abnormal or excessive output  · Ostomy care     Chronic obstructive pulmonary disease (HCC)  Assessment & Plan  · COPD Stable on room air  · Continue Anoro Ellipta    Hypomagnesemia  Assessment & Plan  · Mg 0 5 POA > 2 5 following repletion > 0 9 > 0 6 > 1 4 as of 11/8 AM  · Has received multiple dosages of IV Mg Sulfate  Plan:  · IV Mg Sulfate 4g  · Continue to monitor Mg level and replete as needed    Hyponatremia  Assessment & Plan  Lab Results   Component Value Date    SODIUM 135 11/08/2022     · Baseline 135s -140s, , improved with fluids  · Likely 2/2 to low PO intake and increase alcohol 231  · Encourage food and beverage hydration aside from alcohol     Plan:  CMP initially monitored q12h   Continue IV hydration given low-normal, will discontinue once consistently baseline and encourage oral intake    Essential hypertension  Assessment & Plan  On Amiloride and Amlodipine 5 mg each daily      Plan:  Continue home meds  Carvedilol added        VTE Pharmacologic Prophylaxis: VTE Score: 5 High Risk (Score >/= 5) - Pharmacological DVT Prophylaxis Ordered: apixaban (Eliquis)  Sequential Compression Devices Ordered  Patient Centered Rounds: I performed bedside rounds with nursing staff today    Discussions with Specialists or Other Care Team Provider: N/A    Education and Discussions with Family / Patient: Attempted to update  (daughter) via phone  Left voicemail  Will retry again before end of day  Contact made with daughter on second attempt  Current Length of Stay: 6 day(s)  Current Patient Status: Inpatient   Discharge Plan: Anticipate discharge in 24-48 hrs to home  Code Status: Level 1 - Full Code    Subjective:   Patient seen and evaluated at bedside  Denies any acute overnight events  Patient states he is currently asymptomatic and eager to go home  Patient denies any presence of CP, SOB, Dizziness, Lightheadedness, Fatigue, and SOB  Patient reports he ambulated today without difficulty  Objective:     Vitals:   Temp (24hrs), Av 3 °F (36 8 °C), Min:97 4 °F (36 3 °C), Max:99 1 °F (37 3 °C)    Temp:  [97 4 °F (36 3 °C)-99 1 °F (37 3 °C)] 99 1 °F (37 3 °C)  HR:  [78] 78  Resp:  [17-20] 20  BP: (143-161)/(67-85) 143/85  SpO2:  [98 %] 98 %  Body mass index is 20 67 kg/m²  Input and Output Summary (last 24 hours): Intake/Output Summary (Last 24 hours) at 2022 1521  Last data filed at 2022 1357  Gross per 24 hour   Intake 480 ml   Output 1900 ml   Net -1420 ml       Physical Exam:   Physical Exam  Vitals reviewed  Constitutional:       General: He is not in acute distress  Appearance: He is ill-appearing  He is not toxic-appearing  HENT:      Head: Normocephalic and atraumatic  Eyes:      General: No scleral icterus  Extraocular Movements: Extraocular movements intact  Conjunctiva/sclera: Conjunctivae normal       Pupils: Pupils are equal, round, and reactive to light  Neck:      Vascular: No carotid bruit  Cardiovascular:      Rate and Rhythm: Normal rate and regular rhythm  Pulses: Normal pulses  Heart sounds: Normal heart sounds  No murmur heard  No gallop  Pulmonary:      Effort: Pulmonary effort is normal  No respiratory distress  Breath sounds: Normal breath sounds  No stridor  No wheezing, rhonchi or rales     Chest: Chest wall: No tenderness  Abdominal:      General: Abdomen is flat  The ostomy site is clean  Bowel sounds are normal  There is no distension  Palpations: Abdomen is soft  Tenderness: There is no abdominal tenderness  Musculoskeletal:         General: Normal range of motion  Cervical back: Normal range of motion  Right lower leg: No edema  Left lower leg: No edema  Skin:     General: Skin is warm  Coloration: Skin is not jaundiced  Findings: No rash  Neurological:      General: No focal deficit present  Mental Status: He is alert and oriented to person, place, and time  Mental status is at baseline  Motor: No weakness  Psychiatric:         Mood and Affect: Mood normal          Behavior: Behavior normal         Additional Data:     Labs:  Results from last 7 days   Lab Units 22  1328 22  0431   WBC Thousand/uL  --  5 12   HEMOGLOBIN g/dL 9 6* 9 6*   HEMATOCRIT % 30 0* 29 9*   PLATELETS Thousands/uL  --  110*   NEUTROS PCT %  --  67   LYMPHS PCT %  --  19   MONOS PCT %  --  12   EOS PCT %  --  1     Results from last 7 days   Lab Units 22  0431   SODIUM mmol/L 135   POTASSIUM mmol/L 3 8   CHLORIDE mmol/L 104   CO2 mmol/L 24   BUN mg/dL 10   CREATININE mg/dL 0 66   ANION GAP mmol/L 7   CALCIUM mg/dL 7 6*   ALBUMIN g/dL 3 0*   TOTAL BILIRUBIN mg/dL 0 98   ALK PHOS U/L 81   ALT U/L 27   AST U/L 44*   GLUCOSE RANDOM mg/dL 86                       Lines/Drains:  Invasive Devices  Report    Peripheral Intravenous Line  Duration           Peripheral IV 22 Dorsal (posterior); Left Forearm 2 days    Peripheral IV 22 Left Antecubital 1 day          Drain  Duration           Colostomy LLQ -- days    Colostomy LLQ -- days                  Telemetry:  Telemetry Orders (From admission, onward)             48 Hour Telemetry Monitoring  Continuous x 48 hours           References:    Telemetry Guidelines   Question:  Reason for 48 Hour Telemetry  Answer:  Arrhythmias Requiring Medical Therapy (eg  SVT, Vtach/fib, Bradycardia, Uncontrolled A-fib)                 Telemetry Reviewed: Normal Sinus Rhythm and Sinus Tachycardia  Indication for Continued Telemetry Use: No indication for continued use  Will discontinue  Imaging: Reviewed radiology reports from this admission including: chest xray, chest CT scan, abdominal/pelvic CT and CT head    Recent Cultures (last 7 days):         Last 24 Hours Medication List:   Current Facility-Administered Medications   Medication Dose Route Frequency Provider Last Rate   • acetaminophen  650 mg Oral Q6H PRN Ravindra Palacios MD     • AMILoride  5 mg Oral Daily Ravindra Palcaios MD     • amLODIPine  5 mg Oral Daily Ravindra Palacios MD     • apixaban  10 mg Oral BID Jenae Cheema DO     • buPROPion  150 mg Oral Daily Carl Prajapati MD     • carvedilol  3 125 mg Oral BID With Meals Rashawn Landeros MD     • cyanocobalamin  1,000 mcg Intramuscular Q30 Days Ravindra Palacios MD     • doxylamine  12 5 mg Oral HS PRN Kirsten Singletary MD     • folic acid  1 mg Oral Daily Carl Prajapati MD     • mirtazapine  30 mg Oral HS Carl Prajapati MD     • multivitamin-minerals  1 tablet Oral Daily Carl Prajapati MD     • pantoprazole  40 mg Oral Early Morning Carl Prajapati MD     • polyethylene glycol  17 g Oral Daily Seth Duarte MD     • potassium chloride  20 mEq Oral Daily Carl Prajapati MD     • pravastatin  80 mg Oral Daily With Sandy Camarillo MD     • thiamine  100 mg Oral Daily Carl Prajapati MD     • umeclidinium-vilanterol  1 puff Inhalation Daily Carl Prajapati MD          Today, Patient Was Seen By: Jenae Cheema    **Please Note: This note may have been constructed using a voice recognition system  **

## 2022-11-08 NOTE — ASSESSMENT & PLAN NOTE
Acute Macrocytic Anemia with a Hbg Hemoglobin of 9 6 noted on 11/8/22,   · Repeat H&H confirmed acute drop in Hbg  · Denies an dark or black stools present in ostomy bag  · Patient is asymptomatic denying dizziness, headaches, lightheadedness, SOB, weakness or fatigue  Plan:  · Iron Panel Studies, Pending  · FOBT, Pending - if positive will consult GI given Hx of Chron's s/p resection and chronic ileostomy  · Continue to trend hemoglobin levels

## 2022-11-08 NOTE — ASSESSMENT & PLAN NOTE
Lab Results   Component Value Date    SODIUM 135 11/08/2022     · Baseline 135s -140s, , improved with fluids    · Likely 2/2 to low PO intake and increase alcohol 231  · Encourage food and beverage hydration aside from alcohol     Plan:  CMP initially monitored q12h   Continue IV hydration given low-normal, will discontinue once consistently baseline and encourage oral intake

## 2022-11-08 NOTE — ASSESSMENT & PLAN NOTE
Lab Results   Component Value Date    K 3 8 11/08/2022    CORRECTEDCA 8 4 11/08/2022     Hypokalemia of 3 4 and calcium of 6 8 POA  Likely secondary to poor nutrition and oral intake  Recent lab studies showed normal K of 3 8, and Corrected Calcim of 8 4  Plan  · Continue to monitor CMP on AM labs  · Replete electrolytes as necessary

## 2022-11-08 NOTE — ASSESSMENT & PLAN NOTE
CTA chest:  No definite acute pulmonary emboli, but small segmental and subsegmental emboli particularly in the lower lobes  Acute/subacute fracture of the right posterior 10th rib with trace right effusion  Adjacent curvilinear consolidation is likely due to atelectasis  Acute/subacute fracture of the posterior left rib 12th rib  Low-attenuation partially enhancing subscapular lesion in the right hepatic lobe at the site of microwave ablation of hepatic cellular carcinoma, 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  Plan:  · Will start patient on 10 mg Eliquis b i d   For 7 days switching to 5 mg b i d   · Price check: 47$  · Will discontinue patient Lovenox  · Patient is scheduled for MRI on 12/2/2022 to follow-up for hepatocellular carcinoma

## 2022-11-08 NOTE — CASE MANAGEMENT
Case Management Discharge Planning Note    Patient name Trenton RASHID /S -58 MRN [id number]  : 1955 Date 2022       Current Admission Date: 2022  Current Admission Diagnosis:Syncope   Patient Active Problem List    Diagnosis Date Noted   • Anemia 2022   • Multiple subsegmental pulmonary emboli without acute cor pulmonale ([facility] [address] ) 2022   • Supraventricular tachycardia ([address] ) 2022   • Electrolyte abnormality 2022   • History of alcohol use disorder 2022   • Platelets decreased ([facility] [address] ) 2022   • Hepatocellular carcinoma ([facility] [address] ) 03/15/2022   • Encounter for follow-up surveillance of liver cancer 2022   • Dysthymic disorder 2021   • Crohn's disease of small intestine with other complication ([facility] [address] )    • Vitamin B12 deficiency 2021   • Cataract    • Chronic obstructive pulmonary disease ([facility] [address] ) 2020   • Mixed hyperlipidemia 2020   • Kidney stone    • Gastroesophageal reflux disease without esophagitis    • Left wrist pain 2020   • Hypocalcemia 2020   • Acute pain of left wrist 09/10/2020   • Chronic, continuous use of opioids 09/10/2020   • Observed sleep apnea    • Palliative care patient 2020   • Abdominal wall abscess 2020   • Personal history of liver cancer 2020   • Abdominal pain 2020   • Tobacco abuse 2020   • Severe sepsis ([facility] [address] ) 2020   • Pneumonia 2020   • Chest pain 2020   • Liver mass 2020   • Syncope 2018   • Emphysema of lung ([facility] [address] )    • Cellulitis 2017   • Severe protein-calorie malnutrition ([facility] [address] ) 2017   • Colostomy in place [facility]) 2017   • Diarrhea 2017   • Hypomagnesemia 2017   • Hyponatremia 2017   • Hypokalemia 2017   • Essential hypertension    • Emphysema/COPD ([address] )    • Crohn disease ([place] [address] )       LOS (days): 6  Geometric Mean LOS (GMLOS) (days): 2 60  Days to GMLOS:-4 OBJECTIVE:  Risk of Unplanned Readmission Score: 27 42         Current admission status: Inpatient   Preferred Pharmacy:   [facility] [address] [address]  Phone: [phone] Fax: [phone]    Primary Care Provider: Aydee Sultana MD    Primary Insurance: MEDICARE  Secondary Insurance: AARP    DISCHARGE DETAILS:    Other Referral/Resources/Interventions Provided:  Interventions: Prescription Price Check  Referral Comments: [facility] for Eliquis price check    CM confirmed copay would be $47

## 2022-11-08 NOTE — PLAN OF CARE
Problem: METABOLIC, FLUID AND ELECTROLYTES - ADULT  Goal: Electrolytes maintained within normal limits  Description: INTERVENTIONS:  - Monitor labs and assess patient for signs and symptoms of electrolyte imbalances  - Administer electrolyte replacement as ordered  - Monitor response to electrolyte replacements, including repeat lab results as appropriate  - Instruct patient on fluid and nutrition as appropriate  Outcome: Progressing  Goal: Fluid balance maintained  Description: INTERVENTIONS:  - Monitor labs   - Monitor I/O and WT  - Instruct patient on fluid and nutrition as appropriate  - Assess for signs & symptoms of volume excess or deficit  Outcome: Progressing  Goal: Glucose maintained within target range  Description: INTERVENTIONS:  - Monitor Blood Glucose as ordered  - Assess for signs and symptoms of hyperglycemia and hypoglycemia  - Administer ordered medications to maintain glucose within target range  - Assess nutritional intake and initiate nutrition service referral as needed  Outcome: Progressing     Problem: INFECTION - ADULT  Goal: Absence or prevention of progression during hospitalization  Description: INTERVENTIONS:  - Assess and monitor for signs and symptoms of infection  - Monitor lab/diagnostic results  - Monitor all insertion sites, i e  indwelling lines, tubes, and drains  - Monitor endotracheal if appropriate and nasal secretions for changes in amount and color  - [place] appropriate cooling/warming therapies per order  - Administer medications as ordered  - Instruct and encourage patient and family to use good hand hygiene technique  - Identify and instruct in appropriate isolation precautions for identified infection/condition  Outcome: Progressing  Goal: Absence of fever/infection during neutropenic period  Description: INTERVENTIONS:  - Monitor WBC    Outcome: Progressing     Problem: PAIN - ADULT  Goal: Verbalizes/displays adequate comfort level or baseline comfort level  Description: Interventions:  - Encourage patient to monitor pain and request assistance  - Assess pain using appropriate pain scale  - Administer analgesics based on type and severity of pain and evaluate response  - Implement non-pharmacological measures as appropriate and evaluate response  - Consider cultural and social influences on pain and pain management  - Notify physician/advanced practitioner if interventions unsuccessful or patient reports new pain  Outcome: Progressing     Problem: SAFETY ADULT  Goal: Patient will remain free of falls  Description: INTERVENTIONS:  - Educate patient/family on patient safety including physical limitations  - Instruct patient to call for assistance with activity   - Consult OT/PT to assist with strengthening/mobility   - Keep Call bell within reach  - Keep bed low and locked with side rails adjusted as appropriate  - Keep care items and personal belongings within reach  - Initiate and maintain comfort rounds  - Make Fall Risk Sign visible to staff  - Offer Toileting every 2 Hours, in advance of need  - Initiate/Maintain bed alarm  - Obtain necessary fall risk management equipment: bed alarm  - Apply yellow socks and bracelet for high fall risk patients  - Consider moving patient to room near nurses station  Outcome: Progressing  Goal: Maintain or return to baseline ADL function  Description: INTERVENTIONS:  -  Assess patient's ability to carry out ADLs; assess patient's baseline for ADL function and identify physical deficits which impact ability to perform ADLs (bathing, care of mouth/teeth, toileting, grooming, dressing, etc )  - Assess/evaluate cause of self-care deficits   - Assess range of motion  - Assess patient's mobility; develop plan if impaired  - Assess patient's need for assistive devices and provide as appropriate  - Encourage maximum independence but intervene and supervise when necessary  - Involve family in performance of ADLs  - Assess for home care needs following discharge   - Consider OT consult to assist with ADL evaluation and planning for discharge  - Provide patient education as appropriate  Outcome: Progressing  Goal: Maintains/Returns to pre admission functional level  Description: INTERVENTIONS:  - Perform BMAT or MOVE assessment daily    - Set and communicate daily mobility goal to care team and patient/family/caregiver  - Collaborate with rehabilitation services on mobility goals if consulted  - Perform Range of Motion 2 times a day  - Reposition patient every 2 hours  - Dangle patient 2 times a day  - Stand patient 2 times a day  - Ambulate patient 3 times a day  - Out of bed to chair 3 times a day   - Out of bed for meals 3 times a day  - Out of bed for toileting  - Record patient progress and toleration of activity level   Outcome: Progressing     Problem: DISCHARGE PLANNING  Goal: Discharge to home or other facility with appropriate resources  Description: INTERVENTIONS:  - Identify barriers to discharge w/patient and caregiver  - Arrange for needed discharge resources and transportation as appropriate  - Identify discharge learning needs (meds, wound care, etc )  - Arrange for interpretive services to assist at discharge as needed  - Refer to Case Management Department for coordinating discharge planning if the patient needs post-hospital services based on physician/advanced practitioner order or complex needs related to functional status, cognitive ability, or social support system  Outcome: Progressing     Problem: Knowledge Deficit  Goal: Patient/family/caregiver demonstrates understanding of disease process, treatment plan, medications, and discharge instructions  Description: Complete learning assessment and assess knowledge base    Interventions:  - Provide teaching at level of understanding  - Provide teaching via preferred learning methods  Outcome: Progressing     Problem: MOBILITY - ADULT  Goal: Maintain or return to baseline ADL function  Description: INTERVENTIONS:  -  Assess patient's ability to carry out ADLs; assess patient's baseline for ADL function and identify physical deficits which impact ability to perform ADLs (bathing, care of mouth/teeth, toileting, grooming, dressing, etc )  - Assess/evaluate cause of self-care deficits   - Assess range of motion  - Assess patient's mobility; develop plan if impaired  - Assess patient's need for assistive devices and provide as appropriate  - Encourage maximum independence but intervene and supervise when necessary  - Involve family in performance of ADLs  - Assess for home care needs following discharge   - Consider OT consult to assist with ADL evaluation and planning for discharge  - Provide patient education as appropriate  Outcome: Progressing  Goal: Maintains/Returns to pre admission functional level  Description: INTERVENTIONS:  - Perform BMAT or MOVE assessment daily    - Set and communicate daily mobility goal to care team and patient/family/caregiver  - Collaborate with rehabilitation services on mobility goals if consulted  - Perform Range of Motion 2 times a day  - Reposition patient every 2 hours    - Dangle patient 2 times a day  - Stand patient 2 times a day  - Ambulate patient 3 times a day  - Out of bed to chair 3 times a day   - Out of bed for meals 3 times a day  - Out of bed for toileting  - Record patient progress and toleration of activity level   Outcome: Progressing     Problem: Potential for Falls  Goal: Patient will remain free of falls  Description: INTERVENTIONS:  - Educate patient/family on patient safety including physical limitations  - Instruct patient to call for assistance with activity   - Consult OT/PT to assist with strengthening/mobility   - Keep Call bell within reach  - Keep bed low and locked with side rails adjusted as appropriate  - Keep care items and personal belongings within reach  - Initiate and maintain comfort rounds  - Make Fall Risk Sign visible to staff  - Offer Toileting every 2 Hours, in advance of need  - Initiate/Maintain bed alarm  - Obtain necessary fall risk management equipment: bed alarm  - Apply yellow socks and bracelet for high fall risk patients  - Consider moving patient to room near nurses station  Outcome: Progressing     Problem: Nutrition/Hydration-ADULT  Goal: Nutrient/Hydration intake appropriate for improving, restoring or maintaining nutritional needs  Description: Monitor and assess patient's nutrition/hydration status for malnutrition  Collaborate with interdisciplinary team and initiate plan and interventions as ordered  Monitor patient's weight and dietary intake as ordered or per policy  Utilize nutrition screening tool and intervene as necessary  Determine patient's food preferences and provide high-protein, high-caloric foods as appropriate       INTERVENTIONS:  - Monitor oral intake, urinary output, labs, and treatment plans  - Assess nutrition and hydration status and recommend course of action  - Evaluate amount of meals eaten  - Assist patient with eating if necessary   - Allow adequate time for meals  - Recommend/ encourage appropriate diets, oral nutritional supplements, and vitamin/mineral supplements  - Order, calculate, and assess calorie counts as needed  - Recommend, monitor, and adjust tube feedings and TPN/PPN based on assessed needs  - Assess need for intravenous fluids  - Provide specific nutrition/hydration education as appropriate  - Include patient/family/caregiver in decisions related to nutrition  Outcome: Progressing

## 2022-11-09 ENCOUNTER — APPOINTMENT (INPATIENT)
Dept: CT IMAGING | Facility: HOSPITAL | Age: 67
End: 2022-11-09

## 2022-11-09 LAB
ALBUMIN SERPL BCP-MCNC: 3.1 G/DL (ref 3.5–5)
ALP SERPL-CCNC: 88 U/L (ref 34–104)
ALT SERPL W P-5'-P-CCNC: 28 U/L (ref 7–52)
ANION GAP SERPL CALCULATED.3IONS-SCNC: 7 MMOL/L (ref 4–13)
AST SERPL W P-5'-P-CCNC: 46 U/L (ref 13–39)
BILIRUB DIRECT SERPL-MCNC: 0.32 MG/DL (ref 0–0.2)
BILIRUB SERPL-MCNC: 0.83 MG/DL (ref 0.2–1)
BUN SERPL-MCNC: 12 MG/DL (ref 5–25)
CALCIUM SERPL-MCNC: 7.9 MG/DL (ref 8.4–10.2)
CHLORIDE SERPL-SCNC: 103 MMOL/L (ref 96–108)
CO2 SERPL-SCNC: 25 MMOL/L (ref 21–32)
CREAT SERPL-MCNC: 0.71 MG/DL (ref 0.6–1.3)
CRP SERPL QL: 21.1 MG/L
ERYTHROCYTE [DISTWIDTH] IN BLOOD BY AUTOMATED COUNT: 13 % (ref 11.6–15.1)
ERYTHROCYTE [SEDIMENTATION RATE] IN BLOOD: 48 MM/HOUR (ref 0–19)
GFR SERPL CREATININE-BSD FRML MDRD: 97 ML/MIN/1.73SQ M
GLUCOSE SERPL-MCNC: 85 MG/DL (ref 65–140)
HCT VFR BLD AUTO: 29.5 % (ref 36.5–49.3)
HGB BLD-MCNC: 9.8 G/DL (ref 12–17)
MAGNESIUM SERPL-MCNC: 1.6 MG/DL (ref 1.9–2.7)
MCH RBC QN AUTO: 35.4 PG (ref 26.8–34.3)
MCHC RBC AUTO-ENTMCNC: 33.2 G/DL (ref 31.4–37.4)
MCV RBC AUTO: 107 FL (ref 82–98)
PLATELET # BLD AUTO: 138 THOUSANDS/UL (ref 149–390)
PMV BLD AUTO: 10.9 FL (ref 8.9–12.7)
POTASSIUM SERPL-SCNC: 4 MMOL/L (ref 3.5–5.3)
PROT SERPL-MCNC: 6 G/DL (ref 6.4–8.4)
RBC # BLD AUTO: 2.77 MILLION/UL (ref 3.88–5.62)
SODIUM SERPL-SCNC: 135 MMOL/L (ref 135–147)
WBC # BLD AUTO: 6.03 THOUSAND/UL (ref 4.31–10.16)

## 2022-11-09 RX ORDER — MAGNESIUM SULFATE HEPTAHYDRATE 40 MG/ML
4 INJECTION, SOLUTION INTRAVENOUS ONCE
Status: COMPLETED | OUTPATIENT
Start: 2022-11-09 | End: 2022-11-09

## 2022-11-09 RX ORDER — POLYETHYLENE GLYCOL 3350 17 G/17G
238 POWDER, FOR SOLUTION ORAL ONCE
Status: COMPLETED | OUTPATIENT
Start: 2022-11-09 | End: 2022-11-09

## 2022-11-09 RX ADMIN — MULTIPLE VITAMINS W/ MINERALS TAB 1 TABLET: TAB ORAL at 08:01

## 2022-11-09 RX ADMIN — CARVEDILOL 3.12 MG: 3.12 TABLET, FILM COATED ORAL at 17:28

## 2022-11-09 RX ADMIN — POTASSIUM CHLORIDE 20 MEQ: 20 SOLUTION ORAL at 08:10

## 2022-11-09 RX ADMIN — PRAVASTATIN SODIUM 80 MG: 80 TABLET ORAL at 17:29

## 2022-11-09 RX ADMIN — MIRTAZAPINE 30 MG: 15 TABLET, FILM COATED ORAL at 21:18

## 2022-11-09 RX ADMIN — AMILORIDE HYDROCLORIDE 5 MG: 5 TABLET ORAL at 08:02

## 2022-11-09 RX ADMIN — THIAMINE HCL TAB 100 MG 100 MG: 100 TAB at 08:01

## 2022-11-09 RX ADMIN — CARVEDILOL 3.12 MG: 3.12 TABLET, FILM COATED ORAL at 08:01

## 2022-11-09 RX ADMIN — BUPROPION HYDROCHLORIDE 150 MG: 150 TABLET, FILM COATED, EXTENDED RELEASE ORAL at 08:01

## 2022-11-09 RX ADMIN — PANTOPRAZOLE SODIUM 40 MG: 40 TABLET, DELAYED RELEASE ORAL at 05:32

## 2022-11-09 RX ADMIN — AMLODIPINE BESYLATE 5 MG: 5 TABLET ORAL at 08:01

## 2022-11-09 RX ADMIN — MAGNESIUM SULFATE HEPTAHYDRATE 4 G: 40 INJECTION, SOLUTION INTRAVENOUS at 08:03

## 2022-11-09 RX ADMIN — POLYETHYLENE GLYCOL 3350 238 G: 17 POWDER, FOR SOLUTION ORAL at 17:29

## 2022-11-09 RX ADMIN — APIXABAN 10 MG: 5 TABLET, FILM COATED ORAL at 08:01

## 2022-11-09 RX ADMIN — FOLIC ACID 1 MG: 1 TABLET ORAL at 08:01

## 2022-11-09 RX ADMIN — UMECLIDINIUM BROMIDE AND VILANTEROL TRIFENATATE 1 PUFF: 62.5; 25 POWDER RESPIRATORY (INHALATION) at 09:18

## 2022-11-09 RX ADMIN — BISACODYL 10 MG: 5 TABLET, COATED ORAL at 17:28

## 2022-11-09 RX ADMIN — IOHEXOL 100 ML: 350 INJECTION, SOLUTION INTRAVENOUS at 16:37

## 2022-11-09 NOTE — ASSESSMENT & PLAN NOTE
· Mg 0 5 POA > 2 5 following repletion > 0 9 > 0 6 > 1 4 >  1 6  · Has received multiple dosages of IV Mg Sulfate  Plan:  · IV Mg Sulfate 4g  · Continue to monitor Mg level and replete as needed

## 2022-11-09 NOTE — ASSESSMENT & PLAN NOTE
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

## 2022-11-09 NOTE — PLAN OF CARE
Problem: METABOLIC, FLUID AND ELECTROLYTES - ADULT  Goal: Electrolytes maintained within normal limits  Description: INTERVENTIONS:  - Monitor labs and assess patient for signs and symptoms of electrolyte imbalances  - Administer electrolyte replacement as ordered  - Monitor response to electrolyte replacements, including repeat lab results as appropriate  - Instruct patient on fluid and nutrition as appropriate  Outcome: Progressing  Goal: Fluid balance maintained  Description: INTERVENTIONS:  - Monitor labs   - Monitor I/O and WT  - Instruct patient on fluid and nutrition as appropriate  - Assess for signs & symptoms of volume excess or deficit  Outcome: Progressing  Goal: Glucose maintained within target range  Description: INTERVENTIONS:  - Monitor Blood Glucose as ordered  - Assess for signs and symptoms of hyperglycemia and hypoglycemia  - Administer ordered medications to maintain glucose within target range  - Assess nutritional intake and initiate nutrition service referral as needed  Outcome: Progressing     Problem: PAIN - ADULT  Goal: Verbalizes/displays adequate comfort level or baseline comfort level  Description: Interventions:  - Encourage patient to monitor pain and request assistance  - Assess pain using appropriate pain scale  - Administer analgesics based on type and severity of pain and evaluate response  - Implement non-pharmacological measures as appropriate and evaluate response  - Consider cultural and social influences on pain and pain management  - Notify physician/advanced practitioner if interventions unsuccessful or patient reports new pain  Outcome: Progressing     Problem: INFECTION - ADULT  Goal: Absence or prevention of progression during hospitalization  Description: INTERVENTIONS:  - Assess and monitor for signs and symptoms of infection  - Monitor lab/diagnostic results  - Monitor all insertion sites, i e  indwelling lines, tubes, and drains  - Monitor endotracheal if appropriate and nasal secretions for changes in amount and color  - [place] appropriate cooling/warming therapies per order  - Administer medications as ordered  - Instruct and encourage patient and family to use good hand hygiene technique  - Identify and instruct in appropriate isolation precautions for identified infection/condition  Outcome: Progressing  Goal: Absence of fever/infection during neutropenic period  Description: INTERVENTIONS:  - Monitor WBC    Outcome: Progressing     Problem: SAFETY ADULT  Goal: Patient will remain free of falls  Description: INTERVENTIONS:  - Educate patient/family on patient safety including physical limitations  - Instruct patient to call for assistance with activity   - Consult OT/PT to assist with strengthening/mobility   - Keep Call bell within reach  - Keep bed low and locked with side rails adjusted as appropriate  - Keep care items and personal belongings within reach  - Initiate and maintain comfort rounds  - Make Fall Risk Sign visible to staff  - Offer Toileting every  Hours, in advance of need  - Initiate/Maintain alarm  - Obtain necessary fall risk management equipment:   - Apply yellow socks and bracelet for high fall risk patients  - Consider moving patient to room near nurses station  Outcome: Progressing  Goal: Maintain or return to baseline ADL function  Description: INTERVENTIONS:  -  Assess patient's ability to carry out ADLs; assess patient's baseline for ADL function and identify physical deficits which impact ability to perform ADLs (bathing, care of mouth/teeth, toileting, grooming, dressing, etc )  - Assess/evaluate cause of self-care deficits   - Assess range of motion  - Assess patient's mobility; develop plan if impaired  - Assess patient's need for assistive devices and provide as appropriate  - Encourage maximum independence but intervene and supervise when necessary  - Involve family in performance of ADLs  - Assess for home care needs following discharge   - Consider OT consult to assist with ADL evaluation and planning for discharge  - Provide patient education as appropriate  Outcome: Progressing  Goal: Maintains/Returns to pre admission functional level  Description: INTERVENTIONS:  - Perform BMAT or MOVE assessment daily    - Set and communicate daily mobility goal to care team and patient/family/caregiver  - Collaborate with rehabilitation services on mobility goals if consulted  - Perform Range of Motion  times a day  - Reposition patient every  hours  - Dangle patient  times a day  - Stand patient  times a day  - Ambulate patient  times a day  - Out of bed to chair times a day   - Out of bed for meals  times a day  - Out of bed for toileting  - Record patient progress and toleration of activity level   Outcome: Progressing     Problem: DISCHARGE PLANNING  Goal: Discharge to home or other facility with appropriate resources  Description: INTERVENTIONS:  - Identify barriers to discharge w/patient and caregiver  - Arrange for needed discharge resources and transportation as appropriate  - Identify discharge learning needs (meds, wound care, etc )  - Arrange for interpretive services to assist at discharge as needed  - Refer to Case Management Department for coordinating discharge planning if the patient needs post-hospital services based on physician/advanced practitioner order or complex needs related to functional status, cognitive ability, or social support system  Outcome: Progressing     Problem: Knowledge Deficit  Goal: Patient/family/caregiver demonstrates understanding of disease process, treatment plan, medications, and discharge instructions  Description: Complete learning assessment and assess knowledge base    Interventions:  - Provide teaching at level of understanding  - Provide teaching via preferred learning methods  Outcome: Progressing     Problem: MOBILITY - ADULT  Goal: Maintain or return to baseline ADL function  Description: INTERVENTIONS:  -  Assess patient's ability to carry out ADLs; assess patient's baseline for ADL function and identify physical deficits which impact ability to perform ADLs (bathing, care of mouth/teeth, toileting, grooming, dressing, etc )  - Assess/evaluate cause of self-care deficits   - Assess range of motion  - Assess patient's mobility; develop plan if impaired  - Assess patient's need for assistive devices and provide as appropriate  - Encourage maximum independence but intervene and supervise when necessary  - Involve family in performance of ADLs  - Assess for home care needs following discharge   - Consider OT consult to assist with ADL evaluation and planning for discharge  - Provide patient education as appropriate  Outcome: Progressing  Goal: Maintains/Returns to pre admission functional level  Description: INTERVENTIONS:  - Perform BMAT or MOVE assessment daily    - Set and communicate daily mobility goal to care team and patient/family/caregiver  - Collaborate with rehabilitation services on mobility goals if consulted  - Perform Range of Motion  times a day  - Reposition patient every  hours    - Dangle patient  times a day  - Stand patient  times a day  - Ambulate patient  times a day  - Out of bed to chair  times a day   - Out of bed for meals  times a day  - Out of bed for toileting  - Record patient progress and toleration of activity level   Outcome: Progressing     Problem: Potential for Falls  Goal: Patient will remain free of falls  Description: INTERVENTIONS:  - Educate patient/family on patient safety including physical limitations  - Instruct patient to call for assistance with activity   - Consult OT/PT to assist with strengthening/mobility   - Keep Call bell within reach  - Keep bed low and locked with side rails adjusted as appropriate  - Keep care items and personal belongings within reach  - Initiate and maintain comfort rounds  - Make Fall Risk Sign visible to staff  - Offer Toileting every  Hours, in advance of need  - Initiate/Maintain alarm  - Obtain necessary fall risk management equipment:   - Apply yellow socks and bracelet for high fall risk patients  - Consider moving patient to room near nurses station  Outcome: Progressing     Problem: Nutrition/Hydration-ADULT  Goal: Nutrient/Hydration intake appropriate for improving, restoring or maintaining nutritional needs  Description: Monitor and assess patient's nutrition/hydration status for malnutrition  Collaborate with interdisciplinary team and initiate plan and interventions as ordered  Monitor patient's weight and dietary intake as ordered or per policy  Utilize nutrition screening tool and intervene as necessary  Determine patient's food preferences and provide high-protein, high-caloric foods as appropriate       INTERVENTIONS:  - Monitor oral intake, urinary output, labs, and treatment plans  - Assess nutrition and hydration status and recommend course of action  - Evaluate amount of meals eaten  - Assist patient with eating if necessary   - Allow adequate time for meals  - Recommend/ encourage appropriate diets, oral nutritional supplements, and vitamin/mineral supplements  - Order, calculate, and assess calorie counts as needed  - Recommend, monitor, and adjust tube feedings and TPN/PPN based on assessed needs  - Assess need for intravenous fluids  - Provide specific nutrition/hydration education as appropriate  - Include patient/family/caregiver in decisions related to nutrition  Outcome: Progressing

## 2022-11-09 NOTE — ASSESSMENT & PLAN NOTE
Acute Macrocytic Anemia with a Hbg Hemoglobin of 9 6 noted on 11/8/22,   · hemoglobin remains stable with the morning hemoglobin count of 9 8, Patient is asymptomatic denying dizziness, headaches, lightheadedness, SOB, weakness or fatigue  · positive FOBT  · Iron level and saturation within normal limits, TIBC low at 138, ferritin 819  · GI consult, input appreciated -  Recommended CT abdomen pelvis to rule out retroperitoneal bleeding, as well as EGD colonoscopy scheduled for tomorrow  ·   Plan  · EGD/ colonoscopy  Scheduled for tomorrow,  Prepped with MiraLax and Dulcolax  · Eliquis held, last dose 11/9  At 0800  ·  diet switch to clear liquid after breakfast, NPO past midnight  ·  CRP and ESR,  pending  · Continue to trend hemoglobin levels

## 2022-11-09 NOTE — CONSULTS
Consultation - [facility] Gastroenterology   [facility][1] Nick III 79 y o  male MRN: [id number]  Unit/Bed#: S -01 Encounter: [id number]        Inpatient consult to gastroenterology  Consult performed by: RAMANA Adame  Consult ordered by: Joel Cunningham DO          Reason for Consult / Principal Problem:     Acute drop in hgb, hx crohn's, recent start Eliquis for PE      ASSESSMENT AND PLAN:      This is a 79 y o  male with history of [address]  s/p ablation, Crohn's w/ colostomy not on treatment, COPD, HTN, alcohol use, and cholecystectomy who presented to [facility] on 11/1 due to syncopal episode after an episode of binge drinking, noted to be hyponatremic/hypomagnesemia w/ positive orthostatic BPs now improved after IV/electrolyte repletion  He was started on Eliquis for possible PE this admission  GI consulted due to 3g drop in Hgb yesterday w/ heme positive brown stool  1  Macrocytic Anemia w/ acute drop in hgb, heme positive brown stool  Pt's baseline Hgb between 12-13, but pt experienced an acute drop to 9 6 yesterday with no evidence of overt GI bleeding  He has brown liquid stool in ostomy bag, denies any history of melena/hematochezia  Hgb remains stable at 9 8 this AM  Pt was not previously on any blood thinners but was started on Eliquis this admission for new finding of possible PE  Denies any frequent NSAID use  Does have history of heavy alcohol use drinking 4-5 beers daily x 30 years  Folic acid WNL, N34 elevated 2,901 & he reports he is on outpatient weekly B12 injections  Iron level & saturation WNL, TIBC low 183, Ferritin 819   Anemia may be multifactorial due to hemodilution, anemia of chronic disease, occult GI blood loss, rule out retroperitoneal bleeding      -Monitor CBC  -Continue PPI daily (pt on prilosec 20mg daily at home)  -Check CT scan abdomen/pelvis to rule out retroperitoneal bleed   -Hold Eliquis-last dose 11/9 0800   -Switched to clear liquid diet after breakfast, NPO past midnight  -EGD/Colonoscopy scheduled tomorrow for further evaluation of anemia  Prep and procedure explained  Will use Miralax/Dulcolax prep  2  History of Crohn's disease w/ TI resection & colostomy  Patient reports being diagnosed with Crohn's disease in his 35s due to SBO obstruction requiring partial TI resection  He reports previously being treated w/ Remicade infusions but then he stopped following up w/ GI and developed perianal disease requiring colostomy by Dr Leann Hoyos approximately 10 years ago  His last colostomy in 2017 through his stoma showed moderately active terminal ileitis and mildly active colitis in the ascending and transverse colon, healthy appearing ileocolonic anastomosis  He reports no melena or hematochezia, abdominal pain, N/V, or any increase in ostomy output  He does report emptying colostomy bag approximately 12 times per day  -Monitor stool output  -Check CRP/Sed rate  -CT abdomen/pelvis  -EGD/colonoscopy as above  -Pt will need to follow up for treatment of Crohn's disease outpatient  Discussed importance of GI follow up  3  [address]  s/p ablation   Pt with history of [address]  diagnosed in 2020 followed by surgical oncology s/p ablation in 2020 and more recently 9/21/22 for growing LI-RADS 5 lesion adjacent to previous treatment zone with new finding of additional LR-3 lesion seen in segment 6   -Continue f/u with surg onc  Pt has repeat MRI scheduled in early December 4  Alcohol abuse  -CIWA protocol per primary team  -Counseled patient on alcohol cessation to prevent complications of liver disease    Thank you for the consultation   Case discussed with Dr Yudith Rosen and primary team    ______________________________________________________________________    HPI:  Adryan Chaney is a 79 y o  male with history of [address]  s/p ablation, Crohn's w/ ostomy, COPD, HTN, alcohol use, and cholecystectomy who presented to [facility] on 11/1 due to syncopal episode after an episode of binge drinking  In the ED, orthostatic BP was positive and he was noted to be hyponatremic with sodium of 127 which improved with IVF  CTA chest was performed and showed no definite acute pulmonary emboli but small segmental and subsegmental emboli in the lower lobes could be obscured by motion artifact on CTA chest and he was placed on Eliquis  Yesterday, he was noted to have a 3g drop in Hgb from 12 6 to 9  6  He denied any melena or hematochezia, but was found to have heme positive stool and GI was consulted for further evaluation  Pt reports he was diagnosed with Crohn's disease in his 35s after he developped a SBO which required TI resection  He had been treated with Remicade in the past but then underwent several years without treatment  He developed perianal disease f/b colorectal Dr Hyacinth Hernández and reports he underwent colostomy placement approximately 10 years ago  In 2017, he was hospitalized for LUCILLE/acute diarrheal illness, he tested positive for salmonella, and had colonoscopy performed through his stoma showing moderate colitis in the terminal ileum and mild colitis in the ascending and transverse colon, healthy appearing ileocolonic anastomosis  Biopsies showed moderately active ileitis and mildly active colitis at that time  He was treated w/ prednisone taper w/ plans to restart biologic therapy but never followed up  He denies any N/V, abdominal pain, blood in ostomy bag, or increased ostomy output  Reports he empties his colostomy bag approximately 12 times/day  He takes Prilosec 20 mg daily for history of GERD, denies any heartburn or dysphagia  Doesn't recall ever having an EGD  He is on weekly B12 injections  He is currently undergoing treatment for [address]  which was diagnosed in 2020 f/b surgical onc Dr Schafer and he underwent ablation of a segment 5 liver lesion at that time   Recently, his AFP increased and MRI in July noted an unchanged size and appearance of the right hepatic lobe treatment zone without evidence of viable tumor (LR-TR nonviable), but he had an increase in size of a now 1 8 cm segment 5 lesion adjacent to the treatment zone, upgraded to LI-RADS 5  Subsequently, He underwent IR microwave ablation of segment 5 LI-RADS lesion on 9/21/11 and was found to have an additional LR-3 lesion seen in segment 6  He has follow up MRI scheduled 12/2 and has apt w/ Dr Hayden Camarena thereafter  He reports 30 year history of heavy alcohol use drinking 4-5 beers a day  +thrombocytopenia since September, had normal PT/INR at that time  Last abdominal imaging was in September due to another syncopal episode & CT scan which showed expected posttreatment changes in the right hepatic lobe without evidence of large volume hemorrhage  REVIEW OF SYSTEMS:    CONSTITUTIONAL: Denies any fever, chills, rigors, and weight loss  HEENT: No earache or tinnitus  Denies hearing loss or visual disturbances  CARDIOVASCULAR: No chest pain or palpitations  RESPIRATORY: Denies any cough, hemoptysis, shortness of breath or dyspnea on exertion  GASTROINTESTINAL: As noted in the History of Present Illness  GENITOURINARY: No problems with urination  Denies any hematuria or dysuria  NEUROLOGIC: No dizziness or vertigo, denies headaches  MUSCULOSKELETAL: Denies any muscle or joint pain  SKIN: Denies skin rashes or itching  ENDOCRINE: Denies excessive thirst  Denies intolerance to heat or cold  PSYCHOSOCIAL: Denies depression or anxiety  Denies any recent memory loss         Historical Information   Past Medical History:   Diagnosis Date   • LUCILLE (acute kidney injury) ([facility] ) 6/28/2017   • Blindness of left eye     Since birth   • Cancer [facility])     liver   • Cataract    • Colon polyp    • Colostomy in place [facility]) 6/29/2017   • COPD (chronic obstructive pulmonary disease) ([facility] )    • Crohn disease ([facility] )    • Emphysema of lung ([facility] )    • Emphysema/COPD ([facility] )    • Essential hypertension • GERD (gastroesophageal reflux disease)    • Hypertension    • Hypokalemia 2017   • Hypomagnesemia 2017   • Hyponatremia 2017   • Kidney stone    • Osteomyelitis ([address] )    • Pneumonia      Past Surgical History:   Procedure Laterality Date   • CATARACT EXTRACTION Bilateral    • CHOLECYSTECTOMY     • COLECTOMY      10 inchs of ileum   • COLONOSCOPY N/A 2017    Procedure: COLONOSCOPY;  Surgeon: Gloria Romero MD;  Location: [facility];   Service: Gastroenterology   • COLOSTOMY     • FINGER AMPUTATION     • FINGER SURGERY Left    • IR BIOPSY LIVER MASS  2020   • IR MICROWAVE ABLATION  2022   • LITHOTRIPSY     • LIVER LOBECTOMY N/A 7/15/2020    Procedure: LIVER ABLATION, INTRAOPERATIVE U/S LIVER;  Surgeon: Dillon Martinez MD;  Location: [facility];  Service: Surgical Oncology     Social History   Social History     Substance and Sexual Activity   Alcohol Use Yes   • Alcohol/week: 59 0 standard drinks   • Types: 49 Cans of beer, 10 Shots of liquor per week    Comment: drinks 6 or more beers plus hard liquor daily     Social History     Substance and Sexual Activity   Drug Use No     Social History     Tobacco Use   Smoking Status Former Smoker   • Packs/day: 1 50   • Years: 51 00   • Pack years: 76 50   • Types: Cigarettes   • Start date:    • Quit date: 2022   • Years since quittin 8   Smokeless Tobacco Never Used     Family History   Problem Relation Age of Onset   • Hypertension Father    • Heart disease Sister        Meds/Allergies     Facility-Administered Medications Prior to Admission   Medication   • cyanocobalamin injection 1,000 mcg     Medications Prior to Admission   Medication   • ALPRAZolam (XANAX) 0 25 mg tablet   • AMILoride 5 mg tablet   • amLODIPine (NORVASC) 5 mg tablet   • buPROPion (WELLBUTRIN XL) 150 mg 24 hr tablet   • calcium carbonate (TUMS) 500 mg chewable tablet   • MAGNESIUM CHLORIDE PO   • mirtazapine (REMERON) 15 mg tablet   • omeprazole (PriLOSEC) 20 mg delayed release capsule   • potassium chloride (MICRO-K) 10 MEQ CR capsule   • rosuvastatin (CRESTOR) 10 MG tablet   • umeclidinium-vilanterol (Anoro Ellipta) 62 5-25 MCG/INH inhaler   • VIT B12-METHIONINE-INOS-CHOL IM     Current Facility-Administered Medications   Medication Dose Route Frequency   • acetaminophen (TYLENOL) tablet 650 mg  650 mg Oral Q6H PRN   • AMILoride tablet 5 mg  5 mg Oral Daily   • amLODIPine (NORVASC) tablet 5 mg  5 mg Oral Daily   • apixaban (ELIQUIS) tablet 10 mg  10 mg Oral BID   • buPROPion (WELLBUTRIN XL) 24 hr tablet 150 mg  150 mg Oral Daily   • carvedilol (COREG) tablet 3 125 mg  3 125 mg Oral BID With Meals   • cyanocobalamin injection 1,000 mcg  1,000 mcg Intramuscular Q30 Days   • doxylamine (UNISOM) tablet 12 5 mg  12 5 mg Oral HS PRN   • folic acid (FOLVITE) tablet 1 mg  1 mg Oral Daily   • magnesium sulfate 4 g/100 mL IVPB (premix) 4 g  4 g Intravenous Once   • mirtazapine (REMERON) tablet 30 mg  30 mg Oral HS   • multivitamin-minerals (CENTRUM) tablet 1 tablet  1 tablet Oral Daily   • pantoprazole (PROTONIX) EC tablet 40 mg  40 mg Oral Early Morning   • polyethylene glycol (MIRALAX) packet 17 g  17 g Oral Daily   • potassium chloride oral solution 20 mEq  20 mEq Oral Daily   • pravastatin (PRAVACHOL) tablet 80 mg  80 mg Oral Daily With Dinner   • thiamine tablet 100 mg  100 mg Oral Daily   • umeclidinium-vilanterol 62 5-25 mcg/actuation inhaler 1 puff  1 puff Inhalation Daily       No Known Allergies        Objective     Blood pressure 120/68, pulse 105, temperature 98 8 °F (37 1 °C), temperature source Oral, resp  rate 20, height 5' 9" (1 753 m), weight 63 5 kg (140 lb), SpO2 93 %  Body mass index is 20 67 kg/m²        Intake/Output Summary (Last 24 hours) at 11/9/2022 0918  Last data filed at 11/9/2022 0139  Gross per 24 hour   Intake 1040 ml   Output 2150 ml   Net -1110 ml         PHYSICAL EXAM:      General Appearance:   Alert, cooperative, no distress   HEENT: Normocephalic, atraumatic, anicteric  Neck:  Supple, symmetrical, trachea midline   Lungs:   Clear to auscultation bilaterally; no rales, rhonchi or wheezing; respirations unlabored    Heart[de-identified]   Regular rate and rhythm; no murmur, rub, or gallop  Abdomen:   Soft, non-tender, non-distended; normal bowel sounds; no masses, no organomegaly; ostomy bag with brown liquid stool    Genitalia:   Deferred    Rectal:   Deferred    Extremities:  No cyanosis, clubbing or edema    Pulses:  2+ and symmetric all extremities    Skin:  No jaundice, rashes, or lesions    Lymph nodes:  No palpable cervical lymphadenopathy        Lab Results:   No results displayed because visit has over 200 results  Imaging Studies: I have personally reviewed pertinent imaging studies

## 2022-11-09 NOTE — ASSESSMENT & PLAN NOTE
Patient has history of  Hepatocellular carcinoma, originally diagnosed in 2020 concurrently undergoing treatment followed by surgical oncology   Dr Verona Aguirre  ·  underwent ablation of the segment 5 liver lesion in 2020  ·  recent increase in AFP with MRI in July 2022 noting unchanged size and appearance of the right hepatic lobe treatment zone without evidence of viable tumor but with increase in size of a now 1 8 cm segment 5 lesion adjacent to the treatment stone upgraded to LI rads 5   ·  on 9/21/2022  Underwent IR microwave ablation of segment 5 lesion  And was found to have an additional LR 3 lesion  visualized segment in 6   plan:    Follow-up closely with surgical oncology on discharge

## 2022-11-09 NOTE — ASSESSMENT & PLAN NOTE
· Minimum of at least 6 cans / bottles of alcohol daily   · Alcohol level   Has spoken with [address] [place] [address] Street    Originally agreeable to [employer], but now patient is  Is requesting DC home     Plan:  · IP alcohol rehab - will work with pt regarding placement once he goes home, currently patient is reporting that he does not want to attend rehab post discharge  · CIWA  Protocol placed, no signs of acute alcohol withdrawal during hospital stay  ·  upon discharge home patient will be given resources and information regards to alcohol  rehabilitationin the outpatient setting

## 2022-11-09 NOTE — PROGRESS NOTES
[facility]  Progress Note - Batsheva Guzman III 1955, 79 y o  male MRN: [id number]  Unit/Bed#: S -01 Encounter: [id number]  Primary Care Provider: Kareen Phillips MD   Date and time admitted to hospital: 11/1/2022  9:35 PM    * Syncope  Assessment & Plan  Patient reports loss of consciousness while out drinking with friends last night  He stood up to go to the bathroom and reports losing consciousness and hitting the floor  Reports poor oral intake and loss of appetite recently due to generalized anxiety  He has been experiencing lightheadedness and dizziness upon rising from sitting positing while at home  Denies loss of consciousness at home  He has not had any chest pain or shortness of breath with these episodes  No history of seizures per chart review, no history of CAD or MI  Brought to the emergency department by EMS  Found to be hyponatremic with osmolality profile fitting hypovolemic hyponatremia  Hyponatremia improved with IVF  Orthostatics positive in the ED  EKG shows NSR with incomplete RBBB and nonspecific ST segment abnormality  Troponins negative  Syncope most likely in the setting of poor oral intake and vasovagal response  Telemetry shows episodes of sinus and supraventricular tachycardia with HR ranging from 150 to 200  Echocardiogram negative for any cardiogenic cause of of his syncopal episodes  Orthostats negative x3     Plan:  Continue IVF      Check orthostatics prior to discharge  Holter monitoring on discharge - will place referral to cardiology  Placed on CIWA protocol given history of chronic  Heavy alcohol use  Encourage cessation of alcohol consumption and better oral nutrition  Replenish electrolytes    Anemia  Assessment & Plan  Acute Macrocytic Anemia with a Hbg Hemoglobin of 9 6 noted on 11/8/22,   · hemoglobin remains stable with the morning hemoglobin count of 9 8, Patient is asymptomatic denying dizziness, headaches, lightheadedness, SOB, weakness or fatigue  · positive FOBT  · Iron level and saturation within normal limits, TIBC low at 138, ferritin 819  · GI consult, input appreciated -  Recommended CT abdomen pelvis to rule out retroperitoneal bleeding, as well as EGD colonoscopy scheduled for tomorrow  · CT Abd/Pelvis: no evidence of retroperitoneal hematoma  5 8 cm conglomerate ablation zone defect subcapsular right hepatic lobe segments 8/5 without enhancement to suggest residual active tumor (LR-TR nonviable)  Please note phase of enhancement is somewhat later than optimal   1 cm LIRADS 3 observation in segment 6 on the September 21, 2022 study may again be present (#4/23), probably similar in size although not as well depicted possibly related to differences in phase of enhancement  Attention on 6 month follow up MRI Recommended  Stable shotty lymph nodes upper abdomen and retroperitoneum  Fractures of the left 10th and 12th ribs, not clearly evident on previous CT study  Subtle halo of density again seen about the JUNE, similar to the most recent CT study  Finding may be inflammatory in nature? Attention on follow-up advised  Fatty liver      Plan  · EGD/ colonoscopy  Scheduled for tomorrow,  Prepped with MiraLax and Dulcolax  · Eliquis held, last dose 11/9  At 0800  ·  diet switch to clear liquid after breakfast, NPO past midnight  ·  CRP and ESR,  pending  · Continue to trend hemoglobin levels    Multiple subsegmental pulmonary emboli without acute cor pulmonale (HCC)  Assessment & Plan  CTA chest:  No definite acute pulmonary emboli, but small segmental and subsegmental emboli particularly in the lower lobes  Acute/subacute fracture of the right posterior 10th rib with trace right effusion  Adjacent curvilinear consolidation is likely due to atelectasis  Acute/subacute fracture of the posterior left rib 12th rib    Low-attenuation partially enhancing subscapular lesion in the right hepatic lobe at the site of microwave ablation of hepatic cellular carcinoma, 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  Plan:  · Will start patient on 10 mg Eliquis b i d  For 7 days switching to 5 mg b i d   · Price check: 47$  · Will discontinue patient Lovenox  · Patient is scheduled for MRI on 12/2/2022 to follow-up for hepatocellular carcinoma   ·  Eliquis held given the acute drop in hemoglobin and plan for EGD/ colonoscopy by GI tomorrow    Supraventricular tachycardia [facility])  Assessment & Plan  Patient presents after syncopal episodes while out drinking  Found to have orthostatic hypotension secondary to intravascular volume depletion  Echocardiogram on 11/4 shows EF of 60%, normal LV function and nondilated left atrium  Telemetry reveals SVT with HR ranging from 120 to 200 for consecutive nights      Plan:  Continue telemetry   Monitor for improvement with correction of electrolytes  Carvedilol added      History of alcohol use disorder  Assessment & Plan  · Minimum of at least 6 cans / bottles of alcohol daily   · Alcohol level   Has spoken with [facility]    Originally agreeable to [employer], but now patient is  Is requesting DC home     Plan:  · IP alcohol rehab - will work with pt regarding placement once he goes home, currently patient is reporting that he does not want to attend rehab post discharge  · CIWA  Protocol placed, no signs of acute alcohol withdrawal during hospital stay  ·  upon discharge home patient will be given resources and information regards to alcohol  rehabilitationin the outpatient setting      Electrolyte abnormality  Assessment & Plan  Lab Results   Component Value Date    K 4 0 11/09/2022    CORRECTEDCA 8 4 11/08/2022     Hypokalemia of 3 4 and calcium of 6 8 POA  Likely secondary to poor nutrition and oral intake  Recent lab studies showed normal K of 3 8, and Corrected Calcim of 8 4  Plan  · Continue to monitor CMP on AM labs  · Replete electrolytes as necessary      Platelets decreased [facility])  Assessment & Plan  2/2 to alcohol use  No bleeding at this time, no interventions planned     [address]  (hepatocellular carcinoma) [facility])  Assessment & Plan   Patient has history of  Hepatocellular carcinoma, originally diagnosed in 2020 concurrently undergoing treatment followed by surgical oncology   Dr Verona Aguirre  ·  underwent ablation of the segment 5 liver lesion in 2020  ·  recent increase in AFP with MRI in July 2022 noting unchanged size and appearance of the right hepatic lobe treatment zone without evidence of viable tumor but with increase in size of a now 1 8 cm segment 5 lesion adjacent to the treatment stone upgraded to LI rads 5   ·  on 9/21/2022  Underwent IR microwave ablation of segment 5 lesion  And was found to have an additional LR 3 lesion  visualized segment in 6   plan: Follow-up closely with surgical oncology on discharge    Crohn's disease of small intestine with other complication [facility])  Assessment & Plan  · History of Crohn's disease W/ Ostomy  Patient denies abnormal or excessive output  · Ostomy care   · FOBT+ in the setting of acute drop of Hbg of 3g  ·   Plan:  · GI Consulted, Input Appreciated  · EGD/: Not assessed to be scheduled for tomorrow bowel prep with MiraLax and Dulcolax, diet as noted above  · Plan for  GIoutpatient follow-up for treatment of Crohn's disease      Chronic obstructive pulmonary disease (HCC)  Assessment & Plan  · COPD Stable on room air  · Continue Anoro Ellipta    Hypomagnesemia  Assessment & Plan  · Mg 0 5 POA > 2 5 following repletion > 0 9 > 0 6 > 1 4 >  1 6  · Has received multiple dosages of IV Mg Sulfate  Plan:  · IV Mg Sulfate 4g  · Continue to monitor Mg level and replete as needed    Hyponatremia  Assessment & Plan  Lab Results   Component Value Date    SODIUM 135 11/09/2022     · Baseline 135s -140s, , improved with fluids    · Likely 2/2 to low PO intake and increase alcohol 231  · Encourage food and beverage hydration aside from alcohol Plan:  CMP initially monitored q12h   Continue IV hydration given low-normal, will discontinue once consistently baseline and encourage oral intake   resolved:  Currently patient's sodium at 135      Essential hypertension  Assessment & Plan  On Amiloride and Amlodipine 5 mg each daily      Plan:  Continue home meds  Carvedilol added          VTE Pharmacologic Prophylaxis: VTE Score: 5 Patient was started on Eliquis acutely for subsegmental pulmonary embolism, however that was held today in the setting of acute hemoglobin drop planned EGD/colonoscopy tomorrow    Patient Centered Rounds: I performed bedside rounds with nursing staff today  Discussions with Specialists or Other Care Team Provider:   GI consult     Education and Discussions with Family / Patient:  daughter plan to come to hospital today, will speak to her at  bedside  Current Length of Stay: 7 day(s)  Current Patient Status: Inpatient   Discharge Plan: Anticipate discharge in 24-48 hrs to home with home services  Code Status: Level 1 - Full Code    Subjective:   Patient seen and evaluated at bedside  No presence of any any acute overnight events  Patient visibly upset and in emotional distress during encounter  Patient reported that he was unhappy with change in diet in preparation/evaluation for acute drop in hemoglobin suspected GI bleed  Patient states that he does not understand the purpose or importance of extending hospital course and needing to spend additional time in the hospital  Explained the reasoning behind current plan and workup, as well as the importance of extending hospital course given current presentation  Patient reported understanding and  Agreeable with plan  Patient denies any current symptoms at this time and including abdominal pain, nausea, vomiting, diarrhea, lightheadedness, dizziness, bloody or dark stool in the ileostomy bag, chest pain, shortness of breath, fatigue, fever      Objective:     Vitals:   Temp (24hrs), Av 2 °F (36 8 °C), Min:97 5 °F (36 4 °C), Max:98 8 °F (37 1 °C)    Temp:  [97 5 °F (36 4 °C)-98 8 °F (37 1 °C)] 97 5 °F (36 4 °C)  HR:  [] 73  Resp:  [16-20] 20  BP: (120-160)/(68-78) 157/73  SpO2:  [93 %-97 %] 97 %  Body mass index is 20 67 kg/m²  Input and Output Summary (last 24 hours): Intake/Output Summary (Last 24 hours) at 2022 192  Last data filed at 2022 1701  Gross per 24 hour   Intake 720 ml   Output 2350 ml   Net -1630 ml       Physical Exam:   Physical Exam  Vitals reviewed  Constitutional:       General: He is not in acute distress  Appearance: He is ill-appearing  He is not toxic-appearing  HENT:      Head: Normocephalic and atraumatic  Eyes:      General: No scleral icterus  Extraocular Movements: Extraocular movements intact  Conjunctiva/sclera: Conjunctivae normal       Pupils: Pupils are equal, round, and reactive to light  Neck:      Vascular: No carotid bruit  Cardiovascular:      Rate and Rhythm: Normal rate and regular rhythm  Pulses: Normal pulses  Heart sounds: Normal heart sounds  No murmur heard  No gallop  Pulmonary:      Effort: Pulmonary effort is normal  No respiratory distress  Breath sounds: Normal breath sounds  No stridor  No wheezing, rhonchi or rales  Chest:      Chest wall: No tenderness  Abdominal:      General: Abdomen is flat  The ostomy site is clean  Bowel sounds are normal  There is no distension  Palpations: Abdomen is soft  Tenderness: There is no abdominal tenderness  Comments: Ileostomy in place with brown stool in collection bag, no obvious signs of mass upon palpation of the abdomen   Musculoskeletal:         General: Normal range of motion  Cervical back: Normal range of motion  Right lower leg: No edema  Left lower leg: No edema  Skin:     General: Skin is warm  Coloration: Skin is not jaundiced  Findings: No rash     Neurological: General: No focal deficit present  Mental Status: He is alert and oriented to person, place, and time  Mental status is at baseline  Motor: No weakness  Psychiatric:         Mood and Affect: Mood normal          Behavior: Behavior is agitated  Additional Data:     Labs:  Results from last 7 days   Lab Units 11/09/22  0533 11/08/22  1328 11/08/22  0431   WBC Thousand/uL 6 03  --  5 12   HEMOGLOBIN g/dL 9 8*   < > 9 6*   HEMATOCRIT % 29 5*   < > 29 9*   PLATELETS Thousands/uL 138*  --  110*   NEUTROS PCT %  --   --  67   LYMPHS PCT %  --   --  19   MONOS PCT %  --   --  12   EOS PCT %  --   --  1    < > = values in this interval not displayed  Results from last 7 days   Lab Units 11/09/22  0533   SODIUM mmol/L 135   POTASSIUM mmol/L 4 0   CHLORIDE mmol/L 103   CO2 mmol/L 25   BUN mg/dL 12   CREATININE mg/dL 0 71   ANION GAP mmol/L 7   CALCIUM mg/dL 7 9*   ALBUMIN g/dL 3 1*   TOTAL BILIRUBIN mg/dL 0 83   ALK PHOS U/L 88   ALT U/L 28   AST U/L 46*   GLUCOSE RANDOM mg/dL 85                       Lines/Drains:  Invasive Devices  Report    Peripheral Intravenous Line  Duration           Peripheral IV 11/06/22 Dorsal (posterior); Left Forearm 3 days          Drain  Duration           Colostomy LLQ -- days    Colostomy LLQ -- days                      Imaging: Reviewed radiology reports from this admission including: chest xray, chest CT scan, abdominal/pelvic CT and CT head    Recent Cultures (last 7 days):         Last 24 Hours Medication List:   Current Facility-Administered Medications   Medication Dose Route Frequency Provider Last Rate   • acetaminophen  650 mg Oral Q6H PRN Erasto Alexander MD     • AMILoride  5 mg Oral Daily Erasto Alexander MD     • amLODIPine  5 mg Oral Daily Erasto Alexander MD     • buPROPion  150 mg Oral Daily Loree Rabago MD     • carvedilol  3 125 mg Oral BID With Meals Frank Garcia MD     • cyanocobalamin  1,000 mcg Intramuscular Q30 Days Erasto Alexander MD • doxylamine  12 5 mg Oral HS PRN Lexie Syed MD     • folic acid  1 mg Oral Daily Lottie South MD     • mirtazapine  30 mg Oral HS Lottie South MD     • multivitamin-minerals  1 tablet Oral Daily Lottie South MD     • pantoprazole  40 mg Oral Early Morning Lottie South MD     • potassium chloride  20 mEq Oral Daily Lottie South MD     • pravastatin  80 mg Oral Daily With Eber Knutson MD     • thiamine  100 mg Oral Daily Lottie South MD     • umeclidinium-vilanterol  1 puff Inhalation Daily Lottie South MD          Today, Patient Was Seen By: Dena Leavitt    **Please Note: This note may have been constructed using a voice recognition system  **

## 2022-11-09 NOTE — ASSESSMENT & PLAN NOTE
· History of Crohn's disease W/ Ostomy    Patient denies abnormal or excessive output  · Ostomy care   · FOBT+ in the setting of acute drop of Hbg of 3g  ·   Plan:  · GI Consulted, Input Appreciated  · EGD/: Not assessed to be scheduled for tomorrow bowel prep with MiraLax and Dulcolax, diet as noted above  · Plan for  GIoutpatient follow-up for treatment of Crohn's disease

## 2022-11-09 NOTE — ASSESSMENT & PLAN NOTE
Patient reports loss of consciousness while out drinking with friends last night  He stood up to go to the bathroom and reports losing consciousness and hitting the floor  Reports poor oral intake and loss of appetite recently due to generalized anxiety  He has been experiencing lightheadedness and dizziness upon rising from sitting positing while at home  Denies loss of consciousness at home  He has not had any chest pain or shortness of breath with these episodes  No history of seizures per chart review, no history of CAD or MI  Brought to the emergency department by EMS  Found to be hyponatremic with osmolality profile fitting hypovolemic hyponatremia  Hyponatremia improved with IVF  Orthostatics positive in the ED  EKG shows NSR with incomplete RBBB and nonspecific ST segment abnormality  Troponins negative  Syncope most likely in the setting of poor oral intake and vasovagal response  Telemetry shows episodes of sinus and supraventricular tachycardia with HR ranging from 150 to 200  Echocardiogram negative for any cardiogenic cause of of his syncopal episodes  Orthostats negative x3     Plan:  Continue IVF      Check orthostatics prior to discharge  Holter monitoring on discharge - will place referral to cardiology  Placed on CIWA protocol given history of chronic  Heavy alcohol use  Encourage cessation of alcohol consumption and better oral nutrition  Replenish electrolytes

## 2022-11-09 NOTE — ASSESSMENT & PLAN NOTE
Lab Results   Component Value Date    SODIUM 135 11/09/2022     · Baseline 135s -140s, , improved with fluids    · Likely 2/2 to low PO intake and increase alcohol 231  · Encourage food and beverage hydration aside from alcohol     Plan:  CMP initially monitored q12h   Continue IV hydration given low-normal, will discontinue once consistently baseline and encourage oral intake   resolved:  Currently patient's sodium at 135

## 2022-11-09 NOTE — ASSESSMENT & PLAN NOTE
Lab Results   Component Value Date    K 4 0 11/09/2022    CORRECTEDCA 8 4 11/08/2022     Hypokalemia of 3 4 and calcium of 6 8 POA  Likely secondary to poor nutrition and oral intake  Recent lab studies showed normal K of 3 8, and Corrected Calcim of 8 4  Plan  · Continue to monitor CMP on AM labs  · Replete electrolytes as necessary

## 2022-11-09 NOTE — PLAN OF CARE
Problem: METABOLIC, FLUID AND ELECTROLYTES - ADULT  Goal: Electrolytes maintained within normal limits  Description: INTERVENTIONS:  - Monitor labs and assess patient for signs and symptoms of electrolyte imbalances  - Administer electrolyte replacement as ordered  - Monitor response to electrolyte replacements, including repeat lab results as appropriate  - Instruct patient on fluid and nutrition as appropriate  Outcome: Progressing  Goal: Fluid balance maintained  Description: INTERVENTIONS:  - Monitor labs   - Monitor I/O and WT  - Instruct patient on fluid and nutrition as appropriate  - Assess for signs & symptoms of volume excess or deficit  Outcome: Progressing  Goal: Glucose maintained within target range  Description: INTERVENTIONS:  - Monitor Blood Glucose as ordered  - Assess for signs and symptoms of hyperglycemia and hypoglycemia  - Administer ordered medications to maintain glucose within target range  - Assess nutritional intake and initiate nutrition service referral as needed  Outcome: Progressing     Problem: PAIN - ADULT  Goal: Verbalizes/displays adequate comfort level or baseline comfort level  Description: Interventions:  - Encourage patient to monitor pain and request assistance  - Assess pain using appropriate pain scale  - Administer analgesics based on type and severity of pain and evaluate response  - Implement non-pharmacological measures as appropriate and evaluate response  - Consider cultural and social influences on pain and pain management  - Notify physician/advanced practitioner if interventions unsuccessful or patient reports new pain  Outcome: Progressing     Problem: INFECTION - ADULT  Goal: Absence or prevention of progression during hospitalization  Description: INTERVENTIONS:  - Assess and monitor for signs and symptoms of infection  - Monitor lab/diagnostic results  - Monitor all insertion sites, i e  indwelling lines, tubes, and drains  - Monitor endotracheal if appropriate and nasal secretions for changes in amount and color  - [place] appropriate cooling/warming therapies per order  - Administer medications as ordered  - Instruct and encourage patient and family to use good hand hygiene technique  - Identify and instruct in appropriate isolation precautions for identified infection/condition  Outcome: Progressing  Goal: Absence of fever/infection during neutropenic period  Description: INTERVENTIONS:  - Monitor WBC    Outcome: Progressing     Problem: SAFETY ADULT  Goal: Patient will remain free of falls  Description: INTERVENTIONS:  - Educate patient/family on patient safety including physical limitations  - Instruct patient to call for assistance with activity   - Consult OT/PT to assist with strengthening/mobility   - Keep Call bell within reach  - Keep bed low and locked with side rails adjusted as appropriate  - Keep care items and personal belongings within reach  - Initiate and maintain comfort rounds  - Make Fall Risk Sign visible to staff  - Offer Toileting every 2 Hours, in advance of need  - Apply yellow socks and bracelet for high fall risk patients  - Consider moving patient to room near nurses station  Outcome: Progressing  Goal: Maintain or return to baseline ADL function  Description: INTERVENTIONS:  -  Assess patient's ability to carry out ADLs; assess patient's baseline for ADL function and identify physical deficits which impact ability to perform ADLs (bathing, care of mouth/teeth, toileting, grooming, dressing, etc )  - Assess/evaluate cause of self-care deficits   - Assess range of motion  - Assess patient's mobility; develop plan if impaired  - Assess patient's need for assistive devices and provide as appropriate  - Encourage maximum independence but intervene and supervise when necessary  - Involve family in performance of ADLs  - Assess for home care needs following discharge   - Consider OT consult to assist with ADL evaluation and planning for discharge  - Provide patient education as appropriate  Outcome: Progressing  Goal: Maintains/Returns to pre admission functional level  Description: INTERVENTIONS:  - Perform BMAT or MOVE assessment daily    - Set and communicate daily mobility goal to care team and patient/family/caregiver  - Collaborate with rehabilitation services on mobility goals if consulted  - Perform Range of Motion 3 times a day  - Reposition patient every 2 hours  - Dangle patient 3 times a day  - Stand patient 3 times a day  - Ambulate patient 3 times a day  - Out of bed to chair 3 times a day   - Out of bed for meals 3 times a day  - Out of bed for toileting  - Record patient progress and toleration of activity level   Outcome: Progressing     Problem: DISCHARGE PLANNING  Goal: Discharge to home or other facility with appropriate resources  Description: INTERVENTIONS:  - Identify barriers to discharge w/patient and caregiver  - Arrange for needed discharge resources and transportation as appropriate  - Identify discharge learning needs (meds, wound care, etc )  - Arrange for interpretive services to assist at discharge as needed  - Refer to Case Management Department for coordinating discharge planning if the patient needs post-hospital services based on physician/advanced practitioner order or complex needs related to functional status, cognitive ability, or social support system  Outcome: Progressing     Problem: Knowledge Deficit  Goal: Patient/family/caregiver demonstrates understanding of disease process, treatment plan, medications, and discharge instructions  Description: Complete learning assessment and assess knowledge base    Interventions:  - Provide teaching at level of understanding  - Provide teaching via preferred learning methods  Outcome: Progressing     Problem: Nutrition/Hydration-ADULT  Goal: Nutrient/Hydration intake appropriate for improving, restoring or maintaining nutritional needs  Description: Monitor and assess patient's nutrition/hydration status for malnutrition  Collaborate with interdisciplinary team and initiate plan and interventions as ordered  Monitor patient's weight and dietary intake as ordered or per policy  Utilize nutrition screening tool and intervene as necessary  Determine patient's food preferences and provide high-protein, high-caloric foods as appropriate       INTERVENTIONS:  - Monitor oral intake, urinary output, labs, and treatment plans  - Assess nutrition and hydration status and recommend course of action  - Evaluate amount of meals eaten  - Assist patient with eating if necessary   - Allow adequate time for meals  - Recommend/ encourage appropriate diets, oral nutritional supplements, and vitamin/mineral supplements  - Order, calculate, and assess calorie counts as needed  - Assess need for intravenous fluids  - Provide specific nutrition/hydration education as appropriate  - Include patient/family/caregiver in decisions related to nutrition  Outcome: Progressing

## 2022-11-10 ENCOUNTER — ANESTHESIA EVENT (INPATIENT)
Dept: GASTROENTEROLOGY | Facility: HOSPITAL | Age: 67
End: 2022-11-10

## 2022-11-10 ENCOUNTER — ANESTHESIA (OUTPATIENT)
Dept: ANESTHESIOLOGY | Facility: HOSPITAL | Age: 67
End: 2022-11-10

## 2022-11-10 ENCOUNTER — ANESTHESIA EVENT (OUTPATIENT)
Dept: ANESTHESIOLOGY | Facility: HOSPITAL | Age: 67
End: 2022-11-10

## 2022-11-10 ENCOUNTER — ANESTHESIA (INPATIENT)
Dept: GASTROENTEROLOGY | Facility: HOSPITAL | Age: 67
End: 2022-11-10

## 2022-11-10 ENCOUNTER — APPOINTMENT (OUTPATIENT)
Dept: GASTROENTEROLOGY | Facility: HOSPITAL | Age: 67
End: 2022-11-10

## 2022-11-10 LAB
ANION GAP SERPL CALCULATED.3IONS-SCNC: 10 MMOL/L (ref 4–13)
BUN SERPL-MCNC: 11 MG/DL (ref 5–25)
CALCIUM SERPL-MCNC: 8.1 MG/DL (ref 8.4–10.2)
CHLORIDE SERPL-SCNC: 102 MMOL/L (ref 96–108)
CO2 SERPL-SCNC: 19 MMOL/L (ref 21–32)
CREAT SERPL-MCNC: 0.78 MG/DL (ref 0.6–1.3)
ERYTHROCYTE [DISTWIDTH] IN BLOOD BY AUTOMATED COUNT: 13.2 % (ref 11.6–15.1)
GFR SERPL CREATININE-BSD FRML MDRD: 93 ML/MIN/1.73SQ M
GLUCOSE SERPL-MCNC: 85 MG/DL (ref 65–140)
HCT VFR BLD AUTO: 29.1 % (ref 36.5–49.3)
HGB BLD-MCNC: 9.7 G/DL (ref 12–17)
INR PPP: 1.19 (ref 0.84–1.19)
MAGNESIUM SERPL-MCNC: 1.8 MG/DL (ref 1.9–2.7)
MCH RBC QN AUTO: 35.7 PG (ref 26.8–34.3)
MCHC RBC AUTO-ENTMCNC: 33.3 G/DL (ref 31.4–37.4)
MCV RBC AUTO: 107 FL (ref 82–98)
PLATELET # BLD AUTO: 182 THOUSANDS/UL (ref 149–390)
PMV BLD AUTO: 10.7 FL (ref 8.9–12.7)
POTASSIUM SERPL-SCNC: 4.3 MMOL/L (ref 3.5–5.3)
PROTHROMBIN TIME: 15.3 SECONDS (ref 11.6–14.5)
RBC # BLD AUTO: 2.72 MILLION/UL (ref 3.88–5.62)
SODIUM SERPL-SCNC: 131 MMOL/L (ref 135–147)
WBC # BLD AUTO: 7.2 THOUSAND/UL (ref 4.31–10.16)

## 2022-11-10 PROCEDURE — 0DB98ZZ EXCISION OF DUODENUM, VIA NATURAL OR ARTIFICIAL OPENING ENDOSCOPIC: ICD-10-PCS | Performed by: INTERNAL MEDICINE

## 2022-11-10 PROCEDURE — 0DBK8ZZ EXCISION OF ASCENDING COLON, VIA NATURAL OR ARTIFICIAL OPENING ENDOSCOPIC: ICD-10-PCS | Performed by: INTERNAL MEDICINE

## 2022-11-10 PROCEDURE — 0DB28ZX EXCISION OF MIDDLE ESOPHAGUS, VIA NATURAL OR ARTIFICIAL OPENING ENDOSCOPIC, DIAGNOSTIC: ICD-10-PCS | Performed by: INTERNAL MEDICINE

## 2022-11-10 PROCEDURE — 0D598ZZ DESTRUCTION OF DUODENUM, VIA NATURAL OR ARTIFICIAL OPENING ENDOSCOPIC: ICD-10-PCS | Performed by: INTERNAL MEDICINE

## 2022-11-10 RX ORDER — EPHEDRINE SULFATE 50 MG/ML
INJECTION INTRAVENOUS AS NEEDED
Status: DISCONTINUED | OUTPATIENT
Start: 2022-11-10 | End: 2022-11-10

## 2022-11-10 RX ORDER — SODIUM CHLORIDE, SODIUM LACTATE, POTASSIUM CHLORIDE, CALCIUM CHLORIDE 600; 310; 30; 20 MG/100ML; MG/100ML; MG/100ML; MG/100ML
INJECTION, SOLUTION INTRAVENOUS CONTINUOUS PRN
Status: DISCONTINUED | OUTPATIENT
Start: 2022-11-10 | End: 2022-11-10

## 2022-11-10 RX ORDER — PROPOFOL 10 MG/ML
INJECTION, EMULSION INTRAVENOUS AS NEEDED
Status: DISCONTINUED | OUTPATIENT
Start: 2022-11-10 | End: 2022-11-10

## 2022-11-10 RX ORDER — FENTANYL CITRATE 50 UG/ML
INJECTION, SOLUTION INTRAMUSCULAR; INTRAVENOUS AS NEEDED
Status: DISCONTINUED | OUTPATIENT
Start: 2022-11-10 | End: 2022-11-10

## 2022-11-10 RX ORDER — MAGNESIUM SULFATE HEPTAHYDRATE 40 MG/ML
2 INJECTION, SOLUTION INTRAVENOUS ONCE
Status: COMPLETED | OUTPATIENT
Start: 2022-11-10 | End: 2022-11-10

## 2022-11-10 RX ADMIN — PROPOFOL 20 MG: 10 INJECTION, EMULSION INTRAVENOUS at 14:51

## 2022-11-10 RX ADMIN — MIRTAZAPINE 30 MG: 15 TABLET, FILM COATED ORAL at 21:00

## 2022-11-10 RX ADMIN — BUPROPION HYDROCHLORIDE 150 MG: 150 TABLET, FILM COATED, EXTENDED RELEASE ORAL at 08:22

## 2022-11-10 RX ADMIN — PROPOFOL 20 MG: 10 INJECTION, EMULSION INTRAVENOUS at 14:45

## 2022-11-10 RX ADMIN — MAGNESIUM SULFATE HEPTAHYDRATE 2 G: 40 INJECTION, SOLUTION INTRAVENOUS at 10:18

## 2022-11-10 RX ADMIN — MULTIPLE VITAMINS W/ MINERALS TAB 1 TABLET: TAB ORAL at 08:23

## 2022-11-10 RX ADMIN — PROPOFOL 70 MG: 10 INJECTION, EMULSION INTRAVENOUS at 14:30

## 2022-11-10 RX ADMIN — EPHEDRINE SULFATE 15 MG: 50 INJECTION, SOLUTION INTRAVENOUS at 14:46

## 2022-11-10 RX ADMIN — EPHEDRINE SULFATE 5 MG: 50 INJECTION, SOLUTION INTRAVENOUS at 14:57

## 2022-11-10 RX ADMIN — PROPOFOL 20 MG: 10 INJECTION, EMULSION INTRAVENOUS at 15:20

## 2022-11-10 RX ADMIN — PROPOFOL 30 MG: 10 INJECTION, EMULSION INTRAVENOUS at 14:32

## 2022-11-10 RX ADMIN — POTASSIUM CHLORIDE 20 MEQ: 20 SOLUTION ORAL at 08:23

## 2022-11-10 RX ADMIN — CARVEDILOL 3.12 MG: 3.12 TABLET, FILM COATED ORAL at 16:50

## 2022-11-10 RX ADMIN — CARVEDILOL 3.12 MG: 3.12 TABLET, FILM COATED ORAL at 08:23

## 2022-11-10 RX ADMIN — FENTANYL CITRATE 25 MCG: 50 INJECTION, SOLUTION INTRAMUSCULAR; INTRAVENOUS at 14:30

## 2022-11-10 RX ADMIN — PROPOFOL 20 MG: 10 INJECTION, EMULSION INTRAVENOUS at 15:01

## 2022-11-10 RX ADMIN — AMLODIPINE BESYLATE 5 MG: 5 TABLET ORAL at 08:23

## 2022-11-10 RX ADMIN — PROPOFOL 30 MG: 10 INJECTION, EMULSION INTRAVENOUS at 14:26

## 2022-11-10 RX ADMIN — FOLIC ACID 1 MG: 1 TABLET ORAL at 08:23

## 2022-11-10 RX ADMIN — PROPOFOL 20 MG: 10 INJECTION, EMULSION INTRAVENOUS at 15:04

## 2022-11-10 RX ADMIN — PRAVASTATIN SODIUM 80 MG: 80 TABLET ORAL at 16:50

## 2022-11-10 RX ADMIN — SODIUM CHLORIDE, SODIUM LACTATE, POTASSIUM CHLORIDE, AND CALCIUM CHLORIDE: .6; .31; .03; .02 INJECTION, SOLUTION INTRAVENOUS at 14:27

## 2022-11-10 RX ADMIN — PROPOFOL 20 MG: 10 INJECTION, EMULSION INTRAVENOUS at 15:16

## 2022-11-10 RX ADMIN — EPHEDRINE SULFATE 5 MG: 50 INJECTION, SOLUTION INTRAVENOUS at 15:04

## 2022-11-10 RX ADMIN — AMILORIDE HYDROCLORIDE 5 MG: 5 TABLET ORAL at 12:07

## 2022-11-10 RX ADMIN — FENTANYL CITRATE 25 MCG: 50 INJECTION, SOLUTION INTRAMUSCULAR; INTRAVENOUS at 14:37

## 2022-11-10 RX ADMIN — UMECLIDINIUM BROMIDE AND VILANTEROL TRIFENATATE 1 PUFF: 62.5; 25 POWDER RESPIRATORY (INHALATION) at 08:22

## 2022-11-10 RX ADMIN — PROPOFOL 20 MG: 10 INJECTION, EMULSION INTRAVENOUS at 14:56

## 2022-11-10 RX ADMIN — PROPOFOL 40 MG: 10 INJECTION, EMULSION INTRAVENOUS at 15:12

## 2022-11-10 RX ADMIN — THIAMINE HCL TAB 100 MG 100 MG: 100 TAB at 08:22

## 2022-11-10 RX ADMIN — PANTOPRAZOLE SODIUM 40 MG: 40 TABLET, DELAYED RELEASE ORAL at 04:14

## 2022-11-10 RX ADMIN — TOPICAL ANESTHETIC 1 SPRAY: 200 SPRAY DENTAL; PERIODONTAL at 14:27

## 2022-11-10 RX ADMIN — PROPOFOL 20 MG: 10 INJECTION, EMULSION INTRAVENOUS at 14:48

## 2022-11-10 NOTE — ASSESSMENT & PLAN NOTE
Lab Results   Component Value Date    K 4 3 11/10/2022    CORRECTEDCA 8 4 11/08/2022     Hypokalemia of 3 4 and calcium of 6 8 POA  Likely secondary to poor nutrition and oral intake    Plan  · Continue to monitor CMP on AM labs  · Replete electrolytes as necessary

## 2022-11-10 NOTE — ANESTHESIA PREPROCEDURE EVALUATION
Procedure:  EGD  COLONOSCOPY    Relevant Problems   CARDIO   (+) Chest pain   (+) Essential hypertension   (+) Mixed hyperlipidemia   (+) Supraventricular tachycardia (HCC)      GI/HEPATIC   (+) Gastroesophageal reflux disease without esophagitis   (+) HCC (hepatocellular carcinoma) (HCC)      /RENAL   (+) Kidney stone      HEMATOLOGY   (+) Anemia      NEURO/PSYCH   (+) Chronic, continuous use of opioids   (+) Dysthymic disorder   (+) Encounter for follow-up surveillance of liver cancer   (+) History of alcohol use disorder   (+) Personal history of liver cancer      PULMONARY   (+) Chronic obstructive pulmonary disease (HCC)   (+) Emphysema of lung (HCC)   (+) Emphysema/COPD (HCC)   (+) Observed sleep apnea   (+) Pneumonia        Physical Exam    Airway    Mallampati score: II  TM Distance: >3 FB  Neck ROM: full     Dental   No notable dental hx     Cardiovascular  Cardiovascular exam normal    Pulmonary  Pulmonary exam normal     Other Findings      Left Ventricle: Left ventricular cavity size is normal  Wall thickness is normal  The left ventricular ejection fraction is 60%  Systolic function is normal  Wall motion is normal  Diastolic function is normal for age  •  Right Ventricle: Right ventricular cavity size is normal  Systolic function is normal     11/3/22 ekg   Narrative & Impression   Normal sinus rhythm  Septal infarct , age undetermined  Abnormal ECG  When compared with ECG of 01-NOV-2022 22:02,  Incomplete right bundle branch block is no longer Present  Septal infarct is now Present       Anesthesia Plan  ASA Score- 3     Anesthesia Type- IV sedation with anesthesia with ASA Monitors  Additional Monitors:   Airway Plan:           Plan Factors-Exercise tolerance (METS): >4 METS  Chart reviewed  EKG reviewed  Imaging results reviewed  Existing labs reviewed  Patient summary reviewed  Patient is not a current smoker  Patient not instructed to abstain from smoking on day of procedure  Patient did not smoke on day of surgery  Induction-     Postoperative Plan-     Informed Consent- Anesthetic plan and risks discussed with patient  I personally reviewed this patient with the CRNA  Discussed and agreed on the Anesthesia Plan with the CRNA  Meghana Linton

## 2022-11-10 NOTE — PLAN OF CARE
Problem: METABOLIC, FLUID AND ELECTROLYTES - ADULT  Goal: Electrolytes maintained within normal limits  Description: INTERVENTIONS:  - Monitor labs and assess patient for signs and symptoms of electrolyte imbalances  - Administer electrolyte replacement as ordered  - Monitor response to electrolyte replacements, including repeat lab results as appropriate  - Instruct patient on fluid and nutrition as appropriate  Outcome: Progressing  Goal: Fluid balance maintained  Description: INTERVENTIONS:  - Monitor labs   - Monitor I/O and WT  - Instruct patient on fluid and nutrition as appropriate  - Assess for signs & symptoms of volume excess or deficit  Outcome: Progressing  Goal: Glucose maintained within target range  Description: INTERVENTIONS:  - Monitor Blood Glucose as ordered  - Assess for signs and symptoms of hyperglycemia and hypoglycemia  - Administer ordered medications to maintain glucose within target range  - Assess nutritional intake and initiate nutrition service referral as needed  Outcome: Progressing     Problem: PAIN - ADULT  Goal: Verbalizes/displays adequate comfort level or baseline comfort level  Description: Interventions:  - Encourage patient to monitor pain and request assistance  - Assess pain using appropriate pain scale  - Administer analgesics based on type and severity of pain and evaluate response  - Implement non-pharmacological measures as appropriate and evaluate response  - Consider cultural and social influences on pain and pain management  - Notify physician/advanced practitioner if interventions unsuccessful or patient reports new pain  Outcome: Progressing     Problem: INFECTION - ADULT  Goal: Absence or prevention of progression during hospitalization  Description: INTERVENTIONS:  - Assess and monitor for signs and symptoms of infection  - Monitor lab/diagnostic results  - Monitor all insertion sites, i e  indwelling lines, tubes, and drains  - Monitor endotracheal if appropriate and nasal secretions for changes in amount and color  - [place] appropriate cooling/warming therapies per order  - Administer medications as ordered  - Instruct and encourage patient and family to use good hand hygiene technique  - Identify and instruct in appropriate isolation precautions for identified infection/condition  Outcome: Progressing  Goal: Absence of fever/infection during neutropenic period  Description: INTERVENTIONS:  - Monitor WBC    Outcome: Progressing     Problem: SAFETY ADULT  Goal: Patient will remain free of falls  Description: INTERVENTIONS:  - Educate patient/family on patient safety including physical limitations  - Instruct patient to call for assistance with activity   - Consult OT/PT to assist with strengthening/mobility   - Keep Call bell within reach  - Keep bed low and locked with side rails adjusted as appropriate  - Keep care items and personal belongings within reach  - Initiate and maintain comfort rounds  - Make Fall Risk Sign visible to staff  - Offer Toileting every 2 Hours, in advance of need  - Initiate/Maintain bed alarm  - Apply yellow socks and bracelet for high fall risk patients  - Consider moving patient to room near nurses station  Outcome: Progressing  Goal: Maintain or return to baseline ADL function  Description: INTERVENTIONS:  -  Assess patient's ability to carry out ADLs; assess patient's baseline for ADL function and identify physical deficits which impact ability to perform ADLs (bathing, care of mouth/teeth, toileting, grooming, dressing, etc )  - Assess/evaluate cause of self-care deficits   - Assess range of motion  - Assess patient's mobility; develop plan if impaired  - Assess patient's need for assistive devices and provide as appropriate  - Encourage maximum independence but intervene and supervise when necessary  - Involve family in performance of ADLs  - Assess for home care needs following discharge   - Consider OT consult to assist with ADL evaluation and planning for discharge  - Provide patient education as appropriate  Outcome: Progressing  Goal: Maintains/Returns to pre admission functional level  Description: INTERVENTIONS:  - Perform BMAT or MOVE assessment daily    - Set and communicate daily mobility goal to care team and patient/family/caregiver  - Collaborate with rehabilitation services on mobility goals if consulted  - Perform Range of Motion 3 times a day  - Reposition patient every 2 hours  - Dangle patient 3 times a day  - Stand patient 3 times a day  - Ambulate patient 3 times a day  - Out of bed to chair 3 times a day   - Out of bed for meals 3 times a day  - Out of bed for toileting  - Record patient progress and toleration of activity level   Outcome: Progressing     Problem: DISCHARGE PLANNING  Goal: Discharge to home or other facility with appropriate resources  Description: INTERVENTIONS:  - Identify barriers to discharge w/patient and caregiver  - Arrange for needed discharge resources and transportation as appropriate  - Identify discharge learning needs (meds, wound care, etc )  - Arrange for interpretive services to assist at discharge as needed  - Refer to Case Management Department for coordinating discharge planning if the patient needs post-hospital services based on physician/advanced practitioner order or complex needs related to functional status, cognitive ability, or social support system  Outcome: Progressing     Problem: Knowledge Deficit  Goal: Patient/family/caregiver demonstrates understanding of disease process, treatment plan, medications, and discharge instructions  Description: Complete learning assessment and assess knowledge base    Interventions:  - Provide teaching at level of understanding  - Provide teaching via preferred learning methods  Outcome: Progressing     Problem: MOBILITY - ADULT  Goal: Maintain or return to baseline ADL function  Description: INTERVENTIONS:  -  Assess patient's ability to carry out ADLs; assess patient's baseline for ADL function and identify physical deficits which impact ability to perform ADLs (bathing, care of mouth/teeth, toileting, grooming, dressing, etc )  - Assess/evaluate cause of self-care deficits   - Assess range of motion  - Assess patient's mobility; develop plan if impaired  - Assess patient's need for assistive devices and provide as appropriate  - Encourage maximum independence but intervene and supervise when necessary  - Involve family in performance of ADLs  - Assess for home care needs following discharge   - Consider OT consult to assist with ADL evaluation and planning for discharge  - Provide patient education as appropriate  Outcome: Progressing  Goal: Maintains/Returns to pre admission functional level  Description: INTERVENTIONS:  - Perform BMAT or MOVE assessment daily    - Set and communicate daily mobility goal to care team and patient/family/caregiver  - Collaborate with rehabilitation services on mobility goals if consulted  - Perform Range of Motion 3 times a day  - Reposition patient every 2 hours    - Dangle patient 3 times a day  - Stand patient 3 times a day  - Ambulate patient 3 times a day  - Out of bed to chair 3 times a day   - Out of bed for meals 3 times a day  - Out of bed for toileting  - Record patient progress and toleration of activity level   Outcome: Progressing     Problem: Potential for Falls  Goal: Patient will remain free of falls  Description: INTERVENTIONS:  - Educate patient/family on patient safety including physical limitations  - Instruct patient to call for assistance with activity   - Consult OT/PT to assist with strengthening/mobility   - Keep Call bell within reach  - Keep bed low and locked with side rails adjusted as appropriate  - Keep care items and personal belongings within reach  - Initiate and maintain comfort rounds  - Make Fall Risk Sign visible to staff  - Offer Toileting every 3 Hours, in advance of need  - Initiate/Maintain bed alarm  - Apply yellow socks and bracelet for high fall risk patients  - Consider moving patient to room near nurses station  Outcome: Progressing     Problem: Nutrition/Hydration-ADULT  Goal: Nutrient/Hydration intake appropriate for improving, restoring or maintaining nutritional needs  Description: Monitor and assess patient's nutrition/hydration status for malnutrition  Collaborate with interdisciplinary team and initiate plan and interventions as ordered  Monitor patient's weight and dietary intake as ordered or per policy  Utilize nutrition screening tool and intervene as necessary  Determine patient's food preferences and provide high-protein, high-caloric foods as appropriate       INTERVENTIONS:  - Monitor oral intake, urinary output, labs, and treatment plans  - Assess nutrition and hydration status and recommend course of action  - Evaluate amount of meals eaten  - Assist patient with eating if necessary   - Allow adequate time for meals  - Recommend/ encourage appropriate diets, oral nutritional supplements, and vitamin/mineral supplements  - Order, calculate, and assess calorie counts as needed  - Recommend, monitor, and adjust tube feedings and TPN/PPN based on assessed needs  - Assess need for intravenous fluids  - Provide specific nutrition/hydration education as appropriate  - Include patient/family/caregiver in decisions related to nutrition  Outcome: Progressing

## 2022-11-10 NOTE — ASSESSMENT & PLAN NOTE
· History of Crohn's disease W/ Ostomy    Patient denies abnormal or excessive output  · Ostomy care   · FOBT+ in the setting of acute drop of Hbg of 3g  ·   Plan:  · GI Consulted, Input Appreciated  · Bidirectional Endoscopy scheduled for today  · Plan for  GIoutpatient follow-up for treatment of Crohn's disease

## 2022-11-10 NOTE — ANESTHESIA PREPROCEDURE EVALUATION
Procedure:  PRE-OP ONLY    Relevant Problems   CARDIO   (+) Chest pain   (+) Essential hypertension   (+) Mixed hyperlipidemia   (+) Supraventricular tachycardia (HCC)      GI/HEPATIC   (+) Gastroesophageal reflux disease without esophagitis   (+) HCC (hepatocellular carcinoma) (HCC)      /RENAL   (+) Kidney stone      HEMATOLOGY   (+) Anemia      NEURO/PSYCH   (+) Chronic, continuous use of opioids   (+) Dysthymic disorder   (+) Encounter for follow-up surveillance of liver cancer   (+) History of alcohol use disorder   (+) Personal history of liver cancer      PULMONARY   (+) Chronic obstructive pulmonary disease (HCC)   (+) Emphysema of lung (HCC)   (+) Emphysema/COPD (HCC)   (+) Observed sleep apnea   (+) Pneumonia        Physical Exam    Airway    Mallampati score: II  TM Distance: >3 FB  Neck ROM: full     Dental   No notable dental hx     Cardiovascular  Cardiovascular exam normal    Pulmonary  Pulmonary exam normal     Other Findings      Left Ventricle: Left ventricular cavity size is normal  Wall thickness is normal  The left ventricular ejection fraction is 60%  Systolic function is normal  Wall motion is normal  Diastolic function is normal for age  •  Right Ventricle: Right ventricular cavity size is normal  Systolic function is normal     11/3/22 ekg   Narrative & Impression   Normal sinus rhythm  Septal infarct , age undetermined  Abnormal ECG  When compared with ECG of 01-NOV-2022 22:02,  Incomplete right bundle branch block is no longer Present  Septal infarct is now Present       Anesthesia Plan  ASA Score- 3     Anesthesia Type- IV sedation with anesthesia with ASA Monitors  Additional Monitors:   Airway Plan:           Plan Factors-Exercise tolerance (METS): >4 METS  Chart reviewed  EKG reviewed  Imaging results reviewed  Existing labs reviewed  Patient summary reviewed  Patient is not a current smoker  Patient not instructed to abstain from smoking on day of procedure   Patient did not smoke on day of surgery  Induction-     Postoperative Plan-     Informed Consent- Anesthetic plan and risks discussed with patient  I personally reviewed this patient with the CRNA  Discussed and agreed on the Anesthesia Plan with the CRNA  Hubert Thomason

## 2022-11-10 NOTE — ASSESSMENT & PLAN NOTE
· Minimum of at least 6 cans / bottles of alcohol daily   · Alcohol level   Has spoken with [address] [place] [address] Street    Originally agreeable to [facility] [employer], but now patient is  Is requesting DC home     Plan:  · IP alcohol rehab - will work with pt regarding placement once he goes home, currently patient is reporting that he does not want to attend rehab post discharge  · CIWA  Protocol placed, no signs of acute alcohol withdrawal during hospital stay  ·  upon discharge home patient will be given resources and information regards to alcohol  rehabilitationin the outpatient setting

## 2022-11-10 NOTE — ANESTHESIA POSTPROCEDURE EVALUATION
Post-Op Assessment Note    CV Status:  Stable  Pain Score: 0    Pain management: adequate     Mental Status:  Sleepy   PONV Controlled:  Controlled   Airway Patency:  Patent      Post Op Vitals Reviewed: Yes      Staff: CRNA         No complications documented      BP   145/89   Temp   97   Pulse  82   Resp   14   SpO2   100

## 2022-11-10 NOTE — ASSESSMENT & PLAN NOTE
Acute Macrocytic Anemia with a Hbg Hemoglobin of 9 6 noted on 11/8/22,   · hemoglobin remains stable with the morning hemoglobin count of 9 8, Patient is asymptomatic denying dizziness, headaches, lightheadedness, SOB, weakness or fatigue  · positive FOBT  · Iron level and saturation within normal limits, TIBC low at 138, ferritin 819  · GI consult, input appreciated   · CT Abd/Pelvis: no evidence of retroperitoneal hematoma  5 8 cm conglomerate ablation zone defect subcapsular right hepatic lobe segments 8/5 without enhancement to suggest residual active tumor (LR-TR nonviable)  Please note phase of enhancement is somewhat later than optimal   1 cm LIRADS 3 observation in segment 6 on the September 21, 2022 study may again be present (#4/23), probably similar in size although not as well depicted possibly related to differences in phase of enhancement  Attention on 6 month follow up MRI Recommended  Stable shotty lymph nodes upper abdomen and retroperitoneum  Fractures of the left 10th and 12th ribs, not clearly evident on previous CT study  Subtle halo of density again seen about the JUNE, similar to the most recent CT study  Finding may be inflammatory in nature? Attention on follow-up advised  Fatty liver      Plan:  · Plan for bidirectional endoscopy scheduled for today  · Eliquis held, last dose 11/9  At 0800  ·  diet switch to clear liquid after breakfast, NPO past midnigh  · Continue to trend hemoglobin levels

## 2022-11-10 NOTE — ASSESSMENT & PLAN NOTE
Patient has history of  Hepatocellular carcinoma, originally diagnosed in 2020 concurrently undergoing treatment followed by surgical oncology   Dr Sara Do  ·  underwent ablation of the segment 5 liver lesion in 2020  ·  recent increase in AFP with MRI in July 2022 noting unchanged size and appearance of the right hepatic lobe treatment zone without evidence of viable tumor but with increase in size of a now 1 8 cm segment 5 lesion adjacent to the treatment stone upgraded to LI rads 5   ·  on 9/21/2022  Underwent IR microwave ablation of segment 5 lesion  And was found to have an additional LR 3 lesion  visualized segment in 6  Plan:     Follow-up closely with surgical oncology on discharge

## 2022-11-10 NOTE — ASSESSMENT & PLAN NOTE
· Mg 0 5 POA > 2 5 following repletion > 0 9 > 0 6 > 1 4 >  1 6 > 1 8  · Has received multiple dosages of IV Mg Sulfate  Plan:  · IV Mg Sulfate 1g  · Continue to monitor Mg level and replete as needed

## 2022-11-10 NOTE — PROGRESS NOTES
[facility]  Progress Note - Ace Love III 1955, 79 y o  male MRN: [id number]  Unit/Bed#: S -01 Encounter: [id number]  Primary Care Provider: Yasmin Johnson MD   Date and time admitted to hospital: 11/1/2022  9:35 PM    * Syncope  Assessment & Plan  Patient reports loss of consciousness while out drinking with friends last night  He stood up to go to the bathroom and reports losing consciousness and hitting the floor  Reports poor oral intake and loss of appetite recently due to generalized anxiety  He has been experiencing lightheadedness and dizziness upon rising from sitting positing while at home  Denies loss of consciousness at home  He has not had any chest pain or shortness of breath with these episodes  No history of seizures per chart review, no history of CAD or MI  Brought to the emergency department by EMS  Found to be hyponatremic with osmolality profile fitting hypovolemic hyponatremia  Hyponatremia improved with IVF  Orthostatics positive in the ED  EKG shows NSR with incomplete RBBB and nonspecific ST segment abnormality  Troponins negative  Syncope most likely in the setting of poor oral intake and vasovagal response  Telemetry shows episodes of sinus and supraventricular tachycardia with HR ranging from 150 to 200  Echocardiogram negative for any cardiogenic cause of of his syncopal episodes  Orthostats negative x3     Plan:  Continue IVF      Check orthostatics prior to discharge  Holter monitoring on discharge - will place referral to cardiology  Placed on CIWA protocol given history of chronic  Heavy alcohol use  Encourage cessation of alcohol consumption and better oral nutrition  Replenish electrolytes    Anemia  Assessment & Plan  Acute Macrocytic Anemia with a Hbg Hemoglobin of 9 6 noted on 11/8/22,   · hemoglobin remains stable with the morning hemoglobin count of 9 8, Patient is asymptomatic denying dizziness, headaches, lightheadedness, SOB, weakness or fatigue  · positive FOBT  · Iron level and saturation within normal limits, TIBC low at 138, ferritin 819  · GI consult, input appreciated   · CT Abd/Pelvis: no evidence of retroperitoneal hematoma  5 8 cm conglomerate ablation zone defect subcapsular right hepatic lobe segments 8/5 without enhancement to suggest residual active tumor (LR-TR nonviable)  Please note phase of enhancement is somewhat later than optimal   1 cm LIRADS 3 observation in segment 6 on the September 21, 2022 study may again be present (#4/23), probably similar in size although not as well depicted possibly related to differences in phase of enhancement  Attention on 6 month follow up MRI Recommended  Stable shotty lymph nodes upper abdomen and retroperitoneum  Fractures of the left 10th and 12th ribs, not clearly evident on previous CT study  Subtle halo of density again seen about the JUNE, similar to the most recent CT study  Finding may be inflammatory in nature? Attention on follow-up advised  Fatty liver      Plan:  · Plan for bidirectional endoscopy scheduled for today  · Eliquis held, last dose 11/9  At 0800  ·  diet switch to clear liquid after breakfast, NPO past midnigh  · Continue to trend hemoglobin levels    Supraventricular tachycardia ([facility] [address] )  Assessment & Plan  Patient presents after syncopal episodes while out drinking  Found to have orthostatic hypotension secondary to intravascular volume depletion  Echocardiogram on 11/4 shows EF of 60%, normal LV function and nondilated left atrium  Telemetry reveals SVT with HR ranging from 120 to 200 for consecutive nights      Plan:  Continue telemetry   Monitor for improvement with correction of electrolytes  Carvedilol added      History of alcohol use disorder  Assessment & Plan  · Minimum of at least 6 cans / bottles of alcohol daily   · Alcohol level   Has spoken with [facility]    Originally agreeable to [employer], but now patient is  Is requesting DC home     Plan:  · IP alcohol rehab - will work with pt regarding placement once he goes home, currently patient is reporting that he does not want to attend rehab post discharge  · CIWA  Protocol placed, no signs of acute alcohol withdrawal during hospital stay  ·  upon discharge home patient will be given resources and information regards to alcohol  rehabilitationin the outpatient setting      Electrolyte abnormality  Assessment & Plan  Lab Results   Component Value Date    K 4 3 11/10/2022    CORRECTEDCA 8 4 11/08/2022     Hypokalemia of 3 4 and calcium of 6 8 POA  Likely secondary to poor nutrition and oral intake    Plan  · Continue to monitor CMP on AM labs  · Replete electrolytes as necessary      Platelets decreased ([address] )  Assessment & Plan  2/2 to alcohol use  No bleeding at this time, no interventions planned     [facility] [address]  (hepatocellular carcinoma) [facility])  Assessment & Plan   Patient has history of  Hepatocellular carcinoma, originally diagnosed in 2020 concurrently undergoing treatment followed by surgical oncology   Dr Verona Aguirre  ·  underwent ablation of the segment 5 liver lesion in 2020  ·  recent increase in AFP with MRI in July 2022 noting unchanged size and appearance of the right hepatic lobe treatment zone without evidence of viable tumor but with increase in size of a now 1 8 cm segment 5 lesion adjacent to the treatment stone upgraded to LI rads 5   ·  on 9/21/2022  Underwent IR microwave ablation of segment 5 lesion  And was found to have an additional LR 3 lesion  visualized segment in 6  Plan:     Follow-up closely with surgical oncology on discharge    Crohn's disease of small intestine with other complication ([address] )  Assessment & Plan  · History of Crohn's disease W/ Ostomy    Patient denies abnormal or excessive output  · Ostomy care   · FOBT+ in the setting of acute drop of Hbg of 3g  ·   Plan:  · GI Consulted, Input Appreciated  · Bidirectional Endoscopy scheduled for today  · Plan for  GIoutpatient follow-up for treatment of Crohn's disease      Chronic obstructive pulmonary disease (HCC)  Assessment & Plan  · COPD Stable on room air  · Continue Anoro Ellipta     Hypomagnesemia  Assessment & Plan  · Mg 0 5 POA > 2 5 following repletion > 0 9 > 0 6 > 1 4 >  1 6 > 1 8  · Has received multiple dosages of IV Mg Sulfate  Plan:  · IV Mg Sulfate 1g  · Continue to monitor Mg level and replete as needed    Hyponatremia  Assessment & Plan  Lab Results   Component Value Date    SODIUM 135 11/09/2022     · Baseline 135s -140s, , improved with fluids  · Likely 2/2 to low PO intake and increase alcohol 231  · Encourage food and beverage hydration aside from alcohol     Plan:  CMP initially monitored q12h   Continue IV hydration given low-normal, will discontinue once consistently baseline and encourage oral intake   resolved:  Currently patient's sodium at 133      Essential hypertension  Assessment & Plan  On Amiloride and Amlodipine 5 mg each daily      Plan:  Continue home meds  Carvedilol added          VTE Pharmacologic Prophylaxis: VTE Score: 5 Patient was started on Eliquis acutely for subsegmental pulmonary embolism, however that was held today in the setting of acute hemoglobin drop planned EGD/colonoscopy tomorrow    Patient Centered Rounds: I performed bedside rounds with nursing staff today  Discussions with Specialists or Other Care Team Provider:   GI consult     Education and Discussions with Family / Patient: Updated  (daughter) at bedside  Current Length of Stay: 8 day(s)  Current Patient Status: Inpatient   Discharge Plan: Anticipate discharge in 24-48 hrs to home with home services  Code Status: Level 1 - Full Code    Subjective:   Patient seen and evaluated at bedside  No  acute overnight events  Denies any symptoms at this time  Notes normal brown stool collection in ostomy  Denies CP, SOB, Headache, Dizziness, Abdominal Pain   Spoke with daughter at bedside who reports patient has been confused and disoriented stating he called asking her when he was scheduled to be picked up from the hotel  Nursing staff noted patient was in the hallway looking for the bathroom  Reassessed patient and spoke with daughter at bedside  Patient AOX3 and reporting he just feels tired and wanted to go home  Objective:     Vitals:   Temp (24hrs), Av °F (36 7 °C), Min:97 5 °F (36 4 °C), Max:98 8 °F (37 1 °C)    Temp:  [97 5 °F (36 4 °C)-98 8 °F (37 1 °C)] 97 7 °F (36 5 °C)  HR:  [71-88] 75  Resp:  [17-20] 20  BP: (132-158)/(65-77) 132/65  SpO2:  [97 %-99 %] 97 %  Body mass index is 20 67 kg/m²  Input and Output Summary (last 24 hours): Intake/Output Summary (Last 24 hours) at 11/10/2022 1442  Last data filed at 2022 1701  Gross per 24 hour   Intake --   Output 400 ml   Net -400 ml       Physical Exam:   Physical Exam  Vitals reviewed  Constitutional:       General: He is not in acute distress  Appearance: He is not toxic-appearing  HENT:      Head: Normocephalic and atraumatic  Eyes:      General: No scleral icterus  Extraocular Movements: Extraocular movements intact  Conjunctiva/sclera: Conjunctivae normal       Pupils: Pupils are equal, round, and reactive to light  Neck:      Vascular: No carotid bruit  Cardiovascular:      Rate and Rhythm: Normal rate and regular rhythm  Pulses: Normal pulses  Heart sounds: Normal heart sounds  No murmur heard  No gallop  Pulmonary:      Effort: Pulmonary effort is normal  No respiratory distress  Breath sounds: Normal breath sounds  No stridor  No wheezing, rhonchi or rales  Chest:      Chest wall: No tenderness  Abdominal:      General: Abdomen is flat  The ostomy site is clean  Bowel sounds are normal  There is no distension  Palpations: Abdomen is soft  Tenderness: There is no abdominal tenderness        Comments: Ileostomy in place with brown stool in collection bag, no obvious signs of mass upon palpation of the abdomen   Musculoskeletal:         General: Normal range of motion  Cervical back: Normal range of motion  Right lower leg: No edema  Left lower leg: No edema  Skin:     General: Skin is warm  Coloration: Skin is not jaundiced  Findings: No rash  Neurological:      General: No focal deficit present  Mental Status: He is alert and oriented to person, place, and time  Mental status is at baseline  Motor: No weakness  Psychiatric:         Mood and Affect: Mood normal          Behavior: Behavior is not agitated  Behavior is cooperative  Additional Data:     Labs:  Results from last 7 days   Lab Units 11/10/22  0410 11/08/22  1328 11/08/22  0431   WBC Thousand/uL 7 20   < > 5 12   HEMOGLOBIN g/dL 9 7*   < > 9 6*   HEMATOCRIT % 29 1*   < > 29 9*   PLATELETS Thousands/uL 182   < > 110*   NEUTROS PCT %  --   --  67   LYMPHS PCT %  --   --  19   MONOS PCT %  --   --  12   EOS PCT %  --   --  1    < > = values in this interval not displayed       Results from last 7 days   Lab Units 11/10/22  0410 11/09/22  0533   SODIUM mmol/L 131* 135   POTASSIUM mmol/L 4 3 4 0   CHLORIDE mmol/L 102 103   CO2 mmol/L 19* 25   BUN mg/dL 11 12   CREATININE mg/dL 0 78 0 71   ANION GAP mmol/L 10 7   CALCIUM mg/dL 8 1* 7 9*   ALBUMIN g/dL  --  3 1*   TOTAL BILIRUBIN mg/dL  --  0 83   ALK PHOS U/L  --  88   ALT U/L  --  28   AST U/L  --  46*   GLUCOSE RANDOM mg/dL 85 85     Results from last 7 days   Lab Units 11/10/22  0410   INR  1 19                   Lines/Drains:  Invasive Devices  Report    Peripheral Intravenous Line  Duration           Peripheral IV 11/10/22 Right;Ventral (anterior) Forearm <1 day          Drain  Duration           Colostomy LLQ -- days    Colostomy LLQ -- days                      Imaging: Reviewed radiology reports from this admission including: chest xray, chest CT scan, abdominal/pelvic CT and CT head    Recent Cultures (last 7 days): Last 24 Hours Medication List:   Current Facility-Administered Medications   Medication Dose Route Frequency Provider Last Rate   • acetaminophen  650 mg Oral Q6H PRN Merari Jara MD     • AMILoride  5 mg Oral Daily Merari Jara MD     • amLODIPine  5 mg Oral Daily Merari Jaar MD     • buPROPion  150 mg Oral Daily Semaj White MD     • carvedilol  3 125 mg Oral BID With Meals Evan Damon MD     • cyanocobalamin  1,000 mcg Intramuscular Q30 Days Merari Jara MD     • doxylamine  12 5 mg Oral HS PRN Suleiman Martinez MD     • folic acid  1 mg Oral Daily Semaj White MD     • mirtazapine  30 mg Oral HS Semaj White MD     • multivitamin-minerals  1 tablet Oral Daily Semaj White MD     • pantoprazole  40 mg Oral Early Morning Semaj White MD     • potassium chloride  20 mEq Oral Daily Semaj White MD     • pravastatin  80 mg Oral Daily With Celia Jarvis MD     • thiamine  100 mg Oral Daily Semaj White MD     • umeclidinium-vilanterol  1 puff Inhalation Daily Semaj White MD       Facility-Administered Medications Ordered in Other Encounters   Medication Dose Route Frequency Provider Last Rate   • benzocaine   Mucosal PRN Josephine Quevedo CRNA     • fentanyl citrate (PF)   Intravenous PRN Josephine Quevedo CRNA     • lactated ringers   Intravenous Continuous PRN Josephine Quevedo CRNA     • lidocaine   Intravenous PRN Josephine Quevedo CRNA     • propofol   Intravenous PRN Josephine Quevedo CRNA          Today, Patient Was Seen By: Shreyas Mcdaniel    **Please Note: This note may have been constructed using a voice recognition system  **

## 2022-11-10 NOTE — ASSESSMENT & PLAN NOTE
Lab Results   Component Value Date    SODIUM 131 (L) 11/10/2022     · Baseline 135s -140s, , improved with fluids    · Likely 2/2 to low PO intake and increase alcohol 231  · Encourage food and beverage hydration aside from alcohol

## 2022-11-11 ENCOUNTER — TELEPHONE (OUTPATIENT)
Dept: GASTROENTEROLOGY | Facility: AMBULARY SURGERY CENTER | Age: 67
End: 2022-11-11

## 2022-11-11 ENCOUNTER — APPOINTMENT (INPATIENT)
Dept: VASCULAR ULTRASOUND | Facility: HOSPITAL | Age: 67
End: 2022-11-11

## 2022-11-11 PROBLEM — R41.0 DELIRIUM: Status: ACTIVE | Noted: 2022-11-11

## 2022-11-11 PROBLEM — K50.018 CROHN'S DISEASE OF SMALL INTESTINE WITH OTHER COMPLICATION (HCC): Chronic | Status: ACTIVE | Noted: 2021-06-28

## 2022-11-11 LAB
AMMONIA PLAS-SCNC: 54 UMOL/L (ref 18–72)
ANION GAP SERPL CALCULATED.3IONS-SCNC: 9 MMOL/L (ref 4–13)
BUN SERPL-MCNC: 12 MG/DL (ref 5–25)
CALCIUM SERPL-MCNC: 8.1 MG/DL (ref 8.4–10.2)
CHLORIDE SERPL-SCNC: 103 MMOL/L (ref 96–108)
CO2 SERPL-SCNC: 22 MMOL/L (ref 21–32)
CREAT SERPL-MCNC: 0.75 MG/DL (ref 0.6–1.3)
ERYTHROCYTE [DISTWIDTH] IN BLOOD BY AUTOMATED COUNT: 13.2 % (ref 11.6–15.1)
GFR SERPL CREATININE-BSD FRML MDRD: 94 ML/MIN/1.73SQ M
GLUCOSE SERPL-MCNC: 90 MG/DL (ref 65–140)
HCT VFR BLD AUTO: 30.5 % (ref 36.5–49.3)
HGB BLD-MCNC: 9.9 G/DL (ref 12–17)
MAGNESIUM SERPL-MCNC: 1.6 MG/DL (ref 1.9–2.7)
MCH RBC QN AUTO: 35.4 PG (ref 26.8–34.3)
MCHC RBC AUTO-ENTMCNC: 32.5 G/DL (ref 31.4–37.4)
MCV RBC AUTO: 109 FL (ref 82–98)
PLATELET # BLD AUTO: 198 THOUSANDS/UL (ref 149–390)
PMV BLD AUTO: 10.6 FL (ref 8.9–12.7)
POTASSIUM SERPL-SCNC: 4 MMOL/L (ref 3.5–5.3)
RBC # BLD AUTO: 2.8 MILLION/UL (ref 3.88–5.62)
SODIUM SERPL-SCNC: 134 MMOL/L (ref 135–147)
T4 FREE SERPL-MCNC: 1.06 NG/DL (ref 0.76–1.46)
TSH SERPL DL<=0.05 MIU/L-ACNC: 16.48 UIU/ML (ref 0.45–4.5)
VIT B12 SERPL-MCNC: 638 PG/ML (ref 100–900)
WBC # BLD AUTO: 6.86 THOUSAND/UL (ref 4.31–10.16)

## 2022-11-11 RX ORDER — MAGNESIUM SULFATE HEPTAHYDRATE 40 MG/ML
4 INJECTION, SOLUTION INTRAVENOUS ONCE
Status: COMPLETED | OUTPATIENT
Start: 2022-11-11 | End: 2022-11-11

## 2022-11-11 RX ORDER — ALPRAZOLAM 0.25 MG/1
0.25 TABLET ORAL DAILY PRN
Status: DISCONTINUED | OUTPATIENT
Start: 2022-11-11 | End: 2022-11-13 | Stop reason: HOSPADM

## 2022-11-11 RX ADMIN — THIAMINE HYDROCHLORIDE 500 MG: 100 INJECTION, SOLUTION INTRAMUSCULAR; INTRAVENOUS at 12:00

## 2022-11-11 RX ADMIN — POTASSIUM CHLORIDE 20 MEQ: 20 SOLUTION ORAL at 08:37

## 2022-11-11 RX ADMIN — MAGNESIUM SULFATE HEPTAHYDRATE 4 G: 40 INJECTION, SOLUTION INTRAVENOUS at 09:58

## 2022-11-11 RX ADMIN — CARVEDILOL 3.12 MG: 3.12 TABLET, FILM COATED ORAL at 08:34

## 2022-11-11 RX ADMIN — AMLODIPINE BESYLATE 5 MG: 5 TABLET ORAL at 08:37

## 2022-11-11 RX ADMIN — AMILORIDE HYDROCLORIDE 5 MG: 5 TABLET ORAL at 08:39

## 2022-11-11 RX ADMIN — CARVEDILOL 3.12 MG: 3.12 TABLET, FILM COATED ORAL at 16:22

## 2022-11-11 RX ADMIN — PRAVASTATIN SODIUM 80 MG: 80 TABLET ORAL at 16:22

## 2022-11-11 RX ADMIN — UMECLIDINIUM BROMIDE AND VILANTEROL TRIFENATATE 1 PUFF: 62.5; 25 POWDER RESPIRATORY (INHALATION) at 08:37

## 2022-11-11 RX ADMIN — THIAMINE HCL TAB 100 MG 100 MG: 100 TAB at 08:34

## 2022-11-11 RX ADMIN — DOXYLAMINE SUCCINATE 12.5 MG: 25 TABLET ORAL at 23:50

## 2022-11-11 RX ADMIN — PANTOPRAZOLE SODIUM 40 MG: 40 TABLET, DELAYED RELEASE ORAL at 06:11

## 2022-11-11 RX ADMIN — MULTIPLE VITAMINS W/ MINERALS TAB 1 TABLET: TAB ORAL at 08:37

## 2022-11-11 RX ADMIN — FOLIC ACID 1 MG: 1 TABLET ORAL at 08:37

## 2022-11-11 RX ADMIN — THIAMINE HYDROCHLORIDE 500 MG: 100 INJECTION, SOLUTION INTRAMUSCULAR; INTRAVENOUS at 23:36

## 2022-11-11 RX ADMIN — MIRTAZAPINE 30 MG: 15 TABLET, FILM COATED ORAL at 22:45

## 2022-11-11 RX ADMIN — THIAMINE HYDROCHLORIDE 500 MG: 100 INJECTION, SOLUTION INTRAMUSCULAR; INTRAVENOUS at 16:22

## 2022-11-11 RX ADMIN — ALPRAZOLAM 0.25 MG: 0.25 TABLET ORAL at 23:50

## 2022-11-11 RX ADMIN — BUPROPION HYDROCHLORIDE 150 MG: 150 TABLET, FILM COATED, EXTENDED RELEASE ORAL at 08:35

## 2022-11-11 NOTE — ASSESSMENT & PLAN NOTE
· Mg 0 5 POA Has received multiple dosages of IV Mg Sulfate  · Continues to have hypoglycemia  Plan:  · IV Mg Sulfate  · Continue to monitor Mg level and replete as needed

## 2022-11-11 NOTE — PROGRESS NOTES
[facility]  Progress Note - Connor Nicole III 1955, 79 y o  male MRN: [id number]  Unit/Bed#: S -01 Encounter: [id number]  Primary Care Provider: Christine Atkins MD   Date and time admitted to hospital: 11/1/2022  9:35 PM    * Syncope  Assessment & Plan  Patient reports loss of consciousness while out drinking with friends last night  He stood up to go to the bathroom and reports losing consciousness and hitting the floor  Reports poor oral intake and loss of appetite recently due to generalized anxiety  He has been experiencing lightheadedness and dizziness upon rising from sitting positing while at home  Denies loss of consciousness at home  He has not had any chest pain or shortness of breath with these episodes  No history of seizures per chart review, no history of CAD or MI  Brought to the emergency department by EMS  Found to be hyponatremic with osmolality profile fitting hypovolemic hyponatremia  Hyponatremia improved with IVF  Orthostatics positive in the ED  EKG shows NSR with incomplete RBBB and nonspecific ST segment abnormality  Troponins negative      Syncope most likely in the setting of poor oral intake and vasovagal response  Telemetry shows episodes of sinus and supraventricular tachycardia with HR ranging from 150 to 200  Echocardiogram negative for any cardiogenic cause of of his syncopal episodes  Orthostats negative x3     Plan:  Check orthostatics prior to discharge  Holter monitoring on discharge - will place referral to cardiology  Encourage cessation of alcohol consumption and better oral nutrition  Replenish electrolytes    Delirium  Assessment & Plan  Daughter reports confusion and unusual behavior observed over last 2 days noting that he has been calling home at odd hours of the day, having visual hallucinations, disorientation  · Patient alert and oriented during encounters, have not observed behavior during evaluation, remains AOx3 throughout encounter  Plan:  · Obtain Ammonia Level, TSH, B12 and B1 labs  · Switch Thiamine from PO to IV  · Continue to monitor for signs of hospital dementia vs sun downing  · Geriatrics Consulted, Input Appreciated  · OT Evaluation      Anemia  Assessment & Plan  Acute Macrocytic Anemia with a Hbg Hemoglobin of 9 6 noted on 11/8/22,   · hemoglobin remains stable with the morning hemoglobin count of 9 8, Patient is asymptomatic denying dizziness, headaches, lightheadedness, SOB, weakness or fatigue  · positive FOBT  · Iron level and saturation within normal limits, TIBC low at 138, ferritin 819  · GI consult, input appreciated   · CT Abd/Pelvis: no evidence of retroperitoneal hematoma  · Bidirectional scope performed on 11/11 with removal of multiple polyps, no signs of active bleeding, cauterization of AVMs  · Hemoglobin remains stable     Plan:  · Trend Hbg with f/u H&H after discharge    History of alcohol use disorder  Assessment & Plan  Minimum of at least 6 cans / bottles of alcohol daily   Alcohol level   Has spoken with [facility]    Originally agreeable to [employer], but now patient is  Is requesting DC home   Given Thiamine PO throughout course switch to IV today      Electrolyte abnormality  Assessment & Plan  Lab Results   Component Value Date    K 4 0 11/11/2022    CORRECTEDCA 8 4 11/08/2022     Hypokalemia of 3 4 and calcium of 6 8 POA, Likely secondary to poor nutrition and oral intake  Plan  · Continue to monitor CMP on AM   · Replete electrolytes as necessary      Hypomagnesemia  Assessment & Plan  · Mg 0 5 POA Has received multiple dosages of IV Mg Sulfate  · Continues to have hypoglycemia  Plan:  · IV Mg Sulfate  · Continue to monitor Mg level and replete as needed    Supraventricular tachycardia [facility])  Assessment & Plan  Patient presents after syncopal episodes while out drinking  Found to have orthostatic hypotension secondary to intravascular volume depletion  Echocardiogram on 11/4 shows EF of 60%, normal LV function and nondilated left atrium  Telemetry revealed SVT with HR ranging from 120 to 200 for consecutive nights  Plan:  Improvement with correction of electrolytes  Carvedilol added      Platelets decreased ([facility] [address] )  Assessment & Plan  2/2 to alcohol use  No bleeding at this time, no interventions planned     [facility] [address]  (hepatocellular carcinoma) [facility])  Assessment & Plan  Patient has history of  Hepatocellular carcinoma, originally diagnosed in 2020 concurrently undergoing treatment followed by surgical oncology   Dr Shay Garcia:     Follow-up closely with surgical oncology on discharge    Crohn's disease of small intestine with other complication [facility])  Assessment & Plan  History of Crohn's disease W/ Ostomy  Patient denies abnormal or excessive output  Plan: Outpatient GI follow up      Chronic obstructive pulmonary disease (HCC)  Assessment & Plan  · COPD Stable on room air  · Continue Anoro Ellipta    Hyponatremia  Assessment & Plan  Lab Results   Component Value Date    SODIUM 134 (L) 11/11/2022   Baseline 135s -140s, , improved with fluids  Encourage food and beverage hydration aside from alcohol       Essential hypertension  Assessment & Plan  On Amiloride and Amlodipine 5 mg each daily   Plan:  Continue home meds  Carvedilol added      VTE Pharmacologic Prophylaxis: VTE Score: 5 High Risk (Score >/= 5) - Pharmacological DVT Prophylaxis Ordered: enoxaparin (Lovenox)  Sequential Compression Devices Ordered  Patient Centered Rounds: I performed bedside rounds with nursing staff today  Discussions with Specialists or Other Care Team Provider: Geriatrics, GI  Education and Discussions with Family / Patient: Updated  (daughter) at bedside  Current Length of Stay: 9 day(s)  Current Patient Status: Inpatient   Discharge Plan: Anticipate discharge tomorrow to home  Code Status: Level 1 - Full Code    Subjective:   Patient seen and evaluated at bedside  Status Post bidirectional scope last night without complications  Patient denies any complaints at this times  Family at bedside reporting concern in patients mental status noting patient has been confused over last 2 days, calling them at home at late hours of the night, asking them to "pick him up from the hotel", as well as walk into the hallway asking with the others  Patient reports she does not recall any of these events happening  During evaluation patient continues to be alert and oriented x4  Patient denies any current complaints at this time  Denies any current abdominal pain, diarrhea, bloody bowel movements, dizziness, headaches, palpitations or any other complaints of this time  Daughter noted some mild erythema of the left lower extremity, patient reports does not bother him at this time  Objective:     Vitals:   Temp (24hrs), Av 3 °F (36 8 °C), Min:98 °F (36 7 °C), Max:98 5 °F (36 9 °C)    Temp:  [98 °F (36 7 °C)-98 5 °F (36 9 °C)] 98 5 °F (36 9 °C)  HR:  [77] 77  Resp:  [16-18] 16  BP: (128-142)/(60-83) 128/75  SpO2:  [96 %] 96 %  Body mass index is 20 67 kg/m²  Input and Output Summary (last 24 hours): Intake/Output Summary (Last 24 hours) at 2022 1722  Last data filed at 2022 1300  Gross per 24 hour   Intake 1140 ml   Output 400 ml   Net 740 ml       Physical Exam:   Physical Exam  Vitals reviewed  Constitutional:       General: He is not in acute distress  Appearance: He is not toxic-appearing  HENT:      Head: Normocephalic and atraumatic  Eyes:      General: No scleral icterus  Extraocular Movements: Extraocular movements intact  Conjunctiva/sclera: Conjunctivae normal       Pupils: Pupils are equal, round, and reactive to light  Neck:      Vascular: No carotid bruit  Cardiovascular:      Rate and Rhythm: Normal rate and regular rhythm  Pulses: Normal pulses  Heart sounds: Normal heart sounds  No murmur heard  No gallop  Pulmonary:      Effort: Pulmonary effort is normal  No respiratory distress  Breath sounds: Normal breath sounds  No stridor  No wheezing, rhonchi or rales  Chest:      Chest wall: No tenderness  Abdominal:      General: Abdomen is flat  The ostomy site is clean  Bowel sounds are normal  There is no distension  Palpations: Abdomen is soft  Tenderness: There is no abdominal tenderness  Comments: Ileostomy in place with brown stool in collection bag, no obvious signs of mass upon palpation of the abdomen   Musculoskeletal:         General: Normal range of motion  Cervical back: Normal range of motion  Right lower leg: No edema  Left lower leg: No edema  Skin:     General: Skin is warm  Coloration: Skin is not jaundiced  Findings: Erythema (Mild erythema noted to the left lower extremity) present  No rash  Neurological:      General: No focal deficit present  Mental Status: He is alert and oriented to person, place, and time  Mental status is at baseline  Motor: No weakness  Psychiatric:         Mood and Affect: Mood normal          Behavior: Behavior is not agitated  Behavior is cooperative  Additional Data:     Labs:  Results from last 7 days   Lab Units 11/11/22  0438 11/08/22  1328 11/08/22  0431   WBC Thousand/uL 6 86   < > 5 12   HEMOGLOBIN g/dL 9 9*   < > 9 6*   HEMATOCRIT % 30 5*   < > 29 9*   PLATELETS Thousands/uL 198   < > 110*   NEUTROS PCT %  --   --  67   LYMPHS PCT %  --   --  19   MONOS PCT %  --   --  12   EOS PCT %  --   --  1    < > = values in this interval not displayed       Results from last 7 days   Lab Units 11/11/22  0438 11/10/22  0410 11/09/22  0533   SODIUM mmol/L 134*   < > 135   POTASSIUM mmol/L 4 0   < > 4 0   CHLORIDE mmol/L 103   < > 103   CO2 mmol/L 22   < > 25   BUN mg/dL 12   < > 12   CREATININE mg/dL 0 75   < > 0 71   ANION GAP mmol/L 9   < > 7   CALCIUM mg/dL 8 1*   < > 7 9*   ALBUMIN g/dL  --   --  3 1* TOTAL BILIRUBIN mg/dL  --   --  0 83   ALK PHOS U/L  --   --  88   ALT U/L  --   --  28   AST U/L  --   --  46*   GLUCOSE RANDOM mg/dL 90   < > 85    < > = values in this interval not displayed       Results from last 7 days   Lab Units 11/10/22  0410   INR  1 19                   Lines/Drains:  Invasive Devices  Report    Peripheral Intravenous Line  Duration           Peripheral IV 11/10/22 Right;Ventral (anterior) Forearm 1 day          Drain  Duration           Colostomy LLQ -- days    Colostomy LLQ -- days            Imaging: Reviewed radiology reports from this admission including: abdominal/pelvic CT    Recent Cultures (last 7 days):         Last 24 Hours Medication List:   Current Facility-Administered Medications   Medication Dose Route Frequency Provider Last Rate   • acetaminophen  650 mg Oral Q6H PRN Kenisha Joel MD     • ALPRAZolam  0 25 mg Oral Daily PRN Opal Knutson DO     • AMILoride  5 mg Oral Daily Kenisha Joel MD     • amLODIPine  5 mg Oral Daily Kenisha Joel MD     • buPROPion  150 mg Oral Daily Michael Vasquez MD     • carvedilol  3 125 mg Oral BID With Meals Edmar Suh MD     • cyanocobalamin  1,000 mcg Intramuscular Q30 Days Kenisha Joel MD     • doxylamine  12 5 mg Oral HS PRN Sriram Gutierrez MD     • folic acid  1 mg Oral Daily Michael Vasquez MD     • mirtazapine  30 mg Oral HS Michael Vasquez MD     • multivitamin-minerals  1 tablet Oral Daily Michael Vasquez MD     • pantoprazole  40 mg Oral Early Morning Michael Vasquez MD     • potassium chloride  20 mEq Oral Daily Michael Vasquez MD     • pravastatin  80 mg Oral Daily With Pepe Zepeda MD     • thiamine  500 mg Intravenous TID Prashanth Saunders  mg (11/11/22 1622)   • umeclidinium-vilanterol  1 puff Inhalation Daily Michael Vasquez MD          Today, Patient Was Seen By: Prashanth Saunders    **Please Note: This note may have been constructed using a voice recognition system  **

## 2022-11-11 NOTE — PLAN OF CARE
Problem: METABOLIC, FLUID AND ELECTROLYTES - ADULT  Goal: Electrolytes maintained within normal limits  Description: INTERVENTIONS:  - Monitor labs and assess patient for signs and symptoms of electrolyte imbalances  - Administer electrolyte replacement as ordered  - Monitor response to electrolyte replacements, including repeat lab results as appropriate  - Instruct patient on fluid and nutrition as appropriate  Outcome: Progressing  Goal: Fluid balance maintained  Description: INTERVENTIONS:  - Monitor labs   - Monitor I/O and WT  - Instruct patient on fluid and nutrition as appropriate  - Assess for signs & symptoms of volume excess or deficit  Outcome: Progressing  Goal: Glucose maintained within target range  Description: INTERVENTIONS:  - Monitor Blood Glucose as ordered  - Assess for signs and symptoms of hyperglycemia and hypoglycemia  - Administer ordered medications to maintain glucose within target range  - Assess nutritional intake and initiate nutrition service referral as needed  Outcome: Progressing     Problem: PAIN - ADULT  Goal: Verbalizes/displays adequate comfort level or baseline comfort level  Description: Interventions:  - Encourage patient to monitor pain and request assistance  - Assess pain using appropriate pain scale  - Administer analgesics based on type and severity of pain and evaluate response  - Implement non-pharmacological measures as appropriate and evaluate response  - Consider cultural and social influences on pain and pain management  - Notify physician/advanced practitioner if interventions unsuccessful or patient reports new pain  Outcome: Progressing     Problem: INFECTION - ADULT  Goal: Absence or prevention of progression during hospitalization  Description: INTERVENTIONS:  - Assess and monitor for signs and symptoms of infection  - Monitor lab/diagnostic results  - Monitor all insertion sites, i e  indwelling lines, tubes, and drains  - Monitor endotracheal if appropriate and nasal secretions for changes in amount and color  - [place] appropriate cooling/warming therapies per order  - Administer medications as ordered  - Instruct and encourage patient and family to use good hand hygiene technique  - Identify and instruct in appropriate isolation precautions for identified infection/condition  Outcome: Progressing  Goal: Absence of fever/infection during neutropenic period  Description: INTERVENTIONS:  - Monitor WBC    Outcome: Progressing     Problem: DISCHARGE PLANNING  Goal: Discharge to home or other facility with appropriate resources  Description: INTERVENTIONS:  - Identify barriers to discharge w/patient and caregiver  - Arrange for needed discharge resources and transportation as appropriate  - Identify discharge learning needs (meds, wound care, etc )  - Arrange for interpretive services to assist at discharge as needed  - Refer to Case Management Department for coordinating discharge planning if the patient needs post-hospital services based on physician/advanced practitioner order or complex needs related to functional status, cognitive ability, or social support system  Outcome: Progressing     Problem: Knowledge Deficit  Goal: Patient/family/caregiver demonstrates understanding of disease process, treatment plan, medications, and discharge instructions  Description: Complete learning assessment and assess knowledge base    Interventions:  - Provide teaching at level of understanding  - Provide teaching via preferred learning methods  Outcome: Progressing     Problem: MOBILITY - ADULT  Goal: Maintain or return to baseline ADL function  Description: INTERVENTIONS:  -  Assess patient's ability to carry out ADLs; assess patient's baseline for ADL function and identify physical deficits which impact ability to perform ADLs (bathing, care of mouth/teeth, toileting, grooming, dressing, etc )  - Assess/evaluate cause of self-care deficits   - Assess range of motion  - Assess patient's mobility; develop plan if impaired  - Assess patient's need for assistive devices and provide as appropriate  - Encourage maximum independence but intervene and supervise when necessary  - Involve family in performance of ADLs  - Assess for home care needs following discharge   - Consider OT consult to assist with ADL evaluation and planning for discharge  - Provide patient education as appropriate  Outcome: Progressing  Goal: Maintains/Returns to pre admission functional level  Description: INTERVENTIONS:  - Perform BMAT or MOVE assessment daily    - Set and communicate daily mobility goal to care team and patient/family/caregiver  - Collaborate with rehabilitation services on mobility goals if consulted  - Perform Range of Motion  times a day  - Reposition patient every  hours    - Dangle patient  times a day  - Stand patient  times a day  - Ambulate patient  times a day  - Out of bed to chair  times a day   - Out of bed for meals  times a day  - Out of bed for toileting  - Record patient progress and toleration of activity level   Outcome: Progressing     Problem: Potential for Falls  Goal: Patient will remain free of falls  Description: INTERVENTIONS:  - Educate patient/family on patient safety including physical limitations  - Instruct patient to call for assistance with activity   - Consult OT/PT to assist with strengthening/mobility   - Keep Call bell within reach  - Keep bed low and locked with side rails adjusted as appropriate  - Keep care items and personal belongings within reach  - Initiate and maintain comfort rounds  - Make Fall Risk Sign visible to staff  - Offer Toileting every  Hours, in advance of need  - Initiate/Maintain alarm  - Obtain necessary fall risk management equipment  - Apply yellow socks and bracelet for high fall risk patients  - Consider moving patient to room near nurses station  Outcome: Progressing     Problem: Nutrition/Hydration-ADULT  Goal: Nutrient/Hydration intake appropriate for improving, restoring or maintaining nutritional needs  Description: Monitor and assess patient's nutrition/hydration status for malnutrition  Collaborate with interdisciplinary team and initiate plan and interventions as ordered  Monitor patient's weight and dietary intake as ordered or per policy  Utilize nutrition screening tool and intervene as necessary  Determine patient's food preferences and provide high-protein, high-caloric foods as appropriate       INTERVENTIONS:  - Monitor oral intake, urinary output, labs, and treatment plans  - Assess nutrition and hydration status and recommend course of action  - Evaluate amount of meals eaten  - Assist patient with eating if necessary   - Allow adequate time for meals  - Recommend/ encourage appropriate diets, oral nutritional supplements, and vitamin/mineral supplements  - Order, calculate, and assess calorie counts as needed  - Recommend, monitor, and adjust tube feedings and TPN/PPN based on assessed needs  - Assess need for intravenous fluids  - Provide specific nutrition/hydration education as appropriate  - Include patient/family/caregiver in decisions related to nutrition  Outcome: Progressing

## 2022-11-11 NOTE — ASSESSMENT & PLAN NOTE
Daughter reports confusion and unusual behavior observed over last 2 days noting that he has been calling home at odd hours of the day, having visual hallucinations, disorientation  · Patient alert and oriented during encounters, have not observed behavior during evaluation, remains AOx3 throughout encounter  Plan:  · Obtain Ammonia Level, TSH, B12 and B1 labs  · Switch Thiamine from PO to IV  · Continue to monitor for signs of hospital dementia vs sun downing  · Geriatrics Consulted, Input Appreciated  · OT Evaluation

## 2022-11-11 NOTE — PROGRESS NOTES
Progress Note - Porfirio Bianchi III 79 y o  male MRN: [id number]    Unit/Bed#: S -01 Encounter: [id number]    Assessment and Plan:   Principal Problem:    Syncope  Active Problems:    Essential hypertension    Hyponatremia    Hypomagnesemia    Chronic obstructive pulmonary disease (HCC)    Crohn's disease of small intestine with other complication (HCC)    HCC (hepatocellular carcinoma) (HCC)    Platelets decreased (HCC)    Electrolyte abnormality    History of alcohol use disorder    Supraventricular tachycardia (HCC)    Anemia    #1  Heme positive stools with anemia: hgb stable, s/p EGD and colonoscopy through ostomy yesterday with duodenal AVMs nonbleeding which were treated, portal hypertensive gastropathy and esophagitis, also with several colon polyps removed, some large and removed in piecemeal fashion  No active bleeding  No signs of crohn's disease  -follow up bx  -continue PPI  -diet as tolerated  -can resume Eliquis  -repeat colonoscopy in 6 months for polyps  -discussed everything in detail with patient and his family at bedside, answered all questions, will let Dr Sara Do know patient was admitted  -can be d/c from GI standpoint, we will sign off     #2  [address]  s/p ablation:  history of [address]  diagnosed in 2020 followed by surgical oncology s/p ablation in 2020 and more recently 9/21/22 for growing LI-RADS 5 lesion adjacent to previous treatment zone with new finding of additional LR-3 lesion seen in segment 6   -Continue f/u with surg onc  Pt has repeat MRI scheduled in early December with f/u with Sara Do    #3 Alcohol abuse  -recommend alcohol cessation   ----------------------------------------------------------------------------------------------------------------    Subjective:     No issues today, eating well    Objective:     Vitals: Blood pressure 142/83, pulse 77, temperature 98 °F (36 7 °C), temperature source Oral, resp  rate 18, height 5' 9" (1 753 m), weight 63 5 kg (140 lb), SpO2 96 %  ,Body mass index is 20 67 kg/m²  Intake/Output Summary (Last 24 hours) at 11/11/2022 1043  Last data filed at 11/11/2022 6246  Gross per 24 hour   Intake 1300 ml   Output 400 ml   Net 900 ml       Physical Exam:     General Appearance: Alert, appears stated age and cooperative  Lungs: Clear to auscultation bilaterally, no rales or rhonchi, no labored breathing/accessory muscle use  Heart: Regular rate and rhythm, S1, S2 normal, no murmur, click, rub or gallop  Abdomen: Soft, non-tender, non-distended; bowel sounds normal; no masses or no organomegaly; ostomy present in RLQ  Extremities: No cyanosis, clubbing, or edema    Invasive Devices  Report    Peripheral Intravenous Line  Duration           Peripheral IV 11/10/22 Right;Ventral (anterior) Forearm 1 day          Drain  Duration           Colostomy LLQ -- days    Colostomy LLQ -- days                Lab Results:  Results from last 7 days   Lab Units 11/11/22  0438 11/08/22  1328 11/08/22  0431   WBC Thousand/uL 6 86   < > 5 12   HEMOGLOBIN g/dL 9 9*   < > 9 6*   HEMATOCRIT % 30 5*   < > 29 9*   PLATELETS Thousands/uL 198   < > 110*   NEUTROS PCT %  --   --  67   LYMPHS PCT %  --   --  19   MONOS PCT %  --   --  12   EOS PCT %  --   --  1    < > = values in this interval not displayed  Results from last 7 days   Lab Units 11/11/22  0438 11/10/22  0410 11/09/22  0533   POTASSIUM mmol/L 4 0   < > 4 0   CHLORIDE mmol/L 103   < > 103   CO2 mmol/L 22   < > 25   BUN mg/dL 12   < > 12   CREATININE mg/dL 0 75   < > 0 71   CALCIUM mg/dL 8 1*   < > 7 9*   ALK PHOS U/L  --   --  88   ALT U/L  --   --  28   AST U/L  --   --  46*    < > = values in this interval not displayed  Invalid input(s): BILI  Results from last 7 days   Lab Units 11/10/22  0410   INR  1 19           Imaging Studies: I have personally reviewed pertinent imaging studies  XR chest 1 view portable    Result Date: 11/2/2022  Impression: No acute cardiopulmonary disease   Workstation performed: [id number]     CT head without contrast    Result Date: 11/1/2022  Impression: No acute intracranial abnormality  Chronic microangiopathic changes   Workstation performed: [id number]

## 2022-11-11 NOTE — ASSESSMENT & PLAN NOTE
Patient has history of  Hepatocellular carcinoma, originally diagnosed in 2020 concurrently undergoing treatment followed by surgical oncology   Dr Tyler Khan:     Follow-up closely with surgical oncology on discharge

## 2022-11-11 NOTE — ASSESSMENT & PLAN NOTE
Acute Macrocytic Anemia with a Hbg Hemoglobin of 9 6 noted on 11/8/22,   · hemoglobin remains stable with the morning hemoglobin count of 9 8, Patient is asymptomatic denying dizziness, headaches, lightheadedness, SOB, weakness or fatigue  · positive FOBT  · Iron level and saturation within normal limits, TIBC low at 138, ferritin 819  · GI consult, input appreciated   · CT Abd/Pelvis: no evidence of retroperitoneal hematoma  · Bidirectional scope performed on 11/11 with removal of multiple polyps, no signs of active bleeding, cauterization of AVMs  · Hemoglobin remains stable     Plan:  · Trend Hbg with f/u H&H after discharge

## 2022-11-11 NOTE — ASSESSMENT & PLAN NOTE
Minimum of at least 6 cans / bottles of alcohol daily   Alcohol level   Has spoken with [address]    Originally agreeable to [employer], but now patient is  Is requesting DC home   Given Thiamine PO throughout course switch to IV today

## 2022-11-11 NOTE — ASSESSMENT & PLAN NOTE
Lab Results   Component Value Date    K 4 0 11/11/2022    CORRECTEDCA 8 4 11/08/2022     Hypokalemia of 3 4 and calcium of 6 8 POA, Likely secondary to poor nutrition and oral intake  Plan  · Continue to monitor CMP on AM   · Replete electrolytes as necessary

## 2022-11-11 NOTE — ASSESSMENT & PLAN NOTE
Lab Results   Component Value Date    SODIUM 134 (L) 11/11/2022   Baseline 135s -140s, , improved with fluids    Encourage food and beverage hydration aside from alcohol

## 2022-11-11 NOTE — ASSESSMENT & PLAN NOTE
Patient reports loss of consciousness while out drinking with friends last night  He stood up to go to the bathroom and reports losing consciousness and hitting the floor  Reports poor oral intake and loss of appetite recently due to generalized anxiety  He has been experiencing lightheadedness and dizziness upon rising from sitting positing while at home  Denies loss of consciousness at home  He has not had any chest pain or shortness of breath with these episodes  No history of seizures per chart review, no history of CAD or MI  Brought to the emergency department by EMS  Found to be hyponatremic with osmolality profile fitting hypovolemic hyponatremia  Hyponatremia improved with IVF  Orthostatics positive in the ED  EKG shows NSR with incomplete RBBB and nonspecific ST segment abnormality  Troponins negative      Syncope most likely in the setting of poor oral intake and vasovagal response  Telemetry shows episodes of sinus and supraventricular tachycardia with HR ranging from 150 to 200  Echocardiogram negative for any cardiogenic cause of of his syncopal episodes  Orthostats negative x3     Plan:  Check orthostatics prior to discharge  Holter monitoring on discharge - will place referral to cardiology  Encourage cessation of alcohol consumption and better oral nutrition  Replenish electrolytes

## 2022-11-11 NOTE — ASSESSMENT & PLAN NOTE
Patient presents after syncopal episodes while out drinking  Found to have orthostatic hypotension secondary to intravascular volume depletion  Echocardiogram on 11/4 shows EF of 60%, normal LV function and nondilated left atrium  Telemetry revealed SVT with HR ranging from 120 to 200 for consecutive nights  Plan:  Improvement with correction of electrolytes  Carvedilol added

## 2022-11-11 NOTE — CONSULTS
Consultation - Irma 83 III 79 y o  male MRN: [id number]  Unit/Bed#: S -01 Encounter: [id number]        Inpatient consult to Gerontology  Consult performed by: Wilmar Dillon  Consult ordered by: Erin Knutson, DO            Assessment/Plan     1) Syncope    Assessment  · Pt had LOC while out drinking with friends on 11/1/22, fell and hit floor  · Endorses orthostatic sx at home: some lightheadedness and dizziness when standing up  · No PMHx of CAD, MI, Stroke, Seizure  · EKG: NSR with incomplete RBBB and non-specific ST abnormality, EKG negative for cardiogenic syncope   · Telemetry: Sinus and SV Tachycardia noted, HR range 150-200  · Repeat Orthostatic BP negative x3  · Pt reports poor PO intake and little appetite attributed to generalized anxiety    Impression: syncopal episode likely due to electrolyte abnormalities 2/2 poor PO intake and excessive alcohol consumption    Plan  · Recheck Orthostatic BP prior to d/c  · OP Referral to Cardiology for Holter Monitor  · Replenish electrolytes while inpatient  · Encourage reduction/elimination of EtOH  · Encourage good PO intake, Nutrition consult?     2) Delirium    Assessment  · Pt's daughter reports multiple episodes since admission of her father calling her at odd hours, being disoriented, and reporting hallucinations    Plan  · Draw labs: TSH abnormal = 16 47 (N= 0 45-4 5) Ammonia wnl, B12, B1 levels pending   · Assess for hospital delirium vs sun downing  · OT Eval  · Thiamine now IV, was formerly PO    3) Anemia    Assessment  · Hgb = 9 6 on 11/8/22, MCV = 108, Acute Macrocytic Anemia  · Hgb has remained stable, no HA, SOB, lightheadedness, weakness  · Iron panel wnl  · CT Abdo/Pel: no retroperitoneal bleed  · EGD/Colonoscopy: multiple colon polyps removed, cauterized AVMs, no signs of active bleed    Plan  · Trend Hgb  · F/u OP H&H    4) Alcohol Use Disorder    Assessment  · Pt endorses consuming minimum of 6 drinks/day   · EtOH level = 0 231 @ admission  · Thiamine switched from PO to IV, no signs of Wernicke Encephalopathy  · Platelets low 2/2 EtOH use    Plan  · CIWA monitoring  · Originally amenable to attend [facility]hab for EtOH, now changed mind and wishes for d/c to home    5) Electrolyte Abnormalities    Assessment  · Admission labs showed K = 3 4, Ca = 6 8, Mg = 0 5, likely 2/2 poor PO intake/nutrition    Plan  · Replete prn  · IV Mag sulfate    6) Supraventricular Tachycardia    Assessment  · Pt experienced syncopal episode with LOC  · Orthostatic BP positive in ED, likely due to volume depletion   · Echo 11/4/22: EF = 60%, normal LV function, no dilation of atria  · Telemetry showed SVT, -200 on multiple occasiona    Plan  · Continue electrolyte repletion  · ADDED Carvedilol    7) Hepatocellular Carcinoma    Assessment  · Dx in 2020, currently followed by Surg Onc (Dr Kathryn Hutchinson)    Plan  · F/u with Surg Onc after d/c    8)  Crohn's Disease of Small Intestine w/Other complications    Assessment  · Hx of Crohn's, pt has an Ostomy placed with normal output    Plan  · F/u with GI    9) COPD     Assessment  · Former smoker, 50+ yr pack hx  · Stable SpO2 on RA, last SpO2 = 96%    Plan  · Continue Anoro Ellipta    10) Essential HTN    Assessment  · Home meds include Amiloride and Amlodipine, both 5mg qd    Plan  · Continue home meds  · Added Carvedilol to tx SVT                         History of Present Illness   Physician Requesting Consult: Magi Kothari DO  Reason for Consult / Principal Problem: Syncope  Hx and PE limited by: N/A  Additional history obtained from: N/A      HPI: Clarita Alfaro is a 79y o  year old male who presented to the [facility] ED with syncope after being out with friends "bar-hopping" on the evening of 11/1/22  He had an episode of LOC and hit the floor while falling; he has never had any episodes of blacking out/LOC before   He denied any CP or SOB during this incident and has no PMHx of CAD or MI  On presentation to the ED his Orthostatic BP was positive, EKG showed NSR w/incomplete RBBB w/non-specific ST abnormalities  BMP was significant for hypovolemic hyponatremia  Mr Nicol Gagnon has a PMHx significant for Alcohol use disorder, Essential HTN, SVT, COPD, and Chron's Disease  Review of Systems   Constitutional: Negative for chills, diaphoresis and fever  HENT: Negative  Eyes: Negative for photophobia and pain  Respiratory: Negative for chest tightness and shortness of breath  Cardiovascular: Negative for chest pain, palpitations and leg swelling  Gastrointestinal: Negative for abdominal distention, abdominal pain, diarrhea, nausea and vomiting  Endocrine: Negative  Genitourinary: Negative  Musculoskeletal: Negative  Allergic/Immunologic: Negative  Neurological: Negative for dizziness, tremors, speech difficulty, light-headedness and numbness  Hematological: Negative  Psychiatric/Behavioral: Positive for hallucinations (Daughter claims pt has hallucinations, pt denies)  Negative for confusion and sleep disturbance  The patient is not nervous/anxious          Memory/Cognitive screening: Memory and cognition intact  Mobility:  No ambulatory difficulties  Falls:  No falls prior to this incident  Assistive Devices:  None  Fraility: Pt is not  Nutrition/weight loss/grocery shopping/meal preparation: No difficulties  Vision impairment: None  Hearing impairment: None  Incontinence: None  Delirium: None  Polypharmacy: Yes, pt manages 9 regular home meds  Patients primary residence: Private residence Lives with: Daughter and grandchildren  iADL's:  No difficulties  ADL's:  No difficulties    Historical Information   Past medical history:   Past Medical History:   Diagnosis Date   • LUCILLE (acute kidney injury) ([facility] [address] ) 6/28/2017   • Blindness of left eye     Since birth   • Cancer [facility])     liver   • Cataract    • Colon polyp    • Colostomy in place [facility]) 6/29/2017   • COPD (chronic obstructive pulmonary disease) (HCC)    • Crohn disease ([facility] [address] )    • Emphysema of lung ([facility] [address] )    • Emphysema/COPD ([facility] [address] )    • Essential hypertension    • GERD (gastroesophageal reflux disease)    • Hypertension    • Hypokalemia 2017   • Hypomagnesemia 2017   • Hyponatremia 2017   • Kidney stone    • Osteomyelitis (HCC)    • Pneumonia        Past surgical history:   Past Surgical History:   Procedure Laterality Date   • CATARACT EXTRACTION Bilateral    • CHOLECYSTECTOMY     • COLECTOMY      10 inchs of ileum   • COLONOSCOPY N/A 2017    Procedure: COLONOSCOPY;  Surgeon: Natasha Cool MD;  Location: [facility]; Service: Gastroenterology   • COLOSTOMY     • FINGER AMPUTATION     • FINGER SURGERY Left    • IR BIOPSY LIVER MASS  2020   • IR MICROWAVE ABLATION  2022   • LITHOTRIPSY     • LIVER LOBECTOMY N/A 7/15/2020    Procedure: LIVER ABLATION, INTRAOPERATIVE U/S LIVER;  Surgeon: nAatoliy Hill MD;  Location: [facility];  Service: Surgical Oncology       Social history:   Social History     Socioeconomic History   • Marital status:      Spouse name: Not on file   • Number of children: Not on file   • Years of education: Not on file   • Highest education level: Not on file   Occupational History   • Not on file   Tobacco Use   • Smoking status: Former Smoker     Packs/day: 1 50     Years: 51 00     Pack years: 76 50     Types: Cigarettes     Start date:      Quit date: 2022     Years since quittin 8   • Smokeless tobacco: Never Used   Vaping Use   • Vaping Use: Never used   Substance and Sexual Activity   • Alcohol use:  Yes     Alcohol/week: 59 0 standard drinks     Types: 49 Cans of beer, 10 Shots of liquor per week     Comment: drinks 6 or more beers plus hard liquor daily   • Drug use: No   • Sexual activity: Not Currently   Other Topics Concern   • Not on file   Social History Narrative   • Not on file     Social Determinants of Health     Financial Resource Strain: Not on file   Food Insecurity: No Food Insecurity   • Worried About [address] [employer] in the Last Year: Never true   • Ran Out of Food in the Last Year: Never true   Transportation Needs: No Transportation Needs   • Lack of Transportation (Medical): No   • Lack of Transportation (Non-Medical): No   Physical Activity: Not on file   Stress: Not on file   Social Connections: Not on file   Intimate Partner Violence: Not on file   Housing Stability: Low Risk    • Unable to Pay for Housing in the Last Year: No   • Number of Places Lived in the Last Year: 1   • Unstable Housing in the Last Year: No       Family history:   Family History   Problem Relation Age of Onset   • Hypertension Father    • Heart disease Sister          Meds/Allergies   All current active meds have been reviewed       Medication Administration - last 24 hours from 11/10/2022 1431 to 11/11/2022 1431       Date/Time Order Dose Route Action Action by     11/11/2022 0835 buPROPion (WELLBUTRIN XL) 24 hr tablet 150 mg 150 mg Oral Given Vikki Menon     11/11/2022 0611 pantoprazole (PROTONIX) EC tablet 40 mg 40 mg Oral Given J Carlos Lane RN     11/10/2022 1650 pravastatin (PRAVACHOL) tablet 80 mg 80 mg Oral Given Fanta Andrews     11/11/2022 0837 potassium chloride oral solution 20 mEq 20 mEq Oral Given Vikki Menon     11/10/2022 2100 mirtazapine (REMERON) tablet 30 mg 30 mg Oral Given Mariza Werner RN     11/11/2022 0834 thiamine tablet 100 mg 100 mg Oral Given Vikki Menon     32/27/7980 6271 folic acid (FOLVITE) tablet 1 mg 1 mg Oral Given Vikki Menon     11/11/2022 0837 multivitamin-minerals (CENTRUM) tablet 1 tablet 1 tablet Oral Given Vikki Lynsey     11/11/2022 0837 umeclidinium-vilanterol 62 5-25 mcg/actuation inhaler 1 puff 1 puff Inhalation Given Vikki Menon     11/11/2022 0837 amLODIPine (NORVASC) tablet 5 mg 5 mg Oral Given Vikki Menon     11/11/2022 0839 [facility] 5 [facility]     11/11/2022 0834 carvedilol (COREG) tablet 3 125 mg 3 125 mg Oral Given Vikki Lynsey     11/10/2022 1650 carvedilol (COREG) tablet 3 125 mg 3 125 mg Oral Given Juan C Dyekehinde     11/11/2022 0958 magnesium sulfate 4 g/100 mL IVPB (premix) 4 g 4 g Intravenous New Bag Vikki Menon     11/11/2022 1233 thiamine (VITAMIN B1) 500 mg in sodium chloride 0 9 % 50 mL IVPB 0 mg Intravenous Stopped Vikki Menon     11/11/2022 1200 thiamine (VITAMIN B1) 500 mg in sodium chloride 0 9 % 50 mL IVPB 500 mg Intravenous New Bag Vikki Menon            No Known Allergies    Objective   Vitals:    11/11/22 0733   BP: 142/83   Pulse:    Resp: 18   Temp: 98 °F (36 7 °C)   SpO2:        Intake/Output Summary (Last 24 hours) at 11/11/2022 1331  Last data filed at 11/11/2022 2740  Gross per 24 hour   Intake 1300 ml   Output 400 ml   Net 900 ml     Invasive Devices  Report    Peripheral Intravenous Line  Duration           Peripheral IV 11/10/22 Right;Ventral (anterior) Forearm 1 day          Drain  Duration           Colostomy LLQ -- days    Colostomy LLQ -- days                Physical Exam  Constitutional:       General: He is not in acute distress  Appearance: Normal appearance  HENT:      Head: Normocephalic and atraumatic  Nose: Nose normal       Mouth/Throat:      Mouth: Mucous membranes are moist       Pharynx: Oropharynx is clear  Eyes:      Extraocular Movements: Extraocular movements intact  Cardiovascular:      Rate and Rhythm: Normal rate and regular rhythm  Pulses: Normal pulses  Heart sounds: Normal heart sounds  Pulmonary:      Effort: Pulmonary effort is normal  No respiratory distress  Breath sounds: Normal breath sounds  Chest:      Chest wall: No tenderness  Abdominal:      General: Abdomen is flat  Bowel sounds are normal       Palpations: Abdomen is soft  Musculoskeletal:         General: Normal range of motion  Cervical back: Normal range of motion  No rigidity or tenderness  Skin:     General: Skin is warm and dry  Neurological:      General: No focal deficit present  Mental Status: He is alert and oriented to person, place, and time  Cranial Nerves: No cranial nerve deficit  Psychiatric:         Mood and Affect: Mood normal          Behavior: Behavior normal          Lab Results:   I have personally reviewed pertinent lab and imaging results       VTE Prophylaxis: Sequential compression device (Venodyne)  and Enoxaparin (Lovenox)    Code Status: Level 1 - Full Code  Advance Directive and Living Will:      Power of :    POLST:      Family and Social Support: Daughter  No data recorded

## 2022-11-11 NOTE — PLAN OF CARE
Problem: METABOLIC, FLUID AND ELECTROLYTES - ADULT  Goal: Electrolytes maintained within normal limits  Description: INTERVENTIONS:  - Monitor labs and assess patient for signs and symptoms of electrolyte imbalances  - Administer electrolyte replacement as ordered  - Monitor response to electrolyte replacements, including repeat lab results as appropriate  - Instruct patient on fluid and nutrition as appropriate  Outcome: Progressing  Goal: Fluid balance maintained  Description: INTERVENTIONS:  - Monitor labs   - Monitor I/O and WT  - Instruct patient on fluid and nutrition as appropriate  - Assess for signs & symptoms of volume excess or deficit  Outcome: Progressing  Goal: Glucose maintained within target range  Description: INTERVENTIONS:  - Monitor Blood Glucose as ordered  - Assess for signs and symptoms of hyperglycemia and hypoglycemia  - Administer ordered medications to maintain glucose within target range  - Assess nutritional intake and initiate nutrition service referral as needed  Outcome: Progressing     Problem: PAIN - ADULT  Goal: Verbalizes/displays adequate comfort level or baseline comfort level  Description: Interventions:  - Encourage patient to monitor pain and request assistance  - Assess pain using appropriate pain scale  - Administer analgesics based on type and severity of pain and evaluate response  - Implement non-pharmacological measures as appropriate and evaluate response  - Consider cultural and social influences on pain and pain management  - Notify physician/advanced practitioner if interventions unsuccessful or patient reports new pain  Outcome: Progressing     Problem: INFECTION - ADULT  Goal: Absence or prevention of progression during hospitalization  Description: INTERVENTIONS:  - Assess and monitor for signs and symptoms of infection  - Monitor lab/diagnostic results  - Monitor all insertion sites, i e  indwelling lines, tubes, and drains  - Monitor endotracheal if appropriate and nasal secretions for changes in amount and color  - [place] appropriate cooling/warming therapies per order  - Administer medications as ordered  - Instruct and encourage patient and family to use good hand hygiene technique  - Identify and instruct in appropriate isolation precautions for identified infection/condition  Outcome: Progressing  Goal: Absence of fever/infection during neutropenic period  Description: INTERVENTIONS:  - Monitor WBC    Outcome: Progressing     Problem: SAFETY ADULT  Goal: Patient will remain free of falls  Description: INTERVENTIONS:  - Educate patient/family on patient safety including physical limitations  - Instruct patient to call for assistance with activity   - Consult OT/PT to assist with strengthening/mobility   - Keep Call bell within reach  - Keep bed low and locked with side rails adjusted as appropriate  - Keep care items and personal belongings within reach  - Initiate and maintain comfort rounds  - Make Fall Risk Sign visible to staff  - Offer Toileting every 2 Hours, in advance of need  - Initiate/Maintain bed alarm  - Apply yellow socks and bracelet for high fall risk patients  - Consider moving patient to room near nurses station  Outcome: Progressing  Goal: Maintain or return to baseline ADL function  Description: INTERVENTIONS:  -  Assess patient's ability to carry out ADLs; assess patient's baseline for ADL function and identify physical deficits which impact ability to perform ADLs (bathing, care of mouth/teeth, toileting, grooming, dressing, etc )  - Assess/evaluate cause of self-care deficits   - Assess range of motion  - Assess patient's mobility; develop plan if impaired  - Assess patient's need for assistive devices and provide as appropriate  - Encourage maximum independence but intervene and supervise when necessary  - Involve family in performance of ADLs  - Assess for home care needs following discharge   - Consider OT consult to assist with ADL evaluation and planning for discharge  - Provide patient education as appropriate  Outcome: Progressing  Goal: Maintains/Returns to pre admission functional level  Description: INTERVENTIONS:  - Perform BMAT or MOVE assessment daily    - Set and communicate daily mobility goal to care team and patient/family/caregiver  - Collaborate with rehabilitation services on mobility goals if consulted  - Perform Range of Motion 3 times a day  - Reposition patient every 2 hours  - Dangle patient 3 times a day  - Stand patient 3 times a day  - Ambulate patient 3 times a day  - Out of bed to chair 3 times a day   - Out of bed for meals 3 times a day  - Out of bed for toileting  - Record patient progress and toleration of activity level   Outcome: Progressing     Problem: DISCHARGE PLANNING  Goal: Discharge to home or other facility with appropriate resources  Description: INTERVENTIONS:  - Identify barriers to discharge w/patient and caregiver  - Arrange for needed discharge resources and transportation as appropriate  - Identify discharge learning needs (meds, wound care, etc )  - Arrange for interpretive services to assist at discharge as needed  - Refer to Case Management Department for coordinating discharge planning if the patient needs post-hospital services based on physician/advanced practitioner order or complex needs related to functional status, cognitive ability, or social support system  Outcome: Progressing     Problem: Knowledge Deficit  Goal: Patient/family/caregiver demonstrates understanding of disease process, treatment plan, medications, and discharge instructions  Description: Complete learning assessment and assess knowledge base    Interventions:  - Provide teaching at level of understanding  - Provide teaching via preferred learning methods  Outcome: Progressing     Problem: MOBILITY - ADULT  Goal: Maintain or return to baseline ADL function  Description: INTERVENTIONS:  -  Assess patient's ability to carry out ADLs; assess patient's baseline for ADL function and identify physical deficits which impact ability to perform ADLs (bathing, care of mouth/teeth, toileting, grooming, dressing, etc )  - Assess/evaluate cause of self-care deficits   - Assess range of motion  - Assess patient's mobility; develop plan if impaired  - Assess patient's need for assistive devices and provide as appropriate  - Encourage maximum independence but intervene and supervise when necessary  - Involve family in performance of ADLs  - Assess for home care needs following discharge   - Consider OT consult to assist with ADL evaluation and planning for discharge  - Provide patient education as appropriate  Outcome: Progressing  Goal: Maintains/Returns to pre admission functional level  Description: INTERVENTIONS:  - Perform BMAT or MOVE assessment daily    - Set and communicate daily mobility goal to care team and patient/family/caregiver  - Collaborate with rehabilitation services on mobility goals if consulted  - Perform Range of Motion 3 times a day  - Reposition patient every 2 hours    - Dangle patient 3 times a day  - Stand patient 3 times a day  - Ambulate patient 3 times a day  - Out of bed to chair 3 times a day   - Out of bed for meals 3 times a day  - Out of bed for toileting  - Record patient progress and toleration of activity level   Outcome: Progressing     Problem: Potential for Falls  Goal: Patient will remain free of falls  Description: INTERVENTIONS:  - Educate patient/family on patient safety including physical limitations  - Instruct patient to call for assistance with activity   - Consult OT/PT to assist with strengthening/mobility   - Keep Call bell within reach  - Keep bed low and locked with side rails adjusted as appropriate  - Keep care items and personal belongings within reach  - Initiate and maintain comfort rounds  - Make Fall Risk Sign visible to staff  - Offer Toileting every 2 Hours, in advance of need  - Initiate/Maintain bed alarm  - Apply yellow socks and bracelet for high fall risk patients  - Consider moving patient to room near nurses station  Outcome: Progressing     Problem: Nutrition/Hydration-ADULT  Goal: Nutrient/Hydration intake appropriate for improving, restoring or maintaining nutritional needs  Description: Monitor and assess patient's nutrition/hydration status for malnutrition  Collaborate with interdisciplinary team and initiate plan and interventions as ordered  Monitor patient's weight and dietary intake as ordered or per policy  Utilize nutrition screening tool and intervene as necessary  Determine patient's food preferences and provide high-protein, high-caloric foods as appropriate       INTERVENTIONS:  - Monitor oral intake, urinary output, labs, and treatment plans  - Assess nutrition and hydration status and recommend course of action  - Evaluate amount of meals eaten  - Assist patient with eating if necessary   - Allow adequate time for meals  - Recommend/ encourage appropriate diets, oral nutritional supplements, and vitamin/mineral supplements  - Order, calculate, and assess calorie counts as needed  - Recommend, monitor, and adjust tube feedings and TPN/PPN based on assessed needs  - Assess need for intravenous fluids  - Provide specific nutrition/hydration education as appropriate  - Include patient/family/caregiver in decisions related to nutrition  Outcome: Progressing

## 2022-11-11 NOTE — ASSESSMENT & PLAN NOTE
History of Crohn's disease W/ Ostomy    Patient denies abnormal or excessive output  Plan: Outpatient GI follow up

## 2022-11-12 PROBLEM — C22.0 HCC (HEPATOCELLULAR CARCINOMA) (HCC): Chronic | Status: ACTIVE | Noted: 2022-03-15

## 2022-11-12 PROBLEM — E03.8 SUBCLINICAL HYPOTHYROIDISM: Status: ACTIVE | Noted: 2022-11-12

## 2022-11-12 PROBLEM — I47.1 SUPRAVENTRICULAR TACHYCARDIA (HCC): Status: RESOLVED | Noted: 2022-11-04 | Resolved: 2022-11-12

## 2022-11-12 PROBLEM — I47.10 SUPRAVENTRICULAR TACHYCARDIA: Status: RESOLVED | Noted: 2022-11-04 | Resolved: 2022-11-12

## 2022-11-12 PROBLEM — R55 SYNCOPE: Status: RESOLVED | Noted: 2018-04-03 | Resolved: 2022-11-12

## 2022-11-12 PROBLEM — J44.9 CHRONIC OBSTRUCTIVE PULMONARY DISEASE (HCC): Chronic | Status: ACTIVE | Noted: 2020-11-24

## 2022-11-12 PROBLEM — D69.6 PLATELETS DECREASED (HCC): Status: RESOLVED | Noted: 2022-07-12 | Resolved: 2022-11-12

## 2022-11-12 LAB
ANION GAP SERPL CALCULATED.3IONS-SCNC: 7 MMOL/L (ref 4–13)
BACTERIA UR QL AUTO: ABNORMAL /HPF
BILIRUB UR QL STRIP: NEGATIVE
BUN SERPL-MCNC: 10 MG/DL (ref 5–25)
CALCIUM SERPL-MCNC: 8.6 MG/DL (ref 8.4–10.2)
CHLORIDE SERPL-SCNC: 107 MMOL/L (ref 96–108)
CLARITY UR: CLEAR
CO2 SERPL-SCNC: 24 MMOL/L (ref 21–32)
COLOR UR: ABNORMAL
CREAT SERPL-MCNC: 0.8 MG/DL (ref 0.6–1.3)
ERYTHROCYTE [DISTWIDTH] IN BLOOD BY AUTOMATED COUNT: 13.6 % (ref 11.6–15.1)
GFR SERPL CREATININE-BSD FRML MDRD: 92 ML/MIN/1.73SQ M
GLUCOSE SERPL-MCNC: 92 MG/DL (ref 65–140)
GLUCOSE UR STRIP-MCNC: NEGATIVE MG/DL
HCT VFR BLD AUTO: 31.3 % (ref 36.5–49.3)
HGB BLD-MCNC: 10.1 G/DL (ref 12–17)
HGB UR QL STRIP.AUTO: ABNORMAL
KETONES UR STRIP-MCNC: NEGATIVE MG/DL
LEUKOCYTE ESTERASE UR QL STRIP: NEGATIVE
MAGNESIUM SERPL-MCNC: 1.7 MG/DL (ref 1.9–2.7)
MCH RBC QN AUTO: 35.7 PG (ref 26.8–34.3)
MCHC RBC AUTO-ENTMCNC: 32.3 G/DL (ref 31.4–37.4)
MCV RBC AUTO: 111 FL (ref 82–98)
NITRITE UR QL STRIP: NEGATIVE
NON-SQ EPI CELLS URNS QL MICRO: ABNORMAL /HPF
PH UR STRIP.AUTO: 6.5 [PH]
PLATELET # BLD AUTO: 215 THOUSANDS/UL (ref 149–390)
PMV BLD AUTO: 10.5 FL (ref 8.9–12.7)
POTASSIUM SERPL-SCNC: 4.5 MMOL/L (ref 3.5–5.3)
PROT UR STRIP-MCNC: ABNORMAL MG/DL
RBC # BLD AUTO: 2.83 MILLION/UL (ref 3.88–5.62)
RBC #/AREA URNS AUTO: ABNORMAL /HPF
SODIUM SERPL-SCNC: 138 MMOL/L (ref 135–147)
SP GR UR STRIP.AUTO: 1.01 (ref 1–1.03)
UROBILINOGEN UR STRIP-ACNC: <2 MG/DL
WBC # BLD AUTO: 7.17 THOUSAND/UL (ref 4.31–10.16)
WBC #/AREA URNS AUTO: ABNORMAL /HPF

## 2022-11-12 RX ORDER — MAGNESIUM SULFATE HEPTAHYDRATE 40 MG/ML
4 INJECTION, SOLUTION INTRAVENOUS ONCE
Status: COMPLETED | OUTPATIENT
Start: 2022-11-12 | End: 2022-11-12

## 2022-11-12 RX ORDER — MAGNESIUM SULFATE HEPTAHYDRATE 40 MG/ML
2 INJECTION, SOLUTION INTRAVENOUS ONCE
Status: COMPLETED | OUTPATIENT
Start: 2022-11-12 | End: 2022-11-12

## 2022-11-12 RX ORDER — ENOXAPARIN SODIUM 100 MG/ML
40 INJECTION SUBCUTANEOUS
Status: DISCONTINUED | OUTPATIENT
Start: 2022-11-12 | End: 2022-11-13 | Stop reason: HOSPADM

## 2022-11-12 RX ORDER — LEVOTHYROXINE SODIUM 0.05 MG/1
50 TABLET ORAL
Status: DISCONTINUED | OUTPATIENT
Start: 2022-11-12 | End: 2022-11-13 | Stop reason: HOSPADM

## 2022-11-12 RX ADMIN — MULTIPLE VITAMINS W/ MINERALS TAB 1 TABLET: TAB ORAL at 09:27

## 2022-11-12 RX ADMIN — FOLIC ACID 1 MG: 1 TABLET ORAL at 09:27

## 2022-11-12 RX ADMIN — LEVOTHYROXINE SODIUM 50 MCG: 50 TABLET ORAL at 09:28

## 2022-11-12 RX ADMIN — AMLODIPINE BESYLATE 5 MG: 5 TABLET ORAL at 09:28

## 2022-11-12 RX ADMIN — PRAVASTATIN SODIUM 80 MG: 80 TABLET ORAL at 15:42

## 2022-11-12 RX ADMIN — MAGNESIUM SULFATE HEPTAHYDRATE 2 G: 40 INJECTION, SOLUTION INTRAVENOUS at 17:37

## 2022-11-12 RX ADMIN — CARVEDILOL 3.12 MG: 3.12 TABLET, FILM COATED ORAL at 15:42

## 2022-11-12 RX ADMIN — PANTOPRAZOLE SODIUM 40 MG: 40 TABLET, DELAYED RELEASE ORAL at 05:54

## 2022-11-12 RX ADMIN — BUPROPION HYDROCHLORIDE 150 MG: 150 TABLET, FILM COATED, EXTENDED RELEASE ORAL at 09:28

## 2022-11-12 RX ADMIN — UMECLIDINIUM BROMIDE AND VILANTEROL TRIFENATATE 1 PUFF: 62.5; 25 POWDER RESPIRATORY (INHALATION) at 09:30

## 2022-11-12 RX ADMIN — THIAMINE HYDROCHLORIDE 500 MG: 100 INJECTION, SOLUTION INTRAMUSCULAR; INTRAVENOUS at 10:19

## 2022-11-12 RX ADMIN — THIAMINE HYDROCHLORIDE 500 MG: 100 INJECTION, SOLUTION INTRAMUSCULAR; INTRAVENOUS at 15:36

## 2022-11-12 RX ADMIN — MIRTAZAPINE 30 MG: 15 TABLET, FILM COATED ORAL at 21:02

## 2022-11-12 RX ADMIN — MAGNESIUM SULFATE HEPTAHYDRATE 4 G: 40 INJECTION, SOLUTION INTRAVENOUS at 11:16

## 2022-11-12 RX ADMIN — CARVEDILOL 3.12 MG: 3.12 TABLET, FILM COATED ORAL at 09:27

## 2022-11-12 RX ADMIN — ENOXAPARIN SODIUM 40 MG: 40 INJECTION SUBCUTANEOUS at 14:36

## 2022-11-12 RX ADMIN — THIAMINE HYDROCHLORIDE 500 MG: 100 INJECTION, SOLUTION INTRAMUSCULAR; INTRAVENOUS at 21:03

## 2022-11-12 RX ADMIN — AMILORIDE HYDROCLORIDE 5 MG: 5 TABLET ORAL at 09:27

## 2022-11-12 RX ADMIN — POTASSIUM CHLORIDE 20 MEQ: 20 SOLUTION ORAL at 09:28

## 2022-11-12 NOTE — ASSESSMENT & PLAN NOTE
2/2 to alcohol use  No bleeding at this time, no interventions planned  Platelets within normal limits

## 2022-11-12 NOTE — ASSESSMENT & PLAN NOTE
Lab Results   Component Value Date    K 4 5 11/12/2022    CORRECTEDCA 8 4 11/08/2022     Hypokalemia of 3 4 and calcium of 6 8 POA, Likely secondary to poor nutrition and oral intake    Plan  · Continue to monitor CMP on AM   · Replete electrolytes as necessary

## 2022-11-12 NOTE — ASSESSMENT & PLAN NOTE
Acute Macrocytic Anemia with a Hbg Hemoglobin of 9 6 noted on 11/8/22,   · hemoglobin remains stable with the morning hemoglobin   Patient is asymptomatic denying dizziness, headaches, lightheadedness, SOB, weakness or fatigue  · positive FOBT  · Iron level and saturation within normal limits, TIBC low at 138, ferritin 819  · GI consult, input appreciated   · CT Abd/Pelvis: no evidence of retroperitoneal hematoma  · Bidirectional scope performed on 11/11 with removal of multiple polyps, no signs of active bleeding, cauterization of AVMs  · Hemoglobin remains stable     Plan:  · Lab holiday tomorrow

## 2022-11-12 NOTE — ASSESSMENT & PLAN NOTE
Daughter reports confusion and unusual behavior observed over last 2 days noting that he has been calling home at odd hours of the day, having visual hallucinations, disorientation  · Patient alert and oriented during encounters, have not observed behavior during evaluation, remains AOx3 throughout encounter  · Overnight no events  Family do not report any repeat episodes of calling or hallucinations    · Ammonia, B12 within normal limits  · Labs consistent with subclinical hypothyroidism    Plan:  · Pending vitamin B1 result  · Continue Thiamine IV  · Continue to monitor for signs of hospital dementia vs sun downing  · Geriatrics Consulted, Input Appreciated  · OT Evaluation

## 2022-11-12 NOTE — PROGRESS NOTES
[facility]  Progress Note - Wilman Antonio III 1955, 79 y o  male MRN: [id number]  Unit/Bed#: S -01 Encounter: [id number]  Primary Care Provider: Eren Collins MD   Date and time admitted to hospital: 11/1/2022  9:35 PM    * Syncope-resolved as of 11/12/2022  Assessment & Plan  Patient reports loss of consciousness while out drinking with friends last night  He stood up to go to the bathroom and reports losing consciousness and hitting the floor  Reports poor oral intake and loss of appetite recently due to generalized anxiety  He has been experiencing lightheadedness and dizziness upon rising from sitting positing while at home  Denies loss of consciousness at home  He has not had any chest pain or shortness of breath with these episodes  No history of seizures per chart review, no history of CAD or MI  Brought to the emergency department by EMS  Found to be hyponatremic with osmolality profile fitting hypovolemic hyponatremia  Hyponatremia improved with IVF  Orthostatics positive in the ED  EKG shows NSR with incomplete RBBB and nonspecific ST segment abnormality  Troponins negative      Syncope most likely in the setting of poor oral intake and vasovagal response  Telemetry shows episodes of sinus and supraventricular tachycardia with HR ranging from 150 to 200  Echocardiogram negative for any cardiogenic cause of of his syncopal episodes  Orthostats negative x3     Plan:  Check orthostatics prior to discharge  Holter monitoring on discharge - will place referral to cardiology  Encourage cessation of alcohol consumption and better oral nutrition    Delirium  Assessment & Plan  Daughter reports confusion and unusual behavior observed over last 2 days noting that he has been calling home at odd hours of the day, having visual hallucinations, disorientation  · Patient alert and oriented during encounters, have not observed behavior during evaluation, remains AOx3 throughout encounter  · Overnight no events  Family do not report any repeat episodes of calling or hallucinations  · Ammonia, B12 within normal limits  · Labs consistent with subclinical hypothyroidism    Plan:  · Pending vitamin B1 result  · Continue Thiamine IV  · Continue to monitor for signs of hospital dementia vs sun downing  · Geriatrics Consulted, Input Appreciated  · OT Evaluation    Subclinical hypothyroidism  Assessment & Plan  TSH 16 5, free T4 1 06    Plan:  Levothyroxine 50 mcg per day  Requires follow-up TSH and free T4 in 4-6 weeks    Anemia  Assessment & Plan  Acute Macrocytic Anemia with a Hbg Hemoglobin of 9 6 noted on 11/8/22,   · hemoglobin remains stable with the morning hemoglobin  Patient is asymptomatic denying dizziness, headaches, lightheadedness, SOB, weakness or fatigue  · positive FOBT  · Iron level and saturation within normal limits, TIBC low at 138, ferritin 819  · GI consult, input appreciated   · CT Abd/Pelvis: no evidence of retroperitoneal hematoma  · Bidirectional scope performed on 11/11 with removal of multiple polyps, no signs of active bleeding, cauterization of AVMs  · Hemoglobin remains stable     Plan:  · Lab holiday tomorrow    History of alcohol use disorder  Assessment & Plan  Minimum of at least 6 cans / bottles of alcohol daily   Alcohol level   Has spoken with [facility]    Originally agreeable to [employer], but now patient is  Is requesting DC home   Given Thiamine PO     Plan:   Continue IV thiamine  Continue with outpatient support from catch program        Electrolyte abnormality  Assessment & Plan  Lab Results   Component Value Date    K 4 5 11/12/2022    CORRECTEDCA 8 4 11/08/2022     Hypokalemia of 3 4 and calcium of 6 8 POA, Likely secondary to poor nutrition and oral intake    Plan  · Continue to monitor CMP on AM   · Replete electrolytes as necessary      [address]  (hepatocellular carcinoma) [facility])  Assessment & Plan  Patient has history of Hepatocellular carcinoma, originally diagnosed in 2020 concurrently undergoing treatment followed by surgical oncology   Dr Priscilla Cosby:     Follow-up closely with surgical oncology on discharge    Crohn's disease of small intestine with other complication [facility])  Assessment & Plan  History of Crohn's disease W/ Ostomy  Patient denies abnormal or excessive output    Plan:   Outpatient GI follow up      Chronic obstructive pulmonary disease (HCC)  Assessment & Plan  · COPD Stable on room air    Plan:  · Continue Anoro Ellipta    Hypomagnesemia  Assessment & Plan  · Mg 0 5 POA Has received multiple dosages of IV Mg Sulfate  · Continues to have hypoglycemia    Plan:  Replenish as need  At discharge, will need order for magnesium oxide oral supplement    Hyponatremia  Assessment & Plan  Lab Results   Component Value Date    SODIUM 138 11/12/2022   Baseline 135s -140s, , improved with fluids  Seems resolved at this time    Plan:   Encourage food and beverage hydration aside from alcohol  A m  BMP       Essential hypertension  Assessment & Plan  On Amiloride and Amlodipine 5 mg each daily     Plan:  Continue home meds  Carvedilol added    Supraventricular tachycardia (HCC)-resolved as of 11/12/2022  Assessment & Plan  Patient presents after syncopal episodes while out drinking  Found to have orthostatic hypotension secondary to intravascular volume depletion  Echocardiogram on 11/4 shows EF of 60%, normal LV function and nondilated left atrium  Telemetry revealed SVT with HR ranging from 120 to 200 for consecutive nights  Plan:  Improvement with correction of electrolytes  Carvedilol added      Platelets decreased (HCC)-resolved as of 11/12/2022  Assessment & Plan  2/2 to alcohol use  No bleeding at this time, no interventions planned  Platelets within normal limits  VTE Pharmacologic Prophylaxis: VTE Score: 5 High Risk (Score >/= 5) - Pharmacological DVT Prophylaxis Ordered: enoxaparin (Lovenox)  Sequential Compression Devices Ordered  Patient Centered Rounds: I performed bedside rounds with nursing staff today  Discussions with Specialists or Other Care Team Provider:  Nursing    Education and Discussions with Family / Patient: Updated  (daughter) via phone  Daughter Melissa  Updated son-in-law at bedside  Current Length of Stay: 10 day(s)  Current Patient Status: Inpatient   Discharge Plan: Anticipate discharge tomorrow to pending OT eval / TBD    Code Status: Level 1 - Full Code    Subjective:   Nursing team reported no overnight events  Patient reported feeling well and had no complaints or symptoms at this time  Patient is eager to be discharged from hospital     Objective:     Vitals:   Temp (24hrs), Av 4 °F (36 9 °C), Min:97 9 °F (36 6 °C), Max:98 9 °F (37 2 °C)    Temp:  [97 9 °F (36 6 °C)-98 9 °F (37 2 °C)] 98 9 °F (37 2 °C)  HR:  [77-80] 80  Resp:  [18] 18  BP: (121-144)/(57-73) 121/57  Body mass index is 20 67 kg/m²  Input and Output Summary (last 24 hours): Intake/Output Summary (Last 24 hours) at 2022 1534  Last data filed at 2022 0851  Gross per 24 hour   Intake 180 ml   Output 1000 ml   Net -820 ml       Physical Exam:   Physical Exam  Vitals and nursing note reviewed  Constitutional:       General: He is not in acute distress  Appearance: Normal appearance  He is normal weight  He is not ill-appearing  HENT:      Head: Normocephalic and atraumatic  Mouth/Throat:      Mouth: Mucous membranes are moist       Pharynx: Oropharynx is clear  Eyes:      General: No scleral icterus  Conjunctiva/sclera: Conjunctivae normal    Cardiovascular:      Rate and Rhythm: Normal rate and regular rhythm  Pulses: Normal pulses  Heart sounds: No murmur heard  No gallop  Pulmonary:      Effort: Pulmonary effort is normal  No respiratory distress  Breath sounds: Normal breath sounds  No wheezing or rales     Abdominal: General: Bowel sounds are normal  There is no distension  Palpations: Abdomen is soft  There is no mass  Tenderness: There is no abdominal tenderness  Musculoskeletal:         General: No tenderness  Normal range of motion  Cervical back: Normal range of motion and neck supple  No tenderness  Right lower leg: No edema  Left lower leg: No edema  Skin:     General: Skin is warm and dry  Capillary Refill: Capillary refill takes less than 2 seconds  Neurological:      General: No focal deficit present  Mental Status: He is alert and oriented to person, place, and time  Mental status is at baseline  Sensory: No sensory deficit  Psychiatric:         Mood and Affect: Mood normal          Behavior: Behavior normal           Additional Data:     Labs:  Results from last 7 days   Lab Units 11/12/22 0528 11/08/22  1328 11/08/22  0431   WBC Thousand/uL 7 17   < > 5 12   HEMOGLOBIN g/dL 10 1*   < > 9 6*   HEMATOCRIT % 31 3*   < > 29 9*   PLATELETS Thousands/uL 215   < > 110*   NEUTROS PCT %  --   --  67   LYMPHS PCT %  --   --  19   MONOS PCT %  --   --  12   EOS PCT %  --   --  1    < > = values in this interval not displayed  Results from last 7 days   Lab Units 11/12/22  0528 11/10/22  0410 11/09/22  0533   SODIUM mmol/L 138   < > 135   POTASSIUM mmol/L 4 5   < > 4 0   CHLORIDE mmol/L 107   < > 103   CO2 mmol/L 24   < > 25   BUN mg/dL 10   < > 12   CREATININE mg/dL 0 80   < > 0 71   ANION GAP mmol/L 7   < > 7   CALCIUM mg/dL 8 6   < > 7 9*   ALBUMIN g/dL  --   --  3 1*   TOTAL BILIRUBIN mg/dL  --   --  0 83   ALK PHOS U/L  --   --  88   ALT U/L  --   --  28   AST U/L  --   --  46*   GLUCOSE RANDOM mg/dL 92   < > 85    < > = values in this interval not displayed       Results from last 7 days   Lab Units 11/10/22  0410   INR  1 19                   Lines/Drains:  Invasive Devices  Report    Peripheral Intravenous Line  Duration           Peripheral IV 11/12/22 Right;Ventral (anterior) Forearm <1 day          Drain  Duration           Colostomy LLQ -- days    Colostomy LLQ -- days                      Imaging: Reviewed radiology reports from this admission including: LE duplex     Recent Cultures (last 7 days):         Last 24 Hours Medication List:   Current Facility-Administered Medications   Medication Dose Route Frequency Provider Last Rate   • acetaminophen  650 mg Oral Q6H PRN Patricia Vences MD     • ALPRAZolam  0 25 mg Oral Daily PRN Tera Knutson, DO     • AMILoride  5 mg Oral Daily Patricia Vences MD     • amLODIPine  5 mg Oral Daily Patricia Vences MD     • buPROPion  150 mg Oral Daily Ankit Kaye MD     • carvedilol  3 125 mg Oral BID With Meals Susan Gómez MD     • cyanocobalamin  1,000 mcg Intramuscular Q30 Days Patricia Vences MD     • doxylamine  12 5 mg Oral HS PRN Chau Thao MD     • enoxaparin  40 mg Subcutaneous Q24H [address] Pilo Shields MD     • folic acid  1 mg Oral Daily Ankit Kaye MD     • levothyroxine  50 mcg Oral Early Morning Pilo Shields MD     • magnesium sulfate  2 g Intravenous Once Persaud Mass, DO     • mirtazapine  30 mg Oral HS Ankit Kaye MD     • multivitamin-minerals  1 tablet Oral Daily Ankit Kaye MD     • pantoprazole  40 mg Oral Early Morning Ankit Kaye MD     • potassium chloride  20 mEq Oral Daily Ankit Kaye MD     • pravastatin  80 mg Oral Daily With Adonica Shone, MD     • thiamine  500 mg Intravenous TID Tear Knutson,  mg (11/12/22 1019)   • umeclidinium-vilanterol  1 puff Inhalation Daily Ankit Kaye MD          Today, Patient Was Seen By: Pilo Shields    **Please Note: This note may have been constructed using a voice recognition system  **

## 2022-11-12 NOTE — ASSESSMENT & PLAN NOTE
Lab Results   Component Value Date    SODIUM 138 11/12/2022   Baseline 135s -140s, , improved with fluids  Seems resolved at this time    Plan:   Encourage food and beverage hydration aside from alcohol  A m   BMP

## 2022-11-12 NOTE — ASSESSMENT & PLAN NOTE
Patient has history of  Hepatocellular carcinoma, originally diagnosed in 2020 concurrently undergoing treatment followed by surgical oncology   Dr Dali Beach:     Follow-up closely with surgical oncology on discharge

## 2022-11-12 NOTE — PLAN OF CARE
Problem: METABOLIC, FLUID AND ELECTROLYTES - ADULT  Goal: Electrolytes maintained within normal limits  Description: INTERVENTIONS:  - Monitor labs and assess patient for signs and symptoms of electrolyte imbalances  - Administer electrolyte replacement as ordered  - Monitor response to electrolyte replacements, including repeat lab results as appropriate  - Instruct patient on fluid and nutrition as appropriate  Outcome: Progressing  Goal: Fluid balance maintained  Description: INTERVENTIONS:  - Monitor labs   - Monitor I/O and WT  - Instruct patient on fluid and nutrition as appropriate  - Assess for signs & symptoms of volume excess or deficit  Outcome: Progressing  Goal: Glucose maintained within target range  Description: INTERVENTIONS:  - Monitor Blood Glucose as ordered  - Assess for signs and symptoms of hyperglycemia and hypoglycemia  - Administer ordered medications to maintain glucose within target range  - Assess nutritional intake and initiate nutrition service referral as needed  Outcome: Progressing     Problem: PAIN - ADULT  Goal: Verbalizes/displays adequate comfort level or baseline comfort level  Description: Interventions:  - Encourage patient to monitor pain and request assistance  - Assess pain using appropriate pain scale  - Administer analgesics based on type and severity of pain and evaluate response  - Implement non-pharmacological measures as appropriate and evaluate response  - Consider cultural and social influences on pain and pain management  - Notify physician/advanced practitioner if interventions unsuccessful or patient reports new pain  Outcome: Progressing     Problem: INFECTION - ADULT  Goal: Absence or prevention of progression during hospitalization  Description: INTERVENTIONS:  - Assess and monitor for signs and symptoms of infection  - Monitor lab/diagnostic results  - Monitor all insertion sites, i e  indwelling lines, tubes, and drains  - Monitor endotracheal if appropriate and nasal secretions for changes in amount and color  - Antonito appropriate cooling/warming therapies per order  - Administer medications as ordered  - Instruct and encourage patient and family to use good hand hygiene technique  - Identify and instruct in appropriate isolation precautions for identified infection/condition  Outcome: Progressing  Goal: Absence of fever/infection during neutropenic period  Description: INTERVENTIONS:  - Monitor WBC    Outcome: Progressing    Goal: Maintain or return to baseline ADL function  Description: INTERVENTIONS:  -  Assess patient's ability to carry out ADLs; assess patient's baseline for ADL function and identify physical deficits which impact ability to perform ADLs (bathing, care of mouth/teeth, toileting, grooming, dressing, etc )  - Assess/evaluate cause of self-care deficits   - Assess range of motion  - Assess patient's mobility; develop plan if impaired  - Assess patient's need for assistive devices and provide as appropriate  - Encourage maximum independence but intervene and supervise when necessary  - Involve family in performance of ADLs  - Assess for home care needs following discharge   - Consider OT consult to assist with ADL evaluation and planning for discharge  - Provide patient education as appropriate  Outcome: Progressing  Goal: Maintains/Returns to pre admission functional level  Description: INTERVENTIONS:  - Perform BMAT or MOVE assessment daily    - Set and communicate daily mobility goal to care team and patient/family/caregiver  - Collaborate with rehabilitation services on mobility goals if consulted  - Perform Range of Motion 3 times a day  - Reposition patient every 2 hours    - Dangle patient 3 times a day  - Stand patient 3 times a day  - Ambulate patient 3 times a day  - Out of bed to chair 3 times a day   - Out of bed for meals 3 times a day  - Out of bed for toileting  - Record patient progress and toleration of activity level Outcome: Progressing     Problem: Potential for Falls  Goal: Patient will remain free of falls  Description: INTERVENTIONS:  - Educate patient/family on patient safety including physical limitations  - Instruct patient to call for assistance with activity   - Consult OT/PT to assist with strengthening/mobility   - Keep Call bell within reach  - Keep bed low and locked with side rails adjusted as appropriate  - Keep care items and personal belongings within reach  - Initiate and maintain comfort rounds  - Make Fall Risk Sign visible to staff  - Offer Toileting every 2 Hours, in advance of need  - Apply yellow socks and bracelet for high fall risk patients  - Consider moving patient to room near nurses station  Outcome: Progressing     Problem: Nutrition/Hydration-ADULT  Goal: Nutrient/Hydration intake appropriate for improving, restoring or maintaining nutritional needs  Description: Monitor and assess patient's nutrition/hydration status for malnutrition  Collaborate with interdisciplinary team and initiate plan and interventions as ordered  Monitor patient's weight and dietary intake as ordered or per policy  Utilize nutrition screening tool and intervene as necessary  Determine patient's food preferences and provide high-protein, high-caloric foods as appropriate       INTERVENTIONS:  - Monitor oral intake, urinary output, labs, and treatment plans  - Assess nutrition and hydration status and recommend course of action  - Evaluate amount of meals eaten  - Assist patient with eating if necessary   - Allow adequate time for meals  - Recommend/ encourage appropriate diets, oral nutritional supplements, and vitamin/mineral supplements  - Order, calculate, and assess calorie counts as needed  - Assess need for intravenous fluids  - Provide specific nutrition/hydration education as appropriate  - Include patient/family/caregiver in decisions related to nutrition  Outcome: Progressing

## 2022-11-12 NOTE — ASSESSMENT & PLAN NOTE
TSH 16 5, free T4 1 06    Plan:  Levothyroxine 50 mcg per day  Requires follow-up TSH and free T4 in 4-6 weeks

## 2022-11-12 NOTE — ASSESSMENT & PLAN NOTE
Minimum of at least 6 cans / bottles of alcohol daily   Alcohol level   Has spoken with [address]    Originally agreeable to [employer], but now patient is  Is requesting DC home   Given Thiamine PO     Plan:   Continue IV thiamine  Continue with outpatient support from [facility] program

## 2022-11-12 NOTE — ASSESSMENT & PLAN NOTE
Patient reports loss of consciousness while out drinking with friends last night  He stood up to go to the bathroom and reports losing consciousness and hitting the floor  Reports poor oral intake and loss of appetite recently due to generalized anxiety  He has been experiencing lightheadedness and dizziness upon rising from sitting positing while at home  Denies loss of consciousness at home  He has not had any chest pain or shortness of breath with these episodes  No history of seizures per chart review, no history of CAD or MI  Brought to the emergency department by EMS  Found to be hyponatremic with osmolality profile fitting hypovolemic hyponatremia  Hyponatremia improved with IVF  Orthostatics positive in the ED  EKG shows NSR with incomplete RBBB and nonspecific ST segment abnormality  Troponins negative      Syncope most likely in the setting of poor oral intake and vasovagal response  Telemetry shows episodes of sinus and supraventricular tachycardia with HR ranging from 150 to 200  Echocardiogram negative for any cardiogenic cause of of his syncopal episodes  Orthostats negative x3     Plan:  Check orthostatics prior to discharge  Holter monitoring on discharge - will place referral to cardiology  Encourage cessation of alcohol consumption and better oral nutrition

## 2022-11-12 NOTE — ASSESSMENT & PLAN NOTE
· Mg 0 5 POA Has received multiple dosages of IV Mg Sulfate  · Continues to have hypoglycemia    Plan:  Replenish as need  At discharge, will need order for magnesium oxide oral supplement

## 2022-11-13 VITALS
BODY MASS INDEX: 20.73 KG/M2 | HEART RATE: 74 BPM | OXYGEN SATURATION: 94 % | HEIGHT: 69 IN | RESPIRATION RATE: 17 BRPM | DIASTOLIC BLOOD PRESSURE: 66 MMHG | WEIGHT: 140 LBS | TEMPERATURE: 98.7 F | SYSTOLIC BLOOD PRESSURE: 113 MMHG

## 2022-11-13 PROBLEM — R41.0 DELIRIUM: Status: RESOLVED | Noted: 2022-11-11 | Resolved: 2022-11-13

## 2022-11-13 PROBLEM — E87.1 HYPONATREMIA: Chronic | Status: RESOLVED | Noted: 2017-06-28 | Resolved: 2022-11-13

## 2022-11-13 PROBLEM — E87.1 HYPONATREMIA: Chronic | Status: ACTIVE | Noted: 2017-06-28

## 2022-11-13 LAB
ANION GAP SERPL CALCULATED.3IONS-SCNC: 7 MMOL/L (ref 4–13)
BUN SERPL-MCNC: 10 MG/DL (ref 5–25)
CALCIUM SERPL-MCNC: 8.6 MG/DL (ref 8.4–10.2)
CHLORIDE SERPL-SCNC: 103 MMOL/L (ref 96–108)
CO2 SERPL-SCNC: 28 MMOL/L (ref 21–32)
CREAT SERPL-MCNC: 0.8 MG/DL (ref 0.6–1.3)
GFR SERPL CREATININE-BSD FRML MDRD: 92 ML/MIN/1.73SQ M
GLUCOSE SERPL-MCNC: 87 MG/DL (ref 65–140)
MAGNESIUM SERPL-MCNC: 1.8 MG/DL (ref 1.9–2.7)
POTASSIUM SERPL-SCNC: 4.6 MMOL/L (ref 3.5–5.3)
SODIUM SERPL-SCNC: 138 MMOL/L (ref 135–147)

## 2022-11-13 RX ORDER — LEVOTHYROXINE SODIUM 0.05 MG/1
50 TABLET ORAL
Qty: 30 TABLET | Refills: 0 | Status: SHIPPED | OUTPATIENT
Start: 2022-11-14

## 2022-11-13 RX ORDER — MAGNESIUM SULFATE HEPTAHYDRATE 40 MG/ML
4 INJECTION, SOLUTION INTRAVENOUS ONCE
Status: COMPLETED | OUTPATIENT
Start: 2022-11-13 | End: 2022-11-13

## 2022-11-13 RX ORDER — CALCIUM CARBONATE/VITAMIN D3 500-10/5ML
400 LIQUID (ML) ORAL DAILY
Qty: 14 CAPSULE | Refills: 0 | Status: SHIPPED | OUTPATIENT
Start: 2022-11-13 | End: 2022-11-13

## 2022-11-13 RX ORDER — CARVEDILOL 3.12 MG/1
3.12 TABLET ORAL 2 TIMES DAILY WITH MEALS
Qty: 60 TABLET | Refills: 0 | Status: SHIPPED | OUTPATIENT
Start: 2022-11-13

## 2022-11-13 RX ORDER — FOLIC ACID 1 MG/1
1 TABLET ORAL DAILY
Qty: 30 TABLET | Refills: 0 | Status: SHIPPED | OUTPATIENT
Start: 2022-11-13

## 2022-11-13 RX ADMIN — POTASSIUM CHLORIDE 20 MEQ: 20 SOLUTION ORAL at 09:11

## 2022-11-13 RX ADMIN — AMLODIPINE BESYLATE 5 MG: 5 TABLET ORAL at 09:11

## 2022-11-13 RX ADMIN — LEVOTHYROXINE SODIUM 50 MCG: 50 TABLET ORAL at 06:02

## 2022-11-13 RX ADMIN — CARVEDILOL 3.12 MG: 3.12 TABLET, FILM COATED ORAL at 16:09

## 2022-11-13 RX ADMIN — PRAVASTATIN SODIUM 80 MG: 80 TABLET ORAL at 16:09

## 2022-11-13 RX ADMIN — MAGNESIUM SULFATE HEPTAHYDRATE 4 G: 40 INJECTION, SOLUTION INTRAVENOUS at 11:01

## 2022-11-13 RX ADMIN — UMECLIDINIUM BROMIDE AND VILANTEROL TRIFENATATE 1 PUFF: 62.5; 25 POWDER RESPIRATORY (INHALATION) at 09:13

## 2022-11-13 RX ADMIN — CARVEDILOL 3.12 MG: 3.12 TABLET, FILM COATED ORAL at 09:10

## 2022-11-13 RX ADMIN — THIAMINE HYDROCHLORIDE 500 MG: 100 INJECTION, SOLUTION INTRAMUSCULAR; INTRAVENOUS at 09:14

## 2022-11-13 RX ADMIN — ENOXAPARIN SODIUM 40 MG: 40 INJECTION SUBCUTANEOUS at 09:12

## 2022-11-13 RX ADMIN — MULTIPLE VITAMINS W/ MINERALS TAB 1 TABLET: TAB ORAL at 09:11

## 2022-11-13 RX ADMIN — FOLIC ACID 1 MG: 1 TABLET ORAL at 09:11

## 2022-11-13 RX ADMIN — BUPROPION HYDROCHLORIDE 150 MG: 150 TABLET, FILM COATED, EXTENDED RELEASE ORAL at 09:11

## 2022-11-13 RX ADMIN — AMILORIDE HYDROCLORIDE 5 MG: 5 TABLET ORAL at 09:10

## 2022-11-13 RX ADMIN — PANTOPRAZOLE SODIUM 40 MG: 40 TABLET, DELAYED RELEASE ORAL at 06:02

## 2022-11-13 NOTE — ASSESSMENT & PLAN NOTE
Acute Macrocytic Anemia with a Hbg Hemoglobin of 9 6 noted on 11/8/22,   · hemoglobin remains stable with the morning hemoglobin   Patient is asymptomatic denying dizziness, headaches, lightheadedness, SOB, weakness or fatigue  · positive FOBT  · Iron level and saturation within normal limits, TIBC low at 138, ferritin 819  · GI consult, input appreciated   · CT Abd/Pelvis: no evidence of retroperitoneal hematoma  · Bidirectional scope performed on 11/11 with removal of multiple polyps, no signs of active bleeding, cauterization of AVMs

## 2022-11-13 NOTE — ASSESSMENT & PLAN NOTE
Lab Results   Component Value Date    SODIUM 138 11/13/2022   Baseline 135s -140s, , improved with fluids  Seems resolved at this time  Plan:   Encourage food and beverage hydration aside from alcohol  A m   BMP

## 2022-11-13 NOTE — ASSESSMENT & PLAN NOTE
Daughter reports confusion and unusual behavior observed over last 2 days noting that he has been calling home at odd hours of the day, having visual hallucinations, disorientation  · Patient alert and oriented during encounters, have not observed behavior during evaluation, remains AOx3 throughout encounter  · Overnight no events  Family do not report any repeat episodes of calling or hallucinations    · Ammonia, B12 within normal limits  · Labs consistent with subclinical hypothyroidid

## 2022-11-13 NOTE — ASSESSMENT & PLAN NOTE
Lab Results   Component Value Date    K 4 6 11/13/2022    CORRECTEDCA 8 4 11/08/2022     Hypokalemia of 3 4 and calcium of 6 8 POA, Likely secondary to poor nutrition and oral intake    Plan  · Continue to monitor CMP on AM   · Replete electrolytes as necessary

## 2022-11-13 NOTE — ASSESSMENT & PLAN NOTE
· Mg 0 5 POA Has received multiple dosages of IV Mg Sulfate  · Continues to have hypoglycemia    Plan:  Replenish as need, given IV magnesium sulfate 4 g  At discharge, will need order for magnesium oxide oral supplement

## 2022-11-13 NOTE — ASSESSMENT & PLAN NOTE
Minimum of at least 6 cans / bottles of alcohol daily   Alcohol level   Has spoken with [address]    Originally agreeable to [employer], but now patient is  Is requesting DC home   Given Thiamine PO during hospital course, switched over to IV thiamine    Plan:   Continue IV thiamine  Continue with outpatient support from [facility]

## 2022-11-14 ENCOUNTER — TRANSITIONAL CARE MANAGEMENT (OUTPATIENT)
Dept: INTERNAL MEDICINE CLINIC | Facility: OTHER | Age: 67
End: 2022-11-14

## 2022-11-14 ENCOUNTER — PATIENT OUTREACH (OUTPATIENT)
Dept: INTERNAL MEDICINE CLINIC | Facility: CLINIC | Age: 67
End: 2022-11-14

## 2022-11-14 ENCOUNTER — TELEPHONE (OUTPATIENT)
Dept: INTERNAL MEDICINE CLINIC | Facility: OTHER | Age: 67
End: 2022-11-14

## 2022-11-14 DIAGNOSIS — Z71.89 COMPLEX CARE COORDINATION: Primary | ICD-10-CM

## 2022-11-14 NOTE — PROGRESS NOTES
[address] [facility] Program - Supervising Attending Discharge Attestation    I have seen and examined the patient personally on 11/13/2022  I rounded with the resident physician on this patient and deemed the patient appropriate for discharge  I have reviewed the daily events, assessments, plans, and discharge instructions with both the resident physician and the patient on the day of discharge  I agree with the statements made in the resident note below  Any exceptions will be listed below  Please review resident note below  The patient has no acute complaints  He states he feels much better  Resident physician was able to talk with daughters on the phone and his mental status has been better since we started the thiamine dosing  The patient has not exhibited any hallucinations for daughters or for nursing  He is also had no unusual statements  The patient denies any abdominal pain  He is had no further bleeding  No shortness of breath  The patient is eager to discharge today  On exam, heart is with a regular rate rhythm normal S1 and S2 heart sounds  Lungs are clear to auscultation bilaterally without any wheezing, rhonchi, or rales  Abdomen is soft, nondistended, nontender to palpation  Normoactive bowel sounds  Extremities are with no significant edema or calf tenderness  No varicosities  Patient is awake alert oriented x4  He holds good conversation and is calm and cooperative today  In terms of assessment and plan:  · Syncope -   · Dehydrated and orthostatic on admission  This is now resolved  · He is currently no longer requiring IV fluids  · Monitor electrolytes and fluid status, including I/Os  · Run of SVT also had been noted  Holter monitor recommended at discharge  · Avoid alcohol  · Acute Anemia with heme positive stool -   · In the setting of anticoagulant use, we began to evaluate for bleeding      · EGD on 11/10/2022 to evaluate for heme positive stools  · Check CT abdomen / pelvis to rule out retroperitoneal bleeding  · Iron studies with iron 65, Ferritin 819, and TIBC 183, more consistent with chronic disease pattern than iron deficiency  B12 and folate within normal range or high  · EGD 11/10/2022 - white plaque in the esophagus s/p biopsy  Mild portal hypertensive gastropathy  Two small AVMs treated with argon plasma coagulation  Small sessile polyp in the 2nd portion of the duodenum removed with cold forceps biopsy sent  · Follow-up biopsy results  · Colonoscopy 11/10/2022 - the total polyps removed from the descending colon using cold snare polypectomy and more distal polyps were additionally removed using hot snare with 2 larger polyps removed using endoscopic mucosal resection technique  No evidence of Crohn's colitis currently  Recommendation made for repeat endoscopy in 6 months  · Follow-up biopsy results  · H/H has now shown stability  Does not require daily checks anymore  · No retroperitoneal hematoma on CT abdomen  · History of Alcohol Abuse -   · Has admitted to 6 cans/ bottles of beer per day  ETOH on admission was 231  Alcohol cessation advised  · Has been on CIWA but no evidence of withdrawal   Discontinue CIWA protocol as no longer in typical time course for withdrawal   · Will give high dose thiamine to evaluate for possible wernicke encephalopathy  · Altered mental status / Possible Hallucinations -   · NH3, B12 normal   · Likely Wernicke encephalopathy  Improved with high-dose thiamine 500 mg q 8 hours for the last 2 days  Can not transition to 250 mg thiamine dose daily for 5 days and then down to 100 mg thiamine dose daily thereafter  Follow-up thiamine level which is pending at discharge  This level was checked prior to initiating the high-dose thiamine supplementation  · Levothyroxine initiated for subclinical hypothyroidism  Check TSH in 4 weeks    · Continue to monitor and assess hydration  · Serial mental status exams  No focal neurological findings to suggest stroke or other intracranial structural abnormality  · Medication review - no clear medication causes for encephalopathy  · With the longer hospital stay, must keep in mind also the possibility of inpatient delirium and even possible sleep deprivation  He dose not sleep really well here and does also require sleep aids at home too  · History of Crohn Disease -   · Has history of terminal ileum resection with ileostomy related to prior crohn's complication  · Routine ostomy care  · Hepatocellular Carcinoma -   · Tumors x 2 noted on CT abdomen  · Had recent increase in AFP and there is suggestion of residual active tumor at prior ablation site  Has also had an abnormal MRI in July 2022  Will require follow up with Dr Jerald Randolph for this  · Right lower extremity edema -   · Venous doppler ordered and shows no DVT  · Encourage leg elevation  · Consider compression stockings  · Serial leg exams  · Hypomagnesemia -   · Continue magnesium supplements in the outpatient setting  · Can recheck labs within 1 week  Total time for discharge: 35 minutes, of which more than 50% of the time was spent in direct patient care and reviewing discharge instructions with the patient        Justino Knutson, DO

## 2022-11-14 NOTE — PROGRESS NOTES
Spoke with Danyel Castaneda  States he is doing okay since being discharged  No questions about his discharge instructions  No trouble getting his medications  Reminded him to call PCP office back to set up follow up appointment  He said he will do so  Does not feel I need to call back

## 2022-11-16 LAB — VIT B1 BLD-SCNC: 179.7 NMOL/L (ref 66.5–200)

## 2022-11-17 ENCOUNTER — OFFICE VISIT (OUTPATIENT)
Dept: INTERNAL MEDICINE CLINIC | Facility: CLINIC | Age: 67
End: 2022-11-17

## 2022-11-17 VITALS
DIASTOLIC BLOOD PRESSURE: 82 MMHG | HEIGHT: 69 IN | SYSTOLIC BLOOD PRESSURE: 118 MMHG | HEART RATE: 90 BPM | BODY MASS INDEX: 20.73 KG/M2 | WEIGHT: 140 LBS | OXYGEN SATURATION: 99 % | TEMPERATURE: 98.4 F

## 2022-11-17 DIAGNOSIS — R55 SYNCOPE AND COLLAPSE: Primary | ICD-10-CM

## 2022-11-17 DIAGNOSIS — E87.1 HYPONATREMIA: ICD-10-CM

## 2022-11-17 DIAGNOSIS — E83.42 HYPOMAGNESEMIA: ICD-10-CM

## 2022-11-17 DIAGNOSIS — K50.018 CROHN'S DISEASE OF SMALL INTESTINE WITH OTHER COMPLICATION (HCC): Chronic | ICD-10-CM

## 2022-11-17 DIAGNOSIS — K21.9 GASTROESOPHAGEAL REFLUX DISEASE WITHOUT ESOPHAGITIS: ICD-10-CM

## 2022-11-17 RX ORDER — OMEPRAZOLE 20 MG/1
20 CAPSULE, DELAYED RELEASE ORAL DAILY
Qty: 90 CAPSULE | Refills: 1 | Status: SHIPPED | OUTPATIENT
Start: 2022-11-17

## 2022-11-17 NOTE — PROGRESS NOTES
Transitional Care Management Review:  Michelle Martini is a 79 y o  male here for TCM follow up  During the TCM phone call patient stated:    TCM Call     Date and time call was made  11/14/2022 10:08 AM    Hospital care reviewed  Records reviewed    Patient was hospitialized at  [address] [place] [address]    Date of Admission  11/01/22    Date of discharge  11/13/22    Diagnosis  syncope    Disposition  Home    Current Symptoms  None      TCM Call     Post hospital issues  None    Should patient be enrolled in anticoag monitoring? No    Scheduled for follow up? Yes    Did you obtain your prescribed medications  Yes    Do you need help managing your prescriptions or medications  No    Is transportation to your appointment needed  No    I have advised the patient to call PCP with any new or worsening symptoms  John Herring = [place]    Are you recieving any outpatient services  No    Are you recieving home care services  No    Are you using any community resources  No    Current waiver services  No    Have you fallen in the last 12 months  No    Interperter language line needed  No          Torri Coleman MD      Assessment/Plan:             1  Syncope and collapse    2  Hyponatremia    3  Hypomagnesemia    4  Crohn's disease of small intestine with other complication ([place] [address] )    5  Gastroesophageal reflux disease without esophagitis  -     omeprazole (PriLOSEC) 20 mg delayed release capsule; Take 1 capsule (20 mg total) by mouth daily         Subjective:      Patient ID: Michelle Martini is a 79 y o  male      Follow-up from hospitalization, hospital record reviewed, doing well, trying to eat more      The following portions of the patient's history were reviewed and updated as appropriate: He  has a past medical history of LUCILLE (acute kidney injury) ([facility] ) (6/28/2017), Blindness of left eye, Cancer ([place] [address] ), Cataract, Colon polyp, Colostomy in place [facility]) (6/29/2017), COPD (chronic obstructive pulmonary disease) ([facility] ), Crohn disease ([place] [address] ), Emphysema of lung ([place] [address] ), Emphysema/COPD ([place] [address] ), Essential hypertension, GERD (gastroesophageal reflux disease), Hypertension, Hypokalemia (6/28/2017), Hypomagnesemia (6/29/2017), Hyponatremia (6/28/2017), Kidney stone, Osteomyelitis ([place] [address] ), and Pneumonia  He   Patient Active Problem List    Diagnosis Date Noted   • Subclinical hypothyroidism 11/12/2022   • Anemia 11/08/2022   • Electrolyte abnormality 11/02/2022   • History of alcohol use disorder 11/02/2022   • HCC (hepatocellular carcinoma) ([place] [address] ) 03/15/2022   • Encounter for follow-up surveillance of liver cancer 01/21/2022   • Dysthymic disorder 06/28/2021   • Crohn's disease of small intestine with other complication ([place] [address] ) 29/83/7792   • Vitamin B12 deficiency 02/24/2021   • Cataract    • Chronic obstructive pulmonary disease ([place] [address] ) 11/24/2020   • Mixed hyperlipidemia 11/24/2020   • Kidney stone    • Gastroesophageal reflux disease without esophagitis    • Left wrist pain 09/29/2020   • Hypocalcemia 09/12/2020   • Acute pain of left wrist 09/10/2020   • Chronic, continuous use of opioids 09/10/2020   • Observed sleep apnea    • Palliative care patient 07/31/2020   • Abdominal wall abscess 07/31/2020   • Personal history of liver cancer 06/23/2020   • Abdominal pain 06/04/2020   • Tobacco abuse 05/29/2020   • Severe sepsis ([place] [address] ) 05/28/2020   • Pneumonia 05/28/2020   • Chest pain 05/28/2020   • Liver mass 05/28/2020   • Syncope and collapse 04/03/2018   • Emphysema of lung ([place] [address] )    • Cellulitis 12/04/2017   • Severe protein-calorie malnutrition ([place] [address] ) 07/01/2017   • Colostomy in place [facility]) 06/29/2017   • Diarrhea 06/29/2017   • Hypomagnesemia 06/29/2017   • Hyponatremia 06/28/2017   • Hypokalemia 06/28/2017   • Essential hypertension    • Emphysema/COPD [facility])    • Crohn disease ([facility] )      He  has a past surgical history that includes Colostomy;  Cholecystectomy; Colectomy; Colonoscopy (N/A, 6/30/2017); Lithotripsy; Finger surgery (Left); IR biopsy liver mass (6/8/2020); Cataract extraction (Bilateral); Liver lobectomy (N/A, 7/15/2020); Finger amputation; and IR microwave ablation (9/21/2022)  His family history includes Heart disease in his sister; Hypertension in his father  He  reports that he quit smoking about 10 months ago  His smoking use included cigarettes  He started smoking about 51 years ago  He has a 76 50 pack-year smoking history  He has never used smokeless tobacco  He reports current alcohol use of about 59 0 standard drinks per week  He reports that he does not use drugs    Current Outpatient Medications   Medication Sig Dispense Refill   • ALPRAZolam (XANAX) 0 25 mg tablet Take 1 tablet (0 25 mg total) by mouth daily at bedtime as needed for anxiety 90 tablet 0   • AMILoride 5 mg tablet Take 1 tablet (5 mg total) by mouth daily 90 tablet 0   • amLODIPine (NORVASC) 5 mg tablet Take 1 tablet (5 mg total) by mouth daily 90 tablet 0   • buPROPion (WELLBUTRIN XL) 150 mg 24 hr tablet Take 1 tablet (150 mg total) by mouth daily 90 tablet 0   • calcium carbonate (TUMS) 500 mg chewable tablet Chew 1 tablet (500 mg total) daily  0   • carvedilol (COREG) 3 125 mg tablet Take 1 tablet (3 125 mg total) by mouth 2 (two) times a day with meals 60 tablet 0   • folic acid (FOLVITE) 1 mg tablet Take 1 tablet (1 mg total) by mouth daily 30 tablet 0   • levothyroxine 50 mcg tablet Take 1 tablet (50 mcg total) by mouth daily in the early morning Do not start before November 14, 2022  30 tablet 0   • MAGNESIUM CHLORIDE PO Take 1,000 mg by mouth daily     • mirtazapine (REMERON) 15 mg tablet TAKE 1 TABLET BY MOUTH EVERYDAY AT BEDTIME 90 tablet 1   • omeprazole (PriLOSEC) 20 mg delayed release capsule Take 1 capsule (20 mg total) by mouth daily 90 capsule 1   • potassium chloride (MICRO-K) 10 MEQ CR capsule Take 2 capsules (20 mEq total) by mouth 2 (two) times a day 60 capsule 0   • rosuvastatin (CRESTOR) 10 MG tablet Take 1 tablet (10 mg total) by mouth daily 90 tablet 0   • thiamine 250 MG tablet Take 1 tablet (250 mg total) by mouth daily for 5 days Do not start before November 14, 2022  5 tablet 0   • umeclidinium-vilanterol (Anoro Ellipta) 62 5-25 MCG/INH inhaler Inhale 1 puff daily 180 each 3   • VIT B12-METHIONINE-INOS-CHOL IM Inject into a muscle every 30 (thirty) days       Current Facility-Administered Medications   Medication Dose Route Frequency Provider Last Rate Last Admin   • cyanocobalamin injection 1,000 mcg  1,000 mcg Intramuscular Q30 Days Torri Coleman MD   1,000 mcg at 09/28/22 1031     He has No Known Allergies       Review of Systems   Constitutional: Negative for chills and fever  HENT: Negative for congestion, ear pain and sore throat  Eyes: Negative for pain  Respiratory: Negative for cough and shortness of breath  Cardiovascular: Negative for chest pain and leg swelling  Gastrointestinal: Negative for abdominal pain, nausea and vomiting  Endocrine: Negative for polyuria  Genitourinary: Negative for difficulty urinating, frequency and urgency  Musculoskeletal: Negative for arthralgias and back pain  Skin: Negative for rash  Neurological: Negative for weakness and headaches  Psychiatric/Behavioral: Negative for sleep disturbance  The patient is not nervous/anxious            Objective:      /82 (BP Location: Left arm, Patient Position: Sitting, Cuff Size: Adult)   Pulse 90   Temp 98 4 °F (36 9 °C) (Temporal)   Ht 5' 9" (1 753 m)   Wt 63 5 kg (140 lb)   SpO2 99%   BMI 20 67 kg/m²     Recent Results (from the past 336 hour(s))   Vitamin D Panel    Collection Time: 11/03/22  1:40 PM   Result Value Ref Range    25-HYDROXY VIT D 42 ng/mL    25-Hydroxy D2 <1 0 ng/mL    25-HYDROXY VIT D3 42 ng/mL   Calcium, ionized    Collection Time: 11/03/22  1:40 PM   Result Value Ref Range    Calcium, Ionized 0 99 (L) 1 12 - 1 32 mmol/L   Comprehensive metabolic panel    Collection Time: 11/03/22  5:29 PM   Result Value Ref Range    Sodium 133 (L) 135 - 147 mmol/L    Potassium 4 0 3 5 - 5 3 mmol/L    Chloride 100 96 - 108 mmol/L    CO2 25 21 - 32 mmol/L    ANION GAP 8 4 - 13 mmol/L    BUN 7 5 - 25 mg/dL    Creatinine 0 80 0 60 - 1 30 mg/dL    Glucose 112 65 - 140 mg/dL    Calcium 7 5 (L) 8 4 - 10 2 mg/dL    Corrected Calcium 8 2 (L) 8 3 - 10 1 mg/dL    AST 47 (H) 13 - 39 U/L    ALT 20 7 - 52 U/L    Alkaline Phosphatase 65 34 - 104 U/L    Total Protein 5 9 (L) 6 4 - 8 4 g/dL    Albumin 3 1 (L) 3 5 - 5 0 g/dL    Total Bilirubin 2 05 (H) 0 20 - 1 00 mg/dL    eGFR 92 ml/min/1 73sq m   Comprehensive metabolic panel    Collection Time: 11/04/22  5:45 AM   Result Value Ref Range    Sodium 134 (L) 135 - 147 mmol/L    Potassium 4 0 3 5 - 5 3 mmol/L    Chloride 99 96 - 108 mmol/L    CO2 24 21 - 32 mmol/L    ANION GAP 11 4 - 13 mmol/L    BUN 7 5 - 25 mg/dL    Creatinine 0 70 0 60 - 1 30 mg/dL    Glucose 77 65 - 140 mg/dL    Calcium 7 6 (L) 8 4 - 10 2 mg/dL    Corrected Calcium 8 1 (L) 8 3 - 10 1 mg/dL    AST 40 (H) 13 - 39 U/L    ALT 18 7 - 52 U/L    Alkaline Phosphatase 72 34 - 104 U/L    Total Protein 6 3 (L) 6 4 - 8 4 g/dL    Albumin 3 4 (L) 3 5 - 5 0 g/dL    Total Bilirubin 1 82 (H) 0 20 - 1 00 mg/dL    eGFR 97 ml/min/1 73sq m   Magnesium    Collection Time: 11/04/22  5:45 AM   Result Value Ref Range    Magnesium 0 9 (LL) 1 9 - 2 7 mg/dL   Calcium, ionized    Collection Time: 11/04/22  5:45 AM   Result Value Ref Range    Calcium, Ionized 0 90 (L) 1 12 - 1 32 mmol/L   Lipid Panel with Direct LDL reflex    Collection Time: 11/04/22  5:45 AM   Result Value Ref Range    Cholesterol 87 See Comment mg/dL    Triglycerides 66 See Comment mg/dL    HDL, Direct 50 >=40 mg/dL    LDL Calculated 24 0 - 100 mg/dL   Comprehensive metabolic panel    Collection Time: 11/04/22  7:26 PM   Result Value Ref Range    Sodium 137 135 - 147 mmol/L    Potassium 3 6 3 5 - 5 3 mmol/L    Chloride 102 96 - 108 mmol/L    CO2 28 21 - 32 mmol/L    ANION GAP 7 4 - 13 mmol/L    BUN 8 5 - 25 mg/dL    Creatinine 0 81 0 60 - 1 30 mg/dL    Glucose 93 65 - 140 mg/dL    Calcium 7 9 (L) 8 4 - 10 2 mg/dL    Corrected Calcium 8 7 8 3 - 10 1 mg/dL    AST 46 (H) 13 - 39 U/L    ALT 21 7 - 52 U/L    Alkaline Phosphatase 82 34 - 104 U/L    Total Protein 5 8 (L) 6 4 - 8 4 g/dL    Albumin 3 0 (L) 3 5 - 5 0 g/dL    Total Bilirubin 1 32 (H) 0 20 - 1 00 mg/dL    eGFR 91 ml/min/1 73sq m   Comprehensive metabolic panel    Collection Time: 11/05/22  4:47 AM   Result Value Ref Range    Sodium 135 135 - 147 mmol/L    Potassium 3 5 3 5 - 5 3 mmol/L    Chloride 100 96 - 108 mmol/L    CO2 28 21 - 32 mmol/L    ANION GAP 7 4 - 13 mmol/L    BUN 8 5 - 25 mg/dL    Creatinine 0 80 0 60 - 1 30 mg/dL    Glucose 86 65 - 140 mg/dL    Calcium 7 6 (L) 8 4 - 10 2 mg/dL    Corrected Calcium 8 5 8 3 - 10 1 mg/dL    AST 38 13 - 39 U/L    ALT 20 7 - 52 U/L    Alkaline Phosphatase 66 34 - 104 U/L    Total Protein 5 6 (L) 6 4 - 8 4 g/dL    Albumin 2 9 (L) 3 5 - 5 0 g/dL    Total Bilirubin 1 48 (H) 0 20 - 1 00 mg/dL    eGFR 92 ml/min/1 73sq m   Magnesium    Collection Time: 11/05/22  4:47 AM   Result Value Ref Range    Magnesium 0 6 (LL) 1 9 - 2 7 mg/dL   Comprehensive metabolic panel    Collection Time: 11/06/22  4:38 AM   Result Value Ref Range    Sodium 135 135 - 147 mmol/L    Potassium 3 5 3 5 - 5 3 mmol/L    Chloride 103 96 - 108 mmol/L    CO2 24 21 - 32 mmol/L    ANION GAP 8 4 - 13 mmol/L    BUN 7 5 - 25 mg/dL    Creatinine 0 66 0 60 - 1 30 mg/dL    Glucose 93 65 - 140 mg/dL    Calcium 7 4 (L) 8 4 - 10 2 mg/dL    Corrected Calcium 8 2 (L) 8 3 - 10 1 mg/dL    AST 51 (H) 13 - 39 U/L    ALT 23 7 - 52 U/L    Alkaline Phosphatase 78 34 - 104 U/L    Total Protein 5 7 (L) 6 4 - 8 4 g/dL    Albumin 3 0 (L) 3 5 - 5 0 g/dL    Total Bilirubin 1 22 (H) 0 20 - 1 00 mg/dL    eGFR 100 ml/min/1 73sq m   Magnesium    Collection Time: 11/06/22  4:38 AM   Result Value Ref Range    Magnesium 1 2 (L) 1 9 - 2 7 mg/dL   Magnesium    Collection Time: 11/07/22  5:28 AM   Result Value Ref Range    Magnesium 1 4 (L) 1 9 - 2 7 mg/dL   Comprehensive metabolic panel    Collection Time: 11/07/22  5:28 AM   Result Value Ref Range    Sodium 136 135 - 147 mmol/L    Potassium 4 0 3 5 - 5 3 mmol/L    Chloride 105 96 - 108 mmol/L    CO2 23 21 - 32 mmol/L    ANION GAP 8 4 - 13 mmol/L    BUN 10 5 - 25 mg/dL    Creatinine 0 64 0 60 - 1 30 mg/dL    Glucose 81 65 - 140 mg/dL    Calcium 7 6 (L) 8 4 - 10 2 mg/dL    Corrected Calcium 8 2 (L) 8 3 - 10 1 mg/dL    AST 45 (H) 13 - 39 U/L    ALT 26 7 - 52 U/L    Alkaline Phosphatase 67 34 - 104 U/L    Total Protein 5 9 (L) 6 4 - 8 4 g/dL    Albumin 3 2 (L) 3 5 - 5 0 g/dL    Total Bilirubin 1 24 (H) 0 20 - 1 00 mg/dL    eGFR 101 ml/min/1 73sq m   Magnesium    Collection Time: 11/07/22  2:08 PM   Result Value Ref Range    Magnesium 2 1 1 9 - 2 7 mg/dL   CBC and differential    Collection Time: 11/08/22  4:31 AM   Result Value Ref Range    WBC 5 12 4 31 - 10 16 Thousand/uL    RBC 2 77 (L) 3 88 - 5 62 Million/uL    Hemoglobin 9 6 (L) 12 0 - 17 0 g/dL    Hematocrit 29 9 (L) 36 5 - 49 3 %     (H) 82 - 98 fL    MCH 34 7 (H) 26 8 - 34 3 pg    MCHC 32 1 31 4 - 37 4 g/dL    RDW 12 9 11 6 - 15 1 %    MPV 10 5 8 9 - 12 7 fL    Platelets 373 (L) 775 - 390 Thousands/uL    nRBC 0 /100 WBCs    Neutrophils Relative 67 43 - 75 %    Immat GRANS % 1 0 - 2 %    Lymphocytes Relative 19 14 - 44 %    Monocytes Relative 12 4 - 12 %    Eosinophils Relative 1 0 - 6 %    Basophils Relative 0 0 - 1 %    Neutrophils Absolute 3 45 1 85 - 7 62 Thousands/µL    Immature Grans Absolute 0 03 0 00 - 0 20 Thousand/uL    Lymphocytes Absolute 0 97 0 60 - 4 47 Thousands/µL    Monocytes Absolute 0 62 0 17 - 1 22 Thousand/µL    Eosinophils Absolute 0 03 0 00 - 0 61 Thousand/µL    Basophils Absolute 0 02 0 00 - 0 10 Thousands/µL   Comprehensive metabolic panel    Collection Time: 11/08/22  4:31 AM   Result Value Ref Range    Sodium 135 135 - 147 mmol/L    Potassium 3 8 3 5 - 5 3 mmol/L    Chloride 104 96 - 108 mmol/L    CO2 24 21 - 32 mmol/L    ANION GAP 7 4 - 13 mmol/L    BUN 10 5 - 25 mg/dL    Creatinine 0 66 0 60 - 1 30 mg/dL    Glucose 86 65 - 140 mg/dL    Calcium 7 6 (L) 8 4 - 10 2 mg/dL    Corrected Calcium 8 4 8 3 - 10 1 mg/dL    AST 44 (H) 13 - 39 U/L    ALT 27 7 - 52 U/L    Alkaline Phosphatase 81 34 - 104 U/L    Total Protein 5 8 (L) 6 4 - 8 4 g/dL    Albumin 3 0 (L) 3 5 - 5 0 g/dL    Total Bilirubin 0 98 0 20 - 1 00 mg/dL    eGFR 100 ml/min/1 73sq m   Magnesium    Collection Time: 11/08/22  4:31 AM   Result Value Ref Range    Magnesium 1 4 (L) 1 9 - 2 7 mg/dL   Iron Saturation %    Collection Time: 11/08/22  1:28 PM   Result Value Ref Range    Iron Saturation 36 20 - 50 %    TIBC 183 (L) 250 - 450 ug/dL    Iron 65 65 - 175 ug/dL   Ferritin    Collection Time: 11/08/22  1:28 PM   Result Value Ref Range    Ferritin 819 (H) 8 - 388 ng/mL   Hemoglobin and hematocrit, blood    Collection Time: 11/08/22  1:28 PM   Result Value Ref Range    Hemoglobin 9 6 (L) 12 0 - 17 0 g/dL    Hematocrit 30 0 (L) 36 5 - 49 3 %   Occult blood 1-3, stool    Collection Time: 11/08/22  5:21 PM   Result Value Ref Range    Fecal Occult Blood Diagnostic Positive (A) Negative   CBC    Collection Time: 11/09/22  5:33 AM   Result Value Ref Range    WBC 6 03 4 31 - 10 16 Thousand/uL    RBC 2 77 (L) 3 88 - 5 62 Million/uL    Hemoglobin 9 8 (L) 12 0 - 17 0 g/dL    Hematocrit 29 5 (L) 36 5 - 49 3 %     (H) 82 - 98 fL    MCH 35 4 (H) 26 8 - 34 3 pg    MCHC 33 2 31 4 - 37 4 g/dL    RDW 13 0 11 6 - 15 1 %    Platelets 005 (L) 722 - 390 Thousands/uL    MPV 10 9 8 9 - 12 7 fL   Basic metabolic panel    Collection Time: 11/09/22  5:33 AM   Result Value Ref Range    Sodium 135 135 - 147 mmol/L    Potassium 4 0 3 5 - 5 3 mmol/L    Chloride 103 96 - 108 mmol/L    CO2 25 21 - 32 mmol/L    ANION GAP 7 4 - 13 mmol/L    BUN 12 5 - 25 mg/dL    Creatinine 0 71 0 60 - 1 30 mg/dL Glucose 85 65 - 140 mg/dL    Calcium 7 9 (L) 8 4 - 10 2 mg/dL    eGFR 97 ml/min/1 73sq m   Magnesium    Collection Time: 11/09/22  5:33 AM   Result Value Ref Range    Magnesium 1 6 (L) 1 9 - 2 7 mg/dL   Hepatic function panel    Collection Time: 11/09/22  5:33 AM   Result Value Ref Range    Total Bilirubin 0 83 0 20 - 1 00 mg/dL    Bilirubin, Direct 0 32 (H) 0 00 - 0 20 mg/dL    Alkaline Phosphatase 88 34 - 104 U/L    AST 46 (H) 13 - 39 U/L    ALT 28 7 - 52 U/L    Total Protein 6 0 (L) 6 4 - 8 4 g/dL    Albumin 3 1 (L) 3 5 - 5 0 g/dL   C-reactive protein    Collection Time: 11/09/22  5:33 AM   Result Value Ref Range    CRP 21 1 (H) <3 0 mg/L   Sedimentation rate, automated    Collection Time: 11/09/22  5:33 AM   Result Value Ref Range    Sed Rate 48 (H) 0 - 19 mm/hour   Basic metabolic panel    Collection Time: 11/10/22  4:10 AM   Result Value Ref Range    Sodium 131 (L) 135 - 147 mmol/L    Potassium 4 3 3 5 - 5 3 mmol/L    Chloride 102 96 - 108 mmol/L    CO2 19 (L) 21 - 32 mmol/L    ANION GAP 10 4 - 13 mmol/L    BUN 11 5 - 25 mg/dL    Creatinine 0 78 0 60 - 1 30 mg/dL    Glucose 85 65 - 140 mg/dL    Calcium 8 1 (L) 8 4 - 10 2 mg/dL    eGFR 93 ml/min/1 73sq m   CBC    Collection Time: 11/10/22  4:10 AM   Result Value Ref Range    WBC 7 20 4  31 - 10 16 Thousand/uL    RBC 2 72 (L) 3 88 - 5 62 Million/uL    Hemoglobin 9 7 (L) 12 0 - 17 0 g/dL    Hematocrit 29 1 (L) 36 5 - 49 3 %     (H) 82 - 98 fL    MCH 35 7 (H) 26 8 - 34 3 pg    MCHC 33 3 31 4 - 37 4 g/dL    RDW 13 2 11 6 - 15 1 %    Platelets 092 431 - 695 Thousands/uL    MPV 10 7 8 9 - 12 7 fL   Magnesium    Collection Time: 11/10/22  4:10 AM   Result Value Ref Range    Magnesium 1 8 (L) 1 9 - 2 7 mg/dL   Protime-INR    Collection Time: 11/10/22  4:10 AM   Result Value Ref Range    Protime 15 3 (H) 11 6 - 14 5 seconds    INR 1 19 0 84 - 1 19   Magnesium    Collection Time: 11/11/22  4:38 AM   Result Value Ref Range    Magnesium 1 6 (L) 1 9 - 2 7 mg/dL Basic metabolic panel    Collection Time: 11/11/22  4:38 AM   Result Value Ref Range    Sodium 134 (L) 135 - 147 mmol/L    Potassium 4 0 3 5 - 5 3 mmol/L    Chloride 103 96 - 108 mmol/L    CO2 22 21 - 32 mmol/L    ANION GAP 9 4 - 13 mmol/L    BUN 12 5 - 25 mg/dL    Creatinine 0 75 0 60 - 1 30 mg/dL    Glucose 90 65 - 140 mg/dL    Calcium 8 1 (L) 8 4 - 10 2 mg/dL    eGFR 94 ml/min/1 73sq m   CBC    Collection Time: 11/11/22  4:38 AM   Result Value Ref Range    WBC 6 86 4 31 - 10 16 Thousand/uL    RBC 2 80 (L) 3 88 - 5 62 Million/uL    Hemoglobin 9 9 (L) 12 0 - 17 0 g/dL    Hematocrit 30 5 (L) 36 5 - 49 3 %     (H) 82 - 98 fL    MCH 35 4 (H) 26 8 - 34 3 pg    MCHC 32 5 31 4 - 37 4 g/dL    RDW 13 2 11 6 - 15 1 %    Platelets 296 585 - 656 Thousands/uL    MPV 10 6 8 9 - 12 7 fL   Ammonia    Collection Time: 11/11/22 11:23 AM   Result Value Ref Range    Ammonia 54 18 - 72 umol/L   Vitamin B12    Collection Time: 11/11/22 11:23 AM   Result Value Ref Range    Vitamin B-12 638 100 - 900 pg/mL   TSH, 3rd generation with Free T4 reflex    Collection Time: 11/11/22 11:23 AM   Result Value Ref Range    TSH 3RD GENERATON 16 477 (H) 0 450 - 4 500 uIU/mL   Vitamin B1, whole blood    Collection Time: 11/11/22 11:23 AM   Result Value Ref Range    Vitamin B1, Whole Blood 179 7 66 5 - 200 0 nmol/L   T4, free    Collection Time: 11/11/22 11:23 AM   Result Value Ref Range    Free T4 1 06 0 76 - 1 46 ng/dL   Magnesium    Collection Time: 11/12/22  5:28 AM   Result Value Ref Range    Magnesium 1 7 (L) 1 9 - 2 7 mg/dL   Basic metabolic panel    Collection Time: 11/12/22  5:28 AM   Result Value Ref Range    Sodium 138 135 - 147 mmol/L    Potassium 4 5 3 5 - 5 3 mmol/L    Chloride 107 96 - 108 mmol/L    CO2 24 21 - 32 mmol/L    ANION GAP 7 4 - 13 mmol/L    BUN 10 5 - 25 mg/dL    Creatinine 0 80 0 60 - 1 30 mg/dL    Glucose 92 65 - 140 mg/dL    Calcium 8 6 8 4 - 10 2 mg/dL    eGFR 92 ml/min/1 73sq m   CBC    Collection Time: 11/12/22 5:28 AM   Result Value Ref Range    WBC 7 17 4 31 - 10 16 Thousand/uL    RBC 2 83 (L) 3 88 - 5 62 Million/uL    Hemoglobin 10 1 (L) 12 0 - 17 0 g/dL    Hematocrit 31 3 (L) 36 5 - 49 3 %     (H) 82 - 98 fL    MCH 35 7 (H) 26 8 - 34 3 pg    MCHC 32 3 31 4 - 37 4 g/dL    RDW 13 6 11 6 - 15 1 %    Platelets 144 608 - 171 Thousands/uL    MPV 10 5 8 9 - 12 7 fL   Urinalysis with microscopic    Collection Time: 11/12/22  9:13 AM   Result Value Ref Range    Color, UA Light Yellow     Clarity, UA Clear     Specific Gravity, UA 1 006 1 003 - 1 030    pH, UA 6 5 4 5, 5 0, 5 5, 6 0, 6 5, 7 0, 7 5, 8 0    Leukocytes, UA Negative Negative    Nitrite, UA Negative Negative    Protein, UA Trace (A) Negative mg/dl    Glucose, UA Negative Negative mg/dl    Ketones, UA Negative Negative mg/dl    Urobilinogen, UA <2 0 <2 0 mg/dl mg/dl    Bilirubin, UA Negative Negative    Occult Blood, UA Trace (A) Negative    RBC, UA 1-2 None Seen, 1-2 /hpf    WBC, UA 4-10 (A) None Seen, 1-2 /hpf    Epithelial Cells None Seen None Seen, Occasional /hpf    Bacteria, UA Innumerable (A) None Seen, Occasional /hpf   Magnesium    Collection Time: 11/13/22  5:41 AM   Result Value Ref Range    Magnesium 1 8 (L) 1 9 - 2 7 mg/dL   Basic metabolic panel    Collection Time: 11/13/22  5:41 AM   Result Value Ref Range    Sodium 138 135 - 147 mmol/L    Potassium 4 6 3 5 - 5 3 mmol/L    Chloride 103 96 - 108 mmol/L    CO2 28 21 - 32 mmol/L    ANION GAP 7 4 - 13 mmol/L    BUN 10 5 - 25 mg/dL    Creatinine 0 80 0 60 - 1 30 mg/dL    Glucose 87 65 - 140 mg/dL    Calcium 8 6 8 4 - 10 2 mg/dL    eGFR 92 ml/min/1 73sq m        Physical Exam  Constitutional:       Appearance: Normal appearance  HENT:      Head: Normocephalic  Right Ear: Tympanic membrane, ear canal and external ear normal       Left Ear: Tympanic membrane, ear canal and external ear normal       Nose: Nose normal  No congestion        Mouth/Throat:      Mouth: Mucous membranes are moist  Pharynx: Oropharynx is clear  No oropharyngeal exudate or posterior oropharyngeal erythema  Eyes:      Extraocular Movements: Extraocular movements intact  Conjunctiva/sclera: Conjunctivae normal       Pupils: Pupils are equal, round, and reactive to light  Cardiovascular:      Rate and Rhythm: Normal rate and regular rhythm  Heart sounds: Normal heart sounds  No murmur heard  Pulmonary:      Effort: Pulmonary effort is normal       Breath sounds: Normal breath sounds  No wheezing or rales  Abdominal:      General: There is no distension  Palpations: Abdomen is soft  Tenderness: There is no abdominal tenderness  Musculoskeletal:         General: Normal range of motion  Cervical back: Normal range of motion and neck supple  Right lower leg: No edema  Left lower leg: No edema  Lymphadenopathy:      Cervical: No cervical adenopathy  Skin:     General: Skin is warm  Neurological:      General: No focal deficit present  Mental Status: He is alert and oriented to person, place, and time

## 2022-11-20 NOTE — PROGRESS NOTES
Cardiology Follow Up    Tan Gilliam III  1955  [id number]  [address] [facility] [address] [place] [id number] [phone]    1  SVT (supraventricular tachycardia) (HCC)  Holter monitor      2  Hypomagnesemia  Basic metabolic panel    Magnesium    Holter monitor      3  Mixed hyperlipidemia        4  ETOH abuse            Interval History:   Mr Parag Chadwick III was admitted to [address] [place] [address] on 11/01- 11/13/22 with syncope  Joel Parks was out drinking with friends when he got up to go to the bathroom, hitting the floor with LOC  EMS called  On presentation to the ED he was found to be hyponatremic, hypovolemic, + orthostatic BP  Sodium 127 potassium 4 3 magnesium 0 5  AST elevated  T protein and albumin low  He was treated with IV hydration  Joel Parks   Reported increase  Anxiety poor oral intake and loss of appetite  In ED troponin negative  EKG showed normal sinus rhythm incomplete right bundle branch block  Syncope felt to be in the setting of poor oral intake and vasovagal response  Telemetry showed SVT heart rate 150-200    11/03/22 TTE  Left ventricle cavity size normal wall thickness normal LVEF 83% systolic function normal   Diastolic function normal   Right ventricular cavity size normal systolic function normal   Left atrium normal in size, right atrium normal in size  Aortic valve trileaflet no evidence of regurgitation no significant stenosis  Mitral valve trace regurgitation no evidence of stenosis  Tricuspid valve trace regurgitation no stenosis  Pulmonic valve trace regurgitation no stenosis  Aortic root normal in size  No pericardial effusion pericardium normal in appearance  Orthostatics negative x3 prior to discharge  He was found to have microcytic anemia with hemoglobin 9 6  Positive FOBT  GI consulted    11/11/22 sp Bidirectional scope with removal multiple polyps no signs of active bleeding cauterization of AVMs  Alcohol level   Spoke to John Paul   Originally agreeable to inpatient rehab and then requesting to be discharged home  He was placed on thiamine and vit B12  TSH 16 5 free T4 1 06 started on levothyroxine 50 mcg daily requiring a follow-up with PCP and TSH in 4-6 weeks  Telemetry showed -200 BPM   Cardiology was not consulted  AST prior to discharge 46  OP Holter monitor ordered  Emily was discharged home  Emily presents to our office for a recent hospitalization follow up visit  He  Denies dyspnea with minimal exertion chest pain palpitations lightheadedness or dizziness  He is not drinking alcohol  He is eating and drinking well      Medical History   Anxiety  Hypertension  Hyperlipidemia  Microcytic Anemia  ETOH abuse   Six cans or bottles of alcohol daily  Patient Active Problem List   Diagnosis   • Essential hypertension   • Emphysema/COPD ([facility] )   • Crohn disease ([facility] )   • Hyponatremia   • Colostomy in place [facility])   • Diarrhea   • Severe protein-calorie malnutrition (HCC)   • Cellulitis   • Emphysema of lung (HCC)   • Syncope and collapse   • Severe sepsis (HCC)   • Pneumonia   • Chest pain   • Liver mass   • Tobacco abuse   • Abdominal pain   • Personal history of liver cancer   • Palliative care patient   • Abdominal wall abscess   • Observed sleep apnea   • Acute pain of left wrist   • Chronic, continuous use of opioids   • Hypocalcemia   • Left wrist pain   • Kidney stone   • Hypomagnesemia   • Hypokalemia   • Gastroesophageal reflux disease without esophagitis   • Chronic obstructive pulmonary disease (HCC)   • Mixed hyperlipidemia   • Vitamin B12 deficiency   • Cataract   • Dysthymic disorder   • Crohn's disease of small intestine with other complication ([facility] )   • Encounter for follow-up surveillance of liver cancer   • HCC (hepatocellular carcinoma) (HCC)   • Electrolyte abnormality   • History of alcohol use disorder   • Anemia • Subclinical hypothyroidism     Past Medical History:   Diagnosis Date   • LUCILLE (acute kidney injury) ([facility] ) 2017   • Blindness of left eye     Since birth   • Cancer [facility])     liver   • Cataract    • Colon polyp    • Colostomy in place [facility]) 2017   • COPD (chronic obstructive pulmonary disease) (HCC)    • Crohn disease (HCC)    • Emphysema of lung (HCC)    • Emphysema/COPD (HCC)    • Essential hypertension    • GERD (gastroesophageal reflux disease)    • Hypertension    • Hypokalemia 2017   • Hypomagnesemia 2017   • Hyponatremia 2017   • Kidney stone    • Osteomyelitis (HCC)    • Pneumonia      Social History     Socioeconomic History   • Marital status:      Spouse name: Not on file   • Number of children: Not on file   • Years of education: Not on file   • Highest education level: Not on file   Occupational History   • Not on file   Tobacco Use   • Smoking status: Former     Packs/day: 1 50     Years: 51 00     Pack years: 76 50     Types: Cigarettes     Start date:      Quit date: 2022     Years since quittin 8   • Smokeless tobacco: Never   Vaping Use   • Vaping Use: Never used   Substance and Sexual Activity   • Alcohol use: Yes     Alcohol/week: 59 0 standard drinks     Types: 49 Cans of beer, 10 Shots of liquor per week     Comment: drinks 6 or more beers plus hard liquor daily   • Drug use: No   • Sexual activity: Not Currently   Other Topics Concern   • Not on file   Social History Narrative   • Not on file     Social Determinants of Health     Financial Resource Strain: Not on file   Food Insecurity: No Food Insecurity   • Worried About Running Out of Food in the Last Year: Never true   • Ran Out of Food in the Last Year: Never true   Transportation Needs: No Transportation Needs   • Lack of Transportation (Medical): No   • Lack of Transportation (Non-Medical):  No   Physical Activity: Not on file   Stress: Not on file   Social Connections: Not on file Intimate Partner Violence: Not on file   Housing Stability: Low Risk    • Unable to Pay for Housing in the Last Year: No   • Number of Places Lived in the Last Year: 1   • Unstable Housing in the Last Year: No      Family History   Problem Relation Age of Onset   • Hypertension Father    • Heart disease Sister      Past Surgical History:   Procedure Laterality Date   • CATARACT EXTRACTION Bilateral    • CHOLECYSTECTOMY     • COLECTOMY      10 inchs of ileum   • COLONOSCOPY N/A 6/30/2017    Procedure: COLONOSCOPY;  Surgeon: Wale Mark MD;  Location: [facility];   Service: Gastroenterology   • COLOSTOMY     • FINGER AMPUTATION     • FINGER SURGERY Left    • IR BIOPSY LIVER MASS  6/8/2020   • IR MICROWAVE ABLATION  9/21/2022   • LITHOTRIPSY     • LIVER LOBECTOMY N/A 7/15/2020    Procedure: LIVER ABLATION, INTRAOPERATIVE U/S LIVER;  Surgeon: Carmen Lindsay MD;  Location: [facility];  Service: Surgical Oncology       Current Outpatient Medications:   •  ALPRAZolam (XANAX) 0 25 mg tablet, Take 1 tablet (0 25 mg total) by mouth daily at bedtime as needed for anxiety, Disp: 90 tablet, Rfl: 0  •  AMILoride 5 mg tablet, Take 1 tablet (5 mg total) by mouth daily, Disp: 90 tablet, Rfl: 0  •  amLODIPine (NORVASC) 5 mg tablet, Take 1 tablet (5 mg total) by mouth daily, Disp: 90 tablet, Rfl: 0  •  buPROPion (WELLBUTRIN XL) 150 mg 24 hr tablet, Take 1 tablet (150 mg total) by mouth daily, Disp: 90 tablet, Rfl: 0  •  calcium carbonate (TUMS) 500 mg chewable tablet, Chew 1 tablet (500 mg total) daily, Disp:  , Rfl: 0  •  carvedilol (COREG) 3 125 mg tablet, Take 1 tablet (3 125 mg total) by mouth 2 (two) times a day with meals, Disp: 60 tablet, Rfl: 0  •  folic acid (FOLVITE) 1 mg tablet, Take 1 tablet (1 mg total) by mouth daily, Disp: 30 tablet, Rfl: 0  •  levothyroxine 50 mcg tablet, Take 1 tablet (50 mcg total) by mouth daily in the early morning Do not start before November 14, 2022 , Disp: 30 tablet, Rfl: 0  •  MAGNESIUM CHLORIDE PO, Take 1,000 mg by mouth daily, Disp: , Rfl:   •  mirtazapine (REMERON) 15 mg tablet, TAKE 1 TABLET BY MOUTH EVERYDAY AT BEDTIME, Disp: 90 tablet, Rfl: 1  •  omeprazole (PriLOSEC) 20 mg delayed release capsule, Take 1 capsule (20 mg total) by mouth daily, Disp: 90 capsule, Rfl: 1  •  potassium chloride (MICRO-K) 10 MEQ CR capsule, Take 2 capsules (20 mEq total) by mouth 2 (two) times a day, Disp: 60 capsule, Rfl: 0  •  rosuvastatin (CRESTOR) 10 MG tablet, Take 1 tablet (10 mg total) by mouth daily, Disp: 90 tablet, Rfl: 0  •  thiamine 250 MG tablet, Take 1 tablet (250 mg total) by mouth daily for 5 days Do not start before November 14, 2022 , Disp: 5 tablet, Rfl: 0  •  umeclidinium-vilanterol (Anoro Ellipta) 62 5-25 MCG/INH inhaler, Inhale 1 puff daily, Disp: 180 each, Rfl: 3  •  VIT B12-METHIONINE-INOS-CHOL IM, Inject into a muscle every 30 (thirty) days, Disp: , Rfl:     Current Facility-Administered Medications:   •  cyanocobalamin injection 1,000 mcg, 1,000 mcg, Intramuscular, Q30 Days, Torri Coleman MD, 1,000 mcg at 09/28/22 1031  No Known Allergies    Labs:  No results displayed because visit has over 200 results        Admission on 09/24/2022, Discharged on 09/24/2022   Component Date Value   • WBC 09/24/2022 7 16    • RBC 09/24/2022 3 68 (L)    • Hemoglobin 09/24/2022 13 0    • Hematocrit 09/24/2022 37 2    • MCV 09/24/2022 101 (H)    • MCH 09/24/2022 35 3 (H)    • MCHC 09/24/2022 34 9    • RDW 09/24/2022 11 9    • MPV 09/24/2022 10 8    • Platelets 66/65/2807 66 (L)    • nRBC 09/24/2022 0    • Neutrophils Relative 09/24/2022 72    • Immat GRANS % 09/24/2022 0    • Lymphocytes Relative 09/24/2022 22    • Monocytes Relative 09/24/2022 6    • Eosinophils Relative 09/24/2022 0    • Basophils Relative 09/24/2022 0    • Neutrophils Absolute 09/24/2022 5 09    • Immature Grans Absolute 09/24/2022 0 03    • Lymphocytes Absolute 09/24/2022 1 59    • Monocytes Absolute 09/24/2022 0 40    • Eosinophils Absolute 09/24/2022 0 02    • Basophils Absolute 09/24/2022 0 03    • Sodium 09/24/2022 131 (L)    • Potassium 09/24/2022 3 2 (L)    • Chloride 09/24/2022 94 (L)    • CO2 09/24/2022 24    • ANION GAP 09/24/2022 13    • BUN 09/24/2022 7    • Creatinine 09/24/2022 0 93    • Glucose 09/24/2022 92    • Calcium 09/24/2022 7 9 (L)    • AST 09/24/2022 89 (H)    • ALT 09/24/2022 43    • Alkaline Phosphatase 09/24/2022 43    • Total Protein 09/24/2022 6 7    • Albumin 09/24/2022 3 7    • Total Bilirubin 09/24/2022 1 54 (H)    • eGFR 09/24/2022 85    • Magnesium 09/24/2022 1 0 (L)    • Ventricular Rate 09/24/2022 80    • Atrial Rate 09/24/2022 80    • WV Interval 09/24/2022 154    • QRSD Interval 09/24/2022 102    • QT Interval 09/24/2022 398    • QTC Interval 09/24/2022 459    • P Axis 09/24/2022 59    • QRS Axis 09/24/2022 -28    • T Wave Axis 09/24/2022 72      Imaging: EGD    Result Date: 11/10/2022  Narrative: [address] [place] [address] [phone] DATE OF SERVICE: 11/10/22 PHYSICIAN(S): Attending: Liz Root MD Fellow: No Staff Documented INDICATION: Anemia, Fecal occult blood test positive, History of Crohn's disease POST-OP DIAGNOSIS: See the impression below  PREPROCEDURE: Informed consent was obtained for the procedure, including sedation  Risks of perforation, hemorrhage, adverse drug reaction and aspiration were discussed  The patient was placed in the left lateral decubitus position  Patient was explained about the risks and benefits of the procedure  Risks including but not limited to bleeding, infection, and perforation were explained in detail  Also explained about less than 100% sensitivity with the exam and other alternatives  DETAILS OF PROCEDURE: Patient was taken to the procedure room where a time out was performed to confirm correct patient and correct procedure   The patient underwent monitored anesthesia care, which was administered by an anesthesia professional  The patient's blood pressure, heart rate, level of consciousness, respirations and oxygen were monitored throughout the procedure  The scope was advanced to the second part of the duodenum  Retroflexion was performed in the fundus  The patient experienced no blood loss  The procedure was not difficult  The patient tolerated the procedure well  There were no apparent complications  ANESTHESIA INFORMATION: ASA: ASA status not filed in the log  Anesthesia Type: Anesthesia type not filed in the log  MEDICATIONS: No administrations occurring from 1423 to 1443 on 11/10/22 FINDINGS: Mild, localized abnormal mucosa with plaque in the middle third of the esophagus; performed cold forceps biopsy Mild, generalized hemorrhagic mucosa in the stomach  Consistent with portal hypertensive gastropathy Sessile polyp in the 2nd part of the duodenum; performed complete en bloc removal by cold forceps biopsy 2 small angioectasias in the 2nd part of the duodenum with no bleeding; tissue ablated completely with argon plasma coagulation SPECIMENS: ID Type Source Tests Collected by Time Destination 1 : duodenal polyp cold forceps Tissue Polyp, Stomach/Small Intestine TISSUE EXAM Guillermo Rodríguez MD 11/10/2022  2:34 PM  2 : esophagus white plaque biopsy Tissue Esophagus TISSUE EXAM Guillermo Rodríguez MD 11/10/2022  2:39 PM      Impression: White plaque in the esophagus s/p biopsy Mild PHG Small sessile polyp in the 2nd portion of the duodenum removed with cold biopsy forceps and sent for pathology   2 small avms s/p ablation with APC RECOMMENDATION: Follow up biopsy results Follow h/h Proceed with colonoscopy through ostomy   Guillermo Rodríguez MD     Colonoscopy    Result Date: 11/10/2022  Narrative: [facility] [address] [facility] [phone] DATE OF SERVICE: 11/10/22 PHYSICIAN(S): Attending: Guillermo Rodríguez MD Fellow: No Staff Documented INDICATION: Anemia, Fecal occult blood test positive, History of Crohn's disease POST-OP DIAGNOSIS: See the impression below  HISTORY: Prior colonoscopy: More than 10 years ago  BOWEL PREPARATION: Golytely/Colyte/Trilyte PREPROCEDURE: Informed consent was obtained for the procedure, including sedation  Risks including but not limited to bleeding, infection, perforation, adverse drug reaction and aspiration were explained in detail  Also explained about less than 100% sensitivity with the exam and other alternatives  The patient was placed in the left lateral decubitus position  DETAILS OF PROCEDURE: Patient was taken to the procedure room where a time out was performed to confirm correct patient and correct procedure  The patient underwent monitored anesthesia care, which was administered by an anesthesia professional  The patient's blood pressure, heart rate, level of consciousness, oxygen and respirations were monitored throughout the procedure  A digital rectal exam was performed  The scope was introduced through the anus and advanced to the cecum  Retroflexion was performed in the rectum  The quality of bowel preparation was evaluated using the [facility] Bowel Preparation Scale with scores of: right colon = 2, transverse colon = 2, left colon = 2  The total BBPS score was 6  Bowel prep was adequate  The patient experienced no blood loss  The procedure was not difficult  The patient tolerated the procedure well  There were no apparent complications  ANESTHESIA INFORMATION: ASA: III Anesthesia Type: IV Sedation with Anesthesia MEDICATIONS: No administrations occurring from 1423 to 1532 on 11/10/22 FINDINGS: Seven sessile polyps measuring 5-9 mm in the ascending colon; completely removed en bloc by cold snare and retrieved specimen; completely removed en bloc by hot snare and retrieved specimen  5 of the most proximal polyps were removed using cold snare polypectomy  One measuring 9 mm (most distal) was removed using hot snare polypectomy   This was just distal to the polyps that were removed using endoscopic mucosal resection in the ascending colon  25 mm sessile polyp in the ascending colon; removed by hot snare; removed in piecemeal fashion by endoscopic mucosal resection (EMR) and retrieved specimen 15 mm flat polyp in the ascending colon; lift performed with 6 mL of O-rise; removed by hot snare; completely removed en bloc by endoscopic mucosal resection (EMR) and retrieved specimen EVENTS: Procedure Events Event Event Time ENDO CECUM REACHED 11/10/2022  2:52 PM ENDO SCOPE OUT TIME 11/10/2022  3:29 PM SPECIMENS: ID Type Source Tests Collected by Time Destination 1 : duodenal polyp cold forceps Tissue Polyp, Stomach/Small Intestine TISSUE EXAM Paul Gonzalez MD 11/10/2022  2:34 PM  2 : esophagus white plaque biopsy Tissue Esophagus TISSUE EXAM Paul Gonzalez MD 11/10/2022  2:39 PM  3 : ascending colon polyp x5 cold snare & hot snare Tissue Polyp, Colorectal TISSUE EXAM Paul Gonzalez MD 11/10/2022  2:55 PM  4 : ascending colon polyp hot snare Tissue Polyp, Colorectal TISSUE EXAM Paul Gonzalez MD 11/10/2022  3:04 PM  5 : ascending colon polypx2 hot snare Tissue Polyp, Colorectal TISSUE EXAM Paul Gonzalez MD 11/10/2022  3:19 PM  EQUIPMENT: Colonoscope -[id number]     Impression: Total of 8 polyps removed in the ascnding colon polyps using cold snare polypectomy and the more distal polyps removed using hot snare with two larger polyps (25 and 15 mm respectively) removed using endoscopic mucosal resection technique  No evidence of Crohn's colitis on endoscopic appearance of mucosa  RECOMMENDATION: Follow up pathology Repeat colonoscopy in 6 months due to a personal history of colon polyps and piecemeal resection of polyps  Paul Gonzalez MD     CT abdomen pelvis w wo contrast    Result Date: 11/9/2022  Narrative: CT ABDOMEN AND PELVIS WITH AND WITHOUT IV CONTRAST INDICATION:  Retroperitoneal hematoma suspected; evaluate liver lesions    Patient underwent liver ablation 7/15/2020  Patient underwent microwave ablation of 1 8 cm segment 5 lesion 9/21/2022  COMPARISON:  CT abdomen pelvis 9/24/2022, MRI abdomen 7/21/2022, IR microwave ablation study 9/21/2022 TECHNIQUE: Initial CT of the abdomen and pelvis was performed without intravenous contrast   Subsequent dynamic CT evaluation of the abdomen and pelvis was performed after the administration of intravenous contrast in arterioportal phase with delayed images of the kidneys also provided  Axial, sagittal, and coronal 2D reformatted images were created from the source data and submitted for interpretation  Radiation dose length product (DLP) for this visit:  949 mGy-cm  This examination, like all CT scans performed in the [facility], was performed utilizing techniques to minimize radiation dose exposure, including the use of iterative reconstruction and automated exposure control  IV Contrast:  100 mL of iohexol (OMNIPAQUE) Enteric Contrast:  Enteric contrast was not administered  FINDINGS: ABDOMEN RIGHT KIDNEY AND URETER: 4 mm nonobstructing interpolar calculus  No solid renal mass  No detectable urothelial mass  No hydronephrosis or hydroureter  No perinephric collection  LEFT KIDNEY AND URETER: Punctate nonobstructing lower pole calculus  No solid renal mass  Subcentimeter left midpole hypodensity, unchanged, consistent with cyst on previous MRI  No detectable urothelial mass  No hydronephrosis or hydroureter  No perinephric collection  URINARY BLADDER: No bladder wall mass  No calculi  Bladder is elongated in the AP dimension which may be related to postsurgical changes  LOWER CHEST:  Mild dependent hypoventilatory changes  LIVER/BILIARY TREE:  Lobulated 5 8 x 2 7 x 5 4 cm subcapsular mass right hepatic lobe involving segments 8/5 corresponding to conglomerate ablation zones    This appears grossly stable from most recent studies accounting for differences in interobserver variability  There are foci of high attenuation within on precontrast imaging without evidence of discernible enhancement on postcontrast imaging  Replaced right hepatic artery arising from the SMA extends to the margin of the ablation  Small amount of tissue density superimposed between the liver ablation zone and diaphragm laterally (#4/15), unchanged  This may represent scarring or small amount of hematoma  Minimal duct dilatation seen superior to the mass within segment 8, unchanged  1 cm LIRADS 3 observation in segment 6 on the September 21, 2022 study may again be present (#4/23), probably similar in size although not as well depicted possibly related to differences in phase of enhancement  No new lesions seen  Attenuation of the liver suggesting fatty infiltration  GALLBLADDER:  Gallbladder is surgically absent  SPLEEN:  Unremarkable  PANCREAS:  Unremarkable  ADRENAL GLANDS:  Unremarkable  STOMACH AND BOWEL:  Evaluation of the stomach is limited by underdistention  No focal pathology seen within limitations  Small bowel is normal caliber  Anastomotic sutures seen along the ileocolonic junction  Distal colon resection  Left lower quadrant colostomy  APPENDIX:  Not visualized, presumed surgically absent  ABDOMINOPELVIC CAVITY:  No ascites  No free intraperitoneal air  11 mm short axis portal caval lymph node (#4/28), stable  Shotty lymph nodes along the gastrohepatic ligament, celiac axis, carmen hepatis and retroperitoneum elsewhere appear grossly similar  There is no evidence of retroperitoneal hemorrhage as clinically questioned  VESSELS: Atherosclerotic changes abdominal aorta without evidence of aneurysm  A subtle halo of density is again seen about the JUNE, similar to the most recent CT study  This was not present on CTA of chest abdomen pelvis from 6/4/2020  Finding may be  inflammatory in nature? PELVIS REPRODUCTIVE ORGANS:  Unremarkable for patient's age   ABDOMINAL WALL/INGUINAL REGIONS: Left lower quadrant colostomy  OSSEOUS STRUCTURES:  Mildly displaced left posterior 12th rib fracture and nondisplaced left lateral 10th rib fracture, not clearly evident on the prior study  Grade 1 spondylolisthesis L5 on S1 secondary L5 pars defects with associated degenerative disc disease  Impression: 1  5 8 cm conglomerate ablation zone defect subcapsular right hepatic lobe segments 8/5 without enhancement to suggest residual active tumor (LR-TR nonviable)  Please note phase of enhancement is somewhat later than optimal   2  1 cm LIRADS 3 observation in segment 6 on the September 21, 2022 study may again be present (#4/23), probably similar in size although not as well depicted possibly related to differences in phase of enhancement  Attention on 6 month follow up MRI recommended  3  No evidence of retroperitoneal hematoma  4  Stable shotty lymph nodes upper abdomen and retroperitoneum  5  Fractures of the left 10th and 12th ribs, not clearly evident on previous CT study  6  Subtle halo of density again seen about the JUNE, similar to the most recent CT study  Finding may be inflammatory in nature? Attention on follow-up advised  7  Fatty liver  Workstation performed: [id number]     XR chest 1 view portable    Result Date: 11/2/2022  Narrative: CHEST INDICATION:   syncope  COMPARISON:  5/28/2020 EXAM PERFORMED/VIEWS:  XR CHEST PORTABLE FINDINGS: Cardiomediastinal silhouette appears unremarkable  The lungs are clear  No pneumothorax or pleural effusion  Osseous structures appear within normal limits for patient age  Impression: No acute cardiopulmonary disease  Workstation performed: [id number]     CT head without contrast    Result Date: 11/1/2022  Narrative: CT BRAIN - WITHOUT CONTRAST INDICATION:   Syncope, recurrent syncope, fall  COMPARISON:  Multiple priors most recently 9/24/2022 TECHNIQUE:  CT examination of the brain was performed    In addition to axial images, sagittal and coronal 2D reformatted images were created and submitted for interpretation  Radiation dose length product (DLP) for this visit:  1022 mGy-cm   This examination, like all CT scans performed in the [facility], was performed utilizing techniques to minimize radiation dose exposure, including the use of iterative reconstruction and automated exposure control  IMAGE QUALITY:  Diagnostic  FINDINGS: PARENCHYMA: Decreased attenuation is noted in periventricular and subcortical white matter demonstrating an appearance that is statistically most likely to represent mild microangiopathic change  No CT signs of acute infarction  No intracranial mass, mass effect or midline shift  No acute parenchymal hemorrhage  VENTRICLES AND EXTRA-AXIAL SPACES:  Normal for the patient's age  VISUALIZED ORBITS AND PARANASAL SINUSES:  Globes are aphakic  Paranasal sinuses are well aerated CALVARIUM AND EXTRACRANIAL SOFT TISSUES:  Normal      Impression: No acute intracranial abnormality  Chronic microangiopathic changes  Workstation performed: [id number]     CTA chest pe study    Result Date: 11/7/2022  Narrative: CTA - CHEST WITH IV CONTRAST - PULMONARY ANGIOGRAM INDICATION:   R/O PE, remains tachycardic  Per my review of the medical record, the patient  was recently admitted for syncope while drinking, losing consciousness and hitting the floor, and tachycardia  History of percutaneous microwave ablation of hepatocellular carcinoma  COMPARISON: CXR 11/1/2022 and chest CT 6/4/2020  Abdomen CT 09/24/2022  TECHNIQUE: CT angiogram timed for optimal opacification of the pulmonary arteries  Axial, sagittal, and coronal 2D reformats created from source data  Coronal 3D MIP postprocessing on the acquisition scanner  Radiation dose length product (DLP):  229 mGy-cm   Radiation dose exposure minimized using iterative reconstruction and automated exposure control   IV Contrast:  85 mL of iohexol (OMNIPAQUE)  FINDINGS: PULMONARY ARTERIES:  No definite acute pulmonary emboli but small segmental and subsegmental emboli, particularly in the lower lobes, may be obscured by motion artifact  LUNGS:  Curvilinear consolidation in the posterior basal right lower lobe, adjacent to a focal area of air trapping  Moderate emphysema  Stable patchy biapical pleural-parenchymal scar  AIRWAYS: No significant filling defects  PLEURA:  Trace right effusion  HEART/GREAT VESSELS:  Normal for age  MEDIASTINUM AND MACIEL:  Unremarkable  CHEST WALL AND LOWER NECK: Unremarkable  UPPER ABDOMEN:  Low-attenuation partially enhancing subcapsular lesion in the right hepatic lobe at the site of microwave ablation  OSSEOUS STRUCTURES:  Acute/subacute fracture of the posterior right 10th rib and the left posterior 12th rib  Healed bilateral anterolateral fractures  Mild degenerative disease in the spine  Impression: No definite acute pulmonary emboli, but small segmental and subsegmental emboli, particularly in the lower lobes, can be obscured by motion artifact  Acute/subacute fracture of the right posterior 10th rib with trace right effusion  Adjacent curvilinear consolidation is likely due to atelectasis  Acute/subacute fracture of the posterior left 12th rib  Low-attenuation partially enhancing subcapsular lesion in the right hepatic lobe at the site of microwave ablation of HCC, 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  The patient is scheduled for MRI on 12/2/2022  The study was marked in [facility] for immediate notification  Workstation performed: [id number]     VAS lower limb venous duplex study, unilateral/limited    Result Date: 11/12/2022  Narrative:  [facility] REPORT [facility]INICAL: Indications: Patient presents with mild swelling and redness of the left foot  Operative History: Cholecystectomy Colostomy Risk Factors The patient has no history of HTN or Diabetes     CONCLUSION:  Impression: RIGHT LOWER LIMB LIMITED: Evaluation shows no evidence of thrombus in the common femoral vein  Doppler evaluation shows a normal response to augmentation maneuvers  LEFT LOWER LIMB: No evidence of acute or chronic deep vein thrombosis No evidence of superficial thrombophlebitis noted  Doppler evaluation shows a normal response to augmentation maneuvers  Popliteal, posterior tibial and anterior tibial arterial Doppler waveforms are triphasic  Technical findings were faxed to chart  SIGNATURE: Electronically Signed by: Robert Rivera on 2022-11-12 10:50:59 AM    Echo complete w/ contrast if indicated    Result Date: 11/3/2022  Narrative: •  Left Ventricle: Left ventricular cavity size is normal  Wall thickness is normal  The left ventricular ejection fraction is 60%  Systolic function is normal  Wall motion is normal  Diastolic function is normal for age  •  Right Ventricle: Right ventricular cavity size is normal  Systolic function is normal        Review of Systems:  Review of Systems   All other systems reviewed and are negative  Physical Exam:  Physical Exam  Vitals reviewed  Constitutional:       Comments: Thin elderly male    HENT:      Head: Normocephalic  Cardiovascular:      Rate and Rhythm: Normal rate and regular rhythm  Pulses: Normal pulses  Heart sounds: Normal heart sounds  Pulmonary:      Effort: Pulmonary effort is normal       Breath sounds: Normal breath sounds  Abdominal:      General: Bowel sounds are normal    Musculoskeletal:         General: Normal range of motion  Cervical back: Normal range of motion  Right lower leg: No edema  Left lower leg: No edema  Skin:     General: Skin is warm and dry  Capillary Refill: Capillary refill takes less than 2 seconds  Neurological:      General: No focal deficit present  Mental Status: He is alert and oriented to person, place, and time  Psychiatric:         Mood and Affect: Mood normal          Discussion/Summary:  1   SVT 48 hour Holter monitor evaluate for continued SVT  2  Hypomagnesemia follow up mag level and BMP to be done in near future   3  Hypertension RUE sitting 116/70 continue on amlodipine 5mg daily and Amiloride 5mg daily, continue on a  DASH diet   4  Hyperlipidemia 11/04/22 TC 87, TG 66, HDL 50, LDL 24 continue on Crestor 10mg daily, DASH diet  5   ETOH abuse   Six cans or bottles of alcohol daily  Continues to avoid EtOH, not taking thiamine ? unkown reason and

## 2022-11-21 ENCOUNTER — OFFICE VISIT (OUTPATIENT)
Dept: CARDIOLOGY CLINIC | Facility: CLINIC | Age: 67
End: 2022-11-21

## 2022-11-21 VITALS
HEIGHT: 69 IN | DIASTOLIC BLOOD PRESSURE: 72 MMHG | SYSTOLIC BLOOD PRESSURE: 108 MMHG | BODY MASS INDEX: 20.2 KG/M2 | WEIGHT: 136.4 LBS | OXYGEN SATURATION: 99 % | HEART RATE: 97 BPM

## 2022-11-21 DIAGNOSIS — E78.2 MIXED HYPERLIPIDEMIA: ICD-10-CM

## 2022-11-21 DIAGNOSIS — I47.1 SVT (SUPRAVENTRICULAR TACHYCARDIA) (HCC): Primary | ICD-10-CM

## 2022-11-21 DIAGNOSIS — F10.10 ETOH ABUSE: ICD-10-CM

## 2022-11-21 DIAGNOSIS — E83.42 HYPOMAGNESEMIA: ICD-10-CM

## 2022-11-21 LAB
BUN SERPL-MCNC: 12 MG/DL (ref 7–25)
BUN/CREAT SERPL: NORMAL (CALC) (ref 6–22)
CALCIUM SERPL-MCNC: 9.6 MG/DL (ref 8.6–10.3)
CHLORIDE SERPL-SCNC: 105 MMOL/L (ref 98–110)
CO2 SERPL-SCNC: 29 MMOL/L (ref 20–32)
CREAT SERPL-MCNC: 1.01 MG/DL (ref 0.7–1.35)
GFR/BSA.PRED SERPLBLD CYS-BASED-ARV: 82 ML/MIN/1.73M2
GLUCOSE SERPL-MCNC: 85 MG/DL (ref 65–99)
MAGNESIUM SERPL-MCNC: 1.6 MG/DL (ref 1.5–2.5)
POTASSIUM SERPL-SCNC: 4.6 MMOL/L (ref 3.5–5.3)
SODIUM SERPL-SCNC: 143 MMOL/L (ref 135–146)

## 2022-11-22 DIAGNOSIS — E83.42 HYPOMAGNESEMIA: Primary | ICD-10-CM

## 2022-11-22 RX ORDER — MULTIVITAMIN/IRON/FOLIC ACID 18MG-0.4MG
250 TABLET ORAL DAILY
Qty: 90 TABLET | Refills: 3 | Status: SHIPPED | OUTPATIENT
Start: 2022-11-22

## 2022-11-22 NOTE — PROGRESS NOTES
11/21/22 Mag level 1 6, SVT on telemetry during hospitalization  Goal mag 2 0    Start Mag oxide 250mg daily

## 2022-11-28 DIAGNOSIS — E83.42 HYPOMAGNESEMIA: Primary | ICD-10-CM

## 2022-11-29 ENCOUNTER — HOSPITAL ENCOUNTER (OUTPATIENT)
Dept: NON INVASIVE DIAGNOSTICS | Facility: CLINIC | Age: 67
Discharge: HOME | End: 2022-11-29

## 2022-11-29 DIAGNOSIS — E83.42 HYPOMAGNESEMIA: ICD-10-CM

## 2022-11-29 DIAGNOSIS — I47.1 SVT (SUPRAVENTRICULAR TACHYCARDIA) (HCC): ICD-10-CM

## 2022-12-02 ENCOUNTER — HOSPITAL ENCOUNTER (OUTPATIENT)
Dept: MRI IMAGING | Facility: HOSPITAL | Age: 67
Discharge: HOME | End: 2022-12-02
Attending: SURGERY

## 2022-12-02 DIAGNOSIS — C22.0 HEPATOCELLULAR CARCINOMA (HCC): ICD-10-CM

## 2022-12-02 RX ADMIN — GADOBUTROL 6 ML: 604.72 INJECTION INTRAVENOUS at 12:33

## 2022-12-06 ENCOUNTER — OFFICE VISIT (OUTPATIENT)
Dept: SURGICAL ONCOLOGY | Facility: CLINIC | Age: 67
End: 2022-12-06

## 2022-12-06 VITALS
HEIGHT: 69 IN | BODY MASS INDEX: 19.7 KG/M2 | HEART RATE: 91 BPM | SYSTOLIC BLOOD PRESSURE: 100 MMHG | RESPIRATION RATE: 18 BRPM | DIASTOLIC BLOOD PRESSURE: 60 MMHG | WEIGHT: 133 LBS | TEMPERATURE: 96.8 F | OXYGEN SATURATION: 100 %

## 2022-12-06 DIAGNOSIS — C22.0 HCC (HEPATOCELLULAR CARCINOMA) (HCC): Primary | Chronic | ICD-10-CM

## 2022-12-06 NOTE — PROGRESS NOTES
Surgical Oncology Follow Up       [address] [facility],[address]  [facility]  [address]  [facility] [address]    Stephanie Elkins III  1955  [id number]  [address] [facility],[address]  [facility]  [address] [address]    Diagnoses and all orders for this visit:    [facility]  (hepatocellular carcinoma) ([facility] )  -     Ambulatory Referral to Interventional Radiology; Future  -     MRI abdomen w wo contrast; Future  -     BUN; Future  -     Creatinine, serum; Future  -     AFP tumor marker; Future        Chief Complaint   Patient presents with   • Follow-up     4 month follow-up, MRI       Return in about 4 months (around 4/6/2023) for Office Visit, Imaging - See orders, Labs - See Treatment Plan  Oncology History   Personal history of liver cancer   6/8/2020 Initial Diagnosis    Hepatocellular carcinoma, moderately differentiated     7/15/2020 Surgery    Microwave ablation of liver segment 5         Staging: HCC   Treatment history:  Ablation of segment 5 liver lesion, July 2020  Microwave ablation segment 5 lesion, September 2022  Current treatment: Repeat ablation pending  Disease status: ARLYN    History of Present Illness: Patient returns in follow-up of his [facility]  He is doing well  He was recently admitted for detoxification from alcohol abuse 2 weeks ago  He has since abstained  MRI from December 2, 2022 reveals the treated lesions are nonviable  There is a new lesion the treated lesions are nonviable  There is a new lesion in segment 5/6 that is now LR 5  This was LR 3 4 months ago  I personally reviewed the films  Review of Systems  Complete ROS Surg Onc:   Complete ROS Surg Onc:   Constitutional: The patient denies new or recent history of general fatigue, no recent weight loss, no change in appetite  Eyes: No complaints of visual problems, no scleral icterus     ENT: no complaints of ear pain, no hoarseness, no difficulty swallowing,  no tinnitus and no new masses in head, oral cavity, or neck  Cardiovascular: No complaints of chest pain, no palpitations, no ankle edema  Respiratory: No complaints of shortness of breath, no cough  Gastrointestinal: No complaints of jaundice, no bloody stools, no pale stools  Genitourinary: No complaints of dysuria, no hematuria, no nocturia, no frequent urination, no urethral discharge  Musculoskeletal: No complaints of weakness, paralysis, joint stiffness or arthralgias  Integumentary: No complaints of rash, no new lesions  Neurological: No complaints of convulsions, no seizures, no dizziness  Hematologic/Lymphatic: No complaints of easy bruising  Endocrine:  No hot or cold intolerance  No polydipsia, polyphagia, or polyuria  Allergy/immunology:  No environmental allergies  No food allergies  Not immunocompromised  Skin:  No pallor or rash  No wound          Patient Active Problem List   Diagnosis   • Essential hypertension   • Emphysema/COPD ([place] [address] )   • Crohn disease ([place] [address] )   • Hyponatremia   • Colostomy in place [facility])   • Diarrhea   • Severe protein-calorie malnutrition ([place] [address] )   • Cellulitis   • Emphysema of lung ([place] [address] )   • Syncope and collapse   • Severe sepsis (HCC)   • Pneumonia   • Chest pain   • Liver mass   • Tobacco abuse   • Abdominal pain   • Personal history of liver cancer   • Palliative care patient   • Abdominal wall abscess   • Observed sleep apnea   • Acute pain of left wrist   • Chronic, continuous use of opioids   • Hypocalcemia   • Left wrist pain   • Kidney stone   • Hypomagnesemia   • Hypokalemia   • Gastroesophageal reflux disease without esophagitis   • Chronic obstructive pulmonary disease (HCC)   • Mixed hyperlipidemia   • Vitamin B12 deficiency   • Cataract   • Dysthymic disorder   • Crohn's disease of small intestine with other complication ([place] [address] )   • Encounter for follow-up surveillance of liver cancer   • HCC (hepatocellular carcinoma) (HCC)   • Electrolyte abnormality   • History of alcohol use disorder   • Anemia   • Subclinical hypothyroidism     Past Medical History:   Diagnosis Date   • LUCILLE (acute kidney injury) ([facility] ) 2017   • Blindness of left eye     Since birth   • Cancer [facility])     liver   • Cataract    • Colon polyp    • Colostomy in place [facility]) 2017   • COPD (chronic obstructive pulmonary disease) (HCC)    • Crohn disease ([facility] )    • Emphysema of lung (HCC)    • Emphysema/COPD (HCC)    • Essential hypertension    • GERD (gastroesophageal reflux disease)    • Hypertension    • Hypokalemia 2017   • Hypomagnesemia 2017   • Hyponatremia 2017   • Kidney stone    • Osteomyelitis ([facility] )    • Pneumonia      Past Surgical History:   Procedure Laterality Date   • CATARACT EXTRACTION Bilateral    • CHOLECYSTECTOMY     • COLECTOMY      10 inchs of ileum   • COLONOSCOPY N/A 2017    Procedure: COLONOSCOPY;  Surgeon: Cornell Crenshaw MD;  Location: [facility];   Service: Gastroenterology   • COLOSTOMY     • FINGER AMPUTATION     • FINGER SURGERY Left    • IR BIOPSY LIVER MASS  2020   • IR MICROWAVE ABLATION  2022   • LITHOTRIPSY     • LIVER LOBECTOMY N/A 7/15/2020    Procedure: LIVER ABLATION, INTRAOPERATIVE U/S LIVER;  Surgeon: Marshall Porter MD;  Location: [facility];  Service: Surgical Oncology     Family History   Problem Relation Age of Onset   • Hypertension Father    • Heart disease Sister      Social History     Socioeconomic History   • Marital status:      Spouse name: Not on file   • Number of children: Not on file   • Years of education: Not on file   • Highest education level: Not on file   Occupational History   • Not on file   Tobacco Use   • Smoking status: Former     Packs/day: 1 50     Years: 51 00     Pack years: 76 50     Types: Cigarettes     Start date:      Quit date: 2022     Years since quittin 9   • Smokeless tobacco: Never   Vaping Use   • Vaping Use: Never used Substance and Sexual Activity   • Alcohol use: Yes     Alcohol/week: 59 0 standard drinks     Types: 49 Cans of beer, 10 Shots of liquor per week     Comment: drinks 6 or more beers plus hard liquor daily   • Drug use: No   • Sexual activity: Not Currently   Other Topics Concern   • Not on file   Social History Narrative   • Not on file     Social Determinants of Health     Financial Resource Strain: Not on file   Food Insecurity: No Food Insecurity   • Worried About Running Out of Food in the Last Year: Never true   • Ran Out of Food in the Last Year: Never true   Transportation Needs: No Transportation Needs   • Lack of Transportation (Medical): No   • Lack of Transportation (Non-Medical):  No   Physical Activity: Not on file   Stress: Not on file   Social Connections: Not on file   Intimate Partner Violence: Not on file   Housing Stability: Low Risk    • Unable to Pay for Housing in the Last Year: No   • Number of Places Lived in the Last Year: 1   • Unstable Housing in the Last Year: No       Current Outpatient Medications:   •  AMILoride 5 mg tablet, Take 1 tablet (5 mg total) by mouth daily, Disp: 90 tablet, Rfl: 0  •  amLODIPine (NORVASC) 5 mg tablet, Take 1 tablet (5 mg total) by mouth daily, Disp: 90 tablet, Rfl: 0  •  buPROPion (WELLBUTRIN XL) 150 mg 24 hr tablet, Take 1 tablet (150 mg total) by mouth daily, Disp: 90 tablet, Rfl: 0  •  calcium carbonate (TUMS) 500 mg chewable tablet, Chew 1 tablet (500 mg total) daily, Disp:  , Rfl: 0  •  carvedilol (COREG) 3 125 mg tablet, Take 1 tablet (3 125 mg total) by mouth 2 (two) times a day with meals, Disp: 60 tablet, Rfl: 0  •  folic acid (FOLVITE) 1 mg tablet, Take 1 tablet (1 mg total) by mouth daily, Disp: 30 tablet, Rfl: 0  •  levothyroxine 50 mcg tablet, Take 1 tablet (50 mcg total) by mouth daily in the early morning Do not start before November 14, 2022 , Disp: 30 tablet, Rfl: 0  •  MAGNESIUM CHLORIDE PO, Take 1,000 mg by mouth daily, Disp: , Rfl:   • Magnesium Oxide 250 MG TABS, Take 1 tablet (250 mg total) by mouth in the morning, Disp: 90 tablet, Rfl: 3  •  mirtazapine (REMERON) 15 mg tablet, TAKE 1 TABLET BY MOUTH EVERYDAY AT BEDTIME, Disp: 90 tablet, Rfl: 1  •  omeprazole (PriLOSEC) 20 mg delayed release capsule, Take 1 capsule (20 mg total) by mouth daily, Disp: 90 capsule, Rfl: 1  •  potassium chloride (MICRO-K) 10 MEQ CR capsule, Take 2 capsules (20 mEq total) by mouth 2 (two) times a day (Patient taking differently: Take 40 mEq by mouth 2 (two) times a day), Disp: 60 capsule, Rfl: 0  •  rosuvastatin (CRESTOR) 10 MG tablet, Take 1 tablet (10 mg total) by mouth daily, Disp: 90 tablet, Rfl: 0  •  VIT B12-METHIONINE-INOS-CHOL IM, Inject into a muscle every 30 (thirty) days, Disp: , Rfl:   •  ALPRAZolam (XANAX) 0 25 mg tablet, Take 1 tablet (0 25 mg total) by mouth daily at bedtime as needed for anxiety, Disp: 90 tablet, Rfl: 0  •  thiamine 250 MG tablet, Take 1 tablet (250 mg total) by mouth daily for 5 days Do not start before November 14, 2022  (Patient not taking: Reported on 11/21/2022), Disp: 5 tablet, Rfl: 0  •  umeclidinium-vilanterol (Anoro Ellipta) 62 5-25 MCG/INH inhaler, Inhale 1 puff daily, Disp: 180 each, Rfl: 3    Current Facility-Administered Medications:   •  cyanocobalamin injection 1,000 mcg, 1,000 mcg, Intramuscular, Q30 Days, Torri Coleman MD, 1,000 mcg at 09/28/22 1031  No Known Allergies  Vitals:    12/06/22 0901   BP: 100/60   Pulse: 91   Resp: 18   Temp: (!) 96 8 °F (36 °C)   SpO2: 100%       Physical Exam  Constitutional: General appearance: The Patient is well-developed and well-nourished who appears the stated age in no acute distress  Patient is pleasant and talkative  HEENT:  Normocephalic  Sclerae are anicteric  Mucous membranes are moist  Neck is supple without adenopathy  No JVD  Chest: The lungs are clear to auscultation  Cardiac: Heart is regular rate       Abdomen: Abdomen is soft, non-tender, non-distended and without masses  Extremities: There is no clubbing or cyanosis  There is no edema  Symmetric  Neuro: Grossly nonfocal  Gait is normal      Lymphatic: No evidence of cervical adenopathy bilaterally  No evidence of axillary adenopathy bilaterally  No evidence of inguinal adenopathy bilaterally  Skin: Warm, anicteric  Psych:  Patient is pleasant and talkative  Breasts:        Pathology:  [unfilled]    Labs:      Imaging  EGD    Result Date: 11/10/2022  Narrative: [id number] [id number] [phone] DATE OF SERVICE: 11/10/22 PHYSICIAN(S): Attending: Patrick Laguerre MD Fellow: No Staff Documented INDICATION: Anemia, Fecal occult blood test positive, History of Crohn's disease POST-OP DIAGNOSIS: See the impression below  PREPROCEDURE: Informed consent was obtained for the procedure, including sedation  Risks of perforation, hemorrhage, adverse drug reaction and aspiration were discussed  The patient was placed in the left lateral decubitus position  Patient was explained about the risks and benefits of the procedure  Risks including but not limited to bleeding, infection, and perforation were explained in detail  Also explained about less than 100% sensitivity with the exam and other alternatives  DETAILS OF PROCEDURE: Patient was taken to the procedure room where a time out was performed to confirm correct patient and correct procedure  The patient underwent monitored anesthesia care, which was administered by an anesthesia professional  The patient's blood pressure, heart rate, level of consciousness, respirations and oxygen were monitored throughout the procedure  The scope was advanced to the second part of the duodenum  Retroflexion was performed in the fundus  The patient experienced no blood loss  The procedure was not difficult  The patient tolerated the procedure well  There were no apparent complications   ANESTHESIA INFORMATION: ASA: ASA status not filed in the log  Anesthesia Type: Anesthesia type not filed in the log  MEDICATIONS: No administrations occurring from 1423 to 1443 on 11/10/22 FINDINGS: Mild, localized abnormal mucosa with plaque in the middle third of the esophagus; performed cold forceps biopsy Mild, generalized hemorrhagic mucosa in the stomach  Consistent with portal hypertensive gastropathy Sessile polyp in the 2nd part of the duodenum; performed complete en bloc removal by cold forceps biopsy 2 small angioectasias in the 2nd part of the duodenum with no bleeding; tissue ablated completely with argon plasma coagulation SPECIMENS: ID Type Source Tests Collected by Time Destination 1 : duodenal polyp cold forceps Tissue Polyp, Stomach/Small Intestine TISSUE EXAM Gioia Schirmer, MD 11/10/2022  2:34 PM  2 : esophagus white plaque biopsy Tissue Esophagus TISSUE EXAM Gioia Schirmer, MD 11/10/2022  2:39 PM      Impression: White plaque in the esophagus s/p biopsy Mild PHG Small sessile polyp in the 2nd portion of the duodenum removed with cold biopsy forceps and sent for pathology  2 small avms s/p ablation with APC RECOMMENDATION: Follow up biopsy results Follow h/h Proceed with colonoscopy through ostomy   Gioia Schirmer, MD     Colonoscopy    Result Date: 11/10/2022  Narrative: [facility] [address] [facility] [phone] DATE OF SERVICE: 11/10/22 PHYSICIAN(S): Attending: Gioia Schirmer, MD Fellow: No Staff Documented INDICATION: Anemia, Fecal occult blood test positive, History of Crohn's disease POST-OP DIAGNOSIS: See the impression below  HISTORY: Prior colonoscopy: More than 10 years ago  BOWEL PREPARATION: Golytely/Colyte/Trilyte PREPROCEDURE: Informed consent was obtained for the procedure, including sedation  Risks including but not limited to bleeding, infection, perforation, adverse drug reaction and aspiration were explained in detail   Also explained about less than 100% sensitivity with the exam and other alternatives  The patient was placed in the left lateral decubitus position  DETAILS OF PROCEDURE: Patient was taken to the procedure room where a time out was performed to confirm correct patient and correct procedure  The patient underwent monitored anesthesia care, which was administered by an anesthesia professional  The patient's blood pressure, heart rate, level of consciousness, oxygen and respirations were monitored throughout the procedure  A digital rectal exam was performed  The scope was introduced through the anus and advanced to the cecum  Retroflexion was performed in the rectum  The quality of bowel preparation was evaluated using the Teagan Pain Bowel Preparation Scale with scores of: right colon = 2, transverse colon = 2, left colon = 2  The total BBPS score was 6  Bowel prep was adequate  The patient experienced no blood loss  The procedure was not difficult  The patient tolerated the procedure well  There were no apparent complications  ANESTHESIA INFORMATION: ASA: III Anesthesia Type: IV Sedation with Anesthesia MEDICATIONS: No administrations occurring from 1423 to 1532 on 11/10/22 FINDINGS: Seven sessile polyps measuring 5-9 mm in the ascending colon; completely removed en bloc by cold snare and retrieved specimen; completely removed en bloc by hot snare and retrieved specimen  5 of the most proximal polyps were removed using cold snare polypectomy  One measuring 9 mm (most distal) was removed using hot snare polypectomy  This was just distal to the polyps that were removed using endoscopic mucosal resection in the ascending colon   25 mm sessile polyp in the ascending colon; removed by hot snare; removed in piecemeal fashion by endoscopic mucosal resection (EMR) and retrieved specimen 15 mm flat polyp in the ascending colon; lift performed with 6 mL of O-rise; removed by hot snare; completely removed en bloc by endoscopic mucosal resection (EMR) and retrieved specimen EVENTS: Procedure Events Event Event Time ENDO CECUM REACHED 11/10/2022  2:52 PM ENDO SCOPE OUT TIME 11/10/2022  3:29 PM SPECIMENS: ID Type Source Tests Collected by Time Destination 1 : duodenal polyp cold forceps Tissue Polyp, Stomach/Small Intestine TISSUE EXAM Tamra Varela MD 11/10/2022  2:34 PM  2 : esophagus white plaque biopsy Tissue Esophagus TISSUE EXAM Tamra Varela MD 11/10/2022  2:39 PM  3 : ascending colon polyp x5 cold snare & hot snare Tissue Polyp, Colorectal TISSUE EXAM Tamra Varela MD 11/10/2022  2:55 PM  4 : ascending colon polyp hot snare Tissue Polyp, Colorectal TISSUE EXAM Tamra Varela MD 11/10/2022  3:04 PM  5 : ascending colon polypx2 hot snare Tissue Polyp, Colorectal TISSUE EXAM Tamra Varela MD 11/10/2022  3:19 PM  EQUIPMENT: Colonoscope -[id number]     Impression: Total of 8 polyps removed in the ascnding colon polyps using cold snare polypectomy and the more distal polyps removed using hot snare with two larger polyps (25 and 15 mm respectively) removed using endoscopic mucosal resection technique  No evidence of Crohn's colitis on endoscopic appearance of mucosa  RECOMMENDATION: Follow up pathology Repeat colonoscopy in 6 months due to a personal history of colon polyps and piecemeal resection of polyps  Tamra Varela MD     CT abdomen pelvis w wo contrast    Result Date: 11/9/2022  Narrative: CT ABDOMEN AND PELVIS WITH AND WITHOUT IV CONTRAST INDICATION:  Retroperitoneal hematoma suspected; evaluate liver lesions  Patient underwent liver ablation 7/15/2020  Patient underwent microwave ablation of 1 8 cm segment 5 lesion 9/21/2022   COMPARISON:  CT abdomen pelvis 9/24/2022, MRI abdomen 7/21/2022, IR microwave ablation study 9/21/2022 TECHNIQUE: Initial CT of the abdomen and pelvis was performed without intravenous contrast   Subsequent dynamic CT evaluation of the abdomen and pelvis was performed after the administration of intravenous contrast in arterioportal phase with delayed images of the kidneys also provided  Axial, sagittal, and coronal 2D reformatted images were created from the source data and submitted for interpretation  Radiation dose length product (DLP) for this visit:  949 mGy-cm  This examination, like all CT scans performed in the [facility], was performed utilizing techniques to minimize radiation dose exposure, including the use of iterative reconstruction and automated exposure control  IV Contrast:  100 mL of iohexol (OMNIPAQUE) Enteric Contrast:  Enteric contrast was not administered  FINDINGS: ABDOMEN RIGHT KIDNEY AND URETER: 4 mm nonobstructing interpolar calculus  No solid renal mass  No detectable urothelial mass  No hydronephrosis or hydroureter  No perinephric collection  LEFT KIDNEY AND URETER: Punctate nonobstructing lower pole calculus  No solid renal mass  Subcentimeter left midpole hypodensity, unchanged, consistent with cyst on previous MRI  No detectable urothelial mass  No hydronephrosis or hydroureter  No perinephric collection  URINARY BLADDER: No bladder wall mass  No calculi  Bladder is elongated in the AP dimension which may be related to postsurgical changes  LOWER CHEST:  Mild dependent hypoventilatory changes  LIVER/BILIARY TREE:  Lobulated 5 8 x 2 7 x 5 4 cm subcapsular mass right hepatic lobe involving segments 8/5 corresponding to conglomerate ablation zones  This appears grossly stable from most recent studies accounting for differences in interobserver variability  There are foci of high attenuation within on precontrast imaging without evidence of discernible enhancement on postcontrast imaging  Replaced right hepatic artery arising from the SMA extends to the margin of the ablation  Small amount of tissue density superimposed between the liver ablation zone and diaphragm laterally (#4/15), unchanged  This may represent scarring or small amount of hematoma   Minimal duct dilatation seen superior to the mass within segment 8, unchanged  1 cm LIRADS 3 observation in segment 6 on the September 21, 2022 study may again be present (#4/23), probably similar in size although not as well depicted possibly related to differences in phase of enhancement  No new lesions seen  Attenuation of the liver suggesting fatty infiltration  GALLBLADDER:  Gallbladder is surgically absent  SPLEEN:  Unremarkable  PANCREAS:  Unremarkable  ADRENAL GLANDS:  Unremarkable  STOMACH AND BOWEL:  Evaluation of the stomach is limited by underdistention  No focal pathology seen within limitations  Small bowel is normal caliber  Anastomotic sutures seen along the ileocolonic junction  Distal colon resection  Left lower quadrant colostomy  APPENDIX:  Not visualized, presumed surgically absent  ABDOMINOPELVIC CAVITY:  No ascites  No free intraperitoneal air  11 mm short axis portal caval lymph node (#4/28), stable  Shotty lymph nodes along the gastrohepatic ligament, celiac axis, carmen hepatis and retroperitoneum elsewhere appear grossly similar  There is no evidence of retroperitoneal hemorrhage as clinically questioned  VESSELS: Atherosclerotic changes abdominal aorta without evidence of aneurysm  A subtle halo of density is again seen about the JUNE, similar to the most recent CT study  This was not present on CTA of chest abdomen pelvis from 6/4/2020  Finding may be  inflammatory in nature? PELVIS REPRODUCTIVE ORGANS:  Unremarkable for patient's age  ABDOMINAL WALL/INGUINAL REGIONS:  Left lower quadrant colostomy  OSSEOUS STRUCTURES:  Mildly displaced left posterior 12th rib fracture and nondisplaced left lateral 10th rib fracture, not clearly evident on the prior study  Grade 1 spondylolisthesis L5 on S1 secondary L5 pars defects with associated degenerative disc disease       Impression: 1  5 8 cm conglomerate ablation zone defect subcapsular right hepatic lobe segments 8/5 without enhancement to suggest residual active tumor (LR-TR nonviable)  Please note phase of enhancement is somewhat later than optimal   2  1 cm LIRADS 3 observation in segment 6 on the September 21, 2022 study may again be present (#4/23), probably similar in size although not as well depicted possibly related to differences in phase of enhancement  Attention on 6 month follow up MRI recommended  3  No evidence of retroperitoneal hematoma  4  Stable shotty lymph nodes upper abdomen and retroperitoneum  5  Fractures of the left 10th and 12th ribs, not clearly evident on previous CT study  6  Subtle halo of density again seen about the JUNE, similar to the most recent CT study  Finding may be inflammatory in nature? Attention on follow-up advised  7  Fatty liver  Workstation performed: [id number]     MRI abdomen w wo contrast    Result Date: 12/5/2022  Narrative: MRI OF THE ABDOMEN WITH AND WITHOUT CONTRAST INDICATION: 79 years / Male  C22 0: Liver cell carcinoma  26-year-old male with history of HCC, segment 5, diagnosed and ablated in 2020  LR 5 lesion adjacent to the treatment zone, identified on MRI from 7/21/2022, ablated on 9/21/2022  Follow-up  COMPARISON: MRIs of the abdomen from October 7, 2021, January 14, 2022 and July 21, 2022  CT the abdomen and pelvis from November 9, 2022  TECHNIQUE:  Multiplanar/multisequence MRI of the abdomen was performed before and after administration of contrast  IV Contrast:  6 mL of Gadobutrol injection (SINGLE-DOSE) FINDINGS: LOWER CHEST:   Unremarkable  LIVER: Normal in size and configuration  No signal loss on opposed phase images  Observation: 1 Treatment zone  Size: 3 3 x 4 0 cm (series 10, image 50), unchanged since 7/21/2022 (when measured in similar fashion by me)  Location: Segment 5      Enhancement: No lesional enhancement  LI-RADS Category: LR- TR Nonviable  Observation: 2 Treatment zone  Size: 3 3 x 2 1 cm (series 10, image 58), new since 7/21/2022  Location: Segment 5        Enhancement: No lesional enhancement  LI-RADS Category: LR- TR Nonviable  Observation: 3      Size: 0 9 x 1 3 cm (series 11, image 57), new since an MRI from 7/21/2022  Although subtle, probably present on CT from 11/9/2022, unchanged in size  Location: Segment 5/6  Enhancement: Nonrim arterial phase hyperenhancement  Washout: Yes  Enhancing capsule: Yes  Ancillary findings: restricted diffusion; mild T2 hyperintensity  LI-RADS Category: LR- 5  The hepatic veins and portal veins are patent  BILE DUCTS:  Stable mild segmental intrahepatic ductal dilatation in segment 5  Ducts otherwise normal in caliber  GALLBLADDER:  Postcholecystectomy  PANCREAS:  Unremarkable  ADRENAL GLANDS:  Normal  SPLEEN:  Normal  KIDNEYS/PROXIMAL URETERS:  No hydroureteronephrosis  No suspicious renal mass  STOMACH AND BOWEL:   Unremarkable  No dilated loops of bowel  PERITONEAL CAVITY/RETROPERITONEUM:  No mass or ascites  LYMPH NODES:  No abdominal lymphadenopathy  VASCULAR STRUCTURES:  Unremarkable  No aortic aneurysm  ABDOMINAL WALL:  Unremarkable  OSSEOUS STRUCTURES:  Normal marrow signal and enhancement  No evidence of recent fracture, mass or marrow replacing process  Impression: 1  Stable treatment zone in segment 5, unchanged since 7/21/2022, LR-TR nonviable  2   New treatment zone in segment 5, LR-TR nonviable  3   New lesion in segment 5/6, as described above  LI-RADS 5  The study was marked in [facility] for immediate notification  Workstation performed: [id number]     CTA chest pe study    Result Date: 11/7/2022  Narrative: CTA - CHEST WITH IV CONTRAST - PULMONARY ANGIOGRAM INDICATION:   R/O PE, remains tachycardic  Per my review of the medical record, the patient  was recently admitted for syncope while drinking, losing consciousness and hitting the floor, and tachycardia  History of percutaneous microwave ablation of hepatocellular carcinoma  COMPARISON: CXR 11/1/2022 and chest CT 6/4/2020  Abdomen CT 09/24/2022  TECHNIQUE: CT angiogram timed for optimal opacification of the pulmonary arteries  Axial, sagittal, and coronal 2D reformats created from source data  Coronal 3D MIP postprocessing on the acquisition scanner  Radiation dose length product (DLP):  229 mGy-cm   Radiation dose exposure minimized using iterative reconstruction and automated exposure control  IV Contrast:  85 mL of iohexol (OMNIPAQUE)  FINDINGS: PULMONARY ARTERIES:  No definite acute pulmonary emboli but small segmental and subsegmental emboli, particularly in the lower lobes, may be obscured by motion artifact  LUNGS:  Curvilinear consolidation in the posterior basal right lower lobe, adjacent to a focal area of air trapping  Moderate emphysema  Stable patchy biapical pleural-parenchymal scar  AIRWAYS: No significant filling defects  PLEURA:  Trace right effusion  HEART/GREAT VESSELS:  Normal for age  MEDIASTINUM AND MACIEL:  Unremarkable  CHEST WALL AND LOWER NECK: Unremarkable  UPPER ABDOMEN:  Low-attenuation partially enhancing subcapsular lesion in the right hepatic lobe at the site of microwave ablation  OSSEOUS STRUCTURES:  Acute/subacute fracture of the posterior right 10th rib and the left posterior 12th rib  Healed bilateral anterolateral fractures  Mild degenerative disease in the spine  Impression: No definite acute pulmonary emboli, but small segmental and subsegmental emboli, particularly in the lower lobes, can be obscured by motion artifact  Acute/subacute fracture of the right posterior 10th rib with trace right effusion  Adjacent curvilinear consolidation is likely due to atelectasis  Acute/subacute fracture of the posterior left 12th rib  Low-attenuation partially enhancing subcapsular lesion in the right hepatic lobe at the site of microwave ablation of HCC, 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  The patient is scheduled for MRI on 12/2/2022   The study was marked in [facility] for immediate notification  Workstation performed: [id number]     VAS lower limb venous duplex study, unilateral/limited    Result Date: 11/12/2022  Narrative:  [facility] REPORT CLINICAL: Indications: Patient presents with mild swelling and redness of the left foot  Operative History: Cholecystectomy Colostomy Risk Factors The patient has no history of HTN or Diabetes  CONCLUSION:  Impression: RIGHT LOWER LIMB LIMITED: Evaluation shows no evidence of thrombus in the common femoral vein  Doppler evaluation shows a normal response to augmentation maneuvers  LEFT LOWER LIMB: No evidence of acute or chronic deep vein thrombosis No evidence of superficial thrombophlebitis noted  Doppler evaluation shows a normal response to augmentation maneuvers  Popliteal, posterior tibial and anterior tibial arterial Doppler waveforms are triphasic  Technical findings were faxed to chart  SIGNATURE: Electronically Signed by: Magnus Haynes on 2022-11-12 10:50:59 AM    I reviewed the above laboratory and imaging data  Discussion/Summary: 68-year-old male with [address]  in a non cirrhotic liver   His Child score is B   His MELD score is 9   His hepatitis C antibody is nonreactive   He underwent ablation of the segment 5 liver lesion  The second lesion that was seen was percutaneously ablated  Both of these areas are nonviable  There is a new area under 2 cm in size  I would recommend repeat percutaneous ablation again  I will set him up with IR to discuss this  I would hold on Y 90 or TACE encases disease becomes multifocal   I will plan on seeing him again in 4 months with a repeat MRI and AFP level  I have counseled him to continue to abstain from alcohol    He is agreeable to this plan   All his questions were answered

## 2022-12-08 LAB — MAGNESIUM SERPL-MCNC: 1.5 MG/DL (ref 1.5–2.5)

## 2022-12-12 ENCOUNTER — TELEPHONE (OUTPATIENT)
Dept: INTERVENTIONAL RADIOLOGY/VASCULAR | Facility: CLINIC | Age: 67
End: 2022-12-12

## 2022-12-12 ENCOUNTER — TELEPHONE (OUTPATIENT)
Dept: SURGICAL ONCOLOGY | Facility: CLINIC | Age: 67
End: 2022-12-12

## 2022-12-12 NOTE — TELEPHONE ENCOUNTER
Spoke to patient and informedhim that Dr Shreyas Gupta and Dr Aquiles Storey both agreed that ablation would be best for the liver lesion   IR will reach out to patient to schedule

## 2022-12-16 ENCOUNTER — TELEMEDICINE (OUTPATIENT)
Dept: INTERVENTIONAL RADIOLOGY/VASCULAR | Facility: CLINIC | Age: 67
End: 2022-12-16

## 2022-12-16 ENCOUNTER — OFFICE VISIT (OUTPATIENT)
Dept: INTERNAL MEDICINE CLINIC | Facility: CLINIC | Age: 67
End: 2022-12-16

## 2022-12-16 VITALS
HEIGHT: 69 IN | SYSTOLIC BLOOD PRESSURE: 132 MMHG | HEART RATE: 67 BPM | WEIGHT: 140 LBS | BODY MASS INDEX: 20.73 KG/M2 | OXYGEN SATURATION: 99 % | DIASTOLIC BLOOD PRESSURE: 78 MMHG | TEMPERATURE: 98.2 F

## 2022-12-16 DIAGNOSIS — J43.9 PULMONARY EMPHYSEMA, UNSPECIFIED EMPHYSEMA TYPE (HCC): ICD-10-CM

## 2022-12-16 DIAGNOSIS — C22.0 HEPATOCELLULAR CARCINOMA (HCC): ICD-10-CM

## 2022-12-16 DIAGNOSIS — F34.1 DYSTHYMIC DISORDER: ICD-10-CM

## 2022-12-16 DIAGNOSIS — I10 ESSENTIAL HYPERTENSION: Primary | ICD-10-CM

## 2022-12-16 DIAGNOSIS — E03.8 SUBCLINICAL HYPOTHYROIDISM: ICD-10-CM

## 2022-12-16 DIAGNOSIS — C22.0 HCC (HEPATOCELLULAR CARCINOMA) (HCC): Primary | Chronic | ICD-10-CM

## 2022-12-16 DIAGNOSIS — R73.01 IMPAIRED FASTING BLOOD SUGAR: ICD-10-CM

## 2022-12-16 DIAGNOSIS — E43 SEVERE PROTEIN-CALORIE MALNUTRITION (HCC): ICD-10-CM

## 2022-12-16 DIAGNOSIS — Z00.00 MEDICARE ANNUAL WELLNESS VISIT, SUBSEQUENT: ICD-10-CM

## 2022-12-16 DIAGNOSIS — Z87.898 HISTORY OF ALCOHOL USE DISORDER: ICD-10-CM

## 2022-12-16 DIAGNOSIS — Z93.3 COLOSTOMY IN PLACE (HCC): ICD-10-CM

## 2022-12-16 DIAGNOSIS — K50.018 CROHN'S DISEASE OF SMALL INTESTINE WITH OTHER COMPLICATION (HCC): ICD-10-CM

## 2022-12-16 DIAGNOSIS — E78.2 MIXED HYPERLIPIDEMIA: ICD-10-CM

## 2022-12-16 DIAGNOSIS — Z23 ENCOUNTER FOR IMMUNIZATION: ICD-10-CM

## 2022-12-16 DIAGNOSIS — E53.8 B12 DEFICIENCY: ICD-10-CM

## 2022-12-16 DIAGNOSIS — I47.1 SUPRAVENTRICULAR TACHYCARDIA (HCC): ICD-10-CM

## 2022-12-16 DIAGNOSIS — D69.6 PLATELETS DECREASED (HCC): ICD-10-CM

## 2022-12-16 DIAGNOSIS — K21.9 GASTROESOPHAGEAL REFLUX DISEASE WITHOUT ESOPHAGITIS: ICD-10-CM

## 2022-12-16 RX ORDER — MIRTAZAPINE 15 MG/1
15 TABLET, FILM COATED ORAL
Qty: 90 TABLET | Refills: 1 | Status: SHIPPED | OUTPATIENT
Start: 2022-12-16

## 2022-12-16 RX ORDER — CARVEDILOL 3.12 MG/1
3.12 TABLET ORAL 2 TIMES DAILY WITH MEALS
Qty: 180 TABLET | Refills: 1 | Status: SHIPPED | OUTPATIENT
Start: 2022-12-16

## 2022-12-16 RX ORDER — LEVOTHYROXINE SODIUM 0.05 MG/1
50 TABLET ORAL
Qty: 90 TABLET | Refills: 1 | Status: SHIPPED | OUTPATIENT
Start: 2022-12-16

## 2022-12-16 RX ORDER — FOLIC ACID 1 MG/1
1 TABLET ORAL DAILY
Qty: 90 TABLET | Refills: 1 | Status: SHIPPED | OUTPATIENT
Start: 2022-12-16

## 2022-12-16 RX ADMIN — CYANOCOBALAMIN 1000 MCG: 1000 INJECTION, SOLUTION INTRAMUSCULAR; SUBCUTANEOUS at 15:25

## 2022-12-16 NOTE — PROGRESS NOTES
Assessment and Plan:     Problem List Items Addressed This Visit        Digestive    Crohn's disease of small intestine with other complication (HCC) (Chronic)    Gastroesophageal reflux disease without esophagitis       Endocrine    Subclinical hypothyroidism    Relevant Medications    carvedilol (COREG) 3 125 mg tablet    levothyroxine 50 mcg tablet       Respiratory    Chronic obstructive pulmonary disease (HCC) (Chronic)       Cardiovascular and Mediastinum    Essential hypertension - Primary (Chronic)    Relevant Medications    carvedilol (COREG) 3 125 mg tablet    Other Relevant Orders    CBC and differential       Other    Colostomy in place ([facility] )    Severe protein-calorie malnutrition ([facility] )    Mixed hyperlipidemia    Relevant Orders    Comprehensive metabolic panel    Lipid Panel with Direct LDL reflex    TSH, 3rd generation    Dysthymic disorder    Relevant Medications    mirtazapine (REMERON) 15 mg tablet    History of alcohol use disorder    Relevant Medications    folic acid (FOLVITE) 1 mg tablet   Other Visit Diagnoses     Supraventricular tachycardia (HCC)        Relevant Medications    carvedilol (COREG) 3 125 mg tablet    B12 deficiency        Platelets decreased ([facility] )        Hepatocellular carcinoma ([facility] )        Medicare annual wellness visit, subsequent        Impaired fasting blood sugar        Relevant Orders    Hemoglobin A1C           Preventive health issues were discussed with patient, and age appropriate screening tests were ordered as noted in patient's After Visit Summary  Personalized health advice and appropriate referrals for health education or preventive services given if needed, as noted in patient's After Visit Summary       History of Present Illness:     Patient presents for a Medicare Wellness Visit    Follow-up on multiple medical problems to ensure they are stable on current medications     Patient Care Team:  Shellye Sicard, MD as PCP - General (Internal Medicine)  Ayala Prince Unable to leave voicmail message to check PHONE IS OUT OF SERVICE  INR.3rd call  Letter sent     MD Edu Butcher MD as Julio Montague MD (Oncology)  Jaimie Monreal MD as Fellow (Cardiology)  Isai Rice MD (Pulmonary Disease)     Review of Systems:     Review of Systems   Constitutional: Negative for chills and fever  HENT: Negative for congestion, ear pain and sore throat  Eyes: Negative for pain  Respiratory: Negative for cough and shortness of breath  Cardiovascular: Negative for chest pain and leg swelling  Gastrointestinal: Negative for abdominal pain, nausea and vomiting  Endocrine: Negative for polyuria  Genitourinary: Negative for difficulty urinating, frequency and urgency  Musculoskeletal: Positive for arthralgias  Negative for back pain  Skin: Negative for rash  Neurological: Negative for weakness and headaches  Psychiatric/Behavioral: Negative for sleep disturbance  The patient is not nervous/anxious           Problem List:     Patient Active Problem List   Diagnosis   • Essential hypertension   • Emphysema/COPD ([place] [address] )   • Crohn disease ([place] [address] )   • Hyponatremia   • Colostomy in place [facility])   • Diarrhea   • Severe protein-calorie malnutrition ([place] [address] )   • Cellulitis   • Emphysema of lung ([place] [address] )   • Syncope and collapse   • Severe sepsis (HCC)   • Pneumonia   • Chest pain   • Liver mass   • Tobacco abuse   • Abdominal pain   • Personal history of liver cancer   • Palliative care patient   • Abdominal wall abscess   • Observed sleep apnea   • Acute pain of left wrist   • Chronic, continuous use of opioids   • Hypocalcemia   • Left wrist pain   • Kidney stone   • Hypomagnesemia   • Hypokalemia   • Gastroesophageal reflux disease without esophagitis   • Chronic obstructive pulmonary disease (HCC)   • Mixed hyperlipidemia   • Vitamin B12 deficiency   • Cataract   • Dysthymic disorder   • Crohn's disease of small intestine with other complication ([place] [address] )   • Encounter for follow-up surveillance of liver cancer   • HCC (hepatocellular carcinoma) ([place] [address] )   • Electrolyte abnormality   • History of alcohol use disorder   • Anemia   • Subclinical hypothyroidism      Past Medical and Surgical History:     Past Medical History:   Diagnosis Date   • LUCILLE (acute kidney injury) ([facility] [address] ) 2017   • Blindness of left eye     Since birth   • Cancer [facility])     liver   • Cataract    • Colon polyp    • Colostomy in place [facility]) 2017   • COPD (chronic obstructive pulmonary disease) (HCC)    • Crohn disease ([facility] )    • Emphysema of lung (HCC)    • Emphysema/COPD (HCC)    • Essential hypertension    • GERD (gastroesophageal reflux disease)    • Hypertension    • Hypokalemia 2017   • Hypomagnesemia 2017   • Hyponatremia 2017   • Kidney stone    • Osteomyelitis ([facility] )    • Pneumonia      Past Surgical History:   Procedure Laterality Date   • CATARACT EXTRACTION Bilateral    • CHOLECYSTECTOMY     • COLECTOMY      10 inchs of ileum   • COLONOSCOPY N/A 2017    Procedure: COLONOSCOPY;  Surgeon: Juan Wagner MD;  [facility]: [facility];   Service: Gastroenterology   • COLOSTOMY     • FINGER AMPUTATION     • FINGER SURGERY Left    • IR BIOPSY LIVER MASS  2020   • IR MICROWAVE ABLATION  2022   • LITHOTRIPSY     • LIVER LOBECTOMY N/A 7/15/2020    Procedure: LIVER ABLATION, INTRAOPERATIVE U/S LIVER;  Surgeon: Des Polk MD;  Location: [facility];  Service: Surgical Oncology      Family History:     Family History   Problem Relation Age of Onset   • Hypertension Father    • Heart disease Sister       Social History:     Social History     Socioeconomic History   • Marital status:      Spouse name: None   • Number of children: None   • Years of education: None   • Highest education level: None   Occupational History   • None   Tobacco Use   • Smoking status: Former     Packs/day: 1 50     Years: 51 00     Pack years: 76 50     Types: Cigarettes     Start date:      Quit date: 2022     Years since quittin 9   • Smokeless tobacco: Never   Vaping Use   • Vaping Use: Never used   Substance and Sexual Activity   • Alcohol use: Not Currently     Alcohol/week: 59 0 standard drinks     Types: 49 Cans of beer, 10 Shots of liquor per week   • Drug use: No   • Sexual activity: Not Currently   Other Topics Concern   • None   Social History Narrative   • None     Social Determinants of Health     Financial Resource Strain: Low Risk    • Difficulty of Paying Living Expenses: Not hard at all   Food Insecurity: No Food Insecurity   • Worried About Running Out of Food in the Last Year: Never true   • Ran Out of Food in the Last Year: Never true   Transportation Needs: No Transportation Needs   • Lack of Transportation (Medical): No   • Lack of Transportation (Non-Medical):  No   Physical Activity: Not on file   Stress: Not on file   Social Connections: Not on file   Intimate Partner Violence: Not on file   Housing Stability: Low Risk    • Unable to Pay for Housing in the Last Year: No   • Number of Places Lived in the Last Year: 1   • Unstable Housing in the Last Year: No      Medications and Allergies:     Current Outpatient Medications   Medication Sig Dispense Refill   • ALPRAZolam (XANAX) 0 25 mg tablet Take 1 tablet (0 25 mg total) by mouth daily at bedtime as needed for anxiety 90 tablet 0   • AMILoride 5 mg tablet Take 1 tablet (5 mg total) by mouth daily 90 tablet 0   • amLODIPine (NORVASC) 5 mg tablet Take 1 tablet (5 mg total) by mouth daily 90 tablet 0   • buPROPion (WELLBUTRIN XL) 150 mg 24 hr tablet Take 1 tablet (150 mg total) by mouth daily 90 tablet 0   • calcium carbonate (TUMS) 500 mg chewable tablet Chew 1 tablet (500 mg total) daily  0   • carvedilol (COREG) 3 125 mg tablet Take 1 tablet (3 125 mg total) by mouth 2 (two) times a day with meals 503 tablet 1   • folic acid (FOLVITE) 1 mg tablet Take 1 tablet (1 mg total) by mouth daily 90 tablet 1   • levothyroxine 50 mcg tablet Take 1 tablet (50 mcg total) by mouth daily in the early morning 90 tablet 1   • MAGNESIUM CHLORIDE PO Take 1,000 mg by mouth daily     • Magnesium Oxide 250 MG TABS Take 1 tablet (250 mg total) by mouth in the morning 90 tablet 3   • mirtazapine (REMERON) 15 mg tablet Take 1 tablet (15 mg total) by mouth daily at bedtime 90 tablet 1   • omeprazole (PriLOSEC) 20 mg delayed release capsule Take 1 capsule (20 mg total) by mouth daily 90 capsule 1   • potassium chloride (MICRO-K) 10 MEQ CR capsule Take 2 capsules (20 mEq total) by mouth 2 (two) times a day (Patient taking differently: Take 10 mEq by mouth 2 (two) times a day) 60 capsule 0   • rosuvastatin (CRESTOR) 10 MG tablet Take 1 tablet (10 mg total) by mouth daily 90 tablet 0   • thiamine 250 MG tablet Take 1 tablet (250 mg total) by mouth daily for 5 days Do not start before November 14, 2022  5 tablet 0   • umeclidinium-vilanterol (Anoro Ellipta) 62 5-25 MCG/INH inhaler Inhale 1 puff daily 180 each 3   • VIT B12-METHIONINE-INOS-CHOL IM Inject into a muscle every 30 (thirty) days       Current Facility-Administered Medications   Medication Dose Route Frequency Provider Last Rate Last Admin   • cyanocobalamin injection 1,000 mcg  1,000 mcg Intramuscular Q30 Days Torri Coleman MD   1,000 mcg at 09/28/22 1031     No Known Allergies   Immunizations:     Immunization History   Administered Date(s) Administered   • COVID-19 PFIZER VACCINE 0 3 ML IM 03/23/2021, 04/14/2021   • H1N1, All Formulations 12/04/2009   • INFLUENZA 11/16/2005   • Influenza, high dose seasonal 0 7 mL 10/23/2020, 09/22/2021   • Pneumococcal Polysaccharide PPV23 11/24/2020   • Tdap 06/06/2018      Health Maintenance:         Topic Date Due   • Colorectal Cancer Screening  05/09/2023   • Lung Cancer Screening  11/07/2023   • Hepatitis C Screening  Completed         Topic Date Due   • Hepatitis A Vaccine (1 of 2 - Risk 2-dose series) Never done   • Hepatitis B Vaccine (1 of 3 - Risk 3-dose series) Never done   • COVID-19 Vaccine (3 - Booster for Pfizer series) 09/14/2021   • Pneumococcal Vaccine: 65+ Years (2 - PCV) 11/24/2021   • Influenza Vaccine (1) 09/01/2022      Medicare Screening Tests and Risk Assessments:     Gopi Dominguez is here for his Subsequent Wellness visit  Last Medicare Wellness visit information reviewed, patient interviewed and updates made to the record today  Health Risk Assessment:   Patient rates overall health as good  Patient feels that their physical health rating is much better  Patient is satisfied with their life  Eyesight was rated as same  Hearing was rated as same  Patient feels that their emotional and mental health rating is same  Patients states they are sometimes angry  Patient states they are sometimes unusually tired/fatigued  Pain experienced in the last 7 days has been none  Patient states that he has experienced weight loss or gain in last 6 months  Fall Risk Screening: In the past year, patient has experienced: no history of falling in past year      Home Safety:  Patient does not have trouble with stairs inside or outside of their home  Patient has working smoke alarms and has working carbon monoxide detector  Home safety hazards include: none  Nutrition:   Current diet is Regular  Medications:   Patient is not currently taking any over-the-counter supplements  Patient is able to manage medications  Activities of Daily Living (ADLs)/Instrumental Activities of Daily Living (IADLs):   Walk and transfer into and out of bed and chair?: Yes  Dress and groom yourself?: Yes    Bathe or shower yourself?: Yes    Feed yourself? Yes  Do your laundry/housekeeping?: Yes  Manage your money, pay your bills and track your expenses?: Yes  Make your own meals?: Yes    Do your own shopping?: Yes    Previous Hospitalizations:   Any hospitalizations or ED visits within the last 12 months?: Yes    How many hospitalizations have you had in the last year?: 1-2    Advance Care Planning:   Living will: Yes    Durable POA for healthcare:  Yes    Advanced directive: Yes      PREVENTIVE SCREENINGS      Cardiovascular Screening:    General: Screening Not Indicated and History Lipid Disorder      Diabetes Screening:     General: Screening Current      Colorectal Cancer Screening:     General: Screening Current      Abdominal Aortic Aneurysm (AAA) Screening:    Risk factors include: age between 73-67 yo and tobacco use        Lung Cancer Screening:     General: Screening Current      Hepatitis C Screening:    General: Screening Current    Screening, Brief Intervention, and Referral to Treatment (SBIRT)    Screening  Typical number of drinks in a day: 0  Typical number of drinks in a week: 0  Interpretation: Low risk drinking behavior  Single Item Drug Screening:  How often have you used an illegal drug (including marijuana) or a prescription medication for non-medical reasons in the past year? never    Single Item Drug Screen Score: 0  Interpretation: Negative screen for possible drug use disorder    No results found  Physical Exam:     /78 (BP Location: Left arm, Patient Position: Sitting, Cuff Size: Large)   Pulse 67   Temp 98 2 °F (36 8 °C) (Temporal)   Ht 5' 9" (1 753 m)   Wt 63 5 kg (140 lb)   SpO2 99%   BMI 20 67 kg/m²     Physical Exam  Vitals and nursing note reviewed  Constitutional:       General: He is not in acute distress  Appearance: He is well-developed  HENT:      Head: Normocephalic and atraumatic  Right Ear: Tympanic membrane, ear canal and external ear normal       Left Ear: Tympanic membrane, ear canal and external ear normal       Mouth/Throat:      Pharynx: Oropharynx is clear  Eyes:      Extraocular Movements: Extraocular movements intact  Conjunctiva/sclera: Conjunctivae normal    Cardiovascular:      Rate and Rhythm: Normal rate and regular rhythm  Heart sounds: Normal heart sounds  No murmur heard  Pulmonary:      Effort: Pulmonary effort is normal  No respiratory distress        Breath sounds: Normal breath sounds  Abdominal:      General: Abdomen is flat  Palpations: Abdomen is soft  Tenderness: There is no abdominal tenderness  Musculoskeletal:         General: No swelling  Cervical back: Normal range of motion and neck supple  Right lower leg: No edema  Left lower leg: No edema  Skin:     General: Skin is warm and dry  Capillary Refill: Capillary refill takes less than 2 seconds  Neurological:      General: No focal deficit present  Mental Status: He is alert and oriented to person, place, and time     Psychiatric:         Mood and Affect: Mood normal           Shellye Sicard, MD

## 2022-12-16 NOTE — PROGRESS NOTES
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 HCC  Discussed procedural details of microwave ablation, risk and benefits  Patient wishes to proceed  We will coordinate scheduling for January  Reason for visit is   Chief Complaint   Patient presents with   • Virtual Regular Visit        Encounter provider Nav Chin DO    Provider located at [address] [place] [address] [id number]  [phone]      Recent Visits  Date Type Provider Dept   12/12/22 Telephone Nav Chin DO [facility] recent visits within past 7 days and meeting all other requirements  Today's Visits  Date Type Provider Dept   12/16/22 222 Miquel Fuller DO [facility] [id number]   12/16/22 Office Visit Tori Kingsley MD [facility] [address] Internal Med   Showing today's visits and meeting all other requirements  Future Appointments  No visits were found meeting these conditions  Showing future appointments within next 150 days and meeting all other requirements       The patient was identified by name and date of birth  Elena Dill III was informed that this is a telemedicine visit and that the visit is being conducted through Telephone  My office door was closed  No one else was in the room  He acknowledged consent and understanding of privacy and security of the video platform  The patient has agreed to participate and understands they can discontinue the visit at any time      Patient is aware this is a billable service  CC: [place] [id number]  HPI:   77-year-old male [facility]  status post ablation, Crohns with ostomy, COPD, HTN  I reviewed his most recent MRI 12/2/22  There is an enlarging segment 5/6 lesion measuring roughly 0 9 x 1 3 cm characterized as LR5 HCC  Previous 2 lesions in segment 5 are no longer viable following ablation  Patient is referred to IR clinic to discuss further liver directed therapy  Past Medical History:   Diagnosis Date   • LUCILLE (acute kidney injury) ([place] [address] ) 6/28/2017   • Blindness of left eye     Since birth   • Cancer [facility])     liver   • Cataract    • Colon polyp    • Colostomy in place [facility]) 6/29/2017   • COPD (chronic obstructive pulmonary disease) (HCC)    • Crohn disease ([place] [address] )    • Emphysema of lung (HCC)    • Emphysema/COPD ([place] [address] )    • Essential hypertension    • GERD (gastroesophageal reflux disease)    • Hypertension    • Hypokalemia 6/28/2017   • Hypomagnesemia 6/29/2017   • Hyponatremia 6/28/2017   • Kidney stone    • Osteomyelitis ([place] [address] )    • Pneumonia        Past Surgical History:   Procedure Laterality Date   • CATARACT EXTRACTION Bilateral    • CHOLECYSTECTOMY     • COLECTOMY      10 inchs of ileum   • COLONOSCOPY N/A 6/30/2017    Procedure: COLONOSCOPY;  Surgeon: Marcela Herron MD;  [facility]: [facility];   Service: Gastroenterology   • COLOSTOMY     • FINGER AMPUTATION     • FINGER SURGERY Left    • IR BIOPSY LIVER MASS  6/8/2020   • IR MICROWAVE ABLATION  9/21/2022   • LITHOTRIPSY     • LIVER LOBECTOMY N/A 7/15/2020    Procedure: LIVER ABLATION, INTRAOPERATIVE U/S LIVER;  Surgeon: Matthieu Carrion MD;  Location: [facility];  Service: Surgical Oncology       Current Outpatient Medications   Medication Sig Dispense Refill   • ALPRAZolam (XANAX) 0 25 mg tablet Take 1 tablet (0 25 mg total) by mouth daily at bedtime as needed for anxiety 90 tablet 0   • AMILoride 5 mg tablet Take 1 tablet (5 mg total) by mouth daily 90 tablet 0   • amLODIPine (NORVASC) 5 mg tablet Take 1 tablet (5 mg total) by mouth daily 90 tablet 0   • buPROPion (WELLBUTRIN XL) 150 mg 24 hr tablet Take 1 tablet (150 mg total) by mouth daily 90 tablet 0   • calcium carbonate (TUMS) 500 mg chewable tablet Chew 1 tablet (500 mg total) daily  0   • carvedilol (COREG) 3 125 mg tablet Take 1 tablet (3 125 mg total) by mouth 2 (two) times a day with meals 173 tablet 1   • folic acid (FOLVITE) 1 mg tablet Take 1 tablet (1 mg total) by mouth daily 90 tablet 1   • levothyroxine 50 mcg tablet Take 1 tablet (50 mcg total) by mouth daily in the early morning 90 tablet 1   • MAGNESIUM CHLORIDE PO Take 1,000 mg by mouth daily     • Magnesium Oxide 250 MG TABS Take 1 tablet (250 mg total) by mouth in the morning 90 tablet 3   • mirtazapine (REMERON) 15 mg tablet Take 1 tablet (15 mg total) by mouth daily at bedtime 90 tablet 1   • omeprazole (PriLOSEC) 20 mg delayed release capsule Take 1 capsule (20 mg total) by mouth daily 90 capsule 1   • potassium chloride (MICRO-K) 10 MEQ CR capsule Take 2 capsules (20 mEq total) by mouth 2 (two) times a day (Patient taking differently: Take 10 mEq by mouth 2 (two) times a day) 60 capsule 0   • rosuvastatin (CRESTOR) 10 MG tablet Take 1 tablet (10 mg total) by mouth daily 90 tablet 0   • thiamine 250 MG tablet Take 1 tablet (250 mg total) by mouth daily for 5 days Do not start before November 14, 2022  5 tablet 0   • umeclidinium-vilanterol (Anoro Ellipta) 62 5-25 MCG/INH inhaler Inhale 1 puff daily 180 each 3   • VIT B12-METHIONINE-INOS-CHOL IM Inject into a muscle every 30 (thirty) days       Current Facility-Administered Medications   Medication Dose Route Frequency Provider Last Rate Last Admin   • cyanocobalamin injection 1,000 mcg  1,000 mcg Intramuscular Q30 Days Torri Coleman MD   1,000 mcg at 12/16/22 1525        No Known Allergies    Review of Systems   All other systems reviewed and are negative          I spent 30 minutes with patient today in which greater than 50% of the time was spent in counseling/coordination of care [facility]

## 2023-01-05 DIAGNOSIS — E87.6 HYPOKALEMIA: ICD-10-CM

## 2023-01-05 RX ORDER — POTASSIUM CHLORIDE 750 MG/1
20 CAPSULE, EXTENDED RELEASE ORAL 2 TIMES DAILY
Qty: 60 CAPSULE | Refills: 0 | Status: SHIPPED | OUTPATIENT
Start: 2023-01-05

## 2023-01-09 LAB
BACTERIA STL CULT: NORMAL

## 2023-01-13 ENCOUNTER — CLINICAL SUPPORT (OUTPATIENT)
Dept: INTERNAL MEDICINE CLINIC | Facility: CLINIC | Age: 68
End: 2023-01-13

## 2023-01-13 DIAGNOSIS — E53.8 B12 DEFICIENCY: Primary | ICD-10-CM

## 2023-01-13 RX ADMIN — CYANOCOBALAMIN 1000 MCG: 1000 INJECTION, SOLUTION INTRAMUSCULAR; SUBCUTANEOUS at 10:25

## 2023-01-18 NOTE — PRE-PROCEDURE INSTRUCTIONS
Pre-procedure Instructions for Interventional Radiology  [address]  [place] [address] [address] [phone]    You are scheduled for a/an Microwave Ablation of Liver Mass  On Thursday 1/26/23  Your tentative arrival time is 0715  Short stay will notify you the day before your procedure with the exact arrival time and the location to arrive  To prepare for your procedure:  1  Please arrange for someone to drive you home after the procedure and stay with you until the next morning if you are instructed to do so  This is typically for patients receiving some type of sedative or anesthetic for the procedure  2  DO NOT EAT OR DRINK ANYTHING after midnight on the evening before your procedure including candy & gum   3  ONLY SIPS OF WATER with your medications are allowed on the morning of your procedure  4  TAKE ALL OF YOUR REGULAR MEDICATIONS THE MORNING OF YOUR PROCEDURE with sips of water! We may call you to stop some of your blood sugar, blood pressure and blood thinning medications depending on the procedure  Please take all of these medications unless we instruct you to stop them  5  If you have an allergy to x-ray dye, please contact Interventional Radiology for an x-ray dye preparation which usually consists of an oral steroid and Benadryl  The day of your procedure:  1  Bring a list of the medications you take at home  2  Bring medications you take for breathing problems (such as inhalers), medications for chest pain, or both  3  Bring a case for your glasses or contacts  4  Bring your insurance card and a form of photo ID   5  Please leave all valuables such as credit cards and jewelry at home  6  Report to the registration desk in the main lobby at the [facility]  Ask to be directed to [facility]    7  While your procedure is being performed, your family may wait in the Radiology Waiting Room on the 1st floor in Radiology  if they need to leave, they may provide a number to be called following the procedure  8  Be prepared to stay overnight just in case  Sometimes procedures will indicate the need for further observation or treatment  9  If you are scheduled for a follow-up visit with the Interventional Radiologist after your procedure, you will be called with a date and time      Special Instructions (Medications to stop taking before your procedure etc ):

## 2023-01-25 ENCOUNTER — ANESTHESIA EVENT (OUTPATIENT)
Dept: RADIOLOGY | Facility: HOSPITAL | Age: 68
End: 2023-01-25

## 2023-01-26 ENCOUNTER — ANESTHESIA (OUTPATIENT)
Dept: RADIOLOGY | Facility: HOSPITAL | Age: 68
End: 2023-01-26

## 2023-01-26 ENCOUNTER — APPOINTMENT (INPATIENT)
Dept: NON INVASIVE DIAGNOSTICS | Facility: HOSPITAL | Age: 68
End: 2023-01-26

## 2023-01-26 ENCOUNTER — HOSPITAL ENCOUNTER (INPATIENT)
Dept: RADIOLOGY | Facility: HOSPITAL | Age: 68
LOS: 4 days | Discharge: HOME | End: 2023-01-30
Attending: RADIOLOGY | Admitting: INTERNAL MEDICINE

## 2023-01-26 DIAGNOSIS — M19.031 ARTHROPATHY OF RIGHT WRIST: ICD-10-CM

## 2023-01-26 DIAGNOSIS — L03.113 CELLULITIS OF RIGHT UPPER EXTREMITY: Primary | ICD-10-CM

## 2023-01-26 DIAGNOSIS — C22.0 HCC (HEPATOCELLULAR CARCINOMA) (HCC): Chronic | ICD-10-CM

## 2023-01-26 DIAGNOSIS — E43 SEVERE PROTEIN-CALORIE MALNUTRITION (HCC): ICD-10-CM

## 2023-01-26 LAB
ALBUMIN SERPL BCP-MCNC: 2 G/DL (ref 3.5–5)
ALBUMIN SERPL BCP-MCNC: 2.1 G/DL (ref 3.5–5)
ALBUMIN SERPL BCP-MCNC: 2.4 G/DL (ref 3.5–5)
ALP SERPL-CCNC: 103 U/L (ref 46–116)
ALP SERPL-CCNC: 117 U/L (ref 46–116)
ALP SERPL-CCNC: 124 U/L (ref 46–116)
ALT SERPL W P-5'-P-CCNC: 33 U/L (ref 12–78)
ALT SERPL W P-5'-P-CCNC: 38 U/L (ref 12–78)
ALT SERPL W P-5'-P-CCNC: 38 U/L (ref 12–78)
ANION GAP SERPL CALCULATED.3IONS-SCNC: 10 MMOL/L (ref 4–13)
ANION GAP SERPL CALCULATED.3IONS-SCNC: 11 MMOL/L (ref 4–13)
ANION GAP SERPL CALCULATED.3IONS-SCNC: 8 MMOL/L (ref 4–13)
AST SERPL W P-5'-P-CCNC: 33 U/L (ref 5–45)
AST SERPL W P-5'-P-CCNC: 56 U/L (ref 5–45)
AST SERPL W P-5'-P-CCNC: 61 U/L (ref 5–45)
ATRIAL RATE: 97 BPM
BACTERIA UR QL AUTO: ABNORMAL /HPF
BILIRUB DIRECT SERPL-MCNC: 0.68 MG/DL (ref 0–0.2)
BILIRUB SERPL-MCNC: 1.07 MG/DL (ref 0.2–1)
BILIRUB SERPL-MCNC: 1.3 MG/DL (ref 0.2–1)
BILIRUB SERPL-MCNC: 1.8 MG/DL (ref 0.2–1)
BILIRUB UR QL STRIP: NEGATIVE
BUN SERPL-MCNC: 17 MG/DL (ref 5–25)
BUN SERPL-MCNC: 18 MG/DL (ref 5–25)
BUN SERPL-MCNC: 21 MG/DL (ref 5–25)
CALCIUM ALBUM COR SERPL-MCNC: 8.7 MG/DL (ref 8.3–10.1)
CALCIUM ALBUM COR SERPL-MCNC: 8.7 MG/DL (ref 8.3–10.1)
CALCIUM SERPL-MCNC: 7.1 MG/DL (ref 8.3–10.1)
CALCIUM SERPL-MCNC: 7.2 MG/DL (ref 8.3–10.1)
CALCIUM SERPL-MCNC: 7.4 MG/DL (ref 8.3–10.1)
CHLORIDE SERPL-SCNC: 86 MMOL/L (ref 96–108)
CHLORIDE SERPL-SCNC: 89 MMOL/L (ref 96–108)
CHLORIDE SERPL-SCNC: 92 MMOL/L (ref 96–108)
CLARITY UR: CLEAR
CO2 SERPL-SCNC: 27 MMOL/L (ref 21–32)
CO2 SERPL-SCNC: 28 MMOL/L (ref 21–32)
CO2 SERPL-SCNC: 30 MMOL/L (ref 21–32)
COLOR UR: YELLOW
CREAT SERPL-MCNC: 1.07 MG/DL (ref 0.6–1.3)
CREAT SERPL-MCNC: 1.1 MG/DL (ref 0.6–1.3)
CREAT SERPL-MCNC: 1.28 MG/DL (ref 0.6–1.3)
ERYTHROCYTE [DISTWIDTH] IN BLOOD BY AUTOMATED COUNT: 12.2 % (ref 11.6–15.1)
GFR SERPL CREATININE-BSD FRML MDRD: 57 ML/MIN/1.73SQ M
GFR SERPL CREATININE-BSD FRML MDRD: 69 ML/MIN/1.73SQ M
GFR SERPL CREATININE-BSD FRML MDRD: 71 ML/MIN/1.73SQ M
GLUCOSE P FAST SERPL-MCNC: 105 MG/DL (ref 65–99)
GLUCOSE SERPL-MCNC: 105 MG/DL (ref 65–140)
GLUCOSE SERPL-MCNC: 110 MG/DL (ref 65–140)
GLUCOSE SERPL-MCNC: 99 MG/DL (ref 65–140)
GLUCOSE UR STRIP-MCNC: NEGATIVE MG/DL
HCT VFR BLD AUTO: 34.3 % (ref 36.5–49.3)
HGB BLD-MCNC: 11.9 G/DL (ref 12–17)
HGB UR QL STRIP.AUTO: NEGATIVE
INR PPP: 1.18 (ref 0.84–1.19)
KETONES UR STRIP-MCNC: NEGATIVE MG/DL
LACTATE SERPL-SCNC: 2.4 MMOL/L (ref 0.5–2)
LACTATE SERPL-SCNC: 3 MMOL/L (ref 0.5–2)
LDH SERPL-CCNC: 195 U/L (ref 81–234)
LEUKOCYTE ESTERASE UR QL STRIP: ABNORMAL
LYMPHOCYTES NFR BLD: 10 % (ref 14–44)
MAGNESIUM SERPL-MCNC: 0.7 MG/DL (ref 1.6–2.6)
MAGNESIUM SERPL-MCNC: 2 MG/DL (ref 1.6–2.6)
MAGNESIUM SERPL-MCNC: 2.2 MG/DL (ref 1.6–2.6)
MCH RBC QN AUTO: 31.2 PG (ref 26.8–34.3)
MCHC RBC AUTO-ENTMCNC: 34.7 G/DL (ref 31.4–37.4)
MCV RBC AUTO: 90 FL (ref 82–98)
MONOCYTES NFR BLD AUTO: 4 % (ref 4–12)
NEUTS BAND NFR BLD MANUAL: 2 THOUSAND/UL
NEUTS SEG NFR BLD AUTO: 84 % (ref 45–77)
NITRITE UR QL STRIP: NEGATIVE
NON-SQ EPI CELLS URNS QL MICRO: ABNORMAL /HPF
OSMOLALITY UR/SERPL-RTO: 271 MMOL/KG (ref 282–298)
OSMOLALITY UR: 204 MMOL/KG
P AXIS: 43 DEGREES
PH UR STRIP.AUTO: 5.5 [PH]
PLATELET # BLD AUTO: 325 THOUSANDS/UL (ref 149–390)
PLATELET # BLD AUTO: 333 THOUSANDS/UL (ref 149–390)
PMV BLD AUTO: 10.5 FL (ref 8.9–12.7)
PMV BLD AUTO: 10.9 FL (ref 8.9–12.7)
POTASSIUM SERPL-SCNC: 2.4 MMOL/L (ref 3.5–5.3)
POTASSIUM SERPL-SCNC: 2.9 MMOL/L (ref 3.5–5.3)
POTASSIUM SERPL-SCNC: 3 MMOL/L (ref 3.5–5.3)
PR INTERVAL: 142 MS
PROT SERPL-MCNC: 6.7 G/DL (ref 6.4–8.4)
PROT SERPL-MCNC: 6.8 G/DL (ref 6.4–8.4)
PROT SERPL-MCNC: 7.8 G/DL (ref 6.4–8.4)
PROT UR STRIP-MCNC: ABNORMAL MG/DL
PROTHROMBIN TIME: 15.2 SECONDS (ref 11.6–14.5)
QRS AXIS: -30 DEGREES
QRSD INTERVAL: 90 MS
QT INTERVAL: 368 MS
QTC INTERVAL: 468 MS
RBC # BLD AUTO: 3.81 MILLION/UL (ref 3.88–5.62)
RBC #/AREA URNS AUTO: ABNORMAL /HPF
RETICS # AUTO: NORMAL 10*3/UL (ref 14356–105094)
RETICS # CALC: 1.5 % (ref 0.37–1.87)
SODIUM 24H UR-SCNC: 8 MOL/L
SODIUM SERPL-SCNC: 125 MMOL/L (ref 135–147)
SODIUM SERPL-SCNC: 126 MMOL/L (ref 135–147)
SODIUM SERPL-SCNC: 130 MMOL/L (ref 135–147)
SP GR UR STRIP.AUTO: 1.01 (ref 1–1.03)
T WAVE AXIS: 62 DEGREES
TOTAL CELLS COUNTED SPEC: 100
TSH SERPL DL<=0.05 MIU/L-ACNC: 3.26 UIU/ML (ref 0.45–4.5)
URATE SERPL-MCNC: 5.9 MG/DL (ref 3.5–8.5)
UROBILINOGEN UR STRIP-ACNC: <2 MG/DL
VENTRICULAR RATE: 97 BPM
WBC # BLD AUTO: 15.14 THOUSAND/UL (ref 4.31–10.16)
WBC #/AREA URNS AUTO: ABNORMAL /HPF

## 2023-01-26 RX ORDER — POTASSIUM CHLORIDE 14.9 MG/ML
20 INJECTION INTRAVENOUS ONCE
Status: COMPLETED | OUTPATIENT
Start: 2023-01-26 | End: 2023-01-26

## 2023-01-26 RX ORDER — LANOLIN ALCOHOL/MO/W.PET/CERES
400 CREAM (GRAM) TOPICAL DAILY
Status: DISCONTINUED | OUTPATIENT
Start: 2023-01-27 | End: 2023-01-30 | Stop reason: HOSPADM

## 2023-01-26 RX ORDER — CEFAZOLIN SODIUM 1 G/50ML
1000 SOLUTION INTRAVENOUS ONCE
Status: DISCONTINUED | OUTPATIENT
Start: 2023-01-26 | End: 2023-01-26

## 2023-01-26 RX ORDER — CALCIUM CARBONATE 200(500)MG
500 TABLET,CHEWABLE ORAL DAILY
Status: DISCONTINUED | OUTPATIENT
Start: 2023-01-26 | End: 2023-01-30 | Stop reason: HOSPADM

## 2023-01-26 RX ORDER — POTASSIUM CHLORIDE 20 MEQ/1
40 TABLET, EXTENDED RELEASE ORAL ONCE
Status: COMPLETED | OUTPATIENT
Start: 2023-01-26 | End: 2023-01-26

## 2023-01-26 RX ORDER — LANOLIN ALCOHOL/MO/W.PET/CERES
3 CREAM (GRAM) TOPICAL
Status: DISCONTINUED | OUTPATIENT
Start: 2023-01-26 | End: 2023-01-30 | Stop reason: HOSPADM

## 2023-01-26 RX ORDER — VANCOMYCIN HYDROCHLORIDE 500 MG/100ML
500 INJECTION, SOLUTION INTRAVENOUS EVERY 12 HOURS
Status: DISCONTINUED | OUTPATIENT
Start: 2023-01-26 | End: 2023-01-27

## 2023-01-26 RX ORDER — METRONIDAZOLE 500 MG/100ML
500 INJECTION, SOLUTION INTRAVENOUS ONCE
Status: DISCONTINUED | OUTPATIENT
Start: 2023-01-26 | End: 2023-01-26

## 2023-01-26 RX ORDER — CEFAZOLIN SODIUM 1 G/50ML
1000 SOLUTION INTRAVENOUS EVERY 8 HOURS
Status: DISCONTINUED | OUTPATIENT
Start: 2023-01-26 | End: 2023-01-26

## 2023-01-26 RX ORDER — SODIUM CHLORIDE 9 MG/ML
75 INJECTION, SOLUTION INTRAVENOUS CONTINUOUS
Status: DISCONTINUED | OUTPATIENT
Start: 2023-01-26 | End: 2023-01-26

## 2023-01-26 RX ORDER — BUPROPION HYDROCHLORIDE 150 MG/1
150 TABLET ORAL DAILY
Status: DISCONTINUED | OUTPATIENT
Start: 2023-01-26 | End: 2023-01-30 | Stop reason: HOSPADM

## 2023-01-26 RX ORDER — CARVEDILOL 3.12 MG/1
3.12 TABLET ORAL 2 TIMES DAILY WITH MEALS
Status: DISCONTINUED | OUTPATIENT
Start: 2023-01-26 | End: 2023-01-30 | Stop reason: HOSPADM

## 2023-01-26 RX ORDER — PRAVASTATIN SODIUM 80 MG/1
80 TABLET ORAL
Status: DISCONTINUED | OUTPATIENT
Start: 2023-01-26 | End: 2023-01-30 | Stop reason: HOSPADM

## 2023-01-26 RX ORDER — AMLODIPINE BESYLATE 5 MG/1
5 TABLET ORAL DAILY
Status: DISCONTINUED | OUTPATIENT
Start: 2023-01-26 | End: 2023-01-30 | Stop reason: HOSPADM

## 2023-01-26 RX ORDER — SODIUM CHLORIDE 9 MG/ML
100 INJECTION, SOLUTION INTRAVENOUS CONTINUOUS
Status: DISCONTINUED | OUTPATIENT
Start: 2023-01-26 | End: 2023-01-27

## 2023-01-26 RX ORDER — PANTOPRAZOLE SODIUM 40 MG/1
40 TABLET, DELAYED RELEASE ORAL
Status: DISCONTINUED | OUTPATIENT
Start: 2023-01-26 | End: 2023-01-26

## 2023-01-26 RX ORDER — MIRTAZAPINE 15 MG/1
15 TABLET, FILM COATED ORAL
Status: DISCONTINUED | OUTPATIENT
Start: 2023-01-26 | End: 2023-01-30 | Stop reason: HOSPADM

## 2023-01-26 RX ORDER — POTASSIUM CHLORIDE 20 MEQ/1
20 TABLET, EXTENDED RELEASE ORAL 2 TIMES DAILY
Status: DISCONTINUED | OUTPATIENT
Start: 2023-01-27 | End: 2023-01-30 | Stop reason: HOSPADM

## 2023-01-26 RX ORDER — FOLIC ACID 1 MG/1
1 TABLET ORAL DAILY
Status: DISCONTINUED | OUTPATIENT
Start: 2023-01-26 | End: 2023-01-30 | Stop reason: HOSPADM

## 2023-01-26 RX ORDER — MAGNESIUM SULFATE HEPTAHYDRATE 40 MG/ML
4 INJECTION, SOLUTION INTRAVENOUS ONCE
Status: COMPLETED | OUTPATIENT
Start: 2023-01-26 | End: 2023-01-27

## 2023-01-26 RX ORDER — HEPARIN SODIUM 5000 [USP'U]/ML
5000 INJECTION, SOLUTION INTRAVENOUS; SUBCUTANEOUS EVERY 8 HOURS SCHEDULED
Status: DISCONTINUED | OUTPATIENT
Start: 2023-01-26 | End: 2023-01-30 | Stop reason: HOSPADM

## 2023-01-26 RX ORDER — LEVOTHYROXINE SODIUM 0.05 MG/1
50 TABLET ORAL
Status: DISCONTINUED | OUTPATIENT
Start: 2023-01-26 | End: 2023-01-30 | Stop reason: HOSPADM

## 2023-01-26 RX ORDER — LANOLIN ALCOHOL/MO/W.PET/CERES
250 CREAM (GRAM) TOPICAL DAILY
Status: DISCONTINUED | OUTPATIENT
Start: 2023-01-26 | End: 2023-01-30 | Stop reason: HOSPADM

## 2023-01-26 RX ORDER — ACETAMINOPHEN 325 MG/1
650 TABLET ORAL EVERY 6 HOURS PRN
Status: DISCONTINUED | OUTPATIENT
Start: 2023-01-26 | End: 2023-01-30 | Stop reason: HOSPADM

## 2023-01-26 RX ADMIN — VANCOMYCIN HYDROCHLORIDE 500 MG: 500 INJECTION, SOLUTION INTRAVENOUS at 22:00

## 2023-01-26 RX ADMIN — SODIUM CHLORIDE 100 ML/HR: 0.9 INJECTION, SOLUTION INTRAVENOUS at 23:46

## 2023-01-26 RX ADMIN — THIAMINE HCL TAB 100 MG 250 MG: 100 TAB at 14:18

## 2023-01-26 RX ADMIN — SODIUM CHLORIDE 100 ML/HR: 0.9 INJECTION, SOLUTION INTRAVENOUS at 14:18

## 2023-01-26 RX ADMIN — PANTOPRAZOLE SODIUM 40 MG: 40 TABLET, DELAYED RELEASE ORAL at 12:05

## 2023-01-26 RX ADMIN — MELATONIN 3 MG: at 23:46

## 2023-01-26 RX ADMIN — CARVEDILOL 3.12 MG: 3.12 TABLET, FILM COATED ORAL at 17:12

## 2023-01-26 RX ADMIN — POTASSIUM CHLORIDE 40 MEQ: 1500 TABLET, EXTENDED RELEASE ORAL at 12:03

## 2023-01-26 RX ADMIN — POTASSIUM CHLORIDE 40 MEQ: 1500 TABLET, EXTENDED RELEASE ORAL at 21:31

## 2023-01-26 RX ADMIN — SODIUM CHLORIDE 75 ML/HR: 0.9 INJECTION, SOLUTION INTRAVENOUS at 07:42

## 2023-01-26 RX ADMIN — HEPARIN SODIUM 5000 UNITS: 5000 INJECTION INTRAVENOUS; SUBCUTANEOUS at 21:32

## 2023-01-26 RX ADMIN — FOLIC ACID 1 MG: 1 TABLET ORAL at 12:04

## 2023-01-26 RX ADMIN — CALCIUM CARBONATE (ANTACID) CHEW TAB 500 MG 500 MG: 500 CHEW TAB at 12:04

## 2023-01-26 RX ADMIN — PRAVASTATIN SODIUM 80 MG: 80 TABLET ORAL at 17:12

## 2023-01-26 RX ADMIN — MIRTAZAPINE 15 MG: 15 TABLET, FILM COATED ORAL at 21:31

## 2023-01-26 RX ADMIN — LEVOTHYROXINE SODIUM 50 MCG: 50 TABLET ORAL at 12:04

## 2023-01-26 RX ADMIN — POTASSIUM CHLORIDE 20 MEQ: 14.9 INJECTION, SOLUTION INTRAVENOUS at 19:35

## 2023-01-26 RX ADMIN — POTASSIUM CHLORIDE 40 MEQ: 1500 TABLET, EXTENDED RELEASE ORAL at 14:17

## 2023-01-26 RX ADMIN — CEFAZOLIN SODIUM 1000 MG: 1 SOLUTION INTRAVENOUS at 12:03

## 2023-01-26 RX ADMIN — MAGNESIUM SULFATE HEPTAHYDRATE 4 G: 40 INJECTION, SOLUTION INTRAVENOUS at 12:44

## 2023-01-26 RX ADMIN — AMLODIPINE BESYLATE 5 MG: 5 TABLET ORAL at 12:04

## 2023-01-26 RX ADMIN — POTASSIUM CHLORIDE 40 MEQ: 1500 TABLET, EXTENDED RELEASE ORAL at 19:36

## 2023-01-26 NOTE — ASSESSMENT & PLAN NOTE
Patient presenting to outpatient microwave ablation appointment and found to have concern for cellulitis of R wrist  Patient does not recall any trauma or injury, but memory has been poor recently  Leukocytosis on presentation  Uric acid 5 9  CRP 46  ESR 68  Blood cultures negative at 48 hours  X-ray of wrist and forearm show no intraosseous abnormalities and just soft tissue swelling of the wrist and hand  US of R wrist - no evidence of DVT or abnormalities    Differential: Pseudogout versus subacute cellulitis    Orthopedic surgery attempted to aspirate right wrist joint without success  Discussed with rheumatology who reports patient can be trialed on prednisone 20 mg daily for pain control  Picture does appear less likely to infectious at this point      Plan:  · Treatment with IV vanco 500mg q12  · Follow up blood cultures - no growth at 48 hours  · Pain control with tylenol 650 mg PRN q 6 hours  · Continue prednisone 20 mg daily, ot still reporting significant pain and states "its too soon to tell if the steroid is helping"  · MRSA swab  · Consult ID and rheumatology, appreciate recommendations

## 2023-01-26 NOTE — ASSESSMENT & PLAN NOTE
Patient with history of hypomagnesemia, on supplementation  Mg on presentation low at 0 7  ECG sinus rhythm with PACs    Medium 1 4 this morning      Plan:  · IV mag 2 g x 1 today  · Continue daily Mg supplementation

## 2023-01-26 NOTE — ASSESSMENT & PLAN NOTE
Results from last 7 days   Lab Units 01/29/23  0627 01/28/23  0516 01/27/23  1500   SODIUM mmol/L 140 138 135   POTASSIUM mmol/L 4 5 4 1 3 8   CHLORIDE mmol/L 111* 105 100   CO2 mmol/L 23 28 29   BUN mg/dL 17 12 13   CREATININE mg/dL 0 80 0 86 0 86   CALCIUM mg/dL 8 1* 8 1* 7 5*         Patient presenting with Cr 1 28, up from baseline 0 70-0 80  In setting of poor PO intake and lack of self care     Creatinine now at baseline    Plan:  · Resume home amiloride  · Avoid nephrotoxic agents  · Follow Cr

## 2023-01-26 NOTE — ASSESSMENT & PLAN NOTE
Results from last 7 days   Lab Units 01/29/23  0627 01/28/23  0516 01/27/23  1500   SODIUM mmol/L 140 138 135   POTASSIUM mmol/L 4 5 4 1 3 8   CHLORIDE mmol/L 111* 105 100   CO2 mmol/L 23 28 29   BUN mg/dL 17 12 13   CREATININE mg/dL 0 80 0 86 0 86   CALCIUM mg/dL 8 1* 8 1* 7 5*     Patient presenting with low Na, K and Cl  Likely in setting of PO intake and Crohn's disease     Hypokalemia -resolved  Hypomagnesemia - repleted    Plan:  · Replete electrolytes as needed   · Tele monitoring  · Encourage better PO intake  · Nutrition consult

## 2023-01-26 NOTE — ASSESSMENT & PLAN NOTE
Patient with history of hypocalcemia, likely due to poor PO intake   Corrected Ca 8 7    Plan:  · Tums 500 mg  · Follow up albumin for corrected Ca

## 2023-01-26 NOTE — ASSESSMENT & PLAN NOTE
Patient with history of HTN    Blood Pressure: 104/62     Plan:  · Continue home dose of amlodipine 5 mg and carvedilol 3 125 mg daily  · Resume amiloride 5 mg daily  · Monitor BP

## 2023-01-26 NOTE — ASSESSMENT & PLAN NOTE
Patient with history of subclinical hypothyroidism   Last thyroid levels in 11/11/22:  TSH 16 477 and T4 1 06  Recheck and TSH 3 260     Plan:  · Continue home dose levothyroxine 50 mcg

## 2023-01-26 NOTE — ASSESSMENT & PLAN NOTE
Patient with history of alcohol use disorder  Per daughter, has not had any alcohol since last hospital admission    Plan:  · Continue thiamine and folic acid supplementation

## 2023-01-26 NOTE — PROGRESS NOTES
INTERNAL MEDICINE RESIDENCY SENIOR ADMISSION NOTE     Name: Tramaine Gagnon   Age & Sex: 79 y o  male   MRN: [id number]  Unit/Bed#: PPHP 9 OVR   Encounter: [id number]  Primary Care Provider: Xiomara Ling MD    Admit to team: SOD Team B     Patient seen and examined  Reviewed H&P per Cecelia Zavala MS4   Agree with the assessment and plan with any exception/addition as noted below:    Principal Problem:    Cellulitis of right hand  Active Problems:    Essential hypertension    Crohn disease ([email or web address] )    Hyponatremia    Acute kidney injury ([email or web address] )    Colostomy in place ([email or web address] )    Severe protein-calorie malnutrition ([place] [address] )    Hypocalcemia    Hypomagnesemia    Hypokalemia    Gastroesophageal reflux disease without esophagitis    Chronic obstructive pulmonary disease (HCC)    Dysthymic disorder    HCC (hepatocellular carcinoma) (HCC)    Electrolyte abnormality    History of alcohol use disorder    Anemia    Subclinical hypothyroidism      Kraig Avila is a 78 yo M with PMH of [place] [address]  undergoing microwave ablation, Crohn's disease with colostomy, COPD, HTN and severe protein-calorie malnutrition who was originally going to undergo microwave ablation for liver lesion today with IR  When patient arrived, he was noted to have severe pain and swelling of the right wrist and hand, with elevated WBC and low K  Patient's daughter also shared concern for self-care at home  The procedure was cancelled and admission was requested to Medicine service  Patient has had pain and swelling of the right wrist and hand that has progressively traveled up the arm  He has had no fever or chills and he denies any other symptomatic complaints  He reports taking his medications as prescribed but not today  The medical student with the admitting team reached out to patient's daughter via telephone and she reports feeling that over the last 2 weeks, patient has been having waxing and waning mentation   She is concerned that he is not taking care of himself  She is not sure if patient has been taking his medications  VS on presentation T 98 5, /86, HR 88, RR 18  Only BMP, CBC, and PT-INR checked with IR, which were remarkable for WBC 15 14, Hgb 11 9 (baseline 9's), Na 126, K 2 4, CO2 30, Cr 1 28, Ca 7 4, GFR 57  PE: AOx2-3, dry MM, RRR, CTAB, abd soft NT/ND, erythema, swelling, and tenderness of the right forearm and hand, no edema of the BLE    1  Cellulitis, suspected  Pain and swelling x3 days of the right hand and wrist  WBC 15 14, afebrile  · IV Ancef 1g q8h  · Continue to monitor WBC and fever curve  · XR and doppler to rule out fracture and DVT respectively  · Check Uric acid to r/o gout    2  Electrolyte abnormalities  Na 126, K 2 4, Cl 86, CO2 30, Ca 7 4  Acute on chronic given patient's Crohn's disease, with more acute concerns for self care  Possible that patient is not taking medications  · Na - hyponatremic hypovolemic, suspect poor PO intake  · Check Uosm, Sosm, Winnie  · IVF NS 75 cc/h  · K - patient is on amiloride and Kdur supplementation at home; Also on mag oxide and mag chloride; question possible medication nonadherence  · Kdur 40 mEq x2  · Continue home Kdur supplementation 20 mEq BID starting tomorrow  · Having to hold amiloride 2/2 UTI  · Checked Mg - 0 7 --> replete IV Mg 4; continue magoxide 400 mg daily  · Holding home PPI for hypomagnesemia  · Ca - not corrected as no albumin checked  · Check albumin  · Continue home TUMs  · EKG checked for K 2 4 -- NSR with PACs  · Monitor on telemetry  · Trend BMP q6h  · IVF     3  LUCILLE  Suspect poor PO intake  Cr 1 28 (baseline around 0 80)  · IVF NS 75 cc/h  · Avoid nephrotoxic agents  · Holding home amiloride  · Trend Cr    4  [address]   Diagnosed May 2020  Undergoing microwave ablation with IR  Was supposed to have this done today but noted to have concerns as mentioned above  Ablation cancelled and patient admitted    · Outpatient Surg Onc and IR follow up  · Check hepatic function panel    5  Crohn disease s/p colostomy  · Consult nutrition  · Ostomy care    6  COPD  · Continue home anoro ellipta    7  GERD  · Holding home PPI for hypomagnesemia    8  Anemia  Hgb on admission 11 9 (baseline in the 9s)  Likely hemoconcentrated  In setting of Crohn's, HCC, previous alcohol abuse, chronic disease  · Continue thiamine, folate  · Trend Hgb and transfuse if <7    9  Severe protein-calorie malnutrition  · Nutrition consult  · PT/OT/CM  · Concerns for self care, may require rehab vs SNF    10  Dysthymic disorder  · Continue wellbutrin and remeron    11  Subclinical hypothyroidism  Last TSH in November was 16 477  Now WNL    · Continue home levothyroxine    Code Status: Level 1 - Full Code  Admission Status: INPATIENT   Disposition: Patient requires Med/Surg with Telemetry  Expected Length of Stay: >2 midnights

## 2023-01-26 NOTE — ASSESSMENT & PLAN NOTE
Patient with history of protein calorie malnutrition in setting of hepatocellular carcinoma  Presents with poor self care and PO intake     Plan:  · Nutrition consult - regular diet and ensure plus high protein TID                BMI Findings: Body mass index is 15 7 kg/m²

## 2023-01-26 NOTE — ASSESSMENT & PLAN NOTE
Patient with history or GERD    Plan:  · Continue Tums 500 mg    · Continue pantoprazole while inpatient

## 2023-01-26 NOTE — H&P
INTERNAL MEDICINE RESIDENCY ADMISSION H&P     Name: Shalom Stearns   Age & Sex: 79 y o  male   MRN: [id number]  [facility]   Encounter: [id number]  Primary Care Provider: Rafael Diana MD    Code Status: Level 1 - Full Code  Admission Status: INPATIENT   Disposition: Patient requires Med/Surg with Telemetry    Admit to team: SOD Team B     ASSESSMENT/PLAN     Principal Problem:    Cellulitis  Active Problems:    Essential hypertension    Crohn disease ([place] [address] )    Hyponatremia    Acute kidney injury ([place] [address] )    Colostomy in place ([place] [address] )    Severe protein-calorie malnutrition ([place] [address] )    Hypocalcemia    Hypomagnesemia    Hypokalemia    Gastroesophageal reflux disease without esophagitis    Chronic obstructive pulmonary disease (HCC)    Dysthymic disorder    HCC (hepatocellular carcinoma) ([place] [address] )    Electrolyte abnormality    History of alcohol use disorder    Anemia    Subclinical hypothyroidism      Subclinical hypothyroidism  Assessment & Plan  Patient with history of subclinical hypothyroidism   Last thyroid levels in 11/11/22:  TSH 16 477 and T4 1 06  Recheck and TSH 3 260     Plan:  · Continue home dose levothyroxine 50 mcg    Anemia  Assessment & Plan  Patient with history of anemia, baseline Hgb around 9 5  Presented with Hgb 11 9, likely from concentration in setting of dehydration  Past workup has shown anemia of chronic disease    Plan:  · Monitor Hgb   · Continue home doses folic acid, thiamine  · Transfuse if less than 7      History of alcohol use disorder  Assessment & Plan  Patient with history of alcohol use disorder  Per daughter, has not had any alcohol since last hospital admission    Plan:  · Continue thiamine supplementation    Electrolyte abnormality  Assessment & Plan  Patient presenting with low Na, K and Cl  Likely in setting of PO intake     Plan:  · IVF  · Replete electrolytes as needed   · Tele monitoring  · Encourage better PO intake  · Nutrition consult     [place] [address]  (hepatocellular carcinoma) [facility])  Assessment & Plan  Patient with history of [facility] [address]  who presented for microwave ablation   Liver mass first discovered in May, 2020 and biopsy confirmed diagnosis  Received ablation with Dr Teresa Olivares in July 2020  Surveillance MRI found new masses and referred to IR for ablation   Last MRI in December 2022    Plan:  · Follow up hepatic function panel  · Holding procedure in setting of electrolyte abnormalities and infection workup   · Consider repeat imaging if new symptoms present     Dysthymic disorder  Assessment & Plan  Patient with history of dysthymic disorder  Per daughter, patient has been more withdrawn and with flat affect recently     Plan:  · Continue Wellbutrin 150 mg and Remeron 15 mg daily     [facility] )  Assessment & Plan  Patient with history of COPD    Plan:  · Continue with umeclidinium-vilanterol daily     Gastroesophageal reflux disease without esophagitis  Assessment & Plan  Patient with history or GERD    Plan:  · Continue Tums 500 mg      Hypokalemia  Assessment & Plan  Patient presented for microwave ablation appointment and found to have K 2 4   Prescribed K supplements outpatient   Poor PO intake for past 2 weeks and lack of self care  EKG sinus with PVCs     Plan:  · K 40meq twice 1/26/23   · Follow BMP at 4 PM   · Tele monitoring   · Continue with daily K supplementation 10meq BID         Hypomagnesemia  Assessment & Plan  Patient with history of hypomagnesemia, on supplementation  Mg on presentation low at 0 7    Plan:  · 4 g Mg supplementation   · Follow up Mg at 4 PM   · Tele monitoring   · Continue daily Mg supplementation     Hypocalcemia  Assessment & Plan  Patient with history of hypocalcemia, likely due to poor PO intake     Plan:  · Tums 500 mg  · Follow up albumin for corrected Ca      Severe protein-calorie malnutrition ([facility] )  Assessment & Plan  Patient with history of protein calorie malnutrition in setting of hepatocellular carcinoma  Presents with poor self care and PO intake     Plan:  · Nutrition consult                      BMI Findings: Body mass index is 15 7 kg/m²  Colostomy in place [facility])  Assessment & Plan  Patient with colostomy due to Crohn's disease    Plan:  · Ostomy care    Acute kidney injury [facility])  Assessment & Plan  Patient presenting with Cr 1 28, up from baseline 0 70-0 80  In setting of poor PO intake and lack of self care     Plan:  · IVF  · Avoid nephrotoxic agents  · Follow Cr    Hyponatremia  Assessment & Plan  Patient found to have Na 126 during appointment for microwave ablation   Patient with poor PO intake for the past 2 weeks  Started IVF 0 9% saline 75 cc/hr     Plan:  · Likely due to poor PO intake   · Continue 0 9% saline 75 cc/hr   · Follow up on serum osmal, urine osmal, urine Na  · Follow up BMP at 4 PM and q 6 hour BMP    Crohn disease ([facility] [address] )  Assessment & Plan  Patient with history of Crohn's disease s/p ostomy     Plan:  · Ostomy care    Essential hypertension  Assessment & Plan  Patient with history of HTN    Plan:  · Continue home dose of amlodipine 5 mg carvedilol 3 125 mg daily  · Monitor BP     * Cellulitis  Assessment & Plan  Patient presenting to outpatient microwave ablation appointment and found to have concern for cellulitis of R wrist  Patient does not recall any trauma or injury, but memory has been poor recently    WBC 15 11   Afebrile     Plan:  · Treatment with IV cefazolin 1 g q 8 hours   · Follow up doppler US of R wrist to rule out thrombosis   · Follow up x-ray   · Follow up blood cultures  · Follow WBC   · Pain control with tylenol 650 mg PRN q 6 hours      VTE Pharmacologic Prophylaxis: Heparin  VTE Mechanical Prophylaxis: sequential compression device    CHIEF COMPLAINT   No chief complaint on file       HISTORY OF PRESENT ILLNESS     Patient is a 78 y/o male with past medical history of hepatocellular carcinoma, HTN, subclinical hypothyroidism, and dysthymia who presented to [facility] on 1/26/23 for microwave ablation treatment for hepatocellular carcinoma  While there, patient was found to have concern for cellulitis of R wrist and lab work showed Na 126, K 2 4, Cl 26, CO2 30, Cr 1 28 (baseline 0 7-0 8) with increased WBC at 15  11  Per patient's daughter, he has not been taking care of himself of the past 2 weeks with flat mood and abnormal behavior  Due to cellulitis and lab abnormalities patient to be admitted for treatment  Patient states that his R wrist began to be painful with swelling and warmth that 3 days ago  Pain is worsened with movement and it has been spreading up his arm  He has not had any trauma or cuts/scrapes to his wrist or arm recently and has never had anything like this before  Denies history of clotting or gout  Has not had recent fevers, fatigue, n /v  He does say he has not been eating much recently due to poor appetite  I spoke to his daughter who confirms that the wrist pain started 3 days ago and has been spreading and getting worse  For the past 2 weeks however, she noticed that he has had increasingly more frequent times when he is confused and staring off into space  He will not remember certain conversations  She also notes that his skin has been changing colors, he seems to be losing weight, and she worries that he might be giving up  REVIEW OF SYSTEMS     Review of Systems   Constitutional: Positive for appetite change  Negative for chills, fatigue and fever  Respiratory: Negative for cough and shortness of breath  Cardiovascular: Negative for chest pain, palpitations and leg swelling  Gastrointestinal: Negative for abdominal pain, diarrhea, nausea and vomiting  Genitourinary: Negative for difficulty urinating and dysuria  Musculoskeletal: Positive for arthralgias and joint swelling  Negative for back pain  Skin: Positive for color change  Neurological: Negative for weakness and headaches       OBJECTIVE Vitals:    23 0749   BP: 151/86   BP Location: Left arm   Pulse: 88   Resp: 18   Temp: 98 5 °F (36 9 °C)   TempSrc: Temporal   SpO2: 97%   Weight: 48 2 kg (106 lb 4 8 oz)   Height: 5' 9" (1 753 m)      Temperature:   Temp (24hrs), Av 5 °F (36 9 °C), Min:98 5 °F (36 9 °C), Max:98 5 °F (36 9 °C)    Temperature: 98 5 °F (36 9 °C)  Intake & Output:  I/O     None        Weights:   IBW (Ideal Body Weight): 70 7 kg    Body mass index is 15 7 kg/m²  Weight (last 2 days)     Date/Time Weight    23 0749 48 2 (106 3)        Physical Exam  Constitutional:       General: He is not in acute distress  Appearance: Normal appearance  He is cachectic  He is not toxic-appearing  HENT:      Head: Normocephalic and atraumatic  Eyes:      General: No scleral icterus  Cardiovascular:      Rate and Rhythm: Normal rate and regular rhythm  Pulses: Normal pulses  Heart sounds: Normal heart sounds  No murmur heard  No friction rub  No gallop  Pulmonary:      Effort: Pulmonary effort is normal  No respiratory distress  Breath sounds: Normal breath sounds  No wheezing or rales  Abdominal:      General: Abdomen is flat  Bowel sounds are decreased  There is no distension  Palpations: Abdomen is soft  Tenderness: There is no abdominal tenderness  There is no right CVA tenderness or left CVA tenderness  Comments: Ostomy in place: clean and dry   Musculoskeletal:         General: Swelling and tenderness present  Right wrist: Swelling and tenderness present  Decreased range of motion  Left wrist: Normal       Right lower leg: No edema  Left lower leg: No edema  Comments: R hand a wrist with swelling, erythema, tenderness up to distal suhas   Tender to touch and movement  Skin:     General: Skin is warm and dry  Coloration: Skin is jaundiced  Neurological:      General: No focal deficit present  Mental Status: He is alert        Comments: JASON x2-3  Oriented to person, place, and president  Could not remember day or month     Psychiatric:         Mood and Affect: Mood normal          Behavior: Behavior normal        PAST MEDICAL HISTORY     Past Medical History:   Diagnosis Date   • LUCILLE (acute kidney injury) ([facility] ) 6/28/2017   • Blindness of left eye     Since birth   • Cancer [facility])     liver   • Cataract    • Colon polyp    • Colostomy in place [facility]) 6/29/2017   • COPD (chronic obstructive pulmonary disease) (HCC)    • Crohn disease ([facility] )    • Emphysema of lung (HCC)    • Emphysema/COPD (HCC)    • Essential hypertension    • GERD (gastroesophageal reflux disease)    • Hypertension    • Hypokalemia 6/28/2017   • Hypomagnesemia 6/29/2017   • Hyponatremia 6/28/2017   • Kidney stone    • Osteomyelitis ([place] [id number] )    • Pneumonia      PAST SURGICAL HISTORY     Past Surgical History:   Procedure Laterality Date   • CATARACT EXTRACTION Bilateral    • CHOLECYSTECTOMY     • COLECTOMY      10 inchs of ileum   • COLONOSCOPY N/A 6/30/2017    Procedure: COLONOSCOPY;  Surgeon: Elsa Max MD;  Location: [facility];   Service: Gastroenterology   • COLOSTOMY     • FINGER AMPUTATION     • FINGER SURGERY Left    • IR BIOPSY LIVER MASS  6/8/2020   • IR MICROWAVE ABLATION  9/21/2022   • LITHOTRIPSY     • LIVER LOBECTOMY N/A 7/15/2020    Procedure: LIVER ABLATION, INTRAOPERATIVE U/S LIVER;  Surgeon: Twila Aranda MD;  Location: [facility];  Service: Surgical Oncology     SOCIAL & FAMILY HISTORY     Social History     Substance and Sexual Activity   Alcohol Use Not Currently   • Alcohol/week: 59 0 standard drinks   • Types: 49 Cans of beer, 10 Shots of liquor per week     Substance and Sexual Activity   Alcohol Use Not Currently   • Alcohol/week: 59 0 standard drinks   • Types: 49 Cans of beer, 10 Shots of liquor per week        Substance and Sexual Activity   Drug Use No     Social History     Tobacco Use   Smoking Status Former   • Packs/day: 1 50   • Years: 51 00   • Pack years: 76 50   • Types: Cigarettes   • Start date:    • Quit date: 2022   • Years since quittin 0   Smokeless Tobacco Never     Family History   Problem Relation Age of Onset   • Hypertension Father    • Heart disease Sister      LABORATORY DATA     Labs: I have personally reviewed pertinent reports  Results from last 7 days   Lab Units 23  0742   WBC Thousand/uL 15 14*   HEMOGLOBIN g/dL 11 9*   HEMATOCRIT % 34 3*   PLATELETS Thousands/uL 325      Results from last 7 days   Lab Units 23  0742   POTASSIUM mmol/L 2 4*   CHLORIDE mmol/L 86*   CO2 mmol/L 30   BUN mg/dL 21   CREATININE mg/dL 1 28   CALCIUM mg/dL 7 4*     Results from last 7 days   Lab Units 23  0742   MAGNESIUM mg/dL 0 7*          Results from last 7 days   Lab Units 23  0742   INR  1 18             Micro:  Lab Results   Component Value Date    BLOODCX No Growth After 5 Days  09/10/2020    BLOODCX No Growth After 5 Days  09/10/2020    BLOODCX No Growth After 5 Days  2020    WOUNDCULT 1+ Growth of Enterobacter cloacae complex (A) 2020    SPUTUMCULTUR 2+ Growth of Streptococcus pneumoniae (AA) 2020    SPUTUMCULTUR 2+ Growth of 2020     IMAGING & DIAGNOSTIC TESTS     Imaging: I have personally reviewed pertinent reports  MRI abdomen w wo contrast    Result Date: 2022  Impression: 1  Stable treatment zone in segment 5, unchanged since 2022, LR-TR nonviable  2   New treatment zone in segment 5, LR-TR nonviable  3   New lesion in segment 5/6, as described above  LI-RADS 5  The study was marked in [facility] for immediate notification  Workstation performed: [id number]     EKG, Pathology, and Other Studies: I have personally reviewed pertinent reports  ALLERGIES   No Known Allergies  MEDICATIONS PRIOR TO ARRIVAL     Prior to Admission medications    Medication Sig Start Date End Date Taking?  Authorizing Provider   AMILoride 5 mg tablet Take 1 tablet (5 mg total) by mouth daily 22  Yes Lisa Wheeler MD   amLODIPine (NORVASC) 5 mg tablet Take 1 tablet (5 mg total) by mouth daily 10/20/22  Yes Lisa Wheeler MD   buPROPion (WELLBUTRIN XL) 150 mg 24 hr tablet Take 1 tablet (150 mg total) by mouth daily 10/13/22  Yes Lisa Wheeler MD   calcium carbonate (TUMS) 500 mg chewable tablet Chew 1 tablet (500 mg total) daily 9/12/20  Yes Juanjo Jarrell PA-C   carvedilol (COREG) 3 125 mg tablet Take 1 tablet (3 125 mg total) by mouth 2 (two) times a day with meals 12/16/22  Yes Lisa Wheeler MD   folic acid (FOLVITE) 1 mg tablet Take 1 tablet (1 mg total) by mouth daily 12/16/22  Yes Lisa Wheeler MD   levothyroxine 50 mcg tablet Take 1 tablet (50 mcg total) by mouth daily in the early morning 12/16/22  Yes Torri Coleman MD   MAGNESIUM CHLORIDE PO Take 1,000 mg by mouth daily   Yes Historical Provider, MD   Magnesium Oxide 250 MG TABS Take 1 tablet (250 mg total) by mouth in the morning 11/22/22  Yes RAMANA Clemente   mirtazapine (REMERON) 15 mg tablet Take 1 tablet (15 mg total) by mouth daily at bedtime 12/16/22  Yes Lisa Wheeler MD   omeprazole (PriLOSEC) 20 mg delayed release capsule Take 1 capsule (20 mg total) by mouth daily 11/17/22  Yes Lisa Wheeler MD   potassium chloride (MICRO-K) 10 MEQ CR capsule Take 2 capsules (20 mEq total) by mouth 2 (two) times a day 1/5/23  Yes Lisa Wheeler MD   rosuvastatin (CRESTOR) 10 MG tablet Take 1 tablet (10 mg total) by mouth daily 10/13/22  Yes Lisa Wheeler MD   VIT B12-METHIONINE-INOS-CHOL IM Inject into a muscle every 30 (thirty) days   Yes Historical Provider, MD   ALPRAZolam Shelda Friday) 0 25 mg tablet Take 1 tablet (0 25 mg total) by mouth daily at bedtime as needed for anxiety 8/25/22 12/16/22  Vick Peabody, MD   thiamine 250 MG tablet Take 1 tablet (250 mg total) by mouth daily for 5 days Do not start before November 14, 2022 11/14/22 12/16/22  Soledad Knutson DO   umeclidinium-vilanterol (Anoro Ellipta) 62 5-25 MCG/INH inhaler Inhale 1 puff daily 9/6/22 12/16/22  Naya Grijalva MD   apixaban (Eliquis) 5 mg Take 2 tablets (10 mg total) by mouth 2 (two) times a day for 7 days, THEN 1 tablet (5 mg total) 2 (two) times a day for 23 days   11/7/22 11/10/22  Evelia Knutson,      MEDICATIONS ADMINISTERED IN LAST 24 HOURS     Medication Administration - last 24 hours from 01/25/2023 1209 to 01/26/2023 1209       Date/Time Order Dose Route Action Action by     01/26/2023 0742 EST sodium chloride 0 9 % infusion 75 mL/hr Intravenous [address] Stormy Frames, RN     01/26/2023 1203 EST ceFAZolin (ANCEF) IVPB (premix in dextrose) 1,000 mg 50 mL 1,000 mg Intravenous New Bag Stormy Frames, RN     01/26/2023 1203 EST potassium chloride (K-DUR,KLOR-CON) CR tablet 40 mEq 40 mEq Oral Given Stormy Frames, RN     01/26/2023 1204 EST amLODIPine (NORVASC) tablet 5 mg 5 mg Oral Given Stormy Frames, RN     01/26/2023 1204 EST calcium carbonate (TUMS) chewable tablet 500 mg 500 mg Oral Given Stormy Frames, RN     75/82/5591 8324 EST folic acid (FOLVITE) tablet 1 mg 1 mg Oral Given Stormy Frames, RN     01/26/2023 1204 EST levothyroxine tablet 50 mcg 50 mcg Oral Given Stormy Frames, RN     01/26/2023 1205 EST pantoprazole (PROTONIX) EC tablet 40 mg 40 mg Oral Given Stormy Frames, RN        CURRENT MEDICATIONS     Current Facility-Administered Medications   Medication Dose Route Frequency Provider Last Rate   • acetaminophen  650 mg Oral Q6H PRN Walter Marie MD     • amLODIPine  5 mg Oral Daily Walter Marie MD     • buPROPion  150 mg Oral Daily Walter Marie MD     • calcium carbonate  500 mg Oral Daily Walter Marie MD     • carvedilol  3 125 mg Oral BID With Meals Walter Marie MD     • cefazolin  1,000 mg Intravenous Q8H Walter Marie MD 1,000 mg (54/89/36 6749)   • folic acid  1 mg Oral Daily Walter Marie MD     • heparin (porcine)  5,000 Units Subcutaneous [facility] Walter Marie MD     • levothyroxine  50 mcg Oral Early Morning Katiana Lopez MD     • [START ON 1/27/2023] magnesium Oxide  400 mg Oral Daily Katiana Lopez MD     • magnesium sulfate  4 g Intravenous Once Katiana Lopez MD     • mirtazapine  15 mg Oral HS Katiana Lopez MD     • potassium chloride  40 mEq Oral Once Katiana Lopez MD     • pravastatin  80 mg Oral Daily With Ej Lo MD     • sodium chloride  75 mL/hr Intravenous Continuous Katiana Lopez MD     • thiamine  250 mg Oral Daily Katiana Lopez MD     • umeclidinium-vilanterol  1 puff Inhalation Daily Katiana Lopez MD       sodium chloride, 75 mL/hr      acetaminophen, 650 mg, Q6H PRN        Admission Time  I spent 1 hour admitting the patient  This involved direct patient contact where I performed a full history and physical, reviewing previous records, and reviewing laboratory and other diagnostic studies  Portions of the record may have been created with voice recognition software  Occasional wrong word or "sound a like" substitutions may have occurred due to the inherent limitations of voice recognition software    Read the chart carefully and recognize, using context, where substitutions have occurred     ==  [facility]  Internal Medicine Residency MS-IV

## 2023-01-26 NOTE — ASSESSMENT & PLAN NOTE
Patient found to have Na 126 during appointment for microwave ablation   Patient with poor PO intake for the past 2 weeks  Serum osmol 271     Resolved    Plan:  · Likely due to poor PO intake

## 2023-01-26 NOTE — ASSESSMENT & PLAN NOTE
Lab Results   Component Value Date    WBC 14 30 (H) 01/28/2023    HGB 11 0 (L) 01/28/2023    HCT 33 4 (L) 01/28/2023    MCV 96 01/28/2023     01/28/2023     Patient with history of anemia, baseline Hgb around 9 5  Presented with Hgb 11 9, likely from concentration in setting of dehydration  Past workup has shown anemia of chronic disease  Elevated T bili, labs negative for shearing (normal LDH)    Hemoglobin stable      Plan:  · Monitor Hgb   · Continue home doses folic acid, thiamine  · Transfuse if less than 7

## 2023-01-26 NOTE — QUICK NOTE
Mr Leda Douglas is a 30-year-old male with hepatocellular carcinoma with prior microwave ablations  He presents today for outpatient microwave ablation of a segment 5 liver cancer  Upon evaluation today, he states that he feels fine with no major changes in his medical history  However, the patient's daughter pointed out a concern with his right arm  Upon evaluation, his right hand and wrist are swollen, red and warm to touch  He has exquisite pain upon palpation in the wrist region  He is afebrile  His white blood cell count today is elevated  In private, his daughter who lives with him states that over the last 2 weeks he has not been himself  He has exhibited confusion and generalized lack of care of himself  His appetite has decreased and he often exhibits a very flat affect and generalized lack of awareness  Due to the circumstances, microwave ablation of his liver lesion will be postponed  I recommend for him to be admitted for further work-up  Following decision for admit, BMP came back with a potassium of 2 4  Will request for hopeful SLIM admission for help with further work-up and management

## 2023-01-26 NOTE — ASSESSMENT & PLAN NOTE
Patient with history of [address]  who presented for microwave ablation   Liver mass first discovered in May, 2020 and biopsy confirmed diagnosis  Received ablation with Dr Suha Montenegro in July 2020  Surveillance MRI found new masses and referred to IR for ablation   Last MRI in December 2022  Hepatic panel: T bili 1 80, alk phos 103, AST 33, ALT 28, albumin 2 4     Plan  · Holding procedure in setting of electrolyte abnormalities and infection workup   · Consider repeat imaging if new symptoms present

## 2023-01-26 NOTE — ASSESSMENT & PLAN NOTE
Patient presented for microwave ablation appointment and found to have K 2 4   Prescribed K supplements outpatient   Poor PO intake for past 2 weeks and lack of self care  EKG sinus with PACs     Resolved  Potassium 4 1 today      Plan:  · Continue with daily K supplementation 20meq BID

## 2023-01-26 NOTE — ASSESSMENT & PLAN NOTE
Patient with history of dysthymic disorder  Per daughter, patient has been more withdrawn and with flat affect recently     Plan:  · Continue Wellbutrin 150 mg and Remeron 15 mg daily

## 2023-01-27 LAB
ALBUMIN SERPL BCP-MCNC: 2 G/DL (ref 3.5–5)
ALBUMIN SERPL BCP-MCNC: 2 G/DL (ref 3.5–5)
ALP SERPL-CCNC: 112 U/L (ref 46–116)
ALP SERPL-CCNC: 112 U/L (ref 46–116)
ALT SERPL W P-5'-P-CCNC: 32 U/L (ref 12–78)
ALT SERPL W P-5'-P-CCNC: 36 U/L (ref 12–78)
ANION GAP SERPL CALCULATED.3IONS-SCNC: 6 MMOL/L (ref 4–13)
ANION GAP SERPL CALCULATED.3IONS-SCNC: 8 MMOL/L (ref 4–13)
AST SERPL W P-5'-P-CCNC: 37 U/L (ref 5–45)
AST SERPL W P-5'-P-CCNC: 59 U/L (ref 5–45)
BASOPHILS # BLD AUTO: 0.04 THOUSANDS/ÂΜL (ref 0–0.1)
BASOPHILS NFR BLD AUTO: 0 % (ref 0–1)
BILIRUB SERPL-MCNC: 0.83 MG/DL (ref 0.2–1)
BILIRUB SERPL-MCNC: 1.06 MG/DL (ref 0.2–1)
BUN SERPL-MCNC: 13 MG/DL (ref 5–25)
BUN SERPL-MCNC: 16 MG/DL (ref 5–25)
CALCIUM ALBUM COR SERPL-MCNC: 8.3 MG/DL (ref 8.3–10.1)
CALCIUM ALBUM COR SERPL-MCNC: 9.1 MG/DL (ref 8.3–10.1)
CALCIUM SERPL-MCNC: 6.7 MG/DL (ref 8.3–10.1)
CALCIUM SERPL-MCNC: 7.5 MG/DL (ref 8.3–10.1)
CHLORIDE SERPL-SCNC: 100 MMOL/L (ref 96–108)
CHLORIDE SERPL-SCNC: 96 MMOL/L (ref 96–108)
CO2 SERPL-SCNC: 26 MMOL/L (ref 21–32)
CO2 SERPL-SCNC: 29 MMOL/L (ref 21–32)
CREAT SERPL-MCNC: 0.86 MG/DL (ref 0.6–1.3)
CREAT SERPL-MCNC: 0.94 MG/DL (ref 0.6–1.3)
EOSINOPHIL # BLD AUTO: 0.02 THOUSAND/ÂΜL (ref 0–0.61)
EOSINOPHIL NFR BLD AUTO: 0 % (ref 0–6)
ERYTHROCYTE [DISTWIDTH] IN BLOOD BY AUTOMATED COUNT: 12.4 % (ref 11.6–15.1)
GFR SERPL CREATININE-BSD FRML MDRD: 83 ML/MIN/1.73SQ M
GFR SERPL CREATININE-BSD FRML MDRD: 89 ML/MIN/1.73SQ M
GLUCOSE SERPL-MCNC: 105 MG/DL (ref 65–140)
GLUCOSE SERPL-MCNC: 153 MG/DL (ref 65–140)
HCT VFR BLD AUTO: 32 % (ref 36.5–49.3)
HGB BLD-MCNC: 10.4 G/DL (ref 12–17)
IMM GRANULOCYTES # BLD AUTO: 0.16 THOUSAND/UL (ref 0–0.2)
IMM GRANULOCYTES NFR BLD AUTO: 1 % (ref 0–2)
LACTATE SERPL-SCNC: 1.1 MMOL/L (ref 0.5–2)
LYMPHOCYTES # BLD AUTO: 1.01 THOUSANDS/ÂΜL (ref 0.6–4.47)
LYMPHOCYTES NFR BLD AUTO: 8 % (ref 14–44)
MAGNESIUM SERPL-MCNC: 1.6 MG/DL (ref 1.6–2.6)
MCH RBC QN AUTO: 31.2 PG (ref 26.8–34.3)
MCHC RBC AUTO-ENTMCNC: 32.5 G/DL (ref 31.4–37.4)
MCV RBC AUTO: 94 FL (ref 82–98)
MONOCYTES # BLD AUTO: 1.06 THOUSAND/ÂΜL (ref 0.17–1.22)
MONOCYTES NFR BLD AUTO: 8 % (ref 4–12)
NEUTROPHILS # BLD AUTO: 10.46 THOUSANDS/ÂΜL (ref 1.85–7.62)
NEUTS SEG NFR BLD AUTO: 83 % (ref 43–75)
NRBC BLD AUTO-RTO: 0 /100 WBCS
PLATELET # BLD AUTO: 293 THOUSANDS/UL (ref 149–390)
PMV BLD AUTO: 10.4 FL (ref 8.9–12.7)
POTASSIUM SERPL-SCNC: 3.7 MMOL/L (ref 3.5–5.3)
POTASSIUM SERPL-SCNC: 3.8 MMOL/L (ref 3.5–5.3)
PROT SERPL-MCNC: 6.2 G/DL (ref 6.4–8.4)
PROT SERPL-MCNC: 6.3 G/DL (ref 6.4–8.4)
RBC # BLD AUTO: 3.33 MILLION/UL (ref 3.88–5.62)
SODIUM SERPL-SCNC: 130 MMOL/L (ref 135–147)
SODIUM SERPL-SCNC: 135 MMOL/L (ref 135–147)
WBC # BLD AUTO: 12.75 THOUSAND/UL (ref 4.31–10.16)

## 2023-01-27 RX ORDER — PANTOPRAZOLE SODIUM 40 MG/1
40 TABLET, DELAYED RELEASE ORAL
Status: DISCONTINUED | OUTPATIENT
Start: 2023-01-27 | End: 2023-01-30 | Stop reason: HOSPADM

## 2023-01-27 RX ORDER — ONDANSETRON 2 MG/ML
4 INJECTION INTRAMUSCULAR; INTRAVENOUS EVERY 6 HOURS PRN
Status: DISCONTINUED | OUTPATIENT
Start: 2023-01-27 | End: 2023-01-30 | Stop reason: HOSPADM

## 2023-01-27 RX ADMIN — VANCOMYCIN HYDROCHLORIDE 500 MG: 500 INJECTION, SOLUTION INTRAVENOUS at 09:06

## 2023-01-27 RX ADMIN — BUPROPION HYDROCHLORIDE 150 MG: 150 TABLET, FILM COATED, EXTENDED RELEASE ORAL at 09:05

## 2023-01-27 RX ADMIN — MIRTAZAPINE 15 MG: 15 TABLET, FILM COATED ORAL at 21:14

## 2023-01-27 RX ADMIN — AMLODIPINE BESYLATE 5 MG: 5 TABLET ORAL at 09:06

## 2023-01-27 RX ADMIN — LEVOTHYROXINE SODIUM 50 MCG: 50 TABLET ORAL at 05:32

## 2023-01-27 RX ADMIN — CARVEDILOL 3.12 MG: 3.12 TABLET, FILM COATED ORAL at 16:28

## 2023-01-27 RX ADMIN — POTASSIUM CHLORIDE 20 MEQ: 1500 TABLET, EXTENDED RELEASE ORAL at 09:05

## 2023-01-27 RX ADMIN — CALCIUM CARBONATE (ANTACID) CHEW TAB 500 MG 500 MG: 500 CHEW TAB at 09:05

## 2023-01-27 RX ADMIN — MAGNESIUM OXIDE TAB 400 MG (241.3 MG ELEMENTAL MG) 400 MG: 400 (241.3 MG) TAB at 09:05

## 2023-01-27 RX ADMIN — CARVEDILOL 3.12 MG: 3.12 TABLET, FILM COATED ORAL at 09:04

## 2023-01-27 RX ADMIN — PRAVASTATIN SODIUM 80 MG: 80 TABLET ORAL at 16:28

## 2023-01-27 RX ADMIN — UMECLIDINIUM BROMIDE AND VILANTEROL TRIFENATATE 1 PUFF: 62.5; 25 POWDER RESPIRATORY (INHALATION) at 09:06

## 2023-01-27 RX ADMIN — HEPARIN SODIUM 5000 UNITS: 5000 INJECTION INTRAVENOUS; SUBCUTANEOUS at 21:14

## 2023-01-27 RX ADMIN — THIAMINE HCL TAB 100 MG 250 MG: 100 TAB at 09:05

## 2023-01-27 RX ADMIN — HEPARIN SODIUM 5000 UNITS: 5000 INJECTION INTRAVENOUS; SUBCUTANEOUS at 13:03

## 2023-01-27 RX ADMIN — PANTOPRAZOLE SODIUM 40 MG: 40 TABLET, DELAYED RELEASE ORAL at 16:28

## 2023-01-27 RX ADMIN — VANCOMYCIN HYDROCHLORIDE 750 MG: 750 INJECTION, SOLUTION INTRAVENOUS at 21:12

## 2023-01-27 RX ADMIN — POTASSIUM CHLORIDE 20 MEQ: 1500 TABLET, EXTENDED RELEASE ORAL at 17:02

## 2023-01-27 RX ADMIN — MELATONIN 3 MG: at 21:14

## 2023-01-27 RX ADMIN — FOLIC ACID 1 MG: 1 TABLET ORAL at 09:05

## 2023-01-27 RX ADMIN — HEPARIN SODIUM 5000 UNITS: 5000 INJECTION INTRAVENOUS; SUBCUTANEOUS at 05:32

## 2023-01-27 RX ADMIN — ONDANSETRON 4 MG: 2 INJECTION INTRAMUSCULAR; INTRAVENOUS at 22:31

## 2023-01-27 NOTE — OCCUPATIONAL THERAPY NOTE
Occupational Therapy Evaluation     Patient Name: Hilary Berrios  Today's Date: 1/27/2023  Problem List  Principal Problem:    Cellulitis of right hand  Active Problems:    Essential hypertension    Crohn disease ([facility] )    Hyponatremia    Acute kidney injury ([facility] )    Colostomy in place ([facility] )    Severe protein-calorie malnutrition ([facility] )    Hypocalcemia    Hypomagnesemia    Hypokalemia    Gastroesophageal reflux disease without esophagitis    Chronic obstructive pulmonary disease (HCC)    Dysthymic disorder    HCC (hepatocellular carcinoma) (HCC)    Electrolyte abnormality    History of alcohol use disorder    Anemia    Subclinical hypothyroidism    Past Medical History  Past Medical History:   Diagnosis Date    LUCILLE (acute kidney injury) ([facility] ) 6/28/2017    Blindness of left eye     Since birth    Cancer [facility])     liver    Cataract     Colon polyp     Colostomy in place [facility]) 6/29/2017    COPD (chronic obstructive pulmonary disease) (HCC)     Crohn disease (HCC)     Emphysema of lung (HCC)     Emphysema/COPD ([facility] )     Essential hypertension     GERD (gastroesophageal reflux disease)     Hypertension     Hypokalemia 6/28/2017    Hypomagnesemia 6/29/2017    Hyponatremia 6/28/2017    Kidney stone     Osteomyelitis (HCC)     Pneumonia      Past Surgical History  Past Surgical History:   Procedure Laterality Date    CATARACT EXTRACTION Bilateral     CHOLECYSTECTOMY      COLECTOMY      10 inchs of ileum    COLONOSCOPY N/A 6/30/2017    Procedure: COLONOSCOPY;  Surgeon: Pattie Hammer MD;  [facility];   Service: Gastroenterology    COLOSTOMY      FINGER AMPUTATION      FINGER SURGERY Left     IR BIOPSY LIVER MASS  6/8/2020    IR MICROWAVE ABLATION  9/21/2022    LITHOTRIPSY      LIVER LOBECTOMY N/A 7/15/2020    Procedure: LIVER ABLATION, INTRAOPERATIVE U/S LIVER;  Surgeon: Henry Jarvis MD;  [facility];  Service: Surgical Oncology           01/27/23 0845   OT Last Visit   OT Visit Date 01/27/23   Note Type   Note type Evaluation   Additional Comments Pt seen w/ PT to increase safety, decrease fall risk, and maximize functional/occupational performance 2* medical complexity which is a regression from pt's functional baseline  Pain Assessment   Pain Assessment Tool 0-10   Pain Score 8   Pain Location/Orientation Orientation: Right;Location: Arm   Pain Onset/Description Descriptor: Throbbing   Hospital Pain Intervention(s) Repositioned; Ambulation/increased activity; Emotional support   Restrictions/Precautions   Weight Bearing Precautions Per Order No   Other Precautions Multiple lines; Fall Risk;Pain   Home Living   Type of Home House  (1  ranch home with 2 POOL)   Home Layout One level;Performs ADLs on one level; Able to live on main level with bedroom/bathroom; Access   Bathroom Shower/Tub Tub/shower unit   Bathroom Toilet Standard   Bathroom Equipment Grab bars in shower   Bathroom Accessibility Accessible   Home Equipment Crutches   Additional Comments Pt reports living in [address] [employer] home with 2 POOL with his daughter and grandchildren (5years old and 12years old); no DME PTA, but access to crutches   Prior Function   Level of [place] Independent with ADLs; Independent with functional mobility; Independent with IADLS   Lives With Family;Daughter   Receives Help From Family   IADLs Independent with driving; Independent with meal prep; Independent with medication management   Falls in the last 6 months 1 to 4  (Pt repotring 1 fall)   Vocational Retired   Comments (+) driving; pt reporting that he shares IADLs with his daughter   Lifestyle   Autonomy PTA, pt was independent in ADLs/IADLs and functional mobility w/ no DME; (+) driving   Reciprocal Relationships Lives w/ his daughter and grandchildren who are 5years old and 12years old   Service to Others Retired; reports previously working for [employer] Enjoys being outside   Subjective   Subjective "I feel like how I normally feel "   ADL   Where Assessed Edge of bed   Eating Assistance 5  Supervision/Setup   Grooming Assistance 5  Supervision/Setup   UB Bathing Assistance 5  Supervision/Setup   LB Bathing Assistance 5  Supervision/Setup   UB Dressing Assistance 5  Supervision/Setup   LB Dressing Assistance 5  Supervision/Setup   Toileting Assistance  5  Supervision/Setup   Functional Assistance 5  Supervision/Setup   Bed Mobility   Supine to Sit Unable to assess   Sit to Supine Unable to assess   Additional Comments Upon arrival, pt found sitting EOB; @ end of session, pt left sitting upright in recliner with all functional needs in reach   Transfers   Sit to Stand 7  Independent   Stand to Sit 7  Independent   Additional Comments w/ no DME   Functional Mobility   Functional Mobility 5  Supervision   Additional Comments Pt completed household functional mobility @ supervision level w/ no DME   Balance   Static Sitting Fair +   Dynamic Sitting Fair +   Static Standing Fair   Dynamic Standing Fair   Ambulatory Fair   Activity Tolerance   Activity Tolerance Patient tolerated treatment well   Medical Staff Made HOLGER ElizondoT   Nurse Made Aware RN cleared   RUE Assessment   RUE Assessment WFL   LUE Assessment   LUE Assessment WFL   Hand Function   Gross Motor Coordination Functional   Fine Motor Coordination Functional   Proprioception   Proprioception No apparent deficits   Vision - Complex Assessment   Ocular Range of Motion Intact   Psychosocial   Psychosocial (WDL) WDL   Perception   Inattention/Neglect Appears intact   Cognition   Overall Cognitive Status WFL   Arousal/Participation Alert; Responsive;Arousable; Cooperative   Attention Within functional limits   Orientation Level Oriented X4   Memory Within functional limits   Following Commands Follows all commands and directions without difficulty   Comments Pt pleasant and cooperative; presenting with flat affect   Assessment   Prognosis Fair   Assessment Pt is a 80 yo male who presented to [facility] for microwave ablation treatment for hepatocellular carcinoma and was found to have concern for cellulitis of right wrist  Pt dx w/ cellulitis of right hand  Pt  has a past medical history of LUCILLE (acute kidney injury) ([facility] ) (6/28/2017), Blindness of left eye, Cancer ([facility] ), Cataract, Colon polyp, Colostomy in place [facility]) (6/29/2017), COPD (chronic obstructive pulmonary disease) ([facility] ), Crohn disease ([facility] ), Emphysema of lung ([facility] ), Emphysema/COPD ([facility] [facility] ), Essential hypertension, GERD (gastroesophageal reflux disease), Hypertension, Hypokalemia (6/28/2017), Hypomagnesemia (6/29/2017), Hyponatremia (6/28/2017), Kidney stone, Osteomyelitis ([facility] ), and Pneumonia  Pt with active OT orders and OT consulted to assess pt's functional status and occupational performance to determine safe d/c needs  Pt seen with PT to increase safety, decrease fall risk, and maximize functional/occupational performance 2* medical complexity which is a regression from pt's functional baseline  Pt lives with his daughter and grandchildren in a ranch home with 2 POOL  PTA, pt was independent in ADLs/IADLs and functional mobility w/ no DME  (+) driving  At this time, pt is not demonstrating any significant occupational deficits and is functioning at a level of functional transfers @ an independent level, functional mobility @ supervision level w/ no DME, and UB/LB ADLs @ supervision level  From an OT standpoint, recommend discharge to home with increased social support once medically stable  At this time, pt does not express any concerns regarding performing ADL/IADL/functional mobility tasks  No further skilled acute care OT services are needed at this time  The patient's raw score on the AM-PAC Daily Activity inpatient short form is 22, standardized score is 47 1, greater than 39 4  Patients at this level are likely to benefit from discharge to home   Please refer to the recommendation of the Occupational Therapist for safe discharge planning  Recommend continued engagement in ADL/functional mobility tasks with nursing and restorative therapy staff as appropriate to promote the highest level of independence prior to discharge  OT is discharging pt from caseload at this time, please reconsult if needed  Goals   Patient Goals To go home   Plan   OT Frequency Eval only   Recommendation   OT Discharge Recommendation No rehabilitation needs  (Increased support/assistance upon d/c)   AM-PAC Daily Activity Inpatient   Lower Body Dressing 3   Bathing 3   Toileting 4   Upper Body Dressing 4   Grooming 4   Eating 4   Daily Activity Raw Score 22   Daily Activity Standardized Score (Calc for Raw Score >=11) 47  1   AM-PAC Applied Cognition Inpatient   Following a Speech/Presentation 3   Understanding Ordinary Conversation 4   Taking Medications 4   Remembering Where Things Are Placed or Put Away 4   Remembering List of 4-5 Errands 4   Taking Care of Complicated Tasks 4   Applied Cognition Raw Score 23   Applied Cognition Standardized Score 53 08   End of Consult   Education Provided Yes   Patient Position at End of Consult Bedside chair; All needs within reach   Nurse Communication Nurse aware of consult      Katina Albert MS, OTR/L

## 2023-01-27 NOTE — PROGRESS NOTES
Kari French is a 79 y o  male who is currently ordered Vancomycin IV with management by the Pharmacy Consult service  Relevant clinical data and objective / subjective history reviewed  Vancomycin Assessment:  Indication and Goal AUC/Trough: Soft tissue (goal -600, trough >10)  Clinical Status: stable  Micro:   Ordered  Renal Function:  SCr: 1 07 mg/dL  CrCl: 45 7 mL/min  Renal replacement: Not on dialysis  Days of Therapy: 1  Current Dose: 750mg Q 12H  Vancomycin Plan:  New Dosinmg Q 12H  Estimated AUC: 448 mcg*hr/mL  Estimated Trough: 14 9 mcg/mL  Next Level:  06:00  Renal Function Monitoring: Daily BMP and UOP  Pharmacy will continue to follow closely for s/sx of nephrotoxicity, infusion reactions and appropriateness of therapy  BMP and CBC will be ordered per protocol  We will continue to follow the patient’s culture results and clinical progress daily      Asuncion Vilchis, Pharmacist

## 2023-01-27 NOTE — PLAN OF CARE
Problem: Nutrition/Hydration-ADULT  Goal: Nutrient/Hydration intake appropriate for improving, restoring or maintaining nutritional needs  Description: Monitor and assess patient's nutrition/hydration status for malnutrition  Collaborate with interdisciplinary team and initiate plan and interventions as ordered  Monitor patient's weight and dietary intake as ordered or per policy  Utilize nutrition screening tool and intervene as necessary  Determine patient's food preferences and provide high-protein, high-caloric foods as appropriate       INTERVENTIONS:  - Monitor oral intake, urinary output, labs, and treatment plans  - Assess nutrition and hydration status and recommend course of action  - Evaluate amount of meals eaten  - Assist patient with eating if necessary   - Allow adequate time for meals  - Recommend/ encourage appropriate diets, oral nutritional supplements, and vitamin/mineral supplements  - Order, calculate, and assess calorie counts as needed  - Recommend, monitor, and adjust tube feedings and TPN/PPN based on assessed needs  - Assess need for intravenous fluids  - Provide specific nutrition/hydration education as appropriate  - Include patient/family/caregiver in decisions related to nutrition  Outcome: Progressing     Problem: PAIN - ADULT  Goal: Verbalizes/displays adequate comfort level or baseline comfort level  Description: Interventions:  - Encourage patient to monitor pain and request assistance  - Assess pain using appropriate pain scale  - Administer analgesics based on type and severity of pain and evaluate response  - Implement non-pharmacological measures as appropriate and evaluate response  - Consider cultural and social influences on pain and pain management  - Notify physician/advanced practitioner if interventions unsuccessful or patient reports new pain  Outcome: Progressing     Problem: INFECTION - ADULT  Goal: Absence or prevention of progression during hospitalization  Description: INTERVENTIONS:  - Assess and monitor for signs and symptoms of infection  - Monitor lab/diagnostic results  - Monitor all insertion sites, i e  indwelling lines, tubes, and drains  - Monitor endotracheal if appropriate and nasal secretions for changes in amount and color  - [place] appropriate cooling/warming therapies per order  - Administer medications as ordered  - Instruct and encourage patient and family to use good hand hygiene technique  - Identify and instruct in appropriate isolation precautions for identified infection/condition  Outcome: Progressing

## 2023-01-27 NOTE — RESTORATIVE TECHNICIAN NOTE
Restorative Technician Note      Patient Name: Indio Ng     Restorative Tech Visit Date: 01/27/23  Note Type: Mobility  Patient Position Upon Consult: Supine  Activity Performed: Ambulated  Assistive Device: Other (Comment) (none)  Patient Position at End of Consult: Supine;  All needs within [facility]

## 2023-01-27 NOTE — CONSULTS
RD consulted re: malnutrition; records reveal wt loss; wt today 132#; reflects 8# wt loss since 12/16/22 wt of 140#; usual wt range over past year 136#- 143#;  patient denied any recent changes in appetite or PO intake prior to admit; usual meal pattern 2 meals per day; ensure supplement   today patient did have 2 episodes emesis between bkft and lunch; denied these concerns prior to admit; reported emesis seemed "acidic" to him; RN aware of same and will reach out to Provider; having lunch at this time/ no further emesis at this time;  ; patient requested ensure supplement and was scheduled vanilla ensure plus high protein TID

## 2023-01-27 NOTE — PHYSICAL THERAPY NOTE
Physical Therapy Evaluation     Patient's Name: Madi [facility]    Admitting Diagnosis  [facility]hepatocellular [facility]) [facility] ) [C22 0]    Problem List  Patient Active Problem List   Diagnosis    Essential hypertension    Emphysema/COPD ([facility] )    Crohn disease ([facility] )    Hyponatremia    Acute kidney injury ([facility] )    Colostomy in place ([facility] )    Diarrhea    Severe protein-calorie malnutrition ([facility] )    Cellulitis of right hand    Emphysema of lung ([facility] )    Syncope and collapse    Severe sepsis (HCC)    Pneumonia    Chest pain    Liver mass    Tobacco abuse    Abdominal pain    Personal history of liver cancer    Palliative care patient    Abdominal wall abscess    Observed sleep apnea    Acute pain of left wrist    Chronic, continuous use of opioids    Hypocalcemia    Left wrist pain    Kidney stone    Hypomagnesemia    Hypokalemia    Gastroesophageal reflux disease without esophagitis    Chronic obstructive pulmonary disease (HCC)    Mixed hyperlipidemia    Vitamin B12 deficiency    Cataract    Dysthymic disorder    Crohn's disease of small intestine with other complication ([facility] )    Encounter for follow-up surveillance of liver cancer    HCC (hepatocellular carcinoma) ([facility] )    Electrolyte abnormality    History of alcohol use disorder    Anemia    Subclinical hypothyroidism       Past Medical History  Past Medical History:   Diagnosis Date    LUCILLE (acute kidney injury) ([facility] ) 6/28/2017    Blindness of left eye     Since birth    Cancer [facility])     liver    Cataract     Colon polyp     Colostomy in place ([facility] ) 6/29/2017    COPD (chronic obstructive pulmonary disease) (HCC)     Crohn disease (HCC)     Emphysema of lung ([facility] )     Emphysema/COPD ([facility] )     Essential hypertension     GERD (gastroesophageal reflux disease)     Hypertension     Hypokalemia 6/28/2017    Hypomagnesemia 6/29/2017    Hyponatremia 6/28/2017    Kidney stone     Osteomyelitis ([facility] )     Pneumonia        Past Surgical History  Past Surgical History: Procedure Laterality Date    CATARACT EXTRACTION Bilateral     CHOLECYSTECTOMY      COLECTOMY      10 inchs of ileum    COLONOSCOPY N/A 6/30/2017    Procedure: COLONOSCOPY;  Surgeon: Bud Armstrong MD;  [facility]; Service: Gastroenterology    COLOSTOMY      FINGER AMPUTATION      FINGER SURGERY Left     IR BIOPSY LIVER MASS  6/8/2020    IR MICROWAVE ABLATION  9/21/2022    LITHOTRIPSY      LIVER LOBECTOMY N/A 7/15/2020    Procedure: LIVER ABLATION, INTRAOPERATIVE U/S LIVER;  Surgeon: Blanche Ruiz MD;  [facility];  Service: Surgical Oncology          01/27/23 0859   PT Last Visit   PT Visit Date 01/27/23   Note Type   Note type Evaluation   Pain Assessment   Pain Assessment Tool 0-10   Pain Score 8   Pain Location/Orientation Orientation: Right;Location: Arm   Hospital Pain Intervention(s) Repositioned; Ambulation/increased activity; Emotional support   Restrictions/Precautions   Weight Bearing Precautions Per Order No   Other Precautions Multiple lines; Fall Risk;Pain   Home Living   Type of [address] [place] [address] One level;Performs ADLs on one level; Able to live on main level with bedroom/bathroom  (2 POOL)   Bathroom Shower/Tub Tub/shower unit   Bathroom Toilet Standard   Bathroom Equipment Grab bars in [address]  (did not use PTA)   Prior Function   Level of [place] Independent with ADLs; Independent with functional mobility   Lives With Daughter;Family  (+ 2 grandchildren (9+16))   Receives Help From Family   IADLs Independent with driving; Independent with meal prep; Independent with medication management   Falls in the last 6 months 1 to 4  (1 per pt report)   Vocational Retired   General   Family/Caregiver Present No   Cognition   Overall Cognitive Status WFL   Arousal/Participation Cooperative   Attention Within functional limits   Orientation Level Oriented X4   Memory Within functional limits   Following Commands Follows all commands and directions without difficulty   Comments pt pleasant and cooperative   RLE Assessment   RLE Assessment WFL   LLE Assessment   LLE Assessment WFL   Bed Mobility   Supine to Sit Unable to assess   Sit to Supine Unable to assess   Additional Comments pt sitting EOB upon arrival  Pt reutrned to sitting OOB in recliner with all needs wihtin reach   Transfers   Sit to Stand 7  Independent   Stand to Sit 7  Independent   Additional Comments transfers without AD   Ambulation/Elevation   Gait pattern Excessively slow; Short stride   Gait Assistance 5  Supervision   Assistive Device None   Distance 100ft   Stair Management Assistance 5  Supervision   Stair Management Technique One rail R;Alternating pattern   Number of Stairs 2   Balance   Static Sitting Fair +   Dynamic Sitting Fair +   Static Standing Fair   Dynamic Standing Fair   Ambulatory Fair   Activity Tolerance   Activity Tolerance Patient tolerated treatment well   Medical Staff Made Aware OT; co-eval performed this date 2* increased medical complexity and multiple co-morbidities   Nurse Made Aware RN cleared pt to participate in therapy session   Assessment   Prognosis Good   Assessment Pt seen for moderate complexity PT evaluation  Pt with active PT eval/treat and up and OOB as toelrated orders  Pt is a 79 y o  male who was admitted to [facility] on 1/26/2023 with cellulitis of R hand  Pt's active problems include: HTN, Chron, hyponatremia, colostomy, malnutrition, hypokalemia, GERDm COPD, anemia  Pt  has a past medical history of LUCILLE (acute kidney injury) ([facility] ) (6/28/2017), Blindness of left eye, Cancer ([facility] ), Cataract, Colon polyp, Colostomy in place [facility]) (6/29/2017), COPD (chronic obstructive pulmonary disease) ([facility] ), Crohn disease ([facility] ), Emphysema of lung ([facility] ), Emphysema/COPD ([facility] ), Essential hypertension, GERD (gastroesophageal reflux disease), Hypertension, Hypokalemia (6/28/2017), Hypomagnesemia (6/29/2017), Hyponatremia (6/28/2017), Kidney stone, Osteomyelitis ([address] ), and Pneumonia  Pt resides with daughter and grandsons in [address] with 2 POOL and was independent prior to hospital admission  Currently upon evaluation pt performing funational transfers at I level and ambulation at S level without AD  Pt was left sitting OOB in recliner at the end of PT session with all needs within reach  Pt with no questions or concerns regarding d/c home; appears to be functioning at/ near baseline mobility levels  Pt with no further acute inpatient PT needs at this time- please re-consult if needed  PT to d/c pt and recommends home with OPPT  Encourage pt to ambulate at least 3-4x/day with restorative/ nursing staff while remaining in hospital  The patient's [facility] [address] Mobility Inpatient Short Form Raw Score is 18  A Raw score of greater than 16 suggests the patient may benefit from discharge to home  Please also refer to the recommendation of the Physical Therapist for safe discharge planning  Goals   Patient Goals to go home   Plan   Treatment/Interventions Functional transfer training;Elevations;LE strengthening/ROM; Endurance training;Patient/family training;Gait training;Spoke to nursing;Spoke to case management;OT   PT Frequency   (eval only- d/c PT)   Recommendation   PT Discharge Recommendation Home with outpatient rehabilitation   AM-PAC Basic Mobility Inpatient   Turning in Flat Bed Without Bedrails 3   Lying on Back to Sitting on Edge of Flat Bed Without Bedrails 3   Moving Bed to Chair 3   Standing Up From Chair Using Arms 3   Walk in Room 3   Climb 3-5 Stairs With Railing 3   Basic Mobility Inpatient Raw Score 18   Basic Mobility Standardized Score 41 05   Highest Level Of Mobility   JH-HLM Goal 6: Walk 10 steps or more   JH-HLM Achieved 7: Walk 25 feet or more   Modified [place] Scale   Modified [place] Scale 2         Juan Grace, PT DPT

## 2023-01-27 NOTE — PROGRESS NOTES
INTERNAL MEDICINE RESIDENCY PROGRESS NOTE     Name: Skylar Black   Age & Sex: 79 y o  male   MRN: [id number]  Unit/Bed#: [facility] 925-01   Encounter: [id number]  Team: SOD Team B     PATIENT INFORMATION     Name: Skylar Black   Age & Sex: 79 y o  male   MRN: [id number]  Hospital Stay Days: 1    ASSESSMENT/PLAN     Principal Problem:    Cellulitis of right hand  Active Problems:    Essential hypertension    Crohn disease ([facility] )    Hyponatremia    Acute kidney injury ([facility] )    Colostomy in place [facility])    Severe protein-calorie malnutrition ([facility] )    Hypocalcemia    Hypomagnesemia    Hypokalemia    Gastroesophageal reflux disease without esophagitis    Chronic obstructive pulmonary disease (HCC)    Dysthymic disorder    HCC (hepatocellular carcinoma) (HCC)    Electrolyte abnormality    History of alcohol use disorder    Anemia    Subclinical hypothyroidism      * Cellulitis of right hand  Assessment & Plan  Patient presenting to outpatient microwave ablation appointment and found to have concern for cellulitis of R wrist  Patient does not recall any trauma or injury, but memory has been poor recently    WBC 15 11   Afebrile   Uric acid 5 9      1/27/23  -X-ray of wrist and forearm show no intraosseous abnormalities and just soft tissue swelling of the wrist and hand  -US of R wrist - no evidence of DVT or abnormalities    Plan:  · Treatment with IV vanco 500mg q12  · Follow up blood cultures  · Follow WBC   · Pain control with tylenol 650 mg PRN q 6 hours  · MRSA swab  · Consult ID    Subclinical hypothyroidism  Assessment & Plan  Patient with history of subclinical hypothyroidism   Last thyroid levels in 11/11/22:  TSH 16 477 and T4 1 06  Recheck and TSH 3 260     Plan:  · Continue home dose levothyroxine 50 mcg    Anemia  Assessment & Plan  Patient with history of anemia, baseline Hgb around 9 5  Presented with Hgb 11 9, likely from concentration in setting of dehydration  Past workup has shown anemia of chronic disease  Elevated T bili, labs negative for shearing (normal LDH)    Plan:  · Monitor Hgb   · Continue home doses folic acid, thiamine  · Transfuse if less than 7      History of alcohol use disorder  Assessment & Plan  Patient with history of alcohol use disorder  Per daughter, has not had any alcohol since last hospital admission    Plan:  · Continue thiamine and folic acid supplementation    Electrolyte abnormality  Assessment & Plan  Patient presenting with low Na, K and Cl  Likely in setting of PO intake and Crohn's disease     1/27/23  -Na 130, K 3 7, and Cl 96    Plan:  · Replete electrolytes as needed   · Tele monitoring  · Encourage better PO intake  · Nutrition consult     [place] [id number]  (hepatocellular carcinoma) ([facility] )  Assessment & Plan  [facility]  who presented for microwave ablation   Liver mass first discovered in May, 2020 and biopsy confirmed diagnosis  Received ablation with Dr Cam Sneed in July 2020  Surveillance MRI found new masses and referred to IR for ablation   Last MRI in December 2022  Hepatic panel: T bili 1 80, alk phos 103, AST 33, ALT 28, albumin 2 4     Plan  · Holding procedure in setting of electrolyte abnormalities and infection workup   · Consider repeat imaging if new symptoms present     Dysthymic disorder  Assessment & Plan  Patient with history of dysthymic disorder  Per daughter, patient has been more withdrawn and with flat affect recently     Plan:  · Continue Wellbutrin 150 mg and Remeron 15 mg daily     Chronic obstructive pulmonary disease ([facility] )  Assessment & Plan  Patient with history of COPD    Plan:  · Continue with umeclidinium-vilanterol daily     Gastroesophageal reflux disease without esophagitis  Assessment & Plan  Patient with history or GERD    Plan:  · Continue Tums 500 mg    · Restart PPI due to 2 bouts of vomiting    Hypokalemia  Assessment & Plan  Patient presented for microwave ablation appointment and found to have K 2 4   Prescribed K supplements outpatient   Poor PO intake for past 2 weeks and lack of self care  EKG sinus with PACs     Plan:  · K 40meq twice 1/26/23   · Follow BMP at 4 PM   · Tele monitoring   · Continue with daily K supplementation 20meq BID         Hypomagnesemia  Assessment & Plan  Patient with history of hypomagnesemia, on supplementation  Mg on presentation low at 0 7  ECG sinus rhythm with PACs    Plan:  · 4 g Mg supplementation   · Follow up Mg at 4 PM   · Tele monitoring   · Continue daily Mg supplementation     Hypocalcemia  Assessment & Plan  Patient with history of hypocalcemia, likely due to poor PO intake   Corrected Ca 8 7    Plan:  · Tums 500 mg  · Follow up albumin for corrected Ca      Severe protein-calorie malnutrition ([facility] [address] )  Assessment & Plan  Patient with history of protein calorie malnutrition in setting of hepatocellular carcinoma  Presents with poor self care and PO intake     Plan:  · Nutrition consult - regular diet and ensure plus high protein TID                BMI Findings: Body mass index is 15 7 kg/m²  Colostomy in place [facility])  Assessment & Plan  Patient with colostomy due to Crohn's disease    Plan:  · Ostomy care    Acute kidney injury [facility])  Assessment & Plan  Patient presenting with Cr 1 28, up from baseline 0 70-0 80  In setting of poor PO intake and lack of self care     1/27/23  - Creatinine went from 1 28 -> 0 94, closer to baseline  Plan:  · Holding home amiloride  · Avoid nephrotoxic agents  · Follow Cr    Hyponatremia  Assessment & Plan  Patient found to have Na 126 during appointment for microwave ablation   Patient with poor PO intake for the past 2 weeks    Started IVF 0 9% saline 75 cc/hr   Serum osmol 271     1/27/23  -Na is now 130    Plan:  · Likely due to poor PO intake   · Follow up on urine osmal, urine Na  · Follow up BMP at 4 PM and q 6 hour BMP  · D/C IV fluids    Crohn disease ([facility] [address] )  Assessment & Plan  Patient with history of Crohn's disease s/p ostomy Plan:  · Ostomy care    Essential hypertension  Assessment & Plan  Patient with history of HTN    Plan:  · Continue home dose of amlodipine 5 mg carvedilol 3 125 mg daily  · Holding home amiloride in setting of LUCILLE  · Monitor BP       Disposition: Remain inpatient for treatment of cellulitis of the right wrist     SUBJECTIVE     Patient seen and examined  No acute events overnight  Patient only endorsed complaint of serious 8/10 pain in his right wrist that extends up the mid forearm  His fingers feel numb  He reported no additional complaints and was not the happiest person during my visit  Review of systems denies nausea, vomiting, diarrhea, fever, chills, chest pain, palpitations, or belly pain  OBJECTIVE     Vitals:    23 2219 23 0831 23 1121 23 1412   BP: 122/75 120/82 118/79 120/76   BP Location:       Pulse: 96 80 83 74   Resp: 18 18 16 16   Temp: 97 7 °F (36 5 °C) 97 7 °F (36 5 °C) 97 9 °F (36 6 °C) 98 2 °F (36 8 °C)   TempSrc:       SpO2: 98% 96% 98% 98%   Weight:   59 9 kg (132 lb)    Height:   5' 9" (1 753 m)       Temperature:   Temp (24hrs), Av °F (36 7 °C), Min:97 7 °F (36 5 °C), Max:98 4 °F (36 9 °C)    Temperature: 98 2 °F (36 8 °C)  Intake & Output:  I/O        07 07 0701   07 07 0700    P  O   240     I V  (mL/kg)  946 7 (19 6)     Total Intake(mL/kg)  1186 7 (24 6)     Urine (mL/kg/hr)  350     Total Output  350     Net  +836 7                Weights:   IBW (Ideal Body Weight): 70 7 kg    Body mass index is 19 49 kg/m²  Weight (last 2 days)     Date/Time Weight    23 11:21:54 59 9 (132)    23 0749 48 2 (106 3)        Physical Exam  Constitutional:       Appearance: He is normal weight  Comments: cachetic   HENT:      Head: Normocephalic and atraumatic  Mouth/Throat:      Mouth: Mucous membranes are moist    Eyes:      General:         Right eye: No discharge  Left eye: No discharge  Conjunctiva/sclera: Conjunctivae normal    Cardiovascular:      Rate and Rhythm: Normal rate and regular rhythm  Pulses: Normal pulses  Heart sounds: Normal heart sounds  Pulmonary:      Effort: Pulmonary effort is normal       Breath sounds: Normal breath sounds  Abdominal:      General: Abdomen is flat  Bowel sounds are normal       Palpations: Abdomen is soft  Comments: Ostomy in place   Musculoskeletal:      Right wrist: Swelling and tenderness present  Decreased range of motion  Left wrist: Normal  No swelling or tenderness  Normal range of motion  Comments: Right wrist swelling and redness   Skin:     General: Skin is warm  Capillary Refill: Capillary refill takes less than 2 seconds  Coloration: Skin is jaundiced  Neurological:      General: No focal deficit present  Mental Status: He is alert  Psychiatric:         Mood and Affect: Mood normal          Behavior: Behavior normal        LABORATORY DATA     Labs: I have personally reviewed pertinent reports  Results from last 7 days   Lab Units 01/26/23  1230 01/26/23  0742   WBC Thousand/uL  --  15 14*   HEMOGLOBIN g/dL  --  11 9*   HEMATOCRIT %  --  34 3*   PLATELETS Thousands/uL 333 325      Results from last 7 days   Lab Units 01/27/23  0026 01/26/23 2023 01/26/23  1720   POTASSIUM mmol/L 3 7 3 0* 2 9*   CHLORIDE mmol/L 96 92* 89*   CO2 mmol/L 26 27 28   BUN mg/dL 16 17 18   CREATININE mg/dL 0 94 1 07 1 10   CALCIUM mg/dL 6 7* 7 1* 7 2*   ALK PHOS U/L 112 124* 117*   ALT U/L 36 38 38   AST U/L 59* 56* 61*     Results from last 7 days   Lab Units 01/27/23  0026 01/26/23 2023 01/26/23  1720   MAGNESIUM mg/dL 1 6 2 0 2 2          Results from last 7 days   Lab Units 01/26/23  0742   INR  1 18     Results from last 7 days   Lab Units 01/27/23  0026   LACTIC ACID mmol/L 1 1           IMAGING & DIAGNOSTIC TESTING     Radiology Results: I have personally reviewed pertinent reports    XR forearm 2 vw right    Result Date: 1/27/2023  Impression: 1  No evidence of acute osseous abnormality  2   Soft tissue swelling primarily involving the hand and wrist  Workstation performed: [id number]     XR hand 2 vw right    Result Date: 1/27/2023  Impression: 1  No evidence of acute osseous abnormality  2   Mild soft tissue swelling of the hand and wrist  Workstation performed: [id number]     Other Diagnostic Testing: I have personally reviewed pertinent reports  ACTIVE MEDICATIONS     Current Facility-Administered Medications   Medication Dose Route Frequency   • acetaminophen (TYLENOL) tablet 650 mg  650 mg Oral Q6H PRN   • amLODIPine (NORVASC) tablet 5 mg  5 mg Oral Daily   • buPROPion (WELLBUTRIN XL) 24 hr tablet 150 mg  150 mg Oral Daily   • calcium carbonate (TUMS) chewable tablet 500 mg  500 mg Oral Daily   • carvedilol (COREG) tablet 3 125 mg  3 125 mg Oral BID With Meals   • folic acid (FOLVITE) tablet 1 mg  1 mg Oral Daily   • heparin (porcine) subcutaneous injection 5,000 Units  5,000 Units Subcutaneous Q8H [address]   • levothyroxine tablet 50 mcg  50 mcg Oral Early Morning   • magnesium Oxide (MAG-OX) tablet 400 mg  400 mg Oral Daily   • melatonin tablet 3 mg  3 mg Oral HS   • mirtazapine (REMERON) tablet 15 mg  15 mg Oral HS   • ondansetron (ZOFRAN) injection 4 mg  4 mg Intravenous Q6H PRN   • pantoprazole (PROTONIX) EC tablet 40 mg  40 mg Oral Early Morning   • potassium chloride (K-DUR,KLOR-CON) CR tablet 20 mEq  20 mEq Oral BID   • pravastatin (PRAVACHOL) tablet 80 mg  80 mg Oral Daily With Dinner   • thiamine tablet 250 mg  250 mg Oral Daily   • umeclidinium-vilanterol 62 5-25 mcg/actuation inhaler 1 puff  1 puff Inhalation Daily   • vancomycin (VANCOCIN) IVPB (premix in dextrose) 750 mg 150 mL  750 mg Intravenous Q12H       VTE Pharmacologic Prophylaxis: Heparin  VTE Mechanical Prophylaxis: sequential compression device    Portions of the record may have been created with voice recognition software  Occasional wrong word or "sound a like" substitutions may have occurred due to the inherent limitations of voice recognition software    Read the chart carefully and recognize, using context, where substitutions have occurred   ==  [address] [place] [address] [address]

## 2023-01-27 NOTE — CASE MANAGEMENT
Case Management Assessment & Discharge Planning Note    Patient name Diamond Rouse  Location [facility] 925/[facility] 925-01 MRN [id number]  : 1955 Date 2023       Current Admission Date: 2023  Current Admission Diagnosis:Cellulitis of right hand   Patient Active Problem List    Diagnosis Date Noted   • Subclinical hypothyroidism 2022   • Anemia 2022   • Electrolyte abnormality 2022   • History of alcohol use disorder 2022   • HCC (hepatocellular carcinoma) ([address] ) 03/15/2022   • Encounter for follow-up surveillance of liver cancer 2022   • Dysthymic disorder 2021   • Crohn's disease of small intestine with other complication ([facility] )    • Vitamin B12 deficiency 2021   • Cataract    • Chronic obstructive pulmonary disease ([address] ) 2020   • Mixed hyperlipidemia 2020   • Kidney stone    • Gastroesophageal reflux disease without esophagitis    • Left wrist pain 2020   • Hypocalcemia 2020   • Acute pain of left wrist 09/10/2020   • Chronic, continuous use of opioids 09/10/2020   • Observed sleep apnea    • Palliative care patient 2020   • Abdominal wall abscess 2020   • Personal history of liver cancer 2020   • Abdominal pain 2020   • Tobacco abuse 2020   • Severe sepsis ([address] ) 2020   • Pneumonia 2020   • Chest pain 2020   • Liver mass 2020   • Syncope and collapse 2018   • Emphysema of lung ([address] )    • Cellulitis of right hand 2017   • Severe protein-calorie malnutrition ([address] ) 2017   • Colostomy in place [facility]) 2017   • Diarrhea 2017   • Hypomagnesemia 2017   • Hyponatremia 2017   • Acute kidney injury ([address] ) 2017   • Hypokalemia 2017   • Essential hypertension    • Emphysema/COPD ([facility] )    • Crohn disease ([address] )       LOS (days): 1  Geometric Mean LOS (GMLOS) (days): 3 20  Days to GMLOS:2 2     OBJECTIVE:    Risk of Unplanned Readmission Score: 32 09         Current admission status: Inpatient       Preferred Pharmacy:   [address] [place] [facility]  [address] [facility] [address]  Phone: [phone] Fax: [phone]    Primary Care Provider: Laxmi Phelps MD    Primary Insurance: MEDICARE  Secondary Insurance: AARP    ASSESSMENT:  Vahid Rajput Proxies    There are no active Health Care Proxies on file  Readmission Root Cause  30 Day Readmission: No    Patient Information  Admitted from[de-identified] Home  Mental Status: Alert  During Assessment patient was accompanied by: Not accompanied during assessment  Assessment information provided by[de-identified] Patient  Primary Caregiver: Self  Support Systems: Self, Daughter  [place] of Residence: [address] [place] [address] do you live in?: [place]  Home entry access options   Select all that apply : Stairs  Number of steps to enter home : 2  Type of Current Residence: [place]  In the last 12 months, was there a time when you were not able to pay the mortgage or rent on time?: No  In the last 12 months, how many places have you lived?: 1  In the last 12 months, was there a time when you did not have a steady place to sleep or slept in a shelter (including now)?: No  Homeless/housing insecurity resource given?: N/A  Living Arrangements: Lives w/ Daughter  Is patient a ?: No    Activities of Daily Living Prior to Admission  Functional Status: Independent  Completes ADLs independently?: Yes  Ambulates independently?: Yes  Does patient use assisted devices?: Yes  Assisted Devices (DME) used: Crutches  Does patient currently own DME?: Yes  What DME does the patient currently own?: Crutches  Does patient have a history of Outpatient Therapy (PT/OT)?: No  Does the patient have a history of Short-Term Rehab?: No  Does patient have a history of HHC?: Yes (PT- patient unsure of facility name)  Does patient currently have [facility]?: No         Patient Information Continued  Income Source: Pension/assisted  Within the past 12 months, you worried that your food would run out before you got the money to buy more : Never true  Within the past 12 months, the food you bought just didn't last and you didn't have money to get more : Never true  Food insecurity resource given?: N/A  Does patient receive dialysis treatments?: No  Does patient have a history of substance abuse?: Yes  Historical substance use preference: Alcohol/ETOH  Is patient currently in treatment for substance abuse?: N/A - sober (Per patient, 5 months sober)  Does patient have a history of Mental Health Diagnosis?: No         Means of Transportation  Means of Transport to Appts[de-identified] Drives Self  In the past 12 months, has lack of transportation kept you from medical appointments or from getting medications?: No  In the past 12 months, has lack of transportation kept you from meetings, work, or from getting things needed for daily living?: No  Was application for public transport provided?: N/A        DISCHARGE DETAILS:    Discharge planning discussed with[de-identified] Patient  Freedom of Choice: Yes     CM contacted family/caregiver?: No- see comments (Patient was alert and oriented)       Additional Comments: Met with patient at bedside to complete assessment and discharge planning  Patient answered all questions and advised that he is 5 months sober from alcohol and is not currently in any treatment  Patients daughter will be able to transport upon discharge

## 2023-01-27 NOTE — CONSULTS
Consultation - Infectious Disease   Eliz Car III 79 y o  male MRN: [id number]  Unit/Bed#: [facility] 925-01 Encounter: [id number]      Inpatient consult to Infectious Diseases  Consult performed by: Mariaa Corona MD  Consult ordered by: Cordell Urias DO          IMPRESSION & RECOMMENDATIONS:   Impression:  1  Right wrist arthropathy subacute cellulitis versus pseudogout  2  History of hepatocellular carcinoma  3  History of chronic ETOH  4  COPD  5  GERD  6  History of subclinical hypothyroidism    Recommendations:    Discuss with the primary service  1   Current examination of the right wrist reveals painful swelling with 1+ warmth without significant erythema  Findings are more compatible with an arthropathy than cellulitis  Would consider rheumatologic evaluation and possible arthrocentesis  2  Pending rheumatologic/orthopedic evaluation continue vancomycin at current dose with further dosing by pharmacy      HISTORY OF PRESENT ILLNESS:    Reason for Consult: Cellulitis RUE  HPI: Georgette Aviles is a 79y o  year old male with a history of hepatocellular carcinoma since 5/20, COPD, GERD chronic ETOH, protein calorie malnutrition, hypothyroidism who has been undergoing microwave ablations as an outpatient  On 1/26 when he appeared for an ablation by IR his daughter indicated over the prior 2 weeks he exhibited confusion and stopped caring for himself with decreased appetite and a generalized lack of awareness  In addition he was noted to have an increased WBC count and admission for further work-up and therapy was initiated and ablation was held  There was a concern for RUE cellulitis right wrist and patient was begun on cefazolin 1 g every 8 hours IV Rx which was transitioned to vancomycin initially at 750 mg every 12 hours IV and currently at 500 mg every 12 hours IV      X-rays showed no intraosseous abnormalities and was compatible with just soft tissue swelling over the right wrist and hand and ultrasound of the area showed no evidence of DVT or other abnormalities  Blood and urine cultures are negative so far  Initial WBC count was elevated at 15,140 and today was reduced to 12,750 without a left shift  Has remained afebrile  A uric acid was done on 1/26 and is normal at 5 9      Review of Systems positive findings include memory loss, confusion, right wrist painful swelling with decreased movement and mild increased redness  A [id number] point system-based review of systems is otherwise negative  PAST MEDICAL HISTORY:  Past Medical History:   Diagnosis Date   • LUCILLE (acute kidney injury) ([facility] [address] ) 6/28/2017   • Blindness of left eye     Since birth   • Cancer [facility])     liver   • Cataract    • Colon polyp    • Colostomy in place [facility]) 6/29/2017   • COPD (chronic obstructive pulmonary disease) (HCC)    • Crohn disease ([email or web address] )    • Emphysema of lung (HCC)    • Emphysema/COPD ([place] [address] )    • Essential hypertension    • GERD (gastroesophageal reflux disease)    • Hypertension    • Hypokalemia 6/28/2017   • Hypomagnesemia 6/29/2017   • Hyponatremia 6/28/2017   • Kidney stone    • [facility]el[facility] ([facility] )    • Pneumonia      Past Surgical History:   Procedure Laterality Date   • CATARACT EXTRACTION Bilateral    • CHOLECYSTECTOMY     • COLECTOMY      10 inchs of ileum   • COLONOSCOPY N/A 6/30/2017    Procedure: COLONOSCOPY;  Surgeon: Lisa Erickson MD;  Location: [facility];   Service: Gastroenterology   • COLOSTOMY     • FINGER AMPUTATION     • FINGER SURGERY Left    • IR BIOPSY LIVER MASS  6/8/2020   • IR MICROWAVE ABLATION  9/21/2022   • LITHOTRIPSY     • LIVER LOBECTOMY N/A 7/15/2020    Procedure: LIVER ABLATION, INTRAOPERATIVE U/S LIVER;  Surgeon: Kathe Dobbins MD;  Location: [facility];  Service: Surgical Oncology       FAMILY HISTORY:  Non-contributory    SOCIAL HISTORY:  Social History     Social History     Substance and Sexual Activity   Alcohol Use Not Currently   • Alcohol/week: 59 0 standard drinks   • Types: 49 Cans of beer, 10 Shots of liquor per week     Social History     Substance and Sexual Activity   Drug Use No     Social History     Tobacco Use   Smoking Status Former   • Packs/day: 1 50   • Years: 51 00   • Pack years: 76 50   • Types: Cigarettes   • Start date:    • Quit date: 2022   • Years since quittin 0   Smokeless Tobacco Never       ALLERGIES:  No Known Allergies    MEDICATIONS:  All current active medications have been reviewed  PHYSICAL EXAM:  Temp:  [97 7 °F (36 5 °C)-98 4 °F (36 9 °C)] 98 2 °F (36 8 °C)  HR:  [74-96] 74  Resp:  [16-18] 16  BP: (118-125)/(75-82) 120/76  SpO2:  [96 %-98 %] 98 %  Temp (24hrs), Av °F (36 7 °C), Min:97 7 °F (36 5 °C), Max:98 4 °F (36 9 °C)  Current: Temperature: 98 2 °F (36 8 °C)    Intake/Output Summary (Last 24 hours) at 2023 1642  Last data filed at 2023 1301  Gross per 24 hour   Intake 1186 67 ml   Output 950 ml   Net 236 67 ml       General Appearance:  Appearing well, nontoxic, and in no distress, appears stated age   Head:  Normocephalic, without obvious abnormality, atraumatic   Eyes:  PERRL, conjunctiva pale and sclera anicteric, both eyes   Nose: Nares normal, mucosa normal, no drainage   Throat: Oropharynx moist without lesions; lips, mucosa, and tongue normal; edentulous   Neck: Supple, symmetrical, trachea midline, no adenopathy, no tenderness/mass/nodules   Back:   Symmetric, no curvature, ROM normal, no CVA tenderness   Lungs:   Clear to auscultation bilaterally, no audible wheezes, rhonchi and rales, respirations unlabored   Chest Wall:  No tenderness or deformity   Heart:  Regular rate and rhythm, S1, S2 normal, no murmur, rub or gallop   Abdomen:   Soft, non-tender, non-distended, positive bowel sounds, no masses, no organomegaly    No CVA tenderness   Extremities:  Right wrist +1 increased warmth, edema, with mild decreased ROM    No increased erythema   Skin: Skin color, texture, turgor normal, no rashes or lesions  No draining wounds noted  Neurologic: Alert and oriented times 3,           Invasive Devices:   Peripheral IV 01/26/23 Left;Ventral (anterior) Forearm (Active)   Site Assessment WDL; Clean 01/27/23 0900   Dressing Type Transparent 01/27/23 0900   Line Status Infusing 01/27/23 0900   Dressing Status Clean;Dry; Intact 01/27/23 0900   Dressing Change Due 01/30/23 01/26/23 2015   Reason Not Rotated Not due 01/27/23 0900       Colostomy LLQ (Active)       Colostomy LLQ (Active)       LABS, IMAGING, & OTHER STUDIES:  Lab Results:      I have personally reviewed pertinent labs  Results from last 7 days   Lab Units 01/27/23  1500 01/26/23  1230 01/26/23  0742   WBC Thousand/uL 12 75*  --  15 14*   HEMOGLOBIN g/dL 10 4*  --  11 9*   PLATELETS Thousands/uL 293 333 325     Results from last 7 days   Lab Units 01/27/23  1500 01/27/23  0026 01/26/23  2023   SODIUM mmol/L 135 130* 130*   POTASSIUM mmol/L 3 8 3 7 3 0*   CHLORIDE mmol/L 100 96 92*   CO2 mmol/L 29 26 27   BUN mg/dL 13 16 17   CREATININE mg/dL 0 86 0 94 1 07   EGFR ml/min/1 73sq m 89 83 71   CALCIUM mg/dL 7 5* 6 7* 7 1*   AST U/L 37 59* 56*   ALT U/L 32 36 38   ALK PHOS U/L 112 112 124*     Results from last 7 days   Lab Units 01/26/23  1212   BLOOD CULTURE  No Growth at 24 hrs  No Growth at 24 hrs  Imaging Studies:   I have personally reviewed pertinent imaging study reports and images in PACS  EKG, Pathology, and Other Studies:   I have personally reviewed pertinent reports

## 2023-01-27 NOTE — MALNUTRITION/BMI
This medical record reflects one or more clinical indicators suggestive of malnutrition  Malnutrition Findings:   Adult Malnutrition type: Chronic illness  Adult Degree of Malnutrition: Malnutrition of moderate degree  Malnutrition Characteristics: Weight loss, Muscle loss, Fat loss                  360 Statement: moderate malnutriton related to medical conditions; evidenced by 8# wt loss (5 7%)  since 12/16/22 office visit wt of 140# to current measured wt of 132# on standing scale today; mild/moderate temporal muscle loss; mild/moderate subcutaneous fat loss of orbitals; Treatment: regular diet and ensure plus high protein TID    BMI Findings: Body mass index is 19 49 kg/m²  See Nutrition note dated 1/27/23 for additional details  Completed nutrition assessment is viewable in the nutrition documentation

## 2023-01-28 PROBLEM — E87.1 HYPONATREMIA: Chronic | Status: RESOLVED | Noted: 2017-06-28 | Resolved: 2023-01-28

## 2023-01-28 PROBLEM — M19.031 ARTHROPATHY OF RIGHT WRIST: Status: ACTIVE | Noted: 2017-12-04

## 2023-01-28 LAB
ANION GAP SERPL CALCULATED.3IONS-SCNC: 5 MMOL/L (ref 4–13)
BASOPHILS # BLD AUTO: 0.05 THOUSANDS/ÂΜL (ref 0–0.1)
BASOPHILS NFR BLD AUTO: 0 % (ref 0–1)
BUN SERPL-MCNC: 12 MG/DL (ref 5–25)
CALCIUM SERPL-MCNC: 8.1 MG/DL (ref 8.3–10.1)
CHLORIDE SERPL-SCNC: 105 MMOL/L (ref 96–108)
CO2 SERPL-SCNC: 28 MMOL/L (ref 21–32)
CREAT SERPL-MCNC: 0.86 MG/DL (ref 0.6–1.3)
CRP SERPL QL: 46.7 MG/L
EOSINOPHIL # BLD AUTO: 0.1 THOUSAND/ÂΜL (ref 0–0.61)
EOSINOPHIL NFR BLD AUTO: 1 % (ref 0–6)
ERYTHROCYTE [DISTWIDTH] IN BLOOD BY AUTOMATED COUNT: 12.5 % (ref 11.6–15.1)
ERYTHROCYTE [SEDIMENTATION RATE] IN BLOOD: 68 MM/HOUR (ref 0–19)
GFR SERPL CREATININE-BSD FRML MDRD: 89 ML/MIN/1.73SQ M
GLUCOSE SERPL-MCNC: 93 MG/DL (ref 65–140)
HCT VFR BLD AUTO: 33.4 % (ref 36.5–49.3)
HGB BLD-MCNC: 11 G/DL (ref 12–17)
IMM GRANULOCYTES # BLD AUTO: 0.07 THOUSAND/UL (ref 0–0.2)
IMM GRANULOCYTES NFR BLD AUTO: 1 % (ref 0–2)
LYMPHOCYTES # BLD AUTO: 1.74 THOUSANDS/ÂΜL (ref 0.6–4.47)
LYMPHOCYTES NFR BLD AUTO: 12 % (ref 14–44)
MAGNESIUM SERPL-MCNC: 1.4 MG/DL (ref 1.6–2.6)
MCH RBC QN AUTO: 31.6 PG (ref 26.8–34.3)
MCHC RBC AUTO-ENTMCNC: 32.9 G/DL (ref 31.4–37.4)
MCV RBC AUTO: 96 FL (ref 82–98)
MONOCYTES # BLD AUTO: 0.93 THOUSAND/ÂΜL (ref 0.17–1.22)
MONOCYTES NFR BLD AUTO: 7 % (ref 4–12)
NEUTROPHILS # BLD AUTO: 11.41 THOUSANDS/ÂΜL (ref 1.85–7.62)
NEUTS SEG NFR BLD AUTO: 79 % (ref 43–75)
NRBC BLD AUTO-RTO: 0 /100 WBCS
PLATELET # BLD AUTO: 358 THOUSANDS/UL (ref 149–390)
PMV BLD AUTO: 10.3 FL (ref 8.9–12.7)
POTASSIUM SERPL-SCNC: 4.1 MMOL/L (ref 3.5–5.3)
RBC # BLD AUTO: 3.48 MILLION/UL (ref 3.88–5.62)
SODIUM SERPL-SCNC: 138 MMOL/L (ref 135–147)
VANCOMYCIN SERPL-MCNC: 13.9 UG/ML (ref 10–20)
WBC # BLD AUTO: 14.3 THOUSAND/UL (ref 4.31–10.16)

## 2023-01-28 RX ORDER — PREDNISONE 20 MG/1
20 TABLET ORAL DAILY
Status: DISCONTINUED | OUTPATIENT
Start: 2023-01-28 | End: 2023-01-30 | Stop reason: HOSPADM

## 2023-01-28 RX ORDER — LIDOCAINE HYDROCHLORIDE 10 MG/ML
10 INJECTION, SOLUTION EPIDURAL; INFILTRATION; INTRACAUDAL; PERINEURAL ONCE
Status: COMPLETED | OUTPATIENT
Start: 2023-01-28 | End: 2023-01-28

## 2023-01-28 RX ORDER — AMILORIDE HYDROCHLORIDE 5 MG/1
5 TABLET ORAL DAILY
Status: DISCONTINUED | OUTPATIENT
Start: 2023-01-28 | End: 2023-01-30 | Stop reason: HOSPADM

## 2023-01-28 RX ORDER — MAGNESIUM SULFATE HEPTAHYDRATE 40 MG/ML
2 INJECTION, SOLUTION INTRAVENOUS ONCE
Status: COMPLETED | OUTPATIENT
Start: 2023-01-28 | End: 2023-01-28

## 2023-01-28 RX ADMIN — LIDOCAINE HYDROCHLORIDE 10 ML: 10 INJECTION, SOLUTION EPIDURAL; INFILTRATION; INTRACAUDAL; PERINEURAL at 11:58

## 2023-01-28 RX ADMIN — CARVEDILOL 3.12 MG: 3.12 TABLET, FILM COATED ORAL at 18:02

## 2023-01-28 RX ADMIN — VANCOMYCIN HYDROCHLORIDE 750 MG: 750 INJECTION, SOLUTION INTRAVENOUS at 10:07

## 2023-01-28 RX ADMIN — AMILORIDE HYDROCLORIDE 5 MG: 5 TABLET ORAL at 18:00

## 2023-01-28 RX ADMIN — CARVEDILOL 3.12 MG: 3.12 TABLET, FILM COATED ORAL at 10:06

## 2023-01-28 RX ADMIN — MAGNESIUM OXIDE TAB 400 MG (241.3 MG ELEMENTAL MG) 400 MG: 400 (241.3 MG) TAB at 10:05

## 2023-01-28 RX ADMIN — LEVOTHYROXINE SODIUM 50 MCG: 50 TABLET ORAL at 05:08

## 2023-01-28 RX ADMIN — UMECLIDINIUM BROMIDE AND VILANTEROL TRIFENATATE 1 PUFF: 62.5; 25 POWDER RESPIRATORY (INHALATION) at 10:09

## 2023-01-28 RX ADMIN — PANTOPRAZOLE SODIUM 40 MG: 40 TABLET, DELAYED RELEASE ORAL at 05:08

## 2023-01-28 RX ADMIN — CALCIUM CARBONATE (ANTACID) CHEW TAB 500 MG 500 MG: 500 CHEW TAB at 10:06

## 2023-01-28 RX ADMIN — MELATONIN 3 MG: at 21:48

## 2023-01-28 RX ADMIN — MIRTAZAPINE 15 MG: 15 TABLET, FILM COATED ORAL at 21:48

## 2023-01-28 RX ADMIN — POTASSIUM CHLORIDE 20 MEQ: 1500 TABLET, EXTENDED RELEASE ORAL at 18:00

## 2023-01-28 RX ADMIN — BUPROPION HYDROCHLORIDE 150 MG: 150 TABLET, FILM COATED, EXTENDED RELEASE ORAL at 10:05

## 2023-01-28 RX ADMIN — PRAVASTATIN SODIUM 80 MG: 80 TABLET ORAL at 18:00

## 2023-01-28 RX ADMIN — HEPARIN SODIUM 5000 UNITS: 5000 INJECTION INTRAVENOUS; SUBCUTANEOUS at 13:40

## 2023-01-28 RX ADMIN — PREDNISONE 20 MG: 20 TABLET ORAL at 18:00

## 2023-01-28 RX ADMIN — VANCOMYCIN HYDROCHLORIDE 750 MG: 750 INJECTION, SOLUTION INTRAVENOUS at 21:48

## 2023-01-28 RX ADMIN — MAGNESIUM SULFATE HEPTAHYDRATE 2 G: 40 INJECTION, SOLUTION INTRAVENOUS at 12:44

## 2023-01-28 RX ADMIN — FOLIC ACID 1 MG: 1 TABLET ORAL at 10:05

## 2023-01-28 RX ADMIN — POTASSIUM CHLORIDE 20 MEQ: 1500 TABLET, EXTENDED RELEASE ORAL at 10:06

## 2023-01-28 RX ADMIN — HEPARIN SODIUM 5000 UNITS: 5000 INJECTION INTRAVENOUS; SUBCUTANEOUS at 05:08

## 2023-01-28 RX ADMIN — THIAMINE HCL TAB 100 MG 250 MG: 100 TAB at 10:05

## 2023-01-28 RX ADMIN — HEPARIN SODIUM 5000 UNITS: 5000 INJECTION INTRAVENOUS; SUBCUTANEOUS at 21:48

## 2023-01-28 RX ADMIN — AMLODIPINE BESYLATE 5 MG: 5 TABLET ORAL at 10:05

## 2023-01-28 NOTE — PROGRESS NOTES
Mally Ward is a 79 y o  male who is currently ordered Vancomycin IV with management by the Pharmacy Consult service  Relevant clinical data and objective / subjective history reviewed  Vancomycin Assessment:  Indication and Goal AUC/Trough: Soft tissue (goal -600, trough >10), Trough 13 9 on  at 05:16  Clinical Status: stable  Micro:   UC - no results, BC - no growth at 24 hours  Renal Function:  SCr: 0 86 mg/dL  CrCl: 70 6 mL/min  Renal replacement: Not on dialysis  Days of Therapy: 3  Current Dose: 750 mg IV Q12H  Vancomycin Plan:  New Dosin mg q12h  Estimated AUC: 559 mcg*hr/mL  Estimated Trough: 18 mcg/mL  Next Level: Trough today ( - 05:16) was 13 9; next trough on 2023 at 0600  Renal Function Monitoring: Daily BMP and [place] will continue to follow closely for s/sx of nephrotoxicity, infusion reactions and appropriateness of therapy  BMP and CBC will be ordered per protocol  We will continue to follow the patient’s culture results and clinical progress daily      Adolfo Merlin, Pharmacist

## 2023-01-28 NOTE — PROGRESS NOTES
INTERNAL MEDICINE RESIDENCY PROGRESS NOTE     Name: Ajit Medeiros   Age & Sex: 79 y o  male   MRN: [id number]  Unit/Bed#: [facility] 925-01   Encounter: [id number]  Team: SOD Team B     PATIENT INFORMATION     Name: Ajit Medeiros   Age & Sex: 79 y o  male   MRN: [id number]  Hospital Stay Days: 2    ASSESSMENT/PLAN     Principal Problem:    Arthropathy of right wrist  Active Problems:    Essential hypertension    Crohn disease ([facility] [address] )    Acute kidney injury ([facility] [address] )    Colostomy in place ([facility] [address] )    Severe protein-calorie malnutrition ([facility] [address] )    Hypocalcemia    Hypomagnesemia    Hypokalemia    Gastroesophageal reflux disease without esophagitis    Chronic obstructive pulmonary disease (HCC)    Dysthymic disorder    HCC (hepatocellular carcinoma) ([facility] )    Electrolyte abnormality    History of alcohol use disorder    Anemia    Subclinical hypothyroidism      Subclinical hypothyroidism  Assessment & Plan  Patient with history of subclinical hypothyroidism   Last thyroid levels in 11/11/22: TSH 16 477 and T4 1 06  Recheck and TSH 3 260     Plan:  · Continue home dose levothyroxine 50 mcg    Anemia  Assessment & Plan  Patient with history of anemia, baseline Hgb around 9 5  Presented with Hgb 11 9, likely from concentration in setting of dehydration  Past workup has shown anemia of chronic disease  Elevated T bili, labs negative for shearing (normal LDH)    Hemoglobin stable      Plan:  · Monitor Hgb   · Continue home doses folic acid, thiamine  · Transfuse if less than 7      History of alcohol use disorder  Assessment & Plan  Patient with history of alcohol use disorder  Per daughter, has not had any alcohol since last hospital admission    Plan:  · Continue thiamine and folic acid supplementation    Electrolyte abnormality  Assessment & Plan  Patient presenting with low Na, K and Cl  Likely in setting of PO intake and Crohn's disease     Hypokalemia -resolved  Hypomagnesemia -magnesium 1 4 today, repleted    Plan:  · Replete electrolytes as needed   · Tele monitoring  · Encourage better PO intake  · Nutrition consult     [facility]  (hepatocellular carcinoma) ([facility] )  Assessment & Plan  Patient with history of [facility] [address]  who presented for microwave ablation   Liver mass first discovered in May, 2020 and biopsy confirmed diagnosis  Received ablation with Dr Grant Sep in July 2020  Surveillance MRI found new masses and referred to IR for ablation   Last MRI in December 2022  Hepatic panel: T bili 1 80, alk phos 103, AST 33, ALT 28, albumin 2 4     Plan  · Holding procedure in setting of electrolyte abnormalities and infection workup   · Consider repeat imaging if new symptoms present     Dysthymic disorder  Assessment & Plan  Patient with history of dysthymic disorder  Per daughter, patient has been more withdrawn and with flat affect recently     Plan:  · Continue Wellbutrin 150 mg and Remeron 15 mg daily     Chronic obstructive pulmonary disease ([facility] )  Assessment & Plan  Patient with history of COPD    Plan:  · Continue with umeclidinium-vilanterol daily     Gastroesophageal reflux disease without esophagitis  Assessment & Plan  Patient with history or GERD    Plan:  · Continue Tums 500 mg    · Continue pantoprazole while inpatient    Hypokalemia  Assessment & Plan  Patient presented for microwave ablation appointment and found to have K 2 4   Prescribed K supplements outpatient   Poor PO intake for past 2 weeks and lack of self care  EKG sinus with PACs     Resolved  Potassium 4 1 today  Plan:  · Continue with daily K supplementation 20meq BID         Hypomagnesemia  Assessment & Plan  Patient with history of hypomagnesemia, on supplementation  Mg on presentation low at 0 7  ECG sinus rhythm with PACs    Medium 1 4 this morning      Plan:  · IV mag 2 g x 1 today  · Continue daily Mg supplementation     Hypocalcemia  Assessment & Plan  Patient with history of hypocalcemia, likely due to poor PO intake   Corrected Ca 8 7    Plan:  · Tums 500 mg  · Follow up albumin for corrected Ca      Severe protein-calorie malnutrition ([facility] [address] )  Assessment & Plan  Patient with history of protein calorie malnutrition in setting of hepatocellular carcinoma  Presents with poor self care and PO intake     Plan:  · Nutrition consult - regular diet and ensure plus high protein TID                BMI Findings: Body mass index is 15 7 kg/m²  Colostomy in place [facility])  Assessment & Plan  Patient with colostomy due to Crohn's disease    Plan:  · Ostomy care    Acute kidney injury [facility])  Assessment & Plan  Patient presenting with Cr 1 28, up from baseline 0 70-0 80  In setting of poor PO intake and lack of self care     Creatinine baseline       Plan:  · Resume home amiloride  · Avoid nephrotoxic agents  · Follow Cr    Crohn disease ([facility] 75 )  Assessment & Plan  Patient with history of Crohn's disease s/p ostomy     Plan:  · Ostomy care    Essential hypertension  Assessment & Plan  Patient with history of HTN    Blood Pressure: 104/62     Plan:  · Continue home dose of amlodipine 5 mg and carvedilol 3 125 mg daily  · Resume amiloride 5 mg daily  · Monitor BP     * Arthropathy of right wrist  Assessment & Plan  Patient presenting to outpatient microwave ablation appointment and found to have concern for cellulitis of R wrist  Patient does not recall any trauma or injury, but memory has been poor recently  Leukocytosis on presentation  Uric acid 5 9  CRP 46  ESR 68  Blood cultures negative at 48 hours  X-ray of wrist and forearm show no intraosseous abnormalities and just soft tissue swelling of the wrist and hand  US of R wrist - no evidence of DVT or abnormalities    Differential: Pseudogout versus subacute cellulitis    Orthopedic surgery attempted to aspirate right wrist joint without success  Discussed with rheumatology who reports patient can be trialed on prednisone 20 mg daily for pain control      Plan:  · Treatment with IV vanco 500mg q12  · Follow up blood cultures  · Pain control with tylenol 650 mg PRN q 6 hours  · Start prednisone 20 mg daily  · MRSA swab  · Consult ID and rheumatology, appreciate recommendations    Hyponatremia-resolved as of 2023  Assessment & Plan  Patient found to have Na 126 during appointment for microwave ablation   Patient with poor PO intake for the past 2 weeks  Serum osmol 271     Resolved    Plan:  · Likely due to poor PO intake       Disposition: continue inpatient for IV antibiotics     SUBJECTIVE     Patient seen and examined  No acute events overnight  Reports persistent pain and swelling in the right hand  Difficulty closing his right hand  Denies any improvement with use of antibiotics  No other complaints  OBJECTIVE     Vitals:    23 2030 23 2205 23 0735 23 1426   BP:  105/57 121/76 104/62   BP Location:  Left arm     Pulse:  96 79 86   Resp:  16 18 18   Temp:  98 1 °F (36 7 °C) 97 7 °F (36 5 °C) 97 7 °F (36 5 °C)   TempSrc:  Temporal     SpO2: 96% 96% 97% 95%   Weight:       Height:          Temperature:   Temp (24hrs), Av 8 °F (36 6 °C), Min:97 5 °F (36 4 °C), Max:98 1 °F (36 7 °C)    Temperature: 97 7 °F (36 5 °C)  Intake & Output:  I/O        0701   0700  0701   0700  0701   0700    P  O  240      I V  (mL/kg) 946 7 (19 6)      Total Intake(mL/kg) 1186 7 (24 6)      Urine (mL/kg/hr) 350 750 (0 5)     Total Output 350 750     Net +836 7 -750            Unmeasured Emesis Occurrence  1 x         Weights:   IBW (Ideal Body Weight): 70 7 kg    Body mass index is 19 49 kg/m²  Weight (last 2 days)     Date/Time Weight    23 11:21:54 59 9 (132)    23 0749 48 2 (106 3)        Physical Exam  Constitutional:       General: He is not in acute distress  Appearance: Normal appearance  He is not ill-appearing, toxic-appearing or diaphoretic  HENT:      Head: Normocephalic and atraumatic     Eyes:      General: No scleral icterus  Conjunctiva/sclera: Conjunctivae normal    Cardiovascular:      Rate and Rhythm: Normal rate and regular rhythm  Pulses: Normal pulses  Heart sounds: Normal heart sounds  No murmur heard  No friction rub  No gallop  Pulmonary:      Effort: Pulmonary effort is normal  No respiratory distress  Breath sounds: Normal breath sounds  No wheezing or rales  Abdominal:      General: Abdomen is flat  Bowel sounds are normal       Palpations: Abdomen is soft  Tenderness: There is no abdominal tenderness  Musculoskeletal:         General: Swelling and tenderness present  Normal range of motion  Cervical back: Normal range of motion and neck supple  No muscular tenderness  Comments: Right hand pain and swelling from mid forearm and distally  No erythema  Skin:     General: Skin is warm and dry  Capillary Refill: Capillary refill takes less than 2 seconds  Neurological:      General: No focal deficit present  Mental Status: He is alert and oriented to person, place, and time  Psychiatric:         Mood and Affect: Mood normal          Behavior: Behavior normal        LABORATORY DATA     Labs: I have personally reviewed pertinent reports    Results from last 7 days   Lab Units 01/28/23  0516 01/27/23  1500 01/26/23  1230 01/26/23  0742   WBC Thousand/uL 14 30* 12 75*  --  15 14*   HEMOGLOBIN g/dL 11 0* 10 4*  --  11 9*   HEMATOCRIT % 33 4* 32 0*  --  34 3*   PLATELETS Thousands/uL 358 293 333 325   NEUTROS PCT % 79* 83*  --   --    MONOS PCT % 7 8  --   --       Results from last 7 days   Lab Units 01/28/23  0516 01/27/23  1500 01/27/23  0026 01/26/23  2023   POTASSIUM mmol/L 4 1 3 8 3 7 3 0*   CHLORIDE mmol/L 105 100 96 92*   CO2 mmol/L 28 29 26 27   BUN mg/dL 12 13 16 17   CREATININE mg/dL 0 86 0 86 0 94 1 07   CALCIUM mg/dL 8 1* 7 5* 6 7* 7 1*   ALK PHOS U/L  --  112 112 124*   ALT U/L  --  32 36 38   AST U/L  --  37 59* 56*     Results from last 7 days   Lab Units 01/28/23  0516 01/27/23  0026 01/26/23 2023   MAGNESIUM mg/dL 1 4* 1 6 2 0          Results from last 7 days   Lab Units 01/26/23  0742   INR  1 18     Results from last 7 days   Lab Units 01/27/23  0026   LACTIC ACID mmol/L 1 1           IMAGING & DIAGNOSTIC TESTING     Radiology Results: I have personally reviewed pertinent reports  XR forearm 2 vw right    Result Date: 1/27/2023  Impression: 1  No evidence of acute osseous abnormality  2   Soft tissue swelling primarily involving the hand and wrist  Workstation performed: [id number]     XR hand 2 vw right    Result Date: 1/27/2023  Impression: 1  No evidence of acute osseous abnormality  2   Mild soft tissue swelling of the hand and wrist  Workstation performed: [id number]     Other Diagnostic Testing: I have personally reviewed pertinent reports      ACTIVE MEDICATIONS     Current Facility-Administered Medications   Medication Dose Route Frequency   • acetaminophen (TYLENOL) tablet 650 mg  650 mg Oral Q6H PRN   • AMILoride tablet 5 mg  5 mg Oral Daily   • amLODIPine (NORVASC) tablet 5 mg  5 mg Oral Daily   • buPROPion (WELLBUTRIN XL) 24 hr tablet 150 mg  150 mg Oral Daily   • calcium carbonate (TUMS) chewable tablet 500 mg  500 mg Oral Daily   • carvedilol (COREG) tablet 3 125 mg  3 125 mg Oral BID With Meals   • folic acid (FOLVITE) tablet 1 mg  1 mg Oral Daily   • heparin (porcine) subcutaneous injection 5,000 Units  5,000 Units Subcutaneous Q8H [facility]   • levothyroxine tablet 50 mcg  50 mcg Oral Early Morning   • magnesium Oxide (MAG-OX) tablet 400 mg  400 mg Oral Daily   • melatonin tablet 3 mg  3 mg Oral HS   • mirtazapine (REMERON) tablet 15 mg  15 mg Oral HS   • ondansetron (ZOFRAN) injection 4 mg  4 mg Intravenous Q6H PRN   • pantoprazole (PROTONIX) EC tablet 40 mg  40 mg Oral Early Morning   • potassium chloride (K-DUR,KLOR-CON) CR tablet 20 mEq  20 mEq Oral BID   • pravastatin (PRAVACHOL) tablet 80 mg  80 mg Oral Daily With Dinner   • predniSONE tablet 20 mg  20 mg Oral Daily   • thiamine tablet 250 mg  250 mg Oral Daily   • umeclidinium-vilanterol 62 5-25 mcg/actuation inhaler 1 puff  1 puff Inhalation Daily   • vancomycin (VANCOCIN) IVPB (premix in dextrose) 750 mg 150 mL  750 mg Intravenous Q12H       VTE Pharmacologic Prophylaxis: Heparin  VTE Mechanical Prophylaxis: sequential compression device    Portions of the record may have been created with voice recognition software  Occasional wrong word or "sound a like" substitutions may have occurred due to the inherent limitations of voice recognition software    Read the chart carefully and recognize, using context, where substitutions have occurred   ==  Jn Agosto  Internal Medicine Residency PGY-3

## 2023-01-28 NOTE — PROGRESS NOTES
Daughter Arline Elgin at bedside, requesting to be updated by team  Dr Malini Morrison made aware  Stated he will call and update

## 2023-01-28 NOTE — PLAN OF CARE
Problem: Nutrition/Hydration-ADULT  Goal: Nutrient/Hydration intake appropriate for improving, restoring or maintaining nutritional needs  Description: Monitor and assess patient's nutrition/hydration status for malnutrition  Collaborate with interdisciplinary team and initiate plan and interventions as ordered  Monitor patient's weight and dietary intake as ordered or per policy  Utilize nutrition screening tool and intervene as necessary  Determine patient's food preferences and provide high-protein, high-caloric foods as appropriate       INTERVENTIONS:  - Monitor oral intake, urinary output, labs, and treatment plans  - Assess nutrition and hydration status and recommend course of action  - Evaluate amount of meals eaten  - Assist patient with eating if necessary   - Allow adequate time for meals  - Recommend/ encourage appropriate diets, oral nutritional supplements, and vitamin/mineral supplements  - Order, calculate, and assess calorie counts as needed  - Recommend, monitor, and adjust tube feedings and TPN/PPN based on assessed needs  - Assess need for intravenous fluids  - Provide specific nutrition/hydration education as appropriate  - Include patient/family/caregiver in decisions related to nutrition  Outcome: Progressing     Problem: PAIN - ADULT  Goal: Verbalizes/displays adequate comfort level or baseline comfort level  Description: Interventions:  - Encourage patient to monitor pain and request assistance  - Assess pain using appropriate pain scale  - Administer analgesics based on type and severity of pain and evaluate response  - Implement non-pharmacological measures as appropriate and evaluate response  - Consider cultural and social influences on pain and pain management  - Notify physician/advanced practitioner if interventions unsuccessful or patient reports new pain  Outcome: Progressing     Problem: INFECTION - ADULT  Goal: Absence or prevention of progression during hospitalization  Description: INTERVENTIONS:  - Assess and monitor for signs and symptoms of infection  - Monitor lab/diagnostic results  - Monitor all insertion sites, i e  indwelling lines, tubes, and drains  - Monitor endotracheal if appropriate and nasal secretions for changes in amount and color  - [place] appropriate cooling/warming therapies per order  - Administer medications as ordered  - Instruct and encourage patient and family to use good hand hygiene technique  - Identify and instruct in appropriate isolation precautions for identified infection/condition  Outcome: Progressing

## 2023-01-28 NOTE — PROGRESS NOTES
Pranay spaulding  this  message to the  Resident  to  fis   in  the progress note the  [facility]  inquire  Dr Cheri Price

## 2023-01-28 NOTE — CONSULTS
Orthopedics   Vishal Khoury III 79 y o  male MRN: [id number]  Unit/Bed#: [facility] 925-01      Chief Complaint:   right wrist pain    HPI:   79 y o male  Right hand dominant complaining of right hand and wrist pain  Pain is sharp in character, Located at the right wrist, started on Wednesday of this week without trauma to the area  Pain is constant in duration  Exacerbating factors are movement, remitting factors involve rest  Does not radiate  Does not have a history of gout or known arthritis  Orthopaedics was consulted by the primary team due to recommendations by ID for wrist aspiration for further evaluation of wrist pain  PMH significant for [place] [address]  for which he was going to get an ablation for on Wednesday and it was noticed that his right hand was swollen and he was sent to [facility] for admission due to his right hand  He has a history of alcohol use and liver cirrhosis in addition to his [place] [address]  Occupation Unemployed currently      Review Of Systems:   · Skin: Normal  · Neuro: See HPI  · Musculoskeletal: See HPI  · 14 point review of systems negative except as stated above     Past Medical History:   Past Medical History:   Diagnosis Date   • LUCILLE (acute kidney injury) ([place] [address] ) 6/28/2017   • Blindness of left eye     Since birth   • Cancer [facility])     liver   • Cataract    • Colon polyp    • Colostomy in place [facility]) 6/29/2017   • COPD (chronic obstructive pulmonary disease) (HCC)    • Crohn disease ([place] [address] )    • Emphysema of lung (HCC)    • Emphysema/COPD ([place] [address] )    • Essential hypertension    • GERD (gastroesophageal reflux disease)    • Hypertension    • Hypokalemia 6/28/2017   • Hypomagnesemia 6/29/2017   • Hyponatremia 6/28/2017   • Kidney stone    • Osteomyelitis ([place] [address] )    • Pneumonia        Past Surgical History:   Past Surgical History:   Procedure Laterality Date   • CATARACT EXTRACTION Bilateral    • CHOLECYSTECTOMY     • COLECTOMY      10 inchs of ileum   • COLONOSCOPY N/A 6/30/2017    Procedure: COLONOSCOPY; Surgeon: Tai Martin MD;  [facility]: [facility]; Service: Gastroenterology   • COLOSTOMY     • FINGER AMPUTATION     • FINGER SURGERY Left    • IR BIOPSY LIVER MASS  2020   • IR MICROWAVE ABLATION  2022   • LITHOTRIPSY     • LIVER LOBECTOMY N/A 7/15/2020    Procedure: LIVER ABLATION, INTRAOPERATIVE U/S LIVER;  Surgeon: Rachid Tomlinson MD;  [facility]: [facility];  Service: Surgical Oncology       Family History:  Family history reviewed and non-contributory  Family History   Problem Relation Age of Onset   • Hypertension Father    • Heart disease Sister        Social History:  Social History     Socioeconomic History   • Marital status:      Spouse name: None   • Number of children: None   • Years of education: None   • Highest education level: None   Occupational History   • None   Tobacco Use   • Smoking status: Former     Packs/day: 1 50     Years: 51 00     Pack years: 76 50     Types: Cigarettes     Start date:      Quit date: 2022     Years since quittin 0   • Smokeless tobacco: Never   Vaping Use   • Vaping Use: Never used   Substance and Sexual Activity   • Alcohol use: Not Currently     Alcohol/week: 59 0 standard drinks     Types: 49 Cans of beer, 10 Shots of liquor per week   • Drug use: No   • Sexual activity: Not Currently   Other Topics Concern   • None   Social History Narrative   • None     Social Determinants of Health     Financial Resource Strain: Low Risk    • Difficulty of Paying Living Expenses: Not hard at all   Food Insecurity: No Food Insecurity   • Worried About Running Out of Food in the Last Year: Never true   • Ran Out of Food in the Last Year: Never true   Transportation Needs: No Transportation Needs   • Lack of Transportation (Medical): No   • Lack of Transportation (Non-Medical):  No   Physical Activity: Not on file   Stress: Not on file   Social Connections: Not on file   Intimate Partner Violence: Not on file   Housing Stability: Low Risk    • Unable to Pay for Housing in the Last Year: No   • Number of Places Lived in the Last Year: 1   • Unstable Housing in the Last Year: No       Allergies:   No Known Allergies        Labs:  0   Lab Value Date/Time    HCT 33 4 (L) 01/28/2023 0516    HCT 32 0 (L) 01/27/2023 1500    HCT 34 3 (L) 01/26/2023 0742    HGB 11 0 (L) 01/28/2023 0516    HGB 10 4 (L) 01/27/2023 1500    HGB 11 9 (L) 01/26/2023 0742    INR 1 18 01/26/2023 0742    WBC 14 30 (H) 01/28/2023 0516    WBC 12 75 (H) 01/27/2023 1500    WBC 15 14 (H) 01/26/2023 0742    ESR 48 (H) 11/09/2022 0533    CRP 21 1 (H) 11/09/2022 0533       Meds:    Current Facility-Administered Medications:   •  acetaminophen (TYLENOL) tablet 650 mg, 650 mg, Oral, Q6H PRN, Arlin Ortega MD  •  amLODIPine (NORVASC) tablet 5 mg, 5 mg, Oral, Daily, Arlin Ortega MD, 5 mg at 01/28/23 1005  •  buPROPion (WELLBUTRIN XL) 24 hr tablet 150 mg, 150 mg, Oral, Daily, Arlin Ortega MD, 150 mg at 01/28/23 1005  •  calcium carbonate (TUMS) chewable tablet 500 mg, 500 mg, Oral, Daily, Arlin Ortega MD, 500 mg at 01/28/23 1006  •  carvedilol (COREG) tablet 3 125 mg, 3 125 mg, Oral, BID With Meals, Arlin Ortega MD, 3 125 mg at 78/28/61 7691  •  folic acid (FOLVITE) tablet 1 mg, 1 mg, Oral, Daily, Arlin Ortega MD, 1 mg at 01/28/23 1005  •  heparin (porcine) subcutaneous injection 5,000 Units, 5,000 Units, Subcutaneous, Q8H [facility], 5,000 Units at 01/28/23 0508 **AND** [COMPLETED] Platelet count, , , Once, Arlin Ortega MD  •  levothyroxine tablet 50 mcg, 50 mcg, Oral, Early Morning, Arlin Ortega MD, 50 mcg at 01/28/23 8363  •  magnesium Oxide (MAG-OX) tablet 400 mg, 400 mg, Oral, Daily, Arlin Ortega MD, 400 mg at 01/28/23 1005  •  magnesium sulfate 2 g/50 mL IVPB (premix) 2 g, 2 g, Intravenous, Once, Jocelyne Singh DO  •  melatonin tablet 3 mg, 3 mg, Oral, HS, Roly Aragon MD, 3 mg at 01/27/23 2117  •  mirtazapine (REMERON) tablet 15 mg, 15 mg, Oral, HS, Olamide Melendez MD, 15 mg at 01/27/23 2114  •  ondansetron [facility]) injection 4 mg, 4 mg, Intravenous, Q6H PRN, Jocelyne Singh DO, 4 mg at 01/27/23 2231  •  pantoprazole (PROTONIX) EC tablet 40 mg, 40 mg, Oral, Early Morning, Jocelyne Singh DO, 40 mg at 01/28/23 0508  •  potassium chloride (K-DUR,KLOR-CON) CR tablet 20 mEq, 20 mEq, Oral, BID, Olamide Melendez MD, 20 mEq at 01/28/23 1006  •  pravastatin (PRAVACHOL) tablet 80 mg, 80 mg, Oral, Daily With Mike Starr MD, 80 mg at 01/27/23 1628  •  thiamine tablet 250 mg, 250 mg, Oral, Daily, Olamide Melendez MD, 250 mg at 01/28/23 1005  •  umeclidinium-vilanterol 62 5-25 mcg/actuation inhaler 1 puff, 1 puff, Inhalation, Daily, Olamide Melendez MD, 1 puff at 01/28/23 1009  •  vancomycin (VANCOCIN) IVPB (premix in dextrose) 750 mg 150 mL, 750 mg, Intravenous, Q12H, Anita Saint, MD, Last Rate: 150 mL/hr at 01/28/23 1007, 750 mg at 01/28/23 1007    Blood Culture:   Lab Results   Component Value Date    BLOODCX No Growth at 24 hrs  01/26/2023    [place] No Growth at 24 hrs  01/26/2023       Wound Culture:   Lab Results   Component Value Date    WOUNDCULT 1+ Growth of Enterobacter cloacae complex (A) 07/31/2020       Ins and Outs:  I/O last 24 hours: In: -   Out: 750 [Urine:750]          Physical Exam:   /76   Pulse 79   Temp 97 7 °F (36 5 °C)   Resp 18   Ht 5' 9" (1 753 m)   Wt 59 9 kg (132 lb)   SpO2 97%   BMI 19 49 kg/m²   Gen: Alert and oriented to person, place, time  HEENT: EOMI, eyes clear, moist mucus membranes, hearing intact  Respiratory: Bilateral chest rise  No audible wheezing found  Cardiovascular: Regular Rate and Rhythm  Abdomen: soft nontender/nondistended  · Musculoskeletal: right Hand  · Skin: intact overlying wrist and hand with mild warmth to palpation compared to left wrist  No erythema  Skin able to be wrinkled  Mild swelling about soft tissues   No evidence of wrist joint effusion · AROM of wrist from 30 degrees of extension to 45 degrees of flexion  · Digits remain unaffected held in flexion  · No Fusiform Swelling over digits   · No TTP volar wrist or hand  · Pain with Passive Extension past 45 degrees of flexion and extension of wrist   · Able to perform 50 degrees of pronation and supination actively   · 2+ Radial & Ulnar Pulses palpable     Radiology:   I personally reviewed the films  X-rays AP/Lateral and oblique views of right hand show no acute fracture or dislocation  No scapholunate widening is noted  No periarticular osteopenia noted  No erosions or periosteal reaction noted  X ray of the right forearm does not demonstrate acute fracture or dislocation  There are no bony lesions noted  Procedure- Orthopedics   Pawel Williamson III 79 y o  male MRN: [id number]  Unit/Bed#: [facility] 925-01    Procedure: right wrist aspiration    After sterile preparation of the skin overlying the wrist, local anesthetic was provided with 5cc of 1% lidocaine  A 21 gauge needle was then  inserted distal and ulnar to Daphney's tubercle on the dorsal aspect of the wrist  The aspiration did not yield fluid  A second attempt was performed slightly more distal to Daphney's tubercle without success  Sterile dressing was then applied  Pt tolerated the procedure well and was neurovascularly intact both pre and post procedure  Reid Disla MD      _*_*_*_*_*_*_*_*_*_*_*_*_*_*_*_*_*_*_*_*_*_*_*_*_*_*_*_*_*_*_*_*_*_*_*_*_*_*_*_*_*    Assessment:  79 y o male with mild  right wrist degenerative arthropathy and acute atraumatic pain  Etiology is unlikely infectious  However, without aspirate or CRP at this time, can not rule out crystal arthropathy versus infectious arthropathy  Inflammatory arthropathy may be considered as well, however, the patient's physical exam does not present with erythema, concerning warmth, or periarticular osteoporotic findings on X ray within the carpus       Plan: · Cont   abx per primary team  · WBAT to right upper extremity   · Will follow up CRP and ESR  · Consider ultrasound to right wrist to determine true evidence of effusion  · Heat to affected area  · Analgesics for pain  · Dispo: Ortho will follow       Garrett Castillo MD

## 2023-01-29 LAB
ANION GAP SERPL CALCULATED.3IONS-SCNC: 6 MMOL/L (ref 4–13)
BUN SERPL-MCNC: 17 MG/DL (ref 5–25)
CALCIUM SERPL-MCNC: 8.1 MG/DL (ref 8.3–10.1)
CHLORIDE SERPL-SCNC: 111 MMOL/L (ref 96–108)
CO2 SERPL-SCNC: 23 MMOL/L (ref 21–32)
CREAT SERPL-MCNC: 0.8 MG/DL (ref 0.6–1.3)
GFR SERPL CREATININE-BSD FRML MDRD: 92 ML/MIN/1.73SQ M
GLUCOSE SERPL-MCNC: 172 MG/DL (ref 65–140)
POTASSIUM SERPL-SCNC: 4.5 MMOL/L (ref 3.5–5.3)
SODIUM SERPL-SCNC: 140 MMOL/L (ref 135–147)

## 2023-01-29 RX ADMIN — CARVEDILOL 3.12 MG: 3.12 TABLET, FILM COATED ORAL at 18:02

## 2023-01-29 RX ADMIN — FOLIC ACID 1 MG: 1 TABLET ORAL at 09:44

## 2023-01-29 RX ADMIN — PREDNISONE 20 MG: 20 TABLET ORAL at 09:43

## 2023-01-29 RX ADMIN — HEPARIN SODIUM 5000 UNITS: 5000 INJECTION INTRAVENOUS; SUBCUTANEOUS at 05:52

## 2023-01-29 RX ADMIN — AMILORIDE HYDROCLORIDE 5 MG: 5 TABLET ORAL at 09:44

## 2023-01-29 RX ADMIN — VANCOMYCIN HYDROCHLORIDE 750 MG: 750 INJECTION, SOLUTION INTRAVENOUS at 22:14

## 2023-01-29 RX ADMIN — POTASSIUM CHLORIDE 20 MEQ: 1500 TABLET, EXTENDED RELEASE ORAL at 18:02

## 2023-01-29 RX ADMIN — UMECLIDINIUM BROMIDE AND VILANTEROL TRIFENATATE 1 PUFF: 62.5; 25 POWDER RESPIRATORY (INHALATION) at 09:43

## 2023-01-29 RX ADMIN — PANTOPRAZOLE SODIUM 40 MG: 40 TABLET, DELAYED RELEASE ORAL at 05:52

## 2023-01-29 RX ADMIN — MAGNESIUM OXIDE TAB 400 MG (241.3 MG ELEMENTAL MG) 400 MG: 400 (241.3 MG) TAB at 09:43

## 2023-01-29 RX ADMIN — BUPROPION HYDROCHLORIDE 150 MG: 150 TABLET, FILM COATED, EXTENDED RELEASE ORAL at 09:43

## 2023-01-29 RX ADMIN — THIAMINE HCL TAB 100 MG 250 MG: 100 TAB at 09:43

## 2023-01-29 RX ADMIN — HEPARIN SODIUM 5000 UNITS: 5000 INJECTION INTRAVENOUS; SUBCUTANEOUS at 22:15

## 2023-01-29 RX ADMIN — VANCOMYCIN HYDROCHLORIDE 750 MG: 750 INJECTION, SOLUTION INTRAVENOUS at 10:27

## 2023-01-29 RX ADMIN — HEPARIN SODIUM 5000 UNITS: 5000 INJECTION INTRAVENOUS; SUBCUTANEOUS at 12:50

## 2023-01-29 RX ADMIN — MELATONIN 3 MG: at 22:15

## 2023-01-29 RX ADMIN — LEVOTHYROXINE SODIUM 50 MCG: 50 TABLET ORAL at 05:52

## 2023-01-29 RX ADMIN — POTASSIUM CHLORIDE 20 MEQ: 1500 TABLET, EXTENDED RELEASE ORAL at 09:44

## 2023-01-29 RX ADMIN — MIRTAZAPINE 15 MG: 15 TABLET, FILM COATED ORAL at 22:15

## 2023-01-29 RX ADMIN — PRAVASTATIN SODIUM 80 MG: 80 TABLET ORAL at 18:02

## 2023-01-29 RX ADMIN — CALCIUM CARBONATE (ANTACID) CHEW TAB 500 MG 500 MG: 500 CHEW TAB at 09:43

## 2023-01-29 NOTE — PROGRESS NOTES
Nancy Faulkner is a 79 y o  male who is currently ordered Vancomycin IV with management by the Pharmacy Consult service  Relevant clinical data and objective / subjective history reviewed  Vancomycin Assessment:  Indication and Goal AUC/Trough: Soft tissue (goal -600, trough >10), Trough 13 9 on  at 05:16  Clinical Status: stable  Micro:       Renal Function:  SCr: 0 8 mg/dL  CrCl: 75  6mL/min  Renal replacement: Not on dialysis  Days of Therapy: 4  Current Dose: 750 mg IV Q12H  Vancomycin Plan:  New Dosin mg q12h  Estimated AUC: 525 mcg*hr/mL  Estimated Trough: 16 6mcg/mL  Next Level: 23 0600  Renal Function Monitoring: Daily BMP and [place] will continue to follow closely for s/sx of nephrotoxicity, infusion reactions and appropriateness of therapy  BMP and CBC will be ordered per protocol  We will continue to follow the patient’s culture results and clinical progress daily      Donald Snow, Pharmacist

## 2023-01-29 NOTE — RESTORATIVE TECHNICIAN NOTE
Restorative Technician Note      Patient Name: Eliz Car III     Patient Active Problem List   Diagnosis   • Essential hypertension   • Emphysema/COPD [facility])   • Crohn disease ([facility] )   • Acute kidney injury ([facility] )   • Colostomy in place [facility])   • Diarrhea   • Severe protein-calorie malnutrition ([facility] )   • Arthropathy of right wrist   • Emphysema of lung ([place] [address] )   • Syncope and collapse   • Severe sepsis ([place] [address] )   • Pneumonia   • Chest pain   • Liver mass   • Tobacco abuse   • Abdominal pain   • Personal history of liver cancer   • Palliative care patient   • Abdominal wall abscess   • Observed sleep apnea   • Acute pain of left wrist   • Chronic, continuous use of opioids   • Hypocalcemia   • Left wrist pain   • Kidney stone   • Hypomagnesemia   • Hypokalemia   • Gastroesophageal reflux disease without esophagitis   • Chronic obstructive pulmonary disease (HCC)   • Mixed hyperlipidemia   • Vitamin B12 deficiency   • Cataract   • Dysthymic disorder   • Crohn's disease of small intestine with other complication ([place] [address] )   • Encounter for follow-up surveillance of liver cancer   • HCC (hepatocellular carcinoma) (HCC)   • Electrolyte abnormality   • History of alcohol use disorder   • Anemia   • Subclinical hypothyroidism       Note Type: Bracing, Initial consult  Patient Position Upon Consult: Seated edge of bed  Brace Applied: Wrist Lacer (R Universal wrist lacer)  Patient Position When Brace Applied: Seated  Education Provided: Yes  Patient Position at End of Consult: Seated edge of bed; All needs within reach  Nurse Communication: Nurse aware of consult, application of brace    Please contact [employer] [phone] regarding bracing instructions/adjustments  Thank you!   Tiffany BLACKBURN, Restorative Technician, [employer]

## 2023-01-29 NOTE — PROGRESS NOTES
Progress Note - Orthopedics   Citlalli Cornell III 79 y o  male MRN: [id number]  [facility]      Subjective:    67 y o male  No acute events, no new complaints  Reports his right wrist has mildly improved but is still painful       Labs:  0   Lab Value Date/Time    HCT 33 4 (L) 01/28/2023 0516    HCT 32 0 (L) 01/27/2023 1500    HCT 34 3 (L) 01/26/2023 0742    HGB 11 0 (L) 01/28/2023 0516    HGB 10 4 (L) 01/27/2023 1500    HGB 11 9 (L) 01/26/2023 0742    INR 1 18 01/26/2023 0742    WBC 14 30 (H) 01/28/2023 0516    WBC 12 75 (H) 01/27/2023 1500    WBC 15 14 (H) 01/26/2023 0742    ESR 68 (H) 01/28/2023 1351    CRP 46 7 (H) 01/28/2023 1351       Meds:    Current Facility-Administered Medications:   •  acetaminophen (TYLENOL) tablet 650 mg, 650 mg, Oral, Q6H PRN, Carlos Alberto Potter MD  •  AMILoride tablet 5 mg, 5 mg, Oral, Daily, Jocelyne Singh DO, 5 mg at 01/28/23 1800  •  amLODIPine (NORVASC) tablet 5 mg, 5 mg, Oral, Daily, Carlos Alberto Potter MD, 5 mg at 01/28/23 1005  •  buPROPion (WELLBUTRIN XL) 24 hr tablet 150 mg, 150 mg, Oral, Daily, Carlos Alberto Potter MD, 150 mg at 01/28/23 1005  •  calcium carbonate (TUMS) chewable tablet 500 mg, 500 mg, Oral, Daily, Carlos Alberto Potter MD, 500 mg at 01/28/23 1006  •  carvedilol (COREG) tablet 3 125 mg, 3 125 mg, Oral, BID With Meals, Carlos Alberto Potter MD, 3 125 mg at 61/96/78 5563  •  folic acid (FOLVITE) tablet 1 mg, 1 mg, Oral, Daily, Carlos Alberto Potter MD, 1 mg at 01/28/23 1005  •  heparin (porcine) subcutaneous injection 5,000 Units, 5,000 Units, Subcutaneous, Q8H [facility], 5,000 Units at 01/29/23 0552 **AND** [COMPLETED] Platelet count, , , Once, Carlos Alberto Potter MD  •  levothyroxine tablet 50 mcg, 50 mcg, Oral, Early Morning, Carlos Alberto Potter MD, 50 mcg at 01/29/23 6029  •  magnesium Oxide (MAG-OX) tablet 400 mg, 400 mg, Oral, Daily, Carlos Alberto Potter MD, 400 mg at 01/28/23 1005  •  melatonin tablet 3 mg, 3 mg, Oral, HS, Pan Madrigal MD, 3 mg at 01/28/23 2148  •  mirtazapine (REMERON) tablet 15 mg, 15 mg, Oral, HS, Analilia Lyon MD, 15 mg at 01/28/23 2148  •  ondansetron [facility]) injection 4 mg, 4 mg, Intravenous, Q6H PRN, Jocelyne Singh DO, 4 mg at 01/27/23 2231  •  pantoprazole (PROTONIX) EC tablet 40 mg, 40 mg, Oral, Early Morning, Jocelyne Singh DO, 40 mg at 01/29/23 0492  •  potassium chloride (K-DUR,KLOR-CON) CR tablet 20 mEq, 20 mEq, Oral, BID, Analilia Lyon MD, 20 mEq at 01/28/23 1800  •  pravastatin (PRAVACHOL) tablet 80 mg, 80 mg, Oral, Daily With Berenice Randolph MD, 80 mg at 01/28/23 1800  •  predniSONE tablet 20 mg, 20 mg, Oral, Daily, Jocelyne Singh DO, 20 mg at 01/28/23 1800  •  thiamine tablet 250 mg, 250 mg, Oral, Daily, Analilia Lyon MD, 250 mg at 01/28/23 1005  •  umeclidinium-vilanterol 62 5-25 mcg/actuation inhaler 1 puff, 1 puff, Inhalation, Daily, Analilia Lyon MD, 1 puff at 01/28/23 1009  •  vancomycin (VANCOCIN) IVPB (premix in dextrose) 750 mg 150 mL, 750 mg, Intravenous, Q12H, Moe Oneal MD, Last Rate: 150 mL/hr at 01/28/23 2148, 750 mg at 01/28/23 2148    Blood Culture:   Lab Results   Component Value Date    BLOODCX No Growth at 48 hrs  01/26/2023    [place] No Growth at 48 hrs  01/26/2023       Wound Culture:   Lab Results   Component Value Date    WOUNDCULT 1+ Growth of Enterobacter cloacae complex (A) 07/31/2020       Ins and Outs:  No intake/output data recorded  Physical:  Vitals:    01/28/23 2214   BP: 110/60   Pulse: 83   Resp: 18   Temp: 97 7 °F (36 5 °C)   SpO2: 96%     Musculoskeletal: right Upper Extremity  · Skin intact with dry dressing over dorsal wrist from yesterday's aspiration   · Dressing clean dry and intact  · TTP about wrist dorsally, mildly   · SILT m/r/u  Motor intact ain/pin/m/r/u,   · 2+ radial pulse    Assessment:    67 y o male with atraumatic right wrist pain status post unsuccessful right wrist bedside aspiration   Unlikely infectious etiology at this time  CRP is 46 at this time  Plan:  · WB AT RUE     · No acute surgical intervention required at this time   · May consider ultrasound to assess for effusion in the right wrist joint   · PT/OT  · Pain control  · Rheumatolgoy consult advised   · DVT ppx per primary team if indicated   · Dispo: Ortho will follow     Leticia Juárez MD

## 2023-01-29 NOTE — PROGRESS NOTES
Progress Note - Infectious Disease   Suha Panda III 79 y o  male MRN: [id number]  Unit/Bed#: [facility] 925-01 Encounter: [id number]      Impression:  1  Right wrist arthropathy subacute cellulitis versus pseudogout  2  History of hepatocellular carcinoma  3  History of chronic ETOH  4  COPD  5  GERD  6  History of subclinical hypothyroidism    Recommendations:  Patient is afebrile with WBC count of 14,300  Still suspect that this is not an infectious etiology  1  Check final blood culture results which are negative so far  2   Orthopedic surgery attempted unsuccessful aspiration of the right wrist  3  Rheumatology advised trial of prednisone 20 mg every 24 hours that has begun today  4  For now continue vancomycin 150 mg every 12 hours IV with further dosing by pharmacy  Antibiotics:  1  Vancomycin 750 mg every 12 hours IV, day 3 Rx    Subjective: The patient has no complaints  Denies fevers, chills, or sweats  Denies nausea, vomiting, or diarrhea  Objective:  Vitals:  Temp:  [97 7 °F (36 5 °C)-98 1 °F (36 7 °C)] 97 7 °F (36 5 °C)  HR:  [79-96] 85  Resp:  [16-18] 18  BP: (104-121)/(57-76) 111/61  SpO2:  [95 %-97 %] 96 %  Temp (24hrs), Av 8 °F (36 6 °C), Min:97 7 °F (36 5 °C), Max:98 1 °F (36 7 °C)  Current: Temperature: 97 7 °F (36 5 °C)    Physical Exam:     General Appearance:  Alert, nontoxic, no acute distress  Throat: Oropharynx moist without lesions  Lips, mucosa, and tongue normal, edentulous   Neck: Supple, symmetrical, trachea midline, no adenopathy,  no tenderness/mass/nodules   Lungs:   Clear to auscultation bilaterally, no audible wheezes, rhonchi or rales; respirations unlabored   Heart:  Regular rate and rhythm, S1, S2 normal, no murmur, rub or gallop   Abdomen:   Soft, non-tender, non-distended, positive bowel sounds    No masses, no organomegaly    No CVA tenderness   Extremities:  Right wrist has painful swelling with +1 warmth without significant erythema   Skin: Skin color, texture, turgor normal, no rashes or lesions  No draining wounds noted  Invasive Devices     Peripheral Intravenous Line  Duration           Peripheral IV 01/28/23 Left;Upper;Ventral (anterior) Arm <1 day          Drain  Duration           Colostomy LLQ -- days    Colostomy LLQ -- days                Labs, Imaging, & Other studies:   All pertinent labs were personally reviewed  Results from last 7 days   Lab Units 01/28/23  0516 01/27/23  1500 01/26/23  1230 01/26/23  0742   WBC Thousand/uL 14 30* 12 75*  --  15 14*   HEMOGLOBIN g/dL 11 0* 10 4*  --  11 9*   PLATELETS Thousands/uL 358 293 333 325     Results from last 7 days   Lab Units 01/28/23  0516 01/27/23  1500 01/27/23  0026 01/26/23  2023   SODIUM mmol/L 138 135 130* 130*   POTASSIUM mmol/L 4 1 3 8 3 7 3 0*   CHLORIDE mmol/L 105 100 96 92*   CO2 mmol/L 28 29 26 27   BUN mg/dL 12 13 16 17   CREATININE mg/dL 0 86 0 86 0 94 1 07   EGFR ml/min/1 73sq m 89 89 83 71   CALCIUM mg/dL 8 1* 7 5* 6 7* 7 1*   AST U/L  --  37 59* 56*   ALT U/L  --  32 36 38   ALK PHOS U/L  --  112 112 124*     Results from last 7 days   Lab Units 01/26/23  1212   BLOOD CULTURE  No Growth at 48 hrs  No Growth at 48 hrs

## 2023-01-29 NOTE — PROGRESS NOTES
[facility] [facility]  Progress Note - Zina Cast III 1955, 79 y o  male MRN: [id number]  Unit/Bed#: [facility] 925-01 Encounter: [id number]  Primary Care Provider: Rosanne Messina MD   Date and time admitted to hospital: 1/26/2023 11:04 AM    * Arthropathy of right wrist  Assessment & Plan  Patient presenting to outpatient microwave ablation appointment and found to have concern for cellulitis of R wrist  Patient does not recall any trauma or injury, but memory has been poor recently  Leukocytosis on presentation  Uric acid 5 9  CRP 46  ESR 68  Blood cultures negative at 48 hours  X-ray of wrist and forearm show no intraosseous abnormalities and just soft tissue swelling of the wrist and hand  US of R wrist - no evidence of DVT or abnormalities    Differential: Pseudogout versus subacute cellulitis    Orthopedic surgery attempted to aspirate right wrist joint without success  Discussed with rheumatology who reports patient can be trialed on prednisone 20 mg daily for pain control  Picture does appear less likely to infectious at this point  Plan:  · Treatment with IV vanco 500mg q12  · Follow up blood cultures - no growth at 48 hours  · Pain control with tylenol 650 mg PRN q 6 hours  · Continue prednisone 20 mg daily, ot still reporting significant pain and states "its too soon to tell if the steroid is helping"  · MRSA swab  · Consult ID and rheumatology, appreciate recommendations    Subclinical hypothyroidism  Assessment & Plan  Patient with history of subclinical hypothyroidism   Last thyroid levels in 11/11/22:  TSH 16 477 and T4 1 06  Recheck and TSH 3 260     Plan:  · Continue home dose levothyroxine 50 mcg    Anemia  Assessment & Plan  Lab Results   Component Value Date    WBC 14 30 (H) 01/28/2023    HGB 11 0 (L) 01/28/2023    HCT 33 4 (L) 01/28/2023    MCV 96 01/28/2023     01/28/2023     Patient with history of anemia, baseline Hgb around 9 5  Presented with Hgb 11 9, likely from concentration in setting of dehydration  Past workup has shown anemia of chronic disease  Elevated T bili, labs negative for shearing (normal LDH)    Hemoglobin stable      Plan:  · Monitor Hgb   · Continue home doses folic acid, thiamine  · Transfuse if less than 7      History of alcohol use disorder  Assessment & Plan  Patient with history of alcohol use disorder  Per daughter, has not had any alcohol since last hospital admission    Plan:  · Continue thiamine and folic acid supplementation    Electrolyte abnormality  Assessment & Plan  Results from last 7 days   Lab Units 01/29/23  0627 01/28/23  0516 01/27/23  1500   SODIUM mmol/L 140 138 135   POTASSIUM mmol/L 4 5 4 1 3 8   CHLORIDE mmol/L 111* 105 100   CO2 mmol/L 23 28 29   BUN mg/dL 17 12 13   CREATININE mg/dL 0 80 0 86 0 86   CALCIUM mg/dL 8 1* 8 1* 7 5*     Patient presenting with low Na, K and Cl  Likely in setting of PO intake and Crohn's disease     Hypokalemia -resolved  Hypomagnesemia - repleted    Plan:  · Replete electrolytes as needed   · Tele monitoring  · Encourage better PO intake  · Nutrition consult     [facility]  (hepatocellular carcinoma) ([facility] )  Assessment & Plan  Patient with history of [address]  who presented for microwave ablation   Liver mass first discovered in May, 2020 and biopsy confirmed diagnosis  Received ablation with Dr Mike Diana in July 2020  Surveillance MRI found new masses and referred to IR for ablation   Last MRI in December 2022  Hepatic panel: T bili 1 80, alk phos 103, AST 33, ALT 28, albumin 2 4     Plan  · Holding procedure in setting of electrolyte abnormalities and infection workup   · Consider repeat imaging if new symptoms present     Dysthymic disorder  Assessment & Plan  Patient with history of dysthymic disorder  Per daughter, patient has been more withdrawn and with flat affect recently     Plan:  · Continue Wellbutrin 150 mg and Remeron 15 mg daily     Chronic obstructive pulmonary disease [facility])  Assessment & Plan  Patient with history of COPD    Plan:  · Continue with umeclidinium-vilanterol daily     Gastroesophageal reflux disease without esophagitis  Assessment & Plan  Patient with history or GERD    Plan:  · Continue Tums 500 mg    · Continue pantoprazole while inpatient    Hypokalemia  Assessment & Plan  Patient presented for microwave ablation appointment and found to have K 2 4   Prescribed K supplements outpatient   Poor PO intake for past 2 weeks and lack of self care  EKG sinus with PACs     Resolved  Potassium 4 1 today  Plan:  · Continue with daily K supplementation 20meq BID         Hypomagnesemia  Assessment & Plan  Patient with history of hypomagnesemia, on supplementation  Mg on presentation low at 0 7  ECG sinus rhythm with PACs    Medium 1 4 this morning  Plan:  · IV mag 2 g x 1 today  · Continue daily Mg supplementation     Hypocalcemia  Assessment & Plan  Patient with history of hypocalcemia, likely due to poor PO intake   Corrected Ca 8 7    Plan:  · Tums 500 mg  · Follow up albumin for corrected Ca      Severe protein-calorie malnutrition ([address] )  Assessment & Plan  Patient with history of protein calorie malnutrition in setting of hepatocellular carcinoma  Presents with poor self care and PO intake     Plan:  · Nutrition consult - regular diet and ensure plus high protein TID                BMI Findings: Body mass index is 15 7 kg/m²         Colostomy in place [facility])  Assessment & Plan  Patient with colostomy due to Crohn's disease    Plan:  · Ostomy care    Acute kidney injury [facility])  Assessment & Plan  Results from last 7 days   Lab Units 01/29/23  0627 01/28/23  0516 01/27/23  1500   SODIUM mmol/L 140 138 135   POTASSIUM mmol/L 4 5 4 1 3 8   CHLORIDE mmol/L 111* 105 100   CO2 mmol/L 23 28 29   BUN mg/dL 17 12 13   CREATININE mg/dL 0 80 0 86 0 86   CALCIUM mg/dL 8 1* 8 1* 7 5*         Patient presenting with Cr 1 28, up from baseline 0 70-0 80  In setting of poor PO intake and lack of self care     Creatinine now at baseline    Plan:  · Resume home amiloride  · Avoid nephrotoxic agents  · Follow Cr    Crohn disease ([address] )  Assessment & Plan  Patient with history of Crohn's disease s/p ostomy     Plan:  · Ostomy care    Essential hypertension  Assessment & Plan  Patient with history of HTN    Blood Pressure: 104/62     Plan:  · Continue home dose of amlodipine 5 mg and carvedilol 3 125 mg daily  · Resume amiloride 5 mg daily  · Monitor BP     Hyponatremia-resolved as of 2023  Assessment & Plan  Patient found to have Na 126 during appointment for microwave ablation   Patient with poor PO intake for the past 2 weeks  Serum osmol 271     Resolved    Plan:  · Likely due to poor PO intake         VTE Pharmacologic Prophylaxis:   VTE Score: 6 Moderate Risk (Score 3-4) - Pharmacological DVT Prophylaxis Ordered: Heparin  Mechanical VTE Prophylaxis in Place: Yes    Patient Centered Rounds: I have performed bedside rounds with nursing staff today  Discussions with Specialists or Other Care Team Provider: orthopedics    Current Length of Stay: 3 day(s)    Current Patient Status: Inpatient     Discharge Plan / Estimated Discharge Date: Anticipate discharge tomorrow to home  Code Status: Level 1 - Full Code      Subjective:   Patient was seen and examined by me at bedside  Patient was pleasant and cooperative  No particular overnight event reported  patient continues to report pain of R  Wrist  Reports he is unable to assess if prednisone has been helping with pain  Does report pain has not worsened  Otherwise, patient has no new complaints or concerns  Objective:     Vitals:   Temp (24hrs), Av 4 °F (36 3 °C), Min:96 8 °F (36 °C), Max:97 7 °F (36 5 °C)    Temp:  [96 8 °F (36 °C)-97 7 °F (36 5 °C)] 96 8 °F (36 °C)  HR:  [77-86] 77  Resp:  [18] 18  BP: (104-111)/(60-62) 107/62  SpO2:  [95 %-96 %] 96 %  Body mass index is 19 49 kg/m²       Input and Output Summary (last 24 hours):     No intake or output data in the 24 hours ending 01/29/23 0918    Physical Exam:     Physical Exam  Constitutional:       General: He is not in acute distress  Appearance: Normal appearance  He is not ill-appearing  HENT:      Head: Normocephalic and atraumatic  Eyes:      General: No scleral icterus  Right eye: No discharge  Left eye: No discharge  Cardiovascular:      Rate and Rhythm: Normal rate and regular rhythm  Pulses: Normal pulses  Pulmonary:      Effort: Pulmonary effort is normal  No respiratory distress  Abdominal:      General: There is no distension  Palpations: Abdomen is soft  Musculoskeletal:         General: Swelling (r wrist) and tenderness (r wrist) present  No signs of injury  Normal range of motion  Cervical back: Normal range of motion  Skin:     General: Skin is warm and dry  Neurological:      General: No focal deficit present  Mental Status: He is alert  Mental status is at baseline  Psychiatric:         Mood and Affect: Mood normal          Behavior: Behavior normal           Additional Data:     Labs:  Results from last 7 days   Lab Units 01/28/23  0516   WBC Thousand/uL 14 30*   HEMOGLOBIN g/dL 11 0*   HEMATOCRIT % 33 4*   PLATELETS Thousands/uL 358   NEUTROS PCT % 79*   LYMPHS PCT % 12*   MONOS PCT % 7   EOS PCT % 1     Results from last 7 days   Lab Units 01/29/23  0627 01/28/23  0516 01/27/23  1500   SODIUM mmol/L 140   < > 135   POTASSIUM mmol/L 4 5   < > 3 8   CHLORIDE mmol/L 111*   < > 100   CO2 mmol/L 23   < > 29   BUN mg/dL 17   < > 13   CREATININE mg/dL 0 80   < > 0 86   ANION GAP mmol/L 6   < > 6   CALCIUM mg/dL 8 1*   < > 7 5*   ALBUMIN g/dL  --   --  2 0*   TOTAL BILIRUBIN mg/dL  --   --  0 83   ALK PHOS U/L  --   --  112   ALT U/L  --   --  32   AST U/L  --   --  37   GLUCOSE RANDOM mg/dL 172*   < > 105    < > = values in this interval not displayed       Results from last 7 days   Lab Units 23  0742   INR  1 18             Results from last 7 days   Lab Units 23  0026 23  1720 23  1215   LACTIC ACID mmol/L 1 1 3 0* 2 4*       Imaging: Reviewed radiology reports from this admission including: ultrasound(s)    Recent Cultures (last 7 days):     Results from last 7 days   Lab Units 23  1212   BLOOD CULTURE  No Growth at 48 hrs  No Growth at 48 hrs         Lines/Drains:  Invasive Devices     Peripheral Intravenous Line  Duration           Peripheral IV 23 Left;Upper;Ventral (anterior) Arm <1 day          Drain  Duration           Colostomy LLQ -- days    Colostomy LLQ -- days                Telemetry:   Telemetry Orders (From admission, onward)             24 Hour Telemetry Monitoring  Continuous x 24 Hours (Telem)           References:    Telemetry Guidelines   Question:  Reason for 24 Hour Telemetry  Answer:  Metabolic/Electrolyte Disturbance with High Probability of Dysrhythmia (K level <3 or >6, or KCL infusion >=10mEq/hr)                    Last 24 Hours Medication List:   Current Facility-Administered Medications   Medication Dose Route Frequency Provider Last Rate   • acetaminophen  650 mg Oral Q6H PRN Meredith Skinner MD     • AMILoride  5 mg Oral Daily Jocelyne Singh DO     • amLODIPine  5 mg Oral Daily Meredith Skinner MD     • buPROPion  150 mg Oral Daily Meredith Skinner MD     • calcium carbonate  500 mg Oral Daily Meredith Skinner MD     • carvedilol  3 125 mg Oral BID With Meals Meredith Skinner MD     • folic acid  1 mg Oral Daily Meredith Skinner MD     • heparin (porcine)  5,000 Units Subcutaneous [facility] Meredith Skinner MD     • levothyroxine  50 mcg Oral Early Morning Meredith Skinner MD     • magnesium Oxide  400 mg Oral Daily Meredith Skinner MD     • melatonin  3 mg Oral HS Delilah Bloom MD     • mirtazapine  15 mg Oral HS Meredith Skinner MD     • ondansetron  4 mg Intravenous Q6H PRN Jocelyne DO Francisco     • pantoprazole  40 mg Oral Early Morning Jocelyne Singh DO     • potassium chloride  20 mEq Oral BID Minh Schuster MD     • pravastatin  80 mg Oral Daily With Sola Romero MD     • predniSONE  20 mg Oral Daily Dioni Segura DO     • thiamine  250 mg Oral Daily Minh Schuster MD     • umeclidinium-vilanterol  1 puff Inhalation Daily Minh Schuster MD     • vancomycin  750 mg Intravenous Q12H Chica Sosa  mg (01/28/23 2148)        Today, Patient Was Seen By: Lorenza Soto MD    ** Please Note: This note has been constructed using a voice recognition system   **

## 2023-01-30 VITALS
DIASTOLIC BLOOD PRESSURE: 75 MMHG | HEIGHT: 69 IN | HEART RATE: 95 BPM | OXYGEN SATURATION: 98 % | BODY MASS INDEX: 20.24 KG/M2 | WEIGHT: 136.69 LBS | TEMPERATURE: 97.3 F | RESPIRATION RATE: 20 BRPM | SYSTOLIC BLOOD PRESSURE: 118 MMHG

## 2023-01-30 PROBLEM — E87.8 ELECTROLYTE ABNORMALITY: Status: RESOLVED | Noted: 2022-11-02 | Resolved: 2023-01-30

## 2023-01-30 PROBLEM — E83.42 HYPOMAGNESEMIA: Status: RESOLVED | Noted: 2017-06-29 | Resolved: 2023-01-30

## 2023-01-30 PROBLEM — E87.6 HYPOKALEMIA: Status: RESOLVED | Noted: 2017-06-28 | Resolved: 2023-01-30

## 2023-01-30 LAB
ANION GAP SERPL CALCULATED.3IONS-SCNC: 5 MMOL/L (ref 4–13)
BASOPHILS # BLD AUTO: 0.05 THOUSANDS/ÂΜL (ref 0–0.1)
BASOPHILS # BLD AUTO: 0.05 THOUSANDS/ÂΜL (ref 0–0.1)
BASOPHILS NFR BLD AUTO: 0 % (ref 0–1)
BASOPHILS NFR BLD AUTO: 0 % (ref 0–1)
BUN SERPL-MCNC: 17 MG/DL (ref 5–25)
CALCIUM SERPL-MCNC: 8.1 MG/DL (ref 8.3–10.1)
CHLORIDE SERPL-SCNC: 110 MMOL/L (ref 96–108)
CO2 SERPL-SCNC: 24 MMOL/L (ref 21–32)
CREAT SERPL-MCNC: 0.84 MG/DL (ref 0.6–1.3)
EOSINOPHIL # BLD AUTO: 0.02 THOUSAND/ÂΜL (ref 0–0.61)
EOSINOPHIL # BLD AUTO: 0.04 THOUSAND/ÂΜL (ref 0–0.61)
EOSINOPHIL NFR BLD AUTO: 0 % (ref 0–6)
EOSINOPHIL NFR BLD AUTO: 0 % (ref 0–6)
ERYTHROCYTE [DISTWIDTH] IN BLOOD BY AUTOMATED COUNT: 12.5 % (ref 11.6–15.1)
ERYTHROCYTE [DISTWIDTH] IN BLOOD BY AUTOMATED COUNT: 12.6 % (ref 11.6–15.1)
GFR SERPL CREATININE-BSD FRML MDRD: 90 ML/MIN/1.73SQ M
GLUCOSE SERPL-MCNC: 132 MG/DL (ref 65–140)
HCT VFR BLD AUTO: 30.6 % (ref 36.5–49.3)
HCT VFR BLD AUTO: 31.1 % (ref 36.5–49.3)
HGB BLD-MCNC: 10 G/DL (ref 12–17)
HGB BLD-MCNC: 9.7 G/DL (ref 12–17)
IMM GRANULOCYTES # BLD AUTO: 0.12 THOUSAND/UL (ref 0–0.2)
IMM GRANULOCYTES # BLD AUTO: 0.15 THOUSAND/UL (ref 0–0.2)
IMM GRANULOCYTES NFR BLD AUTO: 1 % (ref 0–2)
IMM GRANULOCYTES NFR BLD AUTO: 1 % (ref 0–2)
LYMPHOCYTES # BLD AUTO: 1.4 THOUSANDS/ÂΜL (ref 0.6–4.47)
LYMPHOCYTES # BLD AUTO: 1.71 THOUSANDS/ÂΜL (ref 0.6–4.47)
LYMPHOCYTES NFR BLD AUTO: 10 % (ref 14–44)
LYMPHOCYTES NFR BLD AUTO: 11 % (ref 14–44)
MCH RBC QN AUTO: 31 PG (ref 26.8–34.3)
MCH RBC QN AUTO: 31.7 PG (ref 26.8–34.3)
MCHC RBC AUTO-ENTMCNC: 31.2 G/DL (ref 31.4–37.4)
MCHC RBC AUTO-ENTMCNC: 32.7 G/DL (ref 31.4–37.4)
MCV RBC AUTO: 97 FL (ref 82–98)
MCV RBC AUTO: 99 FL (ref 82–98)
MONOCYTES # BLD AUTO: 0.69 THOUSAND/ÂΜL (ref 0.17–1.22)
MONOCYTES # BLD AUTO: 0.74 THOUSAND/ÂΜL (ref 0.17–1.22)
MONOCYTES NFR BLD AUTO: 5 % (ref 4–12)
MONOCYTES NFR BLD AUTO: 5 % (ref 4–12)
NEUTROPHILS # BLD AUTO: 12.51 THOUSANDS/ÂΜL (ref 1.85–7.62)
NEUTROPHILS # BLD AUTO: 12.94 THOUSANDS/ÂΜL (ref 1.85–7.62)
NEUTS SEG NFR BLD AUTO: 83 % (ref 43–75)
NEUTS SEG NFR BLD AUTO: 84 % (ref 43–75)
NRBC BLD AUTO-RTO: 0 /100 WBCS
NRBC BLD AUTO-RTO: 0 /100 WBCS
PLATELET # BLD AUTO: 331 THOUSANDS/UL (ref 149–390)
PLATELET # BLD AUTO: 363 THOUSANDS/UL (ref 149–390)
PMV BLD AUTO: 10.2 FL (ref 8.9–12.7)
PMV BLD AUTO: 10.3 FL (ref 8.9–12.7)
POTASSIUM SERPL-SCNC: 4.7 MMOL/L (ref 3.5–5.3)
RBC # BLD AUTO: 3.13 MILLION/UL (ref 3.88–5.62)
RBC # BLD AUTO: 3.15 MILLION/UL (ref 3.88–5.62)
SODIUM SERPL-SCNC: 139 MMOL/L (ref 135–147)
WBC # BLD AUTO: 14.79 THOUSAND/UL (ref 4.31–10.16)
WBC # BLD AUTO: 15.63 THOUSAND/UL (ref 4.31–10.16)

## 2023-01-30 RX ORDER — PREDNISONE 20 MG/1
20 TABLET ORAL DAILY
Qty: 4 TABLET | Refills: 0 | Status: SHIPPED | OUTPATIENT
Start: 2023-01-31 | End: 2023-02-04

## 2023-01-30 RX ADMIN — PANTOPRAZOLE SODIUM 40 MG: 40 TABLET, DELAYED RELEASE ORAL at 05:33

## 2023-01-30 RX ADMIN — UMECLIDINIUM BROMIDE AND VILANTEROL TRIFENATATE 1 PUFF: 62.5; 25 POWDER RESPIRATORY (INHALATION) at 08:27

## 2023-01-30 RX ADMIN — MAGNESIUM OXIDE TAB 400 MG (241.3 MG ELEMENTAL MG) 400 MG: 400 (241.3 MG) TAB at 08:28

## 2023-01-30 RX ADMIN — CARVEDILOL 3.12 MG: 3.12 TABLET, FILM COATED ORAL at 08:28

## 2023-01-30 RX ADMIN — PREDNISONE 20 MG: 20 TABLET ORAL at 08:28

## 2023-01-30 RX ADMIN — CALCIUM CARBONATE (ANTACID) CHEW TAB 500 MG 500 MG: 500 CHEW TAB at 08:29

## 2023-01-30 RX ADMIN — BUPROPION HYDROCHLORIDE 150 MG: 150 TABLET, FILM COATED, EXTENDED RELEASE ORAL at 08:28

## 2023-01-30 RX ADMIN — AMILORIDE HYDROCLORIDE 5 MG: 5 TABLET ORAL at 08:40

## 2023-01-30 RX ADMIN — AMLODIPINE BESYLATE 5 MG: 5 TABLET ORAL at 08:28

## 2023-01-30 RX ADMIN — HEPARIN SODIUM 5000 UNITS: 5000 INJECTION INTRAVENOUS; SUBCUTANEOUS at 05:33

## 2023-01-30 RX ADMIN — FOLIC ACID 1 MG: 1 TABLET ORAL at 08:28

## 2023-01-30 RX ADMIN — LEVOTHYROXINE SODIUM 50 MCG: 50 TABLET ORAL at 05:33

## 2023-01-30 RX ADMIN — VANCOMYCIN HYDROCHLORIDE 750 MG: 750 INJECTION, SOLUTION INTRAVENOUS at 08:32

## 2023-01-30 RX ADMIN — THIAMINE HCL TAB 100 MG 250 MG: 100 TAB at 08:27

## 2023-01-30 RX ADMIN — POTASSIUM CHLORIDE 20 MEQ: 1500 TABLET, EXTENDED RELEASE ORAL at 08:28

## 2023-01-30 NOTE — PROGRESS NOTES
Progress Note - Orthopedics   Madi Espinoza III 79 y o  male MRN: [id number]  [facility]      Subjective:    67 y o male  No acute events   Received a right wrist brace yesterday and was started on corticosteroids with resolution of his symptoms    Labs:  0   Lab Value Date/Time    HCT 33 4 (L) 01/28/2023 0516    HCT 32 0 (L) 01/27/2023 1500    HCT 34 3 (L) 01/26/2023 0742    HGB 11 0 (L) 01/28/2023 0516    HGB 10 4 (L) 01/27/2023 1500    HGB 11 9 (L) 01/26/2023 0742    INR 1 18 01/26/2023 0742    WBC 14 30 (H) 01/28/2023 0516    WBC 12 75 (H) 01/27/2023 1500    WBC 15 14 (H) 01/26/2023 0742    ESR 68 (H) 01/28/2023 1351    CRP 46 7 (H) 01/28/2023 1351       Meds:    Current Facility-Administered Medications:   •  acetaminophen (TYLENOL) tablet 650 mg, 650 mg, Oral, Q6H PRN, Rashaun Alvarenga MD  •  AMILoride tablet 5 mg, 5 mg, Oral, Daily, Jocelyne Singh DO, 5 mg at 01/29/23 0944  •  amLODIPine (NORVASC) tablet 5 mg, 5 mg, Oral, Daily, Rashaun Alvarenga MD, 5 mg at 01/28/23 1005  •  buPROPion (WELLBUTRIN XL) 24 hr tablet 150 mg, 150 mg, Oral, Daily, Rashaun Alvarenga MD, 150 mg at 01/29/23 4490  •  calcium carbonate (TUMS) chewable tablet 500 mg, 500 mg, Oral, Daily, Rashaun Alvarenga MD, 500 mg at 01/29/23 3018  •  carvedilol (COREG) tablet 3 125 mg, 3 125 mg, Oral, BID With Meals, Rashaun Alvarenga MD, 3 125 mg at 51/48/35 1104  •  folic acid (FOLVITE) tablet 1 mg, 1 mg, Oral, Daily, Rashaun Alvarenga MD, 1 mg at 01/29/23 0944  •  heparin (porcine) subcutaneous injection 5,000 Units, 5,000 Units, Subcutaneous, Q8H [facility] & intermediate, 5,000 Units at 01/30/23 0533 **AND** [COMPLETED] Platelet count, , , Once, Rashaun Alvarenga MD  •  levothyroxine tablet 50 mcg, 50 mcg, Oral, Early Morning, Rashaun Alvarenga MD, 50 mcg at 01/30/23 0533  •  magnesium Oxide (MAG-OX) tablet 400 mg, 400 mg, Oral, Daily, Rashaun Alvarenga MD, 400 mg at 01/29/23 0943  •  melatonin tablet 3 mg, 3 mg, Oral, , [address] Fatuma Bledsoe MD, 3 mg at 01/29/23 2215  •  mirtazapine (REMERON) tablet 15 mg, 15 mg, Oral, HS, Katiana Lopez MD, 15 mg at 01/29/23 2215  •  ondansetron [facility]) injection 4 mg, 4 mg, Intravenous, Q6H PRN, Jocelyne Singh DO, 4 mg at 01/27/23 2231  •  pantoprazole (PROTONIX) EC tablet 40 mg, 40 mg, Oral, Early Morning, Jocelyne Singh DO, 40 mg at 01/30/23 0533  •  potassium chloride (K-DUR,KLOR-CON) CR tablet 20 mEq, 20 mEq, Oral, BID, Katiana Lopez MD, 20 mEq at 01/29/23 1802  •  pravastatin (PRAVACHOL) tablet 80 mg, 80 mg, Oral, Daily With Stephanie Robbins MD, 80 mg at 01/29/23 1802  •  predniSONE tablet 20 mg, 20 mg, Oral, Daily, Jocelyne Singh DO, 20 mg at 01/29/23 9534  •  thiamine tablet 250 mg, 250 mg, Oral, Daily, Katiana Lopez MD, 250 mg at 01/29/23 1197  •  umeclidinium-vilanterol 62 5-25 mcg/actuation inhaler 1 puff, 1 puff, Inhalation, Daily, Katiana Lopez MD, 1 puff at 01/29/23 0943  •  vancomycin (VANCOCIN) IVPB (premix in dextrose) 750 mg 150 mL, 750 mg, Intravenous, Q12H, Kayy Lee MD, Last Rate: 150 mL/hr at 01/29/23 2214, 750 mg at 01/29/23 2214    Blood Culture:   Lab Results   Component Value Date    BLOODCX No Growth at 72 hrs  01/26/2023    BLOODCX No Growth at 72 hrs  01/26/2023       Wound Culture:   Lab Results   Component Value Date    WOUNDCULT 1+ Growth of Enterobacter cloacae complex (A) 07/31/2020       Ins and Outs:  I/O last 24 hours: In: 240 [P O :240]  Out: 275 [Urine:275]          Physical:  Vitals:    01/29/23 2236   BP: 119/71   Pulse: 89   Resp: 20   Temp: 97 7 °F (36 5 °C)   SpO2: 96%     Musculoskeletal: right Upper Extremity  · Skin intact  No warmth to palpation  No erythema  · Wrist extension and flexion increased to 60 degrees actively without pain   · No TTP about wrist dorsally, mildly   · SILT m/r/u   Motor intact ain/pin/m/r/u,   · 2+ radial pulse    Assessment:    67 y o male with atraumatic right wrist pain likely inflammatory versus crystal arthropathy as it has responded well to corticosteroid administration  Plan:  · WB AT RUE  · Symptoms have resolved   · Wrist brace for comfort  May discontinue it if symptoms remain resolved   · DVT ppx per primary team if indicated   · Dispo: Ortho signing off at this time        Patrick Acosta MD

## 2023-01-30 NOTE — ASSESSMENT & PLAN NOTE
Patient with history of HTN    Blood Pressure: 118/75     Plan:  · Continue home dose of amlodipine 5 mg and carvedilol 3 125 mg daily  · Resume amiloride 5 mg daily  · Monitor BP

## 2023-01-30 NOTE — ASSESSMENT & PLAN NOTE
Patient with history of [address]  who presented for microwave ablation   Liver mass first discovered in May, 2020 and biopsy confirmed diagnosis  Received ablation with Dr Leanne Padilla in July 2020  Surveillance MRI found new masses and referred to IR for ablation   Last MRI in December 2022  Hepatic panel: T bili 1 80, alk phos 103, AST 33, ALT 28, albumin 2 4     Outpatient follow-up as previously scheduled

## 2023-01-30 NOTE — ASSESSMENT & PLAN NOTE
Results from last 7 days   Lab Units 01/30/23  0513 01/29/23  0627 01/28/23  0516   SODIUM mmol/L 139 140 138   POTASSIUM mmol/L 4 7 4 5 4 1   CHLORIDE mmol/L 110* 111* 105   CO2 mmol/L 24 23 28   BUN mg/dL 17 17 12   CREATININE mg/dL 0 84 0 80 0 86   CALCIUM mg/dL 8 1* 8 1* 8 1*     Patient presenting with low Na, K and Cl  Likely in setting of PO intake and Crohn's disease     Hypokalemia -resolved  Hypomagnesemia - repleted

## 2023-01-30 NOTE — CASE MANAGEMENT
Case Management Discharge Planning Note    Patient name aDrius Serna  Location [facility] 925/[facility] 925-01 MRN [id number]  : 1955 Date 2023       Current Admission Date: 2023  Current Admission Diagnosis:Arthropathy of right wrist   Patient Active Problem List    Diagnosis Date Noted   • Subclinical hypothyroidism 2022   • Anemia 2022   • Electrolyte abnormality 2022   • History of alcohol use disorder 2022   • HCC (hepatocellular carcinoma) ([address] ) 03/15/2022   • Encounter for follow-up surveillance of liver cancer 2022   • Dysthymic disorder 2021   • [facility]'s disease of small intestine with other complication [facility] )    • Vitamin B12 deficiency 2021   • Cataract    • Chronic obstructive pulmonary disease ([address] ) 2020   • Mixed hyperlipidemia 2020   • Kidney stone    • Gastroesophageal reflux disease without esophagitis    • Left wrist pain 2020   • Hypocalcemia 2020   • Acute pain of left wrist 09/10/2020   • Chronic, continuous use of opioids 09/10/2020   • Observed sleep apnea    • Palliative care patient 2020   • Abdominal wall abscess 2020   • Personal history of liver cancer 2020   • Abdominal pain 2020   • Tobacco abuse 2020   • Severe sepsis ([address] ) 2020   • Pneumonia 2020   • Chest pain 2020   • Liver mass 2020   • Syncope and collapse 2018   • Emphysema of lung ([address] )    • Arthropathy of right wrist 2017   • Severe protein-calorie malnutrition ([address] ) 2017   • Colostomy in place [facility]) 2017   • Diarrhea 2017   • Hypomagnesemia 2017   • Acute kidney injury ([address] ) 2017   • Hypokalemia 2017   • Essential hypertension    • Emphysema/COPD ([address] )    • Crohn disease ([address] )       LOS (days): 4  Geometric Mean LOS (GMLOS) (days): 4 60  Days to GMLOS:0 6     OBJECTIVE:  Risk of Unplanned Readmission Score: 35 88         Current admission status: Inpatient   Preferred Pharmacy:   [facility], [address] [place] [id number]  Phone: [phone] Fax: [phone]    Primary Care Provider: Rafael Diana MD    Primary Insurance: MEDICARE  Secondary Insurance: [employer]    DISCHARGE DETAILS:    Discharge planning discussed with[de-identified] Pt  Freedom of Choice: Yes     CM contacted family/caregiver?: Yes  Were Treatment Team discharge recommendations reviewed with patient/caregiver?: Yes  Did patient/caregiver verbalize understanding of patient care needs?: Yes  Were patient/caregiver advised of the risks associated with not following Treatment Team discharge recommendations?: Yes         Requested [address]         Is the patient interested in [employer] at discharge?: No    DME Referral Provided  Referral made for DME?: No    Other Referral/Resources/Interventions Provided:  Interventions: Outpatient PT         Treatment Team Recommendation: Home (Home with OP PT)  Discharge Destination Plan[de-identified] Home (Home with OP PT)  Transport at Discharge : Family (Pt's daughter)                             IMM Given (Date):: 01/30/23  IMM Given to[de-identified] Patient     Additional Comments: Pet chart review, pt is clear for d/c today  CM met with pt at bedside to review IMM and IMM was placed in medical records bin  CM also provided pt with a copy  CM provided pt with  oupatient PT list along with resource for community support for drug and alcohol along with 's Certified 's, Joy Simon, business card

## 2023-01-30 NOTE — PROGRESS NOTES
Vancomycin Pharmacy Consult    Tally Dubin is an 79 y o  male who is currently receiving IV vancomycin for elevated WBC w/ unknown source  Vancomycin Assessment:  1  ID Consult: Yes  2  Cultures:   1/26 Blood 2/2: NGTD  1/26 Urine: in process  3  Procalcitonin:   n/a  4  Renal Function:   SCr: 0 84  CrCl: 75 mL/min  UOP: 0 2 mL/kg/hr  5  Days of Therapy: 5  6  Current Dose: 750 mg IV q12h  7  Last Level: 13 9 (random 1/28 at 0516)  8  Goal AUC(24h): 400-600  9  Goal Random/Trough: 10-15    Vancomycin Plan:  1  Evaluation: continue current regimen  2  New Dosing: continue 750 mg IV q12h  Predicted Trough / AUC(24h): 17 1 / 536  3  Next Level: random 2/3 at [address] will continue to follow closely for s/sx of nephrotoxicity, infusion reactions, and appropriateness of therapy  BMP and CBC will be ordered per protocol  We will continue to follow the patient’s culture results and clinical progress daily  Tomeka Duff, AditiD  Internal Medicine Clinical Pharmacist Specialist  [phone]  [facility]

## 2023-01-30 NOTE — ASSESSMENT & PLAN NOTE
Lab Results   Component Value Date    WBC 15 63 (H) 01/30/2023    HGB 9 7 (L) 01/30/2023    HCT 31 1 (L) 01/30/2023    MCV 99 (H) 01/30/2023     01/30/2023     Patient with history of anemia, baseline Hgb around 9 5  Presented with Hgb 11 9, likely from concentration in setting of dehydration  Past workup has shown anemia of chronic disease  Elevated T bili, labs negative for shearing (normal LDH)    Hemoglobin stable      Plan:  · Continue home doses folic acid, thiamine

## 2023-01-30 NOTE — ASSESSMENT & PLAN NOTE
Patient presented for microwave ablation appointment and found to have K 2 4   Prescribed K supplements outpatient   Poor PO intake for past 2 weeks and lack of self care  EKG sinus with PACs     Resolved  Potassium 4 1 today

## 2023-01-30 NOTE — PROGRESS NOTES
Progress Note - Infectious Disease   Aníbal Richmond III 79 y o  male MRN: [id number]  Unit/Bed#: [facility] 925-01 Encounter: [id number]      Impression:  1  Right wrist arthropathy likely pseudogout  2  History of hepatocellular carcinoma  3  History of chronic ETOH  4  COPD  5  GERD  6  History of subclinical hypothyroidism    Recommendations:  Patient is afebrile with WBC count of 14,300 when last taken  Still suspect that this is not an infectious etiology  The right wrist now looks entirely WNL after steroids begun  1  Check final blood culture results which are negative so far  2   Orthopedic surgery attempted unsuccessful aspiration of the right wrist  3  Rheumatology advised trial of prednisone 20 mg every 24 hours that has begun   4  For now continue vancomycin 750 mg every 12 hours IV with further dosing by pharmacy  Could discontinue tomorrow    Antibiotics:  1  Vancomycin 750 mg every 12 hours IV, day 4 Rx    Subjective: The patient has no complaints  The right wrist appears normal  Denies fevers, chills, or sweats  Denies nausea, vomiting, or diarrhea  Objective:  Vitals:  Temp:  [96 8 °F (36 °C)-97 7 °F (36 5 °C)] 97 3 °F (36 3 °C)  HR:  [77-83] 77  Resp:  [18] 18  BP: (107-137)/(60-79) 137/79  SpO2:  [96 %] 96 %  Temp (24hrs), Av 3 °F (36 3 °C), Min:96 8 °F (36 °C), Max:97 7 °F (36 5 °C)  Current: Temperature: (!) 97 3 °F (36 3 °C)    Physical Exam:     General Appearance:  Alert, nontoxic, no acute distress  Throat: Oropharynx moist without lesions  Lips, mucosa, and tongue normal, edentulous   Neck: Supple, symmetrical, trachea midline, no adenopathy,  no tenderness/mass/nodules   Lungs:   Clear to auscultation bilaterally, no audible wheezes, rhonchi or rales; respirations unlabored   Heart:  Regular rate and rhythm, S1, S2 normal, no murmur, rub or gallop   Abdomen:   Soft, non-tender, non-distended, positive bowel sounds    No masses, no organomegaly    No CVA tenderness   Extremities:  Right wrist now appears normal without erythema, swelling or tenderness   Skin: Skin color, texture, turgor normal, no rashes or lesions  No draining wounds noted  Invasive Devices     Peripheral Intravenous Line  Duration           Peripheral IV 01/28/23 Left;Upper;Ventral (anterior) Arm 1 day          Drain  Duration           Colostomy LLQ -- days    Colostomy LLQ -- days                Labs, Imaging, & Other studies:   All pertinent labs were personally reviewed  Results from last 7 days   Lab Units 01/28/23  0516 01/27/23  1500 01/26/23  1230 01/26/23  0742   WBC Thousand/uL 14 30* 12 75*  --  15 14*   HEMOGLOBIN g/dL 11 0* 10 4*  --  11 9*   PLATELETS Thousands/uL 358 293 333 325     Results from last 7 days   Lab Units 01/29/23  0627 01/28/23  0516 01/27/23  1500 01/27/23  0026 01/26/23  2023   SODIUM mmol/L 140 138 135 130* 130*   POTASSIUM mmol/L 4 5 4 1 3 8 3 7 3 0*   CHLORIDE mmol/L 111* 105 100 96 92*   CO2 mmol/L 23 28 29 26 27   BUN mg/dL 17 12 13 16 17   CREATININE mg/dL 0 80 0 86 0 86 0 94 1 07   EGFR ml/min/1 73sq m 92 89 89 83 71   CALCIUM mg/dL 8 1* 8 1* 7 5* 6 7* 7 1*   AST U/L  --   --  37 59* 56*   ALT U/L  --   --  32 36 38   ALK PHOS U/L  --   --  112 112 124*     Results from last 7 days   Lab Units 01/26/23  1212   BLOOD CULTURE  No Growth at 72 hrs  No Growth at 72 hrs

## 2023-01-30 NOTE — ASSESSMENT & PLAN NOTE
Patient with history of hypocalcemia, likely due to poor PO intake   Corrected Ca 8 7    Plan:  · Tums 500 mg

## 2023-01-30 NOTE — ASSESSMENT & PLAN NOTE
Patient presenting to outpatient microwave ablation appointment and found to have concern for cellulitis of R wrist  Patient does not recall any trauma or injury, but memory has been poor recently  Leukocytosis on presentation  Uric acid 5 9  CRP 46  ESR 68  Blood cultures negative at 48 hours  X-ray of wrist and forearm show no intraosseous abnormalities and just soft tissue swelling of the wrist and hand  US of R wrist - no evidence of DVT or abnormalities    Differential: Pseudogout versus subacute cellulitis; more likely to be pseudogout    Orthopedic surgery attempted to aspirate right wrist joint without success  Discussed with rheumatology who reports patient can be trialed on prednisone 20 mg daily for pain control  Picture does appear less likely to infectious at this point      Plan:  · Antibiotics discontinued at discharge  · Blood cultures with no growth  · Pain control with tylenol 650 mg PRN q 6 hours  · Continue prednisone 20 mg daily X 5 days

## 2023-01-30 NOTE — DISCHARGE SUMMARY
[address] [facility]  Discharge- Erik Hair III 1955, 79 y o  male MRN: [id number]  Unit/Bed#: [facility] 925-01 Encounter: [id number]  Primary Care Provider: Roland Mckeon MD   Date and time admitted to hospital: 1/26/2023 11:04 AM    * Arthropathy of right wrist  Assessment & Plan  Patient presenting to outpatient microwave ablation appointment and found to have concern for cellulitis of R wrist  Patient does not recall any trauma or injury, but memory has been poor recently  Leukocytosis on presentation  Uric acid 5 9  CRP 46  ESR 68  Blood cultures negative at 48 hours  X-ray of wrist and forearm show no intraosseous abnormalities and just soft tissue swelling of the wrist and hand  US of R wrist - no evidence of DVT or abnormalities    Differential: Pseudogout versus subacute cellulitis; more likely to be pseudogout    Orthopedic surgery attempted to aspirate right wrist joint without success  Discussed with rheumatology who reports patient can be trialed on prednisone 20 mg daily for pain control  Picture does appear less likely to infectious at this point  Plan:  · Antibiotics discontinued at discharge  · Blood cultures with no growth  · Pain control with tylenol 650 mg PRN q 6 hours  · Continue prednisone 20 mg daily X 5 days      Subclinical hypothyroidism  Assessment & Plan  Patient with history of subclinical hypothyroidism   Last thyroid levels in 11/11/22:  TSH 16 477 and T4 1 06  Recheck and TSH 3 260     Plan:  · Continue home dose levothyroxine 50 mcg    Anemia  Assessment & Plan  Lab Results   Component Value Date    WBC 15 63 (H) 01/30/2023    HGB 9 7 (L) 01/30/2023    HCT 31 1 (L) 01/30/2023    MCV 99 (H) 01/30/2023     01/30/2023     Patient with history of anemia, baseline Hgb around 9 5  Presented with Hgb 11 9, likely from concentration in setting of dehydration  Past workup has shown anemia of chronic disease  Elevated T bili, labs negative for shearing (normal LDH)    Hemoglobin stable      Plan:  · Continue home doses folic acid, thiamine      History of alcohol use disorder  Assessment & Plan  Patient with history of alcohol use disorder  Per daughter, has not had any alcohol since last hospital admission    Plan:  · Continue thiamine and folic acid supplementation    [facility]  Assessment & Plan  Patient with history of [facility] [address]  who presented for microwave ablation   Liver mass first discovered in May, 2020 and biopsy confirmed diagnosis  Received ablation with Dr Agustin Thompson in July 2020  Surveillance MRI found new masses and referred to IR for ablation   Last MRI in December 2022  Hepatic panel: T bili 1 80, alk phos 103, AST 33, ALT 28, albumin 2 4     Outpatient follow-up as previously scheduled    Dysthymic disorder  Assessment & Plan  Patient with history of dysthymic disorder  Per daughter, patient has been more withdrawn and with flat affect recently     Plan:  · Continue Wellbutrin 150 mg and Remeron 15 mg daily     Chronic obstructive pulmonary disease ([facility] )  Assessment & Plan  Patient with history of COPD    Plan:  · Continue with umeclidinium-vilanterol daily     Gastroesophageal reflux disease without esophagitis  Assessment & Plan  Patient with history or GERD    Plan:  · Continue Tums 500 mg    · Continue pantoprazole while inpatient    Hypocalcemia  Assessment & Plan  Patient with history of hypocalcemia, likely due to poor PO intake   Corrected Ca 8 7    Plan:  · Tums 500 mg      Severe protein-calorie malnutrition ([facility] )  Assessment & Plan  Patient with history of protein calorie malnutrition in setting of hepatocellular carcinoma  Presents with poor self care and PO intake     Plan:  · Nutrition consult - regular diet and ensure plus high protein TID             360 Statement: moderate malnutriton related to medical conditions; evidenced by 8# wt loss (5 7%)  since 12/16/22 office visit wt of 140# to current measured wt of 132# on standing scale today; mild/moderate temporal muscle loss; mild/moderate subcutaneous fat loss of orbitals; Treatment: regular diet and ensure plus high protein TID  BMI Findings: Body mass index is 20 18 kg/m²         Colostomy in place [facility])  Assessment & Plan  Patient with colostomy due to Crohn's disease    Plan:  · Ostomy care    Acute kidney injury [facility])  Assessment & Plan  Results from last 7 days   Lab Units 01/30/23  0513 01/29/23  0627 01/28/23  0516   SODIUM mmol/L 139 140 138   POTASSIUM mmol/L 4 7 4 5 4 1   CHLORIDE mmol/L 110* 111* 105   CO2 mmol/L 24 23 28   BUN mg/dL 17 17 12   CREATININE mg/dL 0 84 0 80 0 86   CALCIUM mg/dL 8 1* 8 1* 8 1*         Patient presenting with Cr 1 28, up from baseline 0 70-0 80  In setting of poor PO intake and lack of self care     Creatinine now at baseline    Plan:  · Resume home amiloride      Crohn disease ([facility] [address] )  Assessment & Plan  Patient with history of Crohn's disease s/p ostomy     Plan:  · Ostomy care    Essential hypertension  Assessment & Plan  Patient with history of HTN    Blood Pressure: 118/75     Plan:  · Continue home dose of amlodipine 5 mg and carvedilol 3 125 mg daily  · Resume amiloride 5 mg daily  · Monitor BP     Electrolyte abnormality-resolved as of 1/30/2023  Assessment & Plan  Results from last 7 days   Lab Units 01/30/23  0513 01/29/23  0627 01/28/23  0516   SODIUM mmol/L 139 140 138   POTASSIUM mmol/L 4 7 4 5 4 1   CHLORIDE mmol/L 110* 111* 105   CO2 mmol/L 24 23 28   BUN mg/dL 17 17 12   CREATININE mg/dL 0 84 0 80 0 86   CALCIUM mg/dL 8 1* 8 1* 8 1*     Patient presenting with low Na, K and Cl  Likely in setting of PO intake and Crohn's disease     Hypokalemia -resolved  Hypomagnesemia - repleted        Hypokalemia-resolved as of 1/30/2023  Assessment & Plan  Patient presented for microwave ablation appointment and found to have K 2 4   Prescribed K supplements outpatient   Poor PO intake for past 2 weeks and lack of self care  EKG sinus with PACs     Resolved  Potassium 4 1 today  Hypomagnesemia-resolved as of 1/30/2023  Assessment & Plan  Patient with history of hypomagnesemia, on supplementation  Mg on presentation low at 0 7  ECG sinus rhythm with PACs    Medium 1 4 this morning  Medical Problems     Resolved Problems  Date Reviewed: 12/16/2022          Resolved    Hyponatremia 1/28/2023     Resolved by  Louis Luque DO    Hypomagnesemia 1/30/2023     Resolved by  Bereket Randhawa MD    Hypokalemia 1/30/2023     Resolved by  Bereket Randhawa MD    Electrolyte abnormality 1/30/2023     Resolved by  Bereket Randhawa MD          Admission Date:   Admission Orders (From admission, onward)     Ordered        01/26/23 1116  Inpatient Admission  Once                        Admitting Diagnosis: [facility] [address]  (hepatocellular carcinoma) ([facility] [address] ) [C22 0]    HPI: Per admitting provider, "Patient is a 80 y/o male with past medical history of hepatocellular carcinoma, HTN, subclinical hypothyroidism, and dysthymia who presented to [facility] on 1/26/23 for microwave ablation treatment for hepatocellular carcinoma  While there, patient was found to have concern for cellulitis of R wrist and lab work showed Na 126, K 2 4, Cl 26, CO2 30, Cr 1 28 (baseline 0 7-0 8) with increased WBC at 15  11  Per patient's daughter, he has not been taking care of himself of the past 2 weeks with flat mood and abnormal behavior  Due to cellulitis and lab abnormalities patient to be admitted for treatment      Patient states that his R wrist began to be painful with swelling and warmth that 3 days ago  Pain is worsened with movement and it has been spreading up his arm  He has not had any trauma or cuts/scrapes to his wrist or arm recently and has never had anything like this before  Denies history of clotting or gout  Has not had recent fevers, fatigue, n /v  He does say he has not been eating much recently due to poor appetite     I spoke to his daughter who confirms that the wrist pain started 3 days ago and has been spreading and getting worse  For the past 2 weeks however, she noticed that he has had increasingly more frequent times when he is confused and staring off into space  He will not remember certain conversations  She also notes that his skin has been changing colors, he seems to be losing weight, and she worries that he might be giving up      "    Procedures Performed: No orders of the defined types were placed in this encounter  Summary of Hospital Course: Post admission, patient was evaluated by orthopedics  An attempt was made for arthrocentesis, however fluid was not able to be aspirated  Infectious disease was also consulted patient was initially started on IV vancomycin  However overall clinical picture appears to be closer to pseudogout rather than cellulitis  Rheumatology was consulted and a trial of prednisone was started  Patient showed remarkable improvement on prednisone making pseudogout more likely  Antibiotics were discontinued after discussion with ID  At discharge, patient reports symptoms are significantly improved he is agreeable to discharge with 5-day course of prednisone  Advised to follow-up with PCP  Significant Findings, Care, Treatment and Services Provided: None    Complications: None    Condition at Discharge: good         Discharge instructions/Information to patient and family:   See after visit summary for information provided to patient and family  Provisions for Follow-Up Care:  See after visit summary for information related to follow-up care and any pertinent home health orders  PCP: Rafael Diana MD    Disposition: Home    Planned Readmission: No    Discharge Statement   I spent 30 minutes discharging the patient  This time was spent on the day of discharge  I had direct contact with the patient on the day of discharge   Additional documentation is required if more than 30 minutes were spent on discharge  Discharge Medications:  See after visit summary for reconciled discharge medications provided to patient and family

## 2023-01-30 NOTE — ASSESSMENT & PLAN NOTE
Patient with history of hypomagnesemia, on supplementation  Mg on presentation low at 0 7  ECG sinus rhythm with PACs    Medium 1 4 this morning

## 2023-01-30 NOTE — DISCHARGE INSTR - AVS FIRST PAGE
No future orthopaedic follow-up is necessary   If you do wish to have future orthopaedic consultation, please do not hesitate to call the office at [phone]

## 2023-01-30 NOTE — ASSESSMENT & PLAN NOTE
Patient with history of protein calorie malnutrition in setting of hepatocellular carcinoma  Presents with poor self care and PO intake     Plan:  · Nutrition consult - regular diet and ensure plus high protein TID             360 Statement: moderate malnutriton related to medical conditions; evidenced by 8# wt loss (5 7%)  since 12/16/22 office visit wt of 140# to current measured wt of 132# on standing scale today; mild/moderate temporal muscle loss; mild/moderate subcutaneous fat loss of orbitals; Treatment: regular diet and ensure plus high protein TID  BMI Findings: Body mass index is 20 18 kg/m²

## 2023-01-30 NOTE — ASSESSMENT & PLAN NOTE
Results from last 7 days   Lab Units 01/30/23  0513 01/29/23  0627 01/28/23  0516   SODIUM mmol/L 139 140 138   POTASSIUM mmol/L 4 7 4 5 4 1   CHLORIDE mmol/L 110* 111* 105   CO2 mmol/L 24 23 28   BUN mg/dL 17 17 12   CREATININE mg/dL 0 84 0 80 0 86   CALCIUM mg/dL 8 1* 8 1* 8 1*         Patient presenting with Cr 1 28, up from baseline 0 70-0 80  In setting of poor PO intake and lack of self care     Creatinine now at baseline    Plan:  · Resume home amiloride

## 2023-01-31 ENCOUNTER — TRANSITIONAL CARE MANAGEMENT (OUTPATIENT)
Dept: INTERNAL MEDICINE CLINIC | Facility: OTHER | Age: 68
End: 2023-01-31

## 2023-01-31 LAB
BACTERIA BLD CULT: NORMAL
BACTERIA BLD CULT: NORMAL

## 2023-02-01 ENCOUNTER — OFFICE VISIT (OUTPATIENT)
Dept: INTERNAL MEDICINE CLINIC | Facility: CLINIC | Age: 68
End: 2023-02-01

## 2023-02-01 VITALS
WEIGHT: 136 LBS | BODY MASS INDEX: 20.14 KG/M2 | HEART RATE: 91 BPM | HEIGHT: 69 IN | TEMPERATURE: 97.6 F | SYSTOLIC BLOOD PRESSURE: 110 MMHG | DIASTOLIC BLOOD PRESSURE: 68 MMHG | OXYGEN SATURATION: 99 %

## 2023-02-01 DIAGNOSIS — Z12.11 SCREENING FOR COLON CANCER: ICD-10-CM

## 2023-02-01 DIAGNOSIS — C22.0 HCC (HEPATOCELLULAR CARCINOMA) (HCC): ICD-10-CM

## 2023-02-01 DIAGNOSIS — K76.6 PORTAL HYPERTENSION (HCC): ICD-10-CM

## 2023-02-01 DIAGNOSIS — N17.9 ACUTE KIDNEY INJURY (HCC): ICD-10-CM

## 2023-02-01 DIAGNOSIS — M19.031 ARTHROPATHY OF RIGHT WRIST: Primary | ICD-10-CM

## 2023-02-01 DIAGNOSIS — E87.6 HYPOKALEMIA: ICD-10-CM

## 2023-02-01 DIAGNOSIS — J43.9 PULMONARY EMPHYSEMA, UNSPECIFIED EMPHYSEMA TYPE (HCC): ICD-10-CM

## 2023-02-01 DIAGNOSIS — D69.6 PLATELETS DECREASED (HCC): ICD-10-CM

## 2023-02-01 DIAGNOSIS — E43 SEVERE PROTEIN-CALORIE MALNUTRITION (HCC): ICD-10-CM

## 2023-02-01 DIAGNOSIS — Z93.3 COLOSTOMY IN PLACE (HCC): ICD-10-CM

## 2023-02-01 DIAGNOSIS — K50.919 CROHN'S DISEASE WITH COMPLICATION, UNSPECIFIED GASTROINTESTINAL TRACT LOCATION (HCC): ICD-10-CM

## 2023-02-01 DIAGNOSIS — L03.113 CELLULITIS OF RIGHT UPPER EXTREMITY: ICD-10-CM

## 2023-02-01 DIAGNOSIS — J43.9 PULMONARY EMPHYSEMA, UNSPECIFIED EMPHYSEMA TYPE (HCC): Primary | ICD-10-CM

## 2023-02-01 PROBLEM — I26.94 MULTIPLE SUBSEGMENTAL PULMONARY EMBOLI WITHOUT ACUTE COR PULMONALE (HCC): Status: ACTIVE | Noted: 2023-02-01

## 2023-02-01 PROBLEM — F10.20 ALCOHOL DEPENDENCE, UNCOMPLICATED (HCC): Status: ACTIVE | Noted: 2023-02-01

## 2023-02-01 RX ORDER — POTASSIUM CHLORIDE 750 MG/1
20 CAPSULE, EXTENDED RELEASE ORAL 2 TIMES DAILY
Qty: 180 CAPSULE | Refills: 1 | Status: SHIPPED | OUTPATIENT
Start: 2023-02-01

## 2023-02-01 RX ORDER — UMECLIDINIUM BROMIDE AND VILANTEROL TRIFENATATE 62.5; 25 UG/1; UG/1
1 POWDER RESPIRATORY (INHALATION) DAILY
Qty: 60 BLISTER | Refills: 3 | Status: SHIPPED | OUTPATIENT
Start: 2023-02-01 | End: 2023-02-01 | Stop reason: SDUPTHER

## 2023-02-01 RX ORDER — UMECLIDINIUM BROMIDE AND VILANTEROL TRIFENATATE 62.5; 25 UG/1; UG/1
1 POWDER RESPIRATORY (INHALATION) DAILY
Qty: 180 BLISTER | Refills: 3 | Status: SHIPPED | OUTPATIENT
Start: 2023-02-01 | End: 2023-03-03

## 2023-02-01 RX ORDER — POTASSIUM CHLORIDE 750 MG/1
20 CAPSULE, EXTENDED RELEASE ORAL 2 TIMES DAILY
Qty: 60 CAPSULE | Refills: 0 | Status: SHIPPED | OUTPATIENT
Start: 2023-02-01 | End: 2023-02-01 | Stop reason: SDUPTHER

## 2023-02-01 NOTE — PROGRESS NOTES
Transitional Care Management Review:  Lesia Bustillos is a 79 y o  male here for TCM follow up  During the TCM phone call patient stated:    TCM Call     Date and time call was made  1/31/2023  8:39 AM    Hospital care reviewed  Records reviewed    Patient was hospitialized at  [facility]    Date of Admission  01/26/23    Date of discharge  01/30/23    Diagnosis  arthroplasty of right wrist    Disposition  Home    Current Symptoms  Cough  COPD cough      TCM Call     Post hospital issues  None    Should patient be enrolled in anticoag monitoring? No    Scheduled for follow up? Yes    Did you obtain your prescribed medications  Yes    Do you need help managing your prescriptions or medications  No    Is transportation to your appointment needed  No    I have advised the patient to call PCP with any new or worsening symptoms  [place] [id number]    Are you recieving any outpatient services  No    Are you recieving home care services  No    Are you using any community resources  No    Current waiver services  No    Have you fallen in the last 12 months  No    Interperter language line needed  No          Torri Coleman MD      Assessment/Plan:             1  Arthropathy of right wrist    2  Cellulitis of right upper extremity    3  Crohn's disease with complication, unspecified gastrointestinal tract location [facility])  -     Ambulatory referral for colonoscopy; Future    4  Pulmonary emphysema, unspecified emphysema type ([address] )  -     umeclidinium-vilanterol (Anoro Ellipta) 62 5-25 mcg/actuation inhaler; Inhale 1 puff daily    5  Portal hypertension ([address] )    6  Screening for colon cancer  -     Ambulatory referral for colonoscopy; Future    7  Hypokalemia  -     potassium chloride (MICRO-K) 10 MEQ CR capsule; Take 2 capsules (20 mEq total) by mouth 2 (two) times a day    8  [address]  (hepatocellular carcinoma) ([address] )    9  Colostomy in place ([address] )    10   Severe protein-calorie malnutrition ([facility] )    11  Acute kidney injury ([facility] )    12  Platelets decreased (HCC)         Subjective:      Patient ID: Keshawn Kumar is a 79 y o  male  Follow-up from hospitalization, I did see him in the hospital over the weekend, he is doing well      The following portions of the patient's history were reviewed and updated as appropriate: He  has a past medical history of LUCILLE (acute kidney injury) ([facility] ) (6/28/2017), Blindness of left eye, Cancer ([facility] ), Cataract, Colon polyp, Colostomy in place [facility]) (6/29/2017), COPD (chronic obstructive pulmonary disease) ([facility] ), Crohn disease ([facility] ), Emphysema of lung ([facility] ), Emphysema/COPD ([facility] ), Essential hypertension, GERD (gastroesophageal reflux disease), Hypertension, Hypokalemia (6/28/2017), Hypomagnesemia (6/29/2017), Hyponatremia (6/28/2017), Kidney stone, Osteomyelitis ([facility] ), and Pneumonia    He   Patient Active Problem List    Diagnosis Date Noted   • Portal hypertension ([facility] ) 02/01/2023   • Multiple subsegmental pulmonary emboli without acute cor pulmonale ([facility] ) 02/01/2023   • Alcohol dependence, uncomplicated ([facility] ) 46/22/3646   • Subclinical hypothyroidism 11/12/2022   • Anemia 11/08/2022   • Supraventricular tachycardia ([facility] )    • History of alcohol use disorder 11/02/2022   • Platelets decreased ([facility] )    • HCC (hepatocellular carcinoma) ([facility] ) 03/15/2022   • Encounter for follow-up surveillance of liver cancer 01/21/2022   • Dysthymic disorder 06/28/2021   • Crohn's disease of small intestine with other complication ([facility] ) 23/39/1429   • Vitamin B12 deficiency 02/24/2021   • Cataract    • Chronic obstructive pulmonary disease ([facility] ) 11/24/2020   • Mixed hyperlipidemia 11/24/2020   • Kidney stone    • Gastroesophageal reflux disease without esophagitis    • Left wrist pain 09/29/2020   • Hypocalcemia 09/12/2020   • Acute pain of left wrist 09/10/2020   • Chronic, continuous use of opioids 09/10/2020   • Observed sleep apnea    • Palliative care patient 07/31/2020   • Abdominal wall abscess 07/31/2020   • Personal history of liver cancer 06/23/2020   • Abdominal pain 06/04/2020   • Tobacco abuse 05/29/2020   • Severe sepsis ([facility] ) 05/28/2020   • Pneumonia 05/28/2020   • Chest pain 05/28/2020   • Liver mass 05/28/2020   • Syncope and collapse 04/03/2018   • Emphysema of lung ([facility] )    • Arthropathy of right wrist 12/04/2017   • Severe protein-calorie malnutrition ([facility] ) 07/01/2017   • Colostomy in place [facility]) 06/29/2017   • Diarrhea 06/29/2017   • Acute kidney injury ([facility] ) 06/28/2017   • Essential hypertension    • Emphysema/COPD ([facility] )    • Crohn disease ([facility] )      He  has a past surgical history that includes Colostomy; Cholecystectomy; Colectomy; Colonoscopy (N/A, 6/30/2017); Lithotripsy; Finger surgery (Left); IR biopsy liver mass (6/8/2020); Cataract extraction (Bilateral); Liver lobectomy (N/A, 7/15/2020); Finger amputation; and IR microwave ablation (9/21/2022)  His family history includes Heart disease in his sister; Hypertension in his father  He  reports that he quit smoking about 12 months ago  His smoking use included cigarettes  He started smoking about 52 years ago  He has a 76 50 pack-year smoking history  He has never used smokeless tobacco  He reports that he does not currently use alcohol after a past usage of about 59 0 standard drinks per week  He reports that he does not use drugs    Current Outpatient Medications   Medication Sig Dispense Refill   • ALPRAZolam (XANAX) 0 25 mg tablet Take 1 tablet (0 25 mg total) by mouth daily at bedtime as needed for anxiety 90 tablet 0   • AMILoride 5 mg tablet Take 1 tablet (5 mg total) by mouth daily 90 tablet 0   • amLODIPine (NORVASC) 5 mg tablet Take 1 tablet (5 mg total) by mouth daily 90 tablet 0   • buPROPion (WELLBUTRIN XL) 150 mg 24 hr tablet Take 1 tablet (150 mg total) by mouth daily 90 tablet 0   • calcium carbonate (TUMS) 500 mg chewable tablet Chew 1 tablet (500 mg total) daily  0   • carvedilol (COREG) 3 125 mg tablet Take 1 tablet (3 125 mg total) by mouth 2 (two) times a day with meals 932 tablet 1   • folic acid (FOLVITE) 1 mg tablet Take 1 tablet (1 mg total) by mouth daily 90 tablet 1   • levothyroxine 50 mcg tablet Take 1 tablet (50 mcg total) by mouth daily in the early morning 90 tablet 1   • MAGNESIUM CHLORIDE PO Take 1,000 mg by mouth daily     • Magnesium Oxide 250 MG TABS Take 1 tablet (250 mg total) by mouth in the morning 90 tablet 3   • mirtazapine (REMERON) 15 mg tablet Take 1 tablet (15 mg total) by mouth daily at bedtime 90 tablet 1   • omeprazole (PriLOSEC) 20 mg delayed release capsule Take 1 capsule (20 mg total) by mouth daily 90 capsule 1   • potassium chloride (MICRO-K) 10 MEQ CR capsule Take 2 capsules (20 mEq total) by mouth 2 (two) times a day 180 capsule 1   • predniSONE 20 mg tablet Take 1 tablet (20 mg total) by mouth daily for 4 days Do not start before January 31, 2023  4 tablet 0   • rosuvastatin (CRESTOR) 10 MG tablet Take 1 tablet (10 mg total) by mouth daily 90 tablet 0   • thiamine 250 MG tablet Take 1 tablet (250 mg total) by mouth daily for 5 days Do not start before November 14, 2022  5 tablet 0   • umeclidinium-vilanterol (Anoro Ellipta) 62 5-25 mcg/actuation inhaler Inhale 1 puff daily 180 blister 3   • VIT B12-METHIONINE-INOS-CHOL IM Inject into a muscle every 30 (thirty) days       Current Facility-Administered Medications   Medication Dose Route Frequency Provider Last Rate Last Admin   • cyanocobalamin injection 1,000 mcg  1,000 mcg Intramuscular Q30 Days Torri Coleman MD   1,000 mcg at 01/13/23 1025     He has No Known Allergies       Review of Systems   Constitutional: Negative for chills and fever  HENT: Negative for congestion, ear pain and sore throat  Eyes: Negative for pain  Respiratory: Negative for cough and shortness of breath  Cardiovascular: Negative for chest pain and leg swelling     Gastrointestinal: Negative for abdominal pain, nausea and vomiting  Endocrine: Negative for polyuria  Genitourinary: Negative for difficulty urinating, frequency and urgency  Musculoskeletal: Positive for arthralgias  Negative for back pain  Skin: Negative for rash  Neurological: Negative for weakness and headaches  Psychiatric/Behavioral: Negative for sleep disturbance  The patient is not nervous/anxious  Objective:      /68 (BP Location: Left arm, Patient Position: Sitting, Cuff Size: Standard)   Pulse 91   Temp 97 6 °F (36 4 °C) (Temporal)   Ht 5' 9" (1 753 m)   Wt 61 7 kg (136 lb)   SpO2 99%   BMI 20 08 kg/m²     Recent Results (from the past 336 hour(s))   Osmolality-"If this is regarding a toxic alcohol, STOP  Test is not routinely indicated   Please consult medical  on call for further guidance "    Collection Time: 01/26/23 12:00 AM   Result Value Ref Range    Osmolality Serum 271 (L) 282 - 298 mmol/KG   Basic metabolic panel    Collection Time: 01/26/23  7:42 AM   Result Value Ref Range    Sodium 126 (L) 135 - 147 mmol/L    Potassium 2 4 (LL) 3 5 - 5 3 mmol/L    Chloride 86 (L) 96 - 108 mmol/L    CO2 30 21 - 32 mmol/L    ANION GAP 10 4 - 13 mmol/L    BUN 21 5 - 25 mg/dL    Creatinine 1 28 0 60 - 1 30 mg/dL    Glucose 105 65 - 140 mg/dL    Glucose, Fasting 105 (H) 65 - 99 mg/dL    Calcium 7 4 (L) 8 3 - 10 1 mg/dL    eGFR 57 ml/min/1 73sq m   Protime-INR    Collection Time: 01/26/23  7:42 AM   Result Value Ref Range    Protime 15 2 (H) 11 6 - 14 5 seconds    INR 1 18 0 84 - 1 19   CBC    Collection Time: 01/26/23  7:42 AM   Result Value Ref Range    WBC 15 14 (H) 4 31 - 10 16 Thousand/uL    RBC 3 81 (L) 3 88 - 5 62 Million/uL    Hemoglobin 11 9 (L) 12 0 - 17 0 g/dL    Hematocrit 34 3 (L) 36 5 - 49 3 %    MCV 90 82 - 98 fL    MCH 31 2 26 8 - 34 3 pg    MCHC 34 7 31 4 - 37 4 g/dL    RDW 12 2 11 6 - 15 1 %    Platelets 985 721 - 302 Thousands/uL    MPV 10 9 8 9 - 12 7 fL   Magnesium    Collection Time: 01/26/23  7:42 AM   Result Value Ref Range    Magnesium 0 7 (LL) 1 6 - 2 6 mg/dL   TSH, 3rd generation with Free T4 reflex    Collection Time: 01/26/23  7:42 AM   Result Value Ref Range    TSH 3RD GENERATON 3 260 0 450 - 4 500 uIU/mL   Hepatic function panel    Collection Time: 01/26/23  7:42 AM   Result Value Ref Range    Total Bilirubin 1 80 (H) 0 20 - 1 00 mg/dL    Bilirubin, Direct 0 68 (H) 0 00 - 0 20 mg/dL    Alkaline Phosphatase 103 46 - 116 U/L    AST 33 5 - 45 U/L    ALT 33 12 - 78 U/L    Total Protein 7 8 6 4 - 8 4 g/dL    Albumin 2 4 (L) 3 5 - 5 0 g/dL   Uric acid    Collection Time: 01/26/23  7:42 AM   Result Value Ref Range    Uric Acid 5 9 3 5 - 8 5 mg/dL   Lactate dehydrogenase    Collection Time: 01/26/23  7:42 AM   Result Value Ref Range     81 - 234 U/L   Retic Count    Collection Time: 01/26/23  7:42 AM   Result Value Ref Range    Retic Ct Abs 57,500 14,356 - 105,094    Retic Ct Pct 1 50 0 37 - 1 87 %   Peripheral Smear    Collection Time: 01/26/23  7:42 AM   Result Value Ref Range    Segmented Neutrophils Manual 84 (H) 45 - 77 %    Bands Manual 2 Thousand/uL    Lymphocytes Manual 10 (L) 14 - 44 %    Monocytes Manual 4 4 - 12 %    Total Counted 100     RBC Morphology     ECG 12 lead    Collection Time: 01/26/23 11:03 AM   Result Value Ref Range    Ventricular Rate 97 BPM    Atrial Rate 97 BPM    OK Interval 142 ms    QRSD Interval 90 ms    QT Interval 368 ms    QTC Interval 468 ms    P Axis 43 degrees    QRS Axis -30 degrees    T Wave Axis 62 degrees   Blood culture    Collection Time: 01/26/23 12:12 PM    Specimen: Arm, Left; Blood   Result Value Ref Range    Blood Culture No Growth After 5 Days  Blood culture    Collection Time: 01/26/23 12:12 PM    Specimen: Arm, Right; Blood   Result Value Ref Range    Blood Culture No Growth After 5 Days      Lactic acid, plasma    Collection Time: 01/26/23 12:15 PM   Result Value Ref Range    LACTIC ACID 2 4 (HH) 0 5 - 2 0 mmol/L   Platelet count Collection Time: 01/26/23 12:30 PM   Result Value Ref Range    Platelets 116 758 - 337 Thousands/uL    MPV 10 5 8 9 - 12 7 fL   Sodium, urine, random    Collection Time: 01/26/23  5:13 PM   Result Value Ref Range    Sodium, Ur 8    Osmolality, urine    Collection Time: 01/26/23  5:13 PM   Result Value Ref Range    Osmolality, Ur 204 (L) 250 - 900 mmol/KG   UA w Reflex to Microscopic w Reflex to Culture    Collection Time: 01/26/23  5:13 PM    Specimen: Urine, Clean Catch   Result Value Ref Range    Color, UA Yellow     Clarity, UA Clear     Specific Gravity, UA 1 008 1 003 - 1 030    pH, UA 5 5 4 5, 5 0, 5 5, 6 0, 6 5, 7 0, 7 5, 8 0    Leukocytes, UA Moderate (A) Negative    Nitrite, UA Negative Negative    Protein, UA Trace (A) Negative mg/dl    Glucose, UA Negative Negative mg/dl    Ketones, UA Negative Negative mg/dl    Urobilinogen, UA <2 0 <2 0 mg/dl mg/dl    Bilirubin, UA Negative Negative    Occult Blood, UA Negative Negative   Urine Microscopic    Collection Time: 01/26/23  5:13 PM   Result Value Ref Range    RBC, UA 1-2 None Seen, 1-2 /hpf    WBC, UA 30-50 (A) None Seen, 1-2 /hpf    Epithelial Cells None Seen None Seen, Occasional /hpf    Bacteria, UA None Seen None Seen, Occasional /hpf   Magnesium    Collection Time: 01/26/23  5:20 PM   Result Value Ref Range    Magnesium 2 2 1 6 - 2 6 mg/dL   Comprehensive metabolic panel    Collection Time: 01/26/23  5:20 PM   Result Value Ref Range    Sodium 125 (L) 135 - 147 mmol/L    Potassium 2 9 (L) 3 5 - 5 3 mmol/L    Chloride 89 (L) 96 - 108 mmol/L    CO2 28 21 - 32 mmol/L    ANION GAP 8 4 - 13 mmol/L    BUN 18 5 - 25 mg/dL    Creatinine 1 10 0 60 - 1 30 mg/dL    Glucose 110 65 - 140 mg/dL    Calcium 7 2 (L) 8 3 - 10 1 mg/dL    Corrected Calcium 8 7 8 3 - 10 1 mg/dL    AST 61 (H) 5 - 45 U/L    ALT 38 12 - 78 U/L    Alkaline Phosphatase 117 (H) 46 - 116 U/L    Total Protein 6 8 6 4 - 8 4 g/dL    Albumin 2 1 (L) 3 5 - 5 0 g/dL    Total Bilirubin 1 30 (H) 0 20 - 1 00 mg/dL    eGFR 69 ml/min/1 73sq m   Lactic acid 2 Hours    Collection Time: 01/26/23  5:20 PM   Result Value Ref Range    LACTIC ACID 3 0 (HH) 0 5 - 2 0 mmol/L   Magnesium    Collection Time: 01/26/23  8:23 PM   Result Value Ref Range    Magnesium 2 0 1 6 - 2 6 mg/dL   Comprehensive metabolic panel    Collection Time: 01/26/23  8:23 PM   Result Value Ref Range    Sodium 130 (L) 135 - 147 mmol/L    Potassium 3 0 (L) 3 5 - 5 3 mmol/L    Chloride 92 (L) 96 - 108 mmol/L    CO2 27 21 - 32 mmol/L    ANION GAP 11 4 - 13 mmol/L    BUN 17 5 - 25 mg/dL    Creatinine 1 07 0 60 - 1 30 mg/dL    Glucose 99 65 - 140 mg/dL    Calcium 7 1 (L) 8 3 - 10 1 mg/dL    Corrected Calcium 8 7 8 3 - 10 1 mg/dL    AST 56 (H) 5 - 45 U/L    ALT 38 12 - 78 U/L    Alkaline Phosphatase 124 (H) 46 - 116 U/L    Total Protein 6 7 6 4 - 8 4 g/dL    Albumin 2 0 (L) 3 5 - 5 0 g/dL    Total Bilirubin 1 07 (H) 0 20 - 1 00 mg/dL    eGFR 71 ml/min/1 73sq m   Magnesium    Collection Time: 01/27/23 12:26 AM   Result Value Ref Range    Magnesium 1 6 1 6 - 2 6 mg/dL   Comprehensive metabolic panel    Collection Time: 01/27/23 12:26 AM   Result Value Ref Range    Sodium 130 (L) 135 - 147 mmol/L    Potassium 3 7 3 5 - 5 3 mmol/L    Chloride 96 96 - 108 mmol/L    CO2 26 21 - 32 mmol/L    ANION GAP 8 4 - 13 mmol/L    BUN 16 5 - 25 mg/dL    Creatinine 0 94 0 60 - 1 30 mg/dL    Glucose 153 (H) 65 - 140 mg/dL    Calcium 6 7 (L) 8 3 - 10 1 mg/dL    Corrected Calcium 8 3 8 3 - 10 1 mg/dL    AST 59 (H) 5 - 45 U/L    ALT 36 12 - 78 U/L    Alkaline Phosphatase 112 46 - 116 U/L    Total Protein 6 2 (L) 6 4 - 8 4 g/dL    Albumin 2 0 (L) 3 5 - 5 0 g/dL    Total Bilirubin 1 06 (H) 0 20 - 1 00 mg/dL    eGFR 83 ml/min/1 73sq m   Lactic acid, plasma    Collection Time: 01/27/23 12:26 AM   Result Value Ref Range    LACTIC ACID 1 1 0 5 - 2 0 mmol/L   CBC and differential    Collection Time: 01/27/23  3:00 PM   Result Value Ref Range    WBC 12 75 (H) 4 31 - 10 16 Thousand/uL RBC 3 33 (L) 3 88 - 5 62 Million/uL    Hemoglobin 10 4 (L) 12 0 - 17 0 g/dL    Hematocrit 32 0 (L) 36 5 - 49 3 %    MCV 94 82 - 98 fL    MCH 31 2 26 8 - 34 3 pg    MCHC 32 5 31 4 - 37 4 g/dL    RDW 12 4 11 6 - 15 1 %    MPV 10 4 8 9 - 12 7 fL    Platelets 630 391 - 690 Thousands/uL    nRBC 0 /100 WBCs    Neutrophils Relative 83 (H) 43 - 75 %    Immat GRANS % 1 0 - 2 %    Lymphocytes Relative 8 (L) 14 - 44 %    Monocytes Relative 8 4 - 12 %    Eosinophils Relative 0 0 - 6 %    Basophils Relative 0 0 - 1 %    Neutrophils Absolute 10 46 (H) 1 85 - 7 62 Thousands/µL    Immature Grans Absolute 0 16 0 00 - 0 20 Thousand/uL    Lymphocytes Absolute 1 01 0 60 - 4 47 Thousands/µL    Monocytes Absolute 1 06 0 17 - 1 22 Thousand/µL    Eosinophils Absolute 0 02 0 00 - 0 61 Thousand/µL    Basophils Absolute 0 04 0 00 - 0 10 Thousands/µL   Comprehensive metabolic panel    Collection Time: 01/27/23  3:00 PM   Result Value Ref Range    Sodium 135 135 - 147 mmol/L    Potassium 3 8 3 5 - 5 3 mmol/L    Chloride 100 96 - 108 mmol/L    CO2 29 21 - 32 mmol/L    ANION GAP 6 4 - 13 mmol/L    BUN 13 5 - 25 mg/dL    Creatinine 0 86 0 60 - 1 30 mg/dL    Glucose 105 65 - 140 mg/dL    Calcium 7 5 (L) 8 3 - 10 1 mg/dL    Corrected Calcium 9 1 8 3 - 10 1 mg/dL    AST 37 5 - 45 U/L    ALT 32 12 - 78 U/L    Alkaline Phosphatase 112 46 - 116 U/L    Total Protein 6 3 (L) 6 4 - 8 4 g/dL    Albumin 2 0 (L) 3 5 - 5 0 g/dL    Total Bilirubin 0 83 0 20 - 1 00 mg/dL    eGFR 89 ml/min/1 73sq m   Vancomycin, random    Collection Time: 01/28/23  5:16 AM   Result Value Ref Range    Vancomycin Rm 13 9 10 0 - 20 0 ug/mL   CBC and differential    Collection Time: 01/28/23  5:16 AM   Result Value Ref Range    WBC 14 30 (H) 4 31 - 10 16 Thousand/uL    RBC 3 48 (L) 3 88 - 5 62 Million/uL    Hemoglobin 11 0 (L) 12 0 - 17 0 g/dL    Hematocrit 33 4 (L) 36 5 - 49 3 %    MCV 96 82 - 98 fL    MCH 31 6 26 8 - 34 3 pg    MCHC 32 9 31 4 - 37 4 g/dL    RDW 12 5 11 6 - 15 1 % MPV 10 3 8 9 - 12 7 fL    Platelets 457 619 - 702 Thousands/uL    nRBC 0 /100 WBCs    Neutrophils Relative 79 (H) 43 - 75 %    Immat GRANS % 1 0 - 2 %    Lymphocytes Relative 12 (L) 14 - 44 %    Monocytes Relative 7 4 - 12 %    Eosinophils Relative 1 0 - 6 %    Basophils Relative 0 0 - 1 %    Neutrophils Absolute 11 41 (H) 1 85 - 7 62 Thousands/µL    Immature Grans Absolute 0 07 0 00 - 0 20 Thousand/uL    Lymphocytes Absolute 1 74 0 60 - 4 47 Thousands/µL    Monocytes Absolute 0 93 0 17 - 1 22 Thousand/µL    Eosinophils Absolute 0 10 0 00 - 0 61 Thousand/µL    Basophils Absolute 0 05 0 00 - 0 10 Thousands/µL   Basic metabolic panel    Collection Time: 01/28/23  5:16 AM   Result Value Ref Range    Sodium 138 135 - 147 mmol/L    Potassium 4 1 3 5 - 5 3 mmol/L    Chloride 105 96 - 108 mmol/L    CO2 28 21 - 32 mmol/L    ANION GAP 5 4 - 13 mmol/L    BUN 12 5 - 25 mg/dL    Creatinine 0 86 0 60 - 1 30 mg/dL    Glucose 93 65 - 140 mg/dL    Calcium 8 1 (L) 8 3 - 10 1 mg/dL    eGFR 89 ml/min/1 73sq m   Magnesium    Collection Time: 01/28/23  5:16 AM   Result Value Ref Range    Magnesium 1 4 (L) 1 6 - 2 6 mg/dL   Sedimentation rate, automated    Collection Time: 01/28/23  1:51 PM   Result Value Ref Range    Sed Rate 68 (H) 0 - 19 mm/hour   C-reactive protein    Collection Time: 01/28/23  1:51 PM   Result Value Ref Range    CRP 46 7 (H) <3 0 mg/L   Basic metabolic panel    Collection Time: 01/29/23  6:27 AM   Result Value Ref Range    Sodium 140 135 - 147 mmol/L    Potassium 4 5 3 5 - 5 3 mmol/L    Chloride 111 (H) 96 - 108 mmol/L    CO2 23 21 - 32 mmol/L    ANION GAP 6 4 - 13 mmol/L    BUN 17 5 - 25 mg/dL    Creatinine 0 80 0 60 - 1 30 mg/dL    Glucose 172 (H) 65 - 140 mg/dL    Calcium 8 1 (L) 8 3 - 10 1 mg/dL    eGFR 92 ml/min/1 73sq m   CBC and differential    Collection Time: 01/30/23  5:13 AM   Result Value Ref Range    WBC 14 79 (H) 4 31 - 10 16 Thousand/uL    RBC 3 15 (L) 3 88 - 5 62 Million/uL    Hemoglobin 10 0 (L) 12 0 - 17 0 g/dL    Hematocrit 30 6 (L) 36 5 - 49 3 %    MCV 97 82 - 98 fL    MCH 31 7 26 8 - 34 3 pg    MCHC 32 7 31 4 - 37 4 g/dL    RDW 12 5 11 6 - 15 1 %    MPV 10 3 8 9 - 12 7 fL    Platelets 736 670 - 594 Thousands/uL    nRBC 0 /100 WBCs    Neutrophils Relative 84 (H) 43 - 75 %    Immat GRANS % 1 0 - 2 %    Lymphocytes Relative 10 (L) 14 - 44 %    Monocytes Relative 5 4 - 12 %    Eosinophils Relative 0 0 - 6 %    Basophils Relative 0 0 - 1 %    Neutrophils Absolute 12 51 (H) 1 85 - 7 62 Thousands/µL    Immature Grans Absolute 0 12 0 00 - 0 20 Thousand/uL    Lymphocytes Absolute 1 40 0 60 - 4 47 Thousands/µL    Monocytes Absolute 0 69 0 17 - 1 22 Thousand/µL    Eosinophils Absolute 0 02 0 00 - 0 61 Thousand/µL    Basophils Absolute 0 05 0 00 - 0 10 Thousands/µL   Basic metabolic panel    Collection Time: 01/30/23  5:13 AM   Result Value Ref Range    Sodium 139 135 - 147 mmol/L    Potassium 4 7 3 5 - 5 3 mmol/L    Chloride 110 (H) 96 - 108 mmol/L    CO2 24 21 - 32 mmol/L    ANION GAP 5 4 - 13 mmol/L    BUN 17 5 - 25 mg/dL    Creatinine 0 84 0 60 - 1 30 mg/dL    Glucose 132 65 - 140 mg/dL    Calcium 8 1 (L) 8 3 - 10 1 mg/dL    eGFR 90 ml/min/1 73sq m   CBC and differential    Collection Time: 01/30/23 10:05 AM   Result Value Ref Range    WBC 15 63 (H) 4 31 - 10 16 Thousand/uL    RBC 3 13 (L) 3 88 - 5 62 Million/uL    Hemoglobin 9 7 (L) 12 0 - 17 0 g/dL    Hematocrit 31 1 (L) 36 5 - 49 3 %    MCV 99 (H) 82 - 98 fL    MCH 31 0 26 8 - 34 3 pg    MCHC 31 2 (L) 31 4 - 37 4 g/dL    RDW 12 6 11 6 - 15 1 %    MPV 10 2 8 9 - 12 7 fL    Platelets 572 495 - 998 Thousands/uL    nRBC 0 /100 WBCs    Neutrophils Relative 83 (H) 43 - 75 %    Immat GRANS % 1 0 - 2 %    Lymphocytes Relative 11 (L) 14 - 44 %    Monocytes Relative 5 4 - 12 %    Eosinophils Relative 0 0 - 6 %    Basophils Relative 0 0 - 1 %    Neutrophils Absolute 12 94 (H) 1 85 - 7 62 Thousands/µL    Immature Grans Absolute 0 15 0 00 - 0 20 Thousand/uL Lymphocytes Absolute 1 71 0 60 - 4 47 Thousands/µL    Monocytes Absolute 0 74 0 17 - 1 22 Thousand/µL    Eosinophils Absolute 0 04 0 00 - 0 61 Thousand/µL    Basophils Absolute 0 05 0 00 - 0 10 Thousands/µL        Physical Exam  Constitutional:       Appearance: Normal appearance  HENT:      Head: Normocephalic  Right Ear: Tympanic membrane, ear canal and external ear normal       Left Ear: Tympanic membrane, ear canal and external ear normal       Nose: Nose normal  No congestion  Mouth/Throat:      Mouth: Mucous membranes are moist       Pharynx: Oropharynx is clear  No oropharyngeal exudate or posterior oropharyngeal erythema  Eyes:      Extraocular Movements: Extraocular movements intact  Conjunctiva/sclera: Conjunctivae normal       Pupils: Pupils are equal, round, and reactive to light  Cardiovascular:      Rate and Rhythm: Normal rate and regular rhythm  Heart sounds: Normal heart sounds  No murmur heard  Pulmonary:      Effort: Pulmonary effort is normal       Breath sounds: Normal breath sounds  No wheezing or rales  Abdominal:      General: Bowel sounds are normal  There is no distension  Palpations: Abdomen is soft  Tenderness: There is no abdominal tenderness  Musculoskeletal:      Cervical back: Normal range of motion and neck supple  Right lower leg: No edema  Left lower leg: No edema  Comments: Right hand dorsum - wrist -swelling none, mild tenderness present medially on the dorsum, skin looks normal, movement mild painful and restricted due to pain   Lymphadenopathy:      Cervical: No cervical adenopathy  Skin:     General: Skin is warm  Neurological:      General: No focal deficit present  Mental Status: He is alert and oriented to person, place, and time

## 2023-02-08 ENCOUNTER — OFFICE VISIT (OUTPATIENT)
Dept: INTERNAL MEDICINE CLINIC | Facility: CLINIC | Age: 68
End: 2023-02-08

## 2023-02-08 ENCOUNTER — HOSPITAL ENCOUNTER (EMERGENCY)
Facility: HOSPITAL | Age: 68
Discharge: HOME | End: 2023-02-08
Attending: EMERGENCY MEDICINE

## 2023-02-08 VITALS
OXYGEN SATURATION: 98 % | TEMPERATURE: 99.2 F | HEART RATE: 107 BPM | RESPIRATION RATE: 16 BRPM | SYSTOLIC BLOOD PRESSURE: 109 MMHG | DIASTOLIC BLOOD PRESSURE: 70 MMHG

## 2023-02-08 DIAGNOSIS — M79.641 RIGHT HAND PAIN: Primary | ICD-10-CM

## 2023-02-08 DIAGNOSIS — I10 ESSENTIAL HYPERTENSION: Chronic | ICD-10-CM

## 2023-02-08 DIAGNOSIS — M11.20 PSEUDOGOUT: ICD-10-CM

## 2023-02-08 DIAGNOSIS — I47.1 SUPRAVENTRICULAR TACHYCARDIA (HCC): ICD-10-CM

## 2023-02-08 DIAGNOSIS — M79.89 SWELLING OF RIGHT HAND: Primary | ICD-10-CM

## 2023-02-08 DIAGNOSIS — Z93.3 COLOSTOMY IN PLACE (HCC): ICD-10-CM

## 2023-02-08 DIAGNOSIS — M79.89 SWELLING OF RIGHT HAND: ICD-10-CM

## 2023-02-08 DIAGNOSIS — J43.8 OTHER EMPHYSEMA (HCC): ICD-10-CM

## 2023-02-08 DIAGNOSIS — E43 SEVERE PROTEIN-CALORIE MALNUTRITION (HCC): ICD-10-CM

## 2023-02-08 LAB
GLUCOSE SERPL-MCNC: 87 MG/DL (ref 65–140)
HOLD SPECIMEN: NORMAL

## 2023-02-08 RX ORDER — PREDNISONE 20 MG/1
TABLET ORAL
Qty: 11 TABLET | Refills: 0 | Status: SHIPPED | OUTPATIENT
Start: 2023-02-08 | End: 2023-02-22

## 2023-02-08 RX ORDER — PREDNISONE 20 MG/1
20 TABLET ORAL ONCE
Status: COMPLETED | OUTPATIENT
Start: 2023-02-08 | End: 2023-02-08

## 2023-02-08 RX ORDER — ACETAMINOPHEN 325 MG/1
650 TABLET ORAL ONCE
Status: COMPLETED | OUTPATIENT
Start: 2023-02-08 | End: 2023-02-08

## 2023-02-08 RX ORDER — OXYCODONE HYDROCHLORIDE 5 MG/1
5 CAPSULE ORAL EVERY 6 HOURS PRN
Qty: 8 CAPSULE | Refills: 0 | Status: SHIPPED | OUTPATIENT
Start: 2023-02-08

## 2023-02-08 RX ORDER — OXYCODONE HYDROCHLORIDE 5 MG/1
5 TABLET ORAL ONCE
Status: COMPLETED | OUTPATIENT
Start: 2023-02-08 | End: 2023-02-08

## 2023-02-08 RX ORDER — KETOROLAC TROMETHAMINE 30 MG/ML
15 INJECTION, SOLUTION INTRAMUSCULAR; INTRAVENOUS ONCE
Status: COMPLETED | OUTPATIENT
Start: 2023-02-08 | End: 2023-02-08

## 2023-02-08 RX ADMIN — OXYCODONE HYDROCHLORIDE 5 MG: 5 TABLET ORAL at 21:40

## 2023-02-08 RX ADMIN — PREDNISONE 20 MG: 20 TABLET ORAL at 21:37

## 2023-02-08 RX ADMIN — KETOROLAC TROMETHAMINE 15 MG: 30 INJECTION, SOLUTION INTRAMUSCULAR at 20:14

## 2023-02-08 RX ADMIN — ACETAMINOPHEN 650 MG: 325 TABLET, FILM COATED ORAL at 20:03

## 2023-02-08 NOTE — PROGRESS NOTES
Assessment/Plan:             1  Right hand pain  -     Transfer to other facility    2  Swelling of right hand  -     Transfer to other facility    3  Essential hypertension    4  Other emphysema ([place] [address] )    5  Severe protein-calorie malnutrition ([facility] )    6  Colostomy in place ([facility] )    7  Supraventricular tachycardia (HCC)           Subjective:      Patient ID: Kezia Bryant is a 79 y o  male  Right hand swelling and pain, getting worse,      The following portions of the patient's history were reviewed and updated as appropriate: He  has a past medical history of LUCILLE (acute kidney injury) ([place] [address] ) (6/28/2017), Blindness of left eye, Cancer ([place] [address] ), Cataract, Colon polyp, Colostomy in place [facility]) (6/29/2017), COPD (chronic obstructive pulmonary disease) ([place] [address] ), Crohn disease ([place] [address] ), Emphysema of lung ([place] [address] ), Emphysema/COPD ([place] [address] ), Essential hypertension, GERD (gastroesophageal reflux disease), Hypertension, Hypokalemia (6/28/2017), Hypomagnesemia (6/29/2017), Hyponatremia (6/28/2017), Kidney stone, Osteomyelitis ([place] [address] ), and Pneumonia    He   Patient Active Problem List    Diagnosis Date Noted   • Portal hypertension ([facility] ) 02/01/2023   • Multiple subsegmental pulmonary emboli without acute cor pulmonale ([facility] ) 02/01/2023   • Alcohol dependence, uncomplicated ([place] [address] ) 36/77/2317   • Subclinical hypothyroidism 11/12/2022   • Anemia 11/08/2022   • Supraventricular tachycardia ([facility] )    • History of alcohol use disorder 11/02/2022   • Platelets decreased ([place] [address] )    • HCC (hepatocellular carcinoma) ([place] [address] ) 03/15/2022   • Encounter for follow-up surveillance of liver cancer 01/21/2022   • Dysthymic disorder 06/28/2021   • Crohn's disease of small intestine with other complication ([place] [address] ) 93/58/5206   • Vitamin B12 deficiency 02/24/2021   • Cataract    • Chronic obstructive pulmonary disease ([facility] ) 11/24/2020   • Mixed hyperlipidemia 11/24/2020   • Kidney stone    • Gastroesophageal reflux disease without esophagitis    • Left wrist pain 09/29/2020   • Hypocalcemia 09/12/2020   • Acute pain of left wrist 09/10/2020   • Chronic, continuous use of opioids 09/10/2020   • Observed sleep apnea    • Palliative care patient 07/31/2020   • Abdominal wall abscess 07/31/2020   • Personal history of liver cancer 06/23/2020   • Abdominal pain 06/04/2020   • Tobacco abuse 05/29/2020   • Severe sepsis ([facility] ) 05/28/2020   • Pneumonia 05/28/2020   • Chest pain 05/28/2020   • Liver mass 05/28/2020   • Syncope and collapse 04/03/2018   • Emphysema of lung ([facility] )    • Arthropathy of right wrist 12/04/2017   • Severe protein-calorie malnutrition ([facility] ) 07/01/2017   • Colostomy in place [facility]) 06/29/2017   • Diarrhea 06/29/2017   • Acute kidney injury ([facility] ) 06/28/2017   • Essential hypertension    • Emphysema/COPD (HCC)    • Crohn disease ([facility] )      He  has a past surgical history that includes Colostomy; Cholecystectomy; Colectomy; Colonoscopy (N/A, 6/30/2017); Lithotripsy; Finger surgery (Left); IR biopsy liver mass (6/8/2020); Cataract extraction (Bilateral); Liver lobectomy (N/A, 7/15/2020); Finger amputation; and IR microwave ablation (9/21/2022)  His family history includes Heart disease in his sister; Hypertension in his father  He  reports that he quit smoking about 13 months ago  His smoking use included cigarettes  He started smoking about 52 years ago  He has a 76 50 pack-year smoking history  He has never used smokeless tobacco  He reports that he does not currently use alcohol after a past usage of about 59 0 standard drinks per week  He reports that he does not use drugs    Current Outpatient Medications   Medication Sig Dispense Refill   • AMILoride 5 mg tablet Take 1 tablet (5 mg total) by mouth daily 90 tablet 0   • amLODIPine (NORVASC) 5 mg tablet Take 1 tablet (5 mg total) by mouth daily 90 tablet 0   • buPROPion (WELLBUTRIN XL) 150 mg 24 hr tablet Take 1 tablet (150 mg total) by mouth daily 90 tablet 0   • calcium carbonate (TUMS) 500 mg chewable tablet Chew 1 tablet (500 mg total) daily  0   • carvedilol (COREG) 3 125 mg tablet Take 1 tablet (3 125 mg total) by mouth 2 (two) times a day with meals 049 tablet 1   • folic acid (FOLVITE) 1 mg tablet Take 1 tablet (1 mg total) by mouth daily 90 tablet 1   • levothyroxine 50 mcg tablet Take 1 tablet (50 mcg total) by mouth daily in the early morning 90 tablet 1   • Magnesium Oxide 250 MG TABS Take 1 tablet (250 mg total) by mouth in the morning 90 tablet 3   • mirtazapine (REMERON) 15 mg tablet Take 1 tablet (15 mg total) by mouth daily at bedtime 90 tablet 1   • omeprazole (PriLOSEC) 20 mg delayed release capsule Take 1 capsule (20 mg total) by mouth daily 90 capsule 1   • potassium chloride (MICRO-K) 10 MEQ CR capsule Take 2 capsules (20 mEq total) by mouth 2 (two) times a day 180 capsule 1   • rosuvastatin (CRESTOR) 10 MG tablet Take 1 tablet (10 mg total) by mouth daily 90 tablet 0   • umeclidinium-vilanterol (Anoro Ellipta) 62 5-25 mcg/actuation inhaler Inhale 1 puff daily 180 blister 3   • VIT B12-METHIONINE-INOS-CHOL IM Inject into a muscle every 30 (thirty) days     • ALPRAZolam (XANAX) 0 25 mg tablet Take 1 tablet (0 25 mg total) by mouth daily at bedtime as needed for anxiety 90 tablet 0   • MAGNESIUM CHLORIDE PO Take 1,000 mg by mouth daily (Patient not taking: Reported on 2/8/2023)     • thiamine 250 MG tablet Take 1 tablet (250 mg total) by mouth daily for 5 days Do not start before November 14, 2022   (Patient not taking: Reported on 2/8/2023) 5 tablet 0     Current Facility-Administered Medications   Medication Dose Route Frequency Provider Last Rate Last Admin   • cyanocobalamin injection 1,000 mcg  1,000 mcg Intramuscular Q30 Days Torri Coleman MD   1,000 mcg at 01/13/23 1025     Current Outpatient Medications on File Prior to Visit   Medication Sig   • AMILoride 5 mg tablet Take 1 tablet (5 mg total) by mouth daily   • amLODIPine (NORVASC) 5 mg tablet Take 1 tablet (5 mg total) by mouth daily   • buPROPion (WELLBUTRIN XL) 150 mg 24 hr tablet Take 1 tablet (150 mg total) by mouth daily   • calcium carbonate (TUMS) 500 mg chewable tablet Chew 1 tablet (500 mg total) daily   • carvedilol (COREG) 3 125 mg tablet Take 1 tablet (3 125 mg total) by mouth 2 (two) times a day with meals   • folic acid (FOLVITE) 1 mg tablet Take 1 tablet (1 mg total) by mouth daily   • levothyroxine 50 mcg tablet Take 1 tablet (50 mcg total) by mouth daily in the early morning   • Magnesium Oxide 250 MG TABS Take 1 tablet (250 mg total) by mouth in the morning   • mirtazapine (REMERON) 15 mg tablet Take 1 tablet (15 mg total) by mouth daily at bedtime   • omeprazole (PriLOSEC) 20 mg delayed release capsule Take 1 capsule (20 mg total) by mouth daily   • potassium chloride (MICRO-K) 10 MEQ CR capsule Take 2 capsules (20 mEq total) by mouth 2 (two) times a day   • rosuvastatin (CRESTOR) 10 MG tablet Take 1 tablet (10 mg total) by mouth daily   • umeclidinium-vilanterol (Anoro Ellipta) 62 5-25 mcg/actuation inhaler Inhale 1 puff daily   • VIT B12-METHIONINE-INOS-CHOL IM Inject into a muscle every 30 (thirty) days   • ALPRAZolam (XANAX) 0 25 mg tablet Take 1 tablet (0 25 mg total) by mouth daily at bedtime as needed for anxiety   • MAGNESIUM CHLORIDE PO Take 1,000 mg by mouth daily (Patient not taking: Reported on 2/8/2023)   • thiamine 250 MG tablet Take 1 tablet (250 mg total) by mouth daily for 5 days Do not start before November 14, 2022  (Patient not taking: Reported on 2/8/2023)   • [DISCONTINUED] apixaban (Eliquis) 5 mg Take 2 tablets (10 mg total) by mouth 2 (two) times a day for 7 days, THEN 1 tablet (5 mg total) 2 (two) times a day for 23 days  Current Facility-Administered Medications on File Prior to Visit   Medication   • cyanocobalamin injection 1,000 mcg     He has No Known Allergies       Review of Systems   Constitutional: Negative for chills and fever  HENT: Negative for congestion, ear pain and sore throat  Eyes: Negative for pain  Respiratory: Negative for cough and shortness of breath  Cardiovascular: Negative for chest pain and leg swelling  Gastrointestinal: Negative for abdominal pain, nausea and vomiting  Endocrine: Negative for polyuria  Genitourinary: Negative for difficulty urinating, frequency and urgency  Musculoskeletal: Positive for arthralgias  Negative for back pain  Skin: Negative for rash  Neurological: Negative for weakness and headaches  Psychiatric/Behavioral: Negative for sleep disturbance  The patient is not nervous/anxious  Objective:      BP (P) 142/74 (BP Location: Left arm, Patient Position: Standing, Cuff Size: Standard)   Pulse (!) (P) 120   Temp (P) 98 4 °F (36 9 °C) (Temporal)   Ht (P) 5' 9" (1 753 m)   Wt (P) 63 kg (139 lb)   SpO2 (P) 97%   BMI (P) 20 53 kg/m²     Recent Results (from the past 1344 hour(s))   Osmolality-"If this is regarding a toxic alcohol, STOP  Test is not routinely indicated   Please consult medical  on call for further guidance "    Collection Time: 01/26/23 12:00 AM   Result Value Ref Range    Osmolality Serum 271 (L) 282 - 298 mmol/KG   Basic metabolic panel    Collection Time: 01/26/23  7:42 AM   Result Value Ref Range    Sodium 126 (L) 135 - 147 mmol/L    Potassium 2 4 (LL) 3 5 - 5 3 mmol/L    Chloride 86 (L) 96 - 108 mmol/L    CO2 30 21 - 32 mmol/L    ANION GAP 10 4 - 13 mmol/L    BUN 21 5 - 25 mg/dL    Creatinine 1 28 0 60 - 1 30 mg/dL    Glucose 105 65 - 140 mg/dL    Glucose, Fasting 105 (H) 65 - 99 mg/dL    Calcium 7 4 (L) 8 3 - 10 1 mg/dL    eGFR 57 ml/min/1 73sq m   Protime-INR    Collection Time: 01/26/23  7:42 AM   Result Value Ref Range    Protime 15 2 (H) 11 6 - 14 5 seconds    INR 1 18 0 84 - 1 19   CBC    Collection Time: 01/26/23  7:42 AM   Result Value Ref Range    WBC 15 14 (H) 4 31 - 10 16 Thousand/uL    RBC 3 81 (L) 3 88 - 5 62 Million/uL    Hemoglobin 11 9 (L) 12 0 - 17 0 g/dL    Hematocrit 34 3 (L) 36 5 - 49 3 %    MCV 90 82 - 98 fL    MCH 31 2 26 8 - 34 3 pg    MCHC 34 7 31 4 - 37 4 g/dL    RDW 12 2 11 6 - 15 1 %    Platelets 261 441 - 385 Thousands/uL    MPV 10 9 8 9 - 12 7 fL   Magnesium    Collection Time: 01/26/23  7:42 AM   Result Value Ref Range    Magnesium 0 7 (LL) 1 6 - 2 6 mg/dL   TSH, 3rd generation with Free T4 reflex    Collection Time: 01/26/23  7:42 AM   Result Value Ref Range    TSH 3RD GENERATON 3 260 0 450 - 4 500 uIU/mL   Hepatic function panel    Collection Time: 01/26/23  7:42 AM   Result Value Ref Range    Total Bilirubin 1 80 (H) 0 20 - 1 00 mg/dL    Bilirubin, Direct 0 68 (H) 0 00 - 0 20 mg/dL    Alkaline Phosphatase 103 46 - 116 U/L    AST 33 5 - 45 U/L    ALT 33 12 - 78 U/L    Total Protein 7 8 6 4 - 8 4 g/dL    Albumin 2 4 (L) 3 5 - 5 0 g/dL   Uric acid    Collection Time: 01/26/23  7:42 AM   Result Value Ref Range    Uric Acid 5 9 3 5 - 8 5 mg/dL   Lactate dehydrogenase    Collection Time: 01/26/23  7:42 AM   Result Value Ref Range     81 - 234 U/L   Retic Count    Collection Time: 01/26/23  7:42 AM   Result Value Ref Range    Retic Ct Abs 57,500 14,356 - 105,094    Retic Ct Pct 1 50 0 37 - 1 87 %   Peripheral Smear    Collection Time: 01/26/23  7:42 AM   Result Value Ref Range    Segmented Neutrophils Manual 84 (H) 45 - 77 %    Bands Manual 2 Thousand/uL    Lymphocytes Manual 10 (L) 14 - 44 %    Monocytes Manual 4 4 - 12 %    Total Counted 100     RBC Morphology     ECG 12 lead    Collection Time: 01/26/23 11:03 AM   Result Value Ref Range    Ventricular Rate 97 BPM    Atrial Rate 97 BPM    NC Interval 142 ms    QRSD Interval 90 ms    QT Interval 368 ms    QTC Interval 468 ms    P Axis 43 degrees    QRS Axis -30 degrees    T Wave Axis 62 degrees   Blood culture    Collection Time: 01/26/23 12:12 PM    Specimen: Arm, Left; Blood   Result Value Ref Range    Blood Culture No Growth After 5 Days      Blood culture    Collection Time: 01/26/23 12:12 PM    Specimen: Arm, Right; Blood   Result Value Ref Range    Blood Culture No Growth After 5 Days      Lactic acid, plasma    Collection Time: 01/26/23 12:15 PM   Result Value Ref Range    LACTIC ACID 2 4 (HH) 0 5 - 2 0 mmol/L   Platelet count    Collection Time: 01/26/23 12:30 PM   Result Value Ref Range    Platelets 835 477 - 157 Thousands/uL    MPV 10 5 8 9 - 12 7 fL   Sodium, urine, random    Collection Time: 01/26/23  5:13 PM   Result Value Ref Range    Sodium, Ur 8    Osmolality, urine    Collection Time: 01/26/23  5:13 PM   Result Value Ref Range    Osmolality, Ur 204 (L) 250 - 900 mmol/KG   UA w Reflex to Microscopic w Reflex to Culture    Collection Time: 01/26/23  5:13 PM    Specimen: Urine, Clean Catch   Result Value Ref Range    Color, UA Yellow     Clarity, UA Clear     Specific Gravity, UA 1 008 1 003 - 1 030    pH, UA 5 5 4 5, 5 0, 5 5, 6 0, 6 5, 7 0, 7 5, 8 0    Leukocytes, UA Moderate (A) Negative    Nitrite, UA Negative Negative    Protein, UA Trace (A) Negative mg/dl    Glucose, UA Negative Negative mg/dl    Ketones, UA Negative Negative mg/dl    Urobilinogen, UA <2 0 <2 0 mg/dl mg/dl    Bilirubin, UA Negative Negative    Occult Blood, UA Negative Negative   Urine Microscopic    Collection Time: 01/26/23  5:13 PM   Result Value Ref Range    RBC, UA 1-2 None Seen, 1-2 /hpf    WBC, UA 30-50 (A) None Seen, 1-2 /hpf    Epithelial Cells None Seen None Seen, Occasional /hpf    Bacteria, UA None Seen None Seen, Occasional /hpf   Magnesium    Collection Time: 01/26/23  5:20 PM   Result Value Ref Range    Magnesium 2 2 1 6 - 2 6 mg/dL   Comprehensive metabolic panel    Collection Time: 01/26/23  5:20 PM   Result Value Ref Range    Sodium 125 (L) 135 - 147 mmol/L    Potassium 2 9 (L) 3 5 - 5 3 mmol/L    Chloride 89 (L) 96 - 108 mmol/L    CO2 28 21 - 32 mmol/L    ANION GAP 8 4 - 13 mmol/L    BUN 18 5 - 25 mg/dL    Creatinine 1 10 0 60 - 1 30 mg/dL    Glucose 110 65 - 140 mg/dL    Calcium 7 2 (L) 8 3 - 10 1 mg/dL    Corrected Calcium 8 7 8 3 - 10 1 mg/dL    AST 61 (H) 5 - 45 U/L    ALT 38 12 - 78 U/L    Alkaline Phosphatase 117 (H) 46 - 116 U/L    Total Protein 6 8 6 4 - 8 4 g/dL    Albumin 2 1 (L) 3 5 - 5 0 g/dL    Total Bilirubin 1 30 (H) 0 20 - 1 00 mg/dL    eGFR 69 ml/min/1 73sq m   Lactic acid 2 Hours    Collection Time: 01/26/23  5:20 PM   Result Value Ref Range    LACTIC ACID 3 0 (HH) 0 5 - 2 0 mmol/L   Magnesium    Collection Time: 01/26/23  8:23 PM   Result Value Ref Range    Magnesium 2 0 1 6 - 2 6 mg/dL   Comprehensive metabolic panel    Collection Time: 01/26/23  8:23 PM   Result Value Ref Range    Sodium 130 (L) 135 - 147 mmol/L    Potassium 3 0 (L) 3 5 - 5 3 mmol/L    Chloride 92 (L) 96 - 108 mmol/L    CO2 27 21 - 32 mmol/L    ANION GAP 11 4 - 13 mmol/L    BUN 17 5 - 25 mg/dL    Creatinine 1 07 0 60 - 1 30 mg/dL    Glucose 99 65 - 140 mg/dL    Calcium 7 1 (L) 8 3 - 10 1 mg/dL    Corrected Calcium 8 7 8 3 - 10 1 mg/dL    AST 56 (H) 5 - 45 U/L    ALT 38 12 - 78 U/L    Alkaline Phosphatase 124 (H) 46 - 116 U/L    Total Protein 6 7 6 4 - 8 4 g/dL    Albumin 2 0 (L) 3 5 - 5 0 g/dL    Total Bilirubin 1 07 (H) 0 20 - 1 00 mg/dL    eGFR 71 ml/min/1 73sq m   Magnesium    Collection Time: 01/27/23 12:26 AM   Result Value Ref Range    Magnesium 1 6 1 6 - 2 6 mg/dL   Comprehensive metabolic panel    Collection Time: 01/27/23 12:26 AM   Result Value Ref Range    Sodium 130 (L) 135 - 147 mmol/L    Potassium 3 7 3 5 - 5 3 mmol/L    Chloride 96 96 - 108 mmol/L    CO2 26 21 - 32 mmol/L    ANION GAP 8 4 - 13 mmol/L    BUN 16 5 - 25 mg/dL    Creatinine 0 94 0 60 - 1 30 mg/dL    Glucose 153 (H) 65 - 140 mg/dL    Calcium 6 7 (L) 8 3 - 10 1 mg/dL    Corrected Calcium 8 3 8 3 - 10 1 mg/dL    AST 59 (H) 5 - 45 U/L    ALT 36 12 - 78 U/L    Alkaline Phosphatase 112 46 - 116 U/L    Total Protein 6 2 (L) 6 4 - 8 4 g/dL    Albumin 2 0 (L) 3 5 - 5 0 g/dL    Total Bilirubin 1 06 (H) 0 20 - 1 00 mg/dL    eGFR 83 ml/min/1 73sq m   Lactic acid, plasma    Collection Time: 01/27/23 12:26 AM   Result Value Ref Range    LACTIC ACID 1 1 0 5 - 2 0 mmol/L   CBC and differential    Collection Time: 01/27/23  3:00 PM   Result Value Ref Range    WBC 12 75 (H) 4 31 - 10 16 Thousand/uL    RBC 3 33 (L) 3 88 - 5 62 Million/uL    Hemoglobin 10 4 (L) 12 0 - 17 0 g/dL    Hematocrit 32 0 (L) 36 5 - 49 3 %    MCV 94 82 - 98 fL    MCH 31 2 26 8 - 34 3 pg    MCHC 32 5 31 4 - 37 4 g/dL    RDW 12 4 11 6 - 15 1 %    MPV 10 4 8 9 - 12 7 fL    Platelets 261 179 - 623 Thousands/uL    nRBC 0 /100 WBCs    Neutrophils Relative 83 (H) 43 - 75 %    Immat GRANS % 1 0 - 2 %    Lymphocytes Relative 8 (L) 14 - 44 %    Monocytes Relative 8 4 - 12 %    Eosinophils Relative 0 0 - 6 %    Basophils Relative 0 0 - 1 %    Neutrophils Absolute 10 46 (H) 1 85 - 7 62 Thousands/µL    Immature Grans Absolute 0 16 0 00 - 0 20 Thousand/uL    Lymphocytes Absolute 1 01 0 60 - 4 47 Thousands/µL    Monocytes Absolute 1 06 0 17 - 1 22 Thousand/µL    Eosinophils Absolute 0 02 0 00 - 0 61 Thousand/µL    Basophils Absolute 0 04 0 00 - 0 10 Thousands/µL   Comprehensive metabolic panel    Collection Time: 01/27/23  3:00 PM   Result Value Ref Range    Sodium 135 135 - 147 mmol/L    Potassium 3 8 3 5 - 5 3 mmol/L    Chloride 100 96 - 108 mmol/L    CO2 29 21 - 32 mmol/L    ANION GAP 6 4 - 13 mmol/L    BUN 13 5 - 25 mg/dL    Creatinine 0 86 0 60 - 1 30 mg/dL    Glucose 105 65 - 140 mg/dL    Calcium 7 5 (L) 8 3 - 10 1 mg/dL    Corrected Calcium 9 1 8 3 - 10 1 mg/dL    AST 37 5 - 45 U/L    ALT 32 12 - 78 U/L    Alkaline Phosphatase 112 46 - 116 U/L    Total Protein 6 3 (L) 6 4 - 8 4 g/dL    Albumin 2 0 (L) 3 5 - 5 0 g/dL    Total Bilirubin 0 83 0 20 - 1 00 mg/dL    eGFR 89 ml/min/1 73sq m   Vancomycin, random    Collection Time: 01/28/23  5:16 AM   Result Value Ref Range    Vancomycin Rm 13 9 10 0 - 20 0 ug/mL   CBC and differential    Collection Time: 01/28/23  5:16 AM   Result Value Ref Range    WBC 14 30 (H) 4 31 - 10 16 Thousand/uL RBC 3 48 (L) 3 88 - 5 62 Million/uL    Hemoglobin 11 0 (L) 12 0 - 17 0 g/dL    Hematocrit 33 4 (L) 36 5 - 49 3 %    MCV 96 82 - 98 fL    MCH 31 6 26 8 - 34 3 pg    MCHC 32 9 31 4 - 37 4 g/dL    RDW 12 5 11 6 - 15 1 %    MPV 10 3 8 9 - 12 7 fL    Platelets 772 934 - 199 Thousands/uL    nRBC 0 /100 WBCs    Neutrophils Relative 79 (H) 43 - 75 %    Immat GRANS % 1 0 - 2 %    Lymphocytes Relative 12 (L) 14 - 44 %    Monocytes Relative 7 4 - 12 %    Eosinophils Relative 1 0 - 6 %    Basophils Relative 0 0 - 1 %    Neutrophils Absolute 11 41 (H) 1 85 - 7 62 Thousands/µL    Immature Grans Absolute 0 07 0 00 - 0 20 Thousand/uL    Lymphocytes Absolute 1 74 0 60 - 4 47 Thousands/µL    Monocytes Absolute 0 93 0 17 - 1 22 Thousand/µL    Eosinophils Absolute 0 10 0 00 - 0 61 Thousand/µL    Basophils Absolute 0 05 0 00 - 0 10 Thousands/µL   Basic metabolic panel    Collection Time: 01/28/23  5:16 AM   Result Value Ref Range    Sodium 138 135 - 147 mmol/L    Potassium 4 1 3 5 - 5 3 mmol/L    Chloride 105 96 - 108 mmol/L    CO2 28 21 - 32 mmol/L    ANION GAP 5 4 - 13 mmol/L    BUN 12 5 - 25 mg/dL    Creatinine 0 86 0 60 - 1 30 mg/dL    Glucose 93 65 - 140 mg/dL    Calcium 8 1 (L) 8 3 - 10 1 mg/dL    eGFR 89 ml/min/1 73sq m   Magnesium    Collection Time: 01/28/23  5:16 AM   Result Value Ref Range    Magnesium 1 4 (L) 1 6 - 2 6 mg/dL   Sedimentation rate, automated    Collection Time: 01/28/23  1:51 PM   Result Value Ref Range    Sed Rate 68 (H) 0 - 19 mm/hour   C-reactive protein    Collection Time: 01/28/23  1:51 PM   Result Value Ref Range    CRP 46 7 (H) <3 0 mg/L   Basic metabolic panel    Collection Time: 01/29/23  6:27 AM   Result Value Ref Range    Sodium 140 135 - 147 mmol/L    Potassium 4 5 3 5 - 5 3 mmol/L    Chloride 111 (H) 96 - 108 mmol/L    CO2 23 21 - 32 mmol/L    ANION GAP 6 4 - 13 mmol/L    BUN 17 5 - 25 mg/dL    Creatinine 0 80 0 60 - 1 30 mg/dL    Glucose 172 (H) 65 - 140 mg/dL    Calcium 8 1 (L) 8 3 - 10 1 mg/dL eGFR 92 ml/min/1 73sq m   CBC and differential    Collection Time: 01/30/23  5:13 AM   Result Value Ref Range    WBC 14 79 (H) 4 31 - 10 16 Thousand/uL    RBC 3 15 (L) 3 88 - 5 62 Million/uL    Hemoglobin 10 0 (L) 12 0 - 17 0 g/dL    Hematocrit 30 6 (L) 36 5 - 49 3 %    MCV 97 82 - 98 fL    MCH 31 7 26 8 - 34 3 pg    MCHC 32 7 31 4 - 37 4 g/dL    RDW 12 5 11 6 - 15 1 %    MPV 10 3 8 9 - 12 7 fL    Platelets 912 147 - 377 Thousands/uL    nRBC 0 /100 WBCs    Neutrophils Relative 84 (H) 43 - 75 %    Immat GRANS % 1 0 - 2 %    Lymphocytes Relative 10 (L) 14 - 44 %    Monocytes Relative 5 4 - 12 %    Eosinophils Relative 0 0 - 6 %    Basophils Relative 0 0 - 1 %    Neutrophils Absolute 12 51 (H) 1 85 - 7 62 Thousands/µL    Immature Grans Absolute 0 12 0 00 - 0 20 Thousand/uL    Lymphocytes Absolute 1 40 0 60 - 4 47 Thousands/µL    Monocytes Absolute 0 69 0 17 - 1 22 Thousand/µL    Eosinophils Absolute 0 02 0 00 - 0 61 Thousand/µL    Basophils Absolute 0 05 0 00 - 0 10 Thousands/µL   Basic metabolic panel    Collection Time: 01/30/23  5:13 AM   Result Value Ref Range    Sodium 139 135 - 147 mmol/L    Potassium 4 7 3 5 - 5 3 mmol/L    Chloride 110 (H) 96 - 108 mmol/L    CO2 24 21 - 32 mmol/L    ANION GAP 5 4 - 13 mmol/L    BUN 17 5 - 25 mg/dL    Creatinine 0 84 0 60 - 1 30 mg/dL    Glucose 132 65 - 140 mg/dL    Calcium 8 1 (L) 8 3 - 10 1 mg/dL    eGFR 90 ml/min/1 73sq m   CBC and differential    Collection Time: 01/30/23 10:05 AM   Result Value Ref Range    WBC 15 63 (H) 4 31 - 10 16 Thousand/uL    RBC 3 13 (L) 3 88 - 5 62 Million/uL    Hemoglobin 9 7 (L) 12 0 - 17 0 g/dL    Hematocrit 31 1 (L) 36 5 - 49 3 %    MCV 99 (H) 82 - 98 fL    MCH 31 0 26 8 - 34 3 pg    MCHC 31 2 (L) 31 4 - 37 4 g/dL    RDW 12 6 11 6 - 15 1 %    MPV 10 2 8 9 - 12 7 fL    Platelets 356 330 - 357 Thousands/uL    nRBC 0 /100 WBCs    Neutrophils Relative 83 (H) 43 - 75 %    Immat GRANS % 1 0 - 2 %    Lymphocytes Relative 11 (L) 14 - 44 % Monocytes Relative 5 4 - 12 %    Eosinophils Relative 0 0 - 6 %    Basophils Relative 0 0 - 1 %    Neutrophils Absolute 12 94 (H) 1 85 - 7 62 Thousands/µL    Immature Grans Absolute 0 15 0 00 - 0 20 Thousand/uL    Lymphocytes Absolute 1 71 0 60 - 4 47 Thousands/µL    Monocytes Absolute 0 74 0 17 - 1 22 Thousand/µL    Eosinophils Absolute 0 04 0 00 - 0 61 Thousand/µL    Basophils Absolute 0 05 0 00 - 0 10 Thousands/µL        Physical Exam  Constitutional:       Appearance: Normal appearance  HENT:      Head: Normocephalic  Right Ear: External ear normal       Left Ear: External ear normal       Nose: Nose normal  No congestion  Mouth/Throat:      Mouth: Mucous membranes are moist       Pharynx: Oropharynx is clear  No oropharyngeal exudate or posterior oropharyngeal erythema  Eyes:      Extraocular Movements: Extraocular movements intact  Conjunctiva/sclera: Conjunctivae normal       Pupils: Pupils are equal, round, and reactive to light  Cardiovascular:      Rate and Rhythm: Normal rate and regular rhythm  Heart sounds: Normal heart sounds  No murmur heard  Pulmonary:      Effort: Pulmonary effort is normal       Breath sounds: Normal breath sounds  No wheezing or rales  Abdominal:      General: Bowel sounds are normal  There is no distension  Palpations: Abdomen is soft  Tenderness: There is no abdominal tenderness  Musculoskeletal:      Cervical back: Normal range of motion and neck supple  Right lower leg: No edema  Left lower leg: No edema  Comments: Right hand examination-swelling present, skin redness present, temperature elevated, movement painful, swelling extends into the forearm   Lymphadenopathy:      Cervical: No cervical adenopathy  Skin:     General: Skin is warm  Neurological:      General: No focal deficit present  Mental Status: He is alert and oriented to person, place, and time

## 2023-02-09 ENCOUNTER — TELEPHONE (OUTPATIENT)
Dept: INTERNAL MEDICINE CLINIC | Facility: CLINIC | Age: 68
End: 2023-02-09

## 2023-02-09 ENCOUNTER — TELEPHONE (OUTPATIENT)
Dept: SURGICAL ONCOLOGY | Facility: CLINIC | Age: 68
End: 2023-02-09

## 2023-02-09 NOTE — TELEPHONE ENCOUNTER
Spoke to patient and rescheduled MRI 6/16/23 and f/u appointment with Dr Chad Lopez 6/28/23  Pt ok with appointment changes  And will do appropriate lab work prior to MRI per pt

## 2023-02-09 NOTE — TELEPHONE ENCOUNTER
Daughter called and patient was seen in the ER at [address] [place] [address]  They did not do anything in the ER for him  He was given Prednisone 20mg  One tablet daily for 7 days then 10mg    daily for 7 days To take one a day for a longer period of time  Also put him on Oxycodone for pain  They wrote possible gout on the discharge summary  Please call her back about this

## 2023-02-09 NOTE — ED PROVIDER NOTES
History  Chief Complaint   Patient presents with   • Hand Swelling     Pt c/o R hand swelling since Monday  Denies injury  Dx with "pseudo-ghout " Placed on prednisone previously which he states helped  Sent for admission  HPI Pt is a 78 y/o m presenting with two days of severe right hand pain/swelling beginning four days ago, progressively worsening involving the dorsum of the hand  Was diagnosed with possible pseudogout on 1/26, discharged home with magnesium supplementation and prednisone which resolved the initial problem  After the prednisone course was finished the pain/swelling returned within two days  States that this is very similar to the last instance  Pmhx: Crohn's with ileostomy, copd, hepatocellular carcinoma, gerd, htn  Prior to Admission Medications   Prescriptions Last Dose Informant Patient Reported? Taking?    ALPRAZolam (XANAX) 0 25 mg tablet   No No   Sig: Take 1 tablet (0 25 mg total) by mouth daily at bedtime as needed for anxiety   AMILoride 5 mg tablet   No No   Sig: Take 1 tablet (5 mg total) by mouth daily   MAGNESIUM CHLORIDE PO   Yes No   Sig: Take 1,000 mg by mouth daily   Patient not taking: Reported on 2/8/2023   Magnesium Oxide 250 MG TABS   No No   Sig: Take 1 tablet (250 mg total) by mouth in the morning   VIT B12-METHIONINE-INOS-CHOL IM   Yes No   Sig: Inject into a muscle every 30 (thirty) days   amLODIPine (NORVASC) 5 mg tablet   No No   Sig: Take 1 tablet (5 mg total) by mouth daily   buPROPion (WELLBUTRIN XL) 150 mg 24 hr tablet   No No   Sig: Take 1 tablet (150 mg total) by mouth daily   calcium carbonate (TUMS) 500 mg chewable tablet   No No   Sig: Chew 1 tablet (500 mg total) daily   carvedilol (COREG) 3 125 mg tablet   No No   Sig: Take 1 tablet (3 125 mg total) by mouth 2 (two) times a day with meals   folic acid (FOLVITE) 1 mg tablet   No No   Sig: Take 1 tablet (1 mg total) by mouth daily   levothyroxine 50 mcg tablet   No No   Sig: Take 1 tablet (50 mcg total) by mouth daily in the early morning   mirtazapine (REMERON) 15 mg tablet   No No   Sig: Take 1 tablet (15 mg total) by mouth daily at bedtime   omeprazole (PriLOSEC) 20 mg delayed release capsule   No No   Sig: Take 1 capsule (20 mg total) by mouth daily   potassium chloride (MICRO-K) 10 MEQ CR capsule   No No   Sig: Take 2 capsules (20 mEq total) by mouth 2 (two) times a day   rosuvastatin (CRESTOR) 10 MG tablet   No No   Sig: Take 1 tablet (10 mg total) by mouth daily   thiamine 250 MG tablet   No No   Sig: Take 1 tablet (250 mg total) by mouth daily for 5 days Do not start before November 14, 2022  Patient not taking: Reported on 2/8/2023   umeclidinium-vilanterol (Anoro Ellipta) 62 5-25 mcg/actuation inhaler   No No   Sig: Inhale 1 puff daily      Facility-Administered Medications Last Administration Doses Remaining   cyanocobalamin injection 1,000 mcg 2/10/2023  1:03 PM           Past Medical History:   Diagnosis Date   • LUCILLE (acute kidney injury) ([facility] ) 6/28/2017   • Blindness of left eye     Since birth   • Cancer [facility])     liver   • Cataract    • Colon polyp    • Colostomy in place [facility]) 6/29/2017   • COPD (chronic obstructive pulmonary disease) (HCC)    • Crohn disease (HCC)    • Emphysema of lung (HCC)    • Emphysema/COPD ([facility] )    • Essential hypertension    • GERD (gastroesophageal reflux disease)    • Hypertension    • Hypokalemia 6/28/2017   • Hypomagnesemia 6/29/2017   • Hyponatremia 6/28/2017   • Kidney stone    • Osteomyelitis ([facility] )    • Pneumonia        Past Surgical History:   Procedure Laterality Date   • CATARACT EXTRACTION Bilateral    • CHOLECYSTECTOMY     • COLECTOMY      10 inchs of ileum   • COLONOSCOPY N/A 6/30/2017    Procedure: COLONOSCOPY;  Surgeon: Adelina Garg MD;  [facility];   Service: Gastroenterology   • COLOSTOMY     • FINGER AMPUTATION     • FINGER SURGERY Left    • IR BIOPSY LIVER MASS  6/8/2020   • IR MICROWAVE ABLATION  9/21/2022   • LITHOTRIPSY     • LIVER LOBECTOMY N/A 7/15/2020    Procedure: LIVER ABLATION, INTRAOPERATIVE U/S LIVER;  Surgeon: John Jose MD;  [facility];  Service: Surgical Oncology       Family History   Problem Relation Age of Onset   • Hypertension Father    • Heart disease Sister      I have reviewed and agree with the history as documented  E-Cigarette/Vaping   • E-Cigarette Use Never User      E-Cigarette/Vaping Substances   • Nicotine No    • THC No    • CBD No    • Flavoring No      Social History     Tobacco Use   • Smoking status: Former     Packs/day: 1 50     Years: 51 00     Pack years: 76 50     Types: Cigarettes     Start date:      Quit date: 2022     Years since quittin 1   • Smokeless tobacco: Never   Vaping Use   • Vaping Use: Never used   Substance Use Topics   • Alcohol use: Not Currently     Alcohol/week: 59 0 standard drinks     Types: 49 Cans of beer, 10 Shots of liquor per week   • Drug use: No        Review of Systems   Constitutional: Negative for chills and fever  HENT: Negative for congestion and sore throat  Eyes: Negative for photophobia  Respiratory: Negative for shortness of breath  Cardiovascular: Negative for chest pain, palpitations and leg swelling  Gastrointestinal: Negative for abdominal pain, nausea and vomiting  Genitourinary: Negative for dysuria  Musculoskeletal: Positive for arthralgias (right hand), joint swelling (right hand) and myalgias (right hand)  Skin: Positive for rash (mild erythema dorsum of right hand)  Neurological: Negative for weakness and headaches  Psychiatric/Behavioral: Negative for confusion  All other systems reviewed and are negative        Physical Exam  ED Triage Vitals   Temperature Pulse Respirations Blood Pressure SpO2   23 1701 23 1701 23 1701 23 1701 23 1701   99 2 °F (37 3 °C) (!) 115 16 142/89 100 %      Temp Source Heart Rate Source Patient Position - Orthostatic VS BP Location FiO2 (%)   23 1701 02/08/23 1701 02/08/23 1701 02/08/23 1701 --   Oral Monitor Sitting Right arm       Pain Score       02/08/23 1909       10 - Worst Possible Pain             Orthostatic Vital Signs  Vitals:    02/08/23 1915 02/08/23 2000 02/08/23 2200 02/08/23 2215   BP: 138/98 153/83  109/70   Pulse: (!) 110 (!) 110 90 (!) 107   Patient Position - Orthostatic VS: Sitting Sitting  Sitting       Physical Exam  Vitals and nursing note reviewed  Constitutional:       General: He is in acute distress (due to pain in right hand)  Appearance: He is not ill-appearing, toxic-appearing or diaphoretic  Comments: No signs of systemic infection   HENT:      Head: Normocephalic and atraumatic  Nose: Nose normal       Mouth/Throat:      Mouth: Mucous membranes are moist       Pharynx: Oropharynx is clear  No oropharyngeal exudate or posterior oropharyngeal erythema  Eyes:      General: No scleral icterus  Right eye: No discharge  Left eye: No discharge  Conjunctiva/sclera: Conjunctivae normal    Cardiovascular:      Rate and Rhythm: Regular rhythm  Tachycardia present  Pulses: Normal pulses  Heart sounds: Normal heart sounds  Pulmonary:      Effort: Pulmonary effort is normal  No respiratory distress  Breath sounds: Normal breath sounds  Abdominal:      General: Abdomen is flat  Palpations: Abdomen is soft  Tenderness: There is no abdominal tenderness  Comments: Ostomy present   Musculoskeletal:         General: Swelling (dorsum of right hand) and tenderness (dorsum of right hand) present  Cervical back: Normal range of motion and neck supple  Right lower leg: No edema  Left lower leg: No edema  Skin:     General: Skin is warm and dry  Findings: Erythema (mild, dorsum of right hand) present  Neurological:      General: No focal deficit present  Mental Status: He is alert and oriented to person, place, and time  Motor: No weakness  Gait: Gait normal    Psychiatric:         Mood and Affect: Mood normal          Behavior: Behavior normal          ED Medications  Medications   ketorolac (TORADOL) injection 15 mg (15 mg Intravenous Given 2/8/23 2014)   acetaminophen (TYLENOL) tablet 650 mg (650 mg Oral Given 2/8/23 2003)   predniSONE tablet 20 mg (20 mg Oral Given 2/8/23 2137)   oxyCODONE (ROXICODONE) IR tablet 5 mg (5 mg Oral Given 2/8/23 2140)       Diagnostic Studies  Results Reviewed     Procedure Component Value Units Date/Time    Fingerstick Glucose (POCT) [[id number]]  (Normal) Collected: 02/08/23 2135    Lab Status: Final result Updated: 02/08/23 2135     POC Glucose 87 mg/dl     Dendron draw [[id number]] Collected: 02/08/23 1708    Lab Status: Final result Specimen: Blood from Arm, Right Updated: 02/08/23 1901    Narrative: The following orders were created for panel order Dendron draw  Procedure                               Abnormality         Status                     ---------                               -----------         ------                     [place] Top on SPQF[[id number]]                           Final result               Green / Yellow tube on QZLN[[id number]]                      Final result               Green / Black tube on hold[[id number]]                       Final result               Lavender Top 3 ml on hold[[id number]]                        Final result                 Please view results for these tests on the individual orders  No orders to display         Procedures  Procedures      ED Course     Pt's pain is improved after prednisone, toradol  Pt does not appear to have infectious process and has otherwise been at baseline despite the right hand discomfort  On 1/26 the presentation was very similar   Rx for prednisone taper over two weeks is an agreeable plan for the pt with return precautions including spread of discomfort, systemic infectious symptoms, etc  Pt has agreed to be reevaluated otherwise by his PCP this coming week  Vitals are stable and he is no longer in any distress  MDM   Pt's presentation is very similar to prior presentation 1/26 was relieved by prednisone and returned after discontinuation, presumed to be psuedogout  Pt had cellulitis ruled out prior, does not appear to be cellulitic and mild tachycardia is attributed to his discomfort  Pt is afebrile and without other complaints - pain mitigated at discharge  Pseudogout, do not suspect: gout, cellulitis, other infectious process, trauma  Disposition  Final diagnoses:   Swelling of right hand   Pseudogout - right hand, presumed     Time reflects when diagnosis was documented in both MDM as applicable and the Disposition within this note     Time User Action Codes Description Comment    2/8/2023  9:58 PM Abby Lopez Add [M79 89] Swelling of right hand     2/8/2023  9:58 PM Abby Lopez Add [A33 38] Pseudogout     2/8/2023  9:58 PM Abby Lopez Modify [N01 15] Pseudogout right hand, presumed      ED Disposition     ED Disposition   Discharge    Condition   Stable    Date/Time   Wed Feb 8, 2023  9:58 PM    Comment   Giacomo Do III discharge to home/self care  Follow-up Information     Follow up With Specialties Details Why Contact Info Additional Information    Andrei Nayak MD Internal Medicine Schedule an appointment as soon as possible for a visit  schedule for follow-up in 1 week to reassess   [facility] Emergency Department Emergency Medicine  As needed [address] Emergency Department, [address], [place], [place], [address]          Discharge Medication List as of 2/8/2023 10:39 PM      START taking these medications    Details   oxyCODONE (OXY-IR) 5 MG capsule Take 1 capsule (5 mg total) by mouth every 6 (six) hours as needed for severe pain for up to 8 doses Max Daily Amount: 20 mg, Starting Wed 2/8/2023, Normal      predniSONE 20 mg tablet Multiple Dosages:Starting Wed 2/8/2023, Until Tue 2/14/2023 at 2359, THEN Starting Wed 2/15/2023, Until Tue 2/21/2023 at 2359Take 1 tablet (20 mg total) by mouth daily for 7 days, THEN 0 5 tablets (10 mg total) daily for 7 days  , Normal         CONTINUE these medications which have NOT CHANGED    Details   ALPRAZolam (XANAX) 0 25 mg tablet Take 1 tablet (0 25 mg total) by mouth daily at bedtime as needed for anxiety, Starting Thu 8/25/2022, Until Wed 2/1/2023 at 2359, Normal      AMILoride 5 mg tablet Take 1 tablet (5 mg total) by mouth daily, Starting Wed 9/28/2022, Normal      amLODIPine (NORVASC) 5 mg tablet Take 1 tablet (5 mg total) by mouth daily, Starting Thu 10/20/2022, Normal      buPROPion (WELLBUTRIN XL) 150 mg 24 hr tablet Take 1 tablet (150 mg total) by mouth daily, Starting Thu 10/13/2022, Normal      calcium carbonate (TUMS) 500 mg chewable tablet Chew 1 tablet (500 mg total) daily, Starting Sat 9/12/2020, No Print      carvedilol (COREG) 3 125 mg tablet Take 1 tablet (3 125 mg total) by mouth 2 (two) times a day with meals, Starting Fri 36/10/8764, Normal      folic acid (FOLVITE) 1 mg tablet Take 1 tablet (1 mg total) by mouth daily, Starting Fri 12/16/2022, Normal      levothyroxine 50 mcg tablet Take 1 tablet (50 mcg total) by mouth daily in the early morning, Starting Fri 12/16/2022, Normal      MAGNESIUM CHLORIDE PO Take 1,000 mg by mouth daily, Historical Med      Magnesium Oxide 250 MG TABS Take 1 tablet (250 mg total) by mouth in the morning, Starting Tue 11/22/2022, Normal      mirtazapine (REMERON) 15 mg tablet Take 1 tablet (15 mg total) by mouth daily at bedtime, Starting Fri 12/16/2022, Normal      omeprazole (PriLOSEC) 20 mg delayed release capsule Take 1 capsule (20 mg total) by mouth daily, Starting Thu 11/17/2022, Normal      potassium chloride (MICRO-K) 10 MEQ CR capsule Take 2 capsules (20 mEq total) by mouth 2 (two) times a day, Starting Wed 2/1/2023, Normal      rosuvastatin (CRESTOR) 10 MG tablet Take 1 tablet (10 mg total) by mouth daily, Starting Thu 10/13/2022, Normal      thiamine 250 MG tablet Take 1 tablet (250 mg total) by mouth daily for 5 days Do not start before November 14, 2022 , Starting Mon 11/14/2022, Until Wed 2/1/2023, Normal      umeclidinium-vilanterol (Anoro Ellipta) 62 5-25 mcg/actuation inhaler Inhale 1 puff daily, Starting Wed 2/1/2023, Until Fri 3/3/2023, Normal      VIT B12-METHIONINE-INOS-CHOL IM Inject into a muscle every 30 (thirty) days, Historical Med           No discharge procedures on file  PDMP Review       Value Time User    PDMP Reviewed  Yes 2/8/2023  3:09 PM Ezequiel West MD           ED Provider  Attending physically available and evaluated Steve Scruggs CORY managed the patient along with the ED Attending      Electronically Signed by         Luna Lomas DO  02/12/23 2313

## 2023-02-09 NOTE — DISCHARGE INSTRUCTIONS
Continue taking magnesium supplementation  Return to the ED with fevers, chills, nausea, vomiting, confusion, spreading pain/rash - any signs of infection  Follow-up with your primary care provider next week to reassess  Continue taking prednisone 20 mg for the first 7 days and then take 10 mg for the second week

## 2023-02-10 ENCOUNTER — CLINICAL SUPPORT (OUTPATIENT)
Dept: INTERNAL MEDICINE CLINIC | Facility: CLINIC | Age: 68
End: 2023-02-10

## 2023-02-10 DIAGNOSIS — E53.8 VITAMIN B12 DEFICIENCY: Primary | ICD-10-CM

## 2023-02-10 RX ADMIN — CYANOCOBALAMIN 1000 MCG: 1000 INJECTION, SOLUTION INTRAMUSCULAR; SUBCUTANEOUS at 13:03

## 2023-02-15 NOTE — ED ATTENDING ATTESTATION
2/8/2023  ILouis MD, saw and evaluated the patient  I have discussed the patient with the resident/non-physician practitioner and agree with the resident's/non-physician practitioner's findings, Plan of Care, and MDM as documented in the resident's/non-physician practitioner's note, except where noted  All available labs and Radiology studies were reviewed  I was present for key portions of any procedure(s) performed by the resident/non-physician practitioner and I was immediately available to provide assistance  At this point I agree with the current assessment done in the Emergency Department    I have conducted an independent evaluation of this patient a history and physical is as follows:see h and p above -agree with er resident tx plan/ dispo    ED Course  ED Course as of 02/15/23 0923   Wed Feb 08, 2023   2120 Er md note- upon exam- pulse in 90's pt resting with reduced pain          Critical Care Time  Procedures

## 2023-03-01 ENCOUNTER — TELEPHONE (OUTPATIENT)
Dept: GASTROENTEROLOGY | Facility: CLINIC | Age: 68
End: 2023-03-01

## 2023-03-01 NOTE — TELEPHONE ENCOUNTER
PT wanted to schedule Colonoscopy , but has OV scheduled 5 /2/23 w/ Dr Daniel Retana,    advised procedure needs to be scheduled after OV visit

## 2023-03-09 NOTE — PRE-PROCEDURE INSTRUCTIONS
Pre-procedure Instructions for Interventional Radiology  [address]  [place] [address] [address] [phone]    You are scheduled for a/an Microwave Ablation of Liver Mass  On Thursday 3/16/23    Your tentative arrival time is 0715  Short stay will notify you the day before your procedure with the exact arrival time and the location to arrive  To prepare for your procedure:  1  Please arrange for someone to drive you home after the procedure and stay with you until the next morning if you are instructed to do so  This is typically for patients receiving some type of sedative or anesthetic for the procedure  2  DO NOT EAT OR DRINK ANYTHING after midnight on the evening before your procedure including candy & gum   3  ONLY SIPS OF WATER with your medications are allowed on the morning of your procedure  4  TAKE ALL OF YOUR REGULAR MEDICATIONS THE MORNING OF YOUR PROCEDURE with sips of water! We may call you to stop some of your blood sugar, blood pressure and blood thinning medications depending on the procedure  Please take all of these medications unless we instruct you to stop them  5  If you have an allergy to x-ray dye, please contact Interventional Radiology for an x-ray dye preparation which usually consists of an oral steroid and Benadryl  The day of your procedure:  1  Bring a list of the medications you take at home  2  Bring medications you take for breathing problems (such as inhalers), medications for chest pain, or both  3  Bring a case for your glasses or contacts  4  Bring your insurance card and a form of photo ID   5  Please leave all valuables such as credit cards and jewelry at home  6  Report to the registration desk in the main lobby at the [facility]  Ask to be directed to [facility]    7  While your procedure is being performed, your family may wait in the Radiology Waiting Room on the 1st floor in Radiology  if they need to leave, they may provide a number to be called following the procedure  8  Be prepared to stay overnight just in case  Sometimes procedures will indicate the need for further observation or treatment  9  If you are scheduled for a follow-up visit with the Interventional Radiologist after your procedure, you will be called with a date and time      Special Instructions (Medications to stop taking before your procedure etc ):

## 2023-03-10 ENCOUNTER — PREP FOR PROCEDURE (OUTPATIENT)
Dept: INTERVENTIONAL RADIOLOGY/VASCULAR | Facility: CLINIC | Age: 68
End: 2023-03-10

## 2023-03-13 DIAGNOSIS — J43.9 PULMONARY EMPHYSEMA, UNSPECIFIED EMPHYSEMA TYPE (HCC): ICD-10-CM

## 2023-03-13 RX ORDER — UMECLIDINIUM BROMIDE AND VILANTEROL TRIFENATATE 62.5; 25 UG/1; UG/1
1 POWDER RESPIRATORY (INHALATION) DAILY
Qty: 180 BLISTER | Refills: 0 | Status: SHIPPED | OUTPATIENT
Start: 2023-03-13 | End: 2023-04-12

## 2023-03-14 LAB
ALBUMIN SERPL-MCNC: 3.9 G/DL (ref 3.6–5.1)
ALBUMIN/GLOB SERPL: 1.5 (CALC) (ref 1–2.5)
ALP SERPL-CCNC: 53 U/L (ref 35–144)
ALT SERPL-CCNC: 9 U/L (ref 9–46)
AST SERPL-CCNC: 13 U/L (ref 10–35)
BASOPHILS # BLD AUTO: 42 CELLS/UL (ref 0–200)
BASOPHILS NFR BLD AUTO: 0.5 %
BILIRUB SERPL-MCNC: 0.7 MG/DL (ref 0.2–1.2)
BUN SERPL-MCNC: 12 MG/DL (ref 7–25)
BUN/CREAT SERPL: NORMAL (CALC) (ref 6–22)
CALCIUM SERPL-MCNC: 9.5 MG/DL (ref 8.6–10.3)
CHLORIDE SERPL-SCNC: 106 MMOL/L (ref 98–110)
CHOLEST SERPL-MCNC: 146 MG/DL
CHOLEST/HDLC SERPL: 3 (CALC)
CO2 SERPL-SCNC: 27 MMOL/L (ref 20–32)
CREAT SERPL-MCNC: 0.91 MG/DL (ref 0.7–1.35)
EOSINOPHIL # BLD AUTO: 168 CELLS/UL (ref 15–500)
EOSINOPHIL NFR BLD AUTO: 2 %
ERYTHROCYTE [DISTWIDTH] IN BLOOD BY AUTOMATED COUNT: 13.4 % (ref 11–15)
GFR/BSA.PRED SERPLBLD CYS-BASED-ARV: 92 ML/MIN/1.73M2
GLOBULIN SER CALC-MCNC: 2.6 G/DL (CALC) (ref 1.9–3.7)
GLUCOSE SERPL-MCNC: 92 MG/DL (ref 65–99)
HBA1C MFR BLD: 5 % OF TOTAL HGB
HCT VFR BLD AUTO: 36.9 % (ref 38.5–50)
HDLC SERPL-MCNC: 49 MG/DL
HGB BLD-MCNC: 12.9 G/DL (ref 13.2–17.1)
LDLC SERPL CALC-MCNC: 74 MG/DL (CALC)
LYMPHOCYTES # BLD AUTO: 1722 CELLS/UL (ref 850–3900)
LYMPHOCYTES NFR BLD AUTO: 20.5 %
MCH RBC QN AUTO: 31.9 PG (ref 27–33)
MCHC RBC AUTO-ENTMCNC: 35 G/DL (ref 32–36)
MCV RBC AUTO: 91.1 FL (ref 80–100)
MONOCYTES # BLD AUTO: 764 CELLS/UL (ref 200–950)
MONOCYTES NFR BLD AUTO: 9.1 %
NEUTROPHILS # BLD AUTO: 5704 CELLS/UL (ref 1500–7800)
NEUTROPHILS NFR BLD AUTO: 67.9 %
NONHDLC SERPL-MCNC: 97 MG/DL (CALC)
PLATELET # BLD AUTO: 245 THOUSAND/UL (ref 140–400)
PMV BLD REES-ECKER: 10.4 FL (ref 7.5–12.5)
POTASSIUM SERPL-SCNC: 3.8 MMOL/L (ref 3.5–5.3)
PROT SERPL-MCNC: 6.5 G/DL (ref 6.1–8.1)
RBC # BLD AUTO: 4.05 MILLION/UL (ref 4.2–5.8)
SODIUM SERPL-SCNC: 142 MMOL/L (ref 135–146)
TRIGL SERPL-MCNC: 151 MG/DL
TSH SERPL-ACNC: 2.96 MIU/L (ref 0.4–4.5)
WBC # BLD AUTO: 8.4 THOUSAND/UL (ref 3.8–10.8)

## 2023-03-15 ENCOUNTER — OFFICE VISIT (OUTPATIENT)
Dept: INTERNAL MEDICINE CLINIC | Facility: CLINIC | Age: 68
End: 2023-03-15

## 2023-03-15 VITALS
HEIGHT: 69 IN | BODY MASS INDEX: 22.36 KG/M2 | HEART RATE: 92 BPM | DIASTOLIC BLOOD PRESSURE: 86 MMHG | WEIGHT: 151 LBS | TEMPERATURE: 97.9 F | SYSTOLIC BLOOD PRESSURE: 134 MMHG | OXYGEN SATURATION: 95 %

## 2023-03-15 DIAGNOSIS — J43.8 OTHER EMPHYSEMA (HCC): ICD-10-CM

## 2023-03-15 DIAGNOSIS — K50.018 CROHN'S DISEASE OF SMALL INTESTINE WITH OTHER COMPLICATION (HCC): ICD-10-CM

## 2023-03-15 DIAGNOSIS — K21.9 GASTROESOPHAGEAL REFLUX DISEASE WITHOUT ESOPHAGITIS: ICD-10-CM

## 2023-03-15 DIAGNOSIS — I10 ESSENTIAL HYPERTENSION: Primary | Chronic | ICD-10-CM

## 2023-03-15 DIAGNOSIS — E78.2 MIXED HYPERLIPIDEMIA: ICD-10-CM

## 2023-03-15 DIAGNOSIS — E53.8 VITAMIN B12 DEFICIENCY: ICD-10-CM

## 2023-03-15 DIAGNOSIS — Z93.3 COLOSTOMY IN PLACE (HCC): ICD-10-CM

## 2023-03-15 DIAGNOSIS — E43 SEVERE PROTEIN-CALORIE MALNUTRITION (HCC): ICD-10-CM

## 2023-03-15 RX ORDER — OMEGA-3S/DHA/EPA/FISH OIL/D3 300MG-1000
400 CAPSULE ORAL
COMMUNITY

## 2023-03-15 RX ORDER — CARBOXYMETHYLCELLULOSE SODIUM 0.5 %
DROPPERETTE, SINGLE-USE DROP DISPENSER OPHTHALMIC (EYE)
COMMUNITY
Start: 2023-03-04

## 2023-03-15 RX ADMIN — CYANOCOBALAMIN 1000 MCG: 1000 INJECTION, SOLUTION INTRAMUSCULAR; SUBCUTANEOUS at 11:59

## 2023-03-15 NOTE — PROGRESS NOTES
Assessment/Plan:             1  Essential hypertension    2  Other emphysema ([facility] )    3  Crohn's disease of small intestine with other complication ([facility] )    4  Colostomy in place ([facility] )    5  Severe protein-calorie malnutrition ([facility] )    6  Vitamin B12 deficiency    7  Mixed hyperlipidemia    8  Gastroesophageal reflux disease without esophagitis         Subjective:      Patient ID: Darius Serna is a 79 y o  male  Follow-up on blood test done on 3/13/2023 test discussed with him      The following portions of the patient's history were reviewed and updated as appropriate: He  has a past medical history of LUCILLE (acute kidney injury) ([facility] ) (6/28/2017), Blindness of left eye, Cancer ([facility] ), Cataract, Colon polyp, Colostomy in place [facility]) (6/29/2017), COPD (chronic obstructive pulmonary disease) ([facility] ), Crohn disease ([facility] ), Emphysema of lung ([facility] ), Emphysema/COPD ([facility] ), Essential hypertension, GERD (gastroesophageal reflux disease), Hypertension, Hypokalemia (6/28/2017), Hypomagnesemia (6/29/2017), Hyponatremia (6/28/2017), Kidney stone, Osteomyelitis ([facility] ), and Pneumonia    He   Patient Active Problem List    Diagnosis Date Noted   • Portal hypertension ([facility] ) 02/01/2023   • Multiple subsegmental pulmonary emboli without acute cor pulmonale ([facility] ) 02/01/2023   • Alcohol dependence, uncomplicated ([facility] ) 26/41/3860   • Subclinical hypothyroidism 11/12/2022   • Anemia 11/08/2022   • Supraventricular tachycardia ([facility] )    • History of alcohol use disorder 11/02/2022   • Platelets decreased ([facility] )    • HCC (hepatocellular carcinoma) ([facility] ) 03/15/2022   • Encounter for follow-up surveillance of liver cancer 01/21/2022   • Dysthymic disorder 06/28/2021   • Crohn's disease of small intestine with other complication ([facility] ) 97/66/9048   • Vitamin B12 deficiency 02/24/2021   • Cataract    • Chronic obstructive pulmonary disease ([facility] ) 11/24/2020   • Mixed hyperlipidemia 11/24/2020   • Kidney stone    • Gastroesophageal reflux disease without esophagitis    • Left wrist pain 09/29/2020   • Hypocalcemia 09/12/2020   • Acute pain of left wrist 09/10/2020   • Chronic, continuous use of opioids 09/10/2020   • Observed sleep apnea    • Palliative care patient 07/31/2020   • Abdominal wall abscess 07/31/2020   • Personal history of liver cancer 06/23/2020   • Abdominal pain 06/04/2020   • Tobacco abuse 05/29/2020   • Severe sepsis ([facility] ) 05/28/2020   • Pneumonia 05/28/2020   • Chest pain 05/28/2020   • Liver mass 05/28/2020   • Syncope and collapse 04/03/2018   • Emphysema of lung ([facility] )    • Arthropathy of right wrist 12/04/2017   • Severe protein-calorie malnutrition ([facility] ) 07/01/2017   • Colostomy in place [facility]) 06/29/2017   • Diarrhea 06/29/2017   • Acute kidney injury ([facility] ) 06/28/2017   • Essential hypertension    • Emphysema/COPD ([facility] )    • Crohn disease ([facility] )      He  has a past surgical history that includes Colostomy; Cholecystectomy; Colectomy; Colonoscopy (N/A, 6/30/2017); Lithotripsy; Finger surgery (Left); IR biopsy liver mass (6/8/2020); Cataract extraction (Bilateral); Liver lobectomy (N/A, 7/15/2020); Finger amputation; and IR microwave ablation (9/21/2022)  His family history includes Heart disease in his sister; Hypertension in his father  He  reports that he quit smoking about 14 months ago  His smoking use included cigarettes  He started smoking about 52 years ago  He has a 76 50 pack-year smoking history  He has never used smokeless tobacco  He reports that he does not currently use alcohol after a past usage of about 59 0 standard drinks per week  He reports that he does not use drugs    Current Outpatient Medications   Medication Sig Dispense Refill   • ALPRAZolam (XANAX) 0 25 mg tablet Take 1 tablet (0 25 mg total) by mouth daily at bedtime as needed for anxiety 90 tablet 0   • AMILoride 5 mg tablet Take 1 tablet (5 mg total) by mouth daily 90 tablet 0   • amLODIPine (NORVASC) 5 mg tablet Take 1 tablet (5 mg total) by mouth daily 90 tablet 0   • buPROPion (WELLBUTRIN XL) 150 mg 24 hr tablet Take 1 tablet (150 mg total) by mouth daily 90 tablet 0   • calcium carbonate (TUMS) 500 mg chewable tablet Chew 1 tablet (500 mg total) daily  0   • carvedilol (COREG) 3 125 mg tablet Take 1 tablet (3 125 mg total) by mouth 2 (two) times a day with meals 180 tablet 1   • cholecalciferol (VITAMIN D3) 400 units tablet Take 400 Units by mouth Every Sunday     • [facility] Magnesium Oxide 250 MG TABS TAKE 1 TABLET (250 MG TOTAL) BY MOUTH IN THE MORNING     • folic acid (FOLVITE) 1 mg tablet Take 1 tablet (1 mg total) by mouth daily 90 tablet 1   • levothyroxine 50 mcg tablet Take 1 tablet (50 mcg total) by mouth daily in the early morning 90 tablet 1   • Magnesium Oxide 250 MG TABS Take 1 tablet (250 mg total) by mouth in the morning 90 tablet 3   • mirtazapine (REMERON) 15 mg tablet Take 1 tablet (15 mg total) by mouth daily at bedtime 90 tablet 1   • omeprazole (PriLOSEC) 20 mg delayed release capsule Take 1 capsule (20 mg total) by mouth daily 90 capsule 1   • potassium chloride (MICRO-K) 10 MEQ CR capsule Take 2 capsules (20 mEq total) by mouth 2 (two) times a day 180 capsule 1   • rosuvastatin (CRESTOR) 10 MG tablet Take 1 tablet (10 mg total) by mouth daily 90 tablet 0   • umeclidinium-vilanterol (Anoro Ellipta) 62 5-25 mcg/actuation inhaler Inhale 1 puff daily 180 blister 0   • thiamine 250 MG tablet Take 1 tablet (250 mg total) by mouth daily for 5 days Do not start before November 14, 2022   (Patient not taking: Reported on 2/8/2023) 5 tablet 0   • VIT B12-METHIONINE-INOS-CHOL IM Inject into a muscle every 30 (thirty) days       Current Facility-Administered Medications   Medication Dose Route Frequency Provider Last Rate Last Admin   • cyanocobalamin injection 1,000 mcg  1,000 mcg Intramuscular Q30 Days Torri Coleman MD   1,000 mcg at 02/10/23 1303     Current Outpatient Medications on File Prior to Visit   Medication Sig   • ALPRAZolam (XANAX) 0 25 mg tablet Take 1 tablet (0 25 mg total) by mouth daily at bedtime as needed for anxiety   • AMILoride 5 mg tablet Take 1 tablet (5 mg total) by mouth daily   • amLODIPine (NORVASC) 5 mg tablet Take 1 tablet (5 mg total) by mouth daily   • buPROPion (WELLBUTRIN XL) 150 mg 24 hr tablet Take 1 tablet (150 mg total) by mouth daily   • calcium carbonate (TUMS) 500 mg chewable tablet Chew 1 tablet (500 mg total) daily   • carvedilol (COREG) 3 125 mg tablet Take 1 tablet (3 125 mg total) by mouth 2 (two) times a day with meals   • cholecalciferol (VITAMIN D3) 400 units tablet Take 400 Units by mouth Every Sunday   • [facility] Magnesium Oxide 250 MG TABS TAKE 1 TABLET (250 MG TOTAL) BY MOUTH IN THE MORNING   • folic acid (FOLVITE) 1 mg tablet Take 1 tablet (1 mg total) by mouth daily   • levothyroxine 50 mcg tablet Take 1 tablet (50 mcg total) by mouth daily in the early morning   • Magnesium Oxide 250 MG TABS Take 1 tablet (250 mg total) by mouth in the morning   • mirtazapine (REMERON) 15 mg tablet Take 1 tablet (15 mg total) by mouth daily at bedtime   • omeprazole (PriLOSEC) 20 mg delayed release capsule Take 1 capsule (20 mg total) by mouth daily   • potassium chloride (MICRO-K) 10 MEQ CR capsule Take 2 capsules (20 mEq total) by mouth 2 (two) times a day   • rosuvastatin (CRESTOR) 10 MG tablet Take 1 tablet (10 mg total) by mouth daily   • umeclidinium-vilanterol (Anoro Ellipta) 62 5-25 mcg/actuation inhaler Inhale 1 puff daily   • thiamine 250 MG tablet Take 1 tablet (250 mg total) by mouth daily for 5 days Do not start before November 14, 2022  (Patient not taking: Reported on 2/8/2023)   • VIT B12-METHIONINE-INOS-CHOL IM Inject into a muscle every 30 (thirty) days   • [DISCONTINUED] apixaban (Eliquis) 5 mg Take 2 tablets (10 mg total) by mouth 2 (two) times a day for 7 days, THEN 1 tablet (5 mg total) 2 (two) times a day for 23 days     • [DISCONTINUED] MAGNESIUM CHLORIDE PO Take 1,000 mg by mouth daily   • [DISCONTINUED] oxyCODONE (OXY-IR) 5 MG capsule Take 1 capsule (5 mg total) by mouth every 6 (six) hours as needed for severe pain for up to 8 doses Max Daily Amount: 20 mg (Patient not taking: Reported on 3/15/2023)     Current Facility-Administered Medications on File Prior to Visit   Medication   • cyanocobalamin injection 1,000 mcg     He has No Known Allergies       Review of Systems   Constitutional: Negative for chills and fever  HENT: Negative for congestion, ear pain and sore throat  Eyes: Negative for pain  Respiratory: Negative for cough and shortness of breath  Cardiovascular: Negative for chest pain and leg swelling  Gastrointestinal: Negative for abdominal pain, nausea and vomiting  Endocrine: Negative for polyuria  Genitourinary: Negative for difficulty urinating, frequency and urgency  Musculoskeletal: Positive for arthralgias  Negative for back pain  Skin: Negative for rash  Neurological: Negative for weakness and headaches  Psychiatric/Behavioral: Negative for sleep disturbance  The patient is not nervous/anxious  Objective:      /86 (BP Location: Left arm, Patient Position: Sitting, Cuff Size: Standard)   Pulse 92   Temp 97 9 °F (36 6 °C) (Temporal)   Ht 5' 9" (1 753 m)   Wt 68 5 kg (151 lb)   SpO2 95%   BMI 22 30 kg/m²     Recent Results (from the past 1344 hour(s))   Osmolality-"If this is regarding a toxic alcohol, STOP  Test is not routinely indicated   Please consult medical  on call for further guidance "    Collection Time: 01/26/23 12:00 AM   Result Value Ref Range    Osmolality Serum 271 (L) 282 - 298 mmol/KG   Basic metabolic panel    Collection Time: 01/26/23  7:42 AM   Result Value Ref Range    Sodium 126 (L) 135 - 147 mmol/L    Potassium 2 4 (LL) 3 5 - 5 3 mmol/L    Chloride 86 (L) 96 - 108 mmol/L    CO2 30 21 - 32 mmol/L    ANION GAP 10 4 - 13 mmol/L    BUN 21 5 - 25 mg/dL    Creatinine 1 28 0 60 - 1 30 mg/dL    Glucose 105 65 - 140 mg/dL Glucose, Fasting 105 (H) 65 - 99 mg/dL    Calcium 7 4 (L) 8 3 - 10 1 mg/dL    eGFR 57 ml/min/1 73sq m   Protime-INR    Collection Time: 01/26/23  7:42 AM   Result Value Ref Range    Protime 15 2 (H) 11 6 - 14 5 seconds    INR 1 18 0 84 - 1 19   CBC    Collection Time: 01/26/23  7:42 AM   Result Value Ref Range    WBC 15 14 (H) 4 31 - 10 16 Thousand/uL    RBC 3 81 (L) 3 88 - 5 62 Million/uL    Hemoglobin 11 9 (L) 12 0 - 17 0 g/dL    Hematocrit 34 3 (L) 36 5 - 49 3 %    MCV 90 82 - 98 fL    MCH 31 2 26 8 - 34 3 pg    MCHC 34 7 31 4 - 37 4 g/dL    RDW 12 2 11 6 - 15 1 %    Platelets 703 263 - 977 Thousands/uL    MPV 10 9 8 9 - 12 7 fL   Magnesium    Collection Time: 01/26/23  7:42 AM   Result Value Ref Range    Magnesium 0 7 (LL) 1 6 - 2 6 mg/dL   TSH, 3rd generation with Free T4 reflex    Collection Time: 01/26/23  7:42 AM   Result Value Ref Range    TSH 3RD GENERATON 3 260 0 450 - 4 500 uIU/mL   Hepatic function panel    Collection Time: 01/26/23  7:42 AM   Result Value Ref Range    Total Bilirubin 1 80 (H) 0 20 - 1 00 mg/dL    Bilirubin, Direct 0 68 (H) 0 00 - 0 20 mg/dL    Alkaline Phosphatase 103 46 - 116 U/L    AST 33 5 - 45 U/L    ALT 33 12 - 78 U/L    Total Protein 7 8 6 4 - 8 4 g/dL    Albumin 2 4 (L) 3 5 - 5 0 g/dL   Uric acid    Collection Time: 01/26/23  7:42 AM   Result Value Ref Range    Uric Acid 5 9 3 5 - 8 5 mg/dL   Lactate dehydrogenase    Collection Time: 01/26/23  7:42 AM   Result Value Ref Range     81 - 234 U/L   Retic Count    Collection Time: 01/26/23  7:42 AM   Result Value Ref Range    Retic Ct Abs 57,500 14,356 - 105,094    Retic Ct Pct 1 50 0 37 - 1 87 %   Peripheral Smear    Collection Time: 01/26/23  7:42 AM   Result Value Ref Range    Segmented Neutrophils Manual 84 (H) 45 - 77 %    Bands Manual 2 Thousand/uL    Lymphocytes Manual 10 (L) 14 - 44 %    Monocytes Manual 4 4 - 12 %    Total Counted 100     RBC Morphology     ECG 12 lead    Collection Time: 01/26/23 11:03 AM   Result Value Ref Range    Ventricular Rate 97 BPM    Atrial Rate 97 BPM    NY Interval 142 ms    QRSD Interval 90 ms    QT Interval 368 ms    QTC Interval 468 ms    P Axis 43 degrees    QRS Axis -30 degrees    T Wave Axis 62 degrees   Blood culture    Collection Time: 01/26/23 12:12 PM    Specimen: Arm, Left; Blood   Result Value Ref Range    Blood Culture No Growth After 5 Days  Blood culture    Collection Time: 01/26/23 12:12 PM    Specimen: Arm, Right; Blood   Result Value Ref Range    Blood Culture No Growth After 5 Days      Lactic acid, plasma    Collection Time: 01/26/23 12:15 PM   Result Value Ref Range    LACTIC ACID 2 4 (HH) 0 5 - 2 0 mmol/L   Platelet count    Collection Time: 01/26/23 12:30 PM   Result Value Ref Range    Platelets 499 332 - 720 Thousands/uL    MPV 10 5 8 9 - 12 7 fL   Sodium, urine, random    Collection Time: 01/26/23  5:13 PM   Result Value Ref Range    Sodium, Ur 8    Osmolality, urine    Collection Time: 01/26/23  5:13 PM   Result Value Ref Range    Osmolality, Ur 204 (L) 250 - 900 mmol/KG   UA w Reflex to Microscopic w Reflex to Culture    Collection Time: 01/26/23  5:13 PM    Specimen: Urine, Clean Catch   Result Value Ref Range    Color, UA Yellow     Clarity, UA Clear     Specific Gravity, UA 1 008 1 003 - 1 030    pH, UA 5 5 4 5, 5 0, 5 5, 6 0, 6 5, 7 0, 7 5, 8 0    Leukocytes, UA Moderate (A) Negative    Nitrite, UA Negative Negative    Protein, UA Trace (A) Negative mg/dl    Glucose, UA Negative Negative mg/dl    Ketones, UA Negative Negative mg/dl    Urobilinogen, UA <2 0 <2 0 mg/dl mg/dl    Bilirubin, UA Negative Negative    Occult Blood, UA Negative Negative   Urine Microscopic    Collection Time: 01/26/23  5:13 PM   Result Value Ref Range    RBC, UA 1-2 None Seen, 1-2 /hpf    WBC, UA 30-50 (A) None Seen, 1-2 /hpf    Epithelial Cells None Seen None Seen, Occasional /hpf    Bacteria, UA None Seen None Seen, Occasional /hpf   Magnesium    Collection Time: 01/26/23  5:20 PM   Result Value Ref Range    Magnesium 2 2 1 6 - 2 6 mg/dL   Comprehensive metabolic panel    Collection Time: 01/26/23  5:20 PM   Result Value Ref Range    Sodium 125 (L) 135 - 147 mmol/L    Potassium 2 9 (L) 3 5 - 5 3 mmol/L    Chloride 89 (L) 96 - 108 mmol/L    CO2 28 21 - 32 mmol/L    ANION GAP 8 4 - 13 mmol/L    BUN 18 5 - 25 mg/dL    Creatinine 1 10 0 60 - 1 30 mg/dL    Glucose 110 65 - 140 mg/dL    Calcium 7 2 (L) 8 3 - 10 1 mg/dL    Corrected Calcium 8 7 8 3 - 10 1 mg/dL    AST 61 (H) 5 - 45 U/L    ALT 38 12 - 78 U/L    Alkaline Phosphatase 117 (H) 46 - 116 U/L    Total Protein 6 8 6 4 - 8 4 g/dL    Albumin 2 1 (L) 3 5 - 5 0 g/dL    Total Bilirubin 1 30 (H) 0 20 - 1 00 mg/dL    eGFR 69 ml/min/1 73sq m   Lactic acid 2 Hours    Collection Time: 01/26/23  5:20 PM   Result Value Ref Range    LACTIC ACID 3 0 (HH) 0 5 - 2 0 mmol/L   Magnesium    Collection Time: 01/26/23  8:23 PM   Result Value Ref Range    Magnesium 2 0 1 6 - 2 6 mg/dL   Comprehensive metabolic panel    Collection Time: 01/26/23  8:23 PM   Result Value Ref Range    Sodium 130 (L) 135 - 147 mmol/L    Potassium 3 0 (L) 3 5 - 5 3 mmol/L    Chloride 92 (L) 96 - 108 mmol/L    CO2 27 21 - 32 mmol/L    ANION GAP 11 4 - 13 mmol/L    BUN 17 5 - 25 mg/dL    Creatinine 1 07 0 60 - 1 30 mg/dL    Glucose 99 65 - 140 mg/dL    Calcium 7 1 (L) 8 3 - 10 1 mg/dL    Corrected Calcium 8 7 8 3 - 10 1 mg/dL    AST 56 (H) 5 - 45 U/L    ALT 38 12 - 78 U/L    Alkaline Phosphatase 124 (H) 46 - 116 U/L    Total Protein 6 7 6 4 - 8 4 g/dL    Albumin 2 0 (L) 3 5 - 5 0 g/dL    Total Bilirubin 1 07 (H) 0 20 - 1 00 mg/dL    eGFR 71 ml/min/1 73sq m   Magnesium    Collection Time: 01/27/23 12:26 AM   Result Value Ref Range    Magnesium 1 6 1 6 - 2 6 mg/dL   Comprehensive metabolic panel    Collection Time: 01/27/23 12:26 AM   Result Value Ref Range    Sodium 130 (L) 135 - 147 mmol/L    Potassium 3 7 3 5 - 5 3 mmol/L    Chloride 96 96 - 108 mmol/L    CO2 26 21 - 32 mmol/L    ANION GAP 8 4 - 13 mmol/L    BUN 16 5 - 25 mg/dL    Creatinine 0 94 0 60 - 1 30 mg/dL    Glucose 153 (H) 65 - 140 mg/dL    Calcium 6 7 (L) 8 3 - 10 1 mg/dL    Corrected Calcium 8 3 8 3 - 10 1 mg/dL    AST 59 (H) 5 - 45 U/L    ALT 36 12 - 78 U/L    Alkaline Phosphatase 112 46 - 116 U/L    Total Protein 6 2 (L) 6 4 - 8 4 g/dL    Albumin 2 0 (L) 3 5 - 5 0 g/dL    Total Bilirubin 1 06 (H) 0 20 - 1 00 mg/dL    eGFR 83 ml/min/1 73sq m   Lactic acid, plasma    Collection Time: 01/27/23 12:26 AM   Result Value Ref Range    LACTIC ACID 1 1 0 5 - 2 0 mmol/L   CBC and differential    Collection Time: 01/27/23  3:00 PM   Result Value Ref Range    WBC 12 75 (H) 4 31 - 10 16 Thousand/uL    RBC 3 33 (L) 3 88 - 5 62 Million/uL    Hemoglobin 10 4 (L) 12 0 - 17 0 g/dL    Hematocrit 32 0 (L) 36 5 - 49 3 %    MCV 94 82 - 98 fL    MCH 31 2 26 8 - 34 3 pg    MCHC 32 5 31 4 - 37 4 g/dL    RDW 12 4 11 6 - 15 1 %    MPV 10 4 8 9 - 12 7 fL    Platelets 059 521 - 878 Thousands/uL    nRBC 0 /100 WBCs    Neutrophils Relative 83 (H) 43 - 75 %    Immat GRANS % 1 0 - 2 %    Lymphocytes Relative 8 (L) 14 - 44 %    Monocytes Relative 8 4 - 12 %    Eosinophils Relative 0 0 - 6 %    Basophils Relative 0 0 - 1 %    Neutrophils Absolute 10 46 (H) 1 85 - 7 62 Thousands/µL    Immature Grans Absolute 0 16 0 00 - 0 20 Thousand/uL    Lymphocytes Absolute 1 01 0 60 - 4 47 Thousands/µL    Monocytes Absolute 1 06 0 17 - 1 22 Thousand/µL    Eosinophils Absolute 0 02 0 00 - 0 61 Thousand/µL    Basophils Absolute 0 04 0 00 - 0 10 Thousands/µL   Comprehensive metabolic panel    Collection Time: 01/27/23  3:00 PM   Result Value Ref Range    Sodium 135 135 - 147 mmol/L    Potassium 3 8 3 5 - 5 3 mmol/L    Chloride 100 96 - 108 mmol/L    CO2 29 21 - 32 mmol/L    ANION GAP 6 4 - 13 mmol/L    BUN 13 5 - 25 mg/dL    Creatinine 0 86 0 60 - 1 30 mg/dL    Glucose 105 65 - 140 mg/dL    Calcium 7 5 (L) 8 3 - 10 1 mg/dL    Corrected Calcium 9 1 8 3 - 10 1 mg/dL    AST 37 5 - 45 U/L    ALT 32 12 - 78 U/L    Alkaline Phosphatase 112 46 - 116 U/L    Total Protein 6 3 (L) 6 4 - 8 4 g/dL    Albumin 2 0 (L) 3 5 - 5 0 g/dL    Total Bilirubin 0 83 0 20 - 1 00 mg/dL    eGFR 89 ml/min/1 73sq m   Vancomycin, random    Collection Time: 01/28/23  5:16 AM   Result Value Ref Range    Vancomycin Rm 13 9 10 0 - 20 0 ug/mL   CBC and differential    Collection Time: 01/28/23  5:16 AM   Result Value Ref Range    WBC 14 30 (H) 4 31 - 10 16 Thousand/uL    RBC 3 48 (L) 3 88 - 5 62 Million/uL    Hemoglobin 11 0 (L) 12 0 - 17 0 g/dL    Hematocrit 33 4 (L) 36 5 - 49 3 %    MCV 96 82 - 98 fL    MCH 31 6 26 8 - 34 3 pg    MCHC 32 9 31 4 - 37 4 g/dL    RDW 12 5 11 6 - 15 1 %    MPV 10 3 8 9 - 12 7 fL    Platelets 692 220 - 842 Thousands/uL    nRBC 0 /100 WBCs    Neutrophils Relative 79 (H) 43 - 75 %    Immat GRANS % 1 0 - 2 %    Lymphocytes Relative 12 (L) 14 - 44 %    Monocytes Relative 7 4 - 12 %    Eosinophils Relative 1 0 - 6 %    Basophils Relative 0 0 - 1 %    Neutrophils Absolute 11 41 (H) 1 85 - 7 62 Thousands/µL    Immature Grans Absolute 0 07 0 00 - 0 20 Thousand/uL    Lymphocytes Absolute 1 74 0 60 - 4 47 Thousands/µL    Monocytes Absolute 0 93 0 17 - 1 22 Thousand/µL    Eosinophils Absolute 0 10 0 00 - 0 61 Thousand/µL    Basophils Absolute 0 05 0 00 - 0 10 Thousands/µL   Basic metabolic panel    Collection Time: 01/28/23  5:16 AM   Result Value Ref Range    Sodium 138 135 - 147 mmol/L    Potassium 4 1 3 5 - 5 3 mmol/L    Chloride 105 96 - 108 mmol/L    CO2 28 21 - 32 mmol/L    ANION GAP 5 4 - 13 mmol/L    BUN 12 5 - 25 mg/dL    Creatinine 0 86 0 60 - 1 30 mg/dL    Glucose 93 65 - 140 mg/dL    Calcium 8 1 (L) 8 3 - 10 1 mg/dL    eGFR 89 ml/min/1 73sq m   Magnesium    Collection Time: 01/28/23  5:16 AM   Result Value Ref Range    Magnesium 1 4 (L) 1 6 - 2 6 mg/dL   Sedimentation rate, automated    Collection Time: 01/28/23  1:51 PM   Result Value Ref Range    Sed Rate 68 (H) 0 - 19 mm/hour   C-reactive protein Collection Time: 01/28/23  1:51 PM   Result Value Ref Range    CRP 46 7 (H) <3 0 mg/L   Basic metabolic panel    Collection Time: 01/29/23  6:27 AM   Result Value Ref Range    Sodium 140 135 - 147 mmol/L    Potassium 4 5 3 5 - 5 3 mmol/L    Chloride 111 (H) 96 - 108 mmol/L    CO2 23 21 - 32 mmol/L    ANION GAP 6 4 - 13 mmol/L    BUN 17 5 - 25 mg/dL    Creatinine 0 80 0 60 - 1 30 mg/dL    Glucose 172 (H) 65 - 140 mg/dL    Calcium 8 1 (L) 8 3 - 10 1 mg/dL    eGFR 92 ml/min/1 73sq m   CBC and differential    Collection Time: 01/30/23  5:13 AM   Result Value Ref Range    WBC 14 79 (H) 4 31 - 10 16 Thousand/uL    RBC 3 15 (L) 3 88 - 5 62 Million/uL    Hemoglobin 10 0 (L) 12 0 - 17 0 g/dL    Hematocrit 30 6 (L) 36 5 - 49 3 %    MCV 97 82 - 98 fL    MCH 31 7 26 8 - 34 3 pg    MCHC 32 7 31 4 - 37 4 g/dL    RDW 12 5 11 6 - 15 1 %    MPV 10 3 8 9 - 12 7 fL    Platelets 023 684 - 232 Thousands/uL    nRBC 0 /100 WBCs    Neutrophils Relative 84 (H) 43 - 75 %    Immat GRANS % 1 0 - 2 %    Lymphocytes Relative 10 (L) 14 - 44 %    Monocytes Relative 5 4 - 12 %    Eosinophils Relative 0 0 - 6 %    Basophils Relative 0 0 - 1 %    Neutrophils Absolute 12 51 (H) 1 85 - 7 62 Thousands/µL    Immature Grans Absolute 0 12 0 00 - 0 20 Thousand/uL    Lymphocytes Absolute 1 40 0 60 - 4 47 Thousands/µL    Monocytes Absolute 0 69 0 17 - 1 22 Thousand/µL    Eosinophils Absolute 0 02 0 00 - 0 61 Thousand/µL    Basophils Absolute 0 05 0 00 - 0 10 Thousands/µL   Basic metabolic panel    Collection Time: 01/30/23  5:13 AM   Result Value Ref Range    Sodium 139 135 - 147 mmol/L    Potassium 4 7 3 5 - 5 3 mmol/L    Chloride 110 (H) 96 - 108 mmol/L    CO2 24 21 - 32 mmol/L    ANION GAP 5 4 - 13 mmol/L    BUN 17 5 - 25 mg/dL    Creatinine 0 84 0 60 - 1 30 mg/dL    Glucose 132 65 - 140 mg/dL    Calcium 8 1 (L) 8 3 - 10 1 mg/dL    eGFR 90 ml/min/1 73sq m   CBC and differential    Collection Time: 01/30/23 10:05 AM   Result Value Ref Range    WBC 15 63 (H) 4 31 - 10 16 Thousand/uL    RBC 3 13 (L) 3 88 - 5 62 Million/uL    Hemoglobin 9 7 (L) 12 0 - 17 0 g/dL    Hematocrit 31 1 (L) 36 5 - 49 3 %    MCV 99 (H) 82 - 98 fL    MCH 31 0 26 8 - 34 3 pg    MCHC 31 2 (L) 31 4 - 37 4 g/dL    RDW 12 6 11 6 - 15 1 %    MPV 10 2 8 9 - 12 7 fL    Platelets 416 754 - 038 Thousands/uL    nRBC 0 /100 WBCs    Neutrophils Relative 83 (H) 43 - 75 %    Immat GRANS % 1 0 - 2 %    Lymphocytes Relative 11 (L) 14 - 44 %    Monocytes Relative 5 4 - 12 %    Eosinophils Relative 0 0 - 6 %    Basophils Relative 0 0 - 1 %    Neutrophils Absolute 12 94 (H) 1 85 - 7 62 Thousands/µL    Immature Grans Absolute 0 15 0 00 - 0 20 Thousand/uL    Lymphocytes Absolute 1 71 0 60 - 4 47 Thousands/µL    Monocytes Absolute 0 74 0 17 - 1 22 Thousand/µL    Eosinophils Absolute 0 04 0 00 - 0 61 Thousand/µL    Basophils Absolute 0 05 0 00 - 0 10 Thousands/µL   Green / Yellow tube on hold    Collection Time: 02/08/23  5:08 PM   Result Value Ref Range    Extra Tube Hold for add-ons  [employer] / Black tube on hold    Collection Time: 02/08/23  5:08 PM   Result Value Ref Range    Extra Tube Hold for add-ons  Lavender Top 3 ml on hold    Collection Time: 02/08/23  5:08 PM   Result Value Ref Range    Extra Tube Hold for add-ons      Fingerstick Glucose (POCT)    Collection Time: 02/08/23  9:35 PM   Result Value Ref Range    POC Glucose 87 65 - 140 mg/dl   Lipid Panel with Direct LDL reflex    Collection Time: 03/13/23 10:31 AM   Result Value Ref Range    Total Cholesterol 146 <200 mg/dL    HDL 49 > OR = 40 mg/dL    Triglycerides 151 (H) <150 mg/dL    LDL Calculated 74 mg/dL (calc)    Chol HDLC Ratio 3 0 <5 0 (calc)    Non-HDL Cholesterol 97 <130 mg/dL (calc)   Comprehensive metabolic panel    Collection Time: 03/13/23 10:31 AM   Result Value Ref Range    Glucose, Random 92 65 - 99 mg/dL    BUN 12 7 - 25 mg/dL    Creatinine 0 91 0 70 - 1 35 mg/dL    eGFR 92 > OR = 60 mL/min/1 73m2    SL AMB BUN/CREATININE RATIO NOT APPLICABLE 6 - 22 (calc)    Sodium 142 135 - 146 mmol/L    Potassium 3 8 3 5 - 5 3 mmol/L    Chloride 106 98 - 110 mmol/L    CO2 27 20 - 32 mmol/L    Calcium 9 5 8 6 - 10 3 mg/dL    Protein, Total 6 5 6 1 - 8 1 g/dL    Albumin 3 9 3 6 - 5 1 g/dL    Globulin 2 6 1 9 - 3 7 g/dL (calc)    Albumin/Globulin Ratio 1 5 1 0 - 2 5 (calc)    TOTAL BILIRUBIN 0 7 0 2 - 1 2 mg/dL    Alkaline Phosphatase 53 35 - 144 U/L    AST 13 10 - 35 U/L    ALT 9 9 - 46 U/L   CBC and differential    Collection Time: 03/13/23 10:31 AM   Result Value Ref Range    White Blood Cell Count 8 4 3 8 - 10 8 Thousand/uL    Red Blood Cell Count 4 05 (L) 4 20 - 5 80 Million/uL    Hemoglobin 12 9 (L) 13 2 - 17 1 g/dL    HCT 36 9 (L) 38 5 - 50 0 %    MCV 91 1 80 0 - 100 0 fL    MCH 31 9 27 0 - 33 0 pg    MCHC 35 0 32 0 - 36 0 g/dL    RDW 13 4 11 0 - 15 0 %    Platelet Count 736 648 - 400 Thousand/uL    SL AMB MPV 10 4 7 5 - 12 5 fL    Neutrophils (Absolute) 5,704 1,500 - 7,800 cells/uL    Lymphocytes (Absolute) 1,722 850 - 3,900 cells/uL    Monocytes (Absolute) 764 200 - 950 cells/uL    Eosinophils (Absolute) 168 15 - 500 cells/uL    Basophils ABS 42 0 - 200 cells/uL    Neutrophils 67 9 %    Lymphocytes 20 5 %    Monocytes 9 1 %    Eosinophils 2 0 %    Basophils PCT 0 5 %   TSH, 3rd generation    Collection Time: 03/13/23 10:31 AM   Result Value Ref Range    TSH 2 96 0 40 - 4 50 mIU/L   Hemoglobin A1c (w/out EAG)    Collection Time: 03/13/23 10:31 AM   Result Value Ref Range    Hemoglobin A1C 5 0 <5 7 % of total Hgb        Physical Exam  Constitutional:       Appearance: Normal appearance  HENT:      Head: Normocephalic  Right Ear: Tympanic membrane, ear canal and external ear normal       Left Ear: Tympanic membrane, ear canal and external ear normal       Nose: Nose normal  No congestion  Mouth/Throat:      Mouth: Mucous membranes are moist       Pharynx: Oropharynx is clear  No oropharyngeal exudate or posterior oropharyngeal erythema  Eyes:      Extraocular Movements: Extraocular movements intact  Conjunctiva/sclera: Conjunctivae normal    Cardiovascular:      Rate and Rhythm: Normal rate and regular rhythm  Heart sounds: Normal heart sounds  No murmur heard  Pulmonary:      Effort: Pulmonary effort is normal       Breath sounds: Normal breath sounds  No wheezing or rales  Abdominal:      General: Bowel sounds are normal  There is no distension  Palpations: Abdomen is soft  Tenderness: There is no abdominal tenderness  Musculoskeletal:         General: Normal range of motion  Cervical back: Normal range of motion and neck supple  Right lower leg: No edema  Left lower leg: No edema  Lymphadenopathy:      Cervical: No cervical adenopathy  Skin:     General: Skin is warm  Neurological:      General: No focal deficit present  Mental Status: He is alert and oriented to person, place, and time

## 2023-03-16 ENCOUNTER — TELEPHONE (OUTPATIENT)
Dept: INTERVENTIONAL RADIOLOGY/VASCULAR | Facility: CLINIC | Age: 68
End: 2023-03-16

## 2023-03-16 ENCOUNTER — HOSPITAL ENCOUNTER (OUTPATIENT)
Dept: RADIOLOGY | Facility: HOSPITAL | Age: 68
Discharge: HOME | End: 2023-03-16
Attending: RADIOLOGY

## 2023-03-16 ENCOUNTER — ANESTHESIA EVENT (OUTPATIENT)
Dept: RADIOLOGY | Facility: HOSPITAL | Age: 68
End: 2023-03-16

## 2023-03-16 ENCOUNTER — ANESTHESIA (OUTPATIENT)
Dept: RADIOLOGY | Facility: HOSPITAL | Age: 68
End: 2023-03-16

## 2023-03-16 VITALS
SYSTOLIC BLOOD PRESSURE: 162 MMHG | BODY MASS INDEX: 22.22 KG/M2 | RESPIRATION RATE: 20 BRPM | DIASTOLIC BLOOD PRESSURE: 89 MMHG | OXYGEN SATURATION: 90 % | WEIGHT: 150 LBS | HEART RATE: 68 BPM | TEMPERATURE: 98.4 F | HEIGHT: 69 IN

## 2023-03-16 LAB
ALBUMIN SERPL BCP-MCNC: 3.2 G/DL (ref 3.5–5)
ALP SERPL-CCNC: 67 U/L (ref 46–116)
ALT SERPL W P-5'-P-CCNC: 17 U/L (ref 12–78)
ANION GAP SERPL CALCULATED.3IONS-SCNC: 3 MMOL/L (ref 4–13)
AST SERPL W P-5'-P-CCNC: 28 U/L (ref 5–45)
BILIRUB SERPL-MCNC: 0.66 MG/DL (ref 0.2–1)
BUN SERPL-MCNC: 11 MG/DL (ref 5–25)
CALCIUM ALBUM COR SERPL-MCNC: 9.8 MG/DL (ref 8.3–10.1)
CALCIUM SERPL-MCNC: 9.2 MG/DL (ref 8.3–10.1)
CHLORIDE SERPL-SCNC: 109 MMOL/L (ref 96–108)
CO2 SERPL-SCNC: 27 MMOL/L (ref 21–32)
CREAT SERPL-MCNC: 0.94 MG/DL (ref 0.6–1.3)
GFR SERPL CREATININE-BSD FRML MDRD: 83 ML/MIN/1.73SQ M
GLUCOSE P FAST SERPL-MCNC: 93 MG/DL (ref 65–99)
GLUCOSE SERPL-MCNC: 93 MG/DL (ref 65–140)
POTASSIUM SERPL-SCNC: 4.2 MMOL/L (ref 3.5–5.3)
PROT SERPL-MCNC: 7 G/DL (ref 6.4–8.4)
SODIUM SERPL-SCNC: 139 MMOL/L (ref 135–147)

## 2023-03-16 RX ORDER — FENTANYL CITRATE/PF 50 MCG/ML
25 SYRINGE (ML) INJECTION
Status: DISCONTINUED | OUTPATIENT
Start: 2023-03-16 | End: 2023-03-17 | Stop reason: HOSPADM

## 2023-03-16 RX ORDER — METRONIDAZOLE 500 MG/100ML
500 INJECTION, SOLUTION INTRAVENOUS ONCE
Status: COMPLETED | OUTPATIENT
Start: 2023-03-16 | End: 2023-03-16

## 2023-03-16 RX ORDER — SODIUM CHLORIDE 9 MG/ML
75 INJECTION, SOLUTION INTRAVENOUS CONTINUOUS
Status: DISCONTINUED | OUTPATIENT
Start: 2023-03-16 | End: 2023-03-17 | Stop reason: HOSPADM

## 2023-03-16 RX ORDER — ONDANSETRON 2 MG/ML
INJECTION INTRAMUSCULAR; INTRAVENOUS AS NEEDED
Status: DISCONTINUED | OUTPATIENT
Start: 2023-03-16 | End: 2023-03-16

## 2023-03-16 RX ORDER — ONDANSETRON 2 MG/ML
4 INJECTION INTRAMUSCULAR; INTRAVENOUS ONCE AS NEEDED
Status: DISCONTINUED | OUTPATIENT
Start: 2023-03-16 | End: 2023-03-17 | Stop reason: HOSPADM

## 2023-03-16 RX ORDER — LIDOCAINE HYDROCHLORIDE 10 MG/ML
INJECTION, SOLUTION EPIDURAL; INFILTRATION; INTRACAUDAL; PERINEURAL AS NEEDED
Status: COMPLETED | OUTPATIENT
Start: 2023-03-16 | End: 2023-03-16

## 2023-03-16 RX ORDER — OXYCODONE HYDROCHLORIDE 5 MG/1
5 TABLET ORAL EVERY 4 HOURS PRN
Status: DISCONTINUED | OUTPATIENT
Start: 2023-03-16 | End: 2023-03-17 | Stop reason: HOSPADM

## 2023-03-16 RX ORDER — GLYCOPYRROLATE 0.2 MG/ML
INJECTION INTRAMUSCULAR; INTRAVENOUS AS NEEDED
Status: DISCONTINUED | OUTPATIENT
Start: 2023-03-16 | End: 2023-03-16

## 2023-03-16 RX ORDER — LIDOCAINE HYDROCHLORIDE 10 MG/ML
INJECTION, SOLUTION EPIDURAL; INFILTRATION; INTRACAUDAL; PERINEURAL AS NEEDED
Status: DISCONTINUED | OUTPATIENT
Start: 2023-03-16 | End: 2023-03-16

## 2023-03-16 RX ORDER — FENTANYL CITRATE 50 UG/ML
INJECTION, SOLUTION INTRAMUSCULAR; INTRAVENOUS AS NEEDED
Status: DISCONTINUED | OUTPATIENT
Start: 2023-03-16 | End: 2023-03-16

## 2023-03-16 RX ORDER — CEFAZOLIN SODIUM 1 G/50ML
1000 SOLUTION INTRAVENOUS ONCE
Status: COMPLETED | OUTPATIENT
Start: 2023-03-16 | End: 2023-03-16

## 2023-03-16 RX ORDER — PROPOFOL 10 MG/ML
INJECTION, EMULSION INTRAVENOUS AS NEEDED
Status: DISCONTINUED | OUTPATIENT
Start: 2023-03-16 | End: 2023-03-16

## 2023-03-16 RX ORDER — SODIUM CHLORIDE, SODIUM LACTATE, POTASSIUM CHLORIDE, CALCIUM CHLORIDE 600; 310; 30; 20 MG/100ML; MG/100ML; MG/100ML; MG/100ML
50 INJECTION, SOLUTION INTRAVENOUS CONTINUOUS
Status: DISCONTINUED | OUTPATIENT
Start: 2023-03-16 | End: 2023-03-17 | Stop reason: HOSPADM

## 2023-03-16 RX ORDER — DEXAMETHASONE SODIUM PHOSPHATE 10 MG/ML
INJECTION, SOLUTION INTRAMUSCULAR; INTRAVENOUS AS NEEDED
Status: DISCONTINUED | OUTPATIENT
Start: 2023-03-16 | End: 2023-03-16

## 2023-03-16 RX ORDER — ROCURONIUM BROMIDE 10 MG/ML
INJECTION, SOLUTION INTRAVENOUS AS NEEDED
Status: DISCONTINUED | OUTPATIENT
Start: 2023-03-16 | End: 2023-03-16

## 2023-03-16 RX ADMIN — FENTANYL CITRATE 50 MCG: 50 INJECTION, SOLUTION INTRAMUSCULAR; INTRAVENOUS at 09:00

## 2023-03-16 RX ADMIN — PHENYLEPHRINE HYDROCHLORIDE 20 MCG/MIN: 10 INJECTION INTRAVENOUS at 08:55

## 2023-03-16 RX ADMIN — ONDANSETRON 4 MG: 2 INJECTION INTRAMUSCULAR; INTRAVENOUS at 08:45

## 2023-03-16 RX ADMIN — CEFAZOLIN SODIUM 1000 MG: 1 SOLUTION INTRAVENOUS at 08:45

## 2023-03-16 RX ADMIN — GLYCOPYRROLATE 0.1 MG: 0.2 INJECTION, SOLUTION INTRAMUSCULAR; INTRAVENOUS at 08:45

## 2023-03-16 RX ADMIN — FENTANYL CITRATE 50 MCG: 50 INJECTION, SOLUTION INTRAMUSCULAR; INTRAVENOUS at 08:37

## 2023-03-16 RX ADMIN — ROCURONIUM BROMIDE 50 MG: 50 INJECTION INTRAVENOUS at 08:32

## 2023-03-16 RX ADMIN — LIDOCAINE HYDROCHLORIDE 10 ML: 10 INJECTION, SOLUTION EPIDURAL; INFILTRATION; INTRACAUDAL; PERINEURAL at 09:05

## 2023-03-16 RX ADMIN — PROPOFOL 150 MG: 10 INJECTION, EMULSION INTRAVENOUS at 08:32

## 2023-03-16 RX ADMIN — LIDOCAINE HYDROCHLORIDE 100 MG: 10 INJECTION, SOLUTION EPIDURAL; INFILTRATION; INTRACAUDAL; PERINEURAL at 08:32

## 2023-03-16 RX ADMIN — SUGAMMADEX 200 MG: 100 INJECTION, SOLUTION INTRAVENOUS at 09:35

## 2023-03-16 RX ADMIN — DEXAMETHASONE SODIUM PHOSPHATE 10 MG: 10 INJECTION, SOLUTION INTRAMUSCULAR; INTRAVENOUS at 08:45

## 2023-03-16 RX ADMIN — SODIUM CHLORIDE 75 ML/HR: 0.9 INJECTION, SOLUTION INTRAVENOUS at 07:52

## 2023-03-16 RX ADMIN — METRONIDAZOLE: 500 SOLUTION INTRAVENOUS at 08:45

## 2023-03-16 NOTE — DISCHARGE INSTRUCTIONS
Ablation of the Liver                                                         WHAT YOU NEED TO KNOW:                             Liver ablation is a treatment that destroys liver tumors without removing them  Using image guidance, a probe is inserted into the tumor  Ablations can be done using high energy radio waves (RFA)  Or using microwave energy  Heat destroys the abnormal tissue  A cyroablation destroys abnormal tissue by freezing it  DISCHARGE INSTRUCTIONS:         You may resume your normal diet and medications  Small sips of flat soda will help with nausea  Limit your activity for 24 hours  Steve Graves and Danny  Patients Contact Interventional  Radiology at [phone] PATIENTS: Contact Interventional Radiology at [phone])      [place] PATIENTS: Contact Interventional Radiology at [phone]) if any of the following occur:    Contact your healthcare provider if:  You have severe pain that does not get better with medicine  Difficulty breathing, nausea or vomiting  Chills or fever above 101 degrees F  Pain at the probe sites not relieved by medication  Develop any redness, swelling, heat, unusual drainage, heavy bruising or bleeding from the probe sites  You continue to have pain a week after your procedure  You have questions or concerns about your condition or care  Follow up with your healthcare provider as directed: You will need to return within a month for a CT scan or MRI of your liver  Write down your questions so you remember to ask them during your visits  Rest as needed: Slowly start to do more each day  Return to your daily activities as directed by your healthcare provider     © 2017 [employer] is for End User's use only and may not be sold, redistributed or otherwise used for commercial purposes  All illustrations and images included in CareNotes® are the copyrighted property of [employer]  or Valentin Cameron  The above information is an  only  It is not intended as medical advice for individual conditions or treatments  Talk to your doctor, nurse or pharmacist before following any medical regimen to see if it is safe and effective for you

## 2023-03-16 NOTE — H&P
Interventional Radiology  History and Physical 3/16/2023     Virginia Liz III   1955   [id number]    Assessment/Plan:  79 y M w/ [address] , here for ablation  Problem List Items Addressed This Visit    None         Subjective:     Patient ID: Lisbet Holm is a 79 y o  male  History of Present Illness  79 y M w/ HCC here for ablation  No complaints  Review of Systems   All other systems reviewed and are negative  Past Medical History:   Diagnosis Date   • LUCILLE (acute kidney injury) ([facility] [address] ) 2017   • Blindness of left eye     Since birth   • Cancer [facility])     liver   • Cataract    • Colon polyp    • Colostomy in place [facility]) 2017   • COPD (chronic obstructive pulmonary disease) (HCC)    • [facility] disease ([facility] )    • Emphysema of lung (HCC)    • Emphysema/COPD ([facility] )    • Essential hypertension    • GERD (gastroesophageal reflux disease)    • Hypertension    • Hypokalemia 2017   • Hypomagnesemia 2017   • Hyponatremia 2017   • Kidney stone    • Osteomyelitis ([place] [address] )    • Pneumonia         Past Surgical History:   Procedure Laterality Date   • CATARACT EXTRACTION Bilateral    • CHOLECYSTECTOMY     • COLECTOMY      10 inchs of ileum   • COLONOSCOPY N/A 2017    Procedure: COLONOSCOPY;  Surgeon: Adelina Garg MD;  Location: [facility];   Service: Gastroenterology   • COLOSTOMY     • FINGER AMPUTATION     • FINGER SURGERY Left    • IR BIOPSY LIVER MASS  2020   • IR MICROWAVE ABLATION  2022   • LITHOTRIPSY     • LIVER LOBECTOMY N/A 7/15/2020    Procedure: LIVER ABLATION, INTRAOPERATIVE U/S LIVER;  Surgeon: Michelle Rosario MD;  Location: [facility];  Service: Surgical Oncology        Social History     Tobacco Use   Smoking Status Former   • Packs/day: 1 50   • Years: 51 00   • Pack years: 76 50   • Types: Cigarettes   • Start date:    • Quit date: 2022   • Years since quittin 1   Smokeless Tobacco Never        Social History     Substance and Sexual Activity   Alcohol Use Not Currently   • Alcohol/week: 59 0 standard drinks   • Types: 49 Cans of beer, 10 Shots of liquor per week        Social History     Substance and Sexual Activity   Drug Use No        No Known Allergies    Current Outpatient Medications   Medication Sig Dispense Refill   • AMILoride 5 mg tablet Take 1 tablet (5 mg total) by mouth daily 90 tablet 0   • amLODIPine (NORVASC) 5 mg tablet Take 1 tablet (5 mg total) by mouth daily 90 tablet 0   • buPROPion (WELLBUTRIN XL) 150 mg 24 hr tablet Take 1 tablet (150 mg total) by mouth daily 90 tablet 0   • calcium carbonate (TUMS) 500 mg chewable tablet Chew 1 tablet (500 mg total) daily  0   • carvedilol (COREG) 3 125 mg tablet Take 1 tablet (3 125 mg total) by mouth 2 (two) times a day with meals 180 tablet 1   • cholecalciferol (VITAMIN D3) 400 units tablet Take 400 Units by mouth Every Sunday     • [facility] Magnesium Oxide 250 MG TABS TAKE 1 TABLET (250 MG TOTAL) BY MOUTH IN THE MORNING     • folic acid (FOLVITE) 1 mg tablet Take 1 tablet (1 mg total) by mouth daily 90 tablet 1   • levothyroxine 50 mcg tablet Take 1 tablet (50 mcg total) by mouth daily in the early morning 90 tablet 1   • Magnesium Oxide 250 MG TABS Take 1 tablet (250 mg total) by mouth in the morning 90 tablet 3   • mirtazapine (REMERON) 15 mg tablet Take 1 tablet (15 mg total) by mouth daily at bedtime 90 tablet 1   • omeprazole (PriLOSEC) 20 mg delayed release capsule Take 1 capsule (20 mg total) by mouth daily 90 capsule 1   • potassium chloride (MICRO-K) 10 MEQ CR capsule Take 2 capsules (20 mEq total) by mouth 2 (two) times a day 180 capsule 1   • rosuvastatin (CRESTOR) 10 MG tablet Take 1 tablet (10 mg total) by mouth daily 90 tablet 0   • umeclidinium-vilanterol (Anoro Ellipta) 62 5-25 mcg/actuation inhaler Inhale 1 puff daily 180 blister 0   • ALPRAZolam (XANAX) 0 25 mg tablet Take 1 tablet (0 25 mg total) by mouth daily at bedtime as needed for anxiety 90 tablet 0   • thiamine 250 MG tablet Take 1 tablet (250 mg total) by mouth daily for 5 days Do not start before November 14, 2022  (Patient not taking: Reported on 2/8/2023) 5 tablet 0   • VIT B12-METHIONINE-INOS-CHOL IM Inject into a muscle every 30 (thirty) days       Current Facility-Administered Medications   Medication Dose Route Frequency Provider Last Rate Last Admin   • ceFAZolin (ANCEF) IVPB (premix in dextrose) 1,000 mg 50 mL  1,000 mg Intravenous Once Stefan Johnson DO       • cyanocobalamin injection 1,000 mcg  1,000 mcg Intramuscular Q30 Days Torri Coleman MD   1,000 mcg at 03/15/23 1159   • metroNIDAZOLE (FLAGYL) IVPB (premix) 500 mg 100 mL  500 mg Intravenous Once Stefan Johnson DO       • sodium chloride 0 9 % infusion  75 mL/hr Intravenous Continuous Stefan Johnson DO 75 mL/hr at 03/16/23 0752 75 mL/hr at 03/16/23 0752          Objective:    Vitals:    03/16/23 0739   BP: 161/84   BP Location: Right arm   Pulse: 74   Resp: 18   Temp: 98 2 °F (36 8 °C)   TempSrc: Temporal   SpO2: 96%   Weight: 68 kg (150 lb)   Height: 5' 9" (1 753 m)        Physical Exam  HENT:      Head: Normocephalic  Eyes:      Pupils: Pupils are equal, round, and reactive to light  Cardiovascular:      Rate and Rhythm: Normal rate  Pulmonary:      Effort: Pulmonary effort is normal    Abdominal:      General: Abdomen is flat  Musculoskeletal:         General: Normal range of motion  Skin:     General: Skin is warm  Neurological:      Mental Status: He is alert and oriented to person, place, and time     Psychiatric:         Mood and Affect: Mood normal            No results found for: BNP   Lab Results   Component Value Date    WBC 8 4 03/13/2023    HGB 12 9 (L) 03/13/2023    HCT 36 9 (L) 03/13/2023    MCV 91 1 03/13/2023     03/13/2023     Lab Results   Component Value Date    INR 1 18 01/26/2023    INR 1 19 11/10/2022    INR 1 00 09/21/2022    PROTIME 15 2 (H) 01/26/2023    PROTIME 15 3 (H) 11/10/2022    PROTIME 13 4 09/21/2022     Lab Results   Component Value Date    PTT 32 07/08/2020         I have personally reviewed pertinent imaging and laboratory results  Code Status: Prior  Advance Directive and Living Will:      Power of :    POLST:      This text is generated with voice recognition software  There may be translation, syntax,  or grammatical errors  If you have any questions, please contact the dictating provider

## 2023-03-16 NOTE — BRIEF OP NOTE (RAD/CATH)
IR MICROWAVE ABLATION  Procedure Note    PATIENT NAME: Zina Cast III  : 1955  MRN: [id number]     Pre-op Diagnosis: No diagnosis found  Post-op Diagnosis: No diagnosis found  Surgeon:   Bijal Barrios DO  Assistants:     No qualified resident was available  Estimated Blood Loss: none  Findings: Right hepatic mass microwave ablation       Specimens: none    Complications:  none    Anesthesia: local and general    Bijal Barrios DO     Date: 3/16/2023  Time: 9:31 AM

## 2023-03-16 NOTE — TELEPHONE ENCOUNTER
Spoke with Mr  Drew Guadalupe  Explained procedur went well  He has mild site pain / soreness  I offered Rx for pain meds  He declined  Will ensure he has follow up imaging

## 2023-03-16 NOTE — SEDATION DOCUMENTATION
Microwave ablation of liver performed by Dr Kai Mcgee without complications  Anesthesia present throughout case

## 2023-03-16 NOTE — ANESTHESIA PREPROCEDURE EVALUATION
Procedure:  IR MICROWAVE ABLATION    Relevant Problems   ANESTHESIA (within normal limits)      CARDIO   (+) Chest pain   (+) Essential hypertension   (+) Mixed hyperlipidemia   (+) Portal hypertension (HCC)   (+) Supraventricular tachycardia (HCC)      ENDO   (+) Subclinical hypothyroidism      GI/HEPATIC   (+) Gastroesophageal reflux disease without esophagitis   (+) HCC (hepatocellular carcinoma) (HCC)      /RENAL   (+) Acute kidney injury ([address] )   (+) Kidney stone      HEMATOLOGY   (+) Anemia      NEURO/PSYCH   (+) Chronic, continuous use of opioids   (+) Dysthymic disorder   (+) Encounter for follow-up surveillance of liver cancer   (+) History of alcohol use disorder   (+) Personal history of liver cancer      PULMONARY   (+) Chronic obstructive pulmonary disease (HCC)   (+) Emphysema of lung (HCC)   (+) Emphysema/COPD (HCC)   (+) Observed sleep apnea   (+) Pneumonia      Other   (+) Crohn disease (HCC)   (+) Liver mass   (+) Palliative care patient   (+) Tobacco abuse      Lab Results   Component Value Date    WBC 8 4 03/13/2023    HGB 12 9 (L) 03/13/2023     03/13/2023     Lab Results   Component Value Date    SODIUM 142 03/13/2023    K 3 8 03/13/2023    BUN 12 03/13/2023    CREATININE 0 91 03/13/2023    EGFR 92 03/13/2023    GLUCOSE 75 07/15/2020     Lab Results   Component Value Date    PTT 32 07/08/2020      Lab Results   Component Value Date    INR 1 18 01/26/2023       Lab Results   Component Value Date    HGBA1C 5 0 03/13/2023       Type and Screen:  Lab Results   Component Value Date    ABO B 07/15/2020    RH Positive 07/15/2020    ABS Negative 07/15/2020    SPECIMEXP [id number] 07/08/2020     Interpretation Summary   Study date: 11/3/22            •  Left Ventricle: Left ventricular cavity size is normal  Wall thickness is normal  The left ventricular ejection fraction is 60%  Systolic function is normal  Wall motion is normal  Diastolic function is normal for age    •  Right Ventricle: Right ventricular cavity size is normal  Systolic function is normal        Physical Exam    Airway    Mallampati score: I  TM Distance: >3 FB  Neck ROM: full     Dental   upper dentures and lower dentures,     Cardiovascular  Cardiovascular exam normal    Pulmonary  Pulmonary exam normal     Other Findings        Anesthesia Plan  ASA Score- 4     Anesthesia Type- general with ASA Monitors  Additional Monitors:   Airway Plan: ETT  Comment: I discussed risks (reviewed with patient on the anesthesia consent form), benefits and alternatives for General Anesthesia  These risks did include breathing tube remaining in place if not strong enough, PONV, damage to lips and teeth, sore throat, eye injury or blindness, risk of heart attack or stroke that may lead to death          Plan Factors-    Chart reviewed  EKG reviewed  Existing labs reviewed  Patient summary reviewed  Induction- intravenous  Postoperative Plan-     Informed Consent- Anesthetic plan and risks discussed with patient  I personally reviewed this patient with the CRNA  Discussed and agreed on the Anesthesia Plan with the CRNA  Rosalind Rossi

## 2023-03-18 ENCOUNTER — HOSPITAL ENCOUNTER (EMERGENCY)
Facility: HOSPITAL | Age: 68
Discharge: HOME | End: 2023-03-18
Attending: EMERGENCY MEDICINE

## 2023-03-18 ENCOUNTER — TELEPHONE (OUTPATIENT)
Dept: OTHER | Facility: OTHER | Age: 68
End: 2023-03-18

## 2023-03-18 ENCOUNTER — APPOINTMENT (EMERGENCY)
Dept: RADIOLOGY | Facility: HOSPITAL | Age: 68
End: 2023-03-18

## 2023-03-18 VITALS
TEMPERATURE: 97.3 F | BODY MASS INDEX: 22.15 KG/M2 | DIASTOLIC BLOOD PRESSURE: 90 MMHG | OXYGEN SATURATION: 94 % | RESPIRATION RATE: 15 BRPM | HEART RATE: 76 BPM | WEIGHT: 150 LBS | SYSTOLIC BLOOD PRESSURE: 164 MMHG

## 2023-03-18 DIAGNOSIS — G89.18 POSTOPERATIVE PAIN: Primary | ICD-10-CM

## 2023-03-18 LAB
ALBUMIN SERPL BCP-MCNC: 3.2 G/DL (ref 3.5–5)
ALP SERPL-CCNC: 51 U/L (ref 46–116)
ALT SERPL W P-5'-P-CCNC: 51 U/L (ref 12–78)
ANION GAP SERPL CALCULATED.3IONS-SCNC: 3 MMOL/L (ref 4–13)
AST SERPL W P-5'-P-CCNC: 52 U/L (ref 5–45)
BASOPHILS # BLD AUTO: 0.05 THOUSANDS/ÂΜL (ref 0–0.1)
BASOPHILS NFR BLD AUTO: 0 % (ref 0–1)
BILIRUB SERPL-MCNC: 1.19 MG/DL (ref 0.2–1)
BUN SERPL-MCNC: 17 MG/DL (ref 5–25)
CALCIUM ALBUM COR SERPL-MCNC: 9.7 MG/DL (ref 8.3–10.1)
CALCIUM SERPL-MCNC: 9.1 MG/DL (ref 8.3–10.1)
CHLORIDE SERPL-SCNC: 108 MMOL/L (ref 96–108)
CO2 SERPL-SCNC: 27 MMOL/L (ref 21–32)
CREAT SERPL-MCNC: 0.85 MG/DL (ref 0.6–1.3)
EOSINOPHIL # BLD AUTO: 0.08 THOUSAND/ÂΜL (ref 0–0.61)
EOSINOPHIL NFR BLD AUTO: 1 % (ref 0–6)
ERYTHROCYTE [DISTWIDTH] IN BLOOD BY AUTOMATED COUNT: 14.2 % (ref 11.6–15.1)
GFR SERPL CREATININE-BSD FRML MDRD: 90 ML/MIN/1.73SQ M
GLUCOSE SERPL-MCNC: 104 MG/DL (ref 65–140)
HCT VFR BLD AUTO: 35.6 % (ref 36.5–49.3)
HGB BLD-MCNC: 11.7 G/DL (ref 12–17)
IMM GRANULOCYTES # BLD AUTO: 0.04 THOUSAND/UL (ref 0–0.2)
IMM GRANULOCYTES NFR BLD AUTO: 0 % (ref 0–2)
LYMPHOCYTES # BLD AUTO: 2.26 THOUSANDS/ÂΜL (ref 0.6–4.47)
LYMPHOCYTES NFR BLD AUTO: 20 % (ref 14–44)
MCH RBC QN AUTO: 31.8 PG (ref 26.8–34.3)
MCHC RBC AUTO-ENTMCNC: 32.9 G/DL (ref 31.4–37.4)
MCV RBC AUTO: 97 FL (ref 82–98)
MONOCYTES # BLD AUTO: 1.09 THOUSAND/ÂΜL (ref 0.17–1.22)
MONOCYTES NFR BLD AUTO: 10 % (ref 4–12)
NEUTROPHILS # BLD AUTO: 7.86 THOUSANDS/ÂΜL (ref 1.85–7.62)
NEUTS SEG NFR BLD AUTO: 69 % (ref 43–75)
NRBC BLD AUTO-RTO: 0 /100 WBCS
PLATELET # BLD AUTO: 186 THOUSANDS/UL (ref 149–390)
PMV BLD AUTO: 9.9 FL (ref 8.9–12.7)
POTASSIUM SERPL-SCNC: 3.5 MMOL/L (ref 3.5–5.3)
PROT SERPL-MCNC: 6.7 G/DL (ref 6.4–8.4)
RBC # BLD AUTO: 3.68 MILLION/UL (ref 3.88–5.62)
SODIUM SERPL-SCNC: 138 MMOL/L (ref 135–147)
WBC # BLD AUTO: 11.38 THOUSAND/UL (ref 4.31–10.16)

## 2023-03-18 RX ORDER — OXYCODONE HYDROCHLORIDE 5 MG/1
5 TABLET ORAL EVERY 6 HOURS PRN
Qty: 21 TABLET | Refills: 0 | Status: SHIPPED | OUTPATIENT
Start: 2023-03-18

## 2023-03-18 RX ORDER — KETOROLAC TROMETHAMINE 30 MG/ML
15 INJECTION, SOLUTION INTRAMUSCULAR; INTRAVENOUS ONCE
Status: COMPLETED | OUTPATIENT
Start: 2023-03-18 | End: 2023-03-18

## 2023-03-18 RX ORDER — HYDROMORPHONE HCL/PF 1 MG/ML
0.5 SYRINGE (ML) INJECTION ONCE
Status: COMPLETED | OUTPATIENT
Start: 2023-03-18 | End: 2023-03-18

## 2023-03-18 RX ORDER — NAPROXEN 500 MG/1
500 TABLET ORAL 2 TIMES DAILY WITH MEALS
Qty: 30 TABLET | Refills: 0 | Status: SHIPPED | OUTPATIENT
Start: 2023-03-18

## 2023-03-18 RX ADMIN — IOHEXOL 100 ML: 350 INJECTION, SOLUTION INTRAVENOUS at 15:47

## 2023-03-18 RX ADMIN — KETOROLAC TROMETHAMINE 15 MG: 30 INJECTION, SOLUTION INTRAMUSCULAR; INTRAVENOUS at 16:31

## 2023-03-18 RX ADMIN — HYDROMORPHONE HYDROCHLORIDE 0.5 MG: 1 INJECTION, SOLUTION INTRAMUSCULAR; INTRAVENOUS; SUBCUTANEOUS at 14:05

## 2023-03-18 NOTE — ED PROVIDER NOTES
Chief Complaint   Patient presents with   • Post-op Problem     Rt flank pain over incision  Liver ablation on 3/16 in IR  Denies fevers/N/V/D     History of Present Illness and Review of Systems   This is a 79 y o  male with PMH significant for Crohn's disease, status post colostomy, hepatocellular carcinoma, COPD, hypertension, hyperlipidemia coming in today with complaint of postoperative problem  He reports that on 3/16 he did undergo an IR procedure for ablation of his hepatocellular carcinoma mass  No reported complications at the time of the surgery  However following this over the last 2 days he has been having progressively worsening pain in the right upper quadrant flank region  No alleviating factors  He has not been have any systemic symptoms such as fevers, chills, nausea, vomiting, high output of his ostomy, chest pain, shortness of breath, episodes syncope, pale appearance, bloody emesis  He does not smoke or drink alcohol  Does not have any other reported procedures or symptoms at this time  Denies any recent medication changes  Chart review is remarkable for microwave ablation of his HCC mass  His platelet count was 449 at that time  Remainder of ROS Reviewed and Non-Pertinent    No other complaints for this encounter     - No language barrier    - History obtained from patient and chart   - Reviewed relevant past medical/family/social history  - There are no limitations to the history obtained      Past Medical, Past Surgical History:    has a past medical history of LUCILLE (acute kidney injury) ([facility] [address] ) (6/28/2017), Blindness of left eye, Cancer ([facility] [address] ), Cataract, Colon polyp, Colostomy in place [facility]) (6/29/2017), COPD (chronic obstructive pulmonary disease) ([facility] [address] ), Crohn disease ([facility] [address] ), Emphysema of lung ([facility] [address] ), Emphysema/COPD ([facility] [address] ), Essential hypertension, GERD (gastroesophageal reflux disease), Hypertension, Hypokalemia (6/28/2017), Hypomagnesemia (6/29/2017), Hyponatremia (6/28/2017), Kidney stone, Osteomyelitis ([facility] [address] ), and Pneumonia  has a past surgical history that includes Colostomy; Cholecystectomy; Colectomy; Colonoscopy (N/A, 2017); Lithotripsy; Finger surgery (Left); IR biopsy liver mass (2020); Cataract extraction (Bilateral); Liver lobectomy (N/A, 7/15/2020); Finger amputation; IR microwave ablation (2022); and IR microwave ablation (3/16/2023)  Allergies:   No Known Allergies    Social and Family History:     Social History     Substance and Sexual Activity   Alcohol Use Not Currently   • Alcohol/week: 59 0 standard drinks   • Types: 49 Cans of beer, 10 Shots of liquor per week     Social History     Tobacco Use   Smoking Status Former   • Packs/day: 1 50   • Years: 51 00   • Pack years: 76 50   • Types: Cigarettes   • Start date:    • Quit date: 2022   • Years since quittin 1   Smokeless Tobacco Never     Social History     Substance and Sexual Activity   Drug Use No       Physical Examination     Vitals:    23 1332 23 1500 23 1633   BP: 153/83 137/75 164/90   BP Location:  Right arm Right arm   Pulse: 95 78 76   Resp: 18 16 15   Temp: (!) 97 3 °F (36 3 °C)     TempSrc: Oral     SpO2: 95% 93% 94%   Weight: 68 kg (150 lb)       Physical Exam  Vitals and nursing note reviewed  Constitutional:       General: He is not in acute distress  Appearance: He is well-developed  He is ill-appearing  HENT:      Head: Normocephalic and atraumatic  Eyes:      Conjunctiva/sclera: Conjunctivae normal    Cardiovascular:      Rate and Rhythm: Normal rate and regular rhythm  Heart sounds: No murmur heard  Pulmonary:      Effort: Pulmonary effort is normal  No respiratory distress  Breath sounds: Normal breath sounds  Abdominal:      Palpations: Abdomen is soft  Tenderness: There is abdominal tenderness in the right upper quadrant  There is no guarding or rebound        Comments: Incision in the right upper quadrant appears clean dry and intact, no active bleeding, no crepitus, no surrounding cellulitic changes   Musculoskeletal:         General: No swelling  Cervical back: Neck supple  Skin:     General: Skin is warm and dry  Capillary Refill: Capillary refill takes less than 2 seconds  Neurological:      Mental Status: He is alert     Psychiatric:         Mood and Affect: Mood normal             Risk Stratification Tools                Orders Placed This Encounter   Procedures   • CT abdomen pelvis with contrast   • CBC and differential   • Comprehensive metabolic panel       Labs:     Labs Reviewed   CBC AND DIFFERENTIAL - Abnormal       Result Value Ref Range Status    WBC 11 38 (*) 4 31 - 10 16 Thousand/uL Final    RBC 3 68 (*) 3 88 - 5 62 Million/uL Final    Hemoglobin 11 7 (*) 12 0 - 17 0 g/dL Final    Hematocrit 35 6 (*) 36 5 - 49 3 % Final    MCV 97  82 - 98 fL Final    MCH 31 8  26 8 - 34 3 pg Final    MCHC 32 9  31 4 - 37 4 g/dL Final    RDW 14 2  11 6 - 15 1 % Final    MPV 9 9  8 9 - 12 7 fL Final    Platelets 419  190 - 390 Thousands/uL Final    nRBC 0  /100 WBCs Final    Neutrophils Relative 69  43 - 75 % Final    Immat GRANS % 0  0 - 2 % Final    Lymphocytes Relative 20  14 - 44 % Final    Monocytes Relative 10  4 - 12 % Final    Eosinophils Relative 1  0 - 6 % Final    Basophils Relative 0  0 - 1 % Final    Neutrophils Absolute 7 86 (*) 1 85 - 7 62 Thousands/µL Final    Immature Grans Absolute 0 04  0 00 - 0 20 Thousand/uL Final    Lymphocytes Absolute 2 26  0 60 - 4 47 Thousands/µL Final    Monocytes Absolute 1 09  0 17 - 1 22 Thousand/µL Final    Eosinophils Absolute 0 08  0 00 - 0 61 Thousand/µL Final    Basophils Absolute 0 05  0 00 - 0 10 Thousands/µL Final   COMPREHENSIVE METABOLIC PANEL - Abnormal    Sodium 138  135 - 147 mmol/L Final    Potassium 3 5  3 5 - 5 3 mmol/L Final    Chloride 108  96 - 108 mmol/L Final    CO2 27  21 - 32 mmol/L Final    ANION GAP 3 (*) 4 - 13 mmol/L Final    BUN 17  5 - 25 mg/dL Final    Creatinine 0 85  0 60 - 1 30 mg/dL Final    Comment: Standardized to IDMS reference method    Glucose 104  65 - 140 mg/dL Final    Comment: If the patient is fasting, the ADA then defines impaired fasting glucose as > 100 mg/dL and diabetes as > or equal to 123 mg/dL  Specimen collection should occur prior to Sulfasalazine administration due to the potential for falsely depressed results  Specimen collection should occur prior to Sulfapyridine administration due to the potential for falsely elevated results  Calcium 9 1  8 3 - 10 1 mg/dL Final    Corrected Calcium 9 7  8 3 - 10 1 mg/dL Final    AST 52 (*) 5 - 45 U/L Final    Comment: Specimen collection should occur prior to Sulfasalazine administration due to the potential for falsely depressed results  ALT 51  12 - 78 U/L Final    Comment: Specimen collection should occur prior to Sulfasalazine and/or Sulfapyridine administration due to the potential for falsely depressed results  Alkaline Phosphatase 51  46 - 116 U/L Final    Total Protein 6 7  6 4 - 8 4 g/dL Final    Albumin 3 2 (*) 3 5 - 5 0 g/dL Final    Total Bilirubin 1 19 (*) 0 20 - 1 00 mg/dL Final    Comment: Use of this assay is not recommended for patients undergoing treatment with eltrombopag due to the potential for falsely elevated results      eGFR 90  ml/min/1 73sq m Final    Narrative:     [place] guidelines for Chronic Kidney Disease (CKD):   •  Stage 1 with normal or high GFR (GFR > 90 mL/min/1 73 square meters)  •  Stage 2 Mild CKD (GFR = 60-89 mL/min/1 73 square meters)  •  Stage 3A Moderate CKD (GFR = 45-59 mL/min/1 73 square meters)  •  Stage 3B Moderate CKD (GFR = 30-44 mL/min/1 73 square meters)  •  Stage 4 Severe CKD (GFR = 15-29 mL/min/1 73 square meters)  •  Stage 5 End Stage CKD (GFR <15 mL/min/1 73 square meters)  Note: GFR calculation is accurate only with a steady state creatinine       Imaging:     CT abdomen pelvis with contrast Final Result by Brigida Staples MD (03/18 1642)      Previous posttreatment ablation changes in the right hepatic lobe unchanged  New Recent post ablation changes correlate with the microwave probe location of 3/16/2023  Relative perfusion changes inferior to this on arterial phase with homogeneous appearance on delayed phase  No acute intra-abdominal finding  Workstation performed: [id number]                Procedures   Procedures      MDM:   Medical Decision Making  Shelby Ponce is a 79 y o  who presents with complaints of postoperative follow-up    Vital signs are unremarkable, physical exam shows the patient is chronically ill-appearing, benign cardiorespiratory exam, does have incision over the right upper quadrant, it is clean dry and intact without evidence of bleeding, he does have notable tenderness in that region, no guarding rigidity or peritoneal signs, no flank ecchymoses, no peripheral edema, strong pulse are palpated distally, he does not appear pale or anemic    Ddx: Overall given his recent procedure, concerning for hematoma versus liver injury versus gallbladder injury versus other  Plan: CBC, CMP, CT abdomen pelvis, pain control, will reassess and touch base with proceduralist      Postoperative pain: acute illness or injury  Amount and/or Complexity of Data Reviewed  Labs: ordered  Decision-making details documented in ED Course  Radiology: ordered  Risk  Prescription drug management  ED Course as of 03/18/23 2328   Sat Mar 18, 2023   1424 Hemoglobin(!): 11 7   1424 Platelet Count: 844   1426 RUQ US indeterminate, no free fluid visualized   1513 eGFR: 90       Summary: Overall the patient's imaging was unremarkable, showed anticipated postoperative changes  No evidence of hematoma or otherwise  His pain was well controlled, therefore this did appear consistent with normal postoperative pain    Prescribed some medications for this, recommended close follow-up and return precautions  Final Dispo   Final Diagnosis:  1  Postoperative pain      Time reflects when diagnosis was documented in both MDM as applicable and the Disposition within this note     Time User Action Codes Description Comment    3/18/2023  4:53 PM Kyle Falcon Add [G89 18] Postoperative pain       ED Disposition     ED Disposition   Discharge    Condition   Stable    Date/Time   Sat Mar 18, 2023  4:53 PM    Comment   Freedom Beyer III discharge to home/self care  Follow-up Information    None       Medications   HYDROmorphone (DILAUDID) injection 0 5 mg (0 5 mg Intravenous Given 3/18/23 1405)   iohexol (OMNIPAQUE) 350 MG/ML injection (SINGLE-DOSE) 100 mL (100 mL Intravenous Given 3/18/23 1547)   ketorolac (TORADOL) injection 15 mg (15 mg Intravenous Given 3/18/23 1631)       All details of the evaluation and treatment plan were made clear and additionally all questions and concerns were addressed while under my care  Portions of the record may have been created with voice recognition software  Occasional wrong word or "sound a like" substitutions may have occurred due to the inherent limitations of voice recognition software  Read the chart carefully and recognize, using context, where substitutions have occurred  The attending physician physically available and evaluated the above patient alongside myself          Lobo Newsome MD  03/18/23 [id number]

## 2023-03-18 NOTE — ED ATTENDING ATTESTATION
3/18/2023  I, Essie Masters MD, saw and evaluated the patient  I have discussed the patient with the resident/non-physician practitioner and agree with the resident's/non-physician practitioner's findings, Plan of Care, and MDM as documented in the resident's/non-physician practitioner's note, except where noted  All available labs and Radiology studies were reviewed  I was present for key portions of any procedure(s) performed by the resident/non-physician practitioner and I was immediately available to provide assistance  At this point I agree with the current assessment done in the Emergency Department  I have conducted an independent evaluation of this patient a history and physical is as follows:      80-year-old male with history of hepatocellular carcinoma status post recent microwave ablation procedure presenting with right flank pain over incision site patient denies fevers chills nausea or vomiting     Patient procedure occurred 2 days prior to arrival   Pain started yesterday  Vitals reviewed  Heart regular rate and rhythm lungs clear to auscultation bilaterally  Abdomen soft with right upper quadrant tenderness to palpation no guarding or rebound noted  Normal bowel sounds  Incision site is clean dry intact  Impression: Right upper quadrant right flank pain status post ablation procedure  Differential diagnosis: Postprocedural pain, complication status post procedure including bleeding, infection, gallbladder injury    Plan to treat patient's pain with IV Dilaudid  We will check CBC CMP, CTAP  ED Course     Labs reviewed: CMP remarkable for mild elevation in AST low albumin T  bili of 1 19  CBC remarkable for mild anemia mild leukocytosis may be reactive      CTAP reviewed report: Previous posttreatment ablation changes in the right hepatic lobe unchanged         New Recent post ablation changes correlate with the microwave probe location of 3/16/2023    Relative perfusion changes inferior to this on arterial phase with homogeneous appearance on delayed phase      No acute intra-abdominal finding  Patient reassessed pain adequately controlled  Results discussed with patient will discharge patient home follow-up with IR as outpatient  Patient prescribed a short course of opioid analgesia given patient's pain  Return precautions given        Critical Care Time  Procedures

## 2023-03-18 NOTE — TELEPHONE ENCOUNTER
Patient stated that he had a IR Microwave Ablation Anes done on 3/16/2023  Patient stated he is experiencing pain and does not have any medication  Patient is requesting a call back for care advice

## 2023-03-18 NOTE — DISCHARGE INSTRUCTIONS
Your workup here was not concerning for anything dangerous  Therefore there is no need for you to stay at the hospital for further testing  We feel safe to send you home  You can use Oxycodone for management of your symptoms  You should follow up with your primary physician to assess for resolution of your symptoms and to determine if there is further evaluation that needs to be performed      Return to the emergency department if you have any symptoms of worsening pain or fevers

## 2023-03-20 ENCOUNTER — VBI (OUTPATIENT)
Dept: INTERNAL MEDICINE CLINIC | Facility: CLINIC | Age: 68
End: 2023-03-20

## 2023-03-20 NOTE — TELEPHONE ENCOUNTER
Ruddy Royal III    ED Visit Information     Ed visit date: 3/18/2023  Diagnosis Description: Postoperative pain  In Network? Yes [facility]  Discharge status: Home  Discharged with meds ? Yes  Number of ED visits to date: 2  ED Severity:3     Outreach Information    Outreach successful: Yes 1  Date letter mailed:0  Date Finalized:3-20-23    Care Coordination    Follow up appointment with pcp: no has Cardio coming up  Transportation issues ? No    [facility] [employer] type: 7 Day Outreach  Emergent necessity warranted by diagnosis: No  [facility] PCP: Yes  Transportation: Self Transport  Called PCP first?: No  Feels able to call PCP for urgent problems ?: Yes  Understands what emergencies can be handled by PCP ?: No  Ever any problems getting appointment with PCP for minor emergency/urgency problems?: No  Practice Contacted Patient ?: No  Pt had ED follow up with pcp/staff ?: No    Seen for follow-up out of network ?: No  Reason Patient went to ED instead of Urgent Care or PCP?: Perceived Severity of Illness, Specialist instructions  There was a personal communication with patient regarding recent ED visit  Arlie Seip stated he has an appt on 3-23-23 he declined pcp appt  Patient was not aware of their nearest [facility] urgent care facility, education was given

## 2023-03-23 ENCOUNTER — OFFICE VISIT (OUTPATIENT)
Dept: CARDIOLOGY CLINIC | Facility: CLINIC | Age: 68
End: 2023-03-23

## 2023-03-23 VITALS
BODY MASS INDEX: 23.28 KG/M2 | HEIGHT: 69 IN | OXYGEN SATURATION: 95 % | DIASTOLIC BLOOD PRESSURE: 76 MMHG | HEART RATE: 88 BPM | SYSTOLIC BLOOD PRESSURE: 142 MMHG | WEIGHT: 157.2 LBS

## 2023-03-23 DIAGNOSIS — I10 ESSENTIAL HYPERTENSION, BENIGN: ICD-10-CM

## 2023-03-23 DIAGNOSIS — I10 ESSENTIAL HYPERTENSION: ICD-10-CM

## 2023-03-23 NOTE — PROGRESS NOTES
General Cardiology - Outpatient Progress Note   Marlen Banks III 79 y o  male   MRN: [id number]  @ Encounter: [id number]    Devyn Romero MD  Consults      Interval History:   Since last encounter, patient has been undergoing microwave ablation therapy for [address]  and has pain in his abdomen associated with coughing or sneezing that gets better with NSAID/oxycodone  He reports no more alcohol use since November 2022, and continues to do yard work and short walking without any palpitations, dizziness, lightheadedness, or shortness of breath  Cardiac History Summary:   Andre Luna is a 79y o  year old male who has a history of hepatocellular carcinoma and former heavy alcohol abuse  He was initially referred to me for preoperative evaluation for treatment of his [address]  in the setting of asymptomatic coronary artery calcifications on CT  He has undergone microwave ablation therapy for his [address]  He was admitted to the hospital  for syncope in the setting of heavy alcohol binge in 11/2022 and noted to have SVT on telemetry, prompting cardiology follow-up  Objective:  Review of Systems  Review of system was conducted and was negative except for as stated in the interval history      Physical Exam:  Vitals: Blood pressure 142/76, pulse 88, height 5' 9" (1 753 m), weight 71 3 kg (157 lb 3 2 oz), SpO2 95 %  , Body mass index is 23 21 kg/m²      GEN: Andre Luna appears well, alert and oriented x 3, pleasant and cooperative   HEENT:  Normocephalic, atraumatic, anicteric, moist mucous membranes  NECK:  No JVD or carotid bruits   HEART: RRR, no murmur  LUNGS: Clear to auscultation bilaterally; no wheezes, rales, or rhonchi; respiration nonlabored   EXTREMITIES: no edema  SKIN:  Dry, intact, warm to touch    Home Medications: (Not in a hospital admission)      Current Outpatient Medications:   •  AMILoride 5 mg tablet, Take 1 tablet (5 mg total) by mouth daily, Disp: 90 tablet, Rfl: 0  •  amLODIPine (NORVASC) 5 mg tablet, Take 1 tablet (5 mg total) by mouth daily, Disp: 90 tablet, Rfl: 0  •  buPROPion (WELLBUTRIN XL) 150 mg 24 hr tablet, Take 1 tablet (150 mg total) by mouth daily, Disp: 90 tablet, Rfl: 0  •  calcium carbonate (TUMS) 500 mg chewable tablet, Chew 1 tablet (500 mg total) daily, Disp:  , Rfl: 0  •  carvedilol (COREG) 3 125 mg tablet, Take 1 tablet (3 125 mg total) by mouth 2 (two) times a day with meals, Disp: 180 tablet, Rfl: 1  •  cholecalciferol (VITAMIN D3) 400 units tablet, Take 400 Units by mouth Every Sunday, Disp: , Rfl:   •  CVS Magnesium Oxide 250 MG TABS, TAKE 1 TABLET (250 MG TOTAL) BY MOUTH IN THE MORNING, Disp: , Rfl:   •  folic acid (FOLVITE) 1 mg tablet, Take 1 tablet (1 mg total) by mouth daily, Disp: 90 tablet, Rfl: 1  •  levothyroxine 50 mcg tablet, Take 1 tablet (50 mcg total) by mouth daily in the early morning, Disp: 90 tablet, Rfl: 1  •  Magnesium Oxide 250 MG TABS, Take 1 tablet (250 mg total) by mouth in the morning, Disp: 90 tablet, Rfl: 3  •  mirtazapine (REMERON) 15 mg tablet, Take 1 tablet (15 mg total) by mouth daily at bedtime, Disp: 90 tablet, Rfl: 1  •  naproxen (Naprosyn) 500 mg tablet, Take 1 tablet (500 mg total) by mouth 2 (two) times a day with meals, Disp: 30 tablet, Rfl: 0  •  omeprazole (PriLOSEC) 20 mg delayed release capsule, Take 1 capsule (20 mg total) by mouth daily, Disp: 90 capsule, Rfl: 1  •  oxyCODONE (Roxicodone) 5 immediate release tablet, Take 1 tablet (5 mg total) by mouth every 6 (six) hours as needed for moderate pain for up to 21 doses Max Daily Amount: 20 mg, Disp: 21 tablet, Rfl: 0  •  potassium chloride (MICRO-K) 10 MEQ CR capsule, Take 2 capsules (20 mEq total) by mouth 2 (two) times a day, Disp: 180 capsule, Rfl: 1  •  rosuvastatin (CRESTOR) 10 MG tablet, Take 1 tablet (10 mg total) by mouth daily, Disp: 90 tablet, Rfl: 0  •  umeclidinium-vilanterol (Anoro Ellipta) 62 5-25 mcg/actuation inhaler, Inhale 1 puff daily, Disp: 180 blister, Rfl: 0  • VIT B12-METHIONINE-INOS-CHOL IM, Inject into a muscle every 30 (thirty) days, Disp: , Rfl:   •  ALPRAZolam (XANAX) 0 25 mg tablet, Take 1 tablet (0 25 mg total) by mouth daily at bedtime as needed for anxiety, Disp: 90 tablet, Rfl: 0  •  thiamine 250 MG tablet, Take 1 tablet (250 mg total) by mouth daily for 5 days Do not start before November 14, 2022  (Patient not taking: Reported on 2/8/2023), Disp: 5 tablet, Rfl: 0    Current Facility-Administered Medications:   •  cyanocobalamin injection 1,000 mcg, 1,000 mcg, Intramuscular, Q30 Days, Torri Coleman MD, 1,000 mcg at 03/15/23 1159    Labs & Results:  Lab Results   Component Value Date    HSTNI0 4 11/01/2022    HSTNI2 3 11/02/2022    HSTNI4 3 11/02/2022     No results found for: NTBNP  Lab Results   Component Value Date    TRIG 151 (H) 03/13/2023    HDL 49 03/13/2023    LDLCALC 74 03/13/2023     Lab Results   Component Value Date    K 3 5 03/18/2023    CO2 27 03/18/2023     03/18/2023    BUN 17 03/18/2023    CREATININE 0 85 03/18/2023    ALT 51 03/18/2023    AST 52 (H) 03/18/2023    TSH 2 96 03/13/2023     Lab Results   Component Value Date    WBC 11 38 (H) 03/18/2023    HGB 11 7 (L) 03/18/2023    HCT 35 6 (L) 03/18/2023    MCV 97 03/18/2023     03/18/2023     Lab Results   Component Value Date    INR 1 18 01/26/2023    INR 1 19 11/10/2022    INR 1 00 09/21/2022     HOLTER  No results found for this or any previous visit  Results for orders placed during the hospital encounter of 11/29/22    Holter monitor    Interpretation Summary  PATIENT NAME:  Ace Simpson III    AGE:  79 y o  Sex:  male  MRN:  [id number]      PROCEDURE: Holter monitor - 48 hour      INDICATIONS: SVT    HOLTER FINDINGS:    The patient was monitored for 48 continuous hours    This revealed the patient to be in sinus rhythm with an average rate of 85 BPM; a minimum rate of 62 BPM; and a maximum rate of 148 BPM     There were a total of 464 ventricular ectopic beats (0 2% of all monitored beats)  There were a total of 191 supraventricular ectopic beats (0 1% of all monitored beats)  There were multiple short 3-5 beat runs of atrial tachycardia  There was one run of SVT that was 22 beats with a HR of approximately 150 bpm that is most likely atrial tachycardia  There was no evidence of atrial fibrillation or atrial flutter  There was no bradycardia  There was no evidence of heart block, and no significant pauses were seen  There was approximately 7 hr 22 min of tachycardia  This predominantly all sinus tachycardia  A symptom diary was not included with the report  Impression  The patient was in sinus rhythm throughout the duration of the study  The average heart rate was 85 bpm   One 22 beat run of nonsustained SVT at rate of 150 bpm that is most likely atrial tachycardia  Multiple short runs of atrial tachycardia (3-5 beats)  No sustained arrhythmias  No symptoms reported  Alcon Harden MD        Imaging:     ECHO:  Results for orders placed during the hospital encounter of 07/10/20    Echo complete with contrast if indicated    Narrative  [facility]  [address]  [phone]    Transthoracic Echocardiogram  2D, M-mode, Doppler, and Color Doppler    Study date:  10-Jul-2020    Patient: Finn Camacho  MR number: [id number]  Account number: [de-identified]  : 1955  Age: 59 years  Gender: Male  Status: Outpatient  Location: Echo lab  Height: 69 in  Weight: 129 lb  BP: 120/ 76 mmHg    Indications: Hepatocellular Carcinoma    Diagnoses: C22 0 - Liver cell carcinoma    Sonographer:  SHAKEEL Azul  Primary Physician:  Taran Jose MD  Referring Physician:  Ronna Cooper MD  Group:  [facility]  Interpreting Physician:  Aura Courtney MD    SUMMARY    LEFT VENTRICLE:  Systolic function was normal by visual assessment  Ejection fraction was estimated to be 70 %    There were no regional wall motion abnormalities  Doppler parameters were consistent with abnormal left ventricular relaxation (grade 1 diastolic dysfunction)  RIGHT VENTRICLE:  The size was normal   Systolic function was normal     HISTORY: PRIOR HISTORY: HTN,COPD,Heptatocellular Carcinoma    PROCEDURE: The procedure was performed in the echo lab  This was a routine study  The transthoracic approach was used  The study included complete 2D imaging, M-mode, complete spectral Doppler, and color Doppler  The heart rate was 103  bpm, at the start of the study  Echocardiographic views were limited due to low windows and lung interference  This was a technically difficult study  LEFT VENTRICLE: Size was normal  Systolic function was normal by visual assessment  Ejection fraction was estimated to be 70 %  There were no regional wall motion abnormalities  Wall thickness was normal  DOPPLER: Doppler parameters were  consistent with abnormal left ventricular relaxation (grade 1 diastolic dysfunction)  RIGHT VENTRICLE: The size was normal  Systolic function was normal  Wall thickness was normal     LEFT ATRIUM: Size was normal     RIGHT ATRIUM: Size was normal     MITRAL VALVE: Valve structure was normal  There was normal leaflet separation  DOPPLER: The transmitral velocity was within the normal range  There was no evidence for stenosis  There was no significant regurgitation  AORTIC VALVE: The valve was trileaflet  Leaflets exhibited normal thickness and normal cuspal separation  DOPPLER: Transaortic velocity was within the normal range  There was no evidence for stenosis  There was no significant  regurgitation  TRICUSPID VALVE: The valve structure was normal  There was normal leaflet separation  DOPPLER: The transtricuspid velocity was within the normal range  There was no evidence for stenosis  There was no significant regurgitation      PULMONIC VALVE: Leaflets exhibited normal thickness, no calcification, and normal cuspal separation  DOPPLER: The transpulmonic velocity was within the normal range  There was no significant regurgitation  PERICARDIUM: There was no pericardial effusion  The pericardium was normal in appearance  AORTA: The root exhibited normal size  SYSTEMIC VEINS: IVC: The inferior vena cava was normal in size  Respirophasic changes were normal     MEASUREMENT TABLES    OTHER ECHO MEASUREMENTS  (Reference normals)  Estimated CVP   5 mmHg   (--)    SYSTEM MEASUREMENT TABLES    2D  %FS: 34 42 %  Ao Diam: 3 35 cm  EDV(Teich): 73 75 ml  EF(Teich): 63 99 %  ESV(Teich): 26 56 ml  IVSd: 0 84 cm  LA Diam: 2 11 cm  LVEDV MOD A4C: 82 01 ml  LVEF MOD A4C: 65 %  LVESV MOD A4C: 28 71 ml  LVIDd: 4 09 cm  LVIDs: 2 68 cm  LVLd A4C: 8 29 cm  LVLs A4C: 6 98 cm  LVPWd: 0 66 cm  RVIDd: 2 64 cm  SV MOD A4C: 53 3 ml  SV(Teich): 47 19 ml    MM  TAPSE: 2 11 cm    PW  E': 0 08 m/s  E/E': 7 3  MV A Marito: 0 68 m/s  MV Dec [place]: 3 17 m/s2  MV DecT: 174 52 ms  MV E Marito: 0 55 m/s  MV E/A Ratio: 0 82  MV PHT: 50 61 ms  MVA By PHT: 4 35 cm2    [employer] Accredited Echocardiography Laboratory    Prepared and electronically signed by    Maximus Holm MD  Signed 10-Jul-2020 14:53:10    Results for orders placed during the hospital encounter of 11/01/22    Echo complete w/ contrast if indicated    Interpretation Summary  •  Left Ventricle: Left ventricular cavity size is normal  Wall thickness is normal  The left ventricular ejection fraction is 60%  Systolic function is normal  Wall motion is normal  Diastolic function is normal for age  •  Right Ventricle: Right ventricular cavity size is normal  Systolic function is normal       Assessment/Plan     Paroxsymal atrial tachycardia, asymptomatic     Coronary artery calcifications    Hypertension    Hepatocellular carcinoma    COPD    Former alcohol abuse     79year old male with [address]  s/p surgical resection and microwave ablation therapy presents for follow up       He was admitted in November 2022 for syncope in setting of alcoholic binging with orthostasis, electrolyte abnormalities, and microcytic anemia  Patient reported no chest pain or palpitations, however episodes of paroxysmal SVT were noted on telemetry  He was discharged with outpatient cardiology follow-up  A 48 hour holter revealed several short runs of atrial tachycardia (<10 seconds) which were asymptomatic  Echocardiogram at the time demonstrated normal biventricular and valve function without significant atrial enlargement  At this time, no further evaluation/treatment for his arrhythmia is warranted  SVT likely provoked by [facility] triggers in setting of alcohol abuse  He has since quit alcohol use  Will re-evaluate as needed if concerning symptoms of chest pain/palpitations occur      Collette Brightly, MD  Cardiology Fellow

## 2023-03-24 ENCOUNTER — NURSE TRIAGE (OUTPATIENT)
Dept: OTHER | Facility: OTHER | Age: 68
End: 2023-03-24

## 2023-03-24 NOTE — TELEPHONE ENCOUNTER
Reason for Disposition  • [1] Caller has NON-URGENT medicine question about med that PCP prescribed AND [2] triager unable to answer question    Answer Assessment - Initial Assessment Questions  1  NAME of MEDICATION: "What medicine are you calling about?"      Amiloride 5 mg and amlodipine 5 mg  2  QUESTION: "What is your question?" (e g , medication refill, side effect)      "I was just at the cardiologist and he told me to increased one of those meds but I cant remember which one he said and its not in my AVS or chart  3   PRESCRIBING HCP: "Who prescribed it?" Reason: if prescribed by specialist, call should be referred to that group     cardiology    Protocols used: MEDICATION QUESTION CALL-ADULT-

## 2023-03-24 NOTE — TELEPHONE ENCOUNTER
Regarding: Medication  ----- Message from Cyndie Wells sent at 3/24/2023  5:40 PM EDT -----  "My cardiologist increased the dosage in one of my blood pressure medications, But, I don't remember which one"

## 2023-03-31 ENCOUNTER — TELEPHONE (OUTPATIENT)
Dept: CARDIOLOGY CLINIC | Facility: CLINIC | Age: 68
End: 2023-03-31

## 2023-05-02 ENCOUNTER — OFFICE VISIT (OUTPATIENT)
Dept: GASTROENTEROLOGY | Facility: AMBULARY SURGERY CENTER | Age: 68
End: 2023-05-02

## 2023-05-02 VITALS
HEART RATE: 78 BPM | OXYGEN SATURATION: 97 % | DIASTOLIC BLOOD PRESSURE: 82 MMHG | SYSTOLIC BLOOD PRESSURE: 124 MMHG | BODY MASS INDEX: 23.88 KG/M2 | WEIGHT: 161.2 LBS | HEIGHT: 69 IN

## 2023-05-02 DIAGNOSIS — J30.89 NON-SEASONAL ALLERGIC RHINITIS, UNSPECIFIED TRIGGER: ICD-10-CM

## 2023-05-02 DIAGNOSIS — C22.0 HCC (HEPATOCELLULAR CARCINOMA) (HCC): Primary | Chronic | ICD-10-CM

## 2023-05-02 DIAGNOSIS — K50.018 CROHN'S DISEASE OF SMALL INTESTINE WITH OTHER COMPLICATION (HCC): Chronic | ICD-10-CM

## 2023-05-02 DIAGNOSIS — K21.9 GASTROESOPHAGEAL REFLUX DISEASE WITHOUT ESOPHAGITIS: ICD-10-CM

## 2023-05-02 NOTE — PROGRESS NOTES
[facility]  Edmund Guzman III 79 y o  male MRN: [id number]            Assessment & Plan:    Pleasant 71-year-old gentleman, history of [address]  undergoing ablation therapy, history of Crohn's disease, underwent total proctectomy and end colostomy many years ago, previously had inflammatory changes of his colon in the past however most recent colonoscopy did not show any evidence of active inflammatory bowel disease  1   History of large adenomatous polyps:  -Patient is due for repeat surveillance colonoscopy given multiple polyps on his last colonoscopy during his hospital visit  -Discussed with him risks of procedure including bleeding, surgery, perforation    2  History of Crohn's disease: On last endoscopy and clinically patient appears to be in remission, likely has burned-out disease as he is not on any therapy at this time    3  HCC: Followed by surgeon oncology and IR, undergoing ablation therapy    Paloma Madrid was seen today for screening colonoscopy  Diagnoses and all orders for this visit:    [address]  (hepatocellular carcinoma) ([place] [id number] )    Crohn's disease of small intestine with other complication [facility])  -     Ambulatory Referral to Gastroenterology  -     Colonoscopy; Future    Gastroesophageal reflux disease without esophagitis    Other orders  -     Diet NPO; Sips with meds; Standing  -     Void on call to OR; Standing            _____________________________________________________________        CC: Follow-up    HPI:  Evelyne Eisenmenger is a 79 y o male who is here for follow-up  71-year-old gentleman, here for follow-up  Patient was admitted to the hospital last November, at that time he was found to have significant anemia  Had an upper endoscopy, colonoscopy, was found to have multiple polyps, largest one was 25 mm  Was recommend to have a repeat colonoscopy in 3 to 6 months    Patient reports that he is doing well, has anywhere from 6-8 bowel movements through his colostomy on a daily basis depending on his dietary intake  Denies any melena, rectal bleeding  Denies any abdominal pain, except when he has mild constipation  Reflux is well controlled, denies any nausea, vomiting, dysphagia  He is gained 30 pounds but he is quit drinking since November       Patient quit smoking about a year ago  He is undergoing continued treatment for [address] , recently had ablation therapy  ROS:  The remainder of the ROS was negative except for the pertinent positives mentioned in HPI  Allergies: Patient has no known allergies      Medications:   Current Outpatient Medications:   •  AMILoride 5 mg tablet, Take 1 tablet (5 mg total) by mouth daily, Disp: 90 tablet, Rfl: 0  •  amLODIPine (NORVASC) 5 mg tablet, Take 1 tablet (5 mg total) by mouth daily, Disp: 90 tablet, Rfl: 0  •  buPROPion (WELLBUTRIN XL) 150 mg 24 hr tablet, Take 1 tablet (150 mg total) by mouth daily, Disp: 90 tablet, Rfl: 0  •  calcium carbonate (TUMS) 500 mg chewable tablet, Chew 1 tablet (500 mg total) daily, Disp:  , Rfl: 0  •  carvedilol (COREG) 3 125 mg tablet, Take 1 tablet (3 125 mg total) by mouth 2 (two) times a day with meals, Disp: 180 tablet, Rfl: 1  •  cholecalciferol (VITAMIN D3) 400 units tablet, Take 400 Units by mouth Every Sunday, Disp: , Rfl:   •  [employer]S Magnesium Oxide 250 MG TABS, TAKE 1 TABLET (250 MG TOTAL) BY MOUTH IN THE MORNING, Disp: , Rfl:   •  fluticasone (FLONASE) 50 mcg/act nasal spray, 2 sprays into each nostril daily, Disp: 16 g, Rfl: 5  •  folic acid (FOLVITE) 1 mg tablet, Take 1 tablet (1 mg total) by mouth daily, Disp: 90 tablet, Rfl: 1  •  levothyroxine 50 mcg tablet, Take 1 tablet (50 mcg total) by mouth daily in the early morning, Disp: 90 tablet, Rfl: 1  •  Magnesium Oxide 250 MG TABS, Take 1 tablet (250 mg total) by mouth in the morning, Disp: 90 tablet, Rfl: 3  •  mirtazapine (REMERON) 15 mg tablet, Take 1 tablet (15 mg total) by mouth daily at bedtime, Disp: 90 tablet, Rfl: 1  •  omeprazole (PriLOSEC) 20 mg delayed release capsule, Take 1 capsule (20 mg total) by mouth daily, Disp: 90 capsule, Rfl: 1  •  oxyCODONE (Roxicodone) 5 immediate release tablet, Take 1 tablet (5 mg total) by mouth every 6 (six) hours as needed for moderate pain for up to 21 doses Max Daily Amount: 20 mg, Disp: 21 tablet, Rfl: 0  •  potassium chloride (MICRO-K) 10 MEQ CR capsule, Take 2 capsules (20 mEq total) by mouth 2 (two) times a day, Disp: 180 capsule, Rfl: 1  •  rosuvastatin (CRESTOR) 10 MG tablet, Take 1 tablet (10 mg total) by mouth daily, Disp: 90 tablet, Rfl: 0  •  VIT B12-METHIONINE-INOS-CHOL IM, Inject into a muscle every 30 (thirty) days, Disp: , Rfl:   •  ALPRAZolam (XANAX) 0 25 mg tablet, Take 1 tablet (0 25 mg total) by mouth daily at bedtime as needed for anxiety, Disp: 90 tablet, Rfl: 0  •  naproxen (Naprosyn) 500 mg tablet, Take 1 tablet (500 mg total) by mouth 2 (two) times a day with meals, Disp: 30 tablet, Rfl: 0  •  thiamine 250 MG tablet, Take 1 tablet (250 mg total) by mouth daily for 5 days Do not start before November 14, 2022   (Patient not taking: Reported on 2/8/2023), Disp: 5 tablet, Rfl: 0  •  umeclidinium-vilanterol (Anoro Ellipta) 62 5-25 mcg/actuation inhaler, Inhale 1 puff daily, Disp: 180 blister, Rfl: 0    Current Facility-Administered Medications:   •  cyanocobalamin injection 1,000 mcg, 1,000 mcg, Intramuscular, Q30 Days, Torri Coleman MD, 1,000 mcg at 04/12/23 1546    Past Medical History:   Diagnosis Date   • [facility]UC[facility]) [facility] ) 6/28/2017   • Blindness of left eye     Since birth   • Cancer [facility])     liver   • Cataract    • Colon polyp    • Colostomy in place [facility]) 6/29/2017   • COPD (chronic obstructive pulmonary disease) (HCC)    • Crohn disease ([facility] [address] )    • Emphysema of lung ([address] )    • Emphysema/COPD ([facility] )    • Essential hypertension    • GERD (gastroesophageal reflux disease)    • Hypertension    • Hypokalemia 6/28/2017   • Hypomagnesemia 6/29/2017   • Hyponatremia "6/28/2017   • Kidney stone    • Osteomyelitis ([facility] [address] )    • Pneumonia        Past Surgical History:   Procedure Laterality Date   • APPENDECTOMY  1979   • CATARACT EXTRACTION Bilateral    • CHOLECYSTECTOMY     • COLECTOMY      10 inchs of ileum   • COLONOSCOPY N/A 06/30/2017    Procedure: COLONOSCOPY;  Surgeon: David Montoya MD;  Location: [facility]; Service: Gastroenterology   • COLOSTOMY     • FINGER AMPUTATION     • FINGER SURGERY Left    • HERNIA REPAIR  1978   • IR BIOPSY LIVER MASS  06/08/2020   • IR MICROWAVE ABLATION  09/21/2022   • IR MICROWAVE ABLATION  03/16/2023   • LITHOTRIPSY     • LIVER LOBECTOMY N/A 07/15/2020    Procedure: LIVER ABLATION, INTRAOPERATIVE U/S LIVER;  Surgeon: Iman Quispe MD;  Location: [facility];  Service: Surgical Oncology   • TONSILLECTOMY  Child       Family History   Problem Relation Age of Onset   • Hypertension Father    • Heart disease Sister         reports that he quit smoking about 15 months ago  His smoking use included cigarettes  He started smoking about 52 years ago  He has a 75 00 pack-year smoking history  He has never used smokeless tobacco  He reports that he does not currently use alcohol after a past usage of about 1 0 standard drink per week  He reports that he does not use drugs        Physical Exam:    /82 (BP Location: Right arm, Patient Position: Sitting, Cuff Size: Standard)   Pulse 78   Ht 5' 9\" (1 753 m)   Wt 73 1 kg (161 lb 3 2 oz)   SpO2 97%   BMI 23 81 kg/m²     Gen: wn/wd, NAD  HEENT: anicteric, MMM, no cervical LAD  CVS: RRR, no m/r/g  CHEST: CTA b/l  ABD: +BS, soft, right upper quadrant scar, left lower quadrant colostomy EXT: no c/c/e  NEURO: aaox3  SKIN: NO rashes    "

## 2023-05-02 NOTE — PATIENT INSTRUCTIONS
Scheduled date of colonoscopy (as of today): 7/3/23  Physician performing colonoscopy: Dr Jennifer Kenny  Location of colonoscopy: [facility]  Bowel prep reviewed with patient: Miralax/ Dulcolax  Instructions reviewed with patient by: Kalani MORAN  Clearances: n/a

## 2023-05-03 RX ORDER — FLUTICASONE PROPIONATE 50 MCG
SPRAY, SUSPENSION (ML) NASAL
Qty: 48 ML | Refills: 2 | Status: SHIPPED | OUTPATIENT
Start: 2023-05-03

## 2023-05-10 ENCOUNTER — CLINICAL SUPPORT (OUTPATIENT)
Dept: INTERNAL MEDICINE CLINIC | Facility: CLINIC | Age: 68
End: 2023-05-10

## 2023-05-10 DIAGNOSIS — Z23 ENCOUNTER FOR IMMUNIZATION: Primary | ICD-10-CM

## 2023-05-10 RX ORDER — CYANOCOBALAMIN 1000 UG/ML
1000 INJECTION, SOLUTION INTRAMUSCULAR; SUBCUTANEOUS
Status: SHIPPED | OUTPATIENT
Start: 2023-05-10

## 2023-05-10 RX ADMIN — CYANOCOBALAMIN 1000 MCG: 1000 INJECTION, SOLUTION INTRAMUSCULAR; SUBCUTANEOUS at 13:26

## 2023-06-07 ENCOUNTER — CLINICAL SUPPORT (OUTPATIENT)
Dept: INTERNAL MEDICINE CLINIC | Facility: CLINIC | Age: 68
End: 2023-06-07
Payer: MEDICARE

## 2023-06-07 DIAGNOSIS — E53.8 VITAMIN B12 DEFICIENCY: Primary | ICD-10-CM

## 2023-06-07 PROCEDURE — 96372 THER/PROPH/DIAG INJ SC/IM: CPT

## 2023-06-07 RX ADMIN — CYANOCOBALAMIN 1000 MCG: 1000 INJECTION, SOLUTION INTRAMUSCULAR; SUBCUTANEOUS at 09:44

## 2023-06-16 ENCOUNTER — HOSPITAL ENCOUNTER (OUTPATIENT)
Dept: MRI IMAGING | Facility: HOSPITAL | Age: 68
Discharge: HOME | End: 2023-06-16
Attending: SURGERY
Payer: MEDICARE

## 2023-06-16 DIAGNOSIS — C22.0 HCC (HEPATOCELLULAR CARCINOMA) (HCC): Chronic | ICD-10-CM

## 2023-06-16 PROCEDURE — 74183 MRI ABD W/O CNTR FLWD CNTR: CPT

## 2023-06-16 PROCEDURE — A9585 GADOBUTROL INJECTION: HCPCS | Performed by: SURGERY

## 2023-06-16 PROCEDURE — G1004 CDSM NDSC: HCPCS

## 2023-06-16 RX ADMIN — GADOBUTROL 7 ML: 604.72 INJECTION INTRAVENOUS at 07:15

## 2023-06-28 ENCOUNTER — OFFICE VISIT (OUTPATIENT)
Dept: SURGICAL ONCOLOGY | Facility: CLINIC | Age: 68
End: 2023-06-28
Payer: MEDICARE

## 2023-06-28 VITALS
OXYGEN SATURATION: 95 % | HEART RATE: 100 BPM | BODY MASS INDEX: 24.07 KG/M2 | HEIGHT: 69 IN | TEMPERATURE: 99 F | DIASTOLIC BLOOD PRESSURE: 74 MMHG | WEIGHT: 162.5 LBS | SYSTOLIC BLOOD PRESSURE: 118 MMHG

## 2023-06-28 DIAGNOSIS — C22.0 HCC (HEPATOCELLULAR CARCINOMA) (HCC): Primary | Chronic | ICD-10-CM

## 2023-06-28 PROCEDURE — 99214 OFFICE O/P EST MOD 30 MIN: CPT | Performed by: SURGERY

## 2023-06-28 NOTE — PROGRESS NOTES
Surgical Oncology Follow Up       [address] [facility]  CANCER [facility] SURGICAL ONCOLOGY [place]  [address]   [place] [address]    Vernal Sill Blawn III  1955  [id number]  [address] [facility] SURGICAL ONCOLOGY [place]  [address]  [place] [address]    Diagnoses and all orders for this visit:    [address]  (hepatocellular carcinoma) ([facility] )  -     MRI abdomen w wo contrast; Future  -     AFP tumor marker; Future  -     BUN; Future  -     Creatinine, serum; Future        Chief Complaint   Patient presents with   • Follow-up       Return in about 3 months (around 9/28/2023) for Office Visit, Imaging - See orders, Labs - See Treatment Plan, with Loretta  Oncology History   Personal history of liver cancer   6/8/2020 Initial Diagnosis    Hepatocellular carcinoma, moderately differentiated     7/15/2020 Surgery    Microwave ablation of liver segment 5         Staging: HCC   Treatment history:  Ablation of segment 5 liver lesion, July 2020  Microwave ablation segment 5 lesion, September 2022  Microwave ablation of a right hepatic lobe lesion, March 2023  Current treatment: Observation  Disease status: ARLYN    History of Present Illness: Patient returns in follow-up of his [facility] [address]  He is doing well at this time with no complaints  He is gaining weight  His appetite is good  MRI of June 16, 2023 shows a segment 5 lesion is stable and not viable  The second lesion in segment 5 has decreased in size to 3 3 x 2 1 cm and is nonviable  A third lesion in segment 5/6 is also nonviable  I personally reviewed the films  Review of Systems  Complete ROS Surg Onc:   Complete ROS Surg Onc:   Constitutional: The patient denies new or recent history of general fatigue, no recent weight loss, no change in appetite  Eyes: No complaints of visual problems, no scleral icterus     ENT: no complaints of ear pain, no hoarseness, no difficulty swallowing,  no tinnitus and no new masses in head, oral cavity, or neck  Cardiovascular: No complaints of chest pain, no palpitations, no ankle edema  Respiratory: No complaints of shortness of breath, no cough  Gastrointestinal: No complaints of jaundice, no bloody stools, no pale stools  Genitourinary: No complaints of dysuria, no hematuria, no nocturia, no frequent urination, no urethral discharge  Musculoskeletal: No complaints of weakness, paralysis, joint stiffness or arthralgias  Integumentary: No complaints of rash, no new lesions  Neurological: No complaints of convulsions, no seizures, no dizziness  Hematologic/Lymphatic: No complaints of easy bruising  Endocrine:  No hot or cold intolerance  No polydipsia, polyphagia, or polyuria  Allergy/immunology:  No environmental allergies  No food allergies  Not immunocompromised  Skin:  No pallor or rash  No wound          Patient Active Problem List   Diagnosis   • Essential hypertension   • Emphysema/COPD ([facility] )   • Crohn disease ([facility] )   • Acute kidney injury ([place] [address] )   • Colostomy in place [facility])   • Diarrhea   • Severe protein-calorie malnutrition ([facility] )   • Arthropathy of right wrist   • Emphysema of lung ([facility] )   • Syncope and collapse   • Severe sepsis (HCC)   • Pneumonia   • Chest pain   • Liver mass   • Tobacco abuse   • Abdominal pain   • Personal history of liver cancer   • Palliative care patient   • Abdominal wall abscess   • Observed sleep apnea   • Acute pain of left wrist   • Chronic, continuous use of opioids   • Hypocalcemia   • Left wrist pain   • Kidney stone   • Gastroesophageal reflux disease without esophagitis   • Chronic obstructive pulmonary disease (HCC)   • Mixed hyperlipidemia   • Vitamin B12 deficiency   • Cataract   • Dysthymic disorder   • Crohn's disease of small intestine with other complication ([facility] )   • Encounter for follow-up surveillance of liver cancer   • HCC (hepatocellular carcinoma) ([facility] )   • Platelets decreased ([facility] )   • History of alcohol use disorder   • Supraventricular tachycardia (HCC)   • Anemia   • Subclinical hypothyroidism   • Portal hypertension (HCC)   • Multiple subsegmental pulmonary emboli without acute cor pulmonale (HCC)   • Alcohol dependence, uncomplicated (HCC)     Past Medical History:   Diagnosis Date   • LUCILLE (acute kidney injury) ([facility] [address] ) 6/28/2017   • Blindness of left eye     Since birth   • Cancer [facility])     liver   • Cataract    • Colon polyp    • Colostomy in place [facility]) 6/29/2017   • COPD (chronic obstructive pulmonary disease) (HCC)    • Crohn disease (HCC)    • Emphysema of lung (HCC)    • Emphysema/COPD ([facility] )    • Essential hypertension    • GERD (gastroesophageal reflux disease)    • Hypertension    • Hypokalemia 6/28/2017   • Hypomagnesemia 6/29/2017   • Hyponatremia 6/28/2017   • Kidney stone    • Osteomyelitis ([facility] )    • Pneumonia      Past Surgical History:   Procedure Laterality Date   • APPENDECTOMY  1979   • CATARACT EXTRACTION Bilateral    • CHOLECYSTECTOMY     • COLECTOMY      10 inchs of ileum   • COLONOSCOPY N/A 06/30/2017    Procedure: COLONOSCOPY;  Surgeon: Bishop Schaumann, MD;  [facility]: [facility];   Service: Gastroenterology   • COLOSTOMY     • FINGER AMPUTATION     • FINGER SURGERY Left    • HERNIA REPAIR  1978   • IR BIOPSY LIVER MASS  06/08/2020   • IR MICROWAVE ABLATION  09/21/2022   • IR MICROWAVE ABLATION  03/16/2023   • LITHOTRIPSY     • LIVER LOBECTOMY N/A 07/15/2020    Procedure: LIVER ABLATION, INTRAOPERATIVE U/S LIVER;  Surgeon: Viry Pal MD;  Location: [facility];  Service: Surgical Oncology   • TONSILLECTOMY  Child     Family History   Problem Relation Age of Onset   • Hypertension Father    • Heart disease Sister      Social History     Socioeconomic History   • Marital status:      Spouse name: Not on file   • Number of children: Not on file   • Years of education: Not on file   • Highest education level: Not on file   Occupational History   • Not on file   Tobacco Use   • Smoking status: Former     Packs/day: 1 50     Years: 50 00     Total pack years: 75 00     Types: Cigarettes     Start date:      Quit date: 2022     Years since quittin 4   • Smokeless tobacco: Never   Vaping Use   • Vaping Use: Never used   Substance and Sexual Activity   • Alcohol use: Not Currently     Alcohol/week: 1 0 standard drink of alcohol     Types: 1 Cans of beer per week   • Drug use: No   • Sexual activity: Not Currently     Partners: Female     Birth control/protection: Condom Male   Other Topics Concern   • Not on file   Social History Narrative   • Not on file     Social Determinants of Health     Financial Resource Strain: Low Risk  (2022)    Overall Financial Resource Strain (CARDIA)    • Difficulty of Paying Living Expenses: Not hard at all   Food Insecurity: No Food Insecurity (2023)    Hunger Vital Sign    • Worried About Running Out of Food in the Last Year: Never true    • Ran Out of Food in the Last Year: Never true   Transportation Needs: No Transportation Needs (2023)    PRAPARE - Transportation    • Lack of Transportation (Medical): No    • Lack of Transportation (Non-Medical):  No   Physical Activity: Not on file   Stress: Not on file   Social Connections: Not on file   Intimate Partner Violence: Not on file   Housing Stability: Low Risk  (2023)    Housing Stability Vital Sign    • Unable to Pay for Housing in the Last Year: No    • Number of Places Lived in the Last Year: 1    • Unstable Housing in the Last Year: No       Current Outpatient Medications:   •  AMILoride 5 mg tablet, Take 1 tablet (5 mg total) by mouth daily, Disp: 90 tablet, Rfl: 0  •  amLODIPine (NORVASC) 5 mg tablet, Take 1 tablet (5 mg total) by mouth daily, Disp: 90 tablet, Rfl: 0  •  buPROPion (WELLBUTRIN XL) 150 mg 24 hr tablet, Take 1 tablet (150 mg total) by mouth daily, Disp: 90 tablet, Rfl: 0  •  calcium carbonate (TUMS) 500 mg chewable tablet, Chew 1 tablet (500 mg total) daily, Disp:  , Rfl: 0  •  carvedilol (COREG) 3 125 mg tablet, Take 1 tablet (3 125 mg total) by mouth 2 (two) times a day with meals, Disp: 180 tablet, Rfl: 1  •  cholecalciferol (VITAMIN D3) 400 units tablet, Take 400 Units by mouth Every Sunday, Disp: , Rfl:   •  [employer] Magnesium Oxide 250 MG TABS, TAKE 1 TABLET (250 MG TOTAL) BY MOUTH IN THE MORNING, Disp: , Rfl:   •  fluticasone (FLONASE) 50 mcg/act nasal spray, SPRAY 2 SPRAYS INTO EACH NOSTRIL EVERY DAY, Disp: 48 mL, Rfl: 2  •  folic acid (FOLVITE) 1 mg tablet, Take 1 tablet (1 mg total) by mouth daily, Disp: 90 tablet, Rfl: 1  •  levothyroxine 50 mcg tablet, Take 1 tablet (50 mcg total) by mouth daily in the early morning, Disp: 90 tablet, Rfl: 1  •  Magnesium Oxide 250 MG TABS, Take 1 tablet (250 mg total) by mouth in the morning, Disp: 90 tablet, Rfl: 3  •  mirtazapine (REMERON) 15 mg tablet, Take 1 tablet (15 mg total) by mouth daily at bedtime, Disp: 90 tablet, Rfl: 1  •  omeprazole (PriLOSEC) 20 mg delayed release capsule, Take 1 capsule (20 mg total) by mouth daily, Disp: 90 capsule, Rfl: 1  •  oxyCODONE (Roxicodone) 5 immediate release tablet, Take 1 tablet (5 mg total) by mouth every 6 (six) hours as needed for moderate pain for up to 21 doses Max Daily Amount: 20 mg, Disp: 21 tablet, Rfl: 0  •  potassium chloride (MICRO-K) 10 MEQ CR capsule, Take 2 capsules (20 mEq total) by mouth 2 (two) times a day, Disp: 180 capsule, Rfl: 1  •  rosuvastatin (CRESTOR) 10 MG tablet, Take 1 tablet (10 mg total) by mouth daily, Disp: 90 tablet, Rfl: 0  •  VIT B12-METHIONINE-INOS-CHOL IM, Inject into a muscle every 30 (thirty) days, Disp: , Rfl:   •  ALPRAZolam (XANAX) 0 25 mg tablet, Take 1 tablet (0 25 mg total) by mouth daily at bedtime as needed for anxiety, Disp: 90 tablet, Rfl: 0  •  naproxen (Naprosyn) 500 mg tablet, Take 1 tablet (500 mg total) by mouth 2 (two) times a day with meals, Disp: 30 tablet, Rfl: 0  •  thiamine 250 MG tablet, Take 1 tablet (250 mg total) by mouth daily for 5 days Do not start before November 14, 2022  (Patient not taking: Reported on 2/8/2023), Disp: 5 tablet, Rfl: 0  •  umeclidinium-vilanterol (Anoro Ellipta) 62 5-25 mcg/actuation inhaler, Inhale 1 puff daily, Disp: 180 blister, Rfl: 0    Current Facility-Administered Medications:   •  cyanocobalamin injection 1,000 mcg, 1,000 mcg, Intramuscular, Q30 Days, Torri Coleman MD, 1,000 mcg at 06/07/23 0944  •  cyanocobalamin injection 1,000 mcg, 1,000 mcg, Intramuscular, Q30 Days, Torri Coleman MD, 1,000 mcg at 05/10/23 1326  No Known Allergies  Vitals:    06/28/23 0936   BP: 118/74   Pulse: 100   Temp: 99 °F (37 2 °C)   SpO2: 95%       Physical Exam  Constitutional: General appearance: The Patient is well-developed and well-nourished who appears the stated age in no acute distress  Patient is pleasant and talkative  HEENT:  Normocephalic  Sclerae are anicteric  Mucous membranes are moist  Neck is supple without adenopathy  No JVD  Chest: The lungs are clear to auscultation  Cardiac: Heart is regular rate  Abdomen: Abdomen is soft, non-tender, non-distended and without masses  Extremities: There is no clubbing or cyanosis  There is no edema  Symmetric  Neuro: Grossly nonfocal  Gait is normal      Lymphatic: No evidence of cervical adenopathy bilaterally  No evidence of axillary adenopathy bilaterally  No evidence of inguinal adenopathy bilaterally  Skin: Warm, anicteric  Psych:  Patient is pleasant and talkative  Breasts:        Pathology:  [unfilled]    Labs:      Imaging  MRI abdomen w wo contrast    Result Date: 6/21/2023  Narrative: MRI - ABDOMEN - WITH AND WITHOUT CONTRAST INDICATION: 79 years / Male  C22 0: Liver cell carcinoma  COMPARISON: TECHNIQUE:  Multiplanar/multisequence MRI of the abdomen was performed before and after administration of contrast  IV Contrast:  7 mL of Gadobutrol injection (SINGLE-DOSE) FINDINGS: LOWER CHEST:   Unremarkable   LIVER: Normal in size and configuration  Observation: 1 Treatment zone  Size: 4 1 x 3 1 cm #11/44 remains unchanged  Location: Segment 5 Enhancement: No lesional enhancement  LI-RADS Category: LR- TR Nonviable  Observation: 2 Treatment zone  Size: 2 5 x 1 3 cm, #11/52 decreased in size from 3 3 x 2 1 cm on the 12/2/2022 study  Location: Segment 5  Enhancement: No lesional enhancement  LI-RADS Category: LR- TR Nonviable  Observation: 3 Treatment zone  Size: Treatment zone dimensions of 3 3 x 1 6 cm with thin rim of peripheral treatment related enhancement pattern  Pretreatment LR 5 observation measuring 13 x 9 mm  LI-RADS Category: LR- TR Nonviable  No new observations  The hepatic veins and portal veins are patent  BILE DUCTS: Mild intrahepatic ductal dilation segment 5 unchanged from prior studies  GALLBLADDER: Status post cholecystectomy  PANCREAS:  Unremarkable  ADRENAL GLANDS:  Normal  SPLEEN:  Normal  KIDNEYS/PROXIMAL URETERS: No hydroureteronephrosis  No suspicious renal mass  BOWEL:   No dilated loops of bowel  PERITONEUM/RETROPERITONEUM: No mass  No ascites  LYMPH NODES: No abdominal lymphadenopathy  VASCULAR STRUCTURES:  No aneurysm  ABDOMINAL WALL:  Unremarkable  OSSEOUS STRUCTURES:  No suspicious osseous lesion  Impression: Reidentified stable segment 5 treatment zones  LR TR nonviable  New segment 5/6 treatment zone LR TR nonviable  No new observation  Workstation performed: [id number]     I reviewed the above laboratory and imaging data  Discussion/Summary: 79year-old male with [address]  in a non cirrhotic liver   His Child score is B   His MELD score is 9   His hepatitis C antibody is nonreactive   He underwent ablation of the segment 5 liver lesion    The second lesion that was seen was percutaneously ablated  He has since undergone a repeat ablation of the segment 5/6 lesion in March 2023  All of these areas are nonviable  From my standpoint he is doing well    I will repeat his MRI and AFP level in 3 months  He will be due for a follow-up chest CT in the next 5 to 6 months  We will order this at his next visit  He will continue to abstain from alcohol  I will see him again in 3 months    He is agreeable to this plan   All his questions were answered

## 2023-06-28 NOTE — LETTER
June 28, 2023     Natalia Marcus MD  [address]  [address]  [address] [place] [id number]    Patient: Rola Sotelo   YOB: 1955   Date of Visit: 6/28/2023       Dear Dr Patel Gave: Thank you for referring Isaias Alexander to me for evaluation  Below are my notes for this consultation  If you have questions, please do not hesitate to call me  I look forward to following your patient along with you  Sincerely,        Petra Clay MD        CC: No Recipients    Petra Clay MD  6/28/2023  9:51 AM  Incomplete               Surgical Oncology Follow Up       [address] [facility] SURGICAL ONCOLOGY [place]  [address]  [facility] [place] [address]    Mary Elkins III  1955  [id number]  [address] [facility] SURGICAL ONCOLOGY [place]  [address] [facility]  [place] [address]    Diagnoses and all orders for this visit:    [facility] [address]  (hepatocellular carcinoma) ([facility] [address] )  -     MRI abdomen w wo contrast; Future  -     AFP tumor marker; Future  -     BUN; Future  -     Creatinine, serum; Future        Chief Complaint   Patient presents with   • Follow-up       Return in about 3 months (around 9/28/2023) for Office Visit, Imaging - See orders, Labs - See Treatment Plan, with Loretta  Oncology History   Personal history of liver cancer   6/8/2020 Initial Diagnosis    Hepatocellular carcinoma, moderately differentiated     7/15/2020 Surgery    Microwave ablation of liver segment 5         Staging: [facility] [address]    Treatment history:  Ablation of segment 5 liver lesion, July 2020  Microwave ablation segment 5 lesion, September 2022  Microwave ablation of a right hepatic lobe lesion, March 2023  Current treatment: Observation  Disease status: ARLYN    History of Present Illness: Patient returns in follow-up of his [facility] [address]  He is doing well at this time with no complaints  He is gaining weight  His appetite is good    MRI of June 16, 2023 shows a segment 5 lesion is stable and not viable  The second lesion in segment 5 has decreased in size to 3 3 x 2 1 cm and is nonviable  A third lesion in segment 5/6 is also nonviable  I personally reviewed the films  Review of Systems  Complete ROS Surg Onc:   Complete ROS Surg Onc:   Constitutional: The patient denies new or recent history of general fatigue, no recent weight loss, no change in appetite  Eyes: No complaints of visual problems, no scleral icterus  ENT: no complaints of ear pain, no hoarseness, no difficulty swallowing,  no tinnitus and no new masses in head, oral cavity, or neck  Cardiovascular: No complaints of chest pain, no palpitations, no ankle edema  Respiratory: No complaints of shortness of breath, no cough  Gastrointestinal: No complaints of jaundice, no bloody stools, no pale stools  Genitourinary: No complaints of dysuria, no hematuria, no nocturia, no frequent urination, no urethral discharge  Musculoskeletal: No complaints of weakness, paralysis, joint stiffness or arthralgias  Integumentary: No complaints of rash, no new lesions  Neurological: No complaints of convulsions, no seizures, no dizziness  Hematologic/Lymphatic: No complaints of easy bruising  Endocrine:  No hot or cold intolerance  No polydipsia, polyphagia, or polyuria  Allergy/immunology:  No environmental allergies  No food allergies  Not immunocompromised  Skin:  No pallor or rash  No wound          Patient Active Problem List   Diagnosis   • Essential hypertension   • Emphysema/COPD ([address] )   • Crohn disease ([address] )   • Acute kidney injury ([facility] [address] )   • Colostomy in place [facility])   • Diarrhea   • Severe protein-calorie malnutrition ([address] )   • Arthropathy of right wrist   • Emphysema of lung ([address] )   • Syncope and collapse   • Severe sepsis (HCC)   • Pneumonia   • Chest pain   • Liver mass   • Tobacco abuse   • Abdominal pain   • Personal history of liver cancer   • Palliative care patient   • Abdominal wall abscess   • Observed sleep apnea   • Acute pain of left wrist   • Chronic, continuous use of opioids   • Hypocalcemia   • Left wrist pain   • Kidney stone   • Gastroesophageal reflux disease without esophagitis   • Chronic obstructive pulmonary disease (HCC)   • Mixed hyperlipidemia   • Vitamin B12 deficiency   • Cataract   • Dysthymic disorder   • Crohn's disease of small intestine with other complication ([place] [address] )   • Encounter for follow-up surveillance of liver cancer   • HCC (hepatocellular carcinoma) ([place] [address] )   • Platelets decreased ([place] [address] )   • History of alcohol use disorder   • Supraventricular tachycardia ([place] [address] )   • Anemia   • Subclinical hypothyroidism   • Portal hypertension ([place] [address] )   • Multiple subsegmental pulmonary emboli without acute cor pulmonale (HCC)   • Alcohol dependence, uncomplicated (HCC)     Past Medical History:   Diagnosis Date   • LUCILLE (acute kidney injury) ([place] [address] ) 6/28/2017   • Blindness of left eye     Since birth   • Cancer [facility])     liver   • Cataract    • Colon polyp    • Colostomy in place [facility]) 6/29/2017   • COPD (chronic obstructive pulmonary disease) (HCC)    • Crohn disease ([place] [address] )    • Emphysema of lung ([place] [address] )    • Emphysema/COPD ([place] [address] )    • Essential hypertension    • GERD (gastroesophageal reflux disease)    • Hypertension    • Hypokalemia 6/28/2017   • Hypomagnesemia 6/29/2017   • Hyponatremia 6/28/2017   • Kidney stone    • Osteomyelitis ([place] [address] )    • Pneumonia      Past Surgical History:   Procedure Laterality Date   • APPENDECTOMY  1979   • CATARACT EXTRACTION Bilateral    • CHOLECYSTECTOMY     • COLECTOMY      10 inchs of ileum   • COLONOSCOPY N/A 06/30/2017    Procedure: COLONOSCOPY;  Surgeon: Adele Guzman MD;  [facility];   Service: Gastroenterology   • COLOSTOMY     • FINGER AMPUTATION     • FINGER SURGERY Left    • HERNIA REPAIR  1978   • IR BIOPSY LIVER MASS  06/08/2020   • IR MICROWAVE ABLATION  09/21/2022   • IR MICROWAVE ABLATION  03/16/2023   • LITHOTRIPSY     • LIVER LOBECTOMY N/A 07/15/2020 Procedure: LIVER ABLATION, INTRAOPERATIVE U/S LIVER;  Surgeon: Hilton Dave MD;  [facility];  Service: Surgical Oncology   • TONSILLECTOMY  Child     Family History   Problem Relation Age of Onset   • Hypertension Father    • Heart disease Sister      Social History     Socioeconomic History   • Marital status:      Spouse name: Not on file   • Number of children: Not on file   • Years of education: Not on file   • Highest education level: Not on file   Occupational History   • Not on file   Tobacco Use   • Smoking status: Former     Packs/day: 1 50     Years: 50 00     Total pack years: 75 00     Types: Cigarettes     Start date:      Quit date: 2022     Years since quittin 4   • Smokeless tobacco: Never   Vaping Use   • Vaping Use: Never used   Substance and Sexual Activity   • Alcohol use: Not Currently     Alcohol/week: 1 0 standard drink of alcohol     Types: 1 Cans of beer per week   • Drug use: No   • Sexual activity: Not Currently     Partners: Female     Birth control/protection: Condom Male   Other Topics Concern   • Not on file   Social History Narrative   • Not on file     Social Determinants of Health     Financial Resource Strain: Low Risk  (2022)    Overall Financial Resource Strain (CARDIA)    • Difficulty of Paying Living Expenses: Not hard at all   Food Insecurity: No Food Insecurity (2023)    Hunger Vital Sign    • Worried About Running Out of Food in the Last Year: Never true    • Ran Out of Food in the Last Year: Never true   Transportation Needs: No Transportation Needs (2023)    PRAPARE - Transportation    • Lack of Transportation (Medical): No    • Lack of Transportation (Non-Medical):  No   Physical Activity: Not on file   Stress: Not on file   Social Connections: Not on file   Intimate Partner Violence: Not on file   Housing Stability: Low Risk  (2023)    Housing Stability Vital Sign    • Unable to Pay for Housing in the Last Year: No    • Number of Places Lived in the Last Year: 1    • Unstable Housing in the Last Year: No       Current Outpatient Medications:   •  AMILoride 5 mg tablet, Take 1 tablet (5 mg total) by mouth daily, Disp: 90 tablet, Rfl: 0  •  amLODIPine (NORVASC) 5 mg tablet, Take 1 tablet (5 mg total) by mouth daily, Disp: 90 tablet, Rfl: 0  •  buPROPion (WELLBUTRIN XL) 150 mg 24 hr tablet, Take 1 tablet (150 mg total) by mouth daily, Disp: 90 tablet, Rfl: 0  •  calcium carbonate (TUMS) 500 mg chewable tablet, Chew 1 tablet (500 mg total) daily, Disp:  , Rfl: 0  •  carvedilol (COREG) 3 125 mg tablet, Take 1 tablet (3 125 mg total) by mouth 2 (two) times a day with meals, Disp: 180 tablet, Rfl: 1  •  cholecalciferol (VITAMIN D3) 400 units tablet, Take 400 Units by mouth Every Sunday, Disp: , Rfl:   •  [employer] Magnesium Oxide 250 MG TABS, TAKE 1 TABLET (250 MG TOTAL) BY MOUTH IN THE MORNING, Disp: , Rfl:   •  fluticasone (FLONASE) 50 mcg/act nasal spray, SPRAY 2 SPRAYS INTO EACH NOSTRIL EVERY DAY, Disp: 48 mL, Rfl: 2  •  folic acid (FOLVITE) 1 mg tablet, Take 1 tablet (1 mg total) by mouth daily, Disp: 90 tablet, Rfl: 1  •  levothyroxine 50 mcg tablet, Take 1 tablet (50 mcg total) by mouth daily in the early morning, Disp: 90 tablet, Rfl: 1  •  Magnesium Oxide 250 MG TABS, Take 1 tablet (250 mg total) by mouth in the morning, Disp: 90 tablet, Rfl: 3  •  mirtazapine (REMERON) 15 mg tablet, Take 1 tablet (15 mg total) by mouth daily at bedtime, Disp: 90 tablet, Rfl: 1  •  omeprazole (PriLOSEC) 20 mg delayed release capsule, Take 1 capsule (20 mg total) by mouth daily, Disp: 90 capsule, Rfl: 1  •  oxyCODONE (Roxicodone) 5 immediate release tablet, Take 1 tablet (5 mg total) by mouth every 6 (six) hours as needed for moderate pain for up to 21 doses Max Daily Amount: 20 mg, Disp: 21 tablet, Rfl: 0  •  potassium chloride (MICRO-K) 10 MEQ CR capsule, Take 2 capsules (20 mEq total) by mouth 2 (two) times a day, Disp: 180 capsule, Rfl: 1  • rosuvastatin (CRESTOR) 10 MG tablet, Take 1 tablet (10 mg total) by mouth daily, Disp: 90 tablet, Rfl: 0  •  VIT B12-METHIONINE-INOS-CHOL IM, Inject into a muscle every 30 (thirty) days, Disp: , Rfl:   •  ALPRAZolam (XANAX) 0 25 mg tablet, Take 1 tablet (0 25 mg total) by mouth daily at bedtime as needed for anxiety, Disp: 90 tablet, Rfl: 0  •  naproxen (Naprosyn) 500 mg tablet, Take 1 tablet (500 mg total) by mouth 2 (two) times a day with meals, Disp: 30 tablet, Rfl: 0  •  thiamine 250 MG tablet, Take 1 tablet (250 mg total) by mouth daily for 5 days Do not start before November 14, 2022  (Patient not taking: Reported on 2/8/2023), Disp: 5 tablet, Rfl: 0  •  umeclidinium-vilanterol (Anoro Ellipta) 62 5-25 mcg/actuation inhaler, Inhale 1 puff daily, Disp: 180 blister, Rfl: 0    Current Facility-Administered Medications:   •  cyanocobalamin injection 1,000 mcg, 1,000 mcg, Intramuscular, Q30 Days, Torri Coleman MD, 1,000 mcg at 06/07/23 0944  •  cyanocobalamin injection 1,000 mcg, 1,000 mcg, Intramuscular, Q30 Days, Torri Coleman MD, 1,000 mcg at 05/10/23 1326  No Known Allergies  Vitals:    06/28/23 0936   BP: 118/74   Pulse: 100   Temp: 99 °F (37 2 °C)   SpO2: 95%       Physical Exam  Constitutional: General appearance: The Patient is well-developed and well-nourished who appears the stated age in no acute distress  Patient is pleasant and talkative  HEENT:  Normocephalic  Sclerae are anicteric  Mucous membranes are moist  Neck is supple without adenopathy  No JVD  Chest: The lungs are clear to auscultation  Cardiac: Heart is regular rate  Abdomen: Abdomen is soft, non-tender, non-distended and without masses  Extremities: There is no clubbing or cyanosis  There is no edema  Symmetric  Neuro: Grossly nonfocal  Gait is normal      Lymphatic: No evidence of cervical adenopathy bilaterally  No evidence of axillary adenopathy bilaterally  No evidence of inguinal adenopathy bilaterally  Skin: Warm, anicteric  Psych:  Patient is pleasant and talkative  Breasts:        Pathology:  [unfilled]    Labs:      Imaging  MRI abdomen w wo contrast    Result Date: 6/21/2023  Narrative: MRI - ABDOMEN - WITH AND WITHOUT CONTRAST INDICATION: 79 years / Male  C22 0: Liver cell carcinoma  COMPARISON: TECHNIQUE:  Multiplanar/multisequence MRI of the abdomen was performed before and after administration of contrast  IV Contrast:  7 mL of Gadobutrol injection (SINGLE-DOSE) FINDINGS: LOWER CHEST:   Unremarkable  LIVER: Normal in size and configuration  Observation: 1 Treatment zone  Size: 4 1 x 3 1 cm #11/44 remains unchanged  Location: Segment 5 Enhancement: No lesional enhancement  LI-RADS Category: LR- TR Nonviable  Observation: 2 Treatment zone  Size: 2 5 x 1 3 cm, #11/52 decreased in size from 3 3 x 2 1 cm on the 12/2/2022 study  Location: Segment 5  Enhancement: No lesional enhancement  LI-RADS Category: LR- TR Nonviable  Observation: 3 Treatment zone  Size: Treatment zone dimensions of 3 3 x 1 6 cm with thin rim of peripheral treatment related enhancement pattern  Pretreatment LR 5 observation measuring 13 x 9 mm  LI-RADS Category: LR- TR Nonviable  No new observations  The hepatic veins and portal veins are patent  BILE DUCTS: Mild intrahepatic ductal dilation segment 5 unchanged from prior studies  GALLBLADDER: Status post cholecystectomy  PANCREAS:  Unremarkable  ADRENAL GLANDS:  Normal  SPLEEN:  Normal  KIDNEYS/PROXIMAL URETERS: No hydroureteronephrosis  No suspicious renal mass  BOWEL:   No dilated loops of bowel  PERITONEUM/RETROPERITONEUM: No mass  No ascites  LYMPH NODES: No abdominal lymphadenopathy  VASCULAR STRUCTURES:  No aneurysm  ABDOMINAL WALL:  Unremarkable  OSSEOUS STRUCTURES:  No suspicious osseous lesion  Impression: Reidentified stable segment 5 treatment zones  LR TR nonviable  New segment 5/6 treatment zone LR TR nonviable  No new observation   Workstation performed: [id number]     I reviewed the above laboratory and imaging data  Discussion/Summary: 79year-old male with [address]  in a non cirrhotic liver  His Child score is B  His MELD score is 9  His hepatitis C antibody is nonreactive  He underwent ablation of the segment 5 liver lesion  The second lesion that was seen was percutaneously ablated  He has since undergone a repeat ablation of the segment 5/6 lesion in March 2023  All of these areas are nonviable  From my standpoint he is doing well  I will repeat his MRI and AFP level in 3 months  He will be due for a follow-up chest CT in the next 5 to 6 months  We will order this at his next visit  He will continue to abstain from alcohol  I will see him again in 3 months  He is agreeable to this plan  All his questions were answered

## 2023-07-02 ENCOUNTER — ANESTHESIA EVENT (OUTPATIENT)
Dept: ANESTHESIOLOGY | Facility: HOSPITAL | Age: 68
End: 2023-07-02

## 2023-07-02 ENCOUNTER — ANESTHESIA (OUTPATIENT)
Dept: ANESTHESIOLOGY | Facility: HOSPITAL | Age: 68
End: 2023-07-02

## 2023-07-02 NOTE — ANESTHESIA PREPROCEDURE EVALUATION
Procedure:  PRE-OP ONLY    Relevant Problems   CARDIO   (+) Chest pain   (+) Essential hypertension   (+) Mixed hyperlipidemia   (+) Supraventricular tachycardia (HCC)      ENDO   (+) Subclinical hypothyroidism      GI/HEPATIC   (+) Gastroesophageal reflux disease without esophagitis   (+) HCC (hepatocellular carcinoma) (HCC)      /RENAL   (+) Acute kidney injury ([address])   (+) Kidney stone      HEMATOLOGY   (+) Anemia      NEURO/PSYCH   (+) Chronic, continuous use of opioids   (+) Dysthymic disorder      PULMONARY   (+) Chronic obstructive pulmonary disease (HCC)   (+) Emphysema of lung (HCC)   (+) Emphysema/COPD (HCC)   (+) Observed sleep apnea   (+) Pneumonia      •  Left Ventricle: Left ventricular cavity size is normal. Wall thickness is normal. The left ventricular ejection fraction is 60%. Systolic function is normal. Wall motion is normal. Diastolic function is normal for age. •  Right Ventricle: Right ventricular cavity size is normal. Systolic function is normal.  Physical Exam    Airway    Mallampati score: II  TM Distance: >3 FB  Neck ROM: full     Dental   No notable dental hx     Cardiovascular  Cardiovascular exam normal    Pulmonary  Pulmonary exam normal     Other Findings        Anesthesia Plan  ASA Score- 4     Anesthesia Type- IV sedation with anesthesia with ASA Monitors. Additional Monitors:   Airway Plan:           Plan Factors-Exercise tolerance (METS): >4 METS. Chart reviewed. EKG reviewed. Imaging results reviewed. Existing labs reviewed. Patient summary reviewed. Patient is not a current smoker. Patient not instructed to abstain from smoking on day of procedure. Patient did not smoke on day of surgery. Obstructive sleep apnea risk education given perioperatively. Induction- intravenous. Postoperative Plan-     Informed Consent- Anesthetic plan and risks discussed with patient. I personally reviewed this patient with the CRNA.  Discussed and agreed on the Anesthesia Plan with the CRNA. Radha Castillo

## 2023-07-03 ENCOUNTER — ANESTHESIA EVENT (OUTPATIENT)
Dept: GASTROENTEROLOGY | Facility: AMBULARY SURGERY CENTER | Age: 68
End: 2023-07-03

## 2023-07-03 ENCOUNTER — ANESTHESIA (OUTPATIENT)
Dept: GASTROENTEROLOGY | Facility: AMBULARY SURGERY CENTER | Age: 68
End: 2023-07-03

## 2023-07-03 ENCOUNTER — HOSPITAL ENCOUNTER (OUTPATIENT)
Dept: GASTROENTEROLOGY | Facility: AMBULARY SURGERY CENTER | Age: 68
Setting detail: OUTPATIENT SURGERY
Discharge: HOME | End: 2023-07-03
Attending: INTERNAL MEDICINE | Admitting: INTERNAL MEDICINE
Payer: MEDICARE

## 2023-07-03 VITALS
SYSTOLIC BLOOD PRESSURE: 134 MMHG | OXYGEN SATURATION: 97 % | HEART RATE: 76 BPM | TEMPERATURE: 97.3 F | DIASTOLIC BLOOD PRESSURE: 64 MMHG | RESPIRATION RATE: 18 BRPM | WEIGHT: 156.4 LBS | HEIGHT: 69 IN | BODY MASS INDEX: 23.16 KG/M2

## 2023-07-03 DIAGNOSIS — K50.018 CROHN'S DISEASE OF SMALL INTESTINE WITH OTHER COMPLICATION (HCC): ICD-10-CM

## 2023-07-03 PROCEDURE — 45385 COLONOSCOPY W/LESION REMOVAL: CPT | Performed by: INTERNAL MEDICINE

## 2023-07-03 PROCEDURE — 88305 TISSUE EXAM BY PATHOLOGIST: CPT | Performed by: PATHOLOGY

## 2023-07-03 RX ORDER — PROPOFOL 10 MG/ML
INJECTION, EMULSION INTRAVENOUS AS NEEDED
Status: DISCONTINUED | OUTPATIENT
Start: 2023-07-03 | End: 2023-07-03

## 2023-07-03 RX ORDER — SODIUM CHLORIDE, SODIUM LACTATE, POTASSIUM CHLORIDE, CALCIUM CHLORIDE 600; 310; 30; 20 MG/100ML; MG/100ML; MG/100ML; MG/100ML
125 INJECTION, SOLUTION INTRAVENOUS CONTINUOUS
Status: DISCONTINUED | OUTPATIENT
Start: 2023-07-03 | End: 2023-07-07 | Stop reason: HOSPADM

## 2023-07-03 RX ORDER — LIDOCAINE HYDROCHLORIDE 10 MG/ML
INJECTION, SOLUTION EPIDURAL; INFILTRATION; INTRACAUDAL; PERINEURAL AS NEEDED
Status: DISCONTINUED | OUTPATIENT
Start: 2023-07-03 | End: 2023-07-03

## 2023-07-03 RX ORDER — SODIUM CHLORIDE, SODIUM LACTATE, POTASSIUM CHLORIDE, CALCIUM CHLORIDE 600; 310; 30; 20 MG/100ML; MG/100ML; MG/100ML; MG/100ML
125 INJECTION, SOLUTION INTRAVENOUS CONTINUOUS
Status: CANCELLED | OUTPATIENT
Start: 2023-07-03

## 2023-07-03 RX ADMIN — PROPOFOL 40 MG: 10 INJECTION, EMULSION INTRAVENOUS at 13:54

## 2023-07-03 RX ADMIN — PROPOFOL 50 MG: 10 INJECTION, EMULSION INTRAVENOUS at 13:59

## 2023-07-03 RX ADMIN — PROPOFOL 50 MG: 10 INJECTION, EMULSION INTRAVENOUS at 13:50

## 2023-07-03 RX ADMIN — PROPOFOL 40 MG: 10 INJECTION, EMULSION INTRAVENOUS at 14:14

## 2023-07-03 RX ADMIN — PROPOFOL 50 MG: 10 INJECTION, EMULSION INTRAVENOUS at 13:47

## 2023-07-03 RX ADMIN — PROPOFOL 100 MG: 10 INJECTION, EMULSION INTRAVENOUS at 13:44

## 2023-07-03 RX ADMIN — LIDOCAINE HYDROCHLORIDE 50 MG: 10 INJECTION, SOLUTION EPIDURAL; INFILTRATION; INTRACAUDAL; PERINEURAL at 13:44

## 2023-07-03 RX ADMIN — PROPOFOL 50 MG: 10 INJECTION, EMULSION INTRAVENOUS at 14:08

## 2023-07-03 RX ADMIN — PROPOFOL 60 MG: 10 INJECTION, EMULSION INTRAVENOUS at 14:03

## 2023-07-03 RX ADMIN — SODIUM CHLORIDE, SODIUM LACTATE, POTASSIUM CHLORIDE, AND CALCIUM CHLORIDE: .6; .31; .03; .02 INJECTION, SOLUTION INTRAVENOUS at 13:31

## 2023-07-03 NOTE — ANESTHESIA POSTPROCEDURE EVALUATION
Post-Op Assessment Note    CV Status:  Stable    Pain management: adequate     Mental Status:  Alert and awake   Hydration Status:  Euvolemic   PONV Controlled:  Controlled   Airway Patency:  Patent      Post Op Vitals Reviewed: Yes      Staff: CRNA         No notable events documented.     /65 (07/03/23 1434)    Temp (!) 97.3 °F (36.3 °C) (07/03/23 1434)    Pulse 84 (07/03/23 1434)   Resp 20 (07/03/23 1434)    SpO2 94 % (07/03/23 1434)

## 2023-07-03 NOTE — ANESTHESIA PREPROCEDURE EVALUATION
Procedure:  COLONOSCOPY    Relevant Problems   CARDIO   (+) Chest pain   (+) Essential hypertension   (+) Mixed hyperlipidemia   (+) Supraventricular tachycardia (HCC)      ENDO   (+) Subclinical hypothyroidism      GI/HEPATIC   (+) Gastroesophageal reflux disease without esophagitis   (+) HCC (hepatocellular carcinoma) (HCC)      /RENAL   (+) Acute kidney injury ([address])   (+) Kidney stone      HEMATOLOGY   (+) Anemia      NEURO/PSYCH   (+) Chronic, continuous use of opioids   (+) Dysthymic disorder      PULMONARY   (+) Chronic obstructive pulmonary disease (HCC)   (+) Emphysema of lung (HCC)   (+) Emphysema/COPD (HCC)   (+) Observed sleep apnea   (+) Pneumonia      •  Left Ventricle: Left ventricular cavity size is normal. Wall thickness is normal. The left ventricular ejection fraction is 60%. Systolic function is normal. Wall motion is normal. Diastolic function is normal for age. •  Right Ventricle: Right ventricular cavity size is normal. Systolic function is normal.  Physical Exam    Airway    Mallampati score: II  TM Distance: >3 FB  Neck ROM: full     Dental   No notable dental hx     Cardiovascular  Cardiovascular exam normal    Pulmonary  Pulmonary exam normal     Other Findings        Anesthesia Plan  ASA Score- 4     Anesthesia Type- IV sedation with anesthesia with ASA Monitors. Additional Monitors:   Airway Plan:           Plan Factors-Exercise tolerance (METS): >4 METS. Chart reviewed. EKG reviewed. Imaging results reviewed. Existing labs reviewed. Patient summary reviewed. Patient is not a current smoker. Patient not instructed to abstain from smoking on day of procedure. Patient did not smoke on day of surgery. Obstructive sleep apnea risk education given perioperatively. Induction- intravenous. Postoperative Plan-     Informed Consent- Anesthetic plan and risks discussed with patient. I personally reviewed this patient with the CRNA.  Discussed and agreed on the Anesthesia Plan with the CRNA. Moe Perez

## 2023-07-03 NOTE — H&P
History and Physical - [facility]  Ayala Singletary III 79 y.o. male MRN: [id number]    HPI: Makenna Tomlinson is a 79y.o. year old male who presents with hx of crohns dz, hx of colon polyps      Review of Systems    Historical Information   Past Medical History:   Diagnosis Date   • LUCILLE (acute kidney injury) ([address]) 6/28/2017   • Blindness of left eye     Since birth   • Cancer [facility])     liver   • Cataract    • Colon polyp    • Colostomy in place [facility]) 6/29/2017   • COPD (chronic obstructive pulmonary disease) ([address])    • Crohn disease ([address])    • Emphysema of lung ([address])    • Emphysema/COPD ([address])    • Essential hypertension    • GERD (gastroesophageal reflux disease)    • Hypertension    • Hypokalemia 6/28/2017   • Hypomagnesemia 6/29/2017   • Hyponatremia 6/28/2017   • Kidney stone    • Osteomyelitis ([address])    • Pneumonia      Past Surgical History:   Procedure Laterality Date   • APPENDECTOMY  1979   • CATARACT EXTRACTION Bilateral    • CHOLECYSTECTOMY     • COLECTOMY      10 inchs of ileum   • COLONOSCOPY N/A 06/30/2017    Procedure: COLONOSCOPY;  Surgeon: Carl Vinson MD;  [facility];   Service: Gastroenterology   • COLOSTOMY     • FINGER AMPUTATION     • FINGER SURGERY Left    • HERNIA REPAIR  1978   • IR BIOPSY LIVER MASS  06/08/2020   • IR MICROWAVE ABLATION  09/21/2022   • IR MICROWAVE ABLATION  03/16/2023   • LITHOTRIPSY     • LIVER LOBECTOMY N/A 07/15/2020    Procedure: LIVER ABLATION, INTRAOPERATIVE U/S LIVER;  Surgeon: Geremias Bell MD;  [facility];  Service: Surgical Oncology   • TONSILLECTOMY  Child     Social History   Social History     Substance and Sexual Activity   Alcohol Use Not Currently   • Alcohol/week: 1.0 standard drink of alcohol   • Types: 1 Cans of beer per week     Social History     Substance and Sexual Activity   Drug Use No     Social History     Tobacco Use   Smoking Status Former   • Packs/day: 1.50   • Years: 50.00   • Total pack years: 75.00   • Types: Cigarettes   • Start date:    • Quit date: 2022   • Years since quittin.4   Smokeless Tobacco Never     Family History   Problem Relation Age of Onset   • Hypertension Father    • Heart disease Sister        Meds/Allergies     (Not in a hospital admission)      No Known Allergies    Objective     There were no vitals taken for this visit.       PHYSICAL EXAM    Gen: NAD  CV: RRR  CHEST: Clear  ABD: soft, NT/ND  EXT: no edema  Neuro: AAO      ASSESSMENT/PLAN:  This is a 79y.o. year old male here for hx of crohns dz, hx of colon polyps      PLAN:   Procedure: colonosocpy

## 2023-07-06 PROCEDURE — 88305 TISSUE EXAM BY PATHOLOGIST: CPT | Performed by: PATHOLOGY

## 2023-07-07 ENCOUNTER — TELEPHONE (OUTPATIENT)
Dept: INTERNAL MEDICINE CLINIC | Facility: CLINIC | Age: 68
End: 2023-07-07

## 2023-07-07 ENCOUNTER — OFFICE VISIT (OUTPATIENT)
Dept: INTERNAL MEDICINE CLINIC | Facility: CLINIC | Age: 68
End: 2023-07-07
Payer: MEDICARE

## 2023-07-07 VITALS
WEIGHT: 157 LBS | HEART RATE: 92 BPM | BODY MASS INDEX: 23.25 KG/M2 | HEIGHT: 69 IN | TEMPERATURE: 100.9 F | DIASTOLIC BLOOD PRESSURE: 80 MMHG | OXYGEN SATURATION: 92 % | SYSTOLIC BLOOD PRESSURE: 142 MMHG

## 2023-07-07 DIAGNOSIS — J02.9 SORE THROAT: Primary | ICD-10-CM

## 2023-07-07 PROCEDURE — 99213 OFFICE O/P EST LOW 20 MIN: CPT | Performed by: INTERNAL MEDICINE

## 2023-07-07 RX ORDER — AZITHROMYCIN 250 MG/1
TABLET, FILM COATED ORAL
Qty: 6 TABLET | Refills: 0 | Status: SHIPPED | OUTPATIENT
Start: 2023-07-07 | End: 2023-07-12

## 2023-07-07 RX ORDER — LIDOCAINE HYDROCHLORIDE 20 MG/ML
15 SOLUTION OROPHARYNGEAL 4 TIMES DAILY PRN
Qty: 100 ML | Refills: 1 | Status: SHIPPED | OUTPATIENT
Start: 2023-07-07 | End: 2023-07-10 | Stop reason: SDUPTHER

## 2023-07-07 NOTE — PROGRESS NOTES
Assessment/Plan:    Diagnoses and all orders for this visit:    Sore throat  -     azithromycin (Zithromax) 250 mg tablet; Take 2 tablets (500 mg total) by mouth daily for 1 day, THEN 1 tablet (250 mg total) daily for 4 days.  -     Lidocaine Viscous HCl (XYLOCAINE) 2 % mucosal solution; Swish and spit 15 mL 4 (four) times a day as needed for mouth pain or discomfort       This COVID test was negative, discussed supportive management, follow-up as needed. There are no Patient Instructions on file for this visit. Subjective:      Patient ID: Elyssa Saunders is a 79 y.o. male    Sore throat since yesterday, with minimal cough and phlegm production. Current Outpatient Medications:   •  ALPRAZolam (XANAX) 0.25 mg tablet, Take 1 tablet (0.25 mg total) by mouth daily at bedtime as needed for anxiety, Disp: 90 tablet, Rfl: 0  •  AMILoride 5 mg tablet, Take 1 tablet (5 mg total) by mouth daily, Disp: 90 tablet, Rfl: 0  •  amLODIPine (NORVASC) 5 mg tablet, Take 1 tablet (5 mg total) by mouth daily, Disp: 90 tablet, Rfl: 0  •  azithromycin (Zithromax) 250 mg tablet, Take 2 tablets (500 mg total) by mouth daily for 1 day, THEN 1 tablet (250 mg total) daily for 4 days. , Disp: 6 tablet, Rfl: 0  •  buPROPion (WELLBUTRIN XL) 150 mg 24 hr tablet, Take 1 tablet (150 mg total) by mouth daily, Disp: 90 tablet, Rfl: 0  •  calcium carbonate (TUMS) 500 mg chewable tablet, Chew 1 tablet (500 mg total) daily, Disp:  , Rfl: 0  •  carvedilol (COREG) 3.125 mg tablet, Take 1 tablet (3.125 mg total) by mouth 2 (two) times a day with meals, Disp: 180 tablet, Rfl: 1  •  cholecalciferol (VITAMIN D3) 400 units tablet, Take 400 Units by mouth Every Sunday, Disp: , Rfl:   •  [employer] Magnesium Oxide 250 MG TABS, TAKE 1 TABLET (250 MG TOTAL) BY MOUTH IN THE MORNING, Disp: , Rfl:   •  fluticasone (FLONASE) 50 mcg/act nasal spray, SPRAY 2 SPRAYS INTO EACH NOSTRIL EVERY DAY, Disp: 48 mL, Rfl: 2  •  folic acid (FOLVITE) 1 mg tablet, Take 1 tablet (1 mg total) by mouth daily, Disp: 90 tablet, Rfl: 1  •  levothyroxine 50 mcg tablet, Take 1 tablet (50 mcg total) by mouth daily in the early morning, Disp: 90 tablet, Rfl: 1  •  Lidocaine Viscous HCl (XYLOCAINE) 2 % mucosal solution, Swish and spit 15 mL 4 (four) times a day as needed for mouth pain or discomfort, Disp: 100 mL, Rfl: 1  •  Magnesium Oxide 250 MG TABS, Take 1 tablet (250 mg total) by mouth in the morning, Disp: 90 tablet, Rfl: 3  •  mirtazapine (REMERON) 15 mg tablet, Take 1 tablet (15 mg total) by mouth daily at bedtime, Disp: 90 tablet, Rfl: 1  •  omeprazole (PriLOSEC) 20 mg delayed release capsule, Take 1 capsule (20 mg total) by mouth daily, Disp: 90 capsule, Rfl: 1  •  oxyCODONE (Roxicodone) 5 immediate release tablet, Take 1 tablet (5 mg total) by mouth every 6 (six) hours as needed for moderate pain for up to 21 doses Max Daily Amount: 20 mg, Disp: 21 tablet, Rfl: 0  •  potassium chloride (MICRO-K) 10 MEQ CR capsule, Take 2 capsules (20 mEq total) by mouth 2 (two) times a day, Disp: 180 capsule, Rfl: 1  •  rosuvastatin (CRESTOR) 10 MG tablet, Take 1 tablet (10 mg total) by mouth daily, Disp: 90 tablet, Rfl: 0  •  VIT B12-METHIONINE-INOS-CHOL IM, Inject into a muscle every 30 (thirty) days, Disp: , Rfl:   •  naproxen (Naprosyn) 500 mg tablet, Take 1 tablet (500 mg total) by mouth 2 (two) times a day with meals, Disp: 30 tablet, Rfl: 0  •  thiamine 250 MG tablet, Take 1 tablet (250 mg total) by mouth daily for 5 days Do not start before November 14, 2022.  (Patient not taking: Reported on 2/8/2023), Disp: 5 tablet, Rfl: 0  •  umeclidinium-vilanterol (Anoro Ellipta) 62.5-25 mcg/actuation inhaler, Inhale 1 puff daily, Disp: 180 blister, Rfl: 0    Current Facility-Administered Medications:   •  cyanocobalamin injection 1,000 mcg, 1,000 mcg, Intramuscular, Q30 Days, Torri Coleman MD, 1,000 mcg at 06/07/23 0944  •  cyanocobalamin injection 1,000 mcg, 1,000 mcg, Intramuscular, Q30 Days, Daphne Garcia MD, 1,000 mcg at 05/10/23 1326     Past Medical History:   Diagnosis Date   • LUCILLE (acute kidney injury) ([address]) 6/28/2017   • Blindness of left eye     Since birth   • Cancer [facility])     liver   • Cataract    • Colon polyp    • Colostomy in place [facility]) 6/29/2017   • COPD (chronic obstructive pulmonary disease) ([address])    • Crohn disease ([address])    • Emphysema of lung ([address])    • Emphysema/COPD ([address])    • Essential hypertension    • GERD (gastroesophageal reflux disease)    • Hypertension    • Hypokalemia 6/28/2017   • Hypomagnesemia 6/29/2017   • Hyponatremia 6/28/2017   • Kidney stone    • Osteomyelitis ([address])    • Pneumonia          Past Surgical History:   Procedure Laterality Date   • APPENDECTOMY  1979   • CATARACT EXTRACTION Bilateral    • CHOLECYSTECTOMY     • COLECTOMY      10 inchs of ileum   • COLONOSCOPY N/A 06/30/2017    Procedure: COLONOSCOPY;  Surgeon: Mary Sexton MD;  Location: [facility];   Service: Gastroenterology   • COLOSTOMY     • FINGER AMPUTATION     • FINGER SURGERY Left    • HERNIA REPAIR  1978   • IR BIOPSY LIVER MASS  06/08/2020   • IR MICROWAVE ABLATION  09/21/2022   • IR MICROWAVE ABLATION  03/16/2023   • LITHOTRIPSY     • LIVER LOBECTOMY N/A 07/15/2020    Procedure: LIVER ABLATION, INTRAOPERATIVE U/S LIVER;  Surgeon: Lola Waddell MD;  Location: [facility];  Service: Surgical Oncology   • TONSILLECTOMY  Child         No Known Allergies    Recent Results (from the past 1008 hour(s))   Tissue Exam    Collection Time: 07/03/23  2:13 PM   Result Value Ref Range    Case Report       Surgical Pathology Report                         Case: [id number]                                   Authorizing Provider:  Mary Sexton MD            Collected:           07/03/2023 1413              Ordering Location:     [address] [place] [address]        Received:            07[facility]03/2023 [facility]                                                     Pathologist: Summer Cordova MD                                                                 Specimens:   A) - Polyp, Colorectal, ascending polyp x6, cold snare                                              B) - Polyp, Colorectal, descending polyp x3, cold/hot snare                                         C) - Polyp, Colorectal, transverse polyp x6, cold/hot snare                                Final Diagnosis       A. Polyp, Colorectal, ascending polyp x6, cold snare:  - Fragments of tubular adenoma. - No high grade dysplasia and no evidence of malignancy. B. Polyp, Colorectal, descending polyp x3, cold/hot snare:  - Fragments of tubular adenoma. - No high grade dysplasia and no evidence of malignancy. C. Polyp, Colorectal, transverse polyp x6, cold/hot snare:  - Fragments of tubular adenoma. - No high grade dysplasia and no evidence of malignancy. Note       Interpretation performed at [facility], [address] [place] [address]        Additional Information       All reported additional testing was performed with appropriately reactive controls. These tests were developed and their performance characteristics determined by [facility] or appropriate performing facility, though some tests may be performed on tissues which have not been validated for performance characteristics (such as staining performed on alcohol exposed cell blocks and decalcified tissues). Results should be interpreted with caution and in the context of the patients’ clinical condition. These tests may not be cleared or approved by the U.S. Food and Drug Administration, though the FDA has determined that such clearance or approval is not necessary. These tests are used for clinical purposes and they should not be regarded as investigational or for research. This laboratory has been approved by CLIA 88, designated as a high-complexity laboratory and is qualified to perform these tests.   .      Synoptic Checklist          COLON/RECTUM POLYP FORM - GI - All Specimens          :    Adenoma(s)      Gross Description       A. The specimen is received in formalin, labeled with the patient's name and hospital number, and is designated " ascending colon polyp x6". The specimen consists of multiple tan soft tissue fragments measuring in aggregate of 1.9 x 0.9 x 0.2 cm. Entirely submitted. One screened cassette. B. The specimen is received in formalin, labeled with the patient's name and hospital number, and is designated " descending polyp x3". The specimen consists of multiple tan soft tissue fragments measuring in aggregate of 1.5 x 0.9 x 0.2 cm. Entirely submitted. One screened cassette. C. The specimen is received in formalin, labeled with the patient's name and hospital number, and is designated " transverse polyp x6". The specimen consists of multiple tan-pink soft tissue fragments measuring in aggregate of 2.1 x 1.2 x 0.2 cm. Entirely submitted. One screened cassette. Note: The estimated total formalin fixation time based upon information provided by the submitting clinician and the standard processing schedule is under 72 hours. Patrice         The following portions of the patient's history were reviewed and updated as appropriate: allergies, current medications, past family history, past medical history, past social history, past surgical history and problem list.     Review of Systems   Constitutional: Negative for activity change, appetite change, chills, diaphoresis, fatigue, fever and unexpected weight change. HENT: Positive for congestion and sore throat. Negative for drooling, ear discharge, ear pain, facial swelling, hearing loss, postnasal drip, rhinorrhea, sinus pressure, sinus pain, sneezing, tinnitus, trouble swallowing and voice change. Eyes: Negative for discharge. Respiratory: Positive for cough.  Negative for apnea, choking, chest tightness, shortness of breath, wheezing and stridor. Cardiovascular: Negative for chest pain, palpitations and leg swelling. Gastrointestinal: Negative for abdominal distention, abdominal pain, blood in stool, constipation, diarrhea, nausea, rectal pain and vomiting. Genitourinary: Negative for dysuria, flank pain, frequency and urgency. Musculoskeletal: Negative for arthralgias, back pain, gait problem, joint swelling and myalgias. Skin: Negative for color change, pallor and rash. Neurological: Negative for dizziness and headaches. Objective:      Vitals:    07/07/23 1540   BP: 142/80   Pulse: 92   Temp: (!) 100.9 °F (38.3 °C)   SpO2: 92%          Physical Exam  Vitals reviewed. Constitutional:       General: He is not in acute distress. Appearance: Normal appearance. He is not ill-appearing, toxic-appearing or diaphoretic. HENT:      Mouth/Throat:      Mouth: Mucous membranes are moist.      Pharynx: Posterior oropharyngeal erythema present. No oropharyngeal exudate. Cardiovascular:      Rate and Rhythm: Normal rate and regular rhythm. Pulses: Normal pulses. Heart sounds: Normal heart sounds. No murmur heard. No friction rub. No gallop. Pulmonary:      Effort: Pulmonary effort is normal. No respiratory distress. Breath sounds: Normal breath sounds. No stridor. No wheezing, rhonchi or rales. Chest:      Chest wall: No tenderness. Musculoskeletal:      Right lower leg: No edema. Left lower leg: No edema. Skin:     General: Skin is warm and dry. Findings: No lesion or rash. Neurological:      Mental Status: He is alert.

## 2023-07-07 NOTE — TELEPHONE ENCOUNTER
Patient went to pharmacy, pharmacist told her they do not make the Lidocaine mouth swish anymore and would need a replacement for this.

## 2023-07-09 DIAGNOSIS — D36.9 MULTIPLE ADENOMATOUS POLYPS: Primary | ICD-10-CM

## 2023-07-10 ENCOUNTER — OFFICE VISIT (OUTPATIENT)
Dept: INTERNAL MEDICINE CLINIC | Facility: CLINIC | Age: 68
End: 2023-07-10
Payer: MEDICARE

## 2023-07-10 ENCOUNTER — TELEPHONE (OUTPATIENT)
Dept: GENETICS | Facility: CLINIC | Age: 68
End: 2023-07-10

## 2023-07-10 ENCOUNTER — RA CDI HCC (OUTPATIENT)
Dept: OTHER | Facility: HOSPITAL | Age: 68
End: 2023-07-10

## 2023-07-10 VITALS
HEART RATE: 86 BPM | WEIGHT: 153.4 LBS | BODY MASS INDEX: 22.72 KG/M2 | DIASTOLIC BLOOD PRESSURE: 76 MMHG | OXYGEN SATURATION: 99 % | TEMPERATURE: 98.2 F | HEIGHT: 69 IN | SYSTOLIC BLOOD PRESSURE: 142 MMHG

## 2023-07-10 DIAGNOSIS — J02.9 SORE THROAT: ICD-10-CM

## 2023-07-10 DIAGNOSIS — B37.81 CANDIDA INFECTION, ESOPHAGEAL (HCC): Primary | ICD-10-CM

## 2023-07-10 DIAGNOSIS — B37.81 CANDIDA INFECTION, ESOPHAGEAL (HCC): ICD-10-CM

## 2023-07-10 PROCEDURE — 99213 OFFICE O/P EST LOW 20 MIN: CPT | Performed by: INTERNAL MEDICINE

## 2023-07-10 RX ORDER — DIPHENHYDRAMINE HYDROCHLORIDE AND LIDOCAINE HYDROCHLORIDE AND ALUMINUM HYDROXIDE AND MAGNESIUM HYDRO
10 KIT EVERY 4 HOURS PRN
Qty: 237 ML | Refills: 0 | Status: SHIPPED | OUTPATIENT
Start: 2023-07-10

## 2023-07-10 RX ORDER — FLUCONAZOLE 150 MG/1
150 TABLET ORAL DAILY
Qty: 14 TABLET | Refills: 0 | Status: SHIPPED | OUTPATIENT
Start: 2023-07-10 | End: 2023-07-24

## 2023-07-10 RX ORDER — LIDOCAINE HYDROCHLORIDE 20 MG/ML
15 SOLUTION OROPHARYNGEAL 4 TIMES DAILY PRN
Qty: 100 ML | Refills: 1 | Status: SHIPPED | OUTPATIENT
Start: 2023-07-10

## 2023-07-10 NOTE — PROGRESS NOTES
[address] coding opportunities       Chart reviewed, no opportunity found: CHART REVIEWED, NO OPPORTUNITY FOUND        Patients Insurance     Medicare Insurance: Medicare

## 2023-07-10 NOTE — PROGRESS NOTES
Assessment/Plan:    Diagnoses and all orders for this visit:    Candida infection, esophageal (HCC)  -     fluconazole (DIFLUCAN) 150 mg tablet; Take 1 tablet (150 mg total) by mouth daily for 14 doses  -     diphenhydramine, lidocaine, Al/Mg hydroxide, simethicone (Magic Mouthwash) SUSP; Swish and spit 10 mL every 4 (four) hours as needed for mouth pain or discomfort    Sore throat  -     Lidocaine Viscous HCl (XYLOCAINE) 2 % mucosal solution; Swish and spit 15 mL 4 (four) times a day as needed for mouth pain or discomfort  -     fluconazole (DIFLUCAN) 150 mg tablet; Take 1 tablet (150 mg total) by mouth daily for 14 doses  -     diphenhydramine, lidocaine, Al/Mg hydroxide, simethicone (Magic Mouthwash) SUSP; Swish and spit 10 mL every 4 (four) hours as needed for mouth pain or discomfort       Given evidence of Candida lesions on the mouth will treat for esophageal candidiasis. Follow-up in 1 week or earlier if symptoms are not improved             There are no Patient Instructions on file for this visit. Subjective:      Patient ID: Lawrence Acuna is a 79 y.o. male    Follow-up of sore throat, and painful swallowing. Has noticed white patches on the oral cavity        Current Outpatient Medications:   •  AMILoride 5 mg tablet, Take 1 tablet (5 mg total) by mouth daily, Disp: 90 tablet, Rfl: 0  •  amLODIPine (NORVASC) 5 mg tablet, Take 1 tablet (5 mg total) by mouth daily, Disp: 90 tablet, Rfl: 0  •  azithromycin (Zithromax) 250 mg tablet, Take 2 tablets (500 mg total) by mouth daily for 1 day, THEN 1 tablet (250 mg total) daily for 4 days. , Disp: 6 tablet, Rfl: 0  •  buPROPion (WELLBUTRIN XL) 150 mg 24 hr tablet, Take 1 tablet (150 mg total) by mouth daily, Disp: 90 tablet, Rfl: 0  •  calcium carbonate (TUMS) 500 mg chewable tablet, Chew 1 tablet (500 mg total) daily, Disp:  , Rfl: 0  •  carvedilol (COREG) 3.125 mg tablet, Take 1 tablet (3.125 mg total) by mouth 2 (two) times a day with meals, Disp: 180 tablet, Rfl: 1  •  cholecalciferol (VITAMIN D3) 400 units tablet, Take 400 Units by mouth Every Sunday, Disp: , Rfl:   •  [employer] Magnesium Oxide 250 MG TABS, TAKE 1 TABLET (250 MG TOTAL) BY MOUTH IN THE MORNING, Disp: , Rfl:   •  diphenhydramine, lidocaine, Al/Mg hydroxide, simethicone (Magic Mouthwash) SUSP, Swish and spit 10 mL every 4 (four) hours as needed for mouth pain or discomfort, Disp: 237 mL, Rfl: 0  •  fluconazole (DIFLUCAN) 150 mg tablet, Take 1 tablet (150 mg total) by mouth daily for 14 doses, Disp: 14 tablet, Rfl: 0  •  fluticasone (FLONASE) 50 mcg/act nasal spray, SPRAY 2 SPRAYS INTO EACH NOSTRIL EVERY DAY, Disp: 48 mL, Rfl: 2  •  folic acid (FOLVITE) 1 mg tablet, Take 1 tablet (1 mg total) by mouth daily, Disp: 90 tablet, Rfl: 1  •  levothyroxine 50 mcg tablet, Take 1 tablet (50 mcg total) by mouth daily in the early morning, Disp: 90 tablet, Rfl: 1  •  Lidocaine Viscous HCl (XYLOCAINE) 2 % mucosal solution, Swish and spit 15 mL 4 (four) times a day as needed for mouth pain or discomfort, Disp: 100 mL, Rfl: 1  •  Magnesium Oxide 250 MG TABS, Take 1 tablet (250 mg total) by mouth in the morning, Disp: 90 tablet, Rfl: 3  •  mirtazapine (REMERON) 15 mg tablet, Take 1 tablet (15 mg total) by mouth daily at bedtime, Disp: 90 tablet, Rfl: 1  •  omeprazole (PriLOSEC) 20 mg delayed release capsule, Take 1 capsule (20 mg total) by mouth daily, Disp: 90 capsule, Rfl: 1  •  oxyCODONE (Roxicodone) 5 immediate release tablet, Take 1 tablet (5 mg total) by mouth every 6 (six) hours as needed for moderate pain for up to 21 doses Max Daily Amount: 20 mg, Disp: 21 tablet, Rfl: 0  •  potassium chloride (MICRO-K) 10 MEQ CR capsule, Take 2 capsules (20 mEq total) by mouth 2 (two) times a day, Disp: 180 capsule, Rfl: 1  •  rosuvastatin (CRESTOR) 10 MG tablet, Take 1 tablet (10 mg total) by mouth daily, Disp: 90 tablet, Rfl: 0  •  VIT B12-METHIONINE-INOS-CHOL IM, Inject into a muscle every 30 (thirty) days, Disp: , Rfl: •  ALPRAZolam (XANAX) 0.25 mg tablet, Take 1 tablet (0.25 mg total) by mouth daily at bedtime as needed for anxiety, Disp: 90 tablet, Rfl: 0  •  naproxen (Naprosyn) 500 mg tablet, Take 1 tablet (500 mg total) by mouth 2 (two) times a day with meals, Disp: 30 tablet, Rfl: 0  •  thiamine 250 MG tablet, Take 1 tablet (250 mg total) by mouth daily for 5 days Do not start before November 14, 2022. (Patient not taking: Reported on 2/8/2023), Disp: 5 tablet, Rfl: 0  •  umeclidinium-vilanterol (Anoro Ellipta) 62.5-25 mcg/actuation inhaler, Inhale 1 puff daily, Disp: 180 blister, Rfl: 0    Current Facility-Administered Medications:   •  cyanocobalamin injection 1,000 mcg, 1,000 mcg, Intramuscular, Q30 Days, Torri Coleman MD, 1,000 mcg at 06/07/23 0944  •  cyanocobalamin injection 1,000 mcg, 1,000 mcg, Intramuscular, Q30 Days, Torri Coleman MD, 1,000 mcg at 05/10/23 1326     Past Medical History:   Diagnosis Date   • LUCILLE (acute kidney injury) ([address]) 6/28/2017   • Blindness of left eye     Since birth   • Cancer [facility])     liver   • Cataract    • Colon polyp    • Colostomy in place [facility]) 6/29/2017   • COPD (chronic obstructive pulmonary disease) (HCC)    • Crohn disease ([address])    • Emphysema of lung ([address])    • Emphysema/COPD ([address])    • Essential hypertension    • GERD (gastroesophageal reflux disease)    • Hypertension    • Hypokalemia 6/28/2017   • Hypomagnesemia 6/29/2017   • Hyponatremia 6/28/2017   • Kidney stone    • Osteomyelitis ([address])    • Pneumonia          Past Surgical History:   Procedure Laterality Date   • APPENDECTOMY  1979   • CATARACT EXTRACTION Bilateral    • CHOLECYSTECTOMY     • COLECTOMY      10 inchs of ileum   • COLONOSCOPY N/A 06/30/2017    Procedure: COLONOSCOPY;  Surgeon: Mary Sexton MD;  Location: [facility];   Service: Gastroenterology   • COLOSTOMY     • EYE SURGERY  2 yrs ago   • FINGER AMPUTATION     • FINGER SURGERY Left    • HERNIA REPAIR  1978   • IR BIOPSY LIVER MASS  06/08/2020   • IR MICROWAVE ABLATION  09/21/2022   • IR MICROWAVE ABLATION  03/16/2023   • LITHOTRIPSY     • LIVER LOBECTOMY N/A 07/15/2020    Procedure: LIVER ABLATION, INTRAOPERATIVE U/S LIVER;  Surgeon: Edwige Cook MD;  Location: [facility];  Service: Surgical Oncology   • TONSILLECTOMY  Child         No Known Allergies    Recent Results (from the past 1008 hour(s))   Tissue Exam    Collection Time: 07/03/23  2:13 PM   Result Value Ref Range    Case Report       Surgical Pathology Report                         Case: [id number]                                   Authorizing Provider:  Tevin Black MD            Collected:           07/03/2023 1413              Ordering Location:     [facility]        Received:            07/03/2023 [address] [facility]                                                    Pathologist:           Viv Mg MD                                                                 Specimens:   A) - Polyp, Colorectal, ascending polyp x6, cold snare                                              B) - Polyp, Colorectal, descending polyp x3, cold/hot snare                                         C) - Polyp, Colorectal, transverse polyp x6, cold/hot snare                                Final Diagnosis       A. Polyp, Colorectal, ascending polyp x6, cold snare:  - Fragments of tubular adenoma. - No high grade dysplasia and no evidence of malignancy. B. Polyp, Colorectal, descending polyp x3, cold/hot snare:  - Fragments of tubular adenoma. - No high grade dysplasia and no evidence of malignancy. C. Polyp, Colorectal, transverse polyp x6, cold/hot snare:  - Fragments of tubular adenoma. - No high grade dysplasia and no evidence of malignancy. Note       Interpretation performed at [facility], [address] [place] [address]        Additional Information       All reported additional testing was performed with appropriately reactive controls.   These tests were developed and their performance characteristics determined by [facility] or appropriate performing facility, though some tests may be performed on tissues which have not been validated for performance characteristics (such as staining performed on alcohol exposed cell blocks and decalcified tissues). Results should be interpreted with caution and in the context of the patients’ clinical condition. These tests may not be cleared or approved by the U.S. Food and Drug Administration, though the FDA has determined that such clearance or approval is not necessary. These tests are used for clinical purposes and they should not be regarded as investigational or for research. This laboratory has been approved by [facility], designated as a high-complexity laboratory and is qualified to perform these tests. .      Synoptic Checklist          COLON/RECTUM POLYP FORM - GI - All Specimens          :    Adenoma(s)      Gross Description       A. The specimen is received in formalin, labeled with the patient's name and hospital number, and is designated " ascending colon polyp x6". The specimen consists of multiple tan soft tissue fragments measuring in aggregate of 1.9 x 0.9 x 0.2 cm. Entirely submitted. One screened cassette. B. The specimen is received in formalin, labeled with the patient's name and hospital number, and is designated " descending polyp x3". The specimen consists of multiple tan soft tissue fragments measuring in aggregate of 1.5 x 0.9 x 0.2 cm. Entirely submitted. One screened cassette. C. The specimen is received in formalin, labeled with the patient's name and hospital number, and is designated " transverse polyp x6". The specimen consists of multiple tan-pink soft tissue fragments measuring in aggregate of 2.1 x 1.2 x 0.2 cm. Entirely submitted. One screened cassette.     Note: The estimated total formalin fixation time based upon information provided by the submitting clinician and the standard processing schedule is under 72 hours. Patrice         The following portions of the patient's history were reviewed and updated as appropriate: allergies, current medications, past family history, past medical history, past social history, past surgical history and problem list.     Review of Systems   Constitutional: Negative for activity change, appetite change, chills, diaphoresis, fatigue, fever and unexpected weight change. HENT: Positive for sore throat and trouble swallowing. Negative for congestion, drooling, ear discharge, ear pain, facial swelling, hearing loss, postnasal drip, rhinorrhea, sinus pressure, sinus pain, sneezing, tinnitus and voice change. Eyes: Negative for discharge. Respiratory: Negative for apnea, cough, choking, chest tightness, shortness of breath, wheezing and stridor. Cardiovascular: Negative for chest pain, palpitations and leg swelling. Gastrointestinal: Negative for abdominal distention, abdominal pain, blood in stool, constipation, diarrhea, nausea, rectal pain and vomiting. Genitourinary: Negative for dysuria, flank pain, frequency and urgency. Musculoskeletal: Negative for arthralgias, back pain, gait problem, joint swelling and myalgias. Skin: Negative for color change, pallor and rash. Neurological: Negative for dizziness and headaches. Objective:      Vitals:    07/10/23 1142   BP: 142/76   Pulse: 86   Temp: 98.2 °F (36.8 °C)   SpO2: 99%          Physical Exam  Vitals reviewed. Constitutional:       General: He is not in acute distress. Appearance: Normal appearance. He is not ill-appearing, toxic-appearing or diaphoretic. HENT:      Right Ear: Tympanic membrane normal.      Left Ear: Tympanic membrane normal.      Mouth/Throat:      Mouth: Mucous membranes are moist. Oral lesions present. Pharynx: Posterior oropharyngeal erythema present.       Comments: Minimal white fungal patches seen on the left buccal mucosa  Cardiovascular:      Rate and Rhythm: Normal rate and regular rhythm. Pulses: Normal pulses. Heart sounds: Normal heart sounds. No murmur heard. No friction rub. No gallop. Pulmonary:      Effort: Pulmonary effort is normal. No respiratory distress. Breath sounds: Normal breath sounds. No stridor. No wheezing, rhonchi or rales. Chest:      Chest wall: No tenderness. Musculoskeletal:      Right lower leg: No edema. Left lower leg: No edema. Skin:     General: Skin is warm and dry. Findings: No lesion or rash. Neurological:      Mental Status: He is alert.

## 2023-07-10 NOTE — TELEPHONE ENCOUNTER
I called Janiya Glynn to schedule a new patient appointment with the Cancer [facility]etics [facility].      Outcome:  Genetics appointment scheduled for 8/4 at 9:00 am     Personal/Family History Related to Appointment:  Recently found 15+ polyps, hx of liver ca. No fhx of cancer. Reviewed with Isaías jensen to schedule.      History of Genetic Testing:  Patient reports no personal or family history of genetic testing    Genetics Family History Questionnaire:  I confirmed the patient's e-mail on file as the best e-mail to send an invite link for our genetics family history intake

## 2023-07-11 RX ORDER — DIPHENHYDRAMINE HYDROCHLORIDE AND LIDOCAINE HYDROCHLORIDE AND ALUMINUM HYDROXIDE AND MAGNESIUM HYDRO
10 KIT EVERY 4 HOURS PRN
Refills: 0 | OUTPATIENT
Start: 2023-07-11

## 2023-07-14 ENCOUNTER — OFFICE VISIT (OUTPATIENT)
Dept: INTERNAL MEDICINE CLINIC | Facility: CLINIC | Age: 68
End: 2023-07-14
Payer: MEDICARE

## 2023-07-14 VITALS
TEMPERATURE: 98 F | WEIGHT: 149 LBS | BODY MASS INDEX: 22.07 KG/M2 | DIASTOLIC BLOOD PRESSURE: 72 MMHG | OXYGEN SATURATION: 93 % | HEIGHT: 69 IN | SYSTOLIC BLOOD PRESSURE: 112 MMHG | HEART RATE: 84 BPM

## 2023-07-14 DIAGNOSIS — Z93.3 COLOSTOMY IN PLACE (HCC): ICD-10-CM

## 2023-07-14 DIAGNOSIS — K21.9 GASTROESOPHAGEAL REFLUX DISEASE WITHOUT ESOPHAGITIS: ICD-10-CM

## 2023-07-14 DIAGNOSIS — B37.81 CANDIDA INFECTION, ESOPHAGEAL (HCC): Primary | ICD-10-CM

## 2023-07-14 DIAGNOSIS — K50.018 CROHN'S DISEASE OF SMALL INTESTINE WITH OTHER COMPLICATION (HCC): ICD-10-CM

## 2023-07-14 DIAGNOSIS — I10 ESSENTIAL HYPERTENSION: Chronic | ICD-10-CM

## 2023-07-14 DIAGNOSIS — E78.2 MIXED HYPERLIPIDEMIA: ICD-10-CM

## 2023-07-14 DIAGNOSIS — K76.6 PORTAL HYPERTENSION (HCC): ICD-10-CM

## 2023-07-14 DIAGNOSIS — J43.8 OTHER EMPHYSEMA (HCC): ICD-10-CM

## 2023-07-14 PROCEDURE — 99214 OFFICE O/P EST MOD 30 MIN: CPT | Performed by: INTERNAL MEDICINE

## 2023-07-14 NOTE — PROGRESS NOTES
Assessment/Plan:             1. Candida infection, esophageal ([address])    2. Essential hypertension    3. Other emphysema ([address])    4. Gastroesophageal reflux disease without esophagitis    5. Colostomy in place [facility])    6. Mixed hyperlipidemia    7. Crohn's disease of small intestine with other complication ([address])    8. Portal hypertension (HCC)           Subjective:      Patient ID: Naldo Rizzo is a 79 y.o. male. Follow-up from 7/10/2023, sore throat better, fever improved, able to swallow soft food, still having a lot of throat pain      The following portions of the patient's history were reviewed and updated as appropriate: He  has a past medical history of LUCILLE (acute kidney injury) ([address]) (6/28/2017), Blindness of left eye, Cancer ([address]), Cataract, Colon polyp, Colostomy in place [facility]) (6/29/2017), COPD (chronic obstructive pulmonary disease) ([address]), Crohn disease ([address]), Emphysema of lung ([address]), Emphysema/COPD ([address]), Essential hypertension, GERD (gastroesophageal reflux disease), Hypertension, Hypokalemia (6/28/2017), Hypomagnesemia (6/29/2017), Hyponatremia (6/28/2017), Kidney stone, Osteomyelitis ([address]), and Pneumonia.   He   Patient Active Problem List    Diagnosis Date Noted   • Candida infection, esophageal ([address]) 07/14/2023   • Multiple adenomatous polyps 07/09/2023   • Portal hypertension ([address]) 02/01/2023   • Multiple subsegmental pulmonary emboli without acute cor pulmonale ([address]) 02/01/2023   • Alcohol dependence, uncomplicated ([address]) 28/83/6905   • Subclinical hypothyroidism 11/12/2022   • Anemia 11/08/2022   • Supraventricular tachycardia ([address])    • History of alcohol use disorder 11/02/2022   • Platelets decreased ([address])    • HCC (hepatocellular carcinoma) ([address]) 03/15/2022   • Encounter for follow-up surveillance of liver cancer 01/21/2022   • Dysthymic disorder 06/28/2021   • Crohn's disease of small intestine with other complication ([address] [facility]) 95/20/8307   • Vitamin B12 deficiency 02/24/2021   • Cataract • Chronic obstructive pulmonary disease ([address]) 11/24/2020   • Mixed hyperlipidemia 11/24/2020   • Kidney stone    • Gastroesophageal reflux disease without esophagitis    • Left wrist pain 09/29/2020   • Hypocalcemia 09/12/2020   • Acute pain of left wrist 09/10/2020   • Chronic, continuous use of opioids 09/10/2020   • Observed sleep apnea    • Palliative care patient 07/31/2020   • Abdominal wall abscess 07/31/2020   • Personal history of liver cancer 06/23/2020   • Abdominal pain 06/04/2020   • Tobacco abuse 05/29/2020   • Severe sepsis ([address]) 05/28/2020   • Pneumonia 05/28/2020   • Chest pain 05/28/2020   • Liver mass 05/28/2020   • Syncope and collapse 04/03/2018   • Emphysema of lung ([address])    • Arthropathy of right wrist 12/04/2017   • Severe protein-calorie malnutrition ([address]) 07/01/2017   • Colostomy in place [facility]) 06/29/2017   • Diarrhea 06/29/2017   • Acute kidney injury ([address]) 06/28/2017   • Essential hypertension    • Emphysema/COPD ([address])    • Crohn disease ([address])      He  has a past surgical history that includes Colostomy; Cholecystectomy; Colectomy; Colonoscopy (N/A, 06/30/2017); Lithotripsy; Finger surgery (Left); IR biopsy liver mass (06/08/2020); Cataract extraction (Bilateral); Liver lobectomy (N/A, 07/15/2020); Finger amputation; IR microwave ablation (09/21/2022); IR microwave ablation (03/16/2023); Appendectomy (1979); Hernia repair (1978); Tonsillectomy (Child); and Eye surgery (2 yrs ago). His family history includes Heart disease in his sister; Hypertension in his father. He  reports that he quit smoking about 2 years ago. His smoking use included cigarettes. He started smoking about 52 years ago. He has a 75.00 pack-year smoking history. He has never used smokeless tobacco. He reports that he does not currently use alcohol. He reports that he does not use drugs.   Current Outpatient Medications   Medication Sig Dispense Refill   • ALPRAZolam (XANAX) 0.25 mg tablet Take 1 tablet (0.25 mg total) by mouth daily at bedtime as needed for anxiety 90 tablet 0   • AMILoride 5 mg tablet Take 1 tablet (5 mg total) by mouth daily 90 tablet 0   • amLODIPine (NORVASC) 5 mg tablet Take 1 tablet (5 mg total) by mouth daily 90 tablet 0   • buPROPion (WELLBUTRIN XL) 150 mg 24 hr tablet Take 1 tablet (150 mg total) by mouth daily 90 tablet 0   • calcium carbonate (TUMS) 500 mg chewable tablet Chew 1 tablet (500 mg total) daily  0   • carvedilol (COREG) 3.125 mg tablet Take 1 tablet (3.125 mg total) by mouth 2 (two) times a day with meals 180 tablet 1   • cholecalciferol (VITAMIN D3) 400 units tablet Take 400 Units by mouth Every Sunday     • [facility] Magnesium Oxide 250 MG TABS TAKE 1 TABLET (250 MG TOTAL) BY MOUTH IN THE MORNING     • fluconazole (DIFLUCAN) 150 mg tablet Take 1 tablet (150 mg total) by mouth daily for 14 doses 14 tablet 0   • fluticasone (FLONASE) 50 mcg/act nasal spray SPRAY 2 SPRAYS INTO EACH NOSTRIL EVERY DAY 48 mL 2   • folic acid (FOLVITE) 1 mg tablet Take 1 tablet (1 mg total) by mouth daily 90 tablet 1   • levothyroxine 50 mcg tablet Take 1 tablet (50 mcg total) by mouth daily in the early morning 90 tablet 1   • Lidocaine Viscous HCl (XYLOCAINE) 2 % mucosal solution Swish and spit 15 mL 4 (four) times a day as needed for mouth pain or discomfort 100 mL 1   • Magnesium Oxide 250 MG TABS Take 1 tablet (250 mg total) by mouth in the morning 90 tablet 3   • mirtazapine (REMERON) 15 mg tablet Take 1 tablet (15 mg total) by mouth daily at bedtime 90 tablet 1   • omeprazole (PriLOSEC) 20 mg delayed release capsule Take 1 capsule (20 mg total) by mouth daily 90 capsule 1   • potassium chloride (MICRO-K) 10 MEQ CR capsule Take 2 capsules (20 mEq total) by mouth 2 (two) times a day 180 capsule 1   • rosuvastatin (CRESTOR) 10 MG tablet Take 1 tablet (10 mg total) by mouth daily 90 tablet 0   • thiamine 250 MG tablet Take 1 tablet (250 mg total) by mouth daily for 5 days Do not start before November 14, 2022. 5 tablet 0   • umeclidinium-vilanterol (Anoro Ellipta) 62.5-25 mcg/actuation inhaler Inhale 1 puff daily 180 blister 0   • VIT B12-METHIONINE-INOS-CHOL IM Inject into a muscle every 30 (thirty) days     • diphenhydramine, lidocaine, Al/Mg hydroxide, simethicone (Magic Mouthwash) SUSP Swish and spit 10 mL every 4 (four) hours as needed for mouth pain or discomfort (Patient not taking: Reported on 7/14/2023) 237 mL 0   • naproxen (Naprosyn) 500 mg tablet Take 1 tablet (500 mg total) by mouth 2 (two) times a day with meals (Patient not taking: Reported on 7/14/2023) 30 tablet 0   • oxyCODONE (Roxicodone) 5 immediate release tablet Take 1 tablet (5 mg total) by mouth every 6 (six) hours as needed for moderate pain for up to 21 doses Max Daily Amount: 20 mg (Patient not taking: Reported on 7/14/2023) 21 tablet 0     Current Facility-Administered Medications   Medication Dose Route Frequency Provider Last Rate Last Admin   • cyanocobalamin injection 1,000 mcg  1,000 mcg Intramuscular Q30 Days Torri Coleman MD   1,000 mcg at 06/07/23 0944   • cyanocobalamin injection 1,000 mcg  1,000 mcg Intramuscular Q30 Days Torri Coleman MD   1,000 mcg at 05/10/23 1326     Current Outpatient Medications on File Prior to Visit   Medication Sig   • ALPRAZolam (XANAX) 0.25 mg tablet Take 1 tablet (0.25 mg total) by mouth daily at bedtime as needed for anxiety   • AMILoride 5 mg tablet Take 1 tablet (5 mg total) by mouth daily   • amLODIPine (NORVASC) 5 mg tablet Take 1 tablet (5 mg total) by mouth daily   • buPROPion (WELLBUTRIN XL) 150 mg 24 hr tablet Take 1 tablet (150 mg total) by mouth daily   • calcium carbonate (TUMS) 500 mg chewable tablet Chew 1 tablet (500 mg total) daily   • carvedilol (COREG) 3.125 mg tablet Take 1 tablet (3.125 mg total) by mouth 2 (two) times a day with meals   • cholecalciferol (VITAMIN D3) 400 units tablet Take 400 Units by mouth Every Sunday   • [facility] Magnesium Oxide 250 MG TABS TAKE 1 TABLET (250 MG TOTAL) BY MOUTH IN THE MORNING   • fluconazole (DIFLUCAN) 150 mg tablet Take 1 tablet (150 mg total) by mouth daily for 14 doses   • fluticasone (FLONASE) 50 mcg/act nasal spray SPRAY 2 SPRAYS INTO EACH NOSTRIL EVERY DAY   • folic acid (FOLVITE) 1 mg tablet Take 1 tablet (1 mg total) by mouth daily   • levothyroxine 50 mcg tablet Take 1 tablet (50 mcg total) by mouth daily in the early morning   • Lidocaine Viscous HCl (XYLOCAINE) 2 % mucosal solution Swish and spit 15 mL 4 (four) times a day as needed for mouth pain or discomfort   • Magnesium Oxide 250 MG TABS Take 1 tablet (250 mg total) by mouth in the morning   • mirtazapine (REMERON) 15 mg tablet Take 1 tablet (15 mg total) by mouth daily at bedtime   • omeprazole (PriLOSEC) 20 mg delayed release capsule Take 1 capsule (20 mg total) by mouth daily   • potassium chloride (MICRO-K) 10 MEQ CR capsule Take 2 capsules (20 mEq total) by mouth 2 (two) times a day   • rosuvastatin (CRESTOR) 10 MG tablet Take 1 tablet (10 mg total) by mouth daily   • thiamine 250 MG tablet Take 1 tablet (250 mg total) by mouth daily for 5 days Do not start before November 14, 2022.    • umeclidinium-vilanterol (Anoro Ellipta) 62.5-25 mcg/actuation inhaler Inhale 1 puff daily   • VIT B12-METHIONINE-INOS-CHOL IM Inject into a muscle every 30 (thirty) days   • diphenhydramine, lidocaine, Al/Mg hydroxide, simethicone (Magic Mouthwash) SUSP Swish and spit 10 mL every 4 (four) hours as needed for mouth pain or discomfort (Patient not taking: Reported on 7/14/2023)   • naproxen (Naprosyn) 500 mg tablet Take 1 tablet (500 mg total) by mouth 2 (two) times a day with meals (Patient not taking: Reported on 7/14/2023)   • oxyCODONE (Roxicodone) 5 immediate release tablet Take 1 tablet (5 mg total) by mouth every 6 (six) hours as needed for moderate pain for up to 21 doses Max Daily Amount: 20 mg (Patient not taking: Reported on 7/14/2023)   • [DISCONTINUED] apixaban (Eliquis) 5 mg Take 2 tablets (10 mg total) by mouth 2 (two) times a day for 7 days, THEN 1 tablet (5 mg total) 2 (two) times a day for 23 days. Current Facility-Administered Medications on File Prior to Visit   Medication   • cyanocobalamin injection 1,000 mcg   • cyanocobalamin injection 1,000 mcg     He has No Known Allergies. .    Review of Systems   Constitutional: Positive for fever. Negative for chills. HENT: Positive for sore throat. Negative for congestion and ear pain. Eyes: Negative for pain. Respiratory: Negative for cough and shortness of breath. Cardiovascular: Negative for chest pain and leg swelling. Gastrointestinal: Negative for abdominal pain, nausea and vomiting. Endocrine: Negative for polyuria. Genitourinary: Negative for difficulty urinating, frequency and urgency. Musculoskeletal: Negative for arthralgias and back pain. Skin: Negative for rash. Neurological: Negative for weakness and headaches. Psychiatric/Behavioral: Negative for sleep disturbance. The patient is not nervous/anxious.           Objective:      /72 (BP Location: Left arm, Patient Position: Sitting, Cuff Size: Standard)   Pulse 84   Temp 98 °F (36.7 °C) (Temporal)   Ht 5' 9" (1.753 m)   Wt 67.6 kg (149 lb)   SpO2 93%   BMI 22.00 kg/m²     Recent Results (from the past 1344 hour(s))   Tissue Exam    Collection Time: 07/03/23  2:13 PM   Result Value Ref Range    Case Report       Surgical Pathology Report                         Case: [id number]                                   Authorizing Provider:  Alexandre Shah MD            Collected:           07/03/2023 1413              Ordering Location:     [facility]        Received:            07/03/2023 [address] [place] [address]                                                     Pathologist:           Gideon Tran MD                                                                 Specimens:   A) - Polyp, Colorectal, ascending polyp x6, cold snare                                              B) - Polyp, Colorectal, descending polyp x3, cold/hot snare                                         C) - Polyp, Colorectal, transverse polyp x6, cold/hot snare                                Final Diagnosis       A. Polyp, Colorectal, ascending polyp x6, cold snare:  - Fragments of tubular adenoma. - No high grade dysplasia and no evidence of malignancy. B. Polyp, Colorectal, descending polyp x3, cold/hot snare:  - Fragments of tubular adenoma. - No high grade dysplasia and no evidence of malignancy. C. Polyp, Colorectal, transverse polyp x6, cold/hot snare:  - Fragments of tubular adenoma. - No high grade dysplasia and no evidence of malignancy. Note       Interpretation performed at [facility], [address] [facility] [address] [facility]        Additional Information       All reported additional testing was performed with appropriately reactive controls. These tests were developed and their performance characteristics determined by [facility] or appropriate performing facility, though some tests may be performed on tissues which have not been validated for performance characteristics (such as staining performed on alcohol exposed cell blocks and decalcified tissues). Results should be interpreted with caution and in the context of the patients’ clinical condition. These tests may not be cleared or approved by the U.S. Food and Drug Administration, though the FDA has determined that such clearance or approval is not necessary. These tests are used for clinical purposes and they should not be regarded as investigational or for research. This laboratory has been approved by CLIA 88, designated as a high-complexity laboratory and is qualified to perform these tests. .      Synoptic Checklist          COLON/RECTUM POLYP FORM - GI - All Specimens          :    Adenoma(s)      Gross Description       A.  The specimen is received in formalin, labeled with the patient's name and hospital number, and is designated " ascending colon polyp x6". The specimen consists of multiple tan soft tissue fragments measuring in aggregate of 1.9 x 0.9 x 0.2 cm. Entirely submitted. One screened cassette. B. The specimen is received in formalin, labeled with the patient's name and hospital number, and is designated " descending polyp x3". The specimen consists of multiple tan soft tissue fragments measuring in aggregate of 1.5 x 0.9 x 0.2 cm. Entirely submitted. One screened cassette. C. The specimen is received in formalin, labeled with the patient's name and hospital number, and is designated " transverse polyp x6". The specimen consists of multiple tan-pink soft tissue fragments measuring in aggregate of 2.1 x 1.2 x 0.2 cm. Entirely submitted. One screened cassette. Note: The estimated total formalin fixation time based upon information provided by the submitting clinician and the standard processing schedule is under 72 hours. EZelaya          Physical Exam  Constitutional:       Appearance: Normal appearance. HENT:      Head: Normocephalic. Right Ear: External ear normal.      Left Ear: External ear normal.      Nose: Nose normal. No congestion. Mouth/Throat:      Mouth: Mucous membranes are moist.      Pharynx: Posterior oropharyngeal erythema present. No oropharyngeal exudate. Eyes:      Extraocular Movements: Extraocular movements intact. Conjunctiva/sclera: Conjunctivae normal.   Cardiovascular:      Rate and Rhythm: Normal rate and regular rhythm. Heart sounds: Normal heart sounds. No murmur heard. Pulmonary:      Effort: Pulmonary effort is normal.      Breath sounds: Normal breath sounds. No wheezing or rales. Abdominal:      General: There is no distension. Palpations: Abdomen is soft. Tenderness: There is no abdominal tenderness.       Comments: Colostomy bag present   Musculoskeletal: General: Normal range of motion. Cervical back: Normal range of motion and neck supple. Right lower leg: No edema. Left lower leg: No edema. Lymphadenopathy:      Cervical: No cervical adenopathy. Skin:     General: Skin is warm. Neurological:      General: No focal deficit present. Mental Status: He is alert and oriented to person, place, and time.

## 2023-07-21 ENCOUNTER — OFFICE VISIT (OUTPATIENT)
Dept: INTERNAL MEDICINE CLINIC | Facility: CLINIC | Age: 68
End: 2023-07-21
Payer: MEDICARE

## 2023-07-21 VITALS
HEART RATE: 81 BPM | TEMPERATURE: 97 F | OXYGEN SATURATION: 99 % | HEIGHT: 69 IN | SYSTOLIC BLOOD PRESSURE: 108 MMHG | BODY MASS INDEX: 21.33 KG/M2 | WEIGHT: 144 LBS | DIASTOLIC BLOOD PRESSURE: 72 MMHG

## 2023-07-21 DIAGNOSIS — J43.8 OTHER EMPHYSEMA (HCC): ICD-10-CM

## 2023-07-21 DIAGNOSIS — I10 ESSENTIAL HYPERTENSION: Chronic | ICD-10-CM

## 2023-07-21 DIAGNOSIS — E53.8 VITAMIN B12 DEFICIENCY: ICD-10-CM

## 2023-07-21 DIAGNOSIS — Z93.3 COLOSTOMY IN PLACE (HCC): ICD-10-CM

## 2023-07-21 DIAGNOSIS — E43 SEVERE PROTEIN-CALORIE MALNUTRITION (HCC): ICD-10-CM

## 2023-07-21 DIAGNOSIS — E78.2 MIXED HYPERLIPIDEMIA: ICD-10-CM

## 2023-07-21 DIAGNOSIS — K21.9 GASTROESOPHAGEAL REFLUX DISEASE WITHOUT ESOPHAGITIS: ICD-10-CM

## 2023-07-21 DIAGNOSIS — B37.81 CANDIDA INFECTION, ESOPHAGEAL (HCC): Primary | ICD-10-CM

## 2023-07-21 DIAGNOSIS — K50.018 CROHN'S DISEASE OF SMALL INTESTINE WITH OTHER COMPLICATION (HCC): ICD-10-CM

## 2023-07-21 LAB
ALBUMIN SERPL-MCNC: 3.7 G/DL (ref 3.6–5.1)
ALBUMIN/GLOB SERPL: 1 (CALC) (ref 1–2.5)
ALP SERPL-CCNC: 167 U/L (ref 35–144)
ALT SERPL-CCNC: 34 U/L (ref 9–46)
AST SERPL-CCNC: 50 U/L (ref 10–35)
BASOPHILS # BLD AUTO: 24 CELLS/UL (ref 0–200)
BASOPHILS NFR BLD AUTO: 0.2 %
BILIRUB SERPL-MCNC: 1 MG/DL (ref 0.2–1.2)
BUN SERPL-MCNC: 18 MG/DL (ref 7–25)
BUN/CREAT SERPL: ABNORMAL (CALC) (ref 6–22)
CALCIUM SERPL-MCNC: 9.2 MG/DL (ref 8.6–10.3)
CHLORIDE SERPL-SCNC: 99 MMOL/L (ref 98–110)
CO2 SERPL-SCNC: 25 MMOL/L (ref 20–32)
CREAT SERPL-MCNC: 0.91 MG/DL (ref 0.7–1.35)
EOSINOPHIL # BLD AUTO: 309 CELLS/UL (ref 15–500)
EOSINOPHIL NFR BLD AUTO: 2.6 %
ERYTHROCYTE [DISTWIDTH] IN BLOOD BY AUTOMATED COUNT: 13.5 % (ref 11–15)
GFR/BSA.PRED SERPLBLD CYS-BASED-ARV: 92 ML/MIN/1.73M2
GLOBULIN SER CALC-MCNC: 3.8 G/DL (CALC) (ref 1.9–3.7)
GLUCOSE SERPL-MCNC: 108 MG/DL (ref 65–139)
HCT VFR BLD AUTO: 38.8 % (ref 38.5–50)
HGB BLD-MCNC: 12.8 G/DL (ref 13.2–17.1)
LYMPHOCYTES # BLD AUTO: 1250 CELLS/UL (ref 850–3900)
LYMPHOCYTES NFR BLD AUTO: 10.5 %
MCH RBC QN AUTO: 28.3 PG (ref 27–33)
MCHC RBC AUTO-ENTMCNC: 33 G/DL (ref 32–36)
MCV RBC AUTO: 85.7 FL (ref 80–100)
MONOCYTES # BLD AUTO: 726 CELLS/UL (ref 200–950)
MONOCYTES NFR BLD AUTO: 6.1 %
NEUTROPHILS # BLD AUTO: 9591 CELLS/UL (ref 1500–7800)
NEUTROPHILS NFR BLD AUTO: 80.6 %
PLATELET # BLD AUTO: 312 THOUSAND/UL (ref 140–400)
PMV BLD REES-ECKER: 11.6 FL (ref 7.5–12.5)
POTASSIUM SERPL-SCNC: 4.6 MMOL/L (ref 3.5–5.3)
PROT SERPL-MCNC: 7.5 G/DL (ref 6.1–8.1)
RBC # BLD AUTO: 4.53 MILLION/UL (ref 4.2–5.8)
SODIUM SERPL-SCNC: 136 MMOL/L (ref 135–146)
WBC # BLD AUTO: 11.9 THOUSAND/UL (ref 3.8–10.8)

## 2023-07-21 PROCEDURE — 99214 OFFICE O/P EST MOD 30 MIN: CPT | Performed by: INTERNAL MEDICINE

## 2023-07-21 NOTE — PROGRESS NOTES
Assessment/Plan:             1. Candida infection, esophageal (HCC)  -     CBC and differential; Future  -     Comprehensive metabolic panel; Future    2. Essential hypertension  -     CBC and differential; Future  -     Comprehensive metabolic panel; Future    3. Other emphysema ([address])  -     CBC and differential; Future  -     Comprehensive metabolic panel; Future    4. Severe protein-calorie malnutrition ([address])    5. Mixed hyperlipidemia  -     CBC and differential; Future  -     Comprehensive metabolic panel; Future    6. Gastroesophageal reflux disease without esophagitis    7. Vitamin B12 deficiency    8. Colostomy in place ([address])    9. Crohn's disease of small intestine with other complication ([address])  -     CBC and differential; Future  -     Comprehensive metabolic panel; Future           Subjective:      Patient ID: Luba Terrell is a 79 y.o. male. Follow-up on sore throat, better, having dark urine, appetite improving      The following portions of the patient's history were reviewed and updated as appropriate: He  has a past medical history of LUCILLE (acute kidney injury) ([address]) (6/28/2017), Blindness of left eye, Cancer ([address]), Cataract, Colon polyp, Colostomy in place [facility]) (6/29/2017), COPD (chronic obstructive pulmonary disease) ([address]), Crohn disease ([address]), Emphysema of lung ([address]), Emphysema/COPD ([address]), Essential hypertension, GERD (gastroesophageal reflux disease), Hypertension, Hypokalemia (6/28/2017), Hypomagnesemia (6/29/2017), Hyponatremia (6/28/2017), Kidney stone, Osteomyelitis ([address]), and Pneumonia.   He   Patient Active Problem List    Diagnosis Date Noted   • Candida infection, esophageal ([address]) 07/14/2023   • Multiple adenomatous polyps 07/09/2023   • Portal hypertension ([address]) 02/01/2023   • Multiple subsegmental pulmonary emboli without acute cor pulmonale ([address]) 02/01/2023   • Alcohol dependence, uncomplicated ([address]) 60/85/0668   • Subclinical hypothyroidism 11/12/2022   • Anemia 11/08/2022   • Supraventricular tachycardia ([address])    • History of alcohol use disorder 11/02/2022   • Platelets decreased ([address])    • [address] (hepatocellular carcinoma) ([address]) 03/15/2022   • Encounter for follow-up surveillance of liver cancer 01/21/2022   • Dysthymic disorder 06/28/2021   • Crohn's disease of small intestine with other complication ([address]) 48/93/0052   • Vitamin B12 deficiency 02/24/2021   • Cataract    • Chronic obstructive pulmonary disease ([address]) 11/24/2020   • Mixed hyperlipidemia 11/24/2020   • Kidney stone    • Gastroesophageal reflux disease without esophagitis    • Left wrist pain 09/29/2020   • Hypocalcemia 09/12/2020   • Acute pain of left wrist 09/10/2020   • Chronic, continuous use of opioids 09/10/2020   • Observed sleep apnea    • Palliative care patient 07/31/2020   • Abdominal wall abscess 07/31/2020   • Personal history of liver cancer 06/23/2020   • Abdominal pain 06/04/2020   • Tobacco abuse 05/29/2020   • Severe sepsis ([address]) 05/28/2020   • Pneumonia 05/28/2020   • Chest pain 05/28/2020   • Liver mass 05/28/2020   • Syncope and collapse 04/03/2018   • Emphysema of lung ([address])    • Arthropathy of right wrist 12/04/2017   • Severe protein-calorie malnutrition ([address]) 07/01/2017   • Colostomy in place [facility]) 06/29/2017   • Diarrhea 06/29/2017   • Acute kidney injury ([address]) 06/28/2017   • Essential hypertension    • Emphysema/COPD (HCC)    • Crohn disease ([address])      He  has a past surgical history that includes Colostomy; Cholecystectomy; Colectomy; Colonoscopy (N/A, 06/30/2017); Lithotripsy; Finger surgery (Left); IR biopsy liver mass (06/08/2020); Cataract extraction (Bilateral); Liver lobectomy (N/A, 07/15/2020); Finger amputation; IR microwave ablation (09/21/2022); IR microwave ablation (03/16/2023); Appendectomy (1979); Hernia repair (1978); Tonsillectomy (Child); and Eye surgery (2 yrs ago). His family history includes Heart disease in his sister; Hypertension in his father.   He  reports that he quit smoking about 3 years ago. His smoking use included cigarettes. He started smoking about 52 years ago. He has a 75.00 pack-year smoking history. He has never used smokeless tobacco. He reports that he does not currently use alcohol. He reports that he does not currently use drugs.   Current Outpatient Medications   Medication Sig Dispense Refill   • ALPRAZolam (XANAX) 0.25 mg tablet Take 1 tablet (0.25 mg total) by mouth daily at bedtime as needed for anxiety 90 tablet 0   • AMILoride 5 mg tablet Take 1 tablet (5 mg total) by mouth daily 90 tablet 0   • amLODIPine (NORVASC) 5 mg tablet Take 1 tablet (5 mg total) by mouth daily 90 tablet 0   • buPROPion (WELLBUTRIN XL) 150 mg 24 hr tablet Take 1 tablet (150 mg total) by mouth daily 90 tablet 0   • calcium carbonate (TUMS) 500 mg chewable tablet Chew 1 tablet (500 mg total) daily  0   • carvedilol (COREG) 3.125 mg tablet Take 1 tablet (3.125 mg total) by mouth 2 (two) times a day with meals 180 tablet 1   • cholecalciferol (VITAMIN D3) 400 units tablet Take 400 Units by mouth Every Sunday     • [facility] Magnesium Oxide 250 MG TABS TAKE 1 TABLET (250 MG TOTAL) BY MOUTH IN THE MORNING     • diphenhydramine, lidocaine, Al/Mg hydroxide, simethicone (Magic Mouthwash) SUSP Swish and spit 10 mL every 4 (four) hours as needed for mouth pain or discomfort 237 mL 0   • fluconazole (DIFLUCAN) 150 mg tablet Take 1 tablet (150 mg total) by mouth daily for 14 doses 14 tablet 0   • fluticasone (FLONASE) 50 mcg/act nasal spray SPRAY 2 SPRAYS INTO EACH NOSTRIL EVERY DAY 48 mL 2   • folic acid (FOLVITE) 1 mg tablet Take 1 tablet (1 mg total) by mouth daily 90 tablet 1   • levothyroxine 50 mcg tablet Take 1 tablet (50 mcg total) by mouth daily in the early morning 90 tablet 1   • Lidocaine Viscous HCl (XYLOCAINE) 2 % mucosal solution Swish and spit 15 mL 4 (four) times a day as needed for mouth pain or discomfort 100 mL 1   • Magnesium Oxide 250 MG TABS Take 1 tablet (250 mg total) by mouth in the morning 90 tablet 3   • mirtazapine (REMERON) 15 mg tablet Take 1 tablet (15 mg total) by mouth daily at bedtime 90 tablet 1   • naproxen (Naprosyn) 500 mg tablet Take 1 tablet (500 mg total) by mouth 2 (two) times a day with meals 30 tablet 0   • omeprazole (PriLOSEC) 20 mg delayed release capsule Take 1 capsule (20 mg total) by mouth daily 90 capsule 1   • oxyCODONE (Roxicodone) 5 immediate release tablet Take 1 tablet (5 mg total) by mouth every 6 (six) hours as needed for moderate pain for up to 21 doses Max Daily Amount: 20 mg 21 tablet 0   • potassium chloride (MICRO-K) 10 MEQ CR capsule Take 2 capsules (20 mEq total) by mouth 2 (two) times a day 180 capsule 1   • rosuvastatin (CRESTOR) 10 MG tablet Take 1 tablet (10 mg total) by mouth daily 90 tablet 0   • thiamine 250 MG tablet Take 1 tablet (250 mg total) by mouth daily for 5 days Do not start before November 14, 2022. 5 tablet 0   • umeclidinium-vilanterol (Anoro Ellipta) 62.5-25 mcg/actuation inhaler Inhale 1 puff daily 180 blister 0   • VIT B12-METHIONINE-INOS-CHOL IM Inject into a muscle every 30 (thirty) days       Current Facility-Administered Medications   Medication Dose Route Frequency Provider Last Rate Last Admin   • cyanocobalamin injection 1,000 mcg  1,000 mcg Intramuscular Q30 Days Torri Coleman MD   1,000 mcg at 06/07/23 09   • cyanocobalamin injection 1,000 mcg  1,000 mcg Intramuscular Q30 Days Torri Coleman MD   1,000 mcg at 05/10/23 1326     Current Outpatient Medications on File Prior to Visit   Medication Sig   • ALPRAZolam (XANAX) 0.25 mg tablet Take 1 tablet (0.25 mg total) by mouth daily at bedtime as needed for anxiety   • AMILoride 5 mg tablet Take 1 tablet (5 mg total) by mouth daily   • amLODIPine (NORVASC) 5 mg tablet Take 1 tablet (5 mg total) by mouth daily   • buPROPion (WELLBUTRIN XL) 150 mg 24 hr tablet Take 1 tablet (150 mg total) by mouth daily   • calcium carbonate (TUMS) 500 mg chewable tablet Chew 1 tablet (500 mg total) daily   • carvedilol (COREG) 3.125 mg tablet Take 1 tablet (3.125 mg total) by mouth 2 (two) times a day with meals   • cholecalciferol (VITAMIN D3) 400 units tablet Take 400 Units by mouth Every Sunday   • [facility] Magnesium Oxide 250 MG TABS TAKE 1 TABLET (250 MG TOTAL) BY MOUTH IN THE MORNING   • diphenhydramine, lidocaine, Al/Mg hydroxide, simethicone (Magic Mouthwash) SUSP Swish and spit 10 mL every 4 (four) hours as needed for mouth pain or discomfort   • fluconazole (DIFLUCAN) 150 mg tablet Take 1 tablet (150 mg total) by mouth daily for 14 doses   • fluticasone (FLONASE) 50 mcg/act nasal spray SPRAY 2 SPRAYS INTO EACH NOSTRIL EVERY DAY   • folic acid (FOLVITE) 1 mg tablet Take 1 tablet (1 mg total) by mouth daily   • levothyroxine 50 mcg tablet Take 1 tablet (50 mcg total) by mouth daily in the early morning   • Lidocaine Viscous HCl (XYLOCAINE) 2 % mucosal solution Swish and spit 15 mL 4 (four) times a day as needed for mouth pain or discomfort   • Magnesium Oxide 250 MG TABS Take 1 tablet (250 mg total) by mouth in the morning   • mirtazapine (REMERON) 15 mg tablet Take 1 tablet (15 mg total) by mouth daily at bedtime   • naproxen (Naprosyn) 500 mg tablet Take 1 tablet (500 mg total) by mouth 2 (two) times a day with meals   • omeprazole (PriLOSEC) 20 mg delayed release capsule Take 1 capsule (20 mg total) by mouth daily   • oxyCODONE (Roxicodone) 5 immediate release tablet Take 1 tablet (5 mg total) by mouth every 6 (six) hours as needed for moderate pain for up to 21 doses Max Daily Amount: 20 mg   • potassium chloride (MICRO-K) 10 MEQ CR capsule Take 2 capsules (20 mEq total) by mouth 2 (two) times a day   • rosuvastatin (CRESTOR) 10 MG tablet Take 1 tablet (10 mg total) by mouth daily   • thiamine 250 MG tablet Take 1 tablet (250 mg total) by mouth daily for 5 days Do not start before November 14, 2022.    • umeclidinium-vilanterol (Anoro Ellipta) 62.5-25 mcg/actuation inhaler Inhale 1 puff daily • VIT B12-METHIONINE-INOS-CHOL IM Inject into a muscle every 30 (thirty) days   • [DISCONTINUED] apixaban (Eliquis) 5 mg Take 2 tablets (10 mg total) by mouth 2 (two) times a day for 7 days, THEN 1 tablet (5 mg total) 2 (two) times a day for 23 days. Current Facility-Administered Medications on File Prior to Visit   Medication   • cyanocobalamin injection 1,000 mcg   • cyanocobalamin injection 1,000 mcg     He has No Known Allergies. .    Review of Systems   Constitutional: Negative for chills and fever. HENT: Positive for sore throat. Negative for congestion and ear pain. Eyes: Negative for pain. Respiratory: Negative for cough and shortness of breath. Cardiovascular: Negative for chest pain and leg swelling. Gastrointestinal: Negative for abdominal pain, nausea and vomiting. Endocrine: Negative for polyuria. Genitourinary: Negative for difficulty urinating, frequency and urgency. Musculoskeletal: Negative for arthralgias and back pain. Skin: Negative for rash. Neurological: Negative for weakness and headaches. Psychiatric/Behavioral: Negative for sleep disturbance. The patient is not nervous/anxious.           Objective:      /72 (BP Location: Left arm, Patient Position: Sitting, Cuff Size: Standard)   Pulse 81   Temp (!) 97 °F (36.1 °C) (Temporal)   Ht 5' 9" (1.753 m)   Wt 65.3 kg (144 lb)   SpO2 99%   BMI 21.27 kg/m²     Recent Results (from the past 1344 hour(s))   Tissue Exam    Collection Time: 07/03/23  2:13 PM   Result Value Ref Range    Case Report       Surgical Pathology Report                         Case: [id number]                                   Authorizing Provider:  Rachael Rhodes MD            Collected:           07/03/2023 1413              Ordering Location:     [address] [place] [address]        Received:            07/03/2023 [address]                                                    Pathologist: Rc Ralph MD                                                                 Specimens:   A) - Polyp, Colorectal, ascending polyp x6, cold snare                                              B) - Polyp, Colorectal, descending polyp x3, cold/hot snare                                         C) - Polyp, Colorectal, transverse polyp x6, cold/hot snare                                Final Diagnosis       A. Polyp, Colorectal, ascending polyp x6, cold snare:  - Fragments of tubular adenoma. - No high grade dysplasia and no evidence of malignancy. B. Polyp, Colorectal, descending polyp x3, cold/hot snare:  - Fragments of tubular adenoma. - No high grade dysplasia and no evidence of malignancy. C. Polyp, Colorectal, transverse polyp x6, cold/hot snare:  - Fragments of tubular adenoma. - No high grade dysplasia and no evidence of malignancy. Note       Interpretation performed at [facility], [address] [place] [address]        Additional Information       All reported additional testing was performed with appropriately reactive controls. These tests were developed and their performance characteristics determined by [facility] or appropriate performing facility, though some tests may be performed on tissues which have not been validated for performance characteristics (such as staining performed on alcohol exposed cell blocks and decalcified tissues). Results should be interpreted with caution and in the context of the patients’ clinical condition. These tests may not be cleared or approved by the U.S. Food and Drug Administration, though the FDA has determined that such clearance or approval is not necessary. These tests are used for clinical purposes and they should not be regarded as investigational or for research. This laboratory has been approved by CLIA 88, designated as a high-complexity laboratory and is qualified to perform these tests.   .      Synoptic Checklist COLON/RECTUM POLYP FORM - GI - All Specimens          :    Adenoma(s)      Gross Description       A. The specimen is received in formalin, labeled with the patient's name and hospital number, and is designated " ascending colon polyp x6". The specimen consists of multiple tan soft tissue fragments measuring in aggregate of 1.9 x 0.9 x 0.2 cm. Entirely submitted. One screened cassette. B. The specimen is received in formalin, labeled with the patient's name and hospital number, and is designated " descending polyp x3". The specimen consists of multiple tan soft tissue fragments measuring in aggregate of 1.5 x 0.9 x 0.2 cm. Entirely submitted. One screened cassette. C. The specimen is received in formalin, labeled with the patient's name and hospital number, and is designated " transverse polyp x6". The specimen consists of multiple tan-pink soft tissue fragments measuring in aggregate of 2.1 x 1.2 x 0.2 cm. Entirely submitted. One screened cassette. Note: The estimated total formalin fixation time based upon information provided by the submitting clinician and the standard processing schedule is under 72 hours. EZelaya          Physical Exam  Constitutional:       Appearance: Normal appearance. HENT:      Head: Normocephalic. Right Ear: External ear normal.      Left Ear: External ear normal.      Nose: Nose normal. No congestion. Mouth/Throat:      Mouth: Mucous membranes are moist.      Pharynx: Oropharynx is clear. No oropharyngeal exudate or posterior oropharyngeal erythema. Eyes:      Extraocular Movements: Extraocular movements intact. Conjunctiva/sclera: Conjunctivae normal.   Cardiovascular:      Rate and Rhythm: Normal rate and regular rhythm. Heart sounds: Normal heart sounds. No murmur heard. Pulmonary:      Effort: Pulmonary effort is normal.      Breath sounds: Normal breath sounds. No wheezing or rales.    Abdominal:      General: Bowel sounds are normal. There is no distension. Palpations: Abdomen is soft. Tenderness: There is no abdominal tenderness. Musculoskeletal:         General: Normal range of motion. Cervical back: Normal range of motion and neck supple. Right lower leg: No edema. Left lower leg: No edema. Lymphadenopathy:      Cervical: No cervical adenopathy. Skin:     General: Skin is warm. Neurological:      General: No focal deficit present. Mental Status: He is alert and oriented to person, place, and time.

## 2023-07-31 DIAGNOSIS — G89.18 POSTOPERATIVE PAIN: ICD-10-CM

## 2023-07-31 RX ORDER — OXYCODONE HYDROCHLORIDE 5 MG/1
5 TABLET ORAL EVERY 6 HOURS PRN
Qty: 21 TABLET | Refills: 0 | OUTPATIENT
Start: 2023-07-31

## 2023-08-01 ENCOUNTER — HOSPITAL ENCOUNTER (EMERGENCY)
Facility: HOSPITAL | Age: 68
Discharge: HOME | End: 2023-08-01
Attending: EMERGENCY MEDICINE
Payer: MEDICARE

## 2023-08-01 ENCOUNTER — APPOINTMENT (EMERGENCY)
Dept: RADIOLOGY | Facility: HOSPITAL | Age: 68
End: 2023-08-01
Payer: MEDICARE

## 2023-08-01 VITALS
HEART RATE: 84 BPM | DIASTOLIC BLOOD PRESSURE: 73 MMHG | TEMPERATURE: 97.6 F | SYSTOLIC BLOOD PRESSURE: 133 MMHG | RESPIRATION RATE: 18 BRPM | OXYGEN SATURATION: 100 %

## 2023-08-01 DIAGNOSIS — M77.52 TENDINITIS OF LEFT FOOT: Primary | ICD-10-CM

## 2023-08-01 PROCEDURE — 73630 X-RAY EXAM OF FOOT: CPT

## 2023-08-01 PROCEDURE — 73610 X-RAY EXAM OF ANKLE: CPT

## 2023-08-01 PROCEDURE — 99284 EMERGENCY DEPT VISIT MOD MDM: CPT

## 2023-08-01 PROCEDURE — 99284 EMERGENCY DEPT VISIT MOD MDM: CPT | Performed by: EMERGENCY MEDICINE

## 2023-08-01 RX ORDER — PREDNISONE 20 MG/1
40 TABLET ORAL ONCE
Status: COMPLETED | OUTPATIENT
Start: 2023-08-01 | End: 2023-08-01

## 2023-08-01 RX ORDER — IBUPROFEN 600 MG/1
600 TABLET ORAL ONCE
Status: COMPLETED | OUTPATIENT
Start: 2023-08-01 | End: 2023-08-01

## 2023-08-01 RX ORDER — ACETAMINOPHEN 325 MG/1
650 TABLET ORAL ONCE
Status: COMPLETED | OUTPATIENT
Start: 2023-08-01 | End: 2023-08-01

## 2023-08-01 RX ORDER — OXYCODONE HYDROCHLORIDE 5 MG/1
5 TABLET ORAL EVERY 4 HOURS PRN
Qty: 15 TABLET | Refills: 0 | Status: SHIPPED | OUTPATIENT
Start: 2023-08-01 | End: 2023-08-11

## 2023-08-01 RX ORDER — PREDNISONE 10 MG/1
40 TABLET ORAL DAILY
Qty: 20 TABLET | Refills: 0 | Status: SHIPPED | OUTPATIENT
Start: 2023-08-01 | End: 2023-08-06

## 2023-08-01 RX ADMIN — IBUPROFEN 600 MG: 600 TABLET, FILM COATED ORAL at 11:05

## 2023-08-01 RX ADMIN — PREDNISONE 40 MG: 20 TABLET ORAL at 11:05

## 2023-08-01 RX ADMIN — ACETAMINOPHEN 650 MG: 325 TABLET, FILM COATED ORAL at 11:05

## 2023-08-01 NOTE — ED PROVIDER NOTES
History  Chief Complaint   Patient presents with   • Foot Pain     Pt c/o L foot and ankle pain x1 day; denies injury         49-year-old male, presents with severe left foot and ankle pain.,  No previous history of this.,  Says he was doing a lot of yard work over the past 3 days where he was sitting Manish style causing his left foot/ankle to be held in a hyper plantarflexion position for extended periods of time, this was in atypical activity for him. Otherwise no direct trauma to the area. No previous injury to the foot. No history of gout. Over the past 12 hours patient has had increasing swelling of the dorsum of the left foot, erythema and severe pain with any palpation or movement of the foot      History provided by:  Patient  Leg Pain  Location:  Ankle and foot  Time since incident:  1 day  Injury: no    Ankle location:  L ankle  Foot location:  L foot and dorsum of L foot  Pain details:     Quality:  Aching    Radiates to:  Does not radiate    Severity:  Moderate    Onset quality:  Gradual    Duration:  1 day    Timing:  Constant    Progression:  Worsening  Chronicity:  New  Relieved by:  Rest  Worsened by: Activity (Any palpation or movement)  Associated symptoms: no fever        Prior to Admission Medications   Prescriptions Last Dose Informant Patient Reported? Taking?    ALPRAZolam (XANAX) 0.25 mg tablet  Self No No   Sig: Take 1 tablet (0.25 mg total) by mouth daily at bedtime as needed for anxiety   AMILoride 5 mg tablet  Self No No   Sig: Take 1 tablet (5 mg total) by mouth daily   [employer]S Magnesium Oxide 250 MG TABS  Self Yes No   Sig: TAKE 1 TABLET (250 MG TOTAL) BY MOUTH IN THE MORNING   Lidocaine Viscous HCl (XYLOCAINE) 2 % mucosal solution  Self No No   Sig: Swish and spit 15 mL 4 (four) times a day as needed for mouth pain or discomfort   Magnesium Oxide 250 MG TABS  Self No No   Sig: Take 1 tablet (250 mg total) by mouth in the morning   VIT B12-METHIONINE-INOS-CHOL IM  Self Yes No   Sig: Inject into a muscle every 30 (thirty) days   amLODIPine (NORVASC) 5 mg tablet  Self No No   Sig: Take 1 tablet (5 mg total) by mouth daily   buPROPion (WELLBUTRIN XL) 150 mg 24 hr tablet  Self No No   Sig: Take 1 tablet (150 mg total) by mouth daily   calcium carbonate (TUMS) 500 mg chewable tablet  Self No No   Sig: Chew 1 tablet (500 mg total) daily   carvedilol (COREG) 3.125 mg tablet  Self No No   Sig: Take 1 tablet (3.125 mg total) by mouth 2 (two) times a day with meals   cholecalciferol (VITAMIN D3) 400 units tablet  Self Yes No   Sig: Take 400 Units by mouth Every Sunday   diphenhydramine, lidocaine, Al/Mg hydroxide, simethicone (Magic Mouthwash) SUSP  Self No No   Sig: Swish and spit 10 mL every 4 (four) hours as needed for mouth pain or discomfort   fluticasone (FLONASE) 50 mcg/act nasal spray  Self No No   Sig: SPRAY 2 SPRAYS INTO EACH NOSTRIL EVERY DAY   folic acid (FOLVITE) 1 mg tablet  Self No No   Sig: Take 1 tablet (1 mg total) by mouth daily   levothyroxine 50 mcg tablet  Self No No   Sig: Take 1 tablet (50 mcg total) by mouth daily in the early morning   mirtazapine (REMERON) 15 mg tablet  Self No No   Sig: Take 1 tablet (15 mg total) by mouth daily at bedtime   naproxen (Naprosyn) 500 mg tablet  Self No No   Sig: Take 1 tablet (500 mg total) by mouth 2 (two) times a day with meals   omeprazole (PriLOSEC) 20 mg delayed release capsule  Self No No   Sig: Take 1 capsule (20 mg total) by mouth daily   oxyCODONE (Roxicodone) 5 immediate release tablet  Self No No   Sig: Take 1 tablet (5 mg total) by mouth every 6 (six) hours as needed for moderate pain for up to 21 doses Max Daily Amount: 20 mg   potassium chloride (MICRO-K) 10 MEQ CR capsule  Self No No   Sig: Take 2 capsules (20 mEq total) by mouth 2 (two) times a day   rosuvastatin (CRESTOR) 10 MG tablet  Self No No   Sig: Take 1 tablet (10 mg total) by mouth daily   thiamine 250 MG tablet  Self No No   Sig: Take 1 tablet (250 mg total) by mouth daily for 5 days Do not start before November 14, 2022. umeclidinium-vilanterol (Anoro Ellipta) 62.5-25 mcg/actuation inhaler  Self No No   Sig: Inhale 1 puff daily      Facility-Administered Medications Last Administration Doses Remaining   cyanocobalamin injection 1,000 mcg 6/7/2023  9:44 AM    cyanocobalamin injection 1,000 mcg 5/10/2023  1:26 PM           Past Medical History:   Diagnosis Date   • LUCILLE (acute kidney injury) ([address]) 6/28/2017   • Blindness of left eye     Since birth   • Cancer [facility])     liver   • Cataract    • Colon polyp    • Colostomy in place [facility]) 6/29/2017   • COPD (chronic obstructive pulmonary disease) (HCC)    • Crohn disease (HCC)    • Emphysema of lung (HCC)    • Emphysema/COPD (HCC)    • Essential hypertension    • GERD (gastroesophageal reflux disease)    • Hypertension    • Hypokalemia 6/28/2017   • Hypomagnesemia 6/29/2017   • Hyponatremia 6/28/2017   • Kidney stone    • Osteomyelitis ([address])    • Pneumonia        Past Surgical History:   Procedure Laterality Date   • APPENDECTOMY  1979   • CATARACT EXTRACTION Bilateral    • CHOLECYSTECTOMY     • COLECTOMY      10 inchs of ileum   • COLONOSCOPY N/A 06/30/2017    Procedure: COLONOSCOPY;  Surgeon: Louisa Piña MD;  [facility]; Service: Gastroenterology   • COLOSTOMY     • EYE SURGERY  2 yrs ago   • FINGER AMPUTATION     • FINGER SURGERY Left    • HERNIA REPAIR  1978   • IR BIOPSY LIVER MASS  06/08/2020   • IR MICROWAVE ABLATION  09/21/2022   • IR MICROWAVE ABLATION  03/16/2023   • LITHOTRIPSY     • LIVER LOBECTOMY N/A 07/15/2020    Procedure: LIVER ABLATION, INTRAOPERATIVE U/S LIVER;  Surgeon: Pietro Capone MD;  [facility];  Service: Surgical Oncology   • TONSILLECTOMY  Child       Family History   Problem Relation Age of Onset   • Hypertension Father    • Heart disease Sister      I have reviewed and agree with the history as documented.     E-Cigarette/Vaping   • E-Cigarette Use Never User      E-Cigarette/Vaping Substances   • Nicotine No    • THC No    • CBD No    • Flavoring No      Social History     Tobacco Use   • Smoking status: Former     Packs/day: 1.50     Years: 50.00     Total pack years: 75.00     Types: Cigarettes     Start date: 26     Quit date: 7/15/2020     Years since quitting: 3.0   • Smokeless tobacco: Never   Vaping Use   • Vaping Use: Never used   Substance Use Topics   • Alcohol use: Not Currently   • Drug use: Not Currently       Review of Systems   Constitutional: Negative for fever. Respiratory: Negative for chest tightness and shortness of breath. Cardiovascular: Negative for chest pain. Skin: Negative for rash. Neurological: Negative for dizziness, light-headedness and headaches. Physical Exam  Physical Exam  Vitals reviewed. Constitutional:       Appearance: He is well-developed. HENT:      Head: Atraumatic. Eyes:      General: No scleral icterus. Right eye: No discharge. Left eye: No discharge. Conjunctiva/sclera: Conjunctivae normal.   Neck:      Trachea: No tracheal deviation. Cardiovascular:      Comments: Was tachycardic in triage admits he was in a lot of pain when they were taking his heart rate, his heart rate is 85 at my time of examination  Pulmonary:      Effort: Pulmonary effort is normal. No respiratory distress. Breath sounds: No stridor. Musculoskeletal:         General: No deformity. Cervical back: Neck supple. Comments: Left foot:, Moderate amount of swelling and erythema over the dorsum of the left foot very tender to even light palpation. Pain with any active or passive range of motion throughout the foot and ankle joint. No proximal erythema   Skin:     General: Skin is warm and dry. Coloration: Skin is not pale. Findings: No erythema or rash. Neurological:      Mental Status: He is alert. Motor: No abnormal muscle tone.       Coordination: Coordination normal.         Vital Signs  ED Triage Vitals [08/01/23 0923]   Temperature Pulse Respirations Blood Pressure SpO2   97.6 °F (36.4 °C) (!) 112 18 133/73 96 %      Temp Source Heart Rate Source Patient Position - Orthostatic VS BP Location FiO2 (%)   Oral Monitor Sitting Right arm --      Pain Score       --           Vitals:    08/01/23 0923 08/01/23 1051   BP: 133/73    Pulse: (!) 112 84   Patient Position - Orthostatic VS: Sitting          Visual Acuity      ED Medications  Medications   predniSONE tablet 40 mg (40 mg Oral Given 8/1/23 1105)   acetaminophen (TYLENOL) tablet 650 mg (650 mg Oral Given 8/1/23 1105)   ibuprofen (MOTRIN) tablet 600 mg (600 mg Oral Given 8/1/23 1105)       Diagnostic Studies  Results Reviewed     None                 XR foot 3+ views LEFT   ED Interpretation by Shanice Rausch DO (08/01 1029)   No acute pathology      XR ankle 3+ views LEFT   ED Interpretation by Shanice Rausch DO (08/01 1029)   No acute pathology                 Procedures  Procedures         ED Course                                             Medical Decision Making        Initial ED assessment: 26-year-old male, severe left foot pain, no previous history of this.,  Started after sitting Manish style for extended periods of time over the past couple of days while doing yard work    Initial DDx includes but is not limited to:   Tendinitis  of the dorsiflexors of the left foot, possibly gout infectious etiology felt to be much less likely    Initial ED plan:   X-ray of the foot        Final ED summary/disposition:   After evaluation and workup in the emergency department, work-up unremarkable, patient significantly uncomfortable, placed on 5 days of prednisone for severe inflammation, will follow with orthopedics if it continues patient has a pair of crutches which he is using to help him get around. Amount and/or Complexity of Data Reviewed  Radiology: ordered and independent interpretation performed. Risk  OTC drugs.   Prescription drug management. Disposition  Final diagnoses:   Tendinitis of left foot     Time reflects when diagnosis was documented in both MDM as applicable and the Disposition within this note     Time User Action Codes Description Comment    8/1/2023 10:53 AM Emiliano Schulz [M77.52] Tendinitis of left foot       ED Disposition     ED Disposition   Discharge    Condition   Stable    Date/Time   Tue Aug 1, 2023 10:54 AM    Comment   Annette Farah III discharge to home/self care. Follow-up Information     Follow up With Specialties Details Why Contact Info Additional [address] [facility] Call today To arrange for the next available appointment [address] [phone]  [facility], [address] [place], [place] [address]do, [place], [place], [address] [facility] Call today To arrange for the next available appointment [address] [place] [phone]  [address] [place] [address], [address] [place] [address], [phone]          Discharge Medication List as of 8/1/2023 11:15 AM      START taking these medications    Details   !! oxyCODONE (Roxicodone) 5 immediate release tablet Take 1 tablet (5 mg total) by mouth every 4 (four) hours as needed for moderate pain for up to 10 days Max Daily Amount: 30 mg, Starting Tue 8/1/2023, Until Fri 8/11/2023 at 2359, Normal      predniSONE 10 mg tablet Take 4 tablets (40 mg total) by mouth daily for 5 days, Starting Tue 8/1/2023, Until Sun 8/6/2023, Normal       !! - Potential duplicate medications found. Please discuss with provider.       CONTINUE these medications which have NOT CHANGED    Details   ALPRAZolam (XANAX) 0.25 mg tablet Take 1 tablet (0.25 mg total) by mouth daily at bedtime as needed for anxiety, Starting Thu 8/25/2022, Until Fri 7/21/2023 at 2359, Normal      AMILoride 5 mg tablet Take 1 tablet (5 mg total) by mouth daily, Starting Mon 4/17/2023, Normal      amLODIPine (NORVASC) 5 mg tablet Take 1 tablet (5 mg total) by mouth daily, Starting Mon 4/17/2023, Normal      buPROPion (WELLBUTRIN XL) 150 mg 24 hr tablet Take 1 tablet (150 mg total) by mouth daily, Starting Thu 10/13/2022, Normal      calcium carbonate (TUMS) 500 mg chewable tablet Chew 1 tablet (500 mg total) daily, Starting Sat 9/12/2020, No Print      carvedilol (COREG) 3.125 mg tablet Take 1 tablet (3.125 mg total) by mouth 2 (two) times a day with meals, Starting Fri 12/16/2022, Normal      cholecalciferol (VITAMIN D3) 400 units tablet Take 400 Units by mouth Every Sunday, Historical Med      [employer]S Magnesium Oxide 250 MG TABS TAKE 1 TABLET (250 MG TOTAL) BY MOUTH IN THE MORNING, Historical Med      diphenhydramine, lidocaine, Al/Mg hydroxide, simethicone (Magic Mouthwash) SUSP Swish and spit 10 mL every 4 (four) hours as needed for mouth pain or discomfort, Starting Mon 7/10/2023, Normal      fluticasone (FLONASE) 50 mcg/act nasal spray SPRAY 2 SPRAYS INTO EACH NOSTRIL EVERY DAY, Normal      folic acid (FOLVITE) 1 mg tablet Take 1 tablet (1 mg total) by mouth daily, Starting Fri 12/16/2022, Normal      levothyroxine 50 mcg tablet Take 1 tablet (50 mcg total) by mouth daily in the early morning, Starting Fri 12/16/2022, Normal      Lidocaine Viscous HCl (XYLOCAINE) 2 % mucosal solution Swish and spit 15 mL 4 (four) times a day as needed for mouth pain or discomfort, Starting Mon 7/10/2023, Print      Magnesium Oxide 250 MG TABS Take 1 tablet (250 mg total) by mouth in the morning, Starting Tue 11/22/2022, Normal      mirtazapine (REMERON) 15 mg tablet Take 1 tablet (15 mg total) by mouth daily at bedtime, Starting Fri 12/16/2022, Normal      naproxen (Naprosyn) 500 mg tablet Take 1 tablet (500 mg total) by mouth 2 (two) times a day with meals, Starting Sat 3/18/2023, Normal      omeprazole (PriLOSEC) 20 mg delayed release capsule Take 1 capsule (20 mg total) by mouth daily, Starting Thu 11/17/2022, Normal      !! oxyCODONE (Roxicodone) 5 immediate release tablet Take 1 tablet (5 mg total) by mouth every 6 (six) hours as needed for moderate pain for up to 21 doses Max Daily Amount: 20 mg, Starting Sat 3/18/2023, Normal      potassium chloride (MICRO-K) 10 MEQ CR capsule Take 2 capsules (20 mEq total) by mouth 2 (two) times a day, Starting Wed 2/1/2023, Normal      rosuvastatin (CRESTOR) 10 MG tablet Take 1 tablet (10 mg total) by mouth daily, Starting Thu 10/13/2022, Normal      thiamine 250 MG tablet Take 1 tablet (250 mg total) by mouth daily for 5 days Do not start before November 14, 2022., Starting Mon 11/14/2022, Until Fri 7/21/2023, Normal      umeclidinium-vilanterol (Anoro Ellipta) 62.5-25 mcg/actuation inhaler Inhale 1 puff daily, Starting Mon 3/13/2023, Until Fri 7/21/2023, Normal      VIT B12-METHIONINE-INOS-CHOL IM Inject into a muscle every 30 (thirty) days, Historical Med       !! - Potential duplicate medications found. Please discuss with provider. No discharge procedures on file.     PDMP Review       Value Time User    PDMP Reviewed  Yes 7/21/2023 11:43 AM Sigrid Jimenez MD          ED Provider  Electronically Signed by           Rachelle Drake DO  08/01/23 [id number]

## 2023-08-02 ENCOUNTER — VBI (OUTPATIENT)
Dept: INTERNAL MEDICINE CLINIC | Facility: CLINIC | Age: 68
End: 2023-08-02

## 2023-08-02 NOTE — TELEPHONE ENCOUNTER
08/02/23 10:00 AM    Patient contacted post ED visit, [facility] department spoke with patient/caregiver and outreach was successful. Thank you.   Zeus LOPEZ [facility]ly [facility]

## 2023-08-03 NOTE — PROGRESS NOTES
Pre-Test Genetic Counseling Consult Note    Patient Name: Maria Isabel Bruce III   /Age: 1955/ y.o. Referring Provider: Sola Wood MD    Date of Service: 2023  Genetic Counselor: Kiel Diallo MS, [facility]  Interpretation Services: None  [facility]    Tyrel Thorpe was referred to the [address] [place] [address],[address] and Genetic Assessment Program due to his personal history of colon polyps. he presents today to discuss the possibility of a hereditary cancer syndrome, options for genetic testing, and implications for him and his family. Cancer History and Treatment:   Personal History:   Personal history of liver cancer   2020 Initial Diagnosis     Hepatocellular carcinoma, moderately differentiated      7/15/2020 Surgery     Microwave ablation of liver segment 5       Screening Hx:   Colon:  S/p partial colectomy (10 inch of ileum) secondary to Crohn's disease     Colonoscopy (2023): Findings:   Six sessile polyps measuring from 5-16 mm in the ascending colon  Six sessile polyps measuring from 6-16 mm in the transverse colon  Three sessile polyps measuring from 7-12 mm in the descending colon  F/u colonoscopy recommended in 1 year     Pathology:   A. Polyp, Colorectal, ascending polyp x6, cold snare:  - Fragments of tubular adenoma. - No high grade dysplasia and no evidence of malignancy.     B. Polyp, Colorectal, descending polyp x3, cold/hot snare:  - Fragments of tubular adenoma. - No high grade dysplasia and no evidence of malignancy.     C. Polyp, Colorectal, transverse polyp x6, cold/hot snare:  - Fragments of tubular adenoma. - No high grade dysplasia and no evidence of malignancy. Previous colonoscopy: >10 polyps     Cumulative lifetime polyps: >25    Skin:  No current screening    Prostate:  Prostate screening: no records available     Other screening: Rey Jarett does not follow up with ophthalmology routinely.  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 The muscles were damaged after the procedure which resulted in vision loss. Medical and Surgical History  Pertinent surgical history:   Past Surgical History:   Procedure Laterality Date   • APPENDECTOMY  1979   • CATARACT EXTRACTION Bilateral    • CHOLECYSTECTOMY     • COLECTOMY      10 inchs of ileum   • COLONOSCOPY N/A 06/30/2017    Procedure: COLONOSCOPY;  Surgeon: Tevin Black MD;  [facility]; Service: Gastroenterology   • COLOSTOMY     • EYE SURGERY  2 yrs ago   • FINGER AMPUTATION     • FINGER SURGERY Left    • HERNIA REPAIR  1978   • IR BIOPSY LIVER MASS  06/08/2020   • IR MICROWAVE ABLATION  09/21/2022   • IR MICROWAVE ABLATION  03/16/2023   • LITHOTRIPSY     • LIVER LOBECTOMY N/A 07/15/2020    Procedure: LIVER ABLATION, INTRAOPERATIVE U/S LIVER;  Surgeon: Edwige Cook MD;  [facility];  Service: Surgical Oncology   • TONSILLECTOMY  Child      Pertinent medical history:  Past Medical History:   Diagnosis Date   • LUCILLE (acute kidney injury) ([address]) 6/28/2017   • Blindness of left eye     Since birth   • Cancer [facility])     liver   • Cataract    • Colon polyp    • Colostomy in place [facility]) 6/29/2017   • COPD (chronic obstructive pulmonary disease) ([address])    • Crohn disease ([address])    • Emphysema of lung (HCC)    • Emphysema/COPD (HCC)    • Essential hypertension    • GERD (gastroesophageal reflux disease)    • Hypertension    • Hypokalemia 6/28/2017   • Hypomagnesemia 6/29/2017   • Hyponatremia 6/28/2017   • Kidney stone    • Osteomyelitis (HCC)    • Pneumonia          Other History:  Height:   Ht Readings from Last 1 Encounters:   07/21/23 5' 9" (1.753 m)     Weight:   Wt Readings from Last 1 Encounters:   07/21/23 65.3 kg (144 lb)       Relevant Family History   Patient reports non-Ashkenazi Orthodox ancestry.      Maternal Family History:  Non-contributory     Paternal Family History:  Father: prostate cancer diagnosed at 72 (currently 79 y/o)    Please refer to the scanned pedigree in the Media Tab for a complete family history     *All history is reported as provided by the patient; records are not available for review, except where indicated. Assessment:  We discussed sporadic, familial and hereditary cancer. We reviewed that only 5-10% of cancers are considered hereditary. Cancers such as lung cancer, cervical cancer, testicular cancer, and liver cancer very rarely have a hereditary etiology, and we cannot test for a genetic predisposition for these cancers at this time. We also discussed the many factors that influence our risk for cancer such as age, environmental exposures, lifestyle choices and family history. 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 We reviewed the indications suggestive of a hereditary predisposition to cancer. Genetic testing is indicated for Renetta Valadez based on the following criteria: Meets NCCN V2.2022 Testing Criteria for Adenomatous Polyposis: personal history of >20 cumulative colon adenomas. The risks, benefits, and limitations of genetic testing were reviewed with the patient, as well as genetic discrimination laws, and possible test results (positive, negative, variants of uncertain significance) and their clinical implications. If positive for a mutation, options for managing cancer risk including increased surveillance, chemoprevention, and in some cases prophylactic surgery were discussed. Renetta Valadez was informed that if a hereditary cancer syndrome was identified in him, first degree relatives (parents, siblings, and children) have a chance of also inheriting the condition. Genetic testing would allow for predictive genetic testing in other relatives, who may also be at risk depending on their degree of relation. Plan: Patient decided to proceed with testing and provided consent.     Summary:     Sample Collection:  The patient's blood sample was drawn in the office on 8/4/23 by medical assistant Awais Santos    Genetic Testing Preformed: [facility] Common Hereditary Cancers Panel + RNA (52 genes): APC, NILDA, AXIN2, BARD1, BMPR1A, BRCA1, BRCA2, BRIP1, CDH1, CDK4, CDKN2A, CHEK2, CTNNA1, DICER1, EPCAM, GREM1, HOXB13, KIT, MEN1, MLH1, MSH2, MSH3, MSH6, MUTYH, NBN, NF1, NTHL1, PALB2, PDGFRA, PMS2, POLD1, POLE, PTEN, RAD50, RAD51C, RAD51D, SDHA, SDHB, SDHC, SDHD, SMAD4, SMARCA4, STK11, TP53, TSC1, TSC2, VHL    Result Call Information:  In the event that we need to reach Gena Barahona via telephone:  I confirmed the patient's mobile number on file as the best number to call with results  I confirmed with the patient that we can leave a voicemail on his mobile number    Results take approximately 2-3 weeks to complete once test is started. Gena Barahona will be notified via [facility] once results are available. Additional recommendations for surveillance/medical management will be made pending genetic test results.

## 2023-08-04 ENCOUNTER — DOCUMENTATION (OUTPATIENT)
Dept: GENETICS | Facility: CLINIC | Age: 68
End: 2023-08-04

## 2023-08-04 ENCOUNTER — CLINICAL SUPPORT (OUTPATIENT)
Dept: GENETICS | Facility: CLINIC | Age: 68
End: 2023-08-04
Payer: MEDICARE

## 2023-08-04 DIAGNOSIS — Z85.05 PERSONAL HISTORY OF LIVER CANCER: Primary | ICD-10-CM

## 2023-08-04 DIAGNOSIS — Z86.010 PERSONAL HISTORY OF COLONIC POLYPS: ICD-10-CM

## 2023-08-04 PROCEDURE — 36415 COLL VENOUS BLD VENIPUNCTURE: CPT

## 2023-08-04 NOTE — LETTER
2023     Alexandre Shah MD  [address] [facility]  [address] [facility],[address]    Patient: Bonita Marshall III  YOB: 1955  Date of Visit: 2023      Dear Dr. Brenden Miller: Thank you for referring Marylene Clock to me for evaluation. Below are my notes for this consultation. If you have questions, please do not hesitate to call me. I look forward to following your patient along with you. Sincerely,        Benito Michaud        CC: No Recipients        Pre-Test Genetic Counseling Consult Note    Patient Name: Bonita Marshall III   /Age: 1955/67 y.o. Referring Provider: Alexandre Shah MD    Date of Service: 2023  Genetic Counselor: Benito Michaud MS, [facility]  Interpretation Services: None  [facility]    Jaleel Blackburn was referred to the [address] [place] [address],[address] and Genetic Assessment Program due to his personal history of colon polyps. he presents today to discuss the possibility of a hereditary cancer syndrome, options for genetic testing, and implications for him and his family. Cancer History and Treatment:   Personal History:   Personal history of liver cancer   2020 Initial Diagnosis     Hepatocellular carcinoma, moderately differentiated      7/15/2020 Surgery     Microwave ablation of liver segment 5       Screening Hx:   Colon:  S/p partial colectomy (10 inch of ileum) secondary to Crohn's disease     Colonoscopy (2023): Findings:   Six sessile polyps measuring from 5-16 mm in the ascending colon  Six sessile polyps measuring from 6-16 mm in the transverse colon  Three sessile polyps measuring from 7-12 mm in the descending colon  F/u colonoscopy recommended in 1 year     Pathology:   A. Polyp, Colorectal, ascending polyp x6, cold snare:  - Fragments of tubular adenoma. - No high grade dysplasia and no evidence of malignancy.     B.  Polyp, Colorectal, descending polyp x3, cold/hot snare:  - Fragments of tubular adenoma. - No high grade dysplasia and no evidence of malignancy.     C. Polyp, Colorectal, transverse polyp x6, cold/hot snare:  - Fragments of tubular adenoma. - No high grade dysplasia and no evidence of malignancy. Previous colonoscopy: >10 polyps     Cumulative lifetime polyps: >25    Skin:  No current screening    Prostate:  Prostate screening: no records available     Other screening: Tiffanie Santiago does not follow up with ophthalmology routinely. 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 The muscles were damaged after the procedure which resulted in vision loss. Medical and Surgical History  Pertinent surgical history:   Past Surgical History:   Procedure Laterality Date   • APPENDECTOMY  1979   • CATARACT EXTRACTION Bilateral    • CHOLECYSTECTOMY     • COLECTOMY      10 inchs of ileum   • COLONOSCOPY N/A 06/30/2017    Procedure: COLONOSCOPY;  Surgeon: Jamar Escalera MD;  [facility];   Service: Gastroenterology   • COLOSTOMY     • EYE SURGERY  2 yrs ago   • FINGER AMPUTATION     • FINGER SURGERY Left    • HERNIA REPAIR  1978   • IR BIOPSY LIVER MASS  06/08/2020   • IR MICROWAVE ABLATION  09/21/2022   • IR MICROWAVE ABLATION  03/16/2023   • LITHOTRIPSY     • LIVER LOBECTOMY N/A 07/15/2020    Procedure: LIVER ABLATION, INTRAOPERATIVE U/S LIVER;  Surgeon: Airam Retana MD;  Location[facility];  Service: Surgical Oncology   • TONSILLECTOMY  Child      Pertinent medical history:  Past Medical History:   Diagnosis Date   • LUCILLE (acute kidney injury) ([address]) 6/28/2017   • Blindness of left eye     Since birth   • Cancer [facility])     liver   • Cataract    • Colon polyp    • Colostomy in place [facility]) 6/29/2017   • COPD (chronic obstructive pulmonary disease) ([address])    • Crohn disease ([address])    • Emphysema of lung (HCC)    • Emphysema/COPD (HCC)    • Essential hypertension    • GERD (gastroesophageal reflux disease)    • Hypertension    • Hypokalemia 6/28/2017   • Hypomagnesemia 6/29/2017   • Hyponatremia 6/28/2017   • Kidney stone    • Osteomyelitis (HCC)    • Pneumonia          Other History:  Height:   Ht Readings from Last 1 Encounters:   07/21/23 5' 9" (1.753 m)     Weight:   Wt Readings from Last 1 Encounters:   07/21/23 65.3 kg (144 lb)       Relevant Family History   Patient reports non-Ashkenazi Confucianism ancestry. Maternal Family History:  Non-contributory     Paternal Family History:  Father: prostate cancer diagnosed at 72 (currently 81 y/o)    Please refer to the scanned pedigree in the Media Tab for a complete family history     *All history is reported as provided by the patient; records are not available for review, except where indicated. Assessment:  We discussed sporadic, familial and hereditary cancer. We reviewed that only 5-10% of cancers are considered hereditary. Cancers such as lung cancer, cervical cancer, testicular cancer, and liver cancer very rarely have a hereditary etiology, and we cannot test for a genetic predisposition for these cancers at this time. We also discussed the many factors that influence our risk for cancer such as age, environmental exposures, lifestyle choices and family history. 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 We reviewed the indications suggestive of a hereditary predisposition to cancer. Genetic testing is indicated for Janiya Glynn based on the following criteria: Meets NCCN V2.2022 Testing Criteria for Adenomatous Polyposis: personal history of >20 cumulative colon adenomas. The risks, benefits, and limitations of genetic testing were reviewed with the patient, as well as genetic discrimination laws, and possible test results (positive, negative, variants of uncertain significance) and their clinical implications.  If positive for a mutation, options for managing cancer risk including increased surveillance, chemoprevention, and in some cases prophylactic surgery were discussed. Ileana Wahl was informed that if a hereditary cancer syndrome was identified in him, first degree relatives (parents, siblings, and children) have a chance of also inheriting the condition. Genetic testing would allow for predictive genetic testing in other relatives, who may also be at risk depending on their degree of relation. Plan: Patient decided to proceed with testing and provided consent. Summary:     Sample Collection:  The patient's blood sample was drawn in the office on 8/4/23 by medical assistant Rosey Castelan    Genetic Testing Preformed: [facility] Common Hereditary Cancers Panel + RNA (52 genes): APC, NILDA, AXIN2, BARD1, BMPR1A, BRCA1, BRCA2, BRIP1, CDH1, CDK4, CDKN2A, CHEK2, CTNNA1, DICER1, EPCAM, GREM1, HOXB13, KIT, MEN1, MLH1, MSH2, MSH3, MSH6, MUTYH, NBN, NF1, NTHL1, PALB2, PDGFRA, PMS2, POLD1, POLE, PTEN, RAD50, RAD51C, RAD51D, SDHA, SDHB, SDHC, SDHD, SMAD4, SMARCA4, STK11, TP53, TSC1, TSC2, VHL    Result Call Information:  In the event that we need to reach Ileana Wahl via telephone:  I confirmed the patient's mobile number on file as the best number to call with results  I confirmed with the patient that we can leave a voicemail on his mobile number    Results take approximately 2-3 weeks to complete once test is started. Ileana Wahl will be notified via Shut Down once results are available. Additional recommendations for surveillance/medical management will be made pending genetic test results.

## 2023-08-14 ENCOUNTER — CLINICAL SUPPORT (OUTPATIENT)
Dept: INTERNAL MEDICINE CLINIC | Facility: CLINIC | Age: 68
End: 2023-08-14
Payer: MEDICARE

## 2023-08-14 DIAGNOSIS — E53.8 VITAMIN B12 DEFICIENCY: Primary | ICD-10-CM

## 2023-08-14 RX ADMIN — CYANOCOBALAMIN 1000 MCG: 1000 INJECTION, SOLUTION INTRAMUSCULAR; SUBCUTANEOUS at 11:21

## 2023-08-18 ENCOUNTER — TELEPHONE (OUTPATIENT)
Dept: GENETICS | Facility: CLINIC | Age: 68
End: 2023-08-18

## 2023-08-18 NOTE — TELEPHONE ENCOUNTER
Post-Test Genetic Counseling Consult Note  Today I left a voicemail for Ileana Wahl reviewing the results of his genetic test for hereditary cancer. We met previously on 8/4/23 for pre-test counseling. I encouraged Ileana Wahl to call [phone] to discuss this result further. A copy of this consult note and genetic test result will be shared with the patient. SUMMARY:    Test(s): [facility] Cancer Panel + RNA (47 genes): APC, NILAD, AXIN2, BARD1, BMPR1A, BRCA1, BRCA2, BRIP1, CDH1, CDK4, CDKN2A, CHEK2, CTNNA1, DICER1, EPCAM, GREM1, HOXB13, KIT, MEN1, MLH1, MSH2, MSH3, MSH6, MUTYH, NBN, NF1, NTHL1, PALB2, PDGFRA, PMS2, POLD1, POLE, PTEN, RAD50, RAD51C, RAD51D, SDHA, SDHB, SDHC, SDHD, SMAD4, SMARCA4, STK11, TP53, TSC1, TSC2, VHL    Result: Negative - No Clinically Significant Variants Detected      Assessment:   A negative result reduces the likelihood that Ileana Wahl has a hereditary polyposis condition. However, this testing is unable to completely rule it out. It remains possible that:  - There is a variant in an area of a gene which was not tested or there is a variant not detectable due to technical limitations of this test.     - There is a variant in another gene that was not included in this test or in a gene not known to be linked to cancer or tumors. - There is somatic mosaicism for an APC mutation in which sequencing of the APC gene in DNA extracted from peripheral blood lymphocytes may fail to detect pathogenic variants because of weak mutation signals. Somatic mosacism can occur in up to 20% of individuals who have unaffected parents. 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 The NCCN Genetic/Familial High Risk Assessment:Colorectal Guideline V1.2023 has surveillance/management recommendations for individuals with Colonic Adenomatous Polyposis of Unknown Etiology (CPUE).  Colonic Adenomatous Polyposis of Unknown Etiology is defined as individuals with 10 or more adenomas without a pathogenic variant identified in a polyposis gene. Surveillance recommendations for first-degree relatives (parents, siblings & children) of individuals with Colonic Adenomatous Polyposis of Unknown Etiology (CPUE): If an individual has 20-99 adenomas and a negative genetic test result, surveillance for their first-degree relatives includes:   - Colonoscopy beginning in late teens, then every 2 years. Age and frequency    of colonoscopy may be modified based on clinical judgement. If multiple    surveillance exams without adenomas on follow-up, may lengthen interval    further based on clinical judgment. - If adenomas found, manage based on AFAP    Testing for Family Members:  Despite a negative result, Jose Juan's first-degree relatives may be at increased risk for the cancers based on the family history. We recommend they discuss screening and management recommendations with their healthcare providers. At this time we do not recommend testing for Ponce Castaneda 's daughter based on his negative test result. Ponce Castaneda 's children still need to consider the history of cancer on the other side of their family when determining their risks. If Ponce Castaneda has any affected family members with a cancer diagnosis, especially at a young age, they may still consider genetic testing. Relatives who wish to pursue genetic testing can reach out to the [phone] to schedule an appointment or visit [email or web address] to identify a local genetic counselor. Plan:   Recommendations for Ponce Castaneda are outlined below however the surveillance and medical management should continue as clinically indicated and as determined appropriate by his healthcare providers.     Colon Cancer Screening:   Manage based on NCCN Genetic/Familial High Risk Assessment:Colorectal Guideline V1.2023 surveillance/management recommendations for individuals with Colonic Adenomatous Polyposis of Unknown Etiology (CPUE): Individuals with a personal history of 20 to <100 adenomas with a small adenoma burden manageable by colonoscopy and polypectomy   - Colonoscopy and polypectomy every 1-2 years    -Repeat at short interval based on residual polyp burden   - Baseline upper endoscopy at time of next colonoscopy surveillance by age    22-19y and repeat following duodenal surveillance guidelines for FAP. Of note, Jose Juan's most recent EGD took place in 2022. - Surgical evaluation may be considered if polyps not manageable or based on    patient preference    Other Cancer Screening Recommendations: There are no additional recommendations based on Jose Juan's negative result. he should continue cancer screening and medical management as clinically indicated and as determined appropriate by his healthcare providers. Negative Result: Debra Олегarmando was strongly encouraged to follow up with our office on an annual basis to review the most up to date guidelines as recommendations are subject to change over time. In addition changes to his personal or family history of cancer could alter our recommendations regarding genetic testing and screening.

## 2023-08-24 DIAGNOSIS — I10 ESSENTIAL HYPERTENSION: ICD-10-CM

## 2023-08-24 DIAGNOSIS — E87.6 HYPOKALEMIA: ICD-10-CM

## 2023-08-24 RX ORDER — POTASSIUM CHLORIDE 750 MG/1
20 CAPSULE, EXTENDED RELEASE ORAL 2 TIMES DAILY
Qty: 180 CAPSULE | Refills: 1 | Status: SHIPPED | OUTPATIENT
Start: 2023-08-24

## 2023-08-24 RX ORDER — AMLODIPINE BESYLATE 5 MG/1
5 TABLET ORAL DAILY
Qty: 90 TABLET | Refills: 0 | Status: SHIPPED | OUTPATIENT
Start: 2023-08-24

## 2023-09-11 ENCOUNTER — CLINICAL SUPPORT (OUTPATIENT)
Dept: INTERNAL MEDICINE CLINIC | Facility: CLINIC | Age: 68
End: 2023-09-11
Payer: MEDICARE

## 2023-09-11 DIAGNOSIS — Z23 ENCOUNTER FOR IMMUNIZATION: Primary | ICD-10-CM

## 2023-09-11 PROCEDURE — 96372 THER/PROPH/DIAG INJ SC/IM: CPT

## 2023-09-11 RX ORDER — CYANOCOBALAMIN 1000 UG/ML
1000 INJECTION, SOLUTION INTRAMUSCULAR; SUBCUTANEOUS
Status: SHIPPED | OUTPATIENT
Start: 2023-09-11

## 2023-09-11 RX ADMIN — CYANOCOBALAMIN 1000 MCG: 1000 INJECTION, SOLUTION INTRAMUSCULAR; SUBCUTANEOUS at 09:16

## 2023-09-14 DIAGNOSIS — I10 ESSENTIAL HYPERTENSION: ICD-10-CM

## 2023-09-15 LAB
AFP-TM SERPL-MCNC: 2.8 NG/ML
BUN SERPL-MCNC: 13 MG/DL (ref 7–25)
CREAT SERPL-MCNC: 0.89 MG/DL (ref 0.7–1.35)
GFR/BSA.PRED SERPLBLD CYS-BASED-ARV: 94 ML/MIN/1.73M2

## 2023-09-15 RX ORDER — AMLODIPINE BESYLATE 5 MG/1
5 TABLET ORAL DAILY
Qty: 90 TABLET | Refills: 1 | Status: SHIPPED | OUTPATIENT
Start: 2023-09-15

## 2023-09-19 ENCOUNTER — TELEPHONE (OUTPATIENT)
Dept: INTERNAL MEDICINE CLINIC | Facility: CLINIC | Age: 68
End: 2023-09-19

## 2023-09-19 DIAGNOSIS — Z71.89 ENCOUNTER FOR OSTOMY NURSE CONSULTATION: Primary | ICD-10-CM

## 2023-09-19 NOTE — TELEPHONE ENCOUNTER
Last appt - 7.21.23  Next appt - 10.09.23  Requesting some supplies unable to find supplies in med list 16-Mar-2021

## 2023-09-21 ENCOUNTER — HOSPITAL ENCOUNTER (OUTPATIENT)
Dept: MRI IMAGING | Facility: HOSPITAL | Age: 68
Discharge: HOME | End: 2023-09-21
Attending: SURGERY
Payer: MEDICARE

## 2023-09-21 DIAGNOSIS — C22.0 HCC (HEPATOCELLULAR CARCINOMA) (HCC): ICD-10-CM

## 2023-09-21 PROCEDURE — 74183 MRI ABD W/O CNTR FLWD CNTR: CPT

## 2023-09-21 PROCEDURE — A9585 GADOBUTROL INJECTION: HCPCS | Performed by: SURGERY

## 2023-09-21 PROCEDURE — G1004 CDSM NDSC: HCPCS

## 2023-09-21 RX ORDER — GADOBUTROL 604.72 MG/ML
6 INJECTION INTRAVENOUS
Status: COMPLETED | OUTPATIENT
Start: 2023-09-21 | End: 2023-09-21

## 2023-09-21 RX ADMIN — GADOBUTROL 6 ML: 604.72 INJECTION INTRAVENOUS at 07:25

## 2023-09-26 DIAGNOSIS — Z71.89 ENCOUNTER FOR OSTOMY NURSE CONSULTATION: ICD-10-CM

## 2023-09-26 RX ORDER — LANCETS 28 GAUGE
EACH MISCELLANEOUS
COMMUNITY
End: 2023-09-26 | Stop reason: SDUPTHER

## 2023-09-26 RX ORDER — LANCETS 28 GAUGE
EACH MISCELLANEOUS AS NEEDED
Qty: 30 G | Refills: 0 | Status: SHIPPED | OUTPATIENT
Start: 2023-09-26 | End: 2023-09-28

## 2023-09-26 NOTE — TELEPHONE ENCOUNTER
Pt called back to say he needs the skin barriers and pouch bags for his ostomy supplies, orders go to [facility]

## 2023-09-27 ENCOUNTER — TELEPHONE (OUTPATIENT)
Dept: INTERNAL MEDICINE CLINIC | Facility: CLINIC | Age: 68
End: 2023-09-27

## 2023-09-27 DIAGNOSIS — K94.19 ALTERED BOWEL ELIMINATION DUE TO INTESTINAL OSTOMY (HCC): Primary | ICD-10-CM

## 2023-09-27 RX ORDER — OSTOMY ADHESIVE
STRIP MISCELLANEOUS
Qty: 60 EACH | Refills: 1 | Status: SHIPPED | OUTPATIENT
Start: 2023-09-27 | End: 2023-09-28

## 2023-09-27 RX ORDER — ADHESIVE REMOVER
PACKET (EA) MISCELLANEOUS
Qty: 60 EACH | Refills: 1 | Status: SHIPPED | OUTPATIENT
Start: 2023-09-27 | End: 2023-09-28

## 2023-09-27 RX ORDER — OSTOMY SUPPLY 1 1/4"
EACH MISCELLANEOUS
Qty: 25 EACH | Refills: 1 | Status: SHIPPED | OUTPATIENT
Start: 2023-09-27 | End: 2023-09-28

## 2023-09-27 NOTE — TELEPHONE ENCOUNTER
Pt came in with supplies list needed for ostomy contact pharmacy about supplies needed if they are able to get these supplies awaiting response on pharmacy due to the pharmacy not having a open selection for supplies

## 2023-09-27 NOTE — TELEPHONE ENCOUNTER
Please call the [address] tomorrow, and verify the order, and send it to her physician who is covering me please

## 2023-09-28 ENCOUNTER — OFFICE VISIT (OUTPATIENT)
Dept: SURGICAL ONCOLOGY | Facility: CLINIC | Age: 68
End: 2023-09-28
Payer: MEDICARE

## 2023-09-28 VITALS
SYSTOLIC BLOOD PRESSURE: 128 MMHG | HEART RATE: 82 BPM | TEMPERATURE: 98.1 F | BODY MASS INDEX: 24.37 KG/M2 | WEIGHT: 164.5 LBS | RESPIRATION RATE: 15 BRPM | HEIGHT: 69 IN | OXYGEN SATURATION: 98 % | DIASTOLIC BLOOD PRESSURE: 82 MMHG

## 2023-09-28 DIAGNOSIS — Z87.891 FORMER HEAVY TOBACCO SMOKER: ICD-10-CM

## 2023-09-28 DIAGNOSIS — K94.19 ALTERED BOWEL ELIMINATION DUE TO INTESTINAL OSTOMY (HCC): ICD-10-CM

## 2023-09-28 DIAGNOSIS — Z08 ENCOUNTER FOR FOLLOW-UP SURVEILLANCE OF LIVER CANCER: Primary | ICD-10-CM

## 2023-09-28 DIAGNOSIS — C22.0 HEPATOCELLULAR CARCINOMA (HCC): ICD-10-CM

## 2023-09-28 DIAGNOSIS — Z85.05 ENCOUNTER FOR FOLLOW-UP SURVEILLANCE OF LIVER CANCER: Primary | ICD-10-CM

## 2023-09-28 DIAGNOSIS — Z12.2 ENCOUNTER FOR SCREENING FOR LUNG CANCER: ICD-10-CM

## 2023-09-28 PROCEDURE — 99213 OFFICE O/P EST LOW 20 MIN: CPT

## 2023-09-28 NOTE — PROGRESS NOTES
Surgical Oncology Follow Up       [facility]COLOGY [place]  [address]  [place] [address]    Alexandre Elkins III  1955  [id number]  [facility] [place]  [address]  [place] [address]    Diagnoses and all orders for this visit:    Encounter for follow-up surveillance of liver cancer    Hepatocellular carcinoma ([address])  -     AFP tumor marker; Future  -     BUN; Future  -     Creatinine, serum; Future  -     MRI abdomen w wo contrast; Future    Former heavy tobacco smoker  -     CT lung screening program; Future    Encounter for screening for lung cancer  -     CT lung screening program; Future        Chief Complaint   Patient presents with   • Follow-up       No follow-ups on file. Oncology History   Personal history of liver cancer   6/8/2020 Initial Diagnosis    Hepatocellular carcinoma, moderately differentiated     7/15/2020 Surgery    Surgical microwave ablation of liver segment 5     9/21/2022 -  Radiation    IR microwave ablation of segment 5 lesion     3/16/2023 -  Radiation    IR microwave ablation of segment 5/6 lesion         Staging: HCC   Treatment history:  Ablation of segment 5 liver lesion, July 2020  Microwave ablation segment 5 lesion, September 2022  Microwave ablation of a right hepatic lobe lesion, March 2023  Current treatment: Observation  Disease status: ARLYN    History of Present Illness: The patient returns to the office today in follow-up for his history of recurrent [address] He is currently ARLYN at 6 months from his most recent ablation. He states he feels well, and denies any abdominal pain or distention, appetite changes or weight loss, or jaundice. He recently had a severe fungal infection in his throat and states he was unable to swallow for 2 weeks, but says this has completely resolved and he is eating normally again.   An AFP level has been drawn, and MRI of the abdomen was performed on September 21. I have reviewed these results myself and discussed them with the patient today. Review of Systems   Constitutional: Negative for activity change, appetite change, fatigue and unexpected weight change. HENT: Negative. Respiratory: Negative. Negative for cough. Cardiovascular: Negative. Gastrointestinal: Negative. Negative for abdominal distention, abdominal pain, nausea and vomiting. Musculoskeletal: Negative. Skin: Negative. Negative for color change. Neurological: Negative. Negative for dizziness and headaches. Hematological: Negative. Negative for adenopathy. Psychiatric/Behavioral: Negative.               Patient Active Problem List   Diagnosis   • Essential hypertension   • Emphysema/COPD ([address])   • Crohn disease ([address])   • Acute kidney injury ([address])   • Colostomy in place [facility])   • Diarrhea   • Severe protein-calorie malnutrition ([address])   • Arthropathy of right wrist   • Emphysema of lung ([address])   • Syncope and collapse   • Severe sepsis (HCC)   • Pneumonia   • Chest pain   • Liver mass   • Tobacco abuse   • Abdominal pain   • Personal history of liver cancer   • Palliative care patient   • Abdominal wall abscess   • Observed sleep apnea   • Acute pain of left wrist   • Chronic, continuous use of opioids   • Hypocalcemia   • Left wrist pain   • Kidney stone   • Gastroesophageal reflux disease without esophagitis   • Chronic obstructive pulmonary disease (HCC)   • Mixed hyperlipidemia   • Vitamin B12 deficiency   • Cataract   • Dysthymic disorder   • Crohn's disease of small intestine with other complication ([address])   • Encounter for follow-up surveillance of liver cancer   • HCC (hepatocellular carcinoma) ([address])   • Platelets decreased ([address])   • History of alcohol use disorder   • Supraventricular tachycardia ([address])   • Anemia   • Subclinical hypothyroidism   • Portal hypertension ([address])   • Multiple subsegmental pulmonary emboli without acute cor pulmonale (HCC)   • Alcohol dependence, uncomplicated (HCC)   • Multiple adenomatous polyps   • Candida infection, esophageal (HCC)     Past Medical History:   Diagnosis Date   • LUCILLE (acute kidney injury) ([address]) 6/28/2017   • Blindness of left eye     Since birth   • Cancer [facility])     liver   • Cataract    • Colon polyp    • Colostomy in place [facility]) 6/29/2017   • COPD (chronic obstructive pulmonary disease) (HCC)    • Crohn disease ([address])    • Emphysema of lung (HCC)    • Emphysema/COPD ([address])    • Essential hypertension    • GERD (gastroesophageal reflux disease)    • Hypertension    • Hypokalemia 6/28/2017   • Hypomagnesemia 6/29/2017   • Hyponatremia 6/28/2017   • Kidney stone    • Osteomyelitis ([address])    • Pneumonia      Past Surgical History:   Procedure Laterality Date   • APPENDECTOMY  1979   • CATARACT EXTRACTION Bilateral    • CHOLECYSTECTOMY     • COLECTOMY      10 inchs of ileum   • COLONOSCOPY N/A 06/30/2017    Procedure: COLONOSCOPY;  Surgeon: Roni Mcgregor MD;  [facility];   Service: Gastroenterology   • COLOSTOMY     • EYE SURGERY  2 yrs ago   • FINGER AMPUTATION     • FINGER SURGERY Left    • HERNIA REPAIR  1978   • IR BIOPSY LIVER MASS  06/08/2020   • IR MICROWAVE ABLATION  09/21/2022   • IR MICROWAVE ABLATION  03/16/2023   • LITHOTRIPSY     • LIVER LOBECTOMY N/A 07/15/2020    Procedure: LIVER ABLATION, INTRAOPERATIVE U/S LIVER;  Surgeon: Gray Kramer MD;  Location: [facility];  Service: Surgical Oncology   • TONSILLECTOMY  Child     Family History   Problem Relation Age of Onset   • Hypertension Father    • Heart disease Sister      Social History     Socioeconomic History   • Marital status:      Spouse name: Not on file   • Number of children: Not on file   • Years of education: Not on file   • Highest education level: Not on file   Occupational History   • Not on file   Tobacco Use   • Smoking status: Former     Packs/day: 1.50     Years: 50.00     Total pack years: 75.00     Types: Cigarettes     Start date: 26     Quit date: 7/15/2020     Years since quitting: 3.2   • Smokeless tobacco: Never   Vaping Use   • Vaping Use: Never used   Substance and Sexual Activity   • Alcohol use: Not Currently   • Drug use: Not Currently   • Sexual activity: Not Currently     Partners: Female     Birth control/protection: Condom Male   Other Topics Concern   • Not on file   Social History Narrative   • Not on file     Social Determinants of Health     Financial Resource Strain: Low Risk  (12/16/2022)    Overall Financial Resource Strain (CARDIA)    • Difficulty of Paying Living Expenses: Not hard at all   Food Insecurity: No Food Insecurity (1/27/2023)    Hunger Vital Sign    • Worried About Running Out of Food in the Last Year: Never true    • Ran Out of Food in the Last Year: Never true   Transportation Needs: No Transportation Needs (1/27/2023)    PRAPARE - Transportation    • Lack of Transportation (Medical): No    • Lack of Transportation (Non-Medical):  No   Physical Activity: Not on file   Stress: Not on file   Social Connections: Not on file   Intimate Partner Violence: Not on file   Housing Stability: Low Risk  (1/27/2023)    Housing Stability Vital Sign    • Unable to Pay for Housing in the Last Year: No    • Number of Places Lived in the Last Year: 1    • Unstable Housing in the Last Year: No       Current Outpatient Medications:   •  AMILoride 5 mg tablet, Take 1 tablet (5 mg total) by mouth daily, Disp: 90 tablet, Rfl: 0  •  amLODIPine (NORVASC) 5 mg tablet, Take 1 tablet (5 mg total) by mouth daily, Disp: 90 tablet, Rfl: 1  •  buPROPion (WELLBUTRIN XL) 150 mg 24 hr tablet, Take 1 tablet (150 mg total) by mouth daily, Disp: 90 tablet, Rfl: 0  •  calcium carbonate (TUMS) 500 mg chewable tablet, Chew 1 tablet (500 mg total) daily, Disp:  , Rfl: 0  •  carvedilol (COREG) 3.125 mg tablet, Take 1 tablet (3.125 mg total) by mouth 2 (two) times a day with meals, Disp: 180 tablet, Rfl: 1  •  cholecalciferol (VITAMIN D3) 400 units tablet, Take 400 Units by mouth Every Sunday, Disp: , Rfl:   •  [employer] Magnesium Oxide 250 MG TABS, TAKE 1 TABLET (250 MG TOTAL) BY MOUTH IN THE MORNING, Disp: , Rfl:   •  fluticasone (FLONASE) 50 mcg/act nasal spray, SPRAY 2 SPRAYS INTO EACH NOSTRIL EVERY DAY, Disp: 48 mL, Rfl: 2  •  folic acid (FOLVITE) 1 mg tablet, Take 1 tablet (1 mg total) by mouth daily, Disp: 90 tablet, Rfl: 1  •  levothyroxine 50 mcg tablet, Take 1 tablet (50 mcg total) by mouth daily in the early morning, Disp: 90 tablet, Rfl: 1  •  Magnesium Oxide 250 MG TABS, Take 1 tablet (250 mg total) by mouth in the morning, Disp: 90 tablet, Rfl: 3  •  mirtazapine (REMERON) 15 mg tablet, Take 1 tablet (15 mg total) by mouth daily at bedtime, Disp: 90 tablet, Rfl: 1  •  naproxen (Naprosyn) 500 mg tablet, Take 1 tablet (500 mg total) by mouth 2 (two) times a day with meals, Disp: 30 tablet, Rfl: 0  •  omeprazole (PriLOSEC) 20 mg delayed release capsule, Take 1 capsule (20 mg total) by mouth daily, Disp: 90 capsule, Rfl: 1  •  Ostomy Supplies (AllKare Adhesive Remover Wipes) MISC, Use when needed for every change, Disp: 60 each, Rfl: 1  •  Ostomy Supplies (Ostomy Protective) POWD, Use as needed (as needed for every use), Disp: 30 g, Rfl: 0  •  Ostomy Supplies (Premier Convex Skin Barrier) MISC, Change when needed, Disp: 25 each, Rfl: 1  •  Ostomy Supplies (Protective Barrier Wipes) MISC, Use as needed for every change of pouch, Disp: 60 each, Rfl: 1  •  Ostomy Supplies KIT, Skin barries w/ mold to fit opening 12" pouch w/ 2 sided comfort panel esenta sting free adhesive remover Sting free barrier wipes, Disp: 1 kit, Rfl: 0  •  Ostomy Supplies OINT, Use as needed (as needed for every use), Disp: 30 each, Rfl: 0  •  Ostomy Supplies Pouch MISC, Use as needed (use as needed for every use), Disp: 30 each, Rfl: 0  •  Ostomy Supplies Pouch MISC, Natura 12" pouch with  2 sided comfort panel, Disp: 60 each, Rfl: 1  • potassium chloride (MICRO-K) 10 MEQ CR capsule, Take 2 capsules (20 mEq total) by mouth 2 (two) times a day, Disp: 180 capsule, Rfl: 1  •  VIT B12-METHIONINE-INOS-CHOL IM, Inject into a muscle every 30 (thirty) days, Disp: , Rfl:   •  ALPRAZolam (XANAX) 0.25 mg tablet, Take 1 tablet (0.25 mg total) by mouth daily at bedtime as needed for anxiety, Disp: 90 tablet, Rfl: 0  •  thiamine 250 MG tablet, Take 1 tablet (250 mg total) by mouth daily for 5 days Do not start before November 14, 2022., Disp: 5 tablet, Rfl: 0  •  umeclidinium-vilanterol (Anoro Ellipta) 62.5-25 mcg/actuation inhaler, Inhale 1 puff daily, Disp: 180 blister, Rfl: 0    Current Facility-Administered Medications:   •  cyanocobalamin injection 1,000 mcg, 1,000 mcg, Intramuscular, Q30 Days, Torri Coleman MD, 1,000 mcg at 06/07/23 0944  •  cyanocobalamin injection 1,000 mcg, 1,000 mcg, Intramuscular, Q30 Days, Torri Coleman MD, 1,000 mcg at 08/14/23 1121  •  cyanocobalamin injection 1,000 mcg, 1,000 mcg, Intramuscular, Q30 Days, Lexii Dos Santos MD, 1,000 mcg at 09/11/23 0916  No Known Allergies  Vitals:    09/28/23 0823   BP: 128/82   Pulse: 82   Resp: 15   Temp: 98.1 °F (36.7 °C)   SpO2: 98%       Physical Exam  Vitals reviewed. Constitutional:       General: He is not in acute distress. Appearance: Normal appearance. He is normal weight. He is not ill-appearing or toxic-appearing. HENT:      Head: Normocephalic and atraumatic. Nose: Nose normal.      Mouth/Throat:      Mouth: Mucous membranes are moist.   Eyes:      General: No scleral icterus. Conjunctiva/sclera: Conjunctivae normal.   Cardiovascular:      Rate and Rhythm: Normal rate. Pulmonary:      Effort: Pulmonary effort is normal.   Abdominal:      General: There is no distension. Palpations: Abdomen is soft. There is no mass. Tenderness: There is no abdominal tenderness. Musculoskeletal:         General: Normal range of motion.       Cervical back: Normal range of motion and neck supple. Skin:     General: Skin is warm and dry. Coloration: Skin is not jaundiced. Neurological:      General: No focal deficit present. Mental Status: He is alert and oriented to person, place, and time. Psychiatric:         Mood and Affect: Mood normal.         Behavior: Behavior normal.         Thought Content: Thought content normal.         Judgment: Judgment normal.           Labs:  AFP tumor marker     Collected 9/14/2023  9:07 AM          Component Ref Range & Units 2 wk ago   AFP-Tumor Marker <6.1 ng/mL 2.8    Comment:               Imaging  MRI abdomen w wo contrast    Result Date: 9/27/2023  Narrative: MRI - ABDOMEN - WITH AND WITHOUT CONTRAST INDICATION: 79 years / Male  C22.0: Liver cell carcinoma. COMPARISON: MRI abdomen 6/16/2023 TECHNIQUE:  Multiplanar/multisequence MRI of the abdomen was performed before and after administration of contrast. Imaging performed on 1.5T MRI IV Contrast:  6 mL of Gadobutrol injection (SINGLE-DOSE) FINDINGS: LOWER CHEST:   Unremarkable. LIVER: Mild hepatic steatosis. Treated observation: 1 Size: 4.0 x 2.9 cm (10/52),, previously 4.1 x 3.1 cm on 7/16/2023 Location: Segment 5 Enhancement: No lesional enhancement LI-RADS Category: LR- TR Nonviable. Treated observation: 2 Size: 3.7 x 1.8 centimeter (10/61), previously 4.0 x 1.6 cm on 6/16/2023 Location: Segment 5 Enhancement: No lesional enhancement LI-RADS Category: LR- TR Nonviable. Treated observation: 3 Size: 2.2 x 1.6 cm (10/61), the treatment zone previously measured 2.6 x 1.8 cm on 6/16/2023 and it was previously characterized as LR 5 lesion measuring 1.3 cm Location: Segment 5/6 Enhancement: No lesional enhancement LI-RADS Category: LR- TR Nonviable. The hepatic veins and portal veins are patent. BILE DUCTS: No extrahepatic bile duct dilation.  Stable focal mild intrahepatic biliary ductal dilation adjacent to the treatment zones in the right hepatic lobe (series 5 image 18) compared to 7/21/2022, likely due to scarring. GALLBLADDER: Surgically absent. PANCREAS:  Unremarkable. ADRENAL GLANDS:  Unremarkable. SPLEEN:  Normal. KIDNEYS/PROXIMAL URETERS: No hydroureteronephrosis. No suspicious renal mass. A left renal simple cyst. BOWEL:   No dilated loops of bowel. PERITONEUM/RETROPERITONEUM: No mass. No ascites. LYMPH NODES: No abdominal lymphadenopathy. VASCULAR STRUCTURES:  No aneurysm. ABDOMINAL WALL: Left lower quadrant enterostomy. OSSEOUS STRUCTURES:  No suspicious osseous lesion. Impression: Stable to mildly shrinking  two segment 5 LR TR nonviable treatment zones and segment 5/6 LR TR nonviable treatment zone. No suspicious new hepatic observation. Stable mild focal intrahepatic biliary ductal dilation in the region of the treatment zone in the right hepatic lobe compared to 7/21/2022, likely secondary to scarring. Continued follow-up with MRI abdomen with and without contrast is recommended as clinically warranted. Workstation performed: [id number]     I personally reviewed and interpreted the above laboratory and imaging data. Discussion/Summary: This is a 80 y/o male who presents today for continued [address] surveillance. He is doing well, and his most recent imaging shows no viability in the previously treated lesion. There are no new lesions noted, and his AFP is in normal range. We will see him again in 3 months with repeat MRI and AFP. In addition, as the patient has a significant smoking history, I will also send him for a baseline lung screening CT. He is agreeable to the plan, all questions have been answered today.

## 2023-10-02 RX ORDER — OSTOMY SUPPLY 1 1/4"
EACH MISCELLANEOUS DAILY PRN
Qty: 50 EACH | Refills: 6 | Status: SHIPPED | OUTPATIENT
Start: 2023-10-02

## 2023-10-02 RX ORDER — OSTOMY SUPPLY
EACH MISCELLANEOUS DAILY PRN
Qty: 100 STRIP | Refills: 6 | Status: SHIPPED | OUTPATIENT
Start: 2023-10-02

## 2023-10-03 ENCOUNTER — TELEPHONE (OUTPATIENT)
Dept: INTERNAL MEDICINE CLINIC | Facility: CLINIC | Age: 68
End: 2023-10-03

## 2023-10-03 NOTE — TELEPHONE ENCOUNTER
Leslie form for ostomy supplies on Dr. Isaías Mack desk waiting for signature to be faxed when he is back in office on wed 10/4

## 2023-10-09 ENCOUNTER — CLINICAL SUPPORT (OUTPATIENT)
Dept: INTERNAL MEDICINE CLINIC | Facility: CLINIC | Age: 68
End: 2023-10-09
Payer: MEDICARE

## 2023-10-09 ENCOUNTER — OFFICE VISIT (OUTPATIENT)
Dept: INTERNAL MEDICINE CLINIC | Facility: CLINIC | Age: 68
End: 2023-10-09
Payer: MEDICARE

## 2023-10-09 VITALS
DIASTOLIC BLOOD PRESSURE: 72 MMHG | HEIGHT: 69 IN | OXYGEN SATURATION: 96 % | BODY MASS INDEX: 24.29 KG/M2 | SYSTOLIC BLOOD PRESSURE: 118 MMHG | WEIGHT: 164 LBS | TEMPERATURE: 98.9 F | HEART RATE: 80 BPM

## 2023-10-09 DIAGNOSIS — E78.2 MIXED HYPERLIPIDEMIA: ICD-10-CM

## 2023-10-09 DIAGNOSIS — Z93.3 COLOSTOMY IN PLACE (HCC): ICD-10-CM

## 2023-10-09 DIAGNOSIS — Z85.05 PERSONAL HISTORY OF LIVER CANCER: ICD-10-CM

## 2023-10-09 DIAGNOSIS — I10 ESSENTIAL HYPERTENSION: Chronic | ICD-10-CM

## 2023-10-09 DIAGNOSIS — I26.94 MULTIPLE SUBSEGMENTAL PULMONARY EMBOLI WITHOUT ACUTE COR PULMONALE (HCC): ICD-10-CM

## 2023-10-09 DIAGNOSIS — K50.018 CROHN'S DISEASE OF SMALL INTESTINE WITH OTHER COMPLICATION (HCC): Chronic | ICD-10-CM

## 2023-10-09 DIAGNOSIS — F10.20 ALCOHOL DEPENDENCE, UNCOMPLICATED (HCC): ICD-10-CM

## 2023-10-09 DIAGNOSIS — J04.10 TRACHEITIS: Primary | ICD-10-CM

## 2023-10-09 DIAGNOSIS — E53.8 VITAMIN B12 DEFICIENCY: ICD-10-CM

## 2023-10-09 DIAGNOSIS — F34.1 DYSTHYMIC DISORDER: ICD-10-CM

## 2023-10-09 DIAGNOSIS — R73.01 IMPAIRED FASTING BLOOD SUGAR: ICD-10-CM

## 2023-10-09 DIAGNOSIS — K21.9 GASTROESOPHAGEAL REFLUX DISEASE WITHOUT ESOPHAGITIS: ICD-10-CM

## 2023-10-09 DIAGNOSIS — C22.0 HCC (HEPATOCELLULAR CARCINOMA) (HCC): Chronic | ICD-10-CM

## 2023-10-09 DIAGNOSIS — J43.8 OTHER EMPHYSEMA (HCC): ICD-10-CM

## 2023-10-09 PROCEDURE — 99214 OFFICE O/P EST MOD 30 MIN: CPT | Performed by: INTERNAL MEDICINE

## 2023-10-09 PROCEDURE — 96372 THER/PROPH/DIAG INJ SC/IM: CPT | Performed by: INTERNAL MEDICINE

## 2023-10-09 RX ORDER — BENZONATATE 200 MG/1
200 CAPSULE ORAL 3 TIMES DAILY PRN
Qty: 20 CAPSULE | Refills: 0 | Status: SHIPPED | OUTPATIENT
Start: 2023-10-09

## 2023-10-09 RX ORDER — AZITHROMYCIN 250 MG/1
TABLET, FILM COATED ORAL
Qty: 6 TABLET | Refills: 0 | Status: SHIPPED | OUTPATIENT
Start: 2023-10-09 | End: 2023-10-14

## 2023-10-09 RX ADMIN — CYANOCOBALAMIN 1000 MCG: 1000 INJECTION, SOLUTION INTRAMUSCULAR; SUBCUTANEOUS at 09:41

## 2023-10-09 NOTE — PROGRESS NOTES
Assessment/Plan:             1. Tracheitis  -     azithromycin (Zithromax) 250 mg tablet; Take 2 tablets (500 mg total) by mouth daily for 1 day, THEN 1 tablet (250 mg total) daily for 4 days. -     benzonatate (TESSALON) 200 MG capsule; Take 1 capsule (200 mg total) by mouth 3 (three) times a day as needed for cough    2. Colostomy in place [facility])    3. Crohn's disease of small intestine with other complication ([facility])    4. Gastroesophageal reflux disease without esophagitis    5. [address] (hepatocellular carcinoma) ([facility])    6. Personal history of liver cancer    7. Other emphysema ([facility])    8. Essential hypertension  -     CBC and differential; Future  -     TSH, 3rd generation; Future    9. Multiple subsegmental pulmonary emboli without acute cor pulmonale (HCC)    10. Alcohol dependence, uncomplicated ([facility])    11. Mixed hyperlipidemia  -     Comprehensive metabolic panel; Future  -     Lipid Panel with Direct LDL reflex; Future  -     TSH, 3rd generation; Future    12. Impaired fasting blood sugar  -     Comprehensive metabolic panel; Future  -     Hemoglobin A1C; Future  -     Lipid Panel with Direct LDL reflex; Future  -     TSH, 3rd generation; Future           Subjective:      Patient ID: Elena Henson is a 76 y.o. male. Cough, yellow sputum, also he changes his colostomy bag site every 5 to 7 days, he needs this documented in the chart for [address] [place] [address], to get the supply, also blood test    Cough  Pertinent negatives include no chest pain, chills, ear pain, fever, headaches, rash, sore throat or shortness of breath.        The following portions of the patient's history were reviewed and updated as appropriate: He  has a past medical history of LUCILLE (acute kidney injury) ([facility]) (6/28/2017), Blindness of left eye, Cancer ([facility]), Cataract, Colon polyp, Colostomy in place [facility]) (6/29/2017), COPD (chronic obstructive pulmonary disease) ([facility]), Crohn disease ([facility]), Emphysema of lung ([facility]), Emphysema/COPD ([facility]), Essential hypertension, GERD (gastroesophageal reflux disease), Hypertension, Hypokalemia (6/28/2017), Hypomagnesemia (6/29/2017), Hyponatremia (6/28/2017), Kidney stone, Osteomyelitis ([address]), and Pneumonia.   He   Patient Active Problem List    Diagnosis Date Noted   • Tracheitis 10/09/2023   • Candida infection, esophageal ([address]) 07/14/2023   • Multiple adenomatous polyps 07/09/2023   • Portal hypertension ([address]) 02/01/2023   • Multiple subsegmental pulmonary emboli without acute cor pulmonale ([address]) 02/01/2023   • Alcohol dependence, uncomplicated ([address]) 68/53/2844   • Subclinical hypothyroidism 11/12/2022   • Anemia 11/08/2022   • Supraventricular tachycardia    • History of alcohol use disorder 11/02/2022   • Platelets decreased ([address])    • HCC (hepatocellular carcinoma) ([address]) 03/15/2022   • Encounter for follow-up surveillance of liver cancer 01/21/2022   • Dysthymic disorder 06/28/2021   • Crohn's disease of small intestine with other complication ([address]) 95/43/2789   • Vitamin B12 deficiency 02/24/2021   • Cataract    • Chronic obstructive pulmonary disease ([address]) 11/24/2020   • Mixed hyperlipidemia 11/24/2020   • Kidney stone    • Gastroesophageal reflux disease without esophagitis    • Left wrist pain 09/29/2020   • Hypocalcemia 09/12/2020   • Acute pain of left wrist 09/10/2020   • Chronic, continuous use of opioids 09/10/2020   • Observed sleep apnea    • Palliative care patient 07/31/2020   • Abdominal wall abscess 07/31/2020   • Personal history of liver cancer 06/23/2020   • Abdominal pain 06/04/2020   • Tobacco abuse 05/29/2020   • Severe sepsis ([address]) 05/28/2020   • Pneumonia 05/28/2020   • Chest pain 05/28/2020   • Liver mass 05/28/2020   • Syncope and collapse 04/03/2018   • Emphysema of lung ([address])    • Arthropathy of right wrist 12/04/2017   • Severe protein-calorie malnutrition ([address]) 07/01/2017   • Colostomy in place [facility]) 06/29/2017   • Diarrhea 06/29/2017   • Acute kidney injury ([facility]) 06/28/2017   • Essential hypertension    • Emphysema/COPD [facility])    • Crohn disease ([facility])      He  has a past surgical history that includes Colostomy; Cholecystectomy; Colectomy; Colonoscopy (N/A, 06/30/2017); Lithotripsy; Finger surgery (Left); IR biopsy liver mass (06/08/2020); Cataract extraction (Bilateral); Liver lobectomy (N/A, 07/15/2020); Finger amputation; IR microwave ablation (09/21/2022); IR microwave ablation (03/16/2023); Appendectomy (1979); Hernia repair (1978); Tonsillectomy (Child); and Eye surgery (2 yrs ago). His family history includes Heart disease in his sister; Hypertension in his father. He  reports that he quit smoking about 3 years ago. His smoking use included cigarettes. He started smoking about 52 years ago. He has a 75.00 pack-year smoking history. He has never used smokeless tobacco. He reports that he does not currently use alcohol. He reports that he does not currently use drugs. Current Outpatient Medications   Medication Sig Dispense Refill   • ALPRAZolam (XANAX) 0.25 mg tablet Take 1 tablet (0.25 mg total) by mouth daily at bedtime as needed for anxiety 90 tablet 0   • AMILoride 5 mg tablet Take 1 tablet (5 mg total) by mouth daily 90 tablet 0   • amLODIPine (NORVASC) 5 mg tablet Take 1 tablet (5 mg total) by mouth daily 90 tablet 1   • azithromycin (Zithromax) 250 mg tablet Take 2 tablets (500 mg total) by mouth daily for 1 day, THEN 1 tablet (250 mg total) daily for 4 days.  6 tablet 0   • benzonatate (TESSALON) 200 MG capsule Take 1 capsule (200 mg total) by mouth 3 (three) times a day as needed for cough 20 capsule 0   • buPROPion (WELLBUTRIN XL) 150 mg 24 hr tablet Take 1 tablet (150 mg total) by mouth daily 90 tablet 0   • calcium carbonate (TUMS) 500 mg chewable tablet Chew 1 tablet (500 mg total) daily  0   • carvedilol (COREG) 3.125 mg tablet Take 1 tablet (3.125 mg total) by mouth 2 (two) times a day with meals 180 tablet 1   • cholecalciferol (VITAMIN D3) 400 units tablet Take 400 Units by mouth Every Sunday     • [facility] Magnesium Oxide 250 MG TABS TAKE 1 TABLET (250 MG TOTAL) BY MOUTH IN THE MORNING     • fluticasone (FLONASE) 50 mcg/act nasal spray SPRAY 2 SPRAYS INTO EACH NOSTRIL EVERY DAY 48 mL 2   • folic acid (FOLVITE) 1 mg tablet Take 1 tablet (1 mg total) by mouth daily 90 tablet 1   • levothyroxine 50 mcg tablet Take 1 tablet (50 mcg total) by mouth daily in the early morning 90 tablet 1   • Magnesium Oxide 250 MG TABS Take 1 tablet (250 mg total) by mouth in the morning 90 tablet 3   • mirtazapine (REMERON) 15 mg tablet Take 1 tablet (15 mg total) by mouth daily at bedtime 90 tablet 1   • naproxen (Naprosyn) 500 mg tablet Take 1 tablet (500 mg total) by mouth 2 (two) times a day with meals 30 tablet 0   • omeprazole (PriLOSEC) 20 mg delayed release capsule Take 1 capsule (20 mg total) by mouth daily 90 capsule 1   • Ostomy Supplies (Adapt Barrier) STRP Use daily as needed (PRN) 100 strip 6   • Ostomy Supplies (Premier Convex Skin Barrier) MISC Use daily as needed (PRN) 50 each 6   • Ostomy Supplies KIT Skin barries w/ mold to fit opening 12" pouch w/ 2 sided comfort panel esenta sting free adhesive remover Sting free barrier wipes 1 kit 0   • Ostomy Supplies OINT Use as needed (as needed for every use) 30 each 0   • Ostomy Supplies Pouch MISC Use as needed (use as needed for every use) 30 each 0   • Ostomy Supplies Pouch MISC Natura 12" pouch with  2 sided comfort panel 60 each 3   • potassium chloride (MICRO-K) 10 MEQ CR capsule Take 2 capsules (20 mEq total) by mouth 2 (two) times a day 180 capsule 1   • umeclidinium-vilanterol (Anoro Ellipta) 62.5-25 mcg/actuation inhaler Inhale 1 puff daily 180 blister 0   • VIT B12-METHIONINE-INOS-CHOL IM Inject into a muscle every 30 (thirty) days     • thiamine 250 MG tablet Take 1 tablet (250 mg total) by mouth daily for 5 days Do not start before November 14, 2022. 5 tablet 0     Current Facility-Administered Medications   Medication Dose Route Frequency Provider Last Rate Last Admin   • cyanocobalamin injection 1,000 mcg  1,000 mcg Intramuscular Q30 Days Torri Coleman MD   1,000 mcg at 10/09/23 0941   • cyanocobalamin injection 1,000 mcg  1,000 mcg Intramuscular Q30 Days Torri Coleman MD   1,000 mcg at 08/14/23 1121   • cyanocobalamin injection 1,000 mcg  1,000 mcg Intramuscular Q30 Days Aicha Bennett MD   1,000 mcg at 09/11/23 1772     Current Outpatient Medications on File Prior to Visit   Medication Sig   • ALPRAZolam (XANAX) 0.25 mg tablet Take 1 tablet (0.25 mg total) by mouth daily at bedtime as needed for anxiety   • AMILoride 5 mg tablet Take 1 tablet (5 mg total) by mouth daily   • amLODIPine (NORVASC) 5 mg tablet Take 1 tablet (5 mg total) by mouth daily   • buPROPion (WELLBUTRIN XL) 150 mg 24 hr tablet Take 1 tablet (150 mg total) by mouth daily   • calcium carbonate (TUMS) 500 mg chewable tablet Chew 1 tablet (500 mg total) daily   • carvedilol (COREG) 3.125 mg tablet Take 1 tablet (3.125 mg total) by mouth 2 (two) times a day with meals   • cholecalciferol (VITAMIN D3) 400 units tablet Take 400 Units by mouth Every Sunday   • [facility] Magnesium Oxide 250 MG TABS TAKE 1 TABLET (250 MG TOTAL) BY MOUTH IN THE MORNING   • fluticasone (FLONASE) 50 mcg/act nasal spray SPRAY 2 SPRAYS INTO EACH NOSTRIL EVERY DAY   • folic acid (FOLVITE) 1 mg tablet Take 1 tablet (1 mg total) by mouth daily   • levothyroxine 50 mcg tablet Take 1 tablet (50 mcg total) by mouth daily in the early morning   • Magnesium Oxide 250 MG TABS Take 1 tablet (250 mg total) by mouth in the morning   • mirtazapine (REMERON) 15 mg tablet Take 1 tablet (15 mg total) by mouth daily at bedtime   • naproxen (Naprosyn) 500 mg tablet Take 1 tablet (500 mg total) by mouth 2 (two) times a day with meals   • omeprazole (PriLOSEC) 20 mg delayed release capsule Take 1 capsule (20 mg total) by mouth daily   • Ostomy Supplies (Adapt Barrier) STRP Use daily as needed (PRN)   • [employer] (Premier Convex Skin Barrier) MISC Use daily as needed (PRN)   • Ostomy Supplies KIT Skin barries w/ mold to fit opening 12" pouch w/ 2 sided comfort panel esenta sting free adhesive remover Sting free barrier wipes   • Ostomy Supplies OINT Use as needed (as needed for every use)   • Ostomy Supplies Pouch MISC Use as needed (use as needed for every use)   • Ostomy Supplies Pouch MISC Natura 12" pouch with  2 sided comfort panel   • potassium chloride (MICRO-K) 10 MEQ CR capsule Take 2 capsules (20 mEq total) by mouth 2 (two) times a day   • umeclidinium-vilanterol (Anoro Ellipta) 62.5-25 mcg/actuation inhaler Inhale 1 puff daily   • VIT B12-METHIONINE-INOS-CHOL IM Inject into a muscle every 30 (thirty) days   • thiamine 250 MG tablet Take 1 tablet (250 mg total) by mouth daily for 5 days Do not start before November 14, 2022. • [DISCONTINUED] apixaban (Eliquis) 5 mg Take 2 tablets (10 mg total) by mouth 2 (two) times a day for 7 days, THEN 1 tablet (5 mg total) 2 (two) times a day for 23 days. Current Facility-Administered Medications on File Prior to Visit   Medication   • cyanocobalamin injection 1,000 mcg   • cyanocobalamin injection 1,000 mcg   • cyanocobalamin injection 1,000 mcg     He has No Known Allergies. .    Review of Systems   Constitutional: Negative for chills and fever. HENT: Negative for congestion, ear pain and sore throat. Eyes: Negative for pain. Respiratory: Positive for cough. Negative for shortness of breath. Cardiovascular: Negative for chest pain and leg swelling. Gastrointestinal: Negative for abdominal pain, nausea and vomiting. Endocrine: Negative for polyuria. Genitourinary: Negative for difficulty urinating, frequency and urgency. Musculoskeletal: Positive for arthralgias. Negative for back pain. Skin: Negative for rash. Neurological: Negative for weakness and headaches. Psychiatric/Behavioral: Negative for sleep disturbance.  The patient is not nervous/anxious. Objective:      /72 (BP Location: Left arm, Patient Position: Sitting, Cuff Size: Standard)   Pulse 80   Temp 98.9 °F (37.2 °C) (Temporal)   Ht 5' 9" (1.753 m)   Wt 74.4 kg (164 lb)   SpO2 96%   BMI 24.22 kg/m²     Recent Results (from the past 1344 hour(s))   BUN    Collection Time: 09/14/23  9:07 AM   Result Value Ref Range    BUN 13 7 - 25 mg/dL   Creatinine, serum    Collection Time: 09/14/23  9:07 AM   Result Value Ref Range    Creatinine 0.89 0.70 - 1.35 mg/dL    eGFR 94 > OR = 60 mL/min/1.73m2   AFP tumor marker    Collection Time: 09/14/23  9:07 AM   Result Value Ref Range    AFP-Tumor Marker 2.8 <6.1 ng/mL        Physical Exam  Constitutional:       Appearance: Normal appearance. HENT:      Head: Normocephalic. Right Ear: Tympanic membrane, ear canal and external ear normal.      Left Ear: Tympanic membrane, ear canal and external ear normal.      Nose: Nose normal. No congestion. Mouth/Throat:      Mouth: Mucous membranes are moist.      Pharynx: Oropharynx is clear. No oropharyngeal exudate or posterior oropharyngeal erythema. Eyes:      Extraocular Movements: Extraocular movements intact. Conjunctiva/sclera: Conjunctivae normal.   Cardiovascular:      Rate and Rhythm: Normal rate and regular rhythm. Heart sounds: Normal heart sounds. No murmur heard. Pulmonary:      Effort: Pulmonary effort is normal.      Breath sounds: Wheezing present. No rales. Abdominal:      General: There is no distension. Palpations: Abdomen is soft. Tenderness: There is no abdominal tenderness. Musculoskeletal:      Cervical back: Normal range of motion and neck supple. Right lower leg: No edema. Left lower leg: No edema. Lymphadenopathy:      Cervical: No cervical adenopathy. Skin:     General: Skin is warm. Neurological:      General: No focal deficit present. Mental Status: He is alert and oriented to person, place, and time.

## 2023-10-10 RX ORDER — MIRTAZAPINE 15 MG/1
15 TABLET, FILM COATED ORAL
Qty: 90 TABLET | Refills: 0 | Status: SHIPPED | OUTPATIENT
Start: 2023-10-10

## 2023-10-20 NOTE — PROGRESS NOTES
Otoniel Bardales is a 79 y o  male who is currently receiving Vancomycin IV with management by the Pharmacy Consult service  Relevant clinical data and objective / subjective history reviewed  Vancomycin Assessment:  Indication and Goal AUC/Trough:  Soft tissue (goal -600, trough >10)      Micro:     Renal Function:  SCr: 0 94 mg/dL  CrCl: 64 mL/min  Renal replacement: Not on dialysis  Days of Therapy: 2  Current Dose:  500 mg q12h    Vancomycin Plan:  New Dosin mg q12h  Estimated AUC: 529 mcg*hr/mL  Estimated Trough: 17 3 mcg/mL  Next Level:  with AM labs  Renal Function Monitoring: Daily BMP and UOP  Pharmacy will continue to follow closely for s/sx of nephrotoxicity, infusion reactions and appropriateness of therapy  BMP and CBC will be ordered per protocol  We will continue to follow the patient’s culture results and clinical progress daily      Jaswinder Thompson, PharmD  Pharmacist Patient requesting medication refill.  Please approve or deny this request.    Rx requested:  Requested Prescriptions     Pending Prescriptions Disp Refills    Dulaglutide (TRULICITY) 4.69 MALCOLM/9.7VJ SOPN 2 Adjustable Dose Pre-filled Pen Syringe 1     Sig: INJECT 1 PEN UNDER THE SKIN ONCE PER WEEK    metFORMIN (GLUCOPHAGE-XR) 500 MG extended release tablet 120 tablet 0     Sig: TAKE 2 TABLETS BY MOUTH 2 TIMES DAILY (BEFORE MEALS)         Last Office Visit:   1/9/2023      Next Visit Date:  Future Appointments   Date Time Provider [address]   12/5/2023 11:30 AM Tressa Aragon MD [address] [place] [address]   1/22/2024  1:15 PM Sukumar Schaeffer MD [facility]

## 2023-11-06 ENCOUNTER — CLINICAL SUPPORT (OUTPATIENT)
Dept: INTERNAL MEDICINE CLINIC | Facility: CLINIC | Age: 68
End: 2023-11-06
Payer: MEDICARE

## 2023-11-06 DIAGNOSIS — E53.8 VITAMIN B12 DEFICIENCY: Primary | ICD-10-CM

## 2023-11-06 PROCEDURE — 96372 THER/PROPH/DIAG INJ SC/IM: CPT

## 2023-11-06 RX ADMIN — CYANOCOBALAMIN 1000 MCG: 1000 INJECTION, SOLUTION INTRAMUSCULAR; SUBCUTANEOUS at 09:49

## 2023-11-07 ENCOUNTER — OFFICE VISIT (OUTPATIENT)
Dept: PULMONOLOGY | Facility: CLINIC | Age: 68
End: 2023-11-07
Payer: MEDICARE

## 2023-11-07 VITALS
SYSTOLIC BLOOD PRESSURE: 126 MMHG | DIASTOLIC BLOOD PRESSURE: 78 MMHG | HEART RATE: 88 BPM | WEIGHT: 164 LBS | RESPIRATION RATE: 16 BRPM | HEIGHT: 69 IN | TEMPERATURE: 97.7 F | OXYGEN SATURATION: 96 % | BODY MASS INDEX: 24.29 KG/M2

## 2023-11-07 DIAGNOSIS — I10 ESSENTIAL HYPERTENSION: Chronic | ICD-10-CM

## 2023-11-07 DIAGNOSIS — J43.9 PULMONARY EMPHYSEMA, UNSPECIFIED EMPHYSEMA TYPE (HCC): ICD-10-CM

## 2023-11-07 DIAGNOSIS — Z23 ENCOUNTER FOR IMMUNIZATION: ICD-10-CM

## 2023-11-07 DIAGNOSIS — E78.2 MIXED HYPERLIPIDEMIA: ICD-10-CM

## 2023-11-07 DIAGNOSIS — J30.89 NON-SEASONAL ALLERGIC RHINITIS, UNSPECIFIED TRIGGER: ICD-10-CM

## 2023-11-07 DIAGNOSIS — R06.09 DOE (DYSPNEA ON EXERTION): ICD-10-CM

## 2023-11-07 DIAGNOSIS — F17.211 NICOTINE DEPENDENCE, CIGARETTES, IN REMISSION: ICD-10-CM

## 2023-11-07 DIAGNOSIS — J44.9 CHRONIC OBSTRUCTIVE PULMONARY DISEASE, UNSPECIFIED COPD TYPE (HCC): Primary | ICD-10-CM

## 2023-11-07 DIAGNOSIS — R73.01 IMPAIRED FASTING BLOOD SUGAR: ICD-10-CM

## 2023-11-07 PROCEDURE — G0008 ADMIN INFLUENZA VIRUS VAC: HCPCS

## 2023-11-07 PROCEDURE — 90662 IIV NO PRSV INCREASED AG IM: CPT

## 2023-11-07 PROCEDURE — 99214 OFFICE O/P EST MOD 30 MIN: CPT | Performed by: INTERNAL MEDICINE

## 2023-11-07 RX ORDER — UMECLIDINIUM BROMIDE AND VILANTEROL TRIFENATATE 62.5; 25 UG/1; UG/1
1 POWDER RESPIRATORY (INHALATION) DAILY
Qty: 180 EACH | Refills: 0 | Status: SHIPPED | OUTPATIENT
Start: 2023-11-07 | End: 2024-02-05

## 2023-11-07 NOTE — PROGRESS NOTES
Addendum Note by Catarino Ayala DO at 2/19/2020  3:40 PM     Author: Catarino Ayala DO Service: -- Author Type: Physician    Filed: 2/19/2020  3:40 PM Encounter Date: 2/19/2020 Status: Signed    : Catarino Ayala DO (Physician)    Addended by: CATARINO AYALA on: 2/19/2020 03:40 PM        Modules accepted: Orders         Office Progress Note - Pulmonary    Yolande Montana III 76 y.o. male MRN: [id number]    Encounter: [id number]      Assessment:  Chronic obstructive pulmonary disease. Allergic rhinitis. Dyspnea on exertion. History of tobacco use. Encounter for immunization. Plan:   Influenza vaccine. Anoro Ellipta 1 inhalation once a day. Albuterol/ipratropium inhaler 4 times a day as needed. Fluticasone nasal spray 2 sprays to each nostril once a day. Regular walk/exercise to improve stamina. Follow-up in 6 months. Discussion:   The patient's COPD is in remission. I have maintained him on the Anoro Ellipta 1 inhalation once a day. I have renewed his prescription. He will use the albuterol/ipratropium inhaler 4 times a day as needed. His allergic rhinitis is well treated. I have maintained him on fluticasone nasal spray 2 sprays to each nostril once a day. Advised him to take regular walks and exercise to maintain stamina. We gave him the influenza vaccine today. I will see him in 6 months. Subjective: The patient is here for a follow-up visit. He had a mild cold which has improved. He denies any significant cough, wheezing or sputum production. Denies any chest pain or palpitations. No nocturnal symptoms. He is using the Anoro Ellipta 1 inhalation once a day. He rarely needs to use his rescue inhaler. His nasal allergies have markedly improved with the use of fluticasone. He is using 2 sprays to each nostril once a day. Sometimes he does not remember taking it. Review of systems:  A 12 point system review is done and aside from what is stated above the rest of the review of systems is negative. Family history and social history are reviewed. Medications list is reviewed. Vitals: Blood pressure 126/78, pulse 88, temperature 97.7 °F (36.5 °C), temperature source Tympanic, resp. rate 16, height 5' 9" (1.753 m), weight 74.4 kg (164 lb), SpO2 96 %. ,     Physical Exam  Gen: Awake, alert, oriented x 3, no acute distress  HEENT: Mucous membranes moist, no oral lesions, no thrush  NECK: No accessory muscle use, JVP not elevated  Cardiac: Regular, single S1, single S2, no murmurs, no rubs, no gallops  Lungs: Decreased breath sounds. No wheezing or rhonchi. Abdomen: normoactive bowel sounds, soft nontender, nondistended, no rebound or rigidity, no guarding  Extremities: no cyanosis, no clubbing, no edema  Neuro:  Grossly nonfocal.  Skin:  No rash.     Lab Results   Component Value Date    WBC 11.9 (H) 07/21/2023    HGB 12.8 (L) 07/21/2023    HCT 38.8 07/21/2023    MCV 85.7 07/21/2023     07/21/2023     Lab Results   Component Value Date    SODIUM 136 07/21/2023    K 4.6 07/21/2023    CL 99 07/21/2023    CO2 25 07/21/2023    BUN 13 09/14/2023    CREATININE 0.89 09/14/2023    GLUC 108 07/21/2023    CALCIUM 9.2 07/21/2023

## 2023-11-29 DIAGNOSIS — F34.1 DYSTHYMIC DISORDER: ICD-10-CM

## 2023-11-29 DIAGNOSIS — K21.9 GASTROESOPHAGEAL REFLUX DISEASE WITHOUT ESOPHAGITIS: ICD-10-CM

## 2023-11-29 RX ORDER — OMEPRAZOLE 20 MG/1
20 CAPSULE, DELAYED RELEASE ORAL DAILY
Qty: 90 CAPSULE | Refills: 0 | Status: SHIPPED | OUTPATIENT
Start: 2023-11-29

## 2023-11-29 RX ORDER — ALPRAZOLAM 0.25 MG/1
0.25 TABLET ORAL
Qty: 90 TABLET | Refills: 0 | Status: SHIPPED | OUTPATIENT
Start: 2023-11-29 | End: 2024-02-27

## 2023-12-04 ENCOUNTER — CLINICAL SUPPORT (OUTPATIENT)
Dept: INTERNAL MEDICINE CLINIC | Facility: CLINIC | Age: 68
End: 2023-12-04
Payer: MEDICARE

## 2023-12-04 DIAGNOSIS — E53.8 B12 DEFICIENCY: Primary | ICD-10-CM

## 2023-12-04 PROCEDURE — 96372 THER/PROPH/DIAG INJ SC/IM: CPT

## 2023-12-04 RX ADMIN — CYANOCOBALAMIN 1000 MCG: 1000 INJECTION, SOLUTION INTRAMUSCULAR; SUBCUTANEOUS at 09:29

## 2023-12-07 DIAGNOSIS — E83.42 HYPOMAGNESEMIA: ICD-10-CM

## 2023-12-07 RX ORDER — CARBOXYMETHYLCELLULOSE SODIUM 0.5 %
DROPPERETTE, SINGLE-USE DROP DISPENSER OPHTHALMIC (EYE)
Qty: 90 TABLET | Refills: 3 | Status: SHIPPED | OUTPATIENT
Start: 2023-12-07

## 2023-12-08 PROBLEM — C22.0 HCC (HEPATOCELLULAR CARCINOMA) (HCC): Chronic | Status: RESOLVED | Noted: 2022-03-15 | Resolved: 2023-12-08

## 2023-12-12 LAB
ALBUMIN SERPL-MCNC: 4 G/DL (ref 3.6–5.1)
ALBUMIN/GLOB SERPL: 1.5 (CALC) (ref 1–2.5)
ALP SERPL-CCNC: 97 U/L (ref 35–144)
ALT SERPL-CCNC: 5 U/L (ref 9–46)
AST SERPL-CCNC: 10 U/L (ref 10–35)
BASOPHILS # BLD AUTO: 55 CELLS/UL (ref 0–200)
BASOPHILS NFR BLD AUTO: 0.5 %
BILIRUB SERPL-MCNC: 1.3 MG/DL (ref 0.2–1.2)
BUN SERPL-MCNC: 9 MG/DL (ref 7–25)
BUN/CREAT SERPL: ABNORMAL (CALC) (ref 6–22)
CALCIUM SERPL-MCNC: 9.1 MG/DL (ref 8.6–10.3)
CHLORIDE SERPL-SCNC: 102 MMOL/L (ref 98–110)
CHOLEST SERPL-MCNC: 115 MG/DL
CHOLEST/HDLC SERPL: 3.1 (CALC)
CO2 SERPL-SCNC: 28 MMOL/L (ref 20–32)
CREAT SERPL-MCNC: 0.91 MG/DL (ref 0.7–1.35)
EOSINOPHIL # BLD AUTO: 98 CELLS/UL (ref 15–500)
EOSINOPHIL NFR BLD AUTO: 0.9 %
ERYTHROCYTE [DISTWIDTH] IN BLOOD BY AUTOMATED COUNT: 13.8 % (ref 11–15)
GFR/BSA.PRED SERPLBLD CYS-BASED-ARV: 92 ML/MIN/1.73M2
GLOBULIN SER CALC-MCNC: 2.7 G/DL (CALC) (ref 1.9–3.7)
GLUCOSE SERPL-MCNC: 91 MG/DL (ref 65–99)
HBA1C MFR BLD: 5.3 % OF TOTAL HGB
HCT VFR BLD AUTO: 39.2 % (ref 38.5–50)
HDLC SERPL-MCNC: 37 MG/DL
HGB BLD-MCNC: 13.4 G/DL (ref 13.2–17.1)
LDLC SERPL CALC-MCNC: 59 MG/DL (CALC)
LYMPHOCYTES # BLD AUTO: 2093 CELLS/UL (ref 850–3900)
LYMPHOCYTES NFR BLD AUTO: 19.2 %
MCH RBC QN AUTO: 29.6 PG (ref 27–33)
MCHC RBC AUTO-ENTMCNC: 34.2 G/DL (ref 32–36)
MCV RBC AUTO: 86.5 FL (ref 80–100)
MONOCYTES # BLD AUTO: 654 CELLS/UL (ref 200–950)
MONOCYTES NFR BLD AUTO: 6 %
NEUTROPHILS # BLD AUTO: 8001 CELLS/UL (ref 1500–7800)
NEUTROPHILS NFR BLD AUTO: 73.4 %
NONHDLC SERPL-MCNC: 78 MG/DL (CALC)
PLATELET # BLD AUTO: 248 THOUSAND/UL (ref 140–400)
PMV BLD REES-ECKER: 11 FL (ref 7.5–12.5)
POTASSIUM SERPL-SCNC: 3.5 MMOL/L (ref 3.5–5.3)
PROT SERPL-MCNC: 6.7 G/DL (ref 6.1–8.1)
RBC # BLD AUTO: 4.53 MILLION/UL (ref 4.2–5.8)
SODIUM SERPL-SCNC: 141 MMOL/L (ref 135–146)
TRIGL SERPL-MCNC: 100 MG/DL
TSH SERPL-ACNC: 1.09 MIU/L (ref 0.4–4.5)
WBC # BLD AUTO: 10.9 THOUSAND/UL (ref 3.8–10.8)

## 2023-12-13 ENCOUNTER — APPOINTMENT (EMERGENCY)
Dept: VASCULAR ULTRASOUND | Facility: HOSPITAL | Age: 68
End: 2023-12-13
Payer: MEDICARE

## 2023-12-13 ENCOUNTER — HOSPITAL ENCOUNTER (INPATIENT)
Facility: HOSPITAL | Age: 68
LOS: 3 days | Discharge: HOME | End: 2023-12-16
Attending: EMERGENCY MEDICINE | Admitting: GENERAL PRACTICE
Payer: MEDICARE

## 2023-12-13 ENCOUNTER — APPOINTMENT (EMERGENCY)
Dept: RADIOLOGY | Facility: HOSPITAL | Age: 68
End: 2023-12-13
Payer: MEDICARE

## 2023-12-13 ENCOUNTER — APPOINTMENT (EMERGENCY)
Dept: CT IMAGING | Facility: HOSPITAL | Age: 68
End: 2023-12-13
Payer: MEDICARE

## 2023-12-13 DIAGNOSIS — M87.051 AVASCULAR NECROSIS OF RIGHT FEMORAL HEAD (HCC): Primary | ICD-10-CM

## 2023-12-13 DIAGNOSIS — M87.00 AVASCULAR NECROSIS (HCC): ICD-10-CM

## 2023-12-13 DIAGNOSIS — M54.9 BACK PAIN: ICD-10-CM

## 2023-12-13 DIAGNOSIS — R26.2 AMBULATORY DYSFUNCTION: ICD-10-CM

## 2023-12-13 DIAGNOSIS — M87.052 AVASCULAR NECROSIS OF LEFT FEMORAL HEAD (HCC): ICD-10-CM

## 2023-12-13 DIAGNOSIS — M25.561 ACUTE PAIN OF RIGHT KNEE: ICD-10-CM

## 2023-12-13 DIAGNOSIS — M79.671 RIGHT FOOT PAIN: ICD-10-CM

## 2023-12-13 PROBLEM — C22.0 HEPATOCELLULAR CARCINOMA (HCC): Status: ACTIVE | Noted: 2023-12-13

## 2023-12-13 PROBLEM — Z86.711 HISTORY OF PULMONARY EMBOLISM: Status: ACTIVE | Noted: 2023-12-13

## 2023-12-13 LAB
ALBUMIN SERPL BCP-MCNC: 3.9 G/DL (ref 3.5–5)
ALP SERPL-CCNC: 82 U/L (ref 34–104)
ALT SERPL W P-5'-P-CCNC: 7 U/L (ref 7–52)
ANION GAP SERPL CALCULATED.3IONS-SCNC: 9 MMOL/L
AST SERPL W P-5'-P-CCNC: 15 U/L (ref 13–39)
BASOPHILS # BLD AUTO: 0.03 THOUSANDS/ÂΜL (ref 0–0.1)
BASOPHILS NFR BLD AUTO: 0 % (ref 0–1)
BILIRUB SERPL-MCNC: 1.95 MG/DL (ref 0.2–1)
BUN SERPL-MCNC: 14 MG/DL (ref 5–25)
CALCIUM SERPL-MCNC: 9 MG/DL (ref 8.4–10.2)
CHLORIDE SERPL-SCNC: 99 MMOL/L (ref 96–108)
CO2 SERPL-SCNC: 27 MMOL/L (ref 21–32)
CREAT SERPL-MCNC: 0.94 MG/DL (ref 0.6–1.3)
EOSINOPHIL # BLD AUTO: 0.01 THOUSAND/ÂΜL (ref 0–0.61)
EOSINOPHIL NFR BLD AUTO: 0 % (ref 0–6)
ERYTHROCYTE [DISTWIDTH] IN BLOOD BY AUTOMATED COUNT: 14 % (ref 11.6–15.1)
GFR SERPL CREATININE-BSD FRML MDRD: 82 ML/MIN/1.73SQ M
GLUCOSE SERPL-MCNC: 109 MG/DL (ref 65–140)
HCT VFR BLD AUTO: 39 % (ref 36.5–49.3)
HGB BLD-MCNC: 12.8 G/DL (ref 12–17)
IMM GRANULOCYTES # BLD AUTO: 0.04 THOUSAND/UL (ref 0–0.2)
IMM GRANULOCYTES NFR BLD AUTO: 0 % (ref 0–2)
LACTATE SERPL-SCNC: 1.1 MMOL/L (ref 0.5–2)
LYMPHOCYTES # BLD AUTO: 1.3 THOUSANDS/ÂΜL (ref 0.6–4.47)
LYMPHOCYTES NFR BLD AUTO: 10 % (ref 14–44)
MCH RBC QN AUTO: 29.3 PG (ref 26.8–34.3)
MCHC RBC AUTO-ENTMCNC: 32.8 G/DL (ref 31.4–37.4)
MCV RBC AUTO: 89 FL (ref 82–98)
MONOCYTES # BLD AUTO: 1 THOUSAND/ÂΜL (ref 0.17–1.22)
MONOCYTES NFR BLD AUTO: 8 % (ref 4–12)
NEUTROPHILS # BLD AUTO: 10.97 THOUSANDS/ÂΜL (ref 1.85–7.62)
NEUTS SEG NFR BLD AUTO: 82 % (ref 43–75)
NRBC BLD AUTO-RTO: 0 /100 WBCS
PLATELET # BLD AUTO: 215 THOUSANDS/UL (ref 149–390)
PMV BLD AUTO: 10.3 FL (ref 8.9–12.7)
POTASSIUM SERPL-SCNC: 3.8 MMOL/L (ref 3.5–5.3)
PROT SERPL-MCNC: 7.3 G/DL (ref 6.4–8.4)
RBC # BLD AUTO: 4.37 MILLION/UL (ref 3.88–5.62)
SODIUM SERPL-SCNC: 135 MMOL/L (ref 135–147)
WBC # BLD AUTO: 13.35 THOUSAND/UL (ref 4.31–10.16)

## 2023-12-13 PROCEDURE — 93971 EXTREMITY STUDY: CPT

## 2023-12-13 PROCEDURE — G1004 CDSM NDSC: HCPCS

## 2023-12-13 PROCEDURE — 84550 ASSAY OF BLOOD/URIC ACID: CPT | Performed by: NURSE PRACTITIONER

## 2023-12-13 PROCEDURE — 99222 1ST HOSP IP/OBS MODERATE 55: CPT | Performed by: GENERAL PRACTICE

## 2023-12-13 PROCEDURE — 99284 EMERGENCY DEPT VISIT MOD MDM: CPT

## 2023-12-13 PROCEDURE — 83605 ASSAY OF LACTIC ACID: CPT

## 2023-12-13 PROCEDURE — 96361 HYDRATE IV INFUSION ADD-ON: CPT

## 2023-12-13 PROCEDURE — 96374 THER/PROPH/DIAG INJ IV PUSH: CPT

## 2023-12-13 PROCEDURE — 96376 TX/PRO/DX INJ SAME DRUG ADON: CPT

## 2023-12-13 PROCEDURE — 99285 EMERGENCY DEPT VISIT HI MDM: CPT

## 2023-12-13 PROCEDURE — 36415 COLL VENOUS BLD VENIPUNCTURE: CPT

## 2023-12-13 PROCEDURE — 80053 COMPREHEN METABOLIC PANEL: CPT

## 2023-12-13 PROCEDURE — 96375 TX/PRO/DX INJ NEW DRUG ADDON: CPT

## 2023-12-13 PROCEDURE — 85025 COMPLETE CBC W/AUTO DIFF WBC: CPT

## 2023-12-13 PROCEDURE — 93971 EXTREMITY STUDY: CPT | Performed by: SURGERY

## 2023-12-13 PROCEDURE — 73610 X-RAY EXAM OF ANKLE: CPT

## 2023-12-13 PROCEDURE — 74176 CT ABD & PELVIS W/O CONTRAST: CPT

## 2023-12-13 RX ORDER — ENOXAPARIN SODIUM 100 MG/ML
40 INJECTION SUBCUTANEOUS DAILY
Status: DISCONTINUED | OUTPATIENT
Start: 2023-12-14 | End: 2023-12-16 | Stop reason: HOSPADM

## 2023-12-13 RX ORDER — AMILORIDE HYDROCHLORIDE 5 MG/1
5 TABLET ORAL DAILY
Status: DISCONTINUED | OUTPATIENT
Start: 2023-12-14 | End: 2023-12-16 | Stop reason: HOSPADM

## 2023-12-13 RX ORDER — METHOCARBAMOL 500 MG/1
750 TABLET, FILM COATED ORAL EVERY 6 HOURS PRN
Status: DISCONTINUED | OUTPATIENT
Start: 2023-12-13 | End: 2023-12-14

## 2023-12-13 RX ORDER — OXYCODONE HYDROCHLORIDE 5 MG/1
5 TABLET ORAL EVERY 4 HOURS PRN
Status: DISCONTINUED | OUTPATIENT
Start: 2023-12-13 | End: 2023-12-16 | Stop reason: HOSPADM

## 2023-12-13 RX ORDER — ACETAMINOPHEN 325 MG/1
975 TABLET ORAL EVERY 8 HOURS SCHEDULED
Status: DISCONTINUED | OUTPATIENT
Start: 2023-12-13 | End: 2023-12-16 | Stop reason: HOSPADM

## 2023-12-13 RX ORDER — ACETAMINOPHEN 325 MG/1
650 TABLET ORAL EVERY 4 HOURS PRN
Status: DISCONTINUED | OUTPATIENT
Start: 2023-12-13 | End: 2023-12-13

## 2023-12-13 RX ORDER — LIDOCAINE 50 MG/G
2 PATCH TOPICAL DAILY
Status: DISCONTINUED | OUTPATIENT
Start: 2023-12-13 | End: 2023-12-16 | Stop reason: HOSPADM

## 2023-12-13 RX ORDER — AMLODIPINE BESYLATE 5 MG/1
5 TABLET ORAL DAILY
Status: DISCONTINUED | OUTPATIENT
Start: 2023-12-14 | End: 2023-12-16 | Stop reason: HOSPADM

## 2023-12-13 RX ORDER — NAPROXEN 250 MG/1
500 TABLET ORAL 2 TIMES DAILY WITH MEALS
Status: DISCONTINUED | OUTPATIENT
Start: 2023-12-14 | End: 2023-12-14

## 2023-12-13 RX ORDER — AMOXICILLIN 250 MG
1 CAPSULE ORAL 2 TIMES DAILY PRN
Status: DISCONTINUED | OUTPATIENT
Start: 2023-12-13 | End: 2023-12-16 | Stop reason: HOSPADM

## 2023-12-13 RX ORDER — HYDROMORPHONE HCL/PF 1 MG/ML
0.5 SYRINGE (ML) INJECTION ONCE
Status: COMPLETED | OUTPATIENT
Start: 2023-12-13 | End: 2023-12-13

## 2023-12-13 RX ORDER — ACETAMINOPHEN 325 MG/1
650 TABLET ORAL ONCE
Status: COMPLETED | OUTPATIENT
Start: 2023-12-13 | End: 2023-12-13

## 2023-12-13 RX ORDER — FENTANYL CITRATE 50 UG/ML
50 INJECTION, SOLUTION INTRAMUSCULAR; INTRAVENOUS ONCE
Status: COMPLETED | OUTPATIENT
Start: 2023-12-13 | End: 2023-12-13

## 2023-12-13 RX ORDER — POTASSIUM CHLORIDE 750 MG/1
20 TABLET, EXTENDED RELEASE ORAL 2 TIMES DAILY
Status: DISCONTINUED | OUTPATIENT
Start: 2023-12-13 | End: 2023-12-16 | Stop reason: HOSPADM

## 2023-12-13 RX ORDER — LEVOTHYROXINE SODIUM 0.05 MG/1
50 TABLET ORAL
Status: DISCONTINUED | OUTPATIENT
Start: 2023-12-14 | End: 2023-12-16 | Stop reason: HOSPADM

## 2023-12-13 RX ORDER — BUPROPION HYDROCHLORIDE 150 MG/1
150 TABLET ORAL DAILY
Status: DISCONTINUED | OUTPATIENT
Start: 2023-12-14 | End: 2023-12-16 | Stop reason: HOSPADM

## 2023-12-13 RX ORDER — CARVEDILOL 3.12 MG/1
3.12 TABLET ORAL 2 TIMES DAILY WITH MEALS
Status: DISCONTINUED | OUTPATIENT
Start: 2023-12-14 | End: 2023-12-16 | Stop reason: HOSPADM

## 2023-12-13 RX ORDER — PANTOPRAZOLE SODIUM 40 MG/1
40 TABLET, DELAYED RELEASE ORAL
Status: DISCONTINUED | OUTPATIENT
Start: 2023-12-14 | End: 2023-12-14

## 2023-12-13 RX ORDER — OXYCODONE HYDROCHLORIDE 10 MG/1
10 TABLET ORAL EVERY 4 HOURS PRN
Status: DISCONTINUED | OUTPATIENT
Start: 2023-12-13 | End: 2023-12-16 | Stop reason: HOSPADM

## 2023-12-13 RX ORDER — MIRTAZAPINE 15 MG/1
15 TABLET, FILM COATED ORAL
Status: DISCONTINUED | OUTPATIENT
Start: 2023-12-13 | End: 2023-12-16 | Stop reason: HOSPADM

## 2023-12-13 RX ORDER — HYDROMORPHONE HCL IN WATER/PF 6 MG/30 ML
0.2 PATIENT CONTROLLED ANALGESIA SYRINGE INTRAVENOUS EVERY 4 HOURS PRN
Status: DISCONTINUED | OUTPATIENT
Start: 2023-12-13 | End: 2023-12-13

## 2023-12-13 RX ORDER — KETOROLAC TROMETHAMINE 30 MG/ML
15 INJECTION, SOLUTION INTRAMUSCULAR; INTRAVENOUS ONCE
Status: COMPLETED | OUTPATIENT
Start: 2023-12-13 | End: 2023-12-13

## 2023-12-13 RX ORDER — CALCIUM CARBONATE 500 MG/1
1000 TABLET, CHEWABLE ORAL DAILY PRN
Status: DISCONTINUED | OUTPATIENT
Start: 2023-12-13 | End: 2023-12-16 | Stop reason: HOSPADM

## 2023-12-13 RX ORDER — HYDROMORPHONE HCL IN WATER/PF 6 MG/30 ML
0.2 PATIENT CONTROLLED ANALGESIA SYRINGE INTRAVENOUS EVERY 6 HOURS PRN
Status: DISCONTINUED | OUTPATIENT
Start: 2023-12-13 | End: 2023-12-14

## 2023-12-13 RX ADMIN — MIRTAZAPINE 15 MG: 15 TABLET, FILM COATED ORAL at 21:37

## 2023-12-13 RX ADMIN — KETOROLAC TROMETHAMINE 15 MG: 30 INJECTION, SOLUTION INTRAMUSCULAR; INTRAVENOUS at 12:50

## 2023-12-13 RX ADMIN — POTASSIUM CHLORIDE 20 MEQ: 750 TABLET, EXTENDED RELEASE ORAL at 19:11

## 2023-12-13 RX ADMIN — HYDROMORPHONE HYDROCHLORIDE 0.5 MG: 1 INJECTION, SOLUTION INTRAMUSCULAR; INTRAVENOUS; SUBCUTANEOUS at 14:54

## 2023-12-13 RX ADMIN — OXYCODONE HYDROCHLORIDE 10 MG: 10 TABLET ORAL at 19:09

## 2023-12-13 RX ADMIN — SODIUM CHLORIDE 1000 ML: 0.9 INJECTION, SOLUTION INTRAVENOUS at 12:47

## 2023-12-13 RX ADMIN — FENTANYL CITRATE 50 MCG: 50 INJECTION INTRAMUSCULAR; INTRAVENOUS at 12:18

## 2023-12-13 RX ADMIN — HYDROMORPHONE HYDROCHLORIDE 0.5 MG: 1 INJECTION, SOLUTION INTRAMUSCULAR; INTRAVENOUS; SUBCUTANEOUS at 13:31

## 2023-12-13 RX ADMIN — ACETAMINOPHEN 650 MG: 325 TABLET, FILM COATED ORAL at 12:50

## 2023-12-13 RX ADMIN — METHOCARBAMOL TABLETS 750 MG: 500 TABLET, COATED ORAL at 17:42

## 2023-12-13 NOTE — ASSESSMENT & PLAN NOTE
Presented with right lower back pain radiating into right lower extremity x 5 days with trauma; on admission, pt could only use crutches   CT showed Stable chronic grade 1 anterolisthesis of L5 on S1 secondary to chronic bilateral spondylolysis. No acute fracture or new subluxation. Severe bilateral foraminal narrowing at L5-S1. Avascular necrosis of both femoral head without collapse  Pain control - scheduled Naproxen/ Tylenol & PRN oxycodone   PT/OT  U/S r/o DVT pending   Consider ortho evaluation with persistent symptoms/ poor pain control   Can consider prednisone w/ symptomatic improvement, deferred for now, reviewed w/ attending

## 2023-12-13 NOTE — PLAN OF CARE
Problem: PAIN - ADULT  Goal: Verbalizes/displays adequate comfort level or baseline comfort level  Description: Interventions:  - Encourage patient to monitor pain and request assistance  - Assess pain using appropriate pain scale  - Administer analgesics based on type and severity of pain and evaluate response  - Implement non-pharmacological measures as appropriate and evaluate response  - Consider cultural and social influences on pain and pain management  - Notify physician/advanced practitioner if interventions unsuccessful or patient reports new pain  Outcome: Progressing     Problem: INFECTION - ADULT  Goal: Absence or prevention of progression during hospitalization  Description: INTERVENTIONS:  - Assess and monitor for signs and symptoms of infection  - Monitor lab/diagnostic results  - Monitor all insertion sites, i.e. indwelling lines, tubes, and drains  - Monitor endotracheal if appropriate and nasal secretions for changes in amount and color  - [place] appropriate cooling/warming therapies per order  - Administer medications as ordered  - Instruct and encourage patient and family to use good hand hygiene technique  - Identify and instruct in appropriate isolation precautions for identified infection/condition  Outcome: Progressing  Goal: Absence of fever/infection during neutropenic period  Description: INTERVENTIONS:  - Monitor WBC    Outcome: Progressing     Problem: SAFETY ADULT  Goal: Patient will remain free of falls  Description: INTERVENTIONS:  - Educate patient/family on patient safety including physical limitations  - Instruct patient to call for assistance with activity   - Consult OT/PT to assist with strengthening/mobility   - Keep Call bell within reach  - Keep bed low and locked with side rails adjusted as appropriate  - Keep care items and personal belongings within reach  - Initiate and maintain comfort rounds  - Make Fall Risk Sign visible to staff  - Offer Toileting every 2 Hours,  in advance of need  - Apply yellow socks and bracelet for high fall risk patients  - Consider moving patient to room near nurses station  Outcome: Progressing  Goal: Maintain or return to baseline ADL function  Description: INTERVENTIONS:  -  Assess patient's ability to carry out ADLs; assess patient's baseline for ADL function and identify physical deficits which impact ability to perform ADLs (bathing, care of mouth/teeth, toileting, grooming, dressing, etc.)  - Assess/evaluate cause of self-care deficits   - Assess range of motion  - Assess patient's mobility; develop plan if impaired  - Assess patient's need for assistive devices and provide as appropriate  - Encourage maximum independence but intervene and supervise when necessary  - Involve family in performance of ADLs  - Assess for home care needs following discharge   - Consider OT consult to assist with ADL evaluation and planning for discharge  - Provide patient education as appropriate  Outcome: Progressing  Goal: Maintains/Returns to pre admission functional level  Description: INTERVENTIONS:  - Perform AM-PAC 6 Click Basic Mobility/ Daily Activity assessment daily.  - Set and communicate daily mobility goal to care team and patient/family/caregiver.   - Collaborate with rehabilitation services on mobility goals if consulted  - Perform Range of Motion 2 times a day.  - Reposition patient every 2 hours.  - Dangle patient 2 times a day  - Stand patient 2 times a day  - Ambulate patient 2 times a day  - Out of bed to chair 2 times a day   - Out of bed for meals 2 times a day  - Out of bed for toileting  - Record patient progress and toleration of activity level   Outcome: Progressing     Problem: DISCHARGE PLANNING  Goal: Discharge to home or other facility with appropriate resources  Description: INTERVENTIONS:  - Identify barriers to discharge w/patient and caregiver  - Arrange for needed discharge resources and transportation as appropriate  - Identify  discharge learning needs (meds, wound care, etc.)  - Arrange for interpretive services to assist at discharge as needed  - Refer to Case Management Department for coordinating discharge planning if the patient needs post-hospital services based on physician/advanced practitioner order or complex needs related to functional status, cognitive ability, or social support system  Outcome: Progressing     Problem: Knowledge Deficit  Goal: Patient/family/caregiver demonstrates understanding of disease process, treatment plan, medications, and discharge instructions  Description: Complete learning assessment and assess knowledge base.  Interventions:  - Provide teaching at level of understanding  - Provide teaching via preferred learning methods  Outcome: Progressing

## 2023-12-13 NOTE — ED PROVIDER NOTES
"History  Chief Complaint   Patient presents with    Back Pain     Low back pain that started over the weekend, radiates down right leg to right knee. Also reports right foot pain. Denies injury     Is a 68-year-old male presents today with concerns of right lower back pain this over the weekend and has now spread to his right knee and to his right ankle/foot.  Patient states he is never had any feeling this before and does not typically have back pain.  Denies any injuries.  Patient also denies any urinary symptoms.  No saddle anesthesia, fecal or urinary incontinence.  Patient does have a history of hepatocellular carcinoma.  Denies any nausea or vomiting.  Patient states she has extreme pain, worse with movement.        Prior to Admission Medications   Prescriptions Last Dose Informant Patient Reported? Taking?   ALPRAZolam (XANAX) 0.25 mg tablet 12/13/2023  No Yes   Sig: Take 1 tablet (0.25 mg total) by mouth daily at bedtime as needed for anxiety   AMILoride 5 mg tablet  Self No No   Sig: Take 1 tablet (5 mg total) by mouth daily   [facility] Magnesium Oxide 250 MG TABS  Self Yes No   Sig: TAKE 1 TABLET (250 MG TOTAL) BY MOUTH IN THE MORNING   CVS Magnesium Oxide 250 MG TABS   No No   Sig: TAKE 1 TABLET (250 MG TOTAL) BY MOUTH IN THE MORNING   Ostomy Supplies (Adapt Barrier) STRP  Self No No   Sig: Use daily as needed (PRN)   Ostomy Supplies (Premier Convex Skin Barrier) MISC  Self No No   Sig: Use daily as needed (PRN)   Ostomy Supplies KIT  Self No No   Sig: Skin barries w/ mold to fit opening 12\" pouch w/ 2 sided comfort panel esenta sting free adhesive remover Sting free barrier wipes   Ostomy Supplies OINT  Self No No   Sig: Use as needed (as needed for every use)   Ostomy Supplies Pouch MISC  Self No No   Sig: Use as needed (use as needed for every use)   VIT B12-METHIONINE-INOS-CHOL IM  Self Yes No   Sig: Inject into a muscle every 30 (thirty) days   amLODIPine (NORVASC) 5 mg tablet  Self No No   Sig: Take 1 " tablet (5 mg total) by mouth daily   benzonatate (TESSALON) 200 MG capsule  Self No No   Sig: Take 1 capsule (200 mg total) by mouth 3 (three) times a day as needed for cough   buPROPion (WELLBUTRIN XL) 150 mg 24 hr tablet  Self No No   Sig: Take 1 tablet (150 mg total) by mouth daily   calcium carbonate (TUMS) 500 mg chewable tablet  Self No No   Sig: Chew 1 tablet (500 mg total) daily   carvedilol (COREG) 3.125 mg tablet  Self No No   Sig: Take 1 tablet (3.125 mg total) by mouth 2 (two) times a day with meals   cholecalciferol (VITAMIN D3) 400 units tablet  Self Yes No   Sig: Take 400 Units by mouth Every Sunday   fluticasone (FLONASE) 50 mcg/act nasal spray  Self No No   Sig: SPRAY 2 SPRAYS INTO EACH NOSTRIL EVERY DAY   folic acid (FOLVITE) 1 mg tablet  Self No No   Sig: Take 1 tablet (1 mg total) by mouth daily   levothyroxine 50 mcg tablet  Self No No   Sig: Take 1 tablet (50 mcg total) by mouth daily in the early morning   mirtazapine (REMERON) 15 mg tablet  Self No No   Sig: Take 1 tablet (15 mg total) by mouth daily at bedtime   naproxen (Naprosyn) 500 mg tablet  Self No No   Sig: Take 1 tablet (500 mg total) by mouth 2 (two) times a day with meals   omeprazole (PriLOSEC) 20 mg delayed release capsule   No No   Sig: Take 1 capsule (20 mg total) by mouth daily   potassium chloride (MICRO-K) 10 MEQ CR capsule  Self No No   Sig: Take 2 capsules (20 mEq total) by mouth 2 (two) times a day   thiamine 250 MG tablet  Self No No   Sig: Take 1 tablet (250 mg total) by mouth daily for 5 days Do not start before November 14, 2022.   umeclidinium-vilanterol (Anoro Ellipta) 62.5-25 mcg/actuation inhaler   No No   Sig: Inhale 1 puff daily      Facility-Administered Medications Last Administration Doses Remaining   cyanocobalamin injection 1,000 mcg 10/9/2023  9:41 AM    cyanocobalamin injection 1,000 mcg 8/14/2023 11:21 AM    cyanocobalamin injection 1,000 mcg 12/4/2023  9:29 AM           Past Medical History:   Diagnosis  Date    LUCILLE (acute kidney injury) (HCC) 6/28/2017    Blindness of left eye     Since birth    Cancer (HCC)     liver    Cataract     Colon polyp     Colostomy in place (HCC) 6/29/2017    COPD (chronic obstructive pulmonary disease) (HCC)     Crohn disease (HCC)     Emphysema of lung (HCC)     Emphysema/COPD (HCC)     Essential hypertension     GERD (gastroesophageal reflux disease)     Hypertension     Hypokalemia 6/28/2017    Hypomagnesemia 6/29/2017    Hyponatremia 6/28/2017    Kidney stone     Osteomyelitis (HCC)     Pneumonia        Past Surgical History:   Procedure Laterality Date    APPENDECTOMY  1979    CATARACT EXTRACTION Bilateral     CHOLECYSTECTOMY      COLECTOMY      10 inchs of ileum    COLONOSCOPY N/A 06/30/2017    Procedure: COLONOSCOPY;  Surgeon: Gomez Bee MD;  Location: [facility];  Service: Gastroenterology    COLOSTOMY      EYE SURGERY  2 yrs ago    FINGER AMPUTATION      FINGER SURGERY Left     HERNIA REPAIR  1978    IR BIOPSY LIVER MASS  06/08/2020    IR MICROWAVE ABLATION  09/21/2022    IR MICROWAVE ABLATION  03/16/2023    LITHOTRIPSY      LIVER LOBECTOMY N/A 07/15/2020    Procedure: LIVER ABLATION, INTRAOPERATIVE U/S LIVER;  Surgeon: Asa Schafer MD;  Location: [facility];  Service: Surgical Oncology    TONSILLECTOMY  Child       Family History   Problem Relation Age of Onset    Hypertension Father     Heart disease Sister      I have reviewed and agree with the history as documented.    E-Cigarette/Vaping    E-Cigarette Use Never User      E-Cigarette/Vaping Substances    Nicotine No     THC No     CBD No     Flavoring No     Other No     Unknown No      Social History     Tobacco Use    Smoking status: Former     Current packs/day: 0.00     Average packs/day: 1.5 packs/day for 50.0 years (75.1 ttl pk-yrs)     Types: Cigarettes     Start date: 1971     Quit date: 7/15/2020     Years since quitting: 3.4    Smokeless tobacco: Never   Vaping Use    Vaping status: Never Used   Substance Use  Topics    Alcohol use: Not Currently    Drug use: Not Currently       Review of Systems   Constitutional:  Negative for chills and fever.   HENT:  Negative for ear pain and sore throat.    Eyes:  Negative for pain and visual disturbance.   Respiratory:  Negative for cough and shortness of breath.    Cardiovascular:  Negative for chest pain and palpitations.   Gastrointestinal:  Negative for abdominal pain and vomiting.   Genitourinary:  Negative for dysuria and hematuria.   Musculoskeletal:  Positive for arthralgias (entirety of right leg. severe in ankle/knee), back pain, gait problem and joint swelling (right ankle). Negative for myalgias, neck pain and neck stiffness.   Skin:  Negative for color change and rash.   Neurological:  Negative for seizures and syncope.   All other systems reviewed and are negative.      Physical Exam  Physical Exam  Vitals and nursing note reviewed.   Constitutional:       General: He is in acute distress.      Appearance: He is well-developed. He is ill-appearing.   HENT:      Head: Normocephalic and atraumatic.   Eyes:      Conjunctiva/sclera: Conjunctivae normal.   Cardiovascular:      Rate and Rhythm: Normal rate and regular rhythm.      Pulses: Normal pulses.      Heart sounds: Normal heart sounds. No murmur heard.     No friction rub. No gallop.   Pulmonary:      Effort: Pulmonary effort is normal. No respiratory distress.      Breath sounds: Normal breath sounds. No stridor. No wheezing, rhonchi or rales.   Chest:      Chest wall: No tenderness.   Abdominal:      Palpations: Abdomen is soft.      Tenderness: There is no abdominal tenderness. There is no right CVA tenderness or left CVA tenderness.   Musculoskeletal:         General: Swelling (mild right lateral ankle. No obvious deformities. Pulses are palpable and strong, bilaterally.) and tenderness present. No deformity or signs of injury.      Cervical back: Neck supple.      Right lower leg: No edema.      Left lower leg:  No edema.   Skin:     General: Skin is warm and dry.      Capillary Refill: Capillary refill takes less than 2 seconds.      Coloration: Skin is not jaundiced or pale.      Findings: No bruising, erythema, lesion or rash.   Neurological:      Mental Status: He is alert.   Psychiatric:         Mood and Affect: Mood normal.         Vital Signs  ED Triage Vitals [12/13/23 1034]   Temperature Pulse Respirations Blood Pressure SpO2   98.5 °F (36.9 °C) 100 20 150/69 100 %      Temp Source Heart Rate Source Patient Position - Orthostatic VS BP Location FiO2 (%)   Oral Monitor Sitting Left arm --      Pain Score       7           Vitals:    12/13/23 1034 12/13/23 1223 12/13/23 1700   BP: 150/69 143/70 121/66   Pulse: 100 91 62   Patient Position - Orthostatic VS: Sitting Sitting Lying         Visual Acuity      ED Medications  Medications   acetaminophen (TYLENOL) tablet 975 mg (975 mg Oral Not Given 12/13/23 1654)   lidocaine (LIDODERM) 5 % patch 2 patch (2 patches Topical Not Given 12/13/23 1656)   methocarbamol (ROBAXIN) tablet 750 mg (750 mg Oral Given 12/13/23 1742)   oxyCODONE (ROXICODONE) immediate release tablet 10 mg (10 mg Oral Given 12/13/23 1909)   oxyCODONE (ROXICODONE) IR tablet 5 mg (has no administration in time range)   senna-docusate sodium (SENOKOT S) 8.6-50 mg per tablet 1 tablet (has no administration in time range)   AMILoride tablet 5 mg (has no administration in time range)   amLODIPine (NORVASC) tablet 5 mg (has no administration in time range)   buPROPion (WELLBUTRIN XL) 24 hr tablet 150 mg (has no administration in time range)   carvedilol (COREG) tablet 3.125 mg (has no administration in time range)   levothyroxine tablet 50 mcg (has no administration in time range)   mirtazapine (REMERON) tablet 15 mg (has no administration in time range)   naproxen (NAPROSYN) tablet 500 mg (has no administration in time range)   pantoprazole (PROTONIX) EC tablet 40 mg (has no administration in time range)    potassium chloride (K-DUR,KLOR-CON) CR tablet 20 mEq (20 mEq Oral Given 12/13/23 1911)   umeclidinium-vilanterol 62.5-25 mcg/actuation inhaler 1 puff (has no administration in time range)   calcium carbonate (TUMS) chewable tablet 1,000 mg (has no administration in time range)   enoxaparin (LOVENOX) subcutaneous injection 40 mg (has no administration in time range)   HYDROmorphone HCl (DILAUDID) injection 0.2 mg (has no administration in time range)   fentanyl citrate (PF) 100 MCG/2ML 50 mcg (50 mcg Intravenous Given 12/13/23 1218)   ketorolac (TORADOL) injection 15 mg (15 mg Intravenous Given 12/13/23 1250)   acetaminophen (TYLENOL) tablet 650 mg (650 mg Oral Given 12/13/23 1250)   sodium chloride 0.9 % bolus 1,000 mL (0 mL Intravenous Stopped 12/13/23 1451)   HYDROmorphone (DILAUDID) injection 0.5 mg (0.5 mg Intravenous Given 12/13/23 1331)   HYDROmorphone (DILAUDID) injection 0.5 mg (0.5 mg Intravenous Given 12/13/23 1454)       Diagnostic Studies  Results Reviewed       Procedure Component Value Units Date/Time    Lactic acid, plasma (w/reflex if result > 2.0) [[id number]]  (Normal) Collected: 12/13/23 1216    Lab Status: Final result Specimen: Blood from Arm, Left Updated: 12/13/23 1239     LACTIC ACID 1.1 mmol/L     Narrative:      Result may be elevated if tourniquet was used during collection.    Comprehensive metabolic panel [[id number]]  (Abnormal) Collected: 12/13/23 1216    Lab Status: Final result Specimen: Blood from Arm, Left Updated: 12/13/23 1239     Sodium 135 mmol/L      Potassium 3.8 mmol/L      Chloride 99 mmol/L      CO2 27 mmol/L      ANION GAP 9 mmol/L      BUN 14 mg/dL      Creatinine 0.94 mg/dL      Glucose 109 mg/dL      Calcium 9.0 mg/dL      AST 15 U/L      ALT 7 U/L      Alkaline Phosphatase 82 U/L      Total Protein 7.3 g/dL      Albumin 3.9 g/dL      Total Bilirubin 1.95 mg/dL      eGFR 82 ml/min/1.73sq m     Narrative:      National Kidney Disease Foundation guidelines for Chronic  Kidney Disease (CKD):     Stage 1 with normal or high GFR (GFR > 90 mL/min/1.73 square meters)    Stage 2 Mild CKD (GFR = 60-89 mL/min/1.73 square meters)    Stage 3A Moderate CKD (GFR = 45-59 mL/min/1.73 square meters)    Stage 3B Moderate CKD (GFR = 30-44 mL/min/1.73 square meters)    Stage 4 Severe CKD (GFR = 15-29 mL/min/1.73 square meters)    Stage 5 End Stage CKD (GFR <15 mL/min/1.73 square meters)  Note: GFR calculation is accurate only with a steady state creatinine    CBC and differential [[id number]]  (Abnormal) Collected: 12/13/23 1216    Lab Status: Final result Specimen: Blood from Arm, Left Updated: 12/13/23 1223     WBC 13.35 Thousand/uL      RBC 4.37 Million/uL      Hemoglobin 12.8 g/dL      Hematocrit 39.0 %      MCV 89 fL      MCH 29.3 pg      MCHC 32.8 g/dL      RDW 14.0 %      MPV 10.3 fL      Platelets 215 Thousands/uL      nRBC 0 /100 WBCs      Neutrophils Relative 82 %      Immat GRANS % 0 %      Lymphocytes Relative 10 %      Monocytes Relative 8 %      Eosinophils Relative 0 %      Basophils Relative 0 %      Neutrophils Absolute 10.97 Thousands/µL      Immature Grans Absolute 0.04 Thousand/uL      Lymphocytes Absolute 1.30 Thousands/µL      Monocytes Absolute 1.00 Thousand/µL      Eosinophils Absolute 0.01 Thousand/µL      Basophils Absolute 0.03 Thousands/µL                    VAS lower limb venous duplex study, unilateral/limited   Final Result by Dash Marvin MD (12/13 9091)      CT abdomen pelvis wo contrast   Final Result by Shayy Butts MD (12/13 0836)   Again noted are right hepatic lobe lesions which demonstrates changes of previous microwave ablation. The inferior lesion is smaller. The superior lesion, also mildly smaller. Study suboptimal to evaluate for residual viable tissue due to lack of    contrast   No retroperitoneal or abdominopelvic lymphadenopathy   Severe bilateral foraminal narrowing at L5-S1   Avascular necrosis of both femoral head without collapse   The  study was marked in EPIC for significant notification.            Workstation performed: [id number]         CT recon only lumbar spine   Final Result by Lambert Sotelo MD (12/13 8393)         1. Stable chronic grade 1 anterolisthesis of L5 on S1 secondary to chronic bilateral spondylolysis. No acute fracture or new subluxation.            Resident: DAWSON OSULLIVAN I, the attending radiologist, have reviewed the images and agree with the final report above.      Workstation performed: [id number]         XR ankle 3+ views RIGHT   ED Interpretation by Jerry Jara PA-C (12/13 1230)   No acute fractures or dislocation.                 Procedures  Procedures         ED Course  ED Course as of 12/13/23 1913   Wed Dec 13, 2023   1301 Doubt infection at this point. No signs or skin infection. WBC of 13.35 is similar to patient's baseline. Afebrile. No bands.   1329 Potassium: 3.8   1329 Creatinine: 0.94   1329 Glucose, Random: 109   1329 TOTAL BILIRUBIN(!): 1.95  Elevated from previous. Hx hepatocellular carcinoma. No abdominal pain.   1421 LACTIC ACID: 1.1   1422 Vascular technician informing that patient was negative for any DVT/SVT in the right lower extremity.                                             Medical Decision Making  68 year old male presenting today with extreme pain in right lower extremity. Pain out of proportion to exam.  Labs, Lactic for possible ischemia.  CT abd pelv without contrast and CT lumbar spine recon for mets/fractures.  Labs reassuring. WBC elevated, similar to previous. No fevers. Doubt infection.  Pain improved with fentanyl, however did come back as fentanyl wore off. Will give dilaudid.  Patient unable to ambulate after pain medications.  Will have patient admitted for ambulatory dysfunction.  CT did show bilateral avascular necrosis of femoral heads, likely secondary to >30 year use of chronic steroids. Additional findings of severe bilateral foraminal narrowing at  L5-S1 also likely contributory. Liver lesions decreasing in size. [facility] was contacted and was agreeable to admission. Patient likely will require PT.    Amount and/or Complexity of Data Reviewed  Labs: ordered. Decision-making details documented in ED Course.  Radiology: ordered and independent interpretation performed.    Risk  OTC drugs.  Prescription drug management.  Decision regarding hospitalization.             Disposition  Final diagnoses:   Avascular necrosis of right femoral head (HCC)   Avascular necrosis of left femoral head (HCC)   Ambulatory dysfunction     Time reflects when diagnosis was documented in both MDM as applicable and the Disposition within this note       Time User Action Codes Description Comment    12/13/2023  3:10 PM Jerry Jara [M87.051] Avascular necrosis of right femur (HCC)     12/13/2023  3:10 PM Jerry Jara Add [M87.051] Avascular necrosis of right femoral head (HCC)     12/13/2023  3:10 PM Jerry Jara Modify [M87.051] Avascular necrosis of right femoral head (HCC)     12/13/2023  3:10 PM Jerry Jara Remove [M87.051] Avascular necrosis of right femur (HCC)     12/13/2023  3:10 PM Jerry Jara Add [M87.052] Avascular necrosis of left femoral head (HCC)     12/13/2023  3:11 PM Jerry Jara [R26.2] Ambulatory dysfunction     12/13/2023  6:42 PM Lorri Carter Add [M54.9] Back pain           ED Disposition       ED Disposition   Admit    Condition   Stable    Date/Time   Wed Dec 13, 2023  3:37 PM    Comment   Case was discussed with Dr. López and the patient's admission status was agreed to be Admission Status: inpatient status to the service of Dr. López .               Follow-up Information       Follow up With Specialties Details Why Contact Info Additional Information    [facility] Emergency Medicine Go to  If symptoms worsen [address] [facility] [id number] [phone] [facility]  [facility], [address] [place], [place], [address]            Current Discharge Medication List        CONTINUE these medications which have NOT CHANGED    Details   ALPRAZolam (XANAX) 0.25 mg tablet Take 1 tablet (0.25 mg total) by mouth daily at bedtime as needed for anxiety  Qty: 90 tablet, Refills: 0    Associated Diagnoses: Dysthymic disorder      AMILoride 5 mg tablet Take 1 tablet (5 mg total) by mouth daily  Qty: 90 tablet, Refills: 0    Associated Diagnoses: Essential hypertension, benign      amLODIPine (NORVASC) 5 mg tablet Take 1 tablet (5 mg total) by mouth daily  Qty: 90 tablet, Refills: 1    Associated Diagnoses: Essential hypertension      benzonatate (TESSALON) 200 MG capsule Take 1 capsule (200 mg total) by mouth 3 (three) times a day as needed for cough  Qty: 20 capsule, Refills: 0    Associated Diagnoses: Tracheitis      buPROPion (WELLBUTRIN XL) 150 mg 24 hr tablet Take 1 tablet (150 mg total) by mouth daily  Qty: 90 tablet, Refills: 0    Comments: DX Code Needed  .  Associated Diagnoses: Dysthymic disorder      calcium carbonate (TUMS) 500 mg chewable tablet Chew 1 tablet (500 mg total) daily  Qty:  , Refills: 0    Associated Diagnoses: Hypocalcemia      carvedilol (COREG) 3.125 mg tablet Take 1 tablet (3.125 mg total) by mouth 2 (two) times a day with meals  Qty: 180 tablet, Refills: 1    Associated Diagnoses: Supraventricular tachycardia      cholecalciferol (VITAMIN D3) 400 units tablet Take 400 Units by mouth Every Sunday      !! [facility] Magnesium Oxide 250 MG TABS TAKE 1 TABLET (250 MG TOTAL) BY MOUTH IN THE MORNING      !! [facility] Magnesium Oxide 250 MG TABS TAKE 1 TABLET (250 MG TOTAL) BY MOUTH IN THE MORNING  Qty: 90 tablet, Refills: 3    Associated Diagnoses: Hypomagnesemia      fluticasone (FLONASE) 50 mcg/act nasal spray SPRAY 2 SPRAYS INTO EACH NOSTRIL EVERY DAY  Qty: 48 mL, Refills: 2    Associated Diagnoses: Non-seasonal allergic rhinitis, unspecified  "trigger      folic acid (FOLVITE) 1 mg tablet Take 1 tablet (1 mg total) by mouth daily  Qty: 90 tablet, Refills: 1    Associated Diagnoses: History of alcohol use disorder      levothyroxine 50 mcg tablet Take 1 tablet (50 mcg total) by mouth daily in the early morning  Qty: 90 tablet, Refills: 1    Associated Diagnoses: Subclinical hypothyroidism      mirtazapine (REMERON) 15 mg tablet Take 1 tablet (15 mg total) by mouth daily at bedtime  Qty: 90 tablet, Refills: 0    Comments: DX Code Needed  .  Associated Diagnoses: Dysthymic disorder      naproxen (Naprosyn) 500 mg tablet Take 1 tablet (500 mg total) by mouth 2 (two) times a day with meals  Qty: 30 tablet, Refills: 0    Associated Diagnoses: Postoperative pain      omeprazole (PriLOSEC) 20 mg delayed release capsule Take 1 capsule (20 mg total) by mouth daily  Qty: 90 capsule, Refills: 0    Comments: DX Code Needed  .  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)      Ostomy Supplies KIT Skin barries w/ mold to fit opening 12\" pouch w/ 2 sided comfort panel esenta sting free adhesive remover Sting free barrier wipes  Qty: 1 kit, Refills: 0    Associated Diagnoses: 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)      Ostomy Supplies OINT Use as needed (as needed for every use)  Qty: 30 each, Refills: 0    Associated Diagnoses: Encounter for ostomy nurse consultation      Ostomy Supplies Pouch MISC Use as needed (use as needed for every use)  Qty: 30 each, Refills: 0    Associated Diagnoses: Encounter for ostomy nurse consultation      potassium chloride (MICRO-K) " 10 MEQ CR capsule Take 2 capsules (20 mEq total) by mouth 2 (two) times a day  Qty: 180 capsule, Refills: 1    Associated Diagnoses: Hypokalemia      thiamine 250 MG tablet Take 1 tablet (250 mg total) by mouth daily for 5 days Do not start before November 14, 2022.  Qty: 5 tablet, Refills: 0    Associated Diagnoses: History of alcohol use disorder      umeclidinium-vilanterol (Anoro Ellipta) 62.5-25 mcg/actuation inhaler Inhale 1 puff daily  Qty: 180 each, Refills: 0    Associated Diagnoses: Pulmonary emphysema, unspecified emphysema type (HCC)      VIT B12-METHIONINE-INOS-CHOL IM Inject into a muscle every 30 (thirty) days       !! - Potential duplicate medications found. Please discuss with provider.          No discharge procedures on file.    PDMP Review         Value Time User    PDMP Reviewed  Yes 11/29/2023 11:26 AM Torri Coleman MD            ED Provider  Electronically Signed by             Jerry Jara PA-C  12/13/23 1914

## 2023-12-13 NOTE — H&P
[facility]  H&P  Name: Jose Juan Elkins III 68 y.o. male I MRN: [id number]  Unit/Bed#: [facility] I Date of Admission: 12/13/2023   Date of Service: 12/13/2023 I Hospital Day: 0      Assessment/Plan     * Back pain  Assessment & Plan  Presented with right lower back pain radiating into right lower extremity x 5 days with trauma; on admission, pt could only use crutches   CT showed Stable chronic grade 1 anterolisthesis of L5 on S1 secondary to chronic bilateral spondylolysis. No acute fracture or new subluxation. Severe bilateral foraminal narrowing at L5-S1. Avascular necrosis of both femoral head without collapse  Pain control - scheduled Naproxen/ Tylenol & PRN oxycodone   PT/OT  U/S r/o DVT pending   Consider ortho evaluation with persistent symptoms/ poor pain control   Can consider prednisone w/ symptomatic improvement, deferred for now, reviewed w/ attending    Crohn disease (HCC)  Assessment & Plan  Follows with GI.  Last scope showed stable disease  No acute symptoms  Chronic colostomy bag     Hepatocellular carcinoma (HCC)  Assessment & Plan  Continue to follow-up with GI, routine MRI f/u   H/o resection and radiation     History of pulmonary embolism  Assessment & Plan  Documented h/o; pt denies PE or DVT   NO AC therapy in the past     Chronic obstructive pulmonary disease (HCC)  Assessment & Plan  Without exacerbation  continue respiratory protocol  Continue Anoro      Essential hypertension  Assessment & Plan  Continue home medication including Norvasc & Coreg  BP stable          VTE Pharmacologic Prophylaxis:   lovenox   Code Status: Level 1 - Full Code full code  Discussion with family: Patient declined call to .     Anticipated Length of Stay: Patient will be admitted on an inpatient basis with an anticipated length of stay of greater than 2 midnights secondary to ambulatory dysfunction in setting of acute lower back and RLE pain.    Total Time Spent on Date of  Encounter in care of patient: 45 mins. This time was spent on one or more of the following: performing physical exam; counseling and coordination of care; obtaining or reviewing history; documenting in the medical record; reviewing/ordering tests, medications or procedures; communicating with other healthcare professionals and discussing with patient's family/caregivers.    Chief Complaint: back pain    History of Present Illness:  Jose Juan Eklins III is a 68 y.o. male with a PMH of COPD, hypertension and Crohn's disease who presents with back pain radiating into his right lower extremity. Symptoms began about 5 days ago and have continued to worsen in severity. Pain travels from lower back to distal right foot. Pain is moderate but initially severe; IV pain medications in the ED have helped. He denies saddles paresthesias or bladder/bowel dysfunction. Pt needed a crutch on arrival as he could not bear weight on the RLE. He denies trauma or falling.      Review of Systems:  Review of Systems   Constitutional: Negative.    HENT: Negative.     Eyes: Negative.    Respiratory: Negative.     Cardiovascular: Negative.    Gastrointestinal: Negative.    Endocrine: Negative.    Genitourinary: Negative.    Musculoskeletal:  Positive for back pain, gait problem, joint swelling and myalgias.   Hematological: Negative.    Psychiatric/Behavioral: Negative.         Past Medical and Surgical History:   Past Medical History:   Diagnosis Date    LUCILLE (acute kidney injury) (HCC) 6/28/2017    Blindness of left eye     Since birth    Cancer (HCC)     liver    Cataract     Colon polyp     Colostomy in place (HCC) 6/29/2017    COPD (chronic obstructive pulmonary disease) (HCC)     Crohn disease (HCC)     Emphysema of lung (HCC)     Emphysema/COPD (HCC)     Essential hypertension     GERD (gastroesophageal reflux disease)     Hypertension     Hypokalemia 6/28/2017    Hypomagnesemia 6/29/2017    Hyponatremia 6/28/2017    Kidney stone      Osteomyelitis (HCC)     Pneumonia        Past Surgical History:   Procedure Laterality Date    APPENDECTOMY  1979    CATARACT EXTRACTION Bilateral     CHOLECYSTECTOMY      COLECTOMY      10 inchs of ileum    COLONOSCOPY N/A 06/30/2017    Procedure: COLONOSCOPY;  Surgeon: Gomez Bee MD;  [facility];  Service: Gastroenterology    COLOSTOMY      EYE SURGERY  2 yrs ago    FINGER AMPUTATION      FINGER SURGERY Left     HERNIA REPAIR  1978    IR BIOPSY LIVER MASS  06/08/2020    IR MICROWAVE ABLATION  09/21/2022    IR MICROWAVE ABLATION  03/16/2023    LITHOTRIPSY      LIVER LOBECTOMY N/A 07/15/2020    Procedure: LIVER ABLATION, INTRAOPERATIVE U/S LIVER;  Surgeon: Asa Schafer MD;  [facility];  Service: Surgical Oncology    TONSILLECTOMY  Child       Meds/Allergies:  Prior to Admission medications    Medication Sig Start Date End Date Taking? Authorizing Provider   ALPRAZolam (XANAX) 0.25 mg tablet Take 1 tablet (0.25 mg total) by mouth daily at bedtime as needed for anxiety 11/29/23 2/27/24  Torri Coleman MD   AMILoride 5 mg tablet Take 1 tablet (5 mg total) by mouth daily 4/17/23   Torri Coleman MD   amLODIPine (NORVASC) 5 mg tablet Take 1 tablet (5 mg total) by mouth daily 9/15/23   Torri Coleman MD   benzonatate (TESSALON) 200 MG capsule Take 1 capsule (200 mg total) by mouth 3 (three) times a day as needed for cough 10/9/23   Torri Coleman MD   buPROPion (WELLBUTRIN XL) 150 mg 24 hr tablet Take 1 tablet (150 mg total) by mouth daily 10/13/22   Torri Coleman MD   calcium carbonate (TUMS) 500 mg chewable tablet Chew 1 tablet (500 mg total) daily 9/12/20   Vonda Webster PA-C   carvedilol (COREG) 3.125 mg tablet Take 1 tablet (3.125 mg total) by mouth 2 (two) times a day with meals 12/16/22   Torri Coleman MD   cholecalciferol (VITAMIN D3) 400 units tablet Take 400 Units by mouth Every Sunday    Historical Provider, MD   [employer] Magnesium Oxide 250 MG TABS TAKE 1 TABLET (250 MG TOTAL) BY MOUTH IN  "THE MORNING 3/4/23   Historical Provider, MD   [employer] Magnesium Oxide 250 MG TABS TAKE 1 TABLET (250 MG TOTAL) BY MOUTH IN THE MORNING 12/7/23   RAMANA Morris   fluticasone (FLONASE) 50 mcg/act nasal spray SPRAY 2 SPRAYS INTO EACH NOSTRIL EVERY DAY 5/3/23   Shine Collins MD   folic acid (FOLVITE) 1 mg tablet Take 1 tablet (1 mg total) by mouth daily 12/16/22   Torri Coleman MD   levothyroxine 50 mcg tablet Take 1 tablet (50 mcg total) by mouth daily in the early morning 12/16/22   Torri Coleman MD   mirtazapine (REMERON) 15 mg tablet Take 1 tablet (15 mg total) by mouth daily at bedtime 10/10/23   Torri Coleman MD   naproxen (Naprosyn) 500 mg tablet Take 1 tablet (500 mg total) by mouth 2 (two) times a day with meals 3/18/23   Serafin Lopez MD   omeprazole (PriLOSEC) 20 mg delayed release capsule Take 1 capsule (20 mg total) by mouth daily 11/29/23   Torri Coleman MD   Ostomy Supplies (Adapt Barrier) STRP Use daily as needed (PRN) 10/2/23   Torri Coleman MD   Ostomy Supplies (Premier Convex Skin Barrier) MISC Use daily as needed (PRN) 10/2/23   Torri Coleman MD   Ostomy Supplies KIT Skin barries w/ mold to fit opening 12\" pouch w/ 2 sided comfort panel esenta sting free adhesive remover Sting free barrier wipes 9/27/23   Torri Coleman MD   Ostomy Supplies OINT Use as needed (as needed for every use) 9/26/23   Torri Coleman MD   Ostomy Supplies Pouch MISC Use as needed (use as needed for every use) 9/26/23   Torri Coleman MD   potassium chloride (MICRO-K) 10 MEQ CR capsule Take 2 capsules (20 mEq total) by mouth 2 (two) times a day 8/24/23   Torri Coleman MD   thiamine 250 MG tablet Take 1 tablet (250 mg total) by mouth daily for 5 days Do not start before November 14, 2022. 11/14/22 11/2/23  Adis Knutson,    umeclidinium-vilanterol (Anoro Ellipta) 62.5-25 mcg/actuation inhaler Inhale 1 puff daily 11/7/23 2/5/24  Shine Collins MD   VIT [id number] IM " "Inject into a muscle every 30 (thirty) days    Historical Provider, MD   apixaban (Eliquis) 5 mg Take 2 tablets (10 mg total) by mouth 2 (two) times a day for 7 days, THEN 1 tablet (5 mg total) 2 (two) times a day for 23 days. 11/7/22 11/10/22  Adis Knutson, DO     I have reviewed home medications with a medical source (PCP, Pharmacy, other).    Allergies: No Known Allergies    Social History:  Marital Status:    Patient Pre-hospital Level of Mobility: walks  Patient Pre-hospital Diet Restrictions:  None  Substance Use History:   Social History     Substance and Sexual Activity   Alcohol Use Not Currently     Social History     Tobacco Use   Smoking Status Former    Current packs/day: 0.00    Average packs/day: 1.5 packs/day for 50.0 years (75.1 ttl pk-yrs)    Types: Cigarettes    Start date: 1971    Quit date: 7/15/2020    Years since quitting: 3.4   Smokeless Tobacco Never     Social History     Substance and Sexual Activity   Drug Use Not Currently       Family History:  Family History   Problem Relation Age of Onset    Hypertension Father     Heart disease Sister        Physical Exam:     Vitals:   Blood Pressure: 121/66 (12/13/23 1700)  Pulse: 62 (12/13/23 1700)  Temperature: 98.4 °F (36.9 °C) (12/13/23 1700)  Temp Source: Oral (12/13/23 1700)  Respirations: 18 (12/13/23 1700)  Height: 5' 9\" (175.3 cm) (12/13/23 1700)  Weight - Scale: 74.8 kg (165 lb) (12/13/23 1700)  SpO2: 95 % (12/13/23 1700)    Physical Exam  Constitutional:       Appearance: He is normal weight.   HENT:      Head: Normocephalic.      Nose: Nose normal.      Mouth/Throat:      Mouth: Mucous membranes are moist.      Pharynx: Oropharynx is clear.   Eyes:      Extraocular Movements: Extraocular movements intact.      Conjunctiva/sclera: Conjunctivae normal.   Cardiovascular:      Rate and Rhythm: Normal rate and regular rhythm.      Pulses: Normal pulses.      Heart sounds: Normal heart sounds.   Pulmonary:      Effort: " Pulmonary effort is normal.      Breath sounds: Normal breath sounds.   Abdominal:      General: Abdomen is flat. Bowel sounds are normal.      Palpations: Abdomen is soft.      Comments: Colostomy status    Musculoskeletal:         General: Tenderness present. No deformity or signs of injury.      Cervical back: Normal range of motion.      Right lower leg: No edema.      Left lower leg: No edema.      Comments: Pain w/ attempts of passive ROM of right hip/knee; pain w/ palpation of foot   Skin:     General: Skin is warm and dry.   Neurological:      General: No focal deficit present.      Mental Status: He is alert. Mental status is at baseline.   Psychiatric:         Mood and Affect: Mood normal.         Behavior: Behavior normal.      Additional Data:     Lab Results:  Results from last 7 days   Lab Units 12/13/23  1216   WBC Thousand/uL 13.35*   HEMOGLOBIN g/dL 12.8   HEMATOCRIT % 39.0   PLATELETS Thousands/uL 215   NEUTROS PCT % 82*   LYMPHS PCT % 10*   MONOS PCT % 8   EOS PCT % 0     Results from last 7 days   Lab Units 12/13/23  1216   SODIUM mmol/L 135   POTASSIUM mmol/L 3.8   CHLORIDE mmol/L 99   CO2 mmol/L 27   BUN mg/dL 14   CREATININE mg/dL 0.94   ANION GAP mmol/L 9   CALCIUM mg/dL 9.0   ALBUMIN g/dL 3.9   TOTAL BILIRUBIN mg/dL 1.95*   ALK PHOS U/L 82   ALT U/L 7   AST U/L 15   GLUCOSE RANDOM mg/dL 109             Results from last 7 days   Lab Units 12/11/23  1001   HEMOGLOBIN A1C % of total Hgb 5.3     Results from last 7 days   Lab Units 12/13/23  1216   LACTIC ACID mmol/L 1.1       Lines/Drains:  Invasive Devices       Peripheral Intravenous Line  Duration             Peripheral IV 12/13/23 Left Antecubital <1 day              Drain  Duration             Colostomy LLQ -- days    Colostomy LLQ -- days                        Imaging: Reviewed radiology reports from this admission including: xray(s)  VAS lower limb venous duplex study, unilateral/limited   Final Result by Dash Marvin MD (12/13  1440)      CT abdomen pelvis wo contrast   Final Result by Shayy Butts MD (12/13 1453)   Again noted are right hepatic lobe lesions which demonstrates changes of previous microwave ablation. The inferior lesion is smaller. The superior lesion, also mildly smaller. Study suboptimal to evaluate for residual viable tissue due to lack of    contrast   No retroperitoneal or abdominopelvic lymphadenopathy   Severe bilateral foraminal narrowing at L5-S1   Avascular necrosis of both femoral head without collapse   The study was marked in EPIC for significant notification.            Workstation performed: [id number]         CT recon only lumbar spine   Final Result by Lambert Sotelo MD (12/13 3936)         1. Stable chronic grade 1 anterolisthesis of L5 on S1 secondary to chronic bilateral spondylolysis. No acute fracture or new subluxation.            Resident: DASWON OSULLIVAN I, the attending radiologist, have reviewed the images and agree with the final report above.      Workstation performed: [id number]         XR ankle 3+ views RIGHT   ED Interpretation by Jerry Jara PA-C (12/13 1238)   No acute fractures or dislocation.        ** Please Note: This note has been constructed using a voice recognition system. **

## 2023-12-14 PROBLEM — M54.41 ACUTE BILATERAL LOW BACK PAIN WITH RIGHT-SIDED SCIATICA: Status: ACTIVE | Noted: 2023-12-13

## 2023-12-14 PROBLEM — R17 SERUM TOTAL BILIRUBIN ELEVATED: Status: ACTIVE | Noted: 2023-12-14

## 2023-12-14 PROBLEM — M25.561 ACUTE PAIN OF RIGHT KNEE: Status: ACTIVE | Noted: 2023-12-14

## 2023-12-14 PROBLEM — M79.671 RIGHT FOOT PAIN: Status: ACTIVE | Noted: 2023-12-14

## 2023-12-14 LAB
ERYTHROCYTE [DISTWIDTH] IN BLOOD BY AUTOMATED COUNT: 14.2 % (ref 11.6–15.1)
HCT VFR BLD AUTO: 35.3 % (ref 36.5–49.3)
HGB BLD-MCNC: 11.6 G/DL (ref 12–17)
MCH RBC QN AUTO: 30 PG (ref 26.8–34.3)
MCHC RBC AUTO-ENTMCNC: 32.9 G/DL (ref 31.4–37.4)
MCV RBC AUTO: 91 FL (ref 82–98)
PLATELET # BLD AUTO: 193 THOUSANDS/UL (ref 149–390)
PMV BLD AUTO: 10.9 FL (ref 8.9–12.7)
RBC # BLD AUTO: 3.87 MILLION/UL (ref 3.88–5.62)
URATE SERPL-MCNC: 5.1 MG/DL (ref 3.5–8.5)
WBC # BLD AUTO: 9.2 THOUSAND/UL (ref 4.31–10.16)

## 2023-12-14 PROCEDURE — 99232 SBSQ HOSP IP/OBS MODERATE 35: CPT | Performed by: NURSE PRACTITIONER

## 2023-12-14 PROCEDURE — 97166 OT EVAL MOD COMPLEX 45 MIN: CPT

## 2023-12-14 PROCEDURE — 85027 COMPLETE CBC AUTOMATED: CPT | Performed by: PHYSICIAN ASSISTANT

## 2023-12-14 RX ORDER — LANOLIN ALCOHOL/MO/W.PET/CERES
3 CREAM (GRAM) TOPICAL
Status: DISCONTINUED | OUTPATIENT
Start: 2023-12-14 | End: 2023-12-16 | Stop reason: HOSPADM

## 2023-12-14 RX ORDER — HYDROMORPHONE HCL/PF 1 MG/ML
0.5 SYRINGE (ML) INJECTION EVERY 4 HOURS PRN
Status: DISCONTINUED | OUTPATIENT
Start: 2023-12-14 | End: 2023-12-16 | Stop reason: HOSPADM

## 2023-12-14 RX ORDER — METHOCARBAMOL 500 MG/1
750 TABLET, FILM COATED ORAL EVERY 6 HOURS SCHEDULED
Status: DISCONTINUED | OUTPATIENT
Start: 2023-12-14 | End: 2023-12-14

## 2023-12-14 RX ORDER — PANTOPRAZOLE SODIUM 40 MG/1
40 TABLET, DELAYED RELEASE ORAL
Status: DISCONTINUED | OUTPATIENT
Start: 2023-12-14 | End: 2023-12-16 | Stop reason: HOSPADM

## 2023-12-14 RX ORDER — METHYLPREDNISOLONE SODIUM SUCCINATE 40 MG/ML
40 INJECTION, POWDER, LYOPHILIZED, FOR SOLUTION INTRAMUSCULAR; INTRAVENOUS EVERY 12 HOURS SCHEDULED
Status: DISCONTINUED | OUTPATIENT
Start: 2023-12-14 | End: 2023-12-16 | Stop reason: HOSPADM

## 2023-12-14 RX ORDER — METHOCARBAMOL 750 MG/1
750 TABLET, FILM COATED ORAL EVERY 6 HOURS SCHEDULED
Status: DISCONTINUED | OUTPATIENT
Start: 2023-12-14 | End: 2023-12-16 | Stop reason: HOSPADM

## 2023-12-14 RX ADMIN — OXYCODONE HYDROCHLORIDE 10 MG: 10 TABLET ORAL at 19:53

## 2023-12-14 RX ADMIN — PANTOPRAZOLE SODIUM 40 MG: 40 TABLET, DELAYED RELEASE ORAL at 05:29

## 2023-12-14 RX ADMIN — METHYLPREDNISOLONE SODIUM SUCCINATE 40 MG: 40 INJECTION, POWDER, FOR SOLUTION INTRAMUSCULAR; INTRAVENOUS at 22:40

## 2023-12-14 RX ADMIN — HYDROMORPHONE HYDROCHLORIDE 0.5 MG: 1 INJECTION, SOLUTION INTRAMUSCULAR; INTRAVENOUS; SUBCUTANEOUS at 14:32

## 2023-12-14 RX ADMIN — Medication 3 MG: at 22:23

## 2023-12-14 RX ADMIN — AMILORIDE HYDROCLORIDE 5 MG: 5 TABLET ORAL at 10:23

## 2023-12-14 RX ADMIN — POTASSIUM CHLORIDE 20 MEQ: 750 TABLET, EXTENDED RELEASE ORAL at 10:23

## 2023-12-14 RX ADMIN — ACETAMINOPHEN 975 MG: 325 TABLET, FILM COATED ORAL at 22:23

## 2023-12-14 RX ADMIN — NAPROXEN 500 MG: 250 TABLET ORAL at 10:23

## 2023-12-14 RX ADMIN — AMLODIPINE BESYLATE 5 MG: 5 TABLET ORAL at 10:23

## 2023-12-14 RX ADMIN — CARVEDILOL 3.12 MG: 3.12 TABLET, FILM COATED ORAL at 10:23

## 2023-12-14 RX ADMIN — ACETAMINOPHEN 975 MG: 325 TABLET, FILM COATED ORAL at 14:27

## 2023-12-14 RX ADMIN — LEVOTHYROXINE SODIUM 50 MCG: 50 TABLET ORAL at 05:29

## 2023-12-14 RX ADMIN — ACETAMINOPHEN 975 MG: 325 TABLET, FILM COATED ORAL at 05:50

## 2023-12-14 RX ADMIN — BUPROPION HYDROCHLORIDE 150 MG: 150 TABLET, EXTENDED RELEASE ORAL at 10:23

## 2023-12-14 RX ADMIN — MIRTAZAPINE 15 MG: 15 TABLET, FILM COATED ORAL at 22:23

## 2023-12-14 RX ADMIN — PANTOPRAZOLE SODIUM 40 MG: 40 TABLET, DELAYED RELEASE ORAL at 17:24

## 2023-12-14 RX ADMIN — METHOCARBAMOL TABLETS 750 MG: 750 TABLET, COATED ORAL at 14:27

## 2023-12-14 RX ADMIN — OXYCODONE HYDROCHLORIDE 10 MG: 10 TABLET ORAL at 10:30

## 2023-12-14 RX ADMIN — METHYLPREDNISOLONE SODIUM SUCCINATE 40 MG: 40 INJECTION, POWDER, FOR SOLUTION INTRAMUSCULAR; INTRAVENOUS at 14:27

## 2023-12-14 RX ADMIN — UMECLIDINIUM BROMIDE AND VILANTEROL TRIFENATATE 1 PUFF: 62.5; 25 POWDER RESPIRATORY (INHALATION) at 10:23

## 2023-12-14 RX ADMIN — POTASSIUM CHLORIDE 20 MEQ: 750 TABLET, EXTENDED RELEASE ORAL at 17:24

## 2023-12-14 RX ADMIN — OXYCODONE HYDROCHLORIDE 10 MG: 10 TABLET ORAL at 00:38

## 2023-12-14 RX ADMIN — CARVEDILOL 3.12 MG: 3.12 TABLET, FILM COATED ORAL at 17:32

## 2023-12-14 RX ADMIN — OXYCODONE HYDROCHLORIDE 10 MG: 10 TABLET ORAL at 05:29

## 2023-12-14 RX ADMIN — ENOXAPARIN SODIUM 40 MG: 40 INJECTION SUBCUTANEOUS at 10:23

## 2023-12-14 RX ADMIN — METHOCARBAMOL TABLETS 750 MG: 750 TABLET, COATED ORAL at 17:24

## 2023-12-14 NOTE — ASSESSMENT & PLAN NOTE
Presented with lower back pain radiating into right lower extremity x 5 days, denies trauma; on admission, pt could only use crutches   CT showed Stable chronic grade 1 anterolisthesis of L5 on S1 secondary to chronic bilateral spondylolysis. No acute fracture or new subluxation. Severe bilateral foraminal narrowing at L5-S1. Avascular necrosis of both femoral head without collapse  Pain control - scheduled Tylenol & PRN oxycodone/dilaudid. Will order IV steroids. Continue with lidoderm patch, schecdule robaxin   PT/OT pending   U/S negative for DVT   Consider ortho vs NSX evaluation with persistent symptoms/ poor pain control

## 2023-12-14 NOTE — ASSESSMENT & PLAN NOTE
Noted right foot swelling and pain   Suspect gout as etiology vs. Radiculopathy pain   Start IV solumedrol 40mg BID   Check uric acid level   Venous duplex negative for DVT   Right ankle xray- There is an ankle joint effusion is present. There is lateral soft tissue swelling.   C/w pain control  Pending PT OT eval

## 2023-12-14 NOTE — PLAN OF CARE
Problem: PAIN - ADULT  Goal: Verbalizes/displays adequate comfort level or baseline comfort level  Description: Interventions:  - Encourage patient to monitor pain and request assistance  - Assess pain using appropriate pain scale  - Administer analgesics based on type and severity of pain and evaluate response  - Implement non-pharmacological measures as appropriate and evaluate response  - Consider cultural and social influences on pain and pain management  - Notify physician/advanced practitioner if interventions unsuccessful or patient reports new pain  Outcome: Progressing     Problem: INFECTION - ADULT  Goal: Absence or prevention of progression during hospitalization  Description: INTERVENTIONS:  - Assess and monitor for signs and symptoms of infection  - Monitor lab/diagnostic results  - Monitor all insertion sites, i.e. indwelling lines, tubes, and drains  - Monitor endotracheal if appropriate and nasal secretions for changes in amount and color  - [place] appropriate cooling/warming therapies per order  - Administer medications as ordered  - Instruct and encourage patient and family to use good hand hygiene technique  - Identify and instruct in appropriate isolation precautions for identified infection/condition  Outcome: Progressing

## 2023-12-14 NOTE — OCCUPATIONAL THERAPY NOTE
Occupational Therapy Evaluation     Patient Name: Jose Juan Elkins III  Today's Date: 12/14/2023  Problem List  Principal Problem:    Back pain  Active Problems:    Essential hypertension    Crohn disease (HCC)    Chronic obstructive pulmonary disease (HCC)    Hepatocellular carcinoma (HCC)    History of pulmonary embolism    Past Medical History  Past Medical History:   Diagnosis Date    LUCILLE (acute kidney injury) (HCC) 6/28/2017    Blindness of left eye     Since birth    Cancer (HCC)     liver    Cataract     Colon polyp     Colostomy in place (HCC) 6/29/2017    COPD (chronic obstructive pulmonary disease) (HCC)     Crohn disease (HCC)     Emphysema of lung (HCC)     Emphysema/COPD (HCC)     Essential hypertension     GERD (gastroesophageal reflux disease)     Hypertension     Hypokalemia 6/28/2017    Hypomagnesemia 6/29/2017    Hyponatremia 6/28/2017    Kidney stone     Osteomyelitis (HCC)     Pneumonia      Past Surgical History  Past Surgical History:   Procedure Laterality Date    APPENDECTOMY  1979    CATARACT EXTRACTION Bilateral     CHOLECYSTECTOMY      COLECTOMY      10 inchs of ileum    COLONOSCOPY N/A 06/30/2017    Procedure: COLONOSCOPY;  Surgeon: Gomez Bee MD;  [facility];  Service: Gastroenterology    COLOSTOMY      EYE SURGERY  2 yrs ago    FINGER AMPUTATION      FINGER SURGERY Left     HERNIA REPAIR  1978    IR BIOPSY LIVER MASS  06/08/2020    IR MICROWAVE ABLATION  09/21/2022    IR MICROWAVE ABLATION  03/16/2023    LITHOTRIPSY      LIVER LOBECTOMY N/A 07/15/2020    Procedure: LIVER ABLATION, INTRAOPERATIVE U/S LIVER;  Surgeon: Asa Schafer MD;  [facility];  Service: Surgical Oncology    TONSILLECTOMY  Child             12/14/23 1045   OT Last Visit   OT Visit Date 12/14/23   Note Type   Note type Evaluation   Pain Assessment   Pain Assessment Tool 0-10   Pain Score 10 - Worst Possible Pain   Pain Location/Orientation Orientation: Right;Location: Leg   Hospital Pain  "Intervention(s) Repositioned;Ambulation/increased activity;Emotional support   Restrictions/Precautions   Weight Bearing Precautions Per Order No   RLE Weight Bearing Per Order (S)    (Due to pain in RLE pt avoiding WB on this leg during session)   Other Precautions Fall Risk;Pain  (colostomy bag)   Home Living   Type of Home House   Home Layout One level;Stairs to enter without rails  (1+1 POOL)   Bathroom Shower/Tub Tub/shower unit   Bathroom Toilet Standard   Bathroom Equipment Grab bars in shower;Hand-held shower   Bathroom Accessibility Accessible   Home Equipment Crutches   Prior Function   Level of [place] Independent with ADLs   Lives With Daughter  (+ grandsons ages 17 and 10)   Receives Help From Family   IADLs Independent with driving;Independent with meal prep;Independent with medication management   Falls in the last 6 months 0   Vocational Retired   Lifestyle   Autonomy PTA pt living with daughter + grandsons in 1SH, pt (I) with ADLs and IADLS, (-)falls, no use of AD at baseline   Reciprocal Relationships supportive daughter + grandsons   Service to Others hazardous waste disposal at [employer]   Intrinsic Gratification enjoys biking with [employer]   General   Additional Pertinent History Admit due to low back pain radiating down R leg. PMH: hepatocellular carcinoma, hx of PE, COPD, HTN   Family/Caregiver Present No   Subjective   Subjective \"Ever since my grandson got one of those PEZ dispensers I've been addicted to them, the only place that has them is [facility]\"   ADL   Eating Assistance 7  Independent   Grooming Assistance 7  Independent   UB Bathing Assistance 7  Independent   LB Bathing Assistance 5  Supervision/Setup   UB Dressing Assistance 7  Independent   LB Dressing Assistance 5  Supervision/Setup   LB Dressing Deficit Increased time to complete;Don/doff R sock;Don/doff L sock   Toileting Assistance  5  Supervision/Setup   Additional Comments Did not observe eating, " "grooming, UB bathing, LB bathing, UB dressing, and toileting at time of evaluation, with use of clinical reasoning, pt's performance throughout evaluation indicates that pt may be able to perform these tasks at the levels listed above   Bed Mobility   Supine to Sit 6  Modified independent   Additional items Increased time required;Verbal cues   Transfers   Sit to Stand 6  Modified independent   Additional items Increased time required;Verbal cues   Stand to Sit 6  Modified independent   Additional items Increased time required;Verbal cues   Additional Comments use of RW   Functional Mobility   Functional Mobility 5  Supervision   Additional Comments functional household distance, \"hopping\" to maintain weight off of RLE   Additional items Rolling walker   Balance   Static Sitting Good   Dynamic Sitting Good   Static Standing Fair +   Dynamic Standing Fair   Ambulatory Fair   Activity Tolerance   Activity Tolerance Patient tolerated treatment well   Medical Staff Made Aware CADEN Cox, spoke to PT Judson CHEUNG Assessment   RUE Assessment WFL   LUE Assessment   LUE Assessment WFL   Hand Function   Gross Motor Coordination Functional   Fine Motor Coordination Functional   Cognition   Overall Cognitive Status WFL   Arousal/Participation Alert;Cooperative   Attention Within functional limits   Orientation Level Oriented X4   Memory Within functional limits   Following Commands Follows all commands and directions without difficulty   Comments pleasant and cooperative   Assessment   Limitation Decreased ADL status;Decreased Safe judgement during ADL;Decreased endurance;Decreased high-level ADLs  (impaired pain, balance, fxnl mobility, act meredith, standing meredith, strength)   Prognosis Good   Assessment Pt is a 68 y.o. male seen for OT evaluation s/p admission to [facility] on 12/13/2023 due to Back pain. Personal and env factors supporting pt at time of IE include  supportive family, FFSU at home, no use of AD at baseline, (I) PLOF . " Personal and env factors inhibiting engagement in occupations include  limited support during the day (is home alone) . Performance deficits that affect the pt’s occupational performance can be seen above. Despite pt's current functional limitations and medical complications pt is functioning near his baseline. No further acute OT needs identified at this time. Recommend continued active ADL participation and mobilization with hospital staff while in the hospital to increase pt’s endurance and strength upon D/C. From OT standpoint, recommend D/C to home with family support when medically cleared. D/C pt from OT caseload at this time.   Goals   Patient Goals to have less pain   Plan   OT Frequency Eval only   Discharge Recommendation   Rehab Resource Intensity Level, OT No post-acute rehabilitation needs  (no OT needs)   AM-PAC Daily Activity Inpatient   Lower Body Dressing 3   Bathing 3   Toileting 3   Upper Body Dressing 4   Grooming 4   Eating 4   Daily Activity Raw Score 21   Daily Activity Standardized Score (Calc for Raw Score >=11) 44.27   AM-PAC Applied Cognition Inpatient   Following a Speech/Presentation 4   Understanding Ordinary Conversation 4   Taking Medications 4   Remembering Where Things Are Placed or Put Away 4   Remembering List of 4-5 Errands 4   Taking Care of Complicated Tasks 4   Applied Cognition Raw Score 24   Applied Cognition Standardized Score 62.21   End of Consult   Patient Position at End of Consult Supine;All needs within reach     Pt with no further acute OT needs, will d/c from OT services at this time.       The patient's raw score on the -PAC Daily Activity Inpatient Short Form is 21. A raw score of greater than or equal to 19 suggests the patient may benefit from discharge to home. Please refer to the recommendation of the Occupational Therapist for safe discharge planning.      Kimberly Mcknight MS, OTR/L

## 2023-12-14 NOTE — ASSESSMENT & PLAN NOTE
Right knee swelling and pain noted   Suspect possible gout vs. Radiculopathy  Add IV steroids 40mg BID   Check uric acid level   C/w pain control   Pending PT OT eval

## 2023-12-14 NOTE — PROGRESS NOTES
[facility]  Progress Note  Name: Jose Juan Elkins III I  MRN: [id number]  Unit/Bed#: [facility] I Date of Admission: 12/13/2023   Date of Service: 12/14/2023 I Hospital Day: 1    Assessment/Plan   * Acute bilateral low back pain with right-sided sciatica  Assessment & Plan  Presented with lower back pain radiating into right lower extremity x 5 days, denies trauma; on admission, pt could only use crutches   CT showed Stable chronic grade 1 anterolisthesis of L5 on S1 secondary to chronic bilateral spondylolysis. No acute fracture or new subluxation. Severe bilateral foraminal narrowing at L5-S1. Avascular necrosis of both femoral head without collapse  Pain control - scheduled Tylenol & PRN oxycodone/dilaudid. Will order IV steroids. Continue with lidoderm patch, schecdule robaxin   PT/OT pending   U/S negative for DVT   Consider ortho vs NSX evaluation with persistent symptoms/ poor pain control     Acute pain of right knee  Assessment & Plan  Right knee swelling and pain noted   Suspect possible gout vs. Radiculopathy  Add IV steroids 40mg BID   Check uric acid level   C/w pain control   Pending PT OT eval    Chronic obstructive pulmonary disease (HCC)  Assessment & Plan  Without exacerbation  continue respiratory protocol  Continue Anoro      Serum total bilirubin elevated  Assessment & Plan  Noted on cmp   Repeat level in am     Right foot pain  Assessment & Plan  Noted right foot swelling and pain   Suspect gout as etiology vs. Radiculopathy pain   Start IV solumedrol 40mg BID   Check uric acid level   Venous duplex negative for DVT   Right ankle xray- There is an ankle joint effusion is present. There is lateral soft tissue swelling.   C/w pain control  Pending PT OT eval    History of pulmonary embolism  Assessment & Plan  Documented h/o; pt denies PE or DVT   NO AC therapy in the past     Hepatocellular carcinoma (HCC)  Assessment & Plan  Continue to follow-up with GI, routine MRI f/u    H/o resection and radiation     Crohn disease (HCC)  Assessment & Plan  Follows with GI.  Last scope showed stable disease  No acute symptoms  Chronic colostomy bag     Essential hypertension  Assessment & Plan  Continue home medication including Norvasc, amiloride & Coreg  BP stable                VTE Pharmacologic Prophylaxis: VTE Score: 3 Moderate Risk (Score 3-4) - Pharmacological DVT Prophylaxis Ordered: enoxaparin (Lovenox).    Mobility:   Basic Mobility Inpatient Raw Score: 23  JH-HLM Goal: 7: Walk 25 feet or more  JH-HLM Achieved: 6: Walk 10 steps or more  HLM Goal NOT achieved. Continue with multidisciplinary rounding and encourage appropriate mobility to improve upon HLM goals.    Patient Centered Rounds: I performed bedside rounds with nursing staff today.   Discussions with Specialists or Other Care Team Provider: d/w RN    Education and Discussions with Family / Patient: Patient declined call to .     Total Time Spent on Date of Encounter in care of patient: 35 mins. This time was spent on one or more of the following: performing physical exam; counseling and coordination of care; obtaining or reviewing history; documenting in the medical record; reviewing/ordering tests, medications or procedures; communicating with other healthcare professionals and discussing with patient's family/caregivers.    Current Length of Stay: 1 day(s)  Current Patient Status: Inpatient   Certification Statement: The patient will continue to require additional inpatient hospital stay due to pain   Discharge Plan: Anticipate discharge in 24-48 hrs to discharge location to be determined pending rehab evaluations.    Code Status: Level 1 - Full Code    Subjective:   Pt reporting ongoing severe pain in his right foot. He reports that he has pain that goes across his low back and shoots down into his right leg. He has right foot and right knee swelling now to the point were you can hardly even touch it. He denies  all other complaints. Denies any trauma to his back. Reports this occurred after being up and down a ladder hanging mary[employer] decorations.     Objective:     Vitals:   Temp (24hrs), Av.3 °F (36.8 °C), Min:97.7 °F (36.5 °C), Max:98.8 °F (37.1 °C)    Temp:  [97.7 °F (36.5 °C)-98.8 °F (37.1 °C)] 97.7 °F (36.5 °C)  HR:  [62-78] 64  Resp:  [16-18] 16  BP: (110-121)/(59-66) 117/59  SpO2:  [90 %-95 %] 90 %  Body mass index is 24.37 kg/m².     Input and Output Summary (last 24 hours):     Intake/Output Summary (Last 24 hours) at 2023 1611  Last data filed at 2023 0700  Gross per 24 hour   Intake 970 ml   Output 600 ml   Net 370 ml       Physical Exam:   Physical Exam  Vitals and nursing note reviewed.   Constitutional:       General: He is in acute distress (2/2 to pain).      Appearance: He is well-developed.   Cardiovascular:      Rate and Rhythm: Normal rate and regular rhythm.      Heart sounds: Normal heart sounds. No murmur heard.  Pulmonary:      Effort: Pulmonary effort is normal. No respiratory distress.      Breath sounds: Normal breath sounds. No wheezing or rales.   Abdominal:      General: Bowel sounds are normal. There is no distension.      Palpations: Abdomen is soft.      Tenderness: There is no abdominal tenderness.      Comments: Colostomy present    Musculoskeletal:         General: Swelling (right knee and right foot) and tenderness (right low back, right knee and right foot) present.   Skin:     General: Skin is warm and dry.   Neurological:      Mental Status: He is alert.      Motor: No weakness.   Psychiatric:         Mood and Affect: Mood normal.          Additional Data:     Labs:  Results from last 7 days   Lab Units 23  0456 23  1216   WBC Thousand/uL 9.20 13.35*   HEMOGLOBIN g/dL 11.6* 12.8   HEMATOCRIT % 35.3* 39.0   PLATELETS Thousands/uL 193 215   NEUTROS PCT %  --  82*   LYMPHS PCT %  --  10*   MONOS PCT %  --  8   EOS PCT %  --  0     Results from last 7 days    Lab Units 12/13/23  1216   SODIUM mmol/L 135   POTASSIUM mmol/L 3.8   CHLORIDE mmol/L 99   CO2 mmol/L 27   BUN mg/dL 14   CREATININE mg/dL 0.94   ANION GAP mmol/L 9   CALCIUM mg/dL 9.0   ALBUMIN g/dL 3.9   TOTAL BILIRUBIN mg/dL 1.95*   ALK PHOS U/L 82   ALT U/L 7   AST U/L 15   GLUCOSE RANDOM mg/dL 109             Results from last 7 days   Lab Units 12/11/23  1001   HEMOGLOBIN A1C % of total Hgb 5.3     Results from last 7 days   Lab Units 12/13/23  1216   LACTIC ACID mmol/L 1.1       Lines/Drains:  Invasive Devices       Peripheral Intravenous Line  Duration             Peripheral IV 12/13/23 Left Antecubital 1 day              Drain  Duration             Colostomy LLQ -- days    Colostomy LLQ -- days                          Imaging: Reviewed radiology reports from this admission including: xray(s) and CT    Recent Cultures (last 7 days):         Last 24 Hours Medication List:   Current Facility-Administered Medications   Medication Dose Route Frequency Provider Last Rate    acetaminophen  975 mg Oral Q8H [facility] Lorri Carter PA-C      AMILoride  5 mg Oral Daily Lorri AP Carter-C      amLODIPine  5 mg Oral Daily LorriAP Robles-MADHU      buPROPion  150 mg Oral Daily LorriAP Robles-MADHU      calcium carbonate  1,000 mg Oral Daily PRN LorriAP Robles-MADHU      carvedilol  3.125 mg Oral BID With Meals AP Kumar-C      enoxaparin  40 mg Subcutaneous Daily AP Kumar-C      HYDROmorphone  0.5 mg Intravenous Q4H PRN RAMANA Mcclendon      levothyroxine  50 mcg Oral Early Morning AP Kumar-MADHU      lidocaine  2 patch Topical Daily Lorri Carter PA-C      melatonin  3 mg Oral HS RAMANA Mcclendon      methocarbamol  750 mg Oral Q6H [facility] RAMANA Mcclendon      methylPREDNISolone sodium succinate  40 mg Intravenous Q12H RAMANA German      mirtazapine  15 mg Oral HS Lorrithuan Carter PA-C      oxyCODONE  10 mg Oral Q4H PRN Lorri AP Carter-C      oxyCODONE  5 mg Oral Q4H PRN Lorri Raul,  CHARLENE      pantoprazole  40 mg Oral BID AC RAMANA Mcclendon      potassium chloride  20 mEq Oral BID Lorri Carter PA-C      senna-docusate sodium  1 tablet Oral BID PRN Lorri Carter PA-C      umeclidinium-vilanterol  1 puff Inhalation Daily Lorri Carter PA-C          Today, Patient Was Seen By: RAMANA Mcclendon    **Please Note: This note may have been constructed using a voice recognition system.**

## 2023-12-15 ENCOUNTER — APPOINTMENT (INPATIENT)
Dept: RADIOLOGY | Facility: HOSPITAL | Age: 68
End: 2023-12-15
Payer: MEDICARE

## 2023-12-15 LAB
ALBUMIN SERPL BCP-MCNC: 3.5 G/DL (ref 3.5–5)
ALP SERPL-CCNC: 81 U/L (ref 34–104)
ALT SERPL W P-5'-P-CCNC: 7 U/L (ref 7–52)
ANION GAP SERPL CALCULATED.3IONS-SCNC: 11 MMOL/L
AST SERPL W P-5'-P-CCNC: 15 U/L (ref 13–39)
BILIRUB SERPL-MCNC: 0.52 MG/DL (ref 0.2–1)
BUN SERPL-MCNC: 18 MG/DL (ref 5–25)
CALCIUM SERPL-MCNC: 8.5 MG/DL (ref 8.4–10.2)
CHLORIDE SERPL-SCNC: 104 MMOL/L (ref 96–108)
CO2 SERPL-SCNC: 21 MMOL/L (ref 21–32)
CREAT SERPL-MCNC: 0.84 MG/DL (ref 0.6–1.3)
DME PARACHUTE DELIVERY DATE REQUESTED: NORMAL
DME PARACHUTE ITEM DESCRIPTION: NORMAL
DME PARACHUTE ORDER STATUS: NORMAL
DME PARACHUTE SUPPLIER NAME: NORMAL
DME PARACHUTE SUPPLIER PHONE: NORMAL
ERYTHROCYTE [DISTWIDTH] IN BLOOD BY AUTOMATED COUNT: 13.8 % (ref 11.6–15.1)
GFR SERPL CREATININE-BSD FRML MDRD: 89 ML/MIN/1.73SQ M
GLUCOSE SERPL-MCNC: 211 MG/DL (ref 65–140)
HCT VFR BLD AUTO: 36.4 % (ref 36.5–49.3)
HGB BLD-MCNC: 11.7 G/DL (ref 12–17)
MCH RBC QN AUTO: 29.8 PG (ref 26.8–34.3)
MCHC RBC AUTO-ENTMCNC: 32.1 G/DL (ref 31.4–37.4)
MCV RBC AUTO: 93 FL (ref 82–98)
PLATELET # BLD AUTO: 187 THOUSANDS/UL (ref 149–390)
PMV BLD AUTO: 11.1 FL (ref 8.9–12.7)
POTASSIUM SERPL-SCNC: 4.6 MMOL/L (ref 3.5–5.3)
PROT SERPL-MCNC: 6.7 G/DL (ref 6.4–8.4)
RBC # BLD AUTO: 3.92 MILLION/UL (ref 3.88–5.62)
SODIUM SERPL-SCNC: 136 MMOL/L (ref 135–147)
WBC # BLD AUTO: 7.2 THOUSAND/UL (ref 4.31–10.16)

## 2023-12-15 PROCEDURE — 99232 SBSQ HOSP IP/OBS MODERATE 35: CPT | Performed by: PHYSICIAN ASSISTANT

## 2023-12-15 PROCEDURE — 85027 COMPLETE CBC AUTOMATED: CPT | Performed by: NURSE PRACTITIONER

## 2023-12-15 PROCEDURE — 80053 COMPREHEN METABOLIC PANEL: CPT | Performed by: NURSE PRACTITIONER

## 2023-12-15 PROCEDURE — 97162 PT EVAL MOD COMPLEX 30 MIN: CPT

## 2023-12-15 PROCEDURE — 97535 SELF CARE MNGMENT TRAINING: CPT

## 2023-12-15 PROCEDURE — 73562 X-RAY EXAM OF KNEE 3: CPT

## 2023-12-15 RX ADMIN — ACETAMINOPHEN 975 MG: 325 TABLET, FILM COATED ORAL at 14:09

## 2023-12-15 RX ADMIN — METHOCARBAMOL TABLETS 750 MG: 750 TABLET, COATED ORAL at 06:31

## 2023-12-15 RX ADMIN — METHYLPREDNISOLONE SODIUM SUCCINATE 40 MG: 40 INJECTION, POWDER, FOR SOLUTION INTRAMUSCULAR; INTRAVENOUS at 09:50

## 2023-12-15 RX ADMIN — UMECLIDINIUM BROMIDE AND VILANTEROL TRIFENATATE 1 PUFF: 62.5; 25 POWDER RESPIRATORY (INHALATION) at 11:21

## 2023-12-15 RX ADMIN — METHOCARBAMOL TABLETS 750 MG: 750 TABLET, COATED ORAL at 00:27

## 2023-12-15 RX ADMIN — ACETAMINOPHEN 975 MG: 325 TABLET, FILM COATED ORAL at 21:27

## 2023-12-15 RX ADMIN — METHOCARBAMOL TABLETS 750 MG: 750 TABLET, COATED ORAL at 18:13

## 2023-12-15 RX ADMIN — OXYCODONE HYDROCHLORIDE 10 MG: 10 TABLET ORAL at 14:19

## 2023-12-15 RX ADMIN — BUPROPION HYDROCHLORIDE 150 MG: 150 TABLET, EXTENDED RELEASE ORAL at 09:50

## 2023-12-15 RX ADMIN — PANTOPRAZOLE SODIUM 40 MG: 40 TABLET, DELAYED RELEASE ORAL at 18:15

## 2023-12-15 RX ADMIN — POTASSIUM CHLORIDE 20 MEQ: 750 TABLET, EXTENDED RELEASE ORAL at 09:50

## 2023-12-15 RX ADMIN — OXYCODONE HYDROCHLORIDE 5 MG: 5 TABLET ORAL at 09:57

## 2023-12-15 RX ADMIN — LEVOTHYROXINE SODIUM 50 MCG: 50 TABLET ORAL at 06:33

## 2023-12-15 RX ADMIN — POTASSIUM CHLORIDE 20 MEQ: 750 TABLET, EXTENDED RELEASE ORAL at 18:13

## 2023-12-15 RX ADMIN — CARVEDILOL 3.12 MG: 3.12 TABLET, FILM COATED ORAL at 06:31

## 2023-12-15 RX ADMIN — METHOCARBAMOL TABLETS 750 MG: 750 TABLET, COATED ORAL at 11:22

## 2023-12-15 RX ADMIN — ACETAMINOPHEN 975 MG: 325 TABLET, FILM COATED ORAL at 06:28

## 2023-12-15 RX ADMIN — MIRTAZAPINE 15 MG: 15 TABLET, FILM COATED ORAL at 21:29

## 2023-12-15 RX ADMIN — ENOXAPARIN SODIUM 40 MG: 40 INJECTION SUBCUTANEOUS at 09:50

## 2023-12-15 RX ADMIN — Medication 3 MG: at 21:28

## 2023-12-15 RX ADMIN — AMILORIDE HYDROCLORIDE 5 MG: 5 TABLET ORAL at 09:51

## 2023-12-15 RX ADMIN — AMLODIPINE BESYLATE 5 MG: 5 TABLET ORAL at 09:50

## 2023-12-15 RX ADMIN — CARVEDILOL 3.12 MG: 3.12 TABLET, FILM COATED ORAL at 18:13

## 2023-12-15 RX ADMIN — CALCIUM CARBONATE 1000 MG: 500 TABLET, CHEWABLE ORAL at 09:57

## 2023-12-15 RX ADMIN — METHYLPREDNISOLONE SODIUM SUCCINATE 40 MG: 40 INJECTION, POWDER, FOR SOLUTION INTRAMUSCULAR; INTRAVENOUS at 21:27

## 2023-12-15 RX ADMIN — PANTOPRAZOLE SODIUM 40 MG: 40 TABLET, DELAYED RELEASE ORAL at 06:31

## 2023-12-15 NOTE — PROGRESS NOTES
[facility]  Progress Note  Name: Jose Juan Elkins III I  MRN: [id number]  Unit/Bed#: W -01 I Date of Admission: 12/13/2023   Date of Service: 12/15/2023 I Hospital Day: 2    Assessment/Plan   * Right foot and knee swelling and pain  Assessment & Plan  Presented with back pain that radiated down the right leg, then developed right knee and foot edema and pain  Back pain now resolved.  Right knee and foot pain improving and edema improving since steroids initiated 12/14/23  Possible gout given location/edema/mild redness and excruciating pain improving with steroids.  Uric acid level was WNL.  Xray of ankle does show joint effusion. Xray of knee today does not show effusion  Continue IV steroids and likely change to medrol dose pack tomorrow  Venous duplex negative for DVT   Using walker to walk - at home no devices needed.  OT saw and recommended no needs    Essential hypertension  Assessment & Plan  Continue home medication including Norvasc, amiloride & Coreg  SBP 110s    Crohn disease (HCC)  Assessment & Plan  Follows with GI.  Last scope showed stable disease  No acute symptoms  Chronic colostomy bag     Chronic obstructive pulmonary disease (HCC)  Assessment & Plan  Without exacerbation  continue respiratory protocol  Continue Anoro      Hepatocellular carcinoma (HCC)  Assessment & Plan  Continue to follow-up with GI, routine MRI f/u   H/o resection and radiation     Serum total bilirubin elevated  Assessment & Plan  Lab Results   Component Value Date    TBILI 0.52 12/15/2023    TBILI 1.95 (H) 12/13/2023    TBILI 1.3 (H) 12/11/2023     Now resolved  No RUQ pain.             VTE Pharmacologic Prophylaxis: VTE Score: 3 Moderate Risk (Score 3-4) - Pharmacological DVT Prophylaxis Ordered: enoxaparin (Lovenox).    Mobility:   Basic Mobility Inpatient Raw Score: 23  JH-HLM Goal: 7: Walk 25 feet or more  JH-HLM Achieved: 2: Bed activities/Dependent transfer  HLM Goal NOT achieved.  Continue with multidisciplinary rounding and encourage appropriate mobility to improve upon HLM goals.    Patient Centered Rounds: I performed bedside rounds with nursing staff today.   Discussions with Specialists or Other Care Team Provider: nursing    Education and Discussions with Family / Patient: Updated  (daughter) via phone.    Total Time Spent on Date of Encounter in care of patient:  mins. This time was spent on one or more of the following: performing physical exam; counseling and coordination of care; obtaining or reviewing history; documenting in the medical record; reviewing/ordering tests, medications or procedures; communicating with other healthcare professionals and discussing with patient's family/caregivers.    Current Length of Stay: 2 day(s)  Current Patient Status: Inpatient   Certification Statement: The patient will continue to require additional inpatient hospital stay due to pain control, IV steroids  Discharge Plan: Anticipate discharge tomorrow to home.    Code Status: Level 1 - Full Code    Subjective:   Feels like     Objective:     Vitals:   Temp (24hrs), Av.3 °F (36.3 °C), Min:97.2 °F (36.2 °C), Max:97.5 °F (36.4 °C)    Temp:  [97.2 °F (36.2 °C)-97.5 °F (36.4 °C)] 97.4 °F (36.3 °C)  HR:  [69-77] 76  Resp:  [15-17] 15  BP: (103-135)/(54-79) 114/60  SpO2:  [89 %-94 %] 92 %  Body mass index is 24.37 kg/m².     Input and Output Summary (last 24 hours):   No intake or output data in the 24 hours ending 12/15/23 0668    Physical Exam:   Physical Exam  Vitals and nursing note reviewed.   Constitutional:       General: He is not in acute distress.     Appearance: Normal appearance. He is not diaphoretic.   HENT:      Head: Normocephalic and atraumatic.      Mouth/Throat:      Mouth: Mucous membranes are moist.   Cardiovascular:      Rate and Rhythm: Normal rate and regular rhythm.   Pulmonary:      Effort: Pulmonary effort is normal.      Breath sounds: Normal breath  sounds. No stridor. No wheezing, rhonchi or rales.   Abdominal:      General: Bowel sounds are normal.      Palpations: Abdomen is soft. There is no mass.      Tenderness: There is no abdominal tenderness. There is no guarding.   Musculoskeletal:         General: Swelling (right foot and right knee swelling.) and tenderness (right great toe, right foot, right knee) present.      Right lower leg: No edema.      Left lower leg: No edema.   Skin:     General: Skin is warm and dry.   Neurological:      Mental Status: He is alert.   Psychiatric:         Mood and Affect: Mood normal.         Behavior: Behavior normal.          Additional Data:     Labs:  Results from last 7 days   Lab Units 12/15/23  0500 12/14/23  0456 12/13/23  1216   WBC Thousand/uL 7.20   < > 13.35*   HEMOGLOBIN g/dL 11.7*   < > 12.8   HEMATOCRIT % 36.4*   < > 39.0   PLATELETS Thousands/uL 187   < > 215   NEUTROS PCT %  --   --  82*   LYMPHS PCT %  --   --  10*   MONOS PCT %  --   --  8   EOS PCT %  --   --  0    < > = values in this interval not displayed.     Results from last 7 days   Lab Units 12/15/23  0500   SODIUM mmol/L 136   POTASSIUM mmol/L 4.6   CHLORIDE mmol/L 104   CO2 mmol/L 21   BUN mg/dL 18   CREATININE mg/dL 0.84   ANION GAP mmol/L 11   CALCIUM mg/dL 8.5   ALBUMIN g/dL 3.5   TOTAL BILIRUBIN mg/dL 0.52   ALK PHOS U/L 81   ALT U/L 7   AST U/L 15   GLUCOSE RANDOM mg/dL 211*             Results from last 7 days   Lab Units 12/11/23  1001   HEMOGLOBIN A1C % of total Hgb 5.3     Results from last 7 days   Lab Units 12/13/23  1216   LACTIC ACID mmol/L 1.1       Lines/Drains:  Invasive Devices       Peripheral Intravenous Line  Duration             Peripheral IV 12/13/23 Left Antecubital 2 days              Drain  Duration             Colostomy LLQ -- days    Colostomy LLQ -- days                          Imaging: Reviewed radiology reports from this admission including: xray(s)    Recent Cultures (last 7 days):         Last 24 Hours  Medication List:   Current Facility-Administered Medications   Medication Dose Route Frequency Provider Last Rate    acetaminophen  975 mg Oral Q8H [facility] Lorri Carter PA-C      AMILoride  5 mg Oral Daily Lorri Carter PA-C      amLODIPine  5 mg Oral Daily Lorri Carter PA-C      buPROPion  150 mg Oral Daily Lorri Carter PA-C      calcium carbonate  1,000 mg Oral Daily PRN Lorri Carter PA-C      carvedilol  3.125 mg Oral BID With Meals Lorri Carter PA-C      enoxaparin  40 mg Subcutaneous Daily Lorri Carter PA-C      HYDROmorphone  0.5 mg Intravenous Q4H PRN Marcelle Rao, JUAN RNP      levothyroxine  50 mcg Oral Early Morning Lorri Carter PA-C      lidocaine  2 patch Topical Daily Lorri Carter PA-C      melatonin  3 mg Oral HS Marcelle Rao, JUAN RNP      methocarbamol  750 mg Oral Q6H [facility] Marcelle Rao, CRNP      methylPREDNISolone sodium succinate  40 mg Intravenous Q12H [facility] Marcelle Rao, JUAN RNP      mirtazapine  15 mg Oral HS Lorri Carter PA-C      oxyCODONE  10 mg Oral Q4H PRN Lorri Carter PA-C      oxyCODONE  5 mg Oral Q4H PRN Lorri Carter PA-C      pantoprazole  40 mg Oral BID  Marcelle Rao, JUAN RNP      potassium chloride  20 mEq Oral BID Lorri Carter PA-C      senna-docusate sodium  1 tablet Oral BID PRN Lorri Carter PA-C      umeclidinium-vilanterol  1 puff Inhalation Daily Lorri Carter PA-C          Today, Patient Was Seen By: Vanessa Shaffer PA-C    **Please Note: This note may have been constructed using a voice recognition system.**

## 2023-12-15 NOTE — ASSESSMENT & PLAN NOTE
Presented with back pain that radiated down the right leg, then developed right knee and foot edema and pain  Back pain now resolved.  Right knee and foot pain improving and edema improving since steroids initiated 12/14/23  Possible gout given location/edema/mild redness and excruciating pain improving with steroids.  Uric acid level was WNL.  Xray of ankle does show joint effusion. Xray of knee today does not show effusion  Continue IV steroids and likely change to medrol dose pack tomorrow  Venous duplex negative for DVT   Using walker to walk - at home no devices needed.  OT saw and recommended no needs

## 2023-12-15 NOTE — PLAN OF CARE
Problem: PAIN - ADULT  Goal: Verbalizes/displays adequate comfort level or baseline comfort level  Description: Interventions:  - Encourage patient to monitor pain and request assistance  - Assess pain using appropriate pain scale  - Administer analgesics based on type and severity of pain and evaluate response  - Implement non-pharmacological measures as appropriate and evaluate response  - Consider cultural and social influences on pain and pain management  - Notify physician/advanced practitioner if interventions unsuccessful or patient reports new pain  Outcome: Progressing     Problem: INFECTION - ADULT  Goal: Absence or prevention of progression during hospitalization  Description: INTERVENTIONS:  - Assess and monitor for signs and symptoms of infection  - Monitor lab/diagnostic results  - Monitor all insertion sites, i.e. indwelling lines, tubes, and drains  - Monitor endotracheal if appropriate and nasal secretions for changes in amount and color  - [place] appropriate cooling/warming therapies per order  - Administer medications as ordered  - Instruct and encourage patient and family to use good hand hygiene technique  - Identify and instruct in appropriate isolation precautions for identified infection/condition  Outcome: Progressing  Goal: Absence of fever/infection during neutropenic period  Description: INTERVENTIONS:  - Monitor WBC    Outcome: Progressing     Problem: SAFETY ADULT  Goal: Patient will remain free of falls  Description: INTERVENTIONS:  - Educate patient/family on patient safety including physical limitations  - Instruct patient to call for assistance with activity   - Consult OT/PT to assist with strengthening/mobility   - Keep Call bell within reach  - Keep bed low and locked with side rails adjusted as appropriate  - Keep care items and personal belongings within reach  - Initiate and maintain comfort rounds  - Make Fall Risk Sign visible to staff  - Apply yellow socks and bracelet  for high fall risk patients  - Consider moving patient to room near nurses station  Outcome: Progressing  Goal: Maintain or return to baseline ADL function  Description: INTERVENTIONS:  -  Assess patient's ability to carry out ADLs; assess patient's baseline for ADL function and identify physical deficits which impact ability to perform ADLs (bathing, care of mouth/teeth, toileting, grooming, dressing, etc.)  - Assess/evaluate cause of self-care deficits   - Assess range of motion  - Assess patient's mobility; develop plan if impaired  - Assess patient's need for assistive devices and provide as appropriate  - Encourage maximum independence but intervene and supervise when necessary  - Involve family in performance of ADLs  - Assess for home care needs following discharge   - Consider OT consult to assist with ADL evaluation and planning for discharge  - Provide patient education as appropriate  Outcome: Progressing  Goal: Maintains/Returns to pre admission functional level  Description: INTERVENTIONS:  - Perform AM-PAC 6 Click Basic Mobility/ Daily Activity assessment daily.  - Set and communicate daily mobility goal to care team and patient/family/caregiver.   - Collaborate with rehabilitation services on mobility goals if consulted  - Out of bed for toileting  - Record patient progress and toleration of activity level   Outcome: Progressing     Problem: DISCHARGE PLANNING  Goal: Discharge to home or other facility with appropriate resources  Description: INTERVENTIONS:  - Identify barriers to discharge w/patient and caregiver  - Arrange for needed discharge resources and transportation as appropriate  - Identify discharge learning needs (meds, wound care, etc.)  - Arrange for interpretive services to assist at discharge as needed  - Refer to Case Management Department for coordinating discharge planning if the patient needs post-hospital services based on physician/advanced practitioner order or complex needs  related to functional status, cognitive ability, or social support system  Outcome: Progressing     Problem: Knowledge Deficit  Goal: Patient/family/caregiver demonstrates understanding of disease process, treatment plan, medications, and discharge instructions  Description: Complete learning assessment and assess knowledge base.  Interventions:  - Provide teaching at level of understanding  - Provide teaching via preferred learning methods  Outcome: Progressing

## 2023-12-15 NOTE — ASSESSMENT & PLAN NOTE
Lab Results   Component Value Date    TBILI 0.52 12/15/2023    TBILI 1.95 (H) 12/13/2023    TBILI 1.3 (H) 12/11/2023     Now resolved  No RUQ pain.

## 2023-12-15 NOTE — CASE MANAGEMENT
Case Management Discharge Planning Note    Patient name Jose Juan Elkins III  Location W /W -01 MRN [id number]  : 1955 Date 12/15/2023       Current Admission Date: 2023  Current Admission Diagnosis:Acute bilateral low back pain with right-sided sciatica   Patient Active Problem List    Diagnosis Date Noted    Right foot pain 2023    Acute pain of right knee 2023    Serum total bilirubin elevated 2023    Acute bilateral low back pain with right-sided sciatica 2023    Hepatocellular carcinoma (HCC) 2023    History of pulmonary embolism 2023    Tracheitis 10/09/2023    Candida infection, esophageal (HCC) 2023    Multiple adenomatous polyps 2023    Portal hypertension (HCC) 2023    Multiple subsegmental pulmonary emboli without acute cor pulmonale (HCC) 2023    Alcohol dependence, uncomplicated (HCC) 2023    Subclinical hypothyroidism 2022    Anemia 2022    Supraventricular tachycardia     History of alcohol use disorder 2022    Platelets decreased (HCC)     Encounter for follow-up surveillance of liver cancer 2022    Dysthymic disorder 2021    Crohn's disease of small intestine with other complication (HCC) 2021    Vitamin B12 deficiency 2021    Cataract     Chronic obstructive pulmonary disease (HCC) 2020    Mixed hyperlipidemia 2020    Kidney stone     Gastroesophageal reflux disease without esophagitis     Left wrist pain 2020    Hypocalcemia 2020    Acute pain of left wrist 09/10/2020    Chronic, continuous use of opioids 09/10/2020    Observed sleep apnea     Palliative care patient 2020    Abdominal wall abscess 2020    Personal history of liver cancer 2020    Abdominal pain 2020    Tobacco abuse 2020    Severe sepsis (HCC) 2020    Pneumonia 2020    Chest pain 2020    Liver mass 2020    Syncope and  collapse 04/03/2018    Emphysema of lung (HCC)     Arthropathy of right wrist 12/04/2017    Severe protein-calorie malnutrition (HCC) 07/01/2017    Colostomy in place (HCC) 06/29/2017    Diarrhea 06/29/2017    Acute kidney injury (HCC) 06/28/2017    Essential hypertension     Emphysema/COPD (HCC)     Crohn disease (HCC)       LOS (days): 2  Geometric Mean LOS (GMLOS) (days): 2.9  Days to GMLOS:0.8     OBJECTIVE:  Risk of Unplanned Readmission Score: 19.3         Current admission status: Inpatient   Preferred Pharmacy:   [facility] [place] [facility] [address]  [address]  [place] [address]  Phone: [phone] Fax: [phone]    [facility]/pharmacy #[id number] - [place], [place] - [address]  [address]  [place] [address]  Phone: [phone] Fax: [phone]    [facility] [place] - [address]  [address]  [facility] [address]  Phone: [phone] Fax: [phone]    Primary Care Provider: Torri Coleman MD    Primary Insurance: MEDICARE  Secondary Insurance: [facility]    DISCHARGE DETAILS:    DME Referral Provided  Referral made for DME?: Yes  DME referral completed for the following items:: Walker  DME Supplier Name:: [employer]    Other Referral/Resources/Interventions Provided:  Interventions: DME  Referral Comments: Patient requesting roller walker, however unsure if he had one within the last 5yrs. Order placed via [employer], patient aware they are still running their insurance and CM will follow up.    Would you like to participate in our [facility] service program?  : No - Declined    Treatment Team Recommendation: Home  Discharge Destination Plan:: Home

## 2023-12-15 NOTE — PHYSICAL THERAPY NOTE
"PHYSICAL THERAPY EVALUATION  NAME:  Jose Juan Elkins III  DATE: 12/15/23    AGE:   68 y.o.  Mrn:   [id number]  Principal problem: Principal Problem:    Right foot and knee swelling and pain  Active Problems:    Essential hypertension    Crohn disease (HCC)    Chronic obstructive pulmonary disease (HCC)    Acute bilateral low back pain with right-sided sciatica    Hepatocellular carcinoma (HCC)    History of pulmonary embolism    Acute pain of right knee    Serum total bilirubin elevated      Vitals:    12/15/23 1134 12/15/23 1436 12/15/23 1813 12/15/23 1816   BP: 103/54 114/60 116/59 116/59   Pulse: 74 76  86   Resp: 15      Temp: (!) 97.2 °F (36.2 °C) (!) 97.4 °F (36.3 °C)     TempSrc:       SpO2: (!) 89% 92%  93%   Weight:       Height:           Length Of Stay: 2  Performed at least 2 patient identifiers during session: Name and Birthday  PHYSICAL THERAPY EVALUATION :    12/15/23 1528   PT Last Visit   PT Visit Date 12/15/23   Note Type   Note type Evaluation   Pain Assessment   Pain Assessment Tool 0-10   Pain Score 5  (@ rest, increased w/ activity)   Pain Location/Orientation Orientation: Right  (foot (@ ankle mortise) and knee (joint line, not patella))   Effect of Pain on Daily Activities limits speed and indep of mobility, limits ROM and WB   Patient's Stated Pain Goal No pain   Hospital Pain Intervention(s) Repositioned;Ambulation/increased activity;Emotional support   Multiple Pain Sites   (no back pain reported)   Restrictions/Precautions   Weight Bearing Precautions Per Order No   Other Precautions Fall Risk;Pain   Home Living   Type of Home House   Home Layout One level  (1+1 (second as \"small lip\" POOL)   Home Equipment Crutches  (no longer has a walker since his \"move\")   Prior Function   Level of [place] Independent with ADLs;Independent with functional mobility   Lives With Daughter  (+ grandsons ages 17 and 10)   IADLs Independent with medication management;Independent with meal prep;Independent " "with driving   Falls in the last 6 months 0   General   Family/Caregiver Present No   Cognition   Overall Cognitive Status WFL   Attention Within functional limits   Following Commands Follows one step commands without difficulty   Subjective   Subjective Pt pleasant and cooperative, reports gradually being able to put more weight on foot and knee since yesterday. Denies any lightheadedness   RUE Assessment   RUE Assessment WFL   LUE Assessment   LUE Assessment WFL   RLE Overall AROM   R Knee Flexion slightly > 90 but not WFL   R Knee Extension WFL   Strength RLE   R Hip Flexion   (tested to 3)   R Knee Extension   (tested to 3)   R Ankle Dorsiflexion   (tested to 3)   Strength LLE   L Hip Flexion 4/5   L Knee Extension 4+/5   L Ankle Dorsiflexion 4+/5   Vision-Basic Assessment   Current Vision Wears glasses all the time   Light Touch   RLE Light Touch Grossly intact  (per pt and brief assessment)   LLE Light Touch Grossly intact  (per pt and brief assessment)   Bed Mobility   Supine to Sit 6  Modified independent   Transfers   Sit to Stand 6  Modified independent   Additional items Increased time required   Stand to Sit 6  Modified independent   Additional items Increased time required   Additional Comments no change in RLE pain w/ trunk flexion and extension; + edema vs effusion noted @ R knee and ankle   Ambulation/Elevation   Gait pattern Excessively slow;Step through pattern;Decreased R stance   Gait Assistance 6  Modified independent   Additional items Verbal cues  (instructions for pacing and \"Babying RLE\")   Assistive Device Rolling walker   Distance 15'; Pt declined further ambulation   Stair Management Assistance Not tested   Ambulation/Elevation Additional Comments Pt verbally reported proper sequence for ascending and descending POOL w/ crutches, with backward performance to ascend (easier for Pt). Pt declined need to physically demonstrate stairs w/ crutches prior to DC   Balance   Static Sitting Normal "   Static Standing Fair +   Ambulatory Fair   Endurance Deficit   Endurance Deficit Yes   Endurance Deficit Description limited amb distance and standing tolerance   Activity Tolerance   Activity Tolerance Patient limited by fatigue;Patient limited by pain   Medical Staff Made Aware spoke to Sonido from case management including rE: walker for home use   Assessment:   Pt is a 68 y.o. male seen for PT evaluation s/p admit to [facility] on 12/13/2023 w/ Right foot pain, R knee pain, and back pain (last one resolved per pt).   Order placed for PT.      Prior to admission: Pt lived w/family in home w/POOL, did not use DME normally prior to admission, but used crutches when pain arose.  Upon evaluation: Pt needed no physical A for transfers nor amb w/walker in room. Although he declined physical performance, he was able to describe proper sequencing for POOL  upon DC. .      Pt's clinical presentation is currently evolving given the functional mobility deficits above, especially (but not limited to) decreased ROM and pain, and combined with medical complications including + AVN, abnormal H&H, low SpO2 values, and impulsivity during admission.  Pt IS NOT at his/her mobility baseline.  Although is at risk for falls based on pain and subjectively slow gait speed,he compensated with DME on level surfaces and was able to verbalize proper stairs management w/crutches.       During this admission, pt declined continued skilled inpatient PT in the acute care setting (after pt education at end of session.     Recommendations:    From a PT standpoint, recommend pt focus on use of rolling walker for amb on level surfaces and crutches for stairs.        Prognosis Fair   Problem List Decreased range of motion;Impaired balance;Decreased mobility;Impaired judgement;Pain;Decreased skin integrity  (gait deviations compared to normal)   Barriers to Discharge Inaccessible home environment   Goals   Patient Goals to get back home, use  "the walker   STG Expiration Date 12/25/23   Short Term Goal #1 Pt to verbalize understanding of education provided on recommendations re: rom; DME   PT Treatment Day 0   Plan   Treatment/Interventions Functional transfer training;LE strengthening/ROM;Therapeutic exercise;Endurance training;Patient/family training;Equipment eval/education;Bed mobility;Gait training;Elevations;Spoke to case management;Compensatory technique education   PT Frequency   (DC from IPPT)   Discharge Recommendation   Rehab Resource Intensity Level, PT No post-acute rehabilitation needs   Equipment Recommended Walker  (for level surfaces and crutches for POOL)   Walker Package Recommended Wheeled walker   Additional Comments 2 Co-morbidities affecting pt's physical performance at time of assessment include but are not limited to: Blindness of left eye, Cancer, Colostomy in place,  Emphysema/COPD, HTN, Osteomyelitis, reports of \"prior pseudoarthritis\". Personal factors affecting pt at time of IE include: steps to enter environment, past experience, and inability to navigate community distances.   AM-PAC Basic Mobility Inpatient   Turning in Flat Bed Without Bedrails 4   Lying on Back to Sitting on Edge of Flat Bed Without Bedrails 4   Moving Bed to Chair 4   Standing Up From Chair Using Arms 4   Walk in Room 4   Climb 3-5 Stairs With Railing 3   Basic Mobility Inpatient Raw Score 23   Basic Mobility Standardized Score 50.88   Highest Level Of Mobility   JH-HLM Goal 7: Walk 25 feet or more   JH-HLM Achieved 6: Walk 10 steps or more  (declined further distances during session, but did observe pt amb in halls w/walker prior to posting of this note.)   Additional Treatment Session   Start Time 1520   End Time 1528   Treatment Assessment Pt verbalized understanding of recommendations w/use of rolling walker, and for ROM progression/protection @ R ankle and knee. Pt also verbalized understanding of GRADUAL progressing into standing stretches. . Goals " "met   Equipment Use rolling walker   Additional Treatment Day 1   Exercises   Neuro re-ed Further education provided to pt re: joint protection, gradual ROM, recommendations for mobility upon DC including crutches vs walker  and not (immediately) cane   End of Consult   Patient Position at End of Consult All needs within reach;Seated edge of bed   (Please find full objective findings from PT assessment regarding body systems outlined above).     The patient's -[facility] Basic Mobility Inpatient Short Form Raw Score is 23. A Raw score of greater than 16 suggests the patient may benefit from discharge to home, which DOES coincide with CURRENT above PT recommendations.     However please refer to therapist recommendation for discharge planning given other factors that may influence destination.     Adapted from Ascenc[employer] MORAN, Miranda J, Tayler J, Trish J. Association of -[facility] “6-Clicks” Basic Mobility and Daily Activity Scores With Discharge Destination. Physical Therapy, 2021;101:1-9. DOI: 10.1093/ptj/coqa436    Portions of the record may have been created with voice recognition software.  Occasional wrong word or \"sound a like\" substitutions may have occurred due to the inherent limitations of voice recognition software.  Read the chart carefully and recognize, using context, where substitutions have occurred    "

## 2023-12-16 VITALS
TEMPERATURE: 97.7 F | OXYGEN SATURATION: 95 % | WEIGHT: 165 LBS | HEIGHT: 69 IN | RESPIRATION RATE: 18 BRPM | DIASTOLIC BLOOD PRESSURE: 68 MMHG | HEART RATE: 71 BPM | SYSTOLIC BLOOD PRESSURE: 133 MMHG | BODY MASS INDEX: 24.44 KG/M2

## 2023-12-16 PROCEDURE — 99238 HOSP IP/OBS DSCHRG MGMT 30/<: CPT | Performed by: FAMILY MEDICINE

## 2023-12-16 RX ORDER — PREDNISONE 20 MG/1
40 TABLET ORAL DAILY
Qty: 20 TABLET | Refills: 0 | Status: SHIPPED | OUTPATIENT
Start: 2023-12-16 | End: 2023-12-26

## 2023-12-16 RX ORDER — METHOCARBAMOL 750 MG/1
750 TABLET, FILM COATED ORAL EVERY 6 HOURS SCHEDULED
Qty: 56 TABLET | Refills: 0 | Status: SHIPPED | OUTPATIENT
Start: 2023-12-16 | End: 2023-12-30

## 2023-12-16 RX ORDER — OXYCODONE HYDROCHLORIDE 5 MG/1
5 TABLET ORAL EVERY 8 HOURS PRN
Qty: 10 TABLET | Refills: 0 | Status: SHIPPED | OUTPATIENT
Start: 2023-12-16 | End: 2023-12-19

## 2023-12-16 RX ADMIN — AMLODIPINE BESYLATE 5 MG: 5 TABLET ORAL at 08:35

## 2023-12-16 RX ADMIN — METHYLPREDNISOLONE SODIUM SUCCINATE 40 MG: 40 INJECTION, POWDER, FOR SOLUTION INTRAMUSCULAR; INTRAVENOUS at 08:35

## 2023-12-16 RX ADMIN — UMECLIDINIUM BROMIDE AND VILANTEROL TRIFENATATE 1 PUFF: 62.5; 25 POWDER RESPIRATORY (INHALATION) at 08:37

## 2023-12-16 RX ADMIN — METHOCARBAMOL TABLETS 750 MG: 750 TABLET, COATED ORAL at 00:09

## 2023-12-16 RX ADMIN — PANTOPRAZOLE SODIUM 40 MG: 40 TABLET, DELAYED RELEASE ORAL at 05:38

## 2023-12-16 RX ADMIN — ACETAMINOPHEN 975 MG: 325 TABLET, FILM COATED ORAL at 05:38

## 2023-12-16 RX ADMIN — ENOXAPARIN SODIUM 40 MG: 40 INJECTION SUBCUTANEOUS at 08:36

## 2023-12-16 RX ADMIN — BUPROPION HYDROCHLORIDE 150 MG: 150 TABLET, EXTENDED RELEASE ORAL at 08:35

## 2023-12-16 RX ADMIN — AMILORIDE HYDROCLORIDE 5 MG: 5 TABLET ORAL at 08:37

## 2023-12-16 RX ADMIN — LEVOTHYROXINE SODIUM 50 MCG: 50 TABLET ORAL at 05:38

## 2023-12-16 RX ADMIN — CARVEDILOL 3.12 MG: 3.12 TABLET, FILM COATED ORAL at 08:40

## 2023-12-16 RX ADMIN — METHOCARBAMOL TABLETS 750 MG: 750 TABLET, COATED ORAL at 05:38

## 2023-12-16 RX ADMIN — POTASSIUM CHLORIDE 20 MEQ: 750 TABLET, EXTENDED RELEASE ORAL at 08:35

## 2023-12-16 NOTE — ASSESSMENT & PLAN NOTE
Right knee swelling and pain noted   Suspect possible gout vs. Radiculopathy  Add IV steroids 40mg BID   Check uric acid level   C/w pain control

## 2023-12-16 NOTE — PLAN OF CARE
Problem: PAIN - ADULT  Goal: Verbalizes/displays adequate comfort level or baseline comfort level  Description: Interventions:  - Encourage patient to monitor pain and request assistance  - Assess pain using appropriate pain scale  - Administer analgesics based on type and severity of pain and evaluate response  - Implement non-pharmacological measures as appropriate and evaluate response  - Consider cultural and social influences on pain and pain management  - Notify physician/advanced practitioner if interventions unsuccessful or patient reports new pain  Outcome: Progressing     Problem: INFECTION - ADULT  Goal: Absence or prevention of progression during hospitalization  Description: INTERVENTIONS:  - Assess and monitor for signs and symptoms of infection  - Monitor lab/diagnostic results  - Monitor all insertion sites, i.e. indwelling lines, tubes, and drains  - Monitor endotracheal if appropriate and nasal secretions for changes in amount and color  - [place] appropriate cooling/warming therapies per order  - Administer medications as ordered  - Instruct and encourage patient and family to use good hand hygiene technique  - Identify and instruct in appropriate isolation precautions for identified infection/condition  Outcome: Progressing  Goal: Absence of fever/infection during neutropenic period  Description: INTERVENTIONS:  - Monitor WBC    Outcome: Progressing     Problem: SAFETY ADULT  Goal: Patient will remain free of falls  Description: INTERVENTIONS:  - Educate patient/family on patient safety including physical limitations  - Instruct patient to call for assistance with activity   - Consult OT/PT to assist with strengthening/mobility   - Keep Call bell within reach  - Keep bed low and locked with side rails adjusted as appropriate  - Keep care items and personal belongings within reach  - Initiate and maintain comfort rounds  - Make Fall Risk Sign visible to staff  - Offer Toileting every  Hours,  in advance of need  - Initiate/Maintain alarm  - Obtain necessary fall risk management equipment:   - Apply yellow socks and bracelet for high fall risk patients  - Consider moving patient to room near nurses station  Outcome: Progressing  Goal: Maintain or return to baseline ADL function  Description: INTERVENTIONS:  -  Assess patient's ability to carry out ADLs; assess patient's baseline for ADL function and identify physical deficits which impact ability to perform ADLs (bathing, care of mouth/teeth, toileting, grooming, dressing, etc.)  - Assess/evaluate cause of self-care deficits   - Assess range of motion  - Assess patient's mobility; develop plan if impaired  - Assess patient's need for assistive devices and provide as appropriate  - Encourage maximum independence but intervene and supervise when necessary  - Involve family in performance of ADLs  - Assess for home care needs following discharge   - Consider OT consult to assist with ADL evaluation and planning for discharge  - Provide patient education as appropriate  Outcome: Progressing  Goal: Maintains/Returns to pre admission functional level  Description: INTERVENTIONS:  - Perform AM-PAC 6 Click Basic Mobility/ Daily Activity assessment daily.  - Set and communicate daily mobility goal to care team and patient/family/caregiver.   - Collaborate with rehabilitation services on mobility goals if consulted  - Perform Range of Motion  times a day.  - Reposition patient every  hours.  - Dangle patient  times a day  - Stand patient  times a day  - Ambulate patient  times a day  - Out of bed to chair  times a day   - Out of bed for meals times a day  - Out of bed for toileting  - Record patient progress and toleration of activity level   Outcome: Progressing     Problem: DISCHARGE PLANNING  Goal: Discharge to home or other facility with appropriate resources  Description: INTERVENTIONS:  - Identify barriers to discharge w/patient and caregiver  - Arrange for  needed discharge resources and transportation as appropriate  - Identify discharge learning needs (meds, wound care, etc.)  - Arrange for interpretive services to assist at discharge as needed  - Refer to Case Management Department for coordinating discharge planning if the patient needs post-hospital services based on physician/advanced practitioner order or complex needs related to functional status, cognitive ability, or social support system  Outcome: Progressing     Problem: Knowledge Deficit  Goal: Patient/family/caregiver demonstrates understanding of disease process, treatment plan, medications, and discharge instructions  Description: Complete learning assessment and assess knowledge base.  Interventions:  - Provide teaching at level of understanding  - Provide teaching via preferred learning methods  Outcome: Progressing

## 2023-12-16 NOTE — ASSESSMENT & PLAN NOTE
Presented with back pain that radiated down the right leg, then developed right knee and foot edema and pain  Back pain now resolved.  Right knee and foot pain improving and edema improving since steroids initiated 12/14/23  Possible gout given location/edema/mild redness and excruciating pain improving with steroids.  Uric acid level was WNL.  Xray of ankle does show joint effusion. Xray of knee today does not show effusion  Continue IV steroids and likely change to medrol dose pack tomorrow  Venous duplex negative for DVT   Using walker to walk - at home no devices needed.  OT saw and recommended no needs  Continue steriods on discharge for 10 days, swelling and pain much improved, no need for ortho consult inpatient

## 2023-12-16 NOTE — PLAN OF CARE
Problem: PAIN - ADULT  Goal: Verbalizes/displays adequate comfort level or baseline comfort level  Description: Interventions:  - Encourage patient to monitor pain and request assistance  - Assess pain using appropriate pain scale  - Administer analgesics based on type and severity of pain and evaluate response  - Implement non-pharmacological measures as appropriate and evaluate response  - Consider cultural and social influences on pain and pain management  - Notify physician/advanced practitioner if interventions unsuccessful or patient reports new pain  Outcome: Progressing     Problem: INFECTION - ADULT  Goal: Absence or prevention of progression during hospitalization  Description: INTERVENTIONS:  - Assess and monitor for signs and symptoms of infection  - Monitor lab/diagnostic results  - Monitor all insertion sites, i.e. indwelling lines, tubes, and drains  - Monitor endotracheal if appropriate and nasal secretions for changes in amount and color  - [place] appropriate cooling/warming therapies per order  - Administer medications as ordered  - Instruct and encourage patient and family to use good hand hygiene technique  - Identify and instruct in appropriate isolation precautions for identified infection/condition  Outcome: Progressing     Problem: SAFETY ADULT  Goal: Patient will remain free of falls  Description: INTERVENTIONS:  - Educate patient/family on patient safety including physical limitations  - Instruct patient to call for assistance with activity   - Consult OT/PT to assist with strengthening/mobility   - Keep Call bell within reach  - Keep bed low and locked with side rails adjusted as appropriate  - Keep care items and personal belongings within reach  - Initiate and maintain comfort rounds  - Make Fall Risk Sign visible to staff  - Offer Toileting every  Hours, in advance of need  - Initiate/Maintain alarm  - Obtain necessary fall risk management equipment:   - Apply yellow socks and  bracelet for high fall risk patients  - Consider moving patient to room near nurses station  Outcome: Progressing  Goal: Maintain or return to baseline ADL function  Description: INTERVENTIONS:  -  Assess patient's ability to carry out ADLs; assess patient's baseline for ADL function and identify physical deficits which impact ability to perform ADLs (bathing, care of mouth/teeth, toileting, grooming, dressing, etc.)  - Assess/evaluate cause of self-care deficits   - Assess range of motion  - Assess patient's mobility; develop plan if impaired  - Assess patient's need for assistive devices and provide as appropriate  - Encourage maximum independence but intervene and supervise when necessary  - Involve family in performance of ADLs  - Assess for home care needs following discharge   - Consider OT consult to assist with ADL evaluation and planning for discharge  - Provide patient education as appropriate  Outcome: Progressing  Goal: Maintains/Returns to pre admission functional level  Description: INTERVENTIONS:  - Perform AM-PAC 6 Click Basic Mobility/ Daily Activity assessment daily.  - Set and communicate daily mobility goal to care team and patient/family/caregiver.   - Collaborate with rehabilitation services on mobility goals if consulted  - Perform Range of Motion  times a day.  - Reposition patient every  hours.  - Dangle patient  times a day  - Stand patient  times a day  - Ambulate patient  times a day  - Out of bed to chair  times a day   - Out of bed for meals times a day  - Out of bed for toileting  - Record patient progress and toleration of activity level   Outcome: Progressing

## 2023-12-16 NOTE — ASSESSMENT & PLAN NOTE
Presented with lower back pain radiating into right lower extremity x 5 days, denies trauma; on admission, pt could only use crutches   CT showed Stable chronic grade 1 anterolisthesis of L5 on S1 secondary to chronic bilateral spondylolysis. No acute fracture or new subluxation. Severe bilateral foraminal narrowing at L5-S1. Avascular necrosis of both femoral head without collapse  Pain control - scheduled Tylenol & PRN oxycodone/dilaudid. Will order IV steroids. Continue with lidoderm patch, schecdule robaxin - symptoms improving with prednisone, oxycodone #10 sent, PDMP checked and appropriate   PT/OT pending   U/S negative for DVT   Consider ortho vs NSX evaluation with persistent symptoms/ poor pain control

## 2023-12-16 NOTE — INCIDENTAL FINDINGS
The following findings require follow up:  Radiographic finding   Finding: CT abdomen pelvis wo contrast: Again noted are right hepatic lobe lesions which demonstrates changes of previous microwave ablation. The inferior lesion is smaller. The superior lesion, also mildly smaller. Study suboptimal to evaluate for residual viable tissue due to lack of , contrast, No retroperitoneal or abdominopelvic lymphadenopathy, Severe bilateral foraminal narrowing at L5-S1, Avascular necrosis of both femoral head without collapse,    Follow up required: PCP   Follow up should be done within 1 month(s)    Please notify the following clinician to assist with the follow up:   Dr. Torri Coleman MD
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
## 2023-12-17 ENCOUNTER — TRANSITIONAL CARE MANAGEMENT (OUTPATIENT)
Dept: INTERNAL MEDICINE CLINIC | Facility: OTHER | Age: 68
End: 2023-12-17

## 2023-12-18 ENCOUNTER — OFFICE VISIT (OUTPATIENT)
Dept: INTERNAL MEDICINE CLINIC | Facility: CLINIC | Age: 68
End: 2023-12-18
Payer: MEDICARE

## 2023-12-18 VITALS
SYSTOLIC BLOOD PRESSURE: 142 MMHG | HEART RATE: 77 BPM | DIASTOLIC BLOOD PRESSURE: 86 MMHG | TEMPERATURE: 98.6 F | HEIGHT: 69 IN | BODY MASS INDEX: 23.85 KG/M2 | OXYGEN SATURATION: 98 % | WEIGHT: 161 LBS

## 2023-12-18 DIAGNOSIS — M54.41 ACUTE BILATERAL LOW BACK PAIN WITH RIGHT-SIDED SCIATICA: ICD-10-CM

## 2023-12-18 DIAGNOSIS — K50.018 CROHN'S DISEASE OF SMALL INTESTINE WITH OTHER COMPLICATION (HCC): Chronic | ICD-10-CM

## 2023-12-18 DIAGNOSIS — C22.0 HEPATOCELLULAR CARCINOMA (HCC): ICD-10-CM

## 2023-12-18 DIAGNOSIS — E53.8 VITAMIN B12 DEFICIENCY: ICD-10-CM

## 2023-12-18 DIAGNOSIS — M51.26 DISC DISPLACEMENT, LUMBAR: ICD-10-CM

## 2023-12-18 DIAGNOSIS — J43.8 OTHER EMPHYSEMA (HCC): ICD-10-CM

## 2023-12-18 DIAGNOSIS — Z71.89 COMPLEX CARE COORDINATION: Primary | ICD-10-CM

## 2023-12-18 DIAGNOSIS — I10 ESSENTIAL HYPERTENSION: Chronic | ICD-10-CM

## 2023-12-18 DIAGNOSIS — M79.671 RIGHT FOOT PAIN: Primary | ICD-10-CM

## 2023-12-18 DIAGNOSIS — E78.2 MIXED HYPERLIPIDEMIA: ICD-10-CM

## 2023-12-18 DIAGNOSIS — Z93.3 COLOSTOMY IN PLACE (HCC): ICD-10-CM

## 2023-12-18 DIAGNOSIS — K21.9 GASTROESOPHAGEAL REFLUX DISEASE WITHOUT ESOPHAGITIS: ICD-10-CM

## 2023-12-18 PROCEDURE — 99496 TRANSJ CARE MGMT HIGH F2F 7D: CPT | Performed by: INTERNAL MEDICINE

## 2023-12-18 PROCEDURE — 96372 THER/PROPH/DIAG INJ SC/IM: CPT

## 2023-12-18 RX ADMIN — CYANOCOBALAMIN 1000 MCG: 1000 INJECTION, SOLUTION INTRAMUSCULAR; SUBCUTANEOUS at 10:03

## 2023-12-18 NOTE — PROGRESS NOTES
Transitional Care Management Review:  Jose Juan Elkins III is a 68 y.o. male here for TCM follow up.     During the TCM phone call patient stated:    TCM Call       Date and time call was made  12/17/2023  3:22 PM    Hospital care reviewed  Records reviewed    Patient was hospitialized at  [facility]    Date of Admission  12/13/23    Date of discharge  12/16/23    Diagnosis  foot and knee swelling and pain    Disposition  Home    Current Symptoms  Leg pain - right side; Cough          TCM Call       Post hospital issues  None    Should patient be enrolled in anticoag monitoring?  No    Scheduled for follow up?  Yes    Did you obtain your prescribed medications  Yes    Do you need help managing your prescriptions or medications  No    Is transportation to your appointment needed  No    I have advised the patient to call PCP with any new or worsening symptoms  Giovany Slade CMA    Living Arrangements  Children    Are you recieving any outpatient services  No    Are you recieving home care services  No    Are you using any community resources  No    Current waiver services  No    Have you fallen in the last 12 months  No    Interperter language line needed  No            Torri Coleman MD      Assessment/Plan:      Falls Plan of Care: balance, strength, and gait training instructions were provided.             1. Right foot and knee swelling and pain  Comments:  Stable, continue prednisone    2. Disc displacement, lumbar  Comments:  Stable, better with the prednisone    3. Acute bilateral low back pain with right-sided sciatica    4. Essential hypertension  Comments:  higher than usal, continue same medication, stress, at home    5. Mixed hyperlipidemia    6. Other emphysema (HCC)    7. Crohn's disease of small intestine with other complication (HCC)    8. Gastroesophageal reflux disease without esophagitis    9. Hepatocellular carcinoma (HCC)    10. Colostomy in place (HCC)           Subjective:       Patient ID: Jose Juan Elkins III is a 68 y.o. male.    Follow-up from hospitalization, hospital record reviewed, right knee pain and foot pain is better        The following portions of the patient's history were reviewed and updated as appropriate: He  has a past medical history of LUCILLE (acute kidney injury) (HCC) (6/28/2017), Blindness of left eye, Cancer (HCC), Cataract, Colon polyp, Colostomy in place (HCC) (6/29/2017), COPD (chronic obstructive pulmonary disease) (HCC), Crohn disease (HCC), Emphysema of lung (HCC), Emphysema/COPD (HCC), Essential hypertension, GERD (gastroesophageal reflux disease), Hypertension, Hypokalemia (6/28/2017), Hypomagnesemia (6/29/2017), Hyponatremia (6/28/2017), Kidney stone, Osteomyelitis (HCC), and Pneumonia.  He   Patient Active Problem List    Diagnosis Date Noted    Right foot and knee swelling and pain 12/14/2023    Acute pain of right knee 12/14/2023    Serum total bilirubin elevated 12/14/2023    Acute bilateral low back pain with right-sided sciatica 12/13/2023    Hepatocellular carcinoma (HCC) 12/13/2023    History of pulmonary embolism 12/13/2023    Tracheitis 10/09/2023    Candida infection, esophageal (HCC) 07/14/2023    Multiple adenomatous polyps 07/09/2023    Portal hypertension (HCC) 02/01/2023    Multiple subsegmental pulmonary emboli without acute cor pulmonale (HCC) 02/01/2023    Alcohol dependence, uncomplicated (HCC) 02/01/2023    Subclinical hypothyroidism 11/12/2022    Anemia 11/08/2022    Supraventricular tachycardia     History of alcohol use disorder 11/02/2022    Platelets decreased (HCC)     Encounter for follow-up surveillance of liver cancer 01/21/2022    Dysthymic disorder 06/28/2021    Crohn's disease of small intestine with other complication (HCC) 06/28/2021    Vitamin B12 deficiency 02/24/2021    Cataract     Chronic obstructive pulmonary disease (HCC) 11/24/2020    Mixed hyperlipidemia 11/24/2020    Kidney stone     Gastroesophageal reflux disease  without esophagitis     Left wrist pain 09/29/2020    Hypocalcemia 09/12/2020    Acute pain of left wrist 09/10/2020    Chronic, continuous use of opioids 09/10/2020    Observed sleep apnea     Palliative care patient 07/31/2020    Abdominal wall abscess 07/31/2020    Personal history of liver cancer 06/23/2020    Abdominal pain 06/04/2020    Tobacco abuse 05/29/2020    Severe sepsis (HCC) 05/28/2020    Pneumonia 05/28/2020    Chest pain 05/28/2020    Liver mass 05/28/2020    Syncope and collapse 04/03/2018    Emphysema of lung (HCC)     Arthropathy of right wrist 12/04/2017    Severe protein-calorie malnutrition (HCC) 07/01/2017    Colostomy in place (HCC) 06/29/2017    Diarrhea 06/29/2017    Acute kidney injury (HCC) 06/28/2017    Essential hypertension     Emphysema/COPD (HCC)     Crohn disease (HCC)      He  has a past surgical history that includes Colostomy; Cholecystectomy; Colectomy; Colonoscopy (N/A, 06/30/2017); Lithotripsy; Finger surgery (Left); IR biopsy liver mass (06/08/2020); Cataract extraction (Bilateral); Liver lobectomy (N/A, 07/15/2020); Finger amputation; IR microwave ablation (09/21/2022); IR microwave ablation (03/16/2023); Appendectomy (1979); Hernia repair (1978); Tonsillectomy (Child); and Eye surgery (2 yrs ago).  His family history includes Heart disease in his sister; Hypertension in his father.  He  reports that he quit smoking about 3 years ago. His smoking use included cigarettes. He started smoking about 52 years ago. He has a 75.1 pack-year smoking history. He has never used smokeless tobacco. He reports that he does not currently use alcohol. He reports that he does not currently use drugs.  Current Outpatient Medications   Medication Sig Dispense Refill    ALPRAZolam (XANAX) 0.25 mg tablet Take 1 tablet (0.25 mg total) by mouth daily at bedtime as needed for anxiety 90 tablet 0    AMILoride 5 mg tablet Take 1 tablet (5 mg total) by mouth daily 90 tablet 0    amLODIPine (NORVASC)  "5 mg tablet Take 1 tablet (5 mg total) by mouth daily 90 tablet 1    buPROPion (WELLBUTRIN XL) 150 mg 24 hr tablet Take 1 tablet (150 mg total) by mouth daily 90 tablet 0    calcium carbonate (TUMS) 500 mg chewable tablet Chew 1 tablet (500 mg total) daily  0    carvedilol (COREG) 3.125 mg tablet Take 1 tablet (3.125 mg total) by mouth 2 (two) times a day with meals 180 tablet 1    cholecalciferol (VITAMIN D3) 400 units tablet Take 400 Units by mouth Every Sunday      [employer] Magnesium Oxide 250 MG TABS TAKE 1 TABLET (250 MG TOTAL) BY MOUTH IN THE MORNING      fluticasone (FLONASE) 50 mcg/act nasal spray SPRAY 2 SPRAYS INTO EACH NOSTRIL EVERY DAY 48 mL 2    folic acid (FOLVITE) 1 mg tablet Take 1 tablet (1 mg total) by mouth daily 90 tablet 1    levothyroxine 50 mcg tablet Take 1 tablet (50 mcg total) by mouth daily in the early morning 90 tablet 1    methocarbamol (ROBAXIN) 750 mg tablet Take 1 tablet (750 mg total) by mouth every 6 (six) hours for 14 days 56 tablet 0    mirtazapine (REMERON) 15 mg tablet Take 1 tablet (15 mg total) by mouth daily at bedtime 90 tablet 0    naproxen (Naprosyn) 500 mg tablet Take 1 tablet (500 mg total) by mouth 2 (two) times a day with meals 30 tablet 0    omeprazole (PriLOSEC) 20 mg delayed release capsule Take 1 capsule (20 mg total) by mouth daily 90 capsule 0    Ostomy Supplies (Adapt Barrier) STRP Use daily as needed (PRN) 100 strip 6    Ostomy Supplies (Premier Convex Skin Barrier) MISC Use daily as needed (PRN) 50 each 6    Ostomy Supplies KIT Skin barries w/ mold to fit opening 12\" pouch w/ 2 sided comfort panel esenta sting free adhesive remover Sting free barrier wipes 1 kit 0    Ostomy Supplies OINT Use as needed (as needed for every use) 30 each 0    Ostomy Supplies Pouch MISC Use as needed (use as needed for every use) 30 each 0    oxyCODONE (ROXICODONE) 5 immediate release tablet Take 1 tablet (5 mg total) by mouth every 8 (eight) hours as needed for moderate pain for up " to 3 days Max Daily Amount: 15 mg 10 tablet 0    potassium chloride (MICRO-K) 10 MEQ CR capsule Take 2 capsules (20 mEq total) by mouth 2 (two) times a day 180 capsule 1    predniSONE 20 mg tablet Take 2 tablets (40 mg total) by mouth daily for 10 days With food 20 tablet 0    thiamine 250 MG tablet Take 1 tablet (250 mg total) by mouth daily for 5 days Do not start before November 14, 2022. 5 tablet 0    umeclidinium-vilanterol (Anoro Ellipta) 62.5-25 mcg/actuation inhaler Inhale 1 puff daily 180 each 0    VIT B12-METHIONINE-INOS-CHOL IM Inject into a muscle every 30 (thirty) days      benzonatate (TESSALON) 200 MG capsule Take 1 capsule (200 mg total) by mouth 3 (three) times a day as needed for cough (Patient not taking: Reported on 12/18/2023) 20 capsule 0    [employer] Magnesium Oxide 250 MG TABS TAKE 1 TABLET (250 MG TOTAL) BY MOUTH IN THE MORNING (Patient not taking: Reported on 12/18/2023) 90 tablet 3     Current Facility-Administered Medications   Medication Dose Route Frequency Provider Last Rate Last Admin    cyanocobalamin injection 1,000 mcg  1,000 mcg Intramuscular Q30 Days Torri Coleman MD   1,000 mcg at 10/09/23 0941    cyanocobalamin injection 1,000 mcg  1,000 mcg Intramuscular Q30 Days Torri Coleman MD   1,000 mcg at 08/14/23 1121    cyanocobalamin injection 1,000 mcg  1,000 mcg Intramuscular Q30 Days Eduar Caruso MD   1,000 mcg at 12/04/23 0929     He has No Known Allergies..    Review of Systems   Constitutional:  Negative for chills and fever.   HENT:  Negative for congestion, ear pain and sore throat.    Eyes:  Negative for pain.   Respiratory:  Negative for cough and shortness of breath.    Cardiovascular:  Negative for chest pain and leg swelling.   Gastrointestinal:  Negative for abdominal pain, nausea and vomiting.   Endocrine: Negative for polyuria.   Genitourinary:  Negative for difficulty urinating, frequency and urgency.   Musculoskeletal:  Positive for arthralgias and back pain.   Skin:   "Negative for rash.   Neurological:  Negative for weakness and headaches.   Psychiatric/Behavioral:  Negative for sleep disturbance. The patient is not nervous/anxious.          Objective:      /86 (BP Location: Left arm, Patient Position: Sitting, Cuff Size: Standard)   Pulse 77   Temp 98.6 °F (37 °C) (Temporal)   Ht 5' 9\" (1.753 m)   Wt 73 kg (161 lb)   SpO2 98%   BMI 23.78 kg/m²     Recent Results (from the past 336 hour(s))   Lipid Panel with Direct LDL reflex    Collection Time: 12/11/23 10:01 AM   Result Value Ref Range    Total Cholesterol 115 <200 mg/dL    HDL 37 (L) > OR = 40 mg/dL    Triglycerides 100 <150 mg/dL    LDL Calculated 59 mg/dL (calc)    Chol HDLC Ratio 3.1 <5.0 (calc)    Non-HDL Cholesterol 78 <130 mg/dL (calc)   Comprehensive metabolic panel    Collection Time: 12/11/23 10:01 AM   Result Value Ref Range    Glucose, Random 91 65 - 99 mg/dL    BUN 9 7 - 25 mg/dL    Creatinine 0.91 0.70 - 1.35 mg/dL    eGFR 92 > OR = 60 mL/min/1.73m2    SL AMB BUN/CREATININE RATIO SEE NOTE: 6 - 22 (calc)    Sodium 141 135 - 146 mmol/L    Potassium 3.5 3.5 - 5.3 mmol/L    Chloride 102 98 - 110 mmol/L    CO2 28 20 - 32 mmol/L    Calcium 9.1 8.6 - 10.3 mg/dL    Protein, Total 6.7 6.1 - 8.1 g/dL    Albumin 4.0 3.6 - 5.1 g/dL    Globulin 2.7 1.9 - 3.7 g/dL (calc)    Albumin/Globulin Ratio 1.5 1.0 - 2.5 (calc)    TOTAL BILIRUBIN 1.3 (H) 0.2 - 1.2 mg/dL    Alkaline Phosphatase 97 35 - 144 U/L    AST 10 10 - 35 U/L    ALT 5 (L) 9 - 46 U/L   CBC and differential    Collection Time: 12/11/23 10:01 AM   Result Value Ref Range    White Blood Cell Count 10.9 (H) 3.8 - 10.8 Thousand/uL    Red Blood Cell Count 4.53 4.20 - 5.80 Million/uL    Hemoglobin 13.4 13.2 - 17.1 g/dL    HCT 39.2 38.5 - 50.0 %    MCV 86.5 80.0 - 100.0 fL    MCH 29.6 27.0 - 33.0 pg    MCHC 34.2 32.0 - 36.0 g/dL    RDW 13.8 11.0 - 15.0 %    Platelet Count 248 140 - 400 Thousand/uL    SL AMB MPV 11.0 7.5 - 12.5 fL    Neutrophils (Absolute) 8,001 " (H) 1,500 - 7,800 cells/uL    Lymphocytes (Absolute) 2,093 850 - 3,900 cells/uL    Monocytes (Absolute) 654 200 - 950 cells/uL    Eosinophils (Absolute) 98 15 - 500 cells/uL    Basophils ABS 55 0 - 200 cells/uL    Neutrophils 73.4 %    Lymphocytes 19.2 %    Monocytes 6.0 %    Eosinophils 0.9 %    Basophils PCT 0.5 %   TSH, 3rd generation    Collection Time: 12/11/23 10:01 AM   Result Value Ref Range    TSH 1.09 0.40 - 4.50 mIU/L   Hemoglobin A1c (w/out EAG)    Collection Time: 12/11/23 10:01 AM   Result Value Ref Range    Hemoglobin A1C 5.3 <5.7 % of total Hgb   CBC and differential    Collection Time: 12/13/23 12:16 PM   Result Value Ref Range    WBC 13.35 (H) 4.31 - 10.16 Thousand/uL    RBC 4.37 3.88 - 5.62 Million/uL    Hemoglobin 12.8 12.0 - 17.0 g/dL    Hematocrit 39.0 36.5 - 49.3 %    MCV 89 82 - 98 fL    MCH 29.3 26.8 - 34.3 pg    MCHC 32.8 31.4 - 37.4 g/dL    RDW 14.0 11.6 - 15.1 %    MPV 10.3 8.9 - 12.7 fL    Platelets 215 149 - 390 Thousands/uL    nRBC 0 /100 WBCs    Neutrophils Relative 82 (H) 43 - 75 %    Immat GRANS % 0 0 - 2 %    Lymphocytes Relative 10 (L) 14 - 44 %    Monocytes Relative 8 4 - 12 %    Eosinophils Relative 0 0 - 6 %    Basophils Relative 0 0 - 1 %    Neutrophils Absolute 10.97 (H) 1.85 - 7.62 Thousands/µL    Immature Grans Absolute 0.04 0.00 - 0.20 Thousand/uL    Lymphocytes Absolute 1.30 0.60 - 4.47 Thousands/µL    Monocytes Absolute 1.00 0.17 - 1.22 Thousand/µL    Eosinophils Absolute 0.01 0.00 - 0.61 Thousand/µL    Basophils Absolute 0.03 0.00 - 0.10 Thousands/µL   Comprehensive metabolic panel    Collection Time: 12/13/23 12:16 PM   Result Value Ref Range    Sodium 135 135 - 147 mmol/L    Potassium 3.8 3.5 - 5.3 mmol/L    Chloride 99 96 - 108 mmol/L    CO2 27 21 - 32 mmol/L    ANION GAP 9 mmol/L    BUN 14 5 - 25 mg/dL    Creatinine 0.94 0.60 - 1.30 mg/dL    Glucose 109 65 - 140 mg/dL    Calcium 9.0 8.4 - 10.2 mg/dL    AST 15 13 - 39 U/L    ALT 7 7 - 52 U/L    Alkaline Phosphatase  82 34 - 104 U/L    Total Protein 7.3 6.4 - 8.4 g/dL    Albumin 3.9 3.5 - 5.0 g/dL    Total Bilirubin 1.95 (H) 0.20 - 1.00 mg/dL    eGFR 82 ml/min/1.73sq m   Lactic acid, plasma (w/reflex if result > 2.0)    Collection Time: 12/13/23 12:16 PM   Result Value Ref Range    LACTIC ACID 1.1 0.5 - 2.0 mmol/L   Uric acid    Collection Time: 12/13/23 12:16 PM   Result Value Ref Range    Uric Acid 5.1 3.5 - 8.5 mg/dL   CBC (With Platelets)    Collection Time: 12/14/23  4:56 AM   Result Value Ref Range    WBC 9.20 4.31 - 10.16 Thousand/uL    RBC 3.87 (L) 3.88 - 5.62 Million/uL    Hemoglobin 11.6 (L) 12.0 - 17.0 g/dL    Hematocrit 35.3 (L) 36.5 - 49.3 %    MCV 91 82 - 98 fL    MCH 30.0 26.8 - 34.3 pg    MCHC 32.9 31.4 - 37.4 g/dL    RDW 14.2 11.6 - 15.1 %    Platelets 193 149 - 390 Thousands/uL    MPV 10.9 8.9 - 12.7 fL   Comprehensive metabolic panel    Collection Time: 12/15/23  5:00 AM   Result Value Ref Range    Sodium 136 135 - 147 mmol/L    Potassium 4.6 3.5 - 5.3 mmol/L    Chloride 104 96 - 108 mmol/L    CO2 21 21 - 32 mmol/L    ANION GAP 11 mmol/L    BUN 18 5 - 25 mg/dL    Creatinine 0.84 0.60 - 1.30 mg/dL    Glucose 211 (H) 65 - 140 mg/dL    Calcium 8.5 8.4 - 10.2 mg/dL    AST 15 13 - 39 U/L    ALT 7 7 - 52 U/L    Alkaline Phosphatase 81 34 - 104 U/L    Total Protein 6.7 6.4 - 8.4 g/dL    Albumin 3.5 3.5 - 5.0 g/dL    Total Bilirubin 0.52 0.20 - 1.00 mg/dL    eGFR 89 ml/min/1.73sq m   CBC    Collection Time: 12/15/23  5:00 AM   Result Value Ref Range    WBC 7.20 4.31 - 10.16 Thousand/uL    RBC 3.92 3.88 - 5.62 Million/uL    Hemoglobin 11.7 (L) 12.0 - 17.0 g/dL    Hematocrit 36.4 (L) 36.5 - 49.3 %    MCV 93 82 - 98 fL    MCH 29.8 26.8 - 34.3 pg    MCHC 32.1 31.4 - 37.4 g/dL    RDW 13.8 11.6 - 15.1 %    Platelets 187 149 - 390 Thousands/uL    MPV 11.1 8.9 - 12.7 fL   Wheeled Walker    Collection Time: 12/15/23  3:51 PM   Result Value Ref Range    Supplier Name [employer]     Supplier Phone Number  [phone]     Order Status Pending Further Review     Delivery Note      Delivery Request Date 12/15/2023     Item Description Wheeled Walker, Adult         Physical Exam  Constitutional:       Appearance: Normal appearance.   HENT:      Head: Normocephalic.      Right Ear: Tympanic membrane, ear canal and external ear normal.      Left Ear: Tympanic membrane, ear canal and external ear normal.      Nose: Nose normal. No congestion.      Mouth/Throat:      Mouth: Mucous membranes are moist.      Pharynx: Oropharynx is clear. No oropharyngeal exudate or posterior oropharyngeal erythema.   Eyes:      Extraocular Movements: Extraocular movements intact.      Conjunctiva/sclera: Conjunctivae normal.   Cardiovascular:      Rate and Rhythm: Normal rate and regular rhythm.      Heart sounds: Normal heart sounds. No murmur heard.  Pulmonary:      Effort: Pulmonary effort is normal.      Breath sounds: Normal breath sounds. No wheezing or rales.   Abdominal:      General: Abdomen is flat. There is no distension.      Palpations: Abdomen is soft.      Tenderness: There is no abdominal tenderness.   Musculoskeletal:      Cervical back: Normal range of motion and neck supple.      Right lower leg: No edema.      Left lower leg: No edema.      Comments: Right knee and foot exam-knee no swelling, skin looks normal, movement normal, nontender, right foot exam minimal tenderness present at the ankle, no swelling, skin looks normal   Lymphadenopathy:      Cervical: No cervical adenopathy.   Skin:     General: Skin is warm.   Neurological:      General: No focal deficit present.      Mental Status: He is alert and oriented to person, place, and time.

## 2023-12-19 ENCOUNTER — PATIENT OUTREACH (OUTPATIENT)
Dept: INTERNAL MEDICINE CLINIC | Facility: CLINIC | Age: 68
End: 2023-12-19

## 2023-12-19 LAB
AFP-TM SERPL-MCNC: 3.2 NG/ML
BUN SERPL-MCNC: 16 MG/DL (ref 7–25)
CREAT SERPL-MCNC: 0.98 MG/DL (ref 0.7–1.35)
GFR/BSA.PRED SERPLBLD CYS-BASED-ARV: 84 ML/MIN/1.73M2

## 2023-12-19 NOTE — PROGRESS NOTES
Spoke with Jose Juan he saw PCP yesterday. No questions or concerns about discharge instructions. Has all of his medications. Using [facility] walker. Needs to call orthopedics to schedule appointment. No further outreach needed

## 2023-12-20 LAB
DME PARACHUTE DELIVERY DATE ACTUAL: NORMAL
DME PARACHUTE DELIVERY DATE REQUESTED: NORMAL
DME PARACHUTE ITEM DESCRIPTION: NORMAL
DME PARACHUTE ORDER STATUS: NORMAL
DME PARACHUTE SUPPLIER NAME: NORMAL
DME PARACHUTE SUPPLIER PHONE: NORMAL

## 2023-12-21 ENCOUNTER — APPOINTMENT (OUTPATIENT)
Dept: RADIOLOGY | Facility: AMBULARY SURGERY CENTER | Age: 68
End: 2023-12-21
Attending: STUDENT IN AN ORGANIZED HEALTH CARE EDUCATION/TRAINING PROGRAM
Payer: MEDICARE

## 2023-12-21 ENCOUNTER — OFFICE VISIT (OUTPATIENT)
Dept: OBGYN CLINIC | Facility: CLINIC | Age: 68
End: 2023-12-21
Payer: MEDICARE

## 2023-12-21 VITALS
HEIGHT: 69 IN | HEART RATE: 82 BPM | WEIGHT: 162 LBS | BODY MASS INDEX: 23.99 KG/M2 | SYSTOLIC BLOOD PRESSURE: 135 MMHG | DIASTOLIC BLOOD PRESSURE: 84 MMHG

## 2023-12-21 DIAGNOSIS — M87.00 AVASCULAR NECROSIS (HCC): ICD-10-CM

## 2023-12-21 DIAGNOSIS — M16.11 PRIMARY OSTEOARTHRITIS OF ONE HIP, RIGHT: Primary | ICD-10-CM

## 2023-12-21 PROCEDURE — 99204 OFFICE O/P NEW MOD 45 MIN: CPT | Performed by: STUDENT IN AN ORGANIZED HEALTH CARE EDUCATION/TRAINING PROGRAM

## 2023-12-21 PROCEDURE — 73522 X-RAY EXAM HIPS BI 3-4 VIEWS: CPT

## 2023-12-21 RX ORDER — MELOXICAM 15 MG/1
15 TABLET ORAL DAILY
Qty: 30 TABLET | Refills: 2 | Status: SHIPPED | OUTPATIENT
Start: 2023-12-21

## 2023-12-21 NOTE — PROGRESS NOTES
Orthopaedics Office Visit - New  Patient Visit    ASSESSMENT/PLAN:    Assessment:   Right hip osteoarthritis   AVN     Plan:   XR bilateral hips were reviewed in the office today which demonstrate moderate osteoarthritic changes in the right hip and mild osteoarthritic changes in the left hip.  No acute fractures or dislocations.  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  Patient active smoker.  Weightbearing as tolerated   Discussion was had with the patient about nonoperative versus operative management for hip osteoarthritis.  We discussed nonoperative management in the forms of anti-inflammatory medications, activity modifications, corticosteroid. Also discussed that if these methods do not successful the surgical option would be a total hip arthroplasty.  The patient would like to continue with conservative management options at this time   Will be ordering physical therapy for right hip strengthening exercises, stretchign exercises and core strengthening exercises   Prescribed patient Meloxicam 15 mg for pain relief   Continue taking Methocarbamol 750 as needed for muscle spasms   He is to finish off the Prednisone taper prescribed by the ED.   May consider a Right IA hip steroid injection if he has no improvement. He will call for the order to be placed.     To Do Next Visit:  Hip injection referral    _____________________________________________________  CHIEF COMPLAINT:  No chief complaint on file.        SUBJECTIVE:  Jose Juan Elkins III is a 68 y.o. male who presents today consultation for  bilateral hip pain. He was referred by ED Danny. He states being of the month he was having right sided low back pain and trouble walking. He notes the next the day he started to having increase right knee and foot pain due to going up and down a ladder putting up maryjane lights.  Patient was seen  at the ED on 12/13/23 due to have low back pain  and radiating pain down down to the right foot. Patient has CT abdomen pelvis which showed avascular necrosis femoral head with out collapse. He was admitted in the hospital for 3 days and was discharged with medications, oxycodone, prednisone, and Robaxin 750.He is doing better since leaving the hospital   He states he was diagnosed in the past with right hip osteoarthritis.  He states he has pain over the posterior lateral hip with prolonged standing and walking.  Is no history of having any treatments for his right hip.  He denies any numbness or tingling.    PAST MEDICAL HISTORY:  Past Medical History:   Diagnosis Date    LUCILLE (acute kidney injury) (HCC) 6/28/2017    Blindness of left eye     Since birth    Cancer (HCC)     liver    Cataract     Colon polyp     Colostomy in place (HCC) 6/29/2017    COPD (chronic obstructive pulmonary disease) (HCC)     Crohn disease (HCC)     Emphysema of lung (HCC)     Emphysema/COPD (HCC)     Essential hypertension     GERD (gastroesophageal reflux disease)     Hypertension     Hypokalemia 6/28/2017    Hypomagnesemia 6/29/2017    Hyponatremia 6/28/2017    Kidney stone     Osteomyelitis (HCC)     Pneumonia        PAST SURGICAL HISTORY:  Past Surgical History:   Procedure Laterality Date    APPENDECTOMY  1979    CATARACT EXTRACTION Bilateral     CHOLECYSTECTOMY      COLECTOMY      10 inchs of ileum    COLONOSCOPY N/A 06/30/2017    Procedure: COLONOSCOPY;  Surgeon: Gomez Bee MD;  Location: [facility];  Service: Gastroenterology    COLOSTOMY      EYE SURGERY  2 yrs ago    FINGER AMPUTATION      FINGER SURGERY Left     HERNIA REPAIR  1978    IR BIOPSY LIVER MASS  06/08/2020    IR MICROWAVE ABLATION  09/21/2022    IR MICROWAVE ABLATION  03/16/2023    LITHOTRIPSY      LIVER LOBECTOMY N/A 07/15/2020    Procedure: LIVER ABLATION, INTRAOPERATIVE U/S LIVER;  Surgeon: Asa Schafer MD;  Location: [facility];  Service: Surgical Oncology    TONSILLECTOMY  Child       FAMILY  HISTORY:  Family History   Problem Relation Age of Onset    Hypertension Father     Heart disease Sister        SOCIAL HISTORY:  Social History     Tobacco Use    Smoking status: Former     Current packs/day: 0.00     Average packs/day: 1.5 packs/day for 50.0 years (75.1 ttl pk-yrs)     Types: Cigarettes     Start date: 1971     Quit date: 7/15/2020     Years since quitting: 3.4    Smokeless tobacco: Never   Vaping Use    Vaping status: Never Used   Substance Use Topics    Alcohol use: Not Currently     Alcohol/week: 1.0 standard drink of alcohol     Types: 1 Cans of beer per week    Drug use: Not Currently       MEDICATIONS:    Current Outpatient Medications:     ALPRAZolam (XANAX) 0.25 mg tablet, Take 1 tablet (0.25 mg total) by mouth daily at bedtime as needed for anxiety, Disp: 90 tablet, Rfl: 0    AMILoride 5 mg tablet, Take 1 tablet (5 mg total) by mouth daily, Disp: 90 tablet, Rfl: 0    amLODIPine (NORVASC) 5 mg tablet, Take 1 tablet (5 mg total) by mouth daily, Disp: 90 tablet, Rfl: 1    buPROPion (WELLBUTRIN XL) 150 mg 24 hr tablet, Take 1 tablet (150 mg total) by mouth daily, Disp: 90 tablet, Rfl: 0    calcium carbonate (TUMS) 500 mg chewable tablet, Chew 1 tablet (500 mg total) daily, Disp:  , Rfl: 0    carvedilol (COREG) 3.125 mg tablet, Take 1 tablet (3.125 mg total) by mouth 2 (two) times a day with meals, Disp: 180 tablet, Rfl: 1    cholecalciferol (VITAMIN D3) 400 units tablet, Take 400 Units by mouth Every Sunday, Disp: , Rfl:     [employer] Magnesium Oxide 250 MG TABS, TAKE 1 TABLET (250 MG TOTAL) BY MOUTH IN THE MORNING, Disp: , Rfl:     fluticasone (FLONASE) 50 mcg/act nasal spray, SPRAY 2 SPRAYS INTO EACH NOSTRIL EVERY DAY, Disp: 48 mL, Rfl: 2    folic acid (FOLVITE) 1 mg tablet, Take 1 tablet (1 mg total) by mouth daily, Disp: 90 tablet, Rfl: 1    levothyroxine 50 mcg tablet, Take 1 tablet (50 mcg total) by mouth daily in the early morning, Disp: 90 tablet, Rfl: 1    meloxicam (Mobic) 15 mg tablet,  "Take 1 tablet (15 mg total) by mouth daily PRN with food, Disp: 30 tablet, Rfl: 2    methocarbamol (ROBAXIN) 750 mg tablet, Take 1 tablet (750 mg total) by mouth every 6 (six) hours for 14 days, Disp: 56 tablet, Rfl: 0    mirtazapine (REMERON) 15 mg tablet, Take 1 tablet (15 mg total) by mouth daily at bedtime, Disp: 90 tablet, Rfl: 0    omeprazole (PriLOSEC) 20 mg delayed release capsule, Take 1 capsule (20 mg total) by mouth daily, Disp: 90 capsule, Rfl: 0    Ostomy Supplies (Adapt Barrier) STRP, Use daily as needed (PRN), Disp: 100 strip, Rfl: 6    Ostomy Supplies (Premier Convex Skin Barrier) MISC, Use daily as needed (PRN), Disp: 50 each, Rfl: 6    Ostomy Supplies KIT, Skin barries w/ mold to fit opening 12\" pouch w/ 2 sided comfort panel esenta sting free adhesive remover Sting free barrier wipes, Disp: 1 kit, Rfl: 0    Ostomy Supplies OINT, Use as needed (as needed for every use), Disp: 30 each, Rfl: 0    Ostomy Supplies Pouch MISC, Use as needed (use as needed for every use), Disp: 30 each, Rfl: 0    potassium chloride (MICRO-K) 10 MEQ CR capsule, Take 2 capsules (20 mEq total) by mouth 2 (two) times a day, Disp: 180 capsule, Rfl: 1    predniSONE 20 mg tablet, Take 2 tablets (40 mg total) by mouth daily for 10 days With food, Disp: 20 tablet, Rfl: 0    umeclidinium-vilanterol (Anoro Ellipta) 62.5-25 mcg/actuation inhaler, Inhale 1 puff daily, Disp: 180 each, Rfl: 0    VIT B12-METHIONINE-INOS-CHOL IM, Inject into a muscle every 30 (thirty) days, Disp: , Rfl:     benzonatate (TESSALON) 200 MG capsule, Take 1 capsule (200 mg total) by mouth 3 (three) times a day as needed for cough (Patient not taking: Reported on 12/18/2023), Disp: 20 capsule, Rfl: 0    [employer] Magnesium Oxide 250 MG TABS, TAKE 1 TABLET (250 MG TOTAL) BY MOUTH IN THE MORNING (Patient not taking: Reported on 12/18/2023), Disp: 90 tablet, Rfl: 3    thiamine 250 MG tablet, Take 1 tablet (250 mg total) by mouth daily for 5 days Do not start before " "November 14, 2022., Disp: 5 tablet, Rfl: 0    Current Facility-Administered Medications:     cyanocobalamin injection 1,000 mcg, 1,000 mcg, Intramuscular, Q30 Days, Torri Coleman MD, 1,000 mcg at 10/09/23 0941    cyanocobalamin injection 1,000 mcg, 1,000 mcg, Intramuscular, Q30 Days, Torri Coleman MD, 1,000 mcg at 08/14/23 1121    cyanocobalamin injection 1,000 mcg, 1,000 mcg, Intramuscular, Q30 Days, Eduar Caruso MD, 1,000 mcg at 12/18/23 1003    ALLERGIES:  No Known Allergies    REVIEW OF SYSTEMS:  MSK: Right hip   Neuro: None   Pertinent items are otherwise noted in HPI.  A comprehensive review of systems was otherwise negative.    LABS:  HgA1c:   Lab Results   Component Value Date    HGBA1C 5.3 12/11/2023     BMP:   Lab Results   Component Value Date    GLUCOSE 75 07/15/2020    CALCIUM 8.5 12/15/2023    K 4.6 12/15/2023    CO2 21 12/15/2023     12/15/2023    BUN 18 12/15/2023    CREATININE 0.84 12/15/2023     CBC: No components found for: \"CBC\"    _____________________________________________________  PHYSICAL EXAMINATION:  Vital signs: /84   Pulse 82   Ht 5' 9\" (1.753 m)   Wt 73.5 kg (162 lb)   BMI 23.92 kg/m²   General: No acute distress, awake and alert  Psychiatric: Mood and affect appear appropriate  HEENT: Trachea Midline, No torticollis, no apparent facial trauma  Cardiovascular: No audible murmurs; Extremities appear perfused  Pulmonary: No audible wheezing or stridor  Skin: No open lesions; see further details (if any) below    MUSCULOSKELETAL EXAMINATION:  Extremities:  Right hip right hip was exposed inspected.  The patient has no lacerations or abrasions over the right hip.  The patient has no pain with range of motion of the right hip and 210 degrees of flexion full extension.  The patient has no discomfort with flexion and internal rotation.  He has mild posterior lateral pain with flexion abduction external rotation.  The patient has no pain with heel strike.  The patient's knee " range of motion is from 0 120 degrees with crepitus.  He has mild medial joint line tenderness no lateral joint line tenderness.  The patient's knee is stable to varus and valgus stress.  Negative anterior and posterior drawer testing.  Patient's sensation is intact to light touch in the superficial peroneal, deep peroneal, sural, saphenous, plantar nerve distributions.  Tibialis anterior, extensor hallux longus, gastrocnemius muscles are intact.  Negative straight leg raise.  Limb is well-perfused compartment soft compressible      _____________________________________________________  STUDIES REVIEWED:  I personally reviewed the images and interpretation is as follows:  XR bilateral hips XR bilateral hips were reviewed in the office today which demonstrate moderate osteoarthritic changes in the right hip and mild osteoarthritic changes in the left hip.  No acute fractures or dislocations.  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  Patient active smoker.    PROCEDURES PERFORMED:  Procedures    Scribe Attestation      I,:  Jazmín Camacho am acting as a scribe while in the presence of the attending physician.:       I,:  Freddy Luna DO personally performed the services described in this documentation    as scribed in my presence.:

## 2023-12-26 ENCOUNTER — HOSPITAL ENCOUNTER (OUTPATIENT)
Dept: CT IMAGING | Facility: HOSPITAL | Age: 68
Discharge: HOME | End: 2023-12-26
Payer: MEDICARE

## 2023-12-26 ENCOUNTER — HOSPITAL ENCOUNTER (OUTPATIENT)
Dept: MRI IMAGING | Facility: HOSPITAL | Age: 68
Discharge: HOME | End: 2023-12-26
Payer: MEDICARE

## 2023-12-26 DIAGNOSIS — C22.0 HEPATOCELLULAR CARCINOMA (HCC): ICD-10-CM

## 2023-12-26 DIAGNOSIS — Z12.2 ENCOUNTER FOR SCREENING FOR LUNG CANCER: ICD-10-CM

## 2023-12-26 DIAGNOSIS — Z87.891 FORMER HEAVY TOBACCO SMOKER: ICD-10-CM

## 2023-12-26 PROCEDURE — G1004 CDSM NDSC: HCPCS

## 2023-12-26 PROCEDURE — 71271 CT THORAX LUNG CANCER SCR C-: CPT

## 2023-12-26 PROCEDURE — A9585 GADOBUTROL INJECTION: HCPCS | Performed by: INTERNAL MEDICINE

## 2023-12-26 PROCEDURE — 74183 MRI ABD W/O CNTR FLWD CNTR: CPT

## 2023-12-26 RX ORDER — GADOBUTROL 604.72 MG/ML
7 INJECTION INTRAVENOUS
Status: COMPLETED | OUTPATIENT
Start: 2023-12-26 | End: 2023-12-26

## 2023-12-26 RX ADMIN — GADOBUTROL 7 ML: 604.72 INJECTION INTRAVENOUS at 08:46

## 2024-01-04 ENCOUNTER — OFFICE VISIT (OUTPATIENT)
Dept: SURGICAL ONCOLOGY | Facility: CLINIC | Age: 69
End: 2024-01-04
Payer: COMMERCIAL

## 2024-01-04 VITALS
HEART RATE: 90 BPM | BODY MASS INDEX: 24.73 KG/M2 | TEMPERATURE: 98 F | SYSTOLIC BLOOD PRESSURE: 128 MMHG | OXYGEN SATURATION: 95 % | DIASTOLIC BLOOD PRESSURE: 80 MMHG | HEIGHT: 69 IN | WEIGHT: 167 LBS

## 2024-01-04 DIAGNOSIS — Z85.05 ENCOUNTER FOR FOLLOW-UP SURVEILLANCE OF LIVER CANCER: Primary | ICD-10-CM

## 2024-01-04 DIAGNOSIS — Z08 ENCOUNTER FOR FOLLOW-UP SURVEILLANCE OF LIVER CANCER: Primary | ICD-10-CM

## 2024-01-04 DIAGNOSIS — Z85.05 PERSONAL HISTORY OF LIVER CANCER: ICD-10-CM

## 2024-01-04 PROBLEM — L02.211 ABDOMINAL WALL ABSCESS: Status: RESOLVED | Noted: 2020-07-31 | Resolved: 2024-01-04

## 2024-01-04 PROBLEM — C22.0 HEPATOCELLULAR CARCINOMA (HCC): Status: RESOLVED | Noted: 2023-12-13 | Resolved: 2024-01-04

## 2024-01-04 PROCEDURE — 99213 OFFICE O/P EST LOW 20 MIN: CPT

## 2024-01-04 NOTE — PROGRESS NOTES
Surgical Oncology Follow Up       [facility] [place]  [address]  [place] [address]    Jose Juan Elkins III  1955  [id number]  [facility] [place]  [address]  [place] [address]    Diagnoses and all orders for this visit:    Encounter for follow-up surveillance of liver cancer    Personal history of liver cancer  -     MRI abdomen w wo contrast; Future  -     AFP tumor marker; Future  -     BUN; Future  -     Creatinine, serum; Future        Chief Complaint   Patient presents with    Follow-up       Return in about 3 months (around 4/4/2024) for Office visit with Dr Schafer, Labs - See Treatment Plan, Imaging - See orders.      Oncology History   Personal history of liver cancer   6/8/2020 Initial Diagnosis    Hepatocellular carcinoma, moderately differentiated     6/8/2020 -  Cancer Staged    Staging form: Liver (Excluding Intrahepatic Bile Ducts), AJCC 8th Edition  - Clinical stage from 6/8/2020: Stage IB (cT1b, cN0, cM0) - Signed by RAMANA Khan on 12/8/2023  Stage prefix: Initial diagnosis       7/15/2020 Surgery    Surgical microwave ablation of liver segment 5     9/21/2022 -  Radiation    IR microwave ablation of segment 5 lesion     3/16/2023 -  Radiation    IR microwave ablation of segment 5/6 lesion         Staging: HCC   Treatment history:  Ablation of segment 5 liver lesion, July 2020  Microwave ablation segment 5 lesion, September 2022  Microwave ablation of a right hepatic lobe lesion, March 2023  Current treatment: Observation  Disease status: ARLYN      History of Present Illness: The patient returns to the office today in follow-up for his history of recurrent hepatocellular carcinoma.  He is currently ARLYN at just under 1 year from his most recent treatment.  He reports feeling well, although he was hospitalized recently for gout.  He denies any  abdominal bloating, distention or pain, or any jaundice or dark urine.  An AFP level has been drawn, and lung CT and abdominal MRI were performed on December 26.  I have reviewed these results myself and discussed them with the patient today.      Review of Systems   Constitutional:  Negative for activity change, appetite change, fatigue and unexpected weight change.   HENT: Negative.     Respiratory: Negative.  Negative for cough and shortness of breath.    Cardiovascular: Negative.    Gastrointestinal: Negative.  Negative for abdominal distention, abdominal pain, nausea and vomiting.   Musculoskeletal: Negative.    Skin: Negative.  Negative for color change.   Neurological: Negative.  Negative for dizziness and headaches.   Hematological: Negative.  Negative for adenopathy.   Psychiatric/Behavioral: Negative.               Patient Active Problem List   Diagnosis    Essential hypertension    Emphysema/COPD (HCC)    Crohn disease (HCC)    Acute kidney injury (HCC)    Colostomy in place (HCC)    Diarrhea    Severe protein-calorie malnutrition (HCC)    Arthropathy of right wrist    Emphysema of lung (HCC)    Syncope and collapse    Severe sepsis (HCC)    Pneumonia    Chest pain    Liver mass    Tobacco abuse    Abdominal pain    Personal history of liver cancer    Palliative care patient    Abdominal wall abscess    Observed sleep apnea    Acute pain of left wrist    Chronic, continuous use of opioids    Hypocalcemia    Left wrist pain    Kidney stone    Gastroesophageal reflux disease without esophagitis    Chronic obstructive pulmonary disease (HCC)    Mixed hyperlipidemia    Vitamin B12 deficiency    Cataract    Dysthymic disorder    Crohn's disease of small intestine with other complication (HCC)    Encounter for follow-up surveillance of liver cancer    Platelets decreased (HCC)    History of alcohol use disorder    Supraventricular tachycardia    Anemia    Subclinical hypothyroidism    Portal hypertension (HCC)     Multiple subsegmental pulmonary emboli without acute cor pulmonale (HCC)    Alcohol dependence, uncomplicated (HCC)    Multiple adenomatous polyps    Candida infection, esophageal (HCC)    Tracheitis    Acute bilateral low back pain with right-sided sciatica    History of pulmonary embolism    Right foot and knee swelling and pain    Acute pain of right knee    Serum total bilirubin elevated     Past Medical History:   Diagnosis Date    LUCILLE (acute kidney injury) (HCC) 6/28/2017    Blindness of left eye     Since birth    Cancer (HCC)     liver    Cataract     Colon polyp     Colostomy in place (HCC) 6/29/2017    COPD (chronic obstructive pulmonary disease) (HCC)     Crohn disease (HCC)     Emphysema of lung (HCC)     Emphysema/COPD (HCC)     Essential hypertension     GERD (gastroesophageal reflux disease)     Hypertension     Hypokalemia 6/28/2017    Hypomagnesemia 6/29/2017    Hyponatremia 6/28/2017    Kidney stone     Osteomyelitis (HCC)     Pneumonia      Past Surgical History:   Procedure Laterality Date    APPENDECTOMY  1979    CATARACT EXTRACTION Bilateral     CHOLECYSTECTOMY      COLECTOMY      10 inchs of ileum    COLONOSCOPY N/A 06/30/2017    Procedure: COLONOSCOPY;  Surgeon: Gomez Bee MD;  Location: [facility];  Service: Gastroenterology    COLOSTOMY      EYE SURGERY  2 yrs ago    FINGER AMPUTATION      FINGER SURGERY Left     HERNIA REPAIR  1978    IR BIOPSY LIVER MASS  06/08/2020    IR MICROWAVE ABLATION  09/21/2022    IR MICROWAVE ABLATION  03/16/2023    LITHOTRIPSY      LIVER LOBECTOMY N/A 07/15/2020    Procedure: LIVER ABLATION, INTRAOPERATIVE U/S LIVER;  Surgeon: Asa Schafer MD;  Location: [facility];  Service: Surgical Oncology    TONSILLECTOMY  Child     Family History   Problem Relation Age of Onset    Hypertension Father     Heart disease Sister      Social History     Socioeconomic History    Marital status:      Spouse name: Not on file    Number of children: Not on file     Years of education: Not on file    Highest education level: Not on file   Occupational History    Not on file   Tobacco Use    Smoking status: Former     Current packs/day: 0.00     Average packs/day: 1.5 packs/day for 50.0 years (75.1 ttl pk-yrs)     Types: Cigarettes     Start date: 1971     Quit date: 7/15/2020     Years since quitting: 3.4    Smokeless tobacco: Never   Vaping Use    Vaping status: Never Used   Substance and Sexual Activity    Alcohol use: Not Currently     Alcohol/week: 1.0 standard drink of alcohol     Types: 1 Cans of beer per week    Drug use: Not Currently    Sexual activity: Not Currently     Partners: Female     Birth control/protection: Condom Male   Other Topics Concern    Not on file   Social History Narrative    Not on file     Social Determinants of Health     Financial Resource Strain: Low Risk  (12/12/2023)    Overall Financial Resource Strain (CARDIA)     Difficulty of Paying Living Expenses: Not very hard   Food Insecurity: No Food Insecurity (1/27/2023)    Hunger Vital Sign     Worried About Running Out of Food in the Last Year: Never true     Ran Out of Food in the Last Year: Never true   Transportation Needs: No Transportation Needs (12/12/2023)    PRAPARE - Transportation     Lack of Transportation (Medical): No     Lack of Transportation (Non-Medical): No   Physical Activity: Not on file   Stress: Not on file   Social Connections: Not on file   Intimate Partner Violence: Not on file   Housing Stability: Low Risk  (1/27/2023)    Housing Stability Vital Sign     Unable to Pay for Housing in the Last Year: No     Number of Places Lived in the Last Year: 1     Unstable Housing in the Last Year: No       Current Outpatient Medications:     ALPRAZolam (XANAX) 0.25 mg tablet, Take 1 tablet (0.25 mg total) by mouth daily at bedtime as needed for anxiety, Disp: 90 tablet, Rfl: 0    AMILoride 5 mg tablet, Take 1 tablet (5 mg total) by mouth daily, Disp: 90 tablet, Rfl: 0    amLODIPine  "(NORVASC) 5 mg tablet, Take 1 tablet (5 mg total) by mouth daily, Disp: 90 tablet, Rfl: 1    buPROPion (WELLBUTRIN XL) 150 mg 24 hr tablet, Take 1 tablet (150 mg total) by mouth daily, Disp: 90 tablet, Rfl: 0    calcium carbonate (TUMS) 500 mg chewable tablet, Chew 1 tablet (500 mg total) daily, Disp:  , Rfl: 0    carvedilol (COREG) 3.125 mg tablet, Take 1 tablet (3.125 mg total) by mouth 2 (two) times a day with meals, Disp: 180 tablet, Rfl: 1    cholecalciferol (VITAMIN D3) 400 units tablet, Take 400 Units by mouth Every Sunday, Disp: , Rfl:     [employer] Magnesium Oxide 250 MG TABS, TAKE 1 TABLET (250 MG TOTAL) BY MOUTH IN THE MORNING, Disp: , Rfl:     fluticasone (FLONASE) 50 mcg/act nasal spray, SPRAY 2 SPRAYS INTO EACH NOSTRIL EVERY DAY, Disp: 48 mL, Rfl: 2    folic acid (FOLVITE) 1 mg tablet, Take 1 tablet (1 mg total) by mouth daily, Disp: 90 tablet, Rfl: 1    levothyroxine 50 mcg tablet, Take 1 tablet (50 mcg total) by mouth daily in the early morning, Disp: 90 tablet, Rfl: 1    meloxicam (Mobic) 15 mg tablet, Take 1 tablet (15 mg total) by mouth daily PRN with food, Disp: 30 tablet, Rfl: 2    mirtazapine (REMERON) 15 mg tablet, Take 1 tablet (15 mg total) by mouth daily at bedtime, Disp: 90 tablet, Rfl: 0    omeprazole (PriLOSEC) 20 mg delayed release capsule, Take 1 capsule (20 mg total) by mouth daily, Disp: 90 capsule, Rfl: 0    Ostomy Supplies (Adapt Barrier) STRP, Use daily as needed (PRN), Disp: 100 strip, Rfl: 6    Ostomy Supplies (Premier Convex Skin Barrier) MISC, Use daily as needed (PRN), Disp: 50 each, Rfl: 6    Ostomy Supplies KIT, Skin barries w/ mold to fit opening 12\" pouch w/ 2 sided comfort panel esenta sting free adhesive remover Sting free barrier wipes, Disp: 1 kit, Rfl: 0    Ostomy Supplies OINT, Use as needed (as needed for every use), Disp: 30 each, Rfl: 0    Ostomy Supplies Pouch MISC, Use as needed (use as needed for every use), Disp: 30 each, Rfl: 0    potassium chloride (MICRO-K) 10 " MEQ CR capsule, Take 2 capsules (20 mEq total) by mouth 2 (two) times a day, Disp: 180 capsule, Rfl: 1    umeclidinium-vilanterol (Anoro Ellipta) 62.5-25 mcg/actuation inhaler, Inhale 1 puff daily, Disp: 180 each, Rfl: 0    VIT B12-METHIONINE-INOS-CHOL IM, Inject into a muscle every 30 (thirty) days, Disp: , Rfl:     methocarbamol (ROBAXIN) 750 mg tablet, Take 1 tablet (750 mg total) by mouth every 6 (six) hours for 14 days, Disp: 56 tablet, Rfl: 0    thiamine 250 MG tablet, Take 1 tablet (250 mg total) by mouth daily for 5 days Do not start before November 14, 2022., Disp: 5 tablet, Rfl: 0    Current Facility-Administered Medications:     cyanocobalamin injection 1,000 mcg, 1,000 mcg, Intramuscular, Q30 Days, Torri Coleman MD, 1,000 mcg at 10/09/23 0941    cyanocobalamin injection 1,000 mcg, 1,000 mcg, Intramuscular, Q30 Days, Torri Coleman MD, 1,000 mcg at 08/14/23 1121    cyanocobalamin injection 1,000 mcg, 1,000 mcg, Intramuscular, Q30 Days, Eduar Caruso MD, 1,000 mcg at 12/18/23 1003  No Known Allergies  Vitals:    01/04/24 0800   BP: 128/80   Pulse: 90   Temp: 98 °F (36.7 °C)   SpO2: 95%       Physical Exam  Constitutional: General appearance: The Patient is well-developed and well-nourished who appears the stated age in no acute distress. Patient is pleasant and talkative.     HEENT:  Normocephalic.  Sclerae are anicteric. Mucous membranes are moist. Neck is supple without adenopathy. No JVD.     Chest: The lungs are clear to auscultation.     Cardiac: Heart is regular rate.     Abdomen: Abdomen is soft, non-tender, non-distended and without masses.     Extremities: There is no clubbing or cyanosis. There is no edema.  Symmetric.  Neuro: Grossly nonfocal. Gait is normal.     Lymphatic: No evidence of cervical adenopathy bilaterally.   No evidence of axillary adenopathy bilaterally. No evidence of inguinal adenopathy bilaterally.     Skin: Warm, anicteric.    Psych:  Patient is pleasant and  talkative.  Breasts:        Labs:  AFP tumor marker  Collected 12/18/2023        Component  Ref Range & Units 2 wk ago   AFP-Tumor Marker  <6.1 ng/mL 3.2          Imaging  MRI abdomen w wo contrast    Result Date: 1/3/2024  Narrative: MRI OF THE ABDOMEN WITH AND WITHOUT CONTRAST WITH MRCP INDICATION: 68 years / Male  C22.0: Liver cell carcinoma. COMPARISON: MRI abdomen 9/21/2023. TECHNIQUE:  Multiplanar/multisequence MRI of the abdomen was performed before and after administration of contrast. Imaging performed on 1.5T MRI . IV Contrast:  7 mL of Gadobutrol injection (SINGLE-DOSE) FINDINGS: LOWER CHEST:   Visualized structures in the lower thorax are within normal limits. LIVER: Normal size and morphology. Signal loss on opposed-phase images indicating mild hepatic steatosis. Treated Observation #1: Treatment modality: Microwave ablation 7/15/2020 Location: Segment 5 (series 11, image 166); size: 4.0 x 2.7 cm (previously 4.2 x 2.9 cm). Pretreatment LI-RADS category: LR-M Enhancement in a nodular, mass-like, or thick irregular pattern: No. Size of enhancing component: N/A Treatment Response Category: LR-TR Nonviable Treated Observation #2: Treatment modality: Microwave ablation 9/21/2022 Location: Segment 5 (series 11, image 171); size: 3.8 x 2.1 cm (previously 3.8 x 2.1 cm). Pretreatment LI-RADS category: LR-5 Enhancement in a nodular, mass-like, or thick irregular pattern: No. Size of enhancing component: N/A Treatment Response Category: LR-TR Nonviable Treated Observation #3: Treatment modality: Microwave ablation 3/16/2023 Location: Segment 5/6 (series 11, image 176); size: 2.2 x 1.6 cm (previously 2.2 x 1.6 cm). Pretreatment LI-RADS category: LR-5 Enhancement in a nodular, mass-like, or thick irregular pattern: No. Size of enhancing component: N/A Treatment Response Category: LR-TR Nonviable No new hepatic observations. BILE DUCTS: No extrahepatic biliary dilatation. Stable focal mild intrahepatic biliary ductal  dilatation adjacent to the treatment zones in the right hepatic lobe presumably due to scarring. Otherwise no intrahepatic biliary ductal dilatation. GALLBLADDER: Gallbladder is surgically absent. PANCREAS: Pancreatic parenchyma within normal limits. Normal signal intensity and enhancement. No main pancreatic ductal dilation. ADRENAL GLANDS: Adrenal glands are within normal limits. SPLEEN: Normal spleen. KIDNEYS/PROXIMAL URETERS: Normal kidney size and position with symmetric enhancement. No suspicious lesions. Small simple cyst in the interpolar left kidney. No hydronephrosis. BOWEL:   Normal MRI appearance of the stomach, small bowel, and large bowel. No dilated loops of bowel. PERITONEUM: No ascites. RETROPERITONEUM: Within normal limits. LYMPH NODES: No abdominal lymphadenopathy. VESSELS: Normal caliber abdominal aorta with moderate atherosclerotic plaque. The celiac, SMA, and JUNE are patent. The SMV and splenic vein are patent. The portal veins are patent. The hepatic veins are patent. ABDOMINAL WALL: Within normal limits. BONES: No suspicious osseous lesion.     Impression: 1.  Stable appearance of two segment 5 LR-TR Nonviable treatment zones and a segment 5/6 LR-TR Nonviable treatment zone. There is mild associated local intrahepatic biliary ductal dilatation similar to prior exams. 2.  No new hepatic observations. Workstation performed: [id number]     CT lung screening program    Result Date: 12/30/2023  Narrative: CT CHEST LUNG CANCER SCREENING WITHOUT IV CONTRAST INDICATION:   Z87.891: Personal history of nicotine dependence Z12.2: Encounter for screening for malignant neoplasm of respiratory organs. COMPARISON: CTA chest 11/7/2022 TECHNIQUE:  Unenhanced CT examination of the chest was performed utilizing a low dose protocol.  Multiplanar 2D reformatted images were created from the source data. Radiation dose length product (DLP) for this visit:  115.92 mGy-cm .  This examination, like all CT scans  performed in the [facility], was performed utilizing techniques to minimize radiation dose exposure, including the use of iterative  reconstruction and automated exposure control. FINDINGS: LUNGS: Emphysematous changes of bilateral lungs. Biapical pleural-parenchymal scarring. Scattered calcified granulomas. Stable 5 mm left upper lobe nodule (series 3 image 66). Bilateral posterior dependent atelectasis. Thin linear filling defect in the right main bronchus likely represents airway secretions. PLEURA:  Unremarkable. HEART/GREAT VESSELS: Heart is unremarkable for patient's age. No thoracic aortic aneurysm. MEDIASTINUM AND MACIEL:  Unremarkable. CHEST WALL AND LOWER NECK:  Unremarkable. VISUALIZED STRUCTURES IN THE UPPER ABDOMEN: Post microwave ablation changes of the right lobe of liver. OSSEOUS STRUCTURES:  No acute fracture or destructive osseous lesion.     Impression: 1. Stable 5 mm left upper lobe pulmonary nodule. 2.   Lung-RADS2, benign appearance or behavior.  Continue annual screening with LDCT in 12 months. Workstation performed: [id number]     XR hips bilateral 3-4 vw w pelvis if performed    Result Date: 12/21/2023  Narrative: BILATERAL HIPS AND PELVIS INDICATION:   Idiopathic aseptic necrosis of unspecified bone. COMPARISON:  Comparison made with previous examination(s) dated (DX) 15-Dec-2023,(CT) 13-Dec-2023,(DX) 13-Dec-2023. VIEWS:  XR HIPS BILATERAL 3-4 VW W PELVIS IF PERFORMED Images: 5  FINDINGS: There is sclerosis in the femoral heads bilaterally consistent with avascular necrosis. There is no collapse. There is minimal osteophytosis in the hips as well. No fracture dislocation seen.     Impression: Findings compatible with avascular necrosis of the femoral heads bilaterally without collapse Electronically signed: 12/21/2023 07:31 PM Klaus Shell MD    XR knee 3 vw right non injury    Result Date: 12/15/2023  Narrative: RIGHT KNEE INDICATION:   pain, swelling acute.  COMPARISON:  None VIEWS:  XR KNEE 3 VW RIGHT NON INJURY FINDINGS: There is no acute fracture or dislocation. There is no joint effusion. Joint spaces are preserved. Bilateral meniscal chondrocalcinosis, medial greater than lateral. No lytic or blastic osseous lesion. Soft tissues are unremarkable.     Impression: No acute osseous abnormality. Workstation performed: [id number]     XR ankle 3+ views RIGHT    Result Date: 12/14/2023  Narrative: RIGHT ANKLE INDICATION:   Pain, atraumatic?. COMPARISON: 06/07/2004 VIEWS:  XR ANKLE 3+ VW RIGHT Images: 4 FINDINGS: There is no acute fracture or dislocation. No significant degenerative changes. No lytic or blastic osseous lesion. There is an ankle joint effusion is present. There is lateral soft tissue swelling.     Impression: No acute osseous abnormality. Workstation performed: [id number]     CT recon only lumbar spine    Result Date: 12/13/2023  Narrative: CT RECON ONLY LUMBAR SPINE (NO CHARGE) INDICATION:   Low back pain radiating down the right leg to the knee. History of liver cancer. COMPARISON: CT abdomen pelvis 3/18/2023 TECHNIQUE: Axial CT examination of the lumbar spine was obtained utilizing reconstructed images from CT of the chest, abdomen and pelvis performed the same day.  Images were reformatted in the sagittal and coronal planes. This examination, like all CT scans performed in the [facility], was performed utilizing techniques to minimize radiation dose exposure, including the use of iterative reconstruction and automated exposure control. FINDINGS: ALIGNMENT: Grade 1 anterolisthesis L5-S1 with associated chronic appearing bilateral pars defects, stable from the prior CT from March. VERTEBRAE: No acute fracture.  No acute osseous abnormality. DEGENERATIVE CHANGES: L1-L2: Normal disc height. No disc bulge. Normal facets. No significant canal or foraminal stenosis. L2-L3: Normal disc height. No disc bulge. Normal facets. No significant canal  or foraminal stenosis. L3-L4: Small left neural foraminal disc protrusion.. Moderate left-sided facet hypertrophy. Mild left-sided foraminal stenosis. No significant canal stenosis. L4-L5: Mild diffuse disc bulge. Moderate bilateral facet hypertrophy with mild bilateral neural foraminal stenosis. No significant canal stenosis. L5-S1: Near complete loss of disc height with vacuum phenomenon. Bilateral chronic appearing pars defects with associated grade 1 anterolisthesis. Severe bilateral foraminal stenosis on the basis of the anterolisthesis. No significant canal stenosis. PREVERTEBRAL AND PARASPINAL SOFT TISSUES: Atherosclerotic calcifications of the descending aorta and bilateral iliac arteries. Incidentally noted healed, left 12th posterior rib fracture. 2 mm nonobstructing left renal calculus, series 4 image 38. Small hypodense lesion in the left kidney posteriorly on series 4 image 32 is incompletely imaged, but correlates to the previously present left renal cyst on the prior study from March on series 4 image 62 of the prior CT.     Impression: 1. Stable chronic grade 1 anterolisthesis of L5 on S1 secondary to chronic bilateral spondylolysis. No acute fracture or new subluxation. Resident: DAWSON OSULLIVAN I, the attending radiologist, have reviewed the images and agree with the final report above. Workstation performed: [id number]     CT abdomen pelvis wo contrast    Result Date: 12/13/2023  Narrative: CT ABDOMEN AND PELVIS WITHOUT IV CONTRAST INDICATION:   Right lower leg, shooting pain. hx cancer. Mets to spine? No history of this pain or sciatica.. COMPARISON:  None. TECHNIQUE:  CT examination of the abdomen and pelvis was performed without intravenous contrast. Multiplanar 2D reformatted images were created from the source data. This examination, like all CT scans performed in the [facility], was performed utilizing techniques to minimize radiation dose exposure, including the use of  iterative reconstruction and automated exposure control. Radiation dose length product (DLP) for this visit:  0 mGy-cm Enteric contrast was administered. FINDINGS: ABDOMEN LOWER CHEST:  No clinically significant abnormality identified in the visualized lower chest. LIVER/BILIARY TREE: Hepatic lesions seen, measuring 4 cm x 3.2 cm. Previously measuring 4.3 cm x 3.4 cm Additional adjacent satellite lesion measuring 1.8 x 2.4 cm previously measuring 6.2 cm GALLBLADDER: Gallbladder surgically absent SPLEEN:  Unremarkable. PANCREAS:  Unremarkable. ADRENAL GLANDS:  Unremarkable. KIDNEYS/URETERS: 1 cm cyst seen mid to lower left kidney No hydronephrosis No obstructing calculus STOMACH AND BOWEL: Left lower quadrant colostomy Ileocolic anastomosis seen APPENDIX:  No findings to suggest appendicitis. ABDOMINOPELVIC CAVITY: Small para-aortic lymph nodes do not meet the criteria for pathologic enlargement on the basis of size Portacaval lymph nodes are seen measuring about 1.2 cm, stable VESSELS:  Unremarkable for patient's age. PELVIS REPRODUCTIVE ORGANS:  Unremarkable for patient's age. URINARY BLADDER:  Unremarkable. ABDOMINAL WALL/INGUINAL REGIONS: Left lower quadrant colostomy stoma seen OSSEOUS STRUCTURES: No acute compression collapse of the vetebra there are no gross lytic lesion Pars defect at L5 with bilateral severe foraminal narrowing Avascular necrosis both femoral head    Impression: Again noted are right hepatic lobe lesions which demonstrates changes of previous microwave ablation. The inferior lesion is smaller. The superior lesion, also mildly smaller. Study suboptimal to evaluate for residual viable tissue due to lack of contrast No retroperitoneal or abdominopelvic lymphadenopathy Severe bilateral foraminal narrowing at L5-S1 Avascular necrosis of both femoral head without collapse The study was marked in EPIC for significant notification. Workstation performed: [id number]     VAS lower limb venous  duplex study, unilateral/limited    Result Date: 12/13/2023  Narrative:  [facility] REPORT CLINICAL: Indications: Patient presents with right lower extremity pain and swelling x 2 days. Operative History: Finger amputation Risk Factors The patient has history of HTN and previous smoking (quit 1-5yrs ago).   CONCLUSION: Impression: RIGHT LOWER LIMB No evidence of acute or chronic deep vein thrombosis . No evidence of superficial thrombophlebitis noted. Doppler evaluation shows a normal response to augmentation maneuvers. Popliteal, posterior tibial and anterior tibial arterial Doppler waveforms are triphasic  LEFT LOWER LIMB LIMITED Evaluation shows no evidence of thrombus in the common femoral vein. Doppler evaluation shows a normal response to augmentation maneuvers.  Technical findings were given to Jerry Jara PA-C via tiger text.  SIGNATURE: Electronically Signed by: OLESYA BLOUNT MD, RPVI on 2023-12-13 02:40:47 PM    I personally reviewed and interpreted the above laboratory and imaging data.    Discussion/Summary: This is a 69 y/o male who presents today for HCC surveillance.  His MRI shows persistent non-viability of all 3 previously treated lesions, and there are no new findings.  Chest CT reveals stability of a small lung nodule. His AFP is within normal range.  We will plan to see him again in 3 months with repeat MRI and AFP.  Patient is agreeable to the plan, all questions were answered today.

## 2024-01-04 NOTE — LETTER
January 4, 2024     Asa Schafer MD  [address] [facility]  2nd Floor  [facility] [address]    Patient: Jose Juan Elkins III   YOB: 1955   Date of Visit: 1/4/2024       Dear Dr. Schafer:    Thank you for referring Jose Juan Elkins to me for evaluation. Below are my notes for this consultation.    If you have questions, please do not hesitate to call me. I look forward to following your patient along with you.         Sincerely,        RAMANA Khan        CC: No Recipients    RAMANA Khan  1/4/2024 10:00 AM  Sign when Signing Visit               Surgical Oncology Follow Up       [address] [place] [address] SURGICAL ONCOLOGY [address] [address]    Jose Juan Elkins III  1955  [id number]  [address] [place] [address] SURGICAL ONCOLOGY [address] [id number]    Diagnoses and all orders for this visit:    Encounter for follow-up surveillance of liver cancer    Personal history of liver cancer  -     MRI abdomen w wo contrast; Future  -     AFP tumor marker; Future  -     BUN; Future  -     Creatinine, serum; Future        Chief Complaint   Patient presents with   • Follow-up       Return in about 3 months (around 4/4/2024) for Office visit with Dr Schafer, Labs - See Treatment Plan, Imaging - See orders.      Oncology History   Personal history of liver cancer   6/8/2020 Initial Diagnosis    Hepatocellular carcinoma, moderately differentiated     6/8/2020 -  Cancer Staged    Staging form: Liver (Excluding Intrahepatic Bile Ducts), AJCC 8th Edition  - Clinical stage from 6/8/2020: Stage IB (cT1b, cN0, cM0) - Signed by RAMANA Khan on 12/8/2023  Stage prefix: Initial diagnosis       7/15/2020 Surgery    Surgical microwave ablation of liver segment 5     9/21/2022 -  Radiation    IR microwave ablation of segment 5 lesion     3/16/2023 -  Radiation    IR microwave ablation of segment 5/6  lesion         Staging: HCC   Treatment history:  Ablation of segment 5 liver lesion, July 2020  Microwave ablation segment 5 lesion, September 2022  Microwave ablation of a right hepatic lobe lesion, March 2023  Current treatment: Observation  Disease status: ARLYN      History of Present Illness: The patient returns to the office today in follow-up for his history of recurrent hepatocellular carcinoma.  He is currently ARLYN at just under 1 year from his most recent treatment.  He reports feeling well, although he was hospitalized recently for gout.  He denies any abdominal bloating, distention or pain, or any jaundice or dark urine.  An AFP level has been drawn, and lung CT and abdominal MRI were performed on December 26.  I have reviewed these results myself and discussed them with the patient today.      Review of Systems   Constitutional:  Negative for activity change, appetite change, fatigue and unexpected weight change.   HENT: Negative.     Respiratory: Negative.  Negative for cough and shortness of breath.    Cardiovascular: Negative.    Gastrointestinal: Negative.  Negative for abdominal distention, abdominal pain, nausea and vomiting.   Musculoskeletal: Negative.    Skin: Negative.  Negative for color change.   Neurological: Negative.  Negative for dizziness and headaches.   Hematological: Negative.  Negative for adenopathy.   Psychiatric/Behavioral: Negative.               Patient Active Problem List   Diagnosis   • Essential hypertension   • Emphysema/COPD (HCC)   • Crohn disease (HCC)   • Acute kidney injury (HCC)   • Colostomy in place (HCC)   • Diarrhea   • Severe protein-calorie malnutrition (HCC)   • Arthropathy of right wrist   • Emphysema of lung (HCC)   • Syncope and collapse   • Severe sepsis (HCC)   • Pneumonia   • Chest pain   • Liver mass   • Tobacco abuse   • Abdominal pain   • Personal history of liver cancer   • Palliative care patient   • Abdominal wall abscess   • Observed sleep apnea   •  Acute pain of left wrist   • Chronic, continuous use of opioids   • Hypocalcemia   • Left wrist pain   • Kidney stone   • Gastroesophageal reflux disease without esophagitis   • Chronic obstructive pulmonary disease (HCC)   • Mixed hyperlipidemia   • Vitamin B12 deficiency   • Cataract   • Dysthymic disorder   • Crohn's disease of small intestine with other complication (HCC)   • Encounter for follow-up surveillance of liver cancer   • Platelets decreased (HCC)   • History of alcohol use disorder   • Supraventricular tachycardia   • Anemia   • Subclinical hypothyroidism   • Portal hypertension (HCC)   • Multiple subsegmental pulmonary emboli without acute cor pulmonale (HCC)   • Alcohol dependence, uncomplicated (HCC)   • Multiple adenomatous polyps   • Candida infection, esophageal (HCC)   • Tracheitis   • Acute bilateral low back pain with right-sided sciatica   • History of pulmonary embolism   • Right foot and knee swelling and pain   • Acute pain of right knee   • Serum total bilirubin elevated     Past Medical History:   Diagnosis Date   • LUCILLE (acute kidney injury) (HCC) 6/28/2017   • Blindness of left eye     Since birth   • Cancer (HCC)     liver   • Cataract    • Colon polyp    • Colostomy in place (HCC) 6/29/2017   • COPD (chronic obstructive pulmonary disease) (HCC)    • Crohn disease (HCC)    • Emphysema of lung (HCC)    • Emphysema/COPD (HCC)    • Essential hypertension    • GERD (gastroesophageal reflux disease)    • Hypertension    • Hypokalemia 6/28/2017   • Hypomagnesemia 6/29/2017   • Hyponatremia 6/28/2017   • Kidney stone    • Osteomyelitis (HCC)    • Pneumonia      Past Surgical History:   Procedure Laterality Date   • APPENDECTOMY  1979   • CATARACT EXTRACTION Bilateral    • CHOLECYSTECTOMY     • COLECTOMY      10 inchs of ileum   • COLONOSCOPY N/A 06/30/2017    Procedure: COLONOSCOPY;  Surgeon: Gomez Bee MD;  Location: [facility];  Service: Gastroenterology   • COLOSTOMY     • EYE SURGERY  2  yrs ago   • FINGER AMPUTATION     • FINGER SURGERY Left    • HERNIA REPAIR  1978   • IR BIOPSY LIVER MASS  06/08/2020   • IR MICROWAVE ABLATION  09/21/2022   • IR MICROWAVE ABLATION  03/16/2023   • LITHOTRIPSY     • LIVER LOBECTOMY N/A 07/15/2020    Procedure: LIVER ABLATION, INTRAOPERATIVE U/S LIVER;  Surgeon: Asa Schafer MD;  Location: [facility];  Service: Surgical Oncology   • TONSILLECTOMY  Child     Family History   Problem Relation Age of Onset   • Hypertension Father    • Heart disease Sister      Social History     Socioeconomic History   • Marital status:      Spouse name: Not on file   • Number of children: Not on file   • Years of education: Not on file   • Highest education level: Not on file   Occupational History   • Not on file   Tobacco Use   • Smoking status: Former     Current packs/day: 0.00     Average packs/day: 1.5 packs/day for 50.0 years (75.1 ttl pk-yrs)     Types: Cigarettes     Start date: 1971     Quit date: 7/15/2020     Years since quitting: 3.4   • Smokeless tobacco: Never   Vaping Use   • Vaping status: Never Used   Substance and Sexual Activity   • Alcohol use: Not Currently     Alcohol/week: 1.0 standard drink of alcohol     Types: 1 Cans of beer per week   • Drug use: Not Currently   • Sexual activity: Not Currently     Partners: Female     Birth control/protection: Condom Male   Other Topics Concern   • Not on file   Social History Narrative   • Not on file     Social Determinants of Health     Financial Resource Strain: Low Risk  (12/12/2023)    Overall Financial Resource Strain (CARDIA)    • Difficulty of Paying Living Expenses: Not very hard   Food Insecurity: No Food Insecurity (1/27/2023)    Hunger Vital Sign    • Worried About Running Out of Food in the Last Year: Never true    • Ran Out of Food in the Last Year: Never true   Transportation Needs: No Transportation Needs (12/12/2023)    PRAPARE - Transportation    • Lack of Transportation (Medical): No    • Lack  of Transportation (Non-Medical): No   Physical Activity: Not on file   Stress: Not on file   Social Connections: Not on file   Intimate Partner Violence: Not on file   Housing Stability: Low Risk  (1/27/2023)    Housing Stability Vital Sign    • Unable to Pay for Housing in the Last Year: No    • Number of Places Lived in the Last Year: 1    • Unstable Housing in the Last Year: No       Current Outpatient Medications:   •  ALPRAZolam (XANAX) 0.25 mg tablet, Take 1 tablet (0.25 mg total) by mouth daily at bedtime as needed for anxiety, Disp: 90 tablet, Rfl: 0  •  AMILoride 5 mg tablet, Take 1 tablet (5 mg total) by mouth daily, Disp: 90 tablet, Rfl: 0  •  amLODIPine (NORVASC) 5 mg tablet, Take 1 tablet (5 mg total) by mouth daily, Disp: 90 tablet, Rfl: 1  •  buPROPion (WELLBUTRIN XL) 150 mg 24 hr tablet, Take 1 tablet (150 mg total) by mouth daily, Disp: 90 tablet, Rfl: 0  •  calcium carbonate (TUMS) 500 mg chewable tablet, Chew 1 tablet (500 mg total) daily, Disp:  , Rfl: 0  •  carvedilol (COREG) 3.125 mg tablet, Take 1 tablet (3.125 mg total) by mouth 2 (two) times a day with meals, Disp: 180 tablet, Rfl: 1  •  cholecalciferol (VITAMIN D3) 400 units tablet, Take 400 Units by mouth Every Sunday, Disp: , Rfl:   •  [employer] Magnesium Oxide 250 MG TABS, TAKE 1 TABLET (250 MG TOTAL) BY MOUTH IN THE MORNING, Disp: , Rfl:   •  fluticasone (FLONASE) 50 mcg/act nasal spray, SPRAY 2 SPRAYS INTO EACH NOSTRIL EVERY DAY, Disp: 48 mL, Rfl: 2  •  folic acid (FOLVITE) 1 mg tablet, Take 1 tablet (1 mg total) by mouth daily, Disp: 90 tablet, Rfl: 1  •  levothyroxine 50 mcg tablet, Take 1 tablet (50 mcg total) by mouth daily in the early morning, Disp: 90 tablet, Rfl: 1  •  meloxicam (Mobic) 15 mg tablet, Take 1 tablet (15 mg total) by mouth daily PRN with food, Disp: 30 tablet, Rfl: 2  •  mirtazapine (REMERON) 15 mg tablet, Take 1 tablet (15 mg total) by mouth daily at bedtime, Disp: 90 tablet, Rfl: 0  •  omeprazole (PriLOSEC) 20 mg  "delayed release capsule, Take 1 capsule (20 mg total) by mouth daily, Disp: 90 capsule, Rfl: 0  •  Ostomy Supplies (Adapt Barrier) STRP, Use daily as needed (PRN), Disp: 100 strip, Rfl: 6  •  Ostomy Supplies (Premier Convex Skin Barrier) MISC, Use daily as needed (PRN), Disp: 50 each, Rfl: 6  •  Ostomy Supplies KIT, Skin barries w/ mold to fit opening 12\" pouch w/ 2 sided comfort panel esenta sting free adhesive remover Sting free barrier wipes, Disp: 1 kit, Rfl: 0  •  Ostomy Supplies OINT, Use as needed (as needed for every use), Disp: 30 each, Rfl: 0  •  Ostomy Supplies Pouch MISC, Use as needed (use as needed for every use), Disp: 30 each, Rfl: 0  •  potassium chloride (MICRO-K) 10 MEQ CR capsule, Take 2 capsules (20 mEq total) by mouth 2 (two) times a day, Disp: 180 capsule, Rfl: 1  •  umeclidinium-vilanterol (Anoro Ellipta) 62.5-25 mcg/actuation inhaler, Inhale 1 puff daily, Disp: 180 each, Rfl: 0  •  VIT B12-METHIONINE-INOS-CHOL IM, Inject into a muscle every 30 (thirty) days, Disp: , Rfl:   •  methocarbamol (ROBAXIN) 750 mg tablet, Take 1 tablet (750 mg total) by mouth every 6 (six) hours for 14 days, Disp: 56 tablet, Rfl: 0  •  thiamine 250 MG tablet, Take 1 tablet (250 mg total) by mouth daily for 5 days Do not start before November 14, 2022., Disp: 5 tablet, Rfl: 0    Current Facility-Administered Medications:   •  cyanocobalamin injection 1,000 mcg, 1,000 mcg, Intramuscular, Q30 Days, Torri Coleman MD, 1,000 mcg at 10/09/23 0941  •  cyanocobalamin injection 1,000 mcg, 1,000 mcg, Intramuscular, Q30 Days, Torri Coleman MD, 1,000 mcg at 08/14/23 1121  •  cyanocobalamin injection 1,000 mcg, 1,000 mcg, Intramuscular, Q30 Days, Eduar Caruso MD, 1,000 mcg at 12/18/23 1003  No Known Allergies  Vitals:    01/04/24 0800   BP: 128/80   Pulse: 90   Temp: 98 °F (36.7 °C)   SpO2: 95%       Physical Exam  Constitutional: General appearance: The Patient is well-developed and well-nourished who appears the stated age in " no acute distress. Patient is pleasant and talkative.     HEENT:  Normocephalic.  Sclerae are anicteric. Mucous membranes are moist. Neck is supple without adenopathy. No JVD.     Chest: The lungs are clear to auscultation.     Cardiac: Heart is regular rate.     Abdomen: Abdomen is soft, non-tender, non-distended and without masses.     Extremities: There is no clubbing or cyanosis. There is no edema.  Symmetric.  Neuro: Grossly nonfocal. Gait is normal.     Lymphatic: No evidence of cervical adenopathy bilaterally.   No evidence of axillary adenopathy bilaterally. No evidence of inguinal adenopathy bilaterally.     Skin: Warm, anicteric.    Psych:  Patient is pleasant and talkative.  Breasts:        Labs:  AFP tumor marker  Collected 12/18/2023        Component  Ref Range & Units 2 wk ago   AFP-Tumor Marker  <6.1 ng/mL 3.2          Imaging  MRI abdomen w wo contrast    Result Date: 1/3/2024  Narrative: MRI OF THE ABDOMEN WITH AND WITHOUT CONTRAST WITH MRCP INDICATION: 68 years / Male  C22.0: Liver cell carcinoma. COMPARISON: MRI abdomen 9/21/2023. TECHNIQUE:  Multiplanar/multisequence MRI of the abdomen was performed before and after administration of contrast. Imaging performed on 1.5T MRI . IV Contrast:  7 mL of Gadobutrol injection (SINGLE-DOSE) FINDINGS: LOWER CHEST:   Visualized structures in the lower thorax are within normal limits. LIVER: Normal size and morphology. Signal loss on opposed-phase images indicating mild hepatic steatosis. Treated Observation #1: Treatment modality: Microwave ablation 7/15/2020 Location: Segment 5 (series 11, image 166); size: 4.0 x 2.7 cm (previously 4.2 x 2.9 cm). Pretreatment LI-RADS category: LR-M Enhancement in a nodular, mass-like, or thick irregular pattern: No. Size of enhancing component: N/A Treatment Response Category: LR-TR Nonviable Treated Observation #2: Treatment modality: Microwave ablation 9/21/2022 Location: Segment 5 (series 11, image 171); size: 3.8 x 2.1  cm (previously 3.8 x 2.1 cm). Pretreatment LI-RADS category: LR-5 Enhancement in a nodular, mass-like, or thick irregular pattern: No. Size of enhancing component: N/A Treatment Response Category: LR-TR Nonviable Treated Observation #3: Treatment modality: Microwave ablation 3/16/2023 Location: Segment 5/6 (series 11, image 176); size: 2.2 x 1.6 cm (previously 2.2 x 1.6 cm). Pretreatment LI-RADS category: LR-5 Enhancement in a nodular, mass-like, or thick irregular pattern: No. Size of enhancing component: N/A Treatment Response Category: LR-TR Nonviable No new hepatic observations. BILE DUCTS: No extrahepatic biliary dilatation. Stable focal mild intrahepatic biliary ductal dilatation adjacent to the treatment zones in the right hepatic lobe presumably due to scarring. Otherwise no intrahepatic biliary ductal dilatation. GALLBLADDER: Gallbladder is surgically absent. PANCREAS: Pancreatic parenchyma within normal limits. Normal signal intensity and enhancement. No main pancreatic ductal dilation. ADRENAL GLANDS: Adrenal glands are within normal limits. SPLEEN: Normal spleen. KIDNEYS/PROXIMAL URETERS: Normal kidney size and position with symmetric enhancement. No suspicious lesions. Small simple cyst in the interpolar left kidney. No hydronephrosis. BOWEL:   Normal MRI appearance of the stomach, small bowel, and large bowel. No dilated loops of bowel. PERITONEUM: No ascites. RETROPERITONEUM: Within normal limits. LYMPH NODES: No abdominal lymphadenopathy. VESSELS: Normal caliber abdominal aorta with moderate atherosclerotic plaque. The celiac, SMA, and JUNE are patent. The SMV and splenic vein are patent. The portal veins are patent. The hepatic veins are patent. ABDOMINAL WALL: Within normal limits. BONES: No suspicious osseous lesion.     Impression: 1.  Stable appearance of two segment 5 LR-TR Nonviable treatment zones and a segment 5/6 LR-TR Nonviable treatment zone. There is mild associated local intrahepatic  biliary ductal dilatation similar to prior exams. 2.  No new hepatic observations. Workstation performed: [id number]     CT lung screening program    Result Date: 12/30/2023  Narrative: CT CHEST LUNG CANCER SCREENING WITHOUT IV CONTRAST INDICATION:   Z87.891: Personal history of nicotine dependence Z12.2: Encounter for screening for malignant neoplasm of respiratory organs. COMPARISON: CTA chest 11/7/2022 TECHNIQUE:  Unenhanced CT examination of the chest was performed utilizing a low dose protocol.  Multiplanar 2D reformatted images were created from the source data. Radiation dose length product (DLP) for this visit:  115.92 mGy-cm .  This examination, like all CT scans performed in the [facility], was performed utilizing techniques to minimize radiation dose exposure, including the use of iterative  reconstruction and automated exposure control. FINDINGS: LUNGS: Emphysematous changes of bilateral lungs. Biapical pleural-parenchymal scarring. Scattered calcified granulomas. Stable 5 mm left upper lobe nodule (series 3 image 66). Bilateral posterior dependent atelectasis. Thin linear filling defect in the right main bronchus likely represents airway secretions. PLEURA:  Unremarkable. HEART/GREAT VESSELS: Heart is unremarkable for patient's age. No thoracic aortic aneurysm. MEDIASTINUM AND MACIEL:  Unremarkable. CHEST WALL AND LOWER NECK:  Unremarkable. VISUALIZED STRUCTURES IN THE UPPER ABDOMEN: Post microwave ablation changes of the right lobe of liver. OSSEOUS STRUCTURES:  No acute fracture or destructive osseous lesion.     Impression: 1. Stable 5 mm left upper lobe pulmonary nodule. 2.   Lung-RADS2, benign appearance or behavior.  Continue annual screening with LDCT in 12 months. Workstation performed: [id number]     XR hips bilateral 3-4 vw w pelvis if performed    Result Date: 12/21/2023  Narrative: BILATERAL HIPS AND PELVIS INDICATION:   Idiopathic aseptic necrosis of unspecified bone.  COMPARISON:  Comparison made with previous examination(s) dated (DX) 15-Dec-2023,(CT) 13-Dec-2023,(DX) 13-Dec-2023. VIEWS:  XR HIPS BILATERAL 3-4 VW W PELVIS IF PERFORMED Images: 5  FINDINGS: There is sclerosis in the femoral heads bilaterally consistent with avascular necrosis. There is no collapse. There is minimal osteophytosis in the hips as well. No fracture dislocation seen.     Impression: Findings compatible with avascular necrosis of the femoral heads bilaterally without collapse Electronically signed: 12/21/2023 07:31 PM Klaus Shell MD    XR knee 3 vw right non injury    Result Date: 12/15/2023  Narrative: RIGHT KNEE INDICATION:   pain, swelling acute. COMPARISON:  None VIEWS:  XR KNEE 3 VW RIGHT NON INJURY FINDINGS: There is no acute fracture or dislocation. There is no joint effusion. Joint spaces are preserved. Bilateral meniscal chondrocalcinosis, medial greater than lateral. No lytic or blastic osseous lesion. Soft tissues are unremarkable.     Impression: No acute osseous abnormality. Workstation performed: [id number]     XR ankle 3+ views RIGHT    Result Date: 12/14/2023  Narrative: RIGHT ANKLE INDICATION:   Pain, atraumatic?. COMPARISON: 06/07/2004 VIEWS:  XR ANKLE 3+ VW RIGHT Images: 4 FINDINGS: There is no acute fracture or dislocation. No significant degenerative changes. No lytic or blastic osseous lesion. There is an ankle joint effusion is present. There is lateral soft tissue swelling.     Impression: No acute osseous abnormality. Workstation performed: [id number]     CT recon only lumbar spine    Result Date: 12/13/2023  Narrative: CT RECON ONLY LUMBAR SPINE (NO CHARGE) INDICATION:   Low back pain radiating down the right leg to the knee. History of liver cancer. COMPARISON: CT abdomen pelvis 3/18/2023 TECHNIQUE: Axial CT examination of the lumbar spine was obtained utilizing reconstructed images from CT of the chest, abdomen and pelvis performed the same day.  Images were reformatted  in the sagittal and coronal planes. This examination, like all CT scans performed [facility] the [facility], was performed utilizing techniques to minimize radiation dose exposure, including the use of iterative reconstruction and automated exposure control. FINDINGS: ALIGNMENT: Grade 1 anterolisthesis L5-S1 with associated chronic appearing bilateral pars defects, stable from the prior CT from March. VERTEBRAE: No acute fracture.  No acute osseous abnormality. DEGENERATIVE CHANGES: L1-L2: Normal disc height. No disc bulge. Normal facets. No significant canal or foraminal stenosis. L2-L3: Normal disc height. No disc bulge. Normal facets. No significant canal or foraminal stenosis. L3-L4: Small left neural foraminal disc protrusion.. Moderate left-sided facet hypertrophy. Mild left-sided foraminal stenosis. No significant canal stenosis. L4-L5: Mild diffuse disc bulge. Moderate bilateral facet hypertrophy with mild bilateral neural foraminal stenosis. No significant canal stenosis. L5-S1: Near complete loss of disc height with vacuum phenomenon. Bilateral chronic appearing pars defects with associated grade 1 anterolisthesis. Severe bilateral foraminal stenosis on the basis of the anterolisthesis. No significant canal stenosis. PREVERTEBRAL AND PARASPINAL SOFT TISSUES: Atherosclerotic calcifications of the descending aorta and bilateral iliac arteries. Incidentally noted healed, left 12th posterior rib fracture. 2 mm nonobstructing left renal calculus, series 4 image 38. Small hypodense lesion in the left kidney posteriorly on series 4 image 32 is incompletely imaged, but correlates to the previously present left renal cyst on the prior study from March on series 4 image 62 of the prior CT.     Impression: 1. Stable chronic grade 1 anterolisthesis of L5 on S1 secondary to chronic bilateral spondylolysis. No acute fracture or new subluxation. Resident: DAWSON OSULLIVAN I, the attending radiologist, have  reviewed the images and agree with the final report above. Workstation performed: [id number]     CT abdomen pelvis wo contrast    Result Date: 12/13/2023  Narrative: CT ABDOMEN AND PELVIS WITHOUT IV CONTRAST INDICATION:   Right lower leg, shooting pain. hx cancer. Mets to spine? No history of this pain or sciatica.. COMPARISON:  None. TECHNIQUE:  CT examination of the abdomen and pelvis was performed without intravenous contrast. Multiplanar 2D reformatted images were created from the source data. This examination, like all CT scans performed in the [facility], was performed utilizing techniques to minimize radiation dose exposure, including the use of iterative reconstruction and automated exposure control. Radiation dose length product (DLP) for this visit:  0 mGy-cm Enteric contrast was administered. FINDINGS: ABDOMEN LOWER CHEST:  No clinically significant abnormality identified in the visualized lower chest. LIVER/BILIARY TREE: Hepatic lesions seen, measuring 4 cm x 3.2 cm. Previously measuring 4.3 cm x 3.4 cm Additional adjacent satellite lesion measuring 1.8 x 2.4 cm previously measuring 6.2 cm GALLBLADDER: Gallbladder surgically absent SPLEEN:  Unremarkable. PANCREAS:  Unremarkable. ADRENAL GLANDS:  Unremarkable. KIDNEYS/URETERS: 1 cm cyst seen mid to lower left kidney No hydronephrosis No obstructing calculus STOMACH AND BOWEL: Left lower quadrant colostomy Ileocolic anastomosis seen APPENDIX:  No findings to suggest appendicitis. ABDOMINOPELVIC CAVITY: Small para-aortic lymph nodes do not meet the criteria for pathologic enlargement on the basis of size Portacaval lymph nodes are seen measuring about 1.2 cm, stable VESSELS:  Unremarkable for patient's age. PELVIS REPRODUCTIVE ORGANS:  Unremarkable for patient's age. URINARY BLADDER:  Unremarkable. ABDOMINAL WALL/INGUINAL REGIONS: Left lower quadrant colostomy stoma seen OSSEOUS STRUCTURES: No acute compression collapse of the vetebra  there are no gross lytic lesion Pars defect at L5 with bilateral severe foraminal narrowing Avascular necrosis both femoral head    Impression: Again noted are right hepatic lobe lesions which demonstrates changes of previous microwave ablation. The inferior lesion is smaller. The superior lesion, also mildly smaller. Study suboptimal to evaluate for residual viable tissue due to lack of contrast No retroperitoneal or abdominopelvic lymphadenopathy Severe bilateral foraminal narrowing at L5-S1 Avascular necrosis of both femoral head without collapse The study was marked in EPIC for significant notification. Workstation performed: [id number]     VAS lower limb venous duplex study, unilateral/limited    Result Date: 12/13/2023  Narrative:  [facility]: Indications: Patient presents with right lower extremity pain and swelling x 2 days. Operative History: Finger amputation Risk Factors The patient has history of HTN and previous smoking (quit 1-5yrs ago).   CONCLUSION: Impression: RIGHT LOWER LIMB No evidence of acute or chronic deep vein thrombosis . No evidence of superficial thrombophlebitis noted. Doppler evaluation shows a normal response to augmentation maneuvers. Popliteal, posterior tibial and anterior tibial arterial Doppler waveforms are triphasic  LEFT LOWER LIMB LIMITED Evaluation shows no evidence of thrombus in the common femoral vein. Doppler evaluation shows a normal response to augmentation maneuvers.  Technical findings were given to Jerry Jara PA-C via tiger text.  SIGNATURE: Electronically Signed by: OLESYA BLOUNT MD, VI on 2023-12-13 02:40:47 PM    I personally reviewed and interpreted the above laboratory and imaging data.    Discussion/Summary: This is a 69 y/o male who presents today for HCC surveillance.  His MRI shows persistent non-viability of all 3 previously treated lesions, and there are no new findings.  Chest CT reveals stability of a small lung nodule.  His AFP is within normal range.  We will plan to see him again in 3 months with repeat MRI and AFP.  Patient is agreeable to the plan, all questions were answered today.

## 2024-01-05 ENCOUNTER — CLINICAL SUPPORT (OUTPATIENT)
Dept: INTERNAL MEDICINE CLINIC | Facility: CLINIC | Age: 69
End: 2024-01-05
Payer: MEDICARE

## 2024-01-05 DIAGNOSIS — E53.8 VITAMIN B12 DEFICIENCY: Primary | ICD-10-CM

## 2024-01-05 PROCEDURE — 96372 THER/PROPH/DIAG INJ SC/IM: CPT

## 2024-01-05 RX ADMIN — CYANOCOBALAMIN 1000 MCG: 1000 INJECTION, SOLUTION INTRAMUSCULAR; SUBCUTANEOUS at 09:02

## 2024-01-09 ENCOUNTER — OFFICE VISIT (OUTPATIENT)
Dept: INTERNAL MEDICINE CLINIC | Facility: CLINIC | Age: 69
End: 2024-01-09
Payer: MEDICARE

## 2024-01-09 VITALS
BODY MASS INDEX: 23.99 KG/M2 | OXYGEN SATURATION: 96 % | DIASTOLIC BLOOD PRESSURE: 76 MMHG | HEART RATE: 65 BPM | WEIGHT: 162 LBS | HEIGHT: 69 IN | TEMPERATURE: 98 F | SYSTOLIC BLOOD PRESSURE: 124 MMHG

## 2024-01-09 DIAGNOSIS — Z93.3 COLOSTOMY IN PLACE (HCC): ICD-10-CM

## 2024-01-09 DIAGNOSIS — E43 SEVERE PROTEIN-CALORIE MALNUTRITION (HCC): ICD-10-CM

## 2024-01-09 DIAGNOSIS — E78.2 MIXED HYPERLIPIDEMIA: ICD-10-CM

## 2024-01-09 DIAGNOSIS — E53.8 VITAMIN B12 DEFICIENCY: ICD-10-CM

## 2024-01-09 DIAGNOSIS — F34.1 DYSTHYMIC DISORDER: ICD-10-CM

## 2024-01-09 DIAGNOSIS — J43.8 OTHER EMPHYSEMA (HCC): ICD-10-CM

## 2024-01-09 DIAGNOSIS — D69.6 PLATELETS DECREASED (HCC): ICD-10-CM

## 2024-01-09 DIAGNOSIS — Z85.05 PERSONAL HISTORY OF LIVER CANCER: ICD-10-CM

## 2024-01-09 DIAGNOSIS — I10 ESSENTIAL HYPERTENSION: Primary | Chronic | ICD-10-CM

## 2024-01-09 DIAGNOSIS — R73.01 IMPAIRED FASTING BLOOD SUGAR: ICD-10-CM

## 2024-01-09 DIAGNOSIS — M87.00 AVASCULAR NECROSIS (HCC): ICD-10-CM

## 2024-01-09 DIAGNOSIS — K76.6 PORTAL HYPERTENSION (HCC): ICD-10-CM

## 2024-01-09 DIAGNOSIS — K50.018 CROHN'S DISEASE OF SMALL INTESTINE WITH OTHER COMPLICATION (HCC): Chronic | ICD-10-CM

## 2024-01-09 DIAGNOSIS — K21.9 GASTROESOPHAGEAL REFLUX DISEASE WITHOUT ESOPHAGITIS: ICD-10-CM

## 2024-01-09 PROCEDURE — 99214 OFFICE O/P EST MOD 30 MIN: CPT | Performed by: INTERNAL MEDICINE

## 2024-01-09 PROCEDURE — G0439 PPPS, SUBSEQ VISIT: HCPCS | Performed by: INTERNAL MEDICINE

## 2024-01-09 NOTE — PROGRESS NOTES
Assessment and Plan:     Problem List Items Addressed This Visit          Digestive    Crohn's disease of small intestine with other complication (HCC) (Chronic)    Personal history of liver cancer    Gastroesophageal reflux disease without esophagitis       Respiratory    Emphysema of lung (HCC)       Cardiovascular and Mediastinum    Essential hypertension - Primary (Chronic)    Relevant Orders    CBC and differential    Portal hypertension (HCC)       Musculoskeletal and Integument    Avascular necrosis (HCC)       Other    Colostomy in place (HCC)    Severe protein-calorie malnutrition (HCC)    Mixed hyperlipidemia    Relevant Orders    Comprehensive metabolic panel    Lipid Panel with Direct LDL reflex    TSH, 3rd generation    Vitamin B12 deficiency    Dysthymic disorder    Platelets decreased (HCC)     Other Visit Diagnoses       Impaired fasting blood sugar        Relevant Orders    Hemoglobin A1C             Preventive health issues were discussed with patient, and age appropriate screening tests were ordered as noted in patient's After Visit Summary.  Personalized health advice and appropriate referrals for health education or preventive services given if needed, as noted in patient's After Visit Summary.     History of Present Illness:     Patient presents for a Medicare Wellness Visit    Follow-up on multiple medical problems to ensure they are stable on current medication, also Medicare wellness exam       Patient Care Team:  Torri Coleman MD as PCP - General (Internal Medicine)  Torri Coleman MD as PCP - PCP-[facility] ([facility])  MD Gomez White MD as Endoscopist  Asa Schafer MD (Oncology)  Eulogio Horner MD as Fellow (Cardiology)  Shine Collins MD (Pulmonary Disease)     Review of Systems:     Review of Systems   Constitutional:  Negative for chills and fever.   HENT:  Negative for congestion, ear pain and sore throat.    Eyes:  Negative for pain.   Respiratory:   Negative for cough and shortness of breath.    Cardiovascular:  Negative for chest pain and leg swelling.   Gastrointestinal:  Negative for abdominal pain, nausea and vomiting.   Endocrine: Negative for polyuria.   Genitourinary:  Negative for difficulty urinating, frequency and urgency.   Musculoskeletal:  Positive for arthralgias. Negative for back pain.   Skin:  Negative for rash.   Neurological:  Negative for weakness and headaches.   Psychiatric/Behavioral:  Negative for sleep disturbance. The patient is not nervous/anxious.         Problem List:     Patient Active Problem List   Diagnosis    Essential hypertension    Emphysema/COPD (HCC)    Crohn disease (HCC)    Acute kidney injury (HCC)    Colostomy in place (HCC)    Diarrhea    Severe protein-calorie malnutrition (HCC)    Arthropathy of right wrist    Emphysema of lung (HCC)    Syncope and collapse    Severe sepsis (HCC)    Pneumonia    Chest pain    Liver mass    Tobacco abuse    Abdominal pain    Personal history of liver cancer    Palliative care patient    Observed sleep apnea    Acute pain of left wrist    Chronic, continuous use of opioids    Hypocalcemia    Left wrist pain    Kidney stone    Gastroesophageal reflux disease without esophagitis    Chronic obstructive pulmonary disease (HCC)    Mixed hyperlipidemia    Vitamin B12 deficiency    Cataract    Dysthymic disorder    Crohn's disease of small intestine with other complication (HCC)    Encounter for follow-up surveillance of liver cancer    Platelets decreased (HCC)    History of alcohol use disorder    Supraventricular tachycardia    Anemia    Subclinical hypothyroidism    Portal hypertension (HCC)    Multiple subsegmental pulmonary emboli without acute cor pulmonale (HCC)    Alcohol dependence, uncomplicated (HCC)    Multiple adenomatous polyps    Candida infection, esophageal (HCC)    Tracheitis    Acute bilateral low back pain with right-sided sciatica    History of pulmonary embolism     Right foot and knee swelling and pain    Acute pain of right knee    Serum total bilirubin elevated    Avascular necrosis (HCC)      Past Medical and Surgical History:     Past Medical History:   Diagnosis Date    LUCILLE (acute kidney injury) (HCC) 6/28/2017    Blindness of left eye     Since birth    Cancer (HCC)     liver    Cataract     Colon polyp     Colostomy in place (HCC) 6/29/2017    COPD (chronic obstructive pulmonary disease) (HCC)     Crohn disease (HCC)     Emphysema of lung (HCC)     Emphysema/COPD (HCC)     Essential hypertension     GERD (gastroesophageal reflux disease)     Hypertension     Hypokalemia 6/28/2017    Hypomagnesemia 6/29/2017    Hyponatremia 6/28/2017    Kidney stone     Osteomyelitis (HCC)     Pneumonia      Past Surgical History:   Procedure Laterality Date    APPENDECTOMY  1979    CATARACT EXTRACTION Bilateral     CHOLECYSTECTOMY      COLECTOMY      10 inchs of ileum    COLONOSCOPY N/A 06/30/2017    Procedure: COLONOSCOPY;  Surgeon: Gomez Bee MD;  Location: [facility];  Service: Gastroenterology    COLOSTOMY      EYE SURGERY  2 yrs ago    FINGER AMPUTATION      FINGER SURGERY Left     HERNIA REPAIR  1978    IR BIOPSY LIVER MASS  06/08/2020    IR MICROWAVE ABLATION  09/21/2022    IR MICROWAVE ABLATION  03/16/2023    LITHOTRIPSY      LIVER LOBECTOMY N/A 07/15/2020    Procedure: LIVER ABLATION, INTRAOPERATIVE U/S LIVER;  Surgeon: Asa Schafer MD;  Location: [facility];  Service: Surgical Oncology    TONSILLECTOMY  Child      Family History:     Family History   Problem Relation Age of Onset    Hypertension Father     Heart disease Sister       Social History:     Social History     Socioeconomic History    Marital status:      Spouse name: None    Number of children: None    Years of education: None    Highest education level: None   Occupational History    None   Tobacco Use    Smoking status: Former     Current packs/day: 0.00     Average packs/day: 1.5 packs/day for 50.0  years (75.1 ttl pk-yrs)     Types: Cigarettes     Start date: 1971     Quit date: 7/15/2020     Years since quitting: 3.4    Smokeless tobacco: Never   Vaping Use    Vaping status: Never Used   Substance and Sexual Activity    Alcohol use: Not Currently     Alcohol/week: 1.0 standard drink of alcohol     Types: 1 Cans of beer per week    Drug use: Not Currently    Sexual activity: Not Currently     Partners: Female     Birth control/protection: Condom Male   Other Topics Concern    None   Social History Narrative    None     Social Determinants of Health     Financial Resource Strain: Low Risk  (1/9/2024)    Overall Financial Resource Strain (CARDIA)     Difficulty of Paying Living Expenses: Not hard at all   Food Insecurity: No Food Insecurity (1/27/2023)    Hunger Vital Sign     Worried About Running Out of Food in the Last Year: Never true     Ran Out of Food in the Last Year: Never true   Transportation Needs: No Transportation Needs (1/9/2024)    PRAPARE - Transportation     Lack of Transportation (Medical): No     Lack of Transportation (Non-Medical): No   Physical Activity: Not on file   Stress: Not on file   Social Connections: Not on file   Intimate Partner Violence: Not on file   Housing Stability: Low Risk  (1/27/2023)    Housing Stability Vital Sign     Unable to Pay for Housing in the Last Year: No     Number of Places Lived in the Last Year: 1     Unstable Housing in the Last Year: No      Medications and Allergies:     Current Outpatient Medications   Medication Sig Dispense Refill    ALPRAZolam (XANAX) 0.25 mg tablet Take 1 tablet (0.25 mg total) by mouth daily at bedtime as needed for anxiety 90 tablet 0    AMILoride 5 mg tablet Take 1 tablet (5 mg total) by mouth daily 90 tablet 0    amLODIPine (NORVASC) 5 mg tablet Take 1 tablet (5 mg total) by mouth daily 90 tablet 1    buPROPion (WELLBUTRIN XL) 150 mg 24 hr tablet Take 1 tablet (150 mg total) by mouth daily 90 tablet 0    calcium carbonate  "(TUMS) 500 mg chewable tablet Chew 1 tablet (500 mg total) daily  0    carvedilol (COREG) 3.125 mg tablet Take 1 tablet (3.125 mg total) by mouth 2 (two) times a day with meals 180 tablet 1    cholecalciferol (VITAMIN D3) 400 units tablet Take 400 Units by mouth Every Sunday      [employer] Magnesium Oxide 250 MG TABS TAKE 1 TABLET (250 MG TOTAL) BY MOUTH IN THE MORNING      fluticasone (FLONASE) 50 mcg/act nasal spray SPRAY 2 SPRAYS INTO EACH NOSTRIL EVERY DAY 48 mL 2    folic acid (FOLVITE) 1 mg tablet Take 1 tablet (1 mg total) by mouth daily 90 tablet 1    levothyroxine 50 mcg tablet Take 1 tablet (50 mcg total) by mouth daily in the early morning 90 tablet 1    meloxicam (Mobic) 15 mg tablet Take 1 tablet (15 mg total) by mouth daily PRN with food 30 tablet 2    mirtazapine (REMERON) 15 mg tablet Take 1 tablet (15 mg total) by mouth daily at bedtime 90 tablet 0    omeprazole (PriLOSEC) 20 mg delayed release capsule Take 1 capsule (20 mg total) by mouth daily 90 capsule 0    Ostomy Supplies (Adapt Barrier) STRP Use daily as needed (PRN) 100 strip 6    Ostomy Supplies (Premier Convex Skin Barrier) MISC Use daily as needed (PRN) 50 each 6    Ostomy Supplies KIT Skin barries w/ mold to fit opening 12\" pouch w/ 2 sided comfort panel esenta sting free adhesive remover Sting free barrier wipes 1 kit 0    Ostomy Supplies OINT Use as needed (as needed for every use) 30 each 0    Ostomy Supplies Pouch MISC Use as needed (use as needed for every use) 30 each 0    potassium chloride (MICRO-K) 10 MEQ CR capsule Take 2 capsules (20 mEq total) by mouth 2 (two) times a day 180 capsule 1    umeclidinium-vilanterol (Anoro Ellipta) 62.5-25 mcg/actuation inhaler Inhale 1 puff daily 180 each 0    VIT B12-METHIONINE-INOS-CHOL IM Inject into a muscle every 30 (thirty) days       Current Facility-Administered Medications   Medication Dose Route Frequency Provider Last Rate Last Admin    cyanocobalamin injection 1,000 mcg  1,000 mcg " Intramuscular Q30 Days Torri Coleman MD   1,000 mcg at 10/09/23 0941    cyanocobalamin injection 1,000 mcg  1,000 mcg Intramuscular Q30 Days Torri Coleman MD   1,000 mcg at 08/14/23 1121    cyanocobalamin injection 1,000 mcg  1,000 mcg Intramuscular Q30 Days Eduar Caruso MD   1,000 mcg at 01/05/24 0902     No Known Allergies   Immunizations:     Immunization History   Administered Date(s) Administered    COVID-19 PFIZER VACCINE 0.3 ML IM 03/23/2021, 04/14/2021    H1N1, All Formulations 12/04/2009    INFLUENZA 11/16/2005    Influenza, high dose seasonal 0.7 mL 10/23/2020, 09/22/2021, 11/07/2023    Pneumococcal Conjugate Vaccine 20-valent (Pcv20), Polysace 12/16/2022    Pneumococcal Polysaccharide PPV23 11/24/2020    Tdap 06/06/2018      Health Maintenance:         Topic Date Due    Colorectal Cancer Screening  07/02/2024    Lung Cancer Screening  12/26/2024    Hepatitis C Screening  Completed         Topic Date Due    Hepatitis A Vaccine (1 of 2 - Risk 2-dose series) Never done    COVID-19 Vaccine (3 - 2023-24 season) 09/01/2023      Medicare Screening Tests and Risk Assessments:     Jose Juan is here for his Subsequent Wellness visit. Last Medicare Wellness visit information reviewed, patient interviewed and updates made to the record today.      Health Risk Assessment:   Patient rates overall health as good. Patient feels that their physical health rating is same. Patient is satisfied with their life. Eyesight was rated as same. Hearing was rated as same. Patient feels that their emotional and mental health rating is same. Patients states they are never, rarely angry. Patient states they are sometimes unusually tired/fatigued. Pain experienced in the last 7 days has been none. Patient states that he has experienced no weight loss or gain in last 6 months.     Depression Screening:   PHQ-9 Score: 0      Fall Risk Screening:   In the past year, patient has experienced: no history of falling in past year      Home  Safety:  Patient does not have trouble with stairs inside or outside of their home. Patient has working smoke alarms and has working carbon monoxide detector. Home safety hazards include: none.     Nutrition:   Current diet is Regular.     Medications:   Patient is currently taking over-the-counter supplements. OTC medications include: see medication list. Patient is able to manage medications.     Activities of Daily Living (ADLs)/Instrumental Activities of Daily Living (IADLs):   Walk and transfer into and out of bed and chair?: Yes  Dress and groom yourself?: Yes    Bathe or shower yourself?: Yes    Feed yourself? Yes  Do your laundry/housekeeping?: Yes  Manage your money, pay your bills and track your expenses?: Yes  Make your own meals?: Yes    Do your own shopping?: Yes    Previous Hospitalizations:   Any hospitalizations or ED visits within the last 12 months?: Yes    How many hospitalizations have you had in the last year?: 1-2    Advance Care Planning:   Living will: Yes    Durable POA for healthcare: Yes    Advanced directive: Yes      Cognitive Screening:   Provider or family/friend/caregiver concerned regarding cognition?: No    PREVENTIVE SCREENINGS      Cardiovascular Screening:    General: Screening Not Indicated and History Lipid Disorder      Diabetes Screening:     General: Screening Current      Colorectal Cancer Screening:     General: Screening Current      Abdominal Aortic Aneurysm (AAA) Screening:    Risk factors include: age between 65-74 yo and tobacco use        Lung Cancer Screening:     General: Screening Current      Hepatitis C Screening:    General: Screening Current    Screening, Brief Intervention, and Referral to Treatment (SBIRT)    Screening  Typical number of drinks in a day: 0  Typical number of drinks in a week: 0  Interpretation: Low risk drinking behavior.    AUDIT-C Screenin) How often did you have a drink containing alcohol in the past year? never  2) How many drinks  "did you have on a typical day when you were drinking in the past year? 0  3) How often did you have 6 or more drinks on one occasion in the past year? never    AUDIT-C Score: 0  Interpretation: Score 0-3 (male): Negative screen for alcohol misuse    Single Item Drug Screening:  How often have you used an illegal drug (including marijuana) or a prescription medication for non-medical reasons in the past year? never    Single Item Drug Screen Score: 0  Interpretation: Negative screen for possible drug use disorder    No results found.     Physical Exam:     /76 (BP Location: Left arm, Patient Position: Sitting, Cuff Size: Standard)   Pulse 65   Temp 98 °F (36.7 °C) (Temporal)   Ht 5' 9\" (1.753 m)   Wt 73.5 kg (162 lb)   SpO2 96%   BMI 23.92 kg/m²     Physical Exam  Vitals and nursing note reviewed.   Constitutional:       General: He is not in acute distress.     Appearance: He is well-developed.   HENT:      Head: Normocephalic and atraumatic.      Right Ear: External ear normal.      Left Ear: External ear normal.      Mouth/Throat:      Pharynx: Oropharynx is clear.   Eyes:      Extraocular Movements: Extraocular movements intact.      Conjunctiva/sclera: Conjunctivae normal.   Cardiovascular:      Rate and Rhythm: Normal rate and regular rhythm.      Heart sounds: Normal heart sounds. No murmur heard.  Pulmonary:      Effort: Pulmonary effort is normal. No respiratory distress.      Breath sounds: Normal breath sounds.   Abdominal:      General: Abdomen is flat. There is no distension.      Palpations: Abdomen is soft.      Tenderness: There is no abdominal tenderness.   Musculoskeletal:         General: No swelling. Normal range of motion.      Cervical back: Neck supple.      Right lower leg: No edema.      Left lower leg: No edema.   Skin:     General: Skin is warm and dry.      Capillary Refill: Capillary refill takes less than 2 seconds.   Neurological:      Mental Status: He is alert. "   Psychiatric:         Mood and Affect: Mood normal.          Joo Aguirre submitted by the patient for this visit:  Medicare Annual Wellness Visit (Submitted on 1/4/2024)  How would you rate your overall health?: good  Compared to last year, how is your physical health?: same  In general, how satisfied are you with your life?: satisfied  Compared to last year, how is your eyesight?: same  Compared to last year, how is your hearing?: same  Compared to last year, how is your emotional/mental health?: same  How often is anger a problem for you?: never, rarely  How often do you feel unusually tired/fatigued?: sometimes  In the past 7 days, how much pain have you experienced?: none  In the past 6 months, have you lost or gained 10 pounds without trying?: No  One or more falls in the last year: No  Do you have trouble with the stairs inside or outside your home?: No  Does your home have working smoke alarms?: Yes  Does your home have a carbon monoxide monitor?: Yes  Which safety hazards (if any) have you experienced in your home? Please select all that apply.: none  How would you describe your current diet? Please select all that apply.: Regular  In addition to prescription medications, are you taking any over-the-counter supplements?: Yes  If yes, what supplements are you taking?: ZZZQuil. Vitamin D, Vitamin B-1  Can you manage your medications?: Yes  Are you currently taking any opioid medications?: No  Can you walk and transfer into and out of your bed and chair?: Yes  Can you dress and groom yourself?: Yes  Can you bathe or shower yourself?: Yes  Can you feed yourself?: Yes  Can you do your laundry/ housekeeping?: Yes  Can you manage your money, pay your bills, and track your expenses?: Yes  Can you make your own meals?: Yes  Can you do your own shopping?: Yes  Within the last 12 months, have you had any hospitalizations or Emergency Department visits?: Yes  If yes, how many times have you been  hospitalized within the past year?: 1-2  Do you have a living will?: Yes  Do you have a Durable POA (Power of ) for healthcare decisions?: Yes  Do you have an Advanced Directive for end of life decisions?: Yes  How often have you used an illegal drug (including marijuana) or a prescription medication for non-medical reasons in the past year?: never  What is the typical number of drinks you consume in a day?: 0  What is the typical number of drinks you consume in a week?: 0  How often did you have a drink containing alcohol in the past year?: never  How many drinks did you have on a typical day  when you were drinking in the past year?: 0  How often did you have 6 or more drinks on one occasion in the past year?: never

## 2024-01-31 DIAGNOSIS — F34.1 DYSTHYMIC DISORDER: ICD-10-CM

## 2024-02-01 RX ORDER — MIRTAZAPINE 15 MG/1
15 TABLET, FILM COATED ORAL
Qty: 90 TABLET | Refills: 0 | Status: SHIPPED | OUTPATIENT
Start: 2024-02-01

## 2024-02-05 ENCOUNTER — TELEMEDICINE (OUTPATIENT)
Dept: INTERNAL MEDICINE CLINIC | Facility: CLINIC | Age: 69
End: 2024-02-05
Payer: COMMERCIAL

## 2024-02-05 ENCOUNTER — TELEPHONE (OUTPATIENT)
Dept: INTERNAL MEDICINE CLINIC | Facility: CLINIC | Age: 69
End: 2024-02-05

## 2024-02-05 VITALS — BODY MASS INDEX: 24.44 KG/M2 | HEIGHT: 69 IN | WEIGHT: 165 LBS | TEMPERATURE: 99.9 F

## 2024-02-05 DIAGNOSIS — J04.10 TRACHEITIS: Primary | ICD-10-CM

## 2024-02-05 PROCEDURE — 99213 OFFICE O/P EST LOW 20 MIN: CPT | Performed by: INTERNAL MEDICINE

## 2024-02-05 RX ORDER — AZITHROMYCIN 250 MG/1
TABLET, FILM COATED ORAL DAILY
Qty: 6 TABLET | Refills: 0 | Status: SHIPPED | OUTPATIENT
Start: 2024-02-05 | End: 2024-02-10

## 2024-02-05 RX ORDER — DEXTROMETHORPHAN HYDROBROMIDE AND PROMETHAZINE HYDROCHLORIDE 15; 6.25 MG/5ML; MG/5ML
5 SYRUP ORAL 4 TIMES DAILY PRN
Qty: 150 ML | Refills: 0 | Status: SHIPPED | OUTPATIENT
Start: 2024-02-05

## 2024-02-05 NOTE — PROGRESS NOTES
Virtual Regular Visit    Verification of patient location:    Patient is located at Home in the following state in which I hold an active license [place]      Assessment/Plan:    Problem List Items Addressed This Visit          Respiratory    Tracheitis - Primary    Relevant Medications    azithromycin (Zithromax) 250 mg tablet    promethazine-dextromethorphan (PHENERGAN-DM) 6.25-15 mg/5 mL oral syrup            Reason for visit is   Chief Complaint   Patient presents with    Cough     Terrible cold, cough, fever. COVID negative   My Chart - [phone]       Virtual Regular Visit          Encounter provider Torri Coleman MD    Provider located at [facility] INTERNAL MEDICINE [facility]  [address] [facility] [address]  [place] [address] [phone]      Recent Visits  No visits were found meeting these conditions.  Showing recent visits within past 7 days and meeting all other requirements  Today's Visits  Date Type Provider Dept   02/05/24 Telemedicine Torri Coleman MD [facility]   02/05/24 Telephone Torri Coleman MD [facility]   Showing today's visits and meeting all other requirements  Future Appointments  No visits were found meeting these conditions.  Showing future appointments within next 150 days and meeting all other requirements       The patient was identified by name and date of birth. Jose Juan Elkins III was informed that this is a telemedicine visit and that the visit is being conducted through the Epic Embedded platform. He agrees to proceed..  My office door was closed. No one else was in the room.  He acknowledged consent and understanding of privacy and security of the video platform. The patient has agreed to participate and understands they can discontinue the visit at any time.    Patient is aware this is a billable service.     Subjective  Jose Juan Elkins III is a 68 y.o. male sick .      Sick, cough,  yellow sputum, fever, chills,    Cough  Associated symptoms include chills and a fever. Pertinent negatives include no chest pain, ear pain, headaches, rash, sore throat or shortness of breath.        Past Medical History:   Diagnosis Date    LUCILLE (acute kidney injury) (HCC) 6/28/2017    Blindness of left eye     Since birth    Cancer (HCC)     liver    Cataract     Colon polyp     Colostomy in place (HCC) 6/29/2017    COPD (chronic obstructive pulmonary disease) (HCC)     Crohn disease (HCC)     Emphysema of lung (HCC)     Emphysema/COPD (HCC)     Essential hypertension     GERD (gastroesophageal reflux disease)     Hypertension     Hypokalemia 6/28/2017    Hypomagnesemia 6/29/2017    Hyponatremia 6/28/2017    Kidney stone     Osteomyelitis (HCC)     Pneumonia        Past Surgical History:   Procedure Laterality Date    APPENDECTOMY  1979    CATARACT EXTRACTION Bilateral     CHOLECYSTECTOMY      COLECTOMY      10 inchs of ileum    COLONOSCOPY N/A 06/30/2017    Procedure: COLONOSCOPY;  Surgeon: Gomez Bee MD;  Location: [facility];  Service: Gastroenterology    COLOSTOMY      EYE SURGERY  2 yrs ago    FINGER AMPUTATION      FINGER SURGERY Left     HERNIA REPAIR  1978    IR BIOPSY LIVER MASS  06/08/2020    IR MICROWAVE ABLATION  09/21/2022    IR MICROWAVE ABLATION  03/16/2023    LITHOTRIPSY      LIVER LOBECTOMY N/A 07/15/2020    Procedure: LIVER ABLATION, INTRAOPERATIVE U/S LIVER;  Surgeon: Asa Schafer MD;  Location: [facility];  Service: Surgical Oncology    TONSILLECTOMY  Child       Current Outpatient Medications   Medication Sig Dispense Refill    ALPRAZolam (XANAX) 0.25 mg tablet Take 1 tablet (0.25 mg total) by mouth daily at bedtime as needed for anxiety 90 tablet 0    AMILoride 5 mg tablet Take 1 tablet (5 mg total) by mouth daily 90 tablet 0    amLODIPine (NORVASC) 5 mg tablet Take 1 tablet (5 mg total) by mouth daily 90 tablet 1    azithromycin (Zithromax) 250 mg tablet Take 2 tablets (500 mg total) by  "mouth daily for 1 day, THEN 1 tablet (250 mg total) daily for 4 days. 6 tablet 0    buPROPion (WELLBUTRIN XL) 150 mg 24 hr tablet Take 1 tablet (150 mg total) by mouth daily 90 tablet 0    calcium carbonate (TUMS) 500 mg chewable tablet Chew 1 tablet (500 mg total) daily  0    carvedilol (COREG) 3.125 mg tablet Take 1 tablet (3.125 mg total) by mouth 2 (two) times a day with meals 180 tablet 1    cholecalciferol (VITAMIN D3) 400 units tablet Take 400 Units by mouth Every Sunday      [employer] Magnesium Oxide 250 MG TABS TAKE 1 TABLET (250 MG TOTAL) BY MOUTH IN THE MORNING      fluticasone (FLONASE) 50 mcg/act nasal spray SPRAY 2 SPRAYS INTO EACH NOSTRIL EVERY DAY 48 mL 2    folic acid (FOLVITE) 1 mg tablet Take 1 tablet (1 mg total) by mouth daily 90 tablet 1    levothyroxine 50 mcg tablet Take 1 tablet (50 mcg total) by mouth daily in the early morning 90 tablet 1    meloxicam (Mobic) 15 mg tablet Take 1 tablet (15 mg total) by mouth daily PRN with food 30 tablet 2    mirtazapine (REMERON) 15 mg tablet Take 1 tablet (15 mg total) by mouth daily at bedtime 90 tablet 0    omeprazole (PriLOSEC) 20 mg delayed release capsule Take 1 capsule (20 mg total) by mouth daily 90 capsule 0    Ostomy Supplies (Adapt Barrier) STRP Use daily as needed (PRN) 100 strip 6    Ostomy Supplies (Premier Convex Skin Barrier) MISC Use daily as needed (PRN) 50 each 6    Ostomy Supplies KIT Skin barries w/ mold to fit opening 12\" pouch w/ 2 sided comfort panel esenta sting free adhesive remover Sting free barrier wipes 1 kit 0    Ostomy Supplies OINT Use as needed (as needed for every use) 30 each 0    Ostomy Supplies Pouch MISC Use as needed (use as needed for every use) 30 each 0    potassium chloride (MICRO-K) 10 MEQ CR capsule Take 2 capsules (20 mEq total) by mouth 2 (two) times a day 180 capsule 1    promethazine-dextromethorphan (PHENERGAN-DM) 6.25-15 mg/5 mL oral syrup Take 5 mL by mouth 4 (four) times a day as needed for cough 150 mL 0    " "umeclidinium-vilanterol (Anoro Ellipta) 62.5-25 mcg/actuation inhaler Inhale 1 puff daily 180 each 0    VIT B12-METHIONINE-INOS-CHOL IM Inject into a muscle every 30 (thirty) days       Current Facility-Administered Medications   Medication Dose Route Frequency Provider Last Rate Last Admin    cyanocobalamin injection 1,000 mcg  1,000 mcg Intramuscular Q30 Days Torri Coleman MD   1,000 mcg at 10/09/23 0941    cyanocobalamin injection 1,000 mcg  1,000 mcg Intramuscular Q30 Days Torri Coleman MD   1,000 mcg at 08/14/23 1121    cyanocobalamin injection 1,000 mcg  1,000 mcg Intramuscular Q30 Days Eduar Caruso MD   1,000 mcg at 01/05/24 0902        No Known Allergies    Review of Systems   Constitutional:  Positive for chills and fever.   HENT:  Negative for congestion, ear pain and sore throat.    Eyes:  Negative for pain.   Respiratory:  Positive for cough. Negative for shortness of breath.    Cardiovascular:  Negative for chest pain and leg swelling.   Gastrointestinal:  Negative for abdominal pain, nausea and vomiting.   Endocrine: Negative for polyuria.   Genitourinary:  Negative for difficulty urinating, frequency and urgency.   Musculoskeletal:  Negative for arthralgias and back pain.   Skin:  Negative for rash.   Neurological:  Negative for weakness and headaches.   Psychiatric/Behavioral:  Negative for sleep disturbance. The patient is not nervous/anxious.        Video Exam    Vitals:    02/05/24 1138   Temp: 99.9 °F (37.7 °C)   TempSrc: Oral   Weight: 74.8 kg (165 lb)   Height: 5' 9\" (1.753 m)       Physical Exam  Constitutional:       General: He is not in acute distress.     Appearance: He is ill-appearing.   Eyes:      Extraocular Movements: Extraocular movements intact.      Conjunctiva/sclera: Conjunctivae normal.   Neurological:      Mental Status: He is alert.          Visit Time  Total Visit Duration: 20        "

## 2024-02-05 NOTE — TELEPHONE ENCOUNTER
Pateint called for an appointment, very sick with cough congestion, fever, etc.     Gave him an appointment for 11:45 today - Virtual.

## 2024-02-09 ENCOUNTER — CLINICAL SUPPORT (OUTPATIENT)
Dept: INTERNAL MEDICINE CLINIC | Facility: CLINIC | Age: 69
End: 2024-02-09
Payer: COMMERCIAL

## 2024-02-09 DIAGNOSIS — E53.9 VITAMIN B DEFICIENCY: Primary | ICD-10-CM

## 2024-02-09 PROCEDURE — 96372 THER/PROPH/DIAG INJ SC/IM: CPT

## 2024-02-09 RX ADMIN — CYANOCOBALAMIN 1000 MCG: 1000 INJECTION, SOLUTION INTRAMUSCULAR; SUBCUTANEOUS at 10:29

## 2024-02-13 NOTE — CASE MANAGEMENT
Case Management Progress Note    Patient name Joy Romero III  Location S /S -29 MRN [id number]  : 1955 Date 11/3/2022       LOS (days): 1  Geometric Mean LOS (GMLOS) (days): 2 60  Days to GMLOS:1 2        OBJECTIVE:        Current admission status: Inpatient  Preferred Pharmacy:   [facility]  [place] [address] [address]  Phone: [phone] Fax: [phone]    Primary Care Provider: Nell Mancia MD    Primary Insurance: MEDICARE  Secondary Insurance: AARP    PROGRESS NOTE:    CM informed pt was seen by Clifton Oconnell yesterday and reconsidering IP Alcohol rehab    CM informed Ike Diagnosis:   Lumbar pain (M54.50)       Referring Provider: Naresh  Date of Evaluation:    11/13/23    Precautions:   Seizure Next MD visit:   none scheduled  Date of Surgery: n/a     Insurance Primary/Secondary: MEDICARE / [employer]BS [place] INDEMNITY     # Auth Visits: Medical necessity, progress notes every 10 visits            Subjective:  Pt states she is feeling good, minimal back pain.    Pain: 2/10      Objective  12/29/23- Deep knotty restriction in R mutlifidi  11/20/23- Potential bursitis L greater troch  11/17/23- SIJ torsion L side posterior TTP and tight pirifomris and coccygeus   All of the below objective measures are from evaluation for reference:  Lumbar AROM: (* denotes performed with pain)  Flexion: 60  Extension: 8 deg   Sidebending: R 44; L 52 cm Finger tip to floor    Assessment: Improving TA recruitment and activation. PT providing mod cues for breath support with TA push downs and BKFO.    Goals:   (to be met in 12 visits)   Pt will improve transversus abdominis recruitment to perform proper isometric contraction without requiring verbal or tactile cuing to promote advancement of therex  2/9/24- GOAL MET  Pt will demonstrate good understanding of proper posture and body mechanics to decrease pain and improve spinal safety 2/9/24- GOAL MET  Pt will improve lumbar spine AROM SB to<48 cm Finger tip to floor to allow increase ease with bending forward to don shoes   Pt will report improved symptom centralization and absence of radicular symptoms for 3 consecutive days to improve function with ADL 2/9/24- GOAL MET  Pt will have decreased paraspinal mm tension to tolerate walking >10 minutes for work and home activities   Pt will demonstrate improved core strength to be able to perform modified dead bug with <6/10 pain   Pt will be independent and compliant with comprehensive HEP to maintain progress achieved in PT      Plan:d/c next visit.  Date: 1/15/24  TX#: 7/10 Date: 1/18/24  TX#: 8/10 Date: 2/6/24  TX#:  9/10 Date: 2/9/24  TX#: 10/10 Date: 2/13/24  TX#: 11/12   THERE EX: 8 mins  Self stretch review with OP   Manual piriformis, glut stretch 60 secs 4x RLE1 THERE EX: 4 mins    Manual piriformis, glut stretch 60 secs 4x RLE THERE EX: 8 min    Manual piriformis, glut stretch 60 secs 4x each LE THERE EX: 10 min    Manual piriformis, glut stretch 60 secs 4x each LE  Hook lying TA brace with hip ADD 10x2 3 sec THERE EX: 10 min    Manual piriformis, glut stretch 60 secs 4x each LE  Hook lying TA brace with hip ADD and PPT 10x2 5 sec hold   MANUAL:20 mins  Prone over pillow STM piriforms lumbar paraspinals, coccygeus, multifidi and glut 20 mins effleurage Restriction noted in L glut   MANUAL:25 mins  Prone over pillow STM piriforms lumbar paraspinals, coccygeus, multifidi and glut, to c-and T paraspinals 25 mins effleurage Restriction noted in L glut  MANUAL:25 mins  Prone over pillow STM piriforms lumbar paraspinals, coccygeus, multifidi and glut, to c-and T paraspinals 25 mins effleurage Restriction noted in L glut  MANUAL:25 mins  Prone over pillow STM piriforms lumbar paraspinals, coccygeus, multifidi and glut, to c-and T paraspinals 25 mins effleurage Restriction noted in L glut  MANUAL:20 mins   Prone over pillow STM piriforms lumbar paraspinals, coccygeus, multifidi and glut, to L and T paraspinals 25 mins effleurage   MODALITIES  US 3.3 W/cm2 1.4 MHz CV head warming on, prone, R  and L multidus near SI jt 8 mins MODALITIES  US 3.3 W/cm2 1.4 MHz CV head warming on, prone, R  and L multidus near SI jt 8 mins  MODALITIES  US 3.3 W/cm2 1.4 MHz CV head warming on, prone, R  and L multidus near SI jt 8 mins    NEURO RE ED: 6 mins  TA foam roll resisted push downs 10x2 3 sec hold mod cues for breath support    NEURO RE ED: 15 mins  TA foam roll resisted push downs 10x2 5 sec hold mod cues for breath support  BKFO 10x ea mod cues for set up*   HEP: Denoted with *  Access Code: [email or web address]  URL:  [email or web address]  Date: 11/13/2023  Prepared by: Elsa Rosario     Exercises  Access Code: [email or web address]  URL: [email or web address]  Date: 12/11/2023  Prepared by: Elsa Rosario    Exercises  - Prone Press Up On Elbows  - 1 x daily - 7 x weekly - 3 sets - 5 reps - 1 min hold  - Standing Lumbar Extension at Wall - Forearms  - 1 x daily - 7 x weekly - 3 sets - 10 reps - 5 secs hold  - Supine Figure 4 Piriformis Stretch  - 1 x daily - 7 x weekly - 3 sets - 1 reps - 30 sec hold  - Supine Piriformis Stretch with Foot on Ground  - 1 x daily - 7 x weekly - 3 sets - 1 reps - 30 secs hold  - Supine Piriformis Stretch Pulling Heel to Hip  - 1 x daily - 7 x weekly - 3 sets - 1 reps - 30 hold  - Supine Transversus Abdominis Bracing - Hands on Stomach  - 1 x daily - 7 x weekly - 2 sets - 10 reps - 5 sec hold  - Sidelying Clamshell in Neutral  - 1 x daily - 7 x weekly - 3 sets - 10 reps  - Hooklying Clamshell with Resistance  - 1 x daily - 4 x weekly - 2 sets - 15 reps  - Seated Table Piriformis Stretch  - 1 x daily - 7 x weekly - 3 sets - 1 reps - 30 sec hold  Access Code: [email or web address]  URL: [email or web address]  Date: 12/29/2023  Prepared by: Elsa Rosario    Exercises  - Supine Piriformis Stretch  - 1 x daily - 7 x weekly - 3 sets - 1 reps - 30 hold  - Supine Piriformis Stretch Pulling Heel to Hip  - 1 x daily - 7 x weekly - 3 sets - 1 reps - 30 hold  - Supine Quadratus Lumborum Stretch  - 1 x daily - 7 x weekly - 3 sets - 1 reps - 30 hold   Bent Knee Fallouts with Alternating Legs  - 1 x daily - 4 x weekly - 2 sets - 10 reps - 3 sec hold  Charges: 1 manual  1 ther ex 1 neuro re ed Total Timed Treatment: 45 min  Total Treatment Time: 45 min

## 2024-02-14 DIAGNOSIS — J30.89 NON-SEASONAL ALLERGIC RHINITIS, UNSPECIFIED TRIGGER: ICD-10-CM

## 2024-02-14 RX ORDER — FLUTICASONE PROPIONATE 50 MCG
SPRAY, SUSPENSION (ML) NASAL
Qty: 48 ML | Refills: 2 | Status: SHIPPED | OUTPATIENT
Start: 2024-02-14

## 2024-02-21 PROBLEM — A41.9 SEVERE SEPSIS (HCC): Status: RESOLVED | Noted: 2020-05-28 | Resolved: 2024-02-21

## 2024-02-21 PROBLEM — R65.20 SEVERE SEPSIS (HCC): Status: RESOLVED | Noted: 2020-05-28 | Resolved: 2024-02-21

## 2024-02-21 PROBLEM — J18.9 PNEUMONIA: Status: RESOLVED | Noted: 2020-05-28 | Resolved: 2024-02-21

## 2024-02-25 DIAGNOSIS — F34.1 DYSTHYMIC DISORDER: ICD-10-CM

## 2024-02-26 DIAGNOSIS — Z01.810 PREOP CARDIOVASCULAR EXAM: ICD-10-CM

## 2024-02-26 RX ORDER — ROSUVASTATIN CALCIUM 10 MG/1
10 TABLET, COATED ORAL DAILY
Qty: 90 TABLET | Refills: 0 | Status: SHIPPED | OUTPATIENT
Start: 2024-02-26

## 2024-02-26 RX ORDER — BUPROPION HYDROCHLORIDE 150 MG/1
150 TABLET ORAL DAILY
Qty: 90 TABLET | Refills: 0 | Status: SHIPPED | OUTPATIENT
Start: 2024-02-26

## 2024-02-26 NOTE — TELEPHONE ENCOUNTER
Last seen 1/9, next appt is 5/8. Refill needed for rosuvastain, its not on med list and he is not sure why

## 2024-02-26 NOTE — TELEPHONE ENCOUNTER
NEXT OFFICE VISIT 03/08/2024    Please confirm patient should be taking this as it was removed from his medication list.

## 2024-02-27 ENCOUNTER — HOSPITAL ENCOUNTER (EMERGENCY)
Facility: HOSPITAL | Age: 69
Discharge: HOME | End: 2024-02-27
Attending: SURGERY | Admitting: SURGERY
Payer: COMMERCIAL

## 2024-02-27 ENCOUNTER — APPOINTMENT (EMERGENCY)
Dept: RADIOLOGY | Facility: HOSPITAL | Age: 69
End: 2024-02-27
Payer: COMMERCIAL

## 2024-02-27 VITALS
SYSTOLIC BLOOD PRESSURE: 158 MMHG | RESPIRATION RATE: 18 BRPM | DIASTOLIC BLOOD PRESSURE: 83 MMHG | OXYGEN SATURATION: 94 % | HEART RATE: 89 BPM | TEMPERATURE: 97.5 F | WEIGHT: 164.68 LBS | OXYGEN SATURATION: 94 % | WEIGHT: 164.68 LBS | RESPIRATION RATE: 18 BRPM | HEART RATE: 89 BPM | SYSTOLIC BLOOD PRESSURE: 158 MMHG | TEMPERATURE: 97.5 F | DIASTOLIC BLOOD PRESSURE: 83 MMHG
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
## 2024-02-27 DIAGNOSIS — S50.319A ELBOW ABRASION: ICD-10-CM

## 2024-02-27 DIAGNOSIS — S70.312A ABRASION OF LEFT THIGH, INITIAL ENCOUNTER: ICD-10-CM

## 2024-02-27 LAB
ALBUMIN SERPL BCP-MCNC: 4 G/DL (ref 3.5–5)
ALBUMIN SERPL BCP-MCNC: 4 G/DL (ref 3.5–5)
ALP SERPL-CCNC: 91 U/L (ref 34–104)
ALP SERPL-CCNC: 91 U/L (ref 34–104)
ALT SERPL W P-5'-P-CCNC: 9 U/L (ref 7–52)
ALT SERPL W P-5'-P-CCNC: 9 U/L (ref 7–52)
ANION GAP SERPL CALCULATED.3IONS-SCNC: 9 MMOL/L
ANION GAP SERPL CALCULATED.3IONS-SCNC: 9 MMOL/L
AST SERPL W P-5'-P-CCNC: 15 U/L (ref 13–39)
AST SERPL W P-5'-P-CCNC: 15 U/L (ref 13–39)
BASE EXCESS BLDA CALC-SCNC: 1 MMOL/L (ref -2–3)
BASE EXCESS BLDA CALC-SCNC: 1 MMOL/L (ref -2–3)
BASOPHILS # BLD AUTO: 0.05 THOUSANDS/ÂΜL (ref 0–0.1)
BASOPHILS # BLD AUTO: 0.05 THOUSANDS/ÂΜL (ref 0–0.1)
BASOPHILS NFR BLD AUTO: 1 % (ref 0–1)
BASOPHILS NFR BLD AUTO: 1 % (ref 0–1)
BILIRUB SERPL-MCNC: 0.53 MG/DL (ref 0.2–1)
BILIRUB SERPL-MCNC: 0.53 MG/DL (ref 0.2–1)
BUN SERPL-MCNC: 11 MG/DL (ref 5–25)
BUN SERPL-MCNC: 11 MG/DL (ref 5–25)
CA-I BLD-SCNC: 1.16 MMOL/L (ref 1.12–1.32)
CA-I BLD-SCNC: 1.16 MMOL/L (ref 1.12–1.32)
CALCIUM SERPL-MCNC: 8.7 MG/DL (ref 8.4–10.2)
CALCIUM SERPL-MCNC: 8.7 MG/DL (ref 8.4–10.2)
CHLORIDE SERPL-SCNC: 104 MMOL/L (ref 96–108)
CHLORIDE SERPL-SCNC: 104 MMOL/L (ref 96–108)
CO2 SERPL-SCNC: 26 MMOL/L (ref 21–32)
CO2 SERPL-SCNC: 26 MMOL/L (ref 21–32)
CREAT SERPL-MCNC: 0.91 MG/DL (ref 0.6–1.3)
CREAT SERPL-MCNC: 0.91 MG/DL (ref 0.6–1.3)
EOSINOPHIL # BLD AUTO: 0.16 THOUSAND/ÂΜL (ref 0–0.61)
EOSINOPHIL # BLD AUTO: 0.16 THOUSAND/ÂΜL (ref 0–0.61)
EOSINOPHIL NFR BLD AUTO: 2 % (ref 0–6)
EOSINOPHIL NFR BLD AUTO: 2 % (ref 0–6)
ERYTHROCYTE [DISTWIDTH] IN BLOOD BY AUTOMATED COUNT: 14.6 % (ref 11.6–15.1)
ERYTHROCYTE [DISTWIDTH] IN BLOOD BY AUTOMATED COUNT: 14.6 % (ref 11.6–15.1)
GFR SERPL CREATININE-BSD FRML MDRD: 86 ML/MIN/1.73SQ M
GFR SERPL CREATININE-BSD FRML MDRD: 86 ML/MIN/1.73SQ M
GLUCOSE SERPL-MCNC: 104 MG/DL (ref 65–140)
GLUCOSE SERPL-MCNC: 104 MG/DL (ref 65–140)
GLUCOSE SERPL-MCNC: 106 MG/DL (ref 65–140)
GLUCOSE SERPL-MCNC: 106 MG/DL (ref 65–140)
HCO3 BLDA-SCNC: 24.9 MMOL/L (ref 24–30)
HCO3 BLDA-SCNC: 24.9 MMOL/L (ref 24–30)
HCT VFR BLD AUTO: 43 % (ref 36.5–49.3)
HCT VFR BLD AUTO: 43 % (ref 36.5–49.3)
HCT VFR BLD CALC: 41 % (ref 36.5–49.3)
HCT VFR BLD CALC: 41 % (ref 36.5–49.3)
HGB BLD-MCNC: 14.2 G/DL (ref 12–17)
HGB BLD-MCNC: 14.2 G/DL (ref 12–17)
HGB BLDA-MCNC: 13.9 G/DL (ref 12–17)
HGB BLDA-MCNC: 13.9 G/DL (ref 12–17)
IMM GRANULOCYTES # BLD AUTO: 0.06 THOUSAND/UL (ref 0–0.2)
IMM GRANULOCYTES # BLD AUTO: 0.06 THOUSAND/UL (ref 0–0.2)
IMM GRANULOCYTES NFR BLD AUTO: 1 % (ref 0–2)
IMM GRANULOCYTES NFR BLD AUTO: 1 % (ref 0–2)
LYMPHOCYTES # BLD AUTO: 2.27 THOUSANDS/ÂΜL (ref 0.6–4.47)
LYMPHOCYTES # BLD AUTO: 2.27 THOUSANDS/ÂΜL (ref 0.6–4.47)
LYMPHOCYTES NFR BLD AUTO: 29 % (ref 14–44)
LYMPHOCYTES NFR BLD AUTO: 29 % (ref 14–44)
MCH RBC QN AUTO: 30.2 PG (ref 26.8–34.3)
MCH RBC QN AUTO: 30.2 PG (ref 26.8–34.3)
MCHC RBC AUTO-ENTMCNC: 33 G/DL (ref 31.4–37.4)
MCHC RBC AUTO-ENTMCNC: 33 G/DL (ref 31.4–37.4)
MCV RBC AUTO: 92 FL (ref 82–98)
MCV RBC AUTO: 92 FL (ref 82–98)
MONOCYTES # BLD AUTO: 0.54 THOUSAND/ÂΜL (ref 0.17–1.22)
MONOCYTES # BLD AUTO: 0.54 THOUSAND/ÂΜL (ref 0.17–1.22)
MONOCYTES NFR BLD AUTO: 7 % (ref 4–12)
MONOCYTES NFR BLD AUTO: 7 % (ref 4–12)
NEUTROPHILS # BLD AUTO: 4.65 THOUSANDS/ÂΜL (ref 1.85–7.62)
NEUTROPHILS # BLD AUTO: 4.65 THOUSANDS/ÂΜL (ref 1.85–7.62)
NEUTS SEG NFR BLD AUTO: 60 % (ref 43–75)
NEUTS SEG NFR BLD AUTO: 60 % (ref 43–75)
NRBC BLD AUTO-RTO: 0 /100 WBCS
NRBC BLD AUTO-RTO: 0 /100 WBCS
PCO2 BLD: 26 MMOL/L (ref 21–32)
PCO2 BLD: 26 MMOL/L (ref 21–32)
PCO2 BLD: 37.6 MM HG (ref 42–50)
PCO2 BLD: 37.6 MM HG (ref 42–50)
PH BLD: 7.43 [PH] (ref 7.3–7.4)
PH BLD: 7.43 [PH] (ref 7.3–7.4)
PLATELET # BLD AUTO: 181 THOUSANDS/UL (ref 149–390)
PLATELET # BLD AUTO: 181 THOUSANDS/UL (ref 149–390)
PMV BLD AUTO: 10.9 FL (ref 8.9–12.7)
PMV BLD AUTO: 10.9 FL (ref 8.9–12.7)
PO2 BLD: 99 MM HG (ref 35–45)
PO2 BLD: 99 MM HG (ref 35–45)
POTASSIUM BLD-SCNC: 3.8 MMOL/L (ref 3.5–5.3)
POTASSIUM BLD-SCNC: 3.8 MMOL/L (ref 3.5–5.3)
POTASSIUM SERPL-SCNC: 3.7 MMOL/L (ref 3.5–5.3)
POTASSIUM SERPL-SCNC: 3.7 MMOL/L (ref 3.5–5.3)
PROT SERPL-MCNC: 6.7 G/DL (ref 6.4–8.4)
PROT SERPL-MCNC: 6.7 G/DL (ref 6.4–8.4)
RBC # BLD AUTO: 4.7 MILLION/UL (ref 3.88–5.62)
RBC # BLD AUTO: 4.7 MILLION/UL (ref 3.88–5.62)
SAO2 % BLD FROM PO2: 98 % (ref 60–85)
SAO2 % BLD FROM PO2: 98 % (ref 60–85)
SODIUM BLD-SCNC: 141 MMOL/L (ref 136–145)
SODIUM BLD-SCNC: 141 MMOL/L (ref 136–145)
SODIUM SERPL-SCNC: 139 MMOL/L (ref 135–147)
SODIUM SERPL-SCNC: 139 MMOL/L (ref 135–147)
SPECIMEN SOURCE: ABNORMAL
SPECIMEN SOURCE: ABNORMAL
WBC # BLD AUTO: 7.73 THOUSAND/UL (ref 4.31–10.16)
WBC # BLD AUTO: 7.73 THOUSAND/UL (ref 4.31–10.16)

## 2024-02-27 PROCEDURE — 71260 CT THORAX DX C+: CPT

## 2024-02-27 PROCEDURE — 72125 CT NECK SPINE W/O DYE: CPT

## 2024-02-27 PROCEDURE — 74177 CT ABD & PELVIS W/CONTRAST: CPT

## 2024-02-27 PROCEDURE — 36415 COLL VENOUS BLD VENIPUNCTURE: CPT | Performed by: SURGERY

## 2024-02-27 PROCEDURE — 73552 X-RAY EXAM OF FEMUR 2/>: CPT

## 2024-02-27 PROCEDURE — 82330 ASSAY OF CALCIUM: CPT

## 2024-02-27 PROCEDURE — 85014 HEMATOCRIT: CPT

## 2024-02-27 PROCEDURE — 82803 BLOOD GASES ANY COMBINATION: CPT

## 2024-02-27 PROCEDURE — 70450 CT HEAD/BRAIN W/O DYE: CPT

## 2024-02-27 PROCEDURE — 84295 ASSAY OF SERUM SODIUM: CPT

## 2024-02-27 PROCEDURE — 71045 X-RAY EXAM CHEST 1 VIEW: CPT

## 2024-02-27 PROCEDURE — 73080 X-RAY EXAM OF ELBOW: CPT

## 2024-02-27 PROCEDURE — 80053 COMPREHEN METABOLIC PANEL: CPT | Performed by: SURGERY

## 2024-02-27 PROCEDURE — 96374 THER/PROPH/DIAG INJ IV PUSH: CPT

## 2024-02-27 PROCEDURE — 99285 EMERGENCY DEPT VISIT HI MDM: CPT

## 2024-02-27 PROCEDURE — 85025 COMPLETE CBC W/AUTO DIFF WBC: CPT | Performed by: SURGERY

## 2024-02-27 PROCEDURE — 93308 TTE F-UP OR LMTD: CPT | Performed by: SURGERY

## 2024-02-27 PROCEDURE — 84132 ASSAY OF SERUM POTASSIUM: CPT

## 2024-02-27 PROCEDURE — 73060 X-RAY EXAM OF HUMERUS: CPT

## 2024-02-27 PROCEDURE — 82947 ASSAY GLUCOSE BLOOD QUANT: CPT

## 2024-02-27 PROCEDURE — 99204 OFFICE O/P NEW MOD 45 MIN: CPT | Performed by: SURGERY

## 2024-02-27 PROCEDURE — 76705 ECHO EXAM OF ABDOMEN: CPT | Performed by: SURGERY

## 2024-02-27 RX ORDER — FENTANYL CITRATE 50 UG/ML
50 INJECTION, SOLUTION INTRAMUSCULAR; INTRAVENOUS ONCE
Status: DISCONTINUED | OUTPATIENT
Start: 2024-02-27 | End: 2024-02-27

## 2024-02-27 RX ORDER — KETOROLAC TROMETHAMINE 30 MG/ML
15 INJECTION, SOLUTION INTRAMUSCULAR; INTRAVENOUS ONCE
Status: COMPLETED | OUTPATIENT
Start: 2024-02-27 | End: 2024-02-27

## 2024-02-27 RX ORDER — GINSENG 100 MG
1 CAPSULE ORAL ONCE
Status: COMPLETED | OUTPATIENT
Start: 2024-02-27 | End: 2024-02-27

## 2024-02-27 RX ORDER — FENTANYL CITRATE 50 UG/ML
1 INJECTION, SOLUTION INTRAMUSCULAR; INTRAVENOUS ONCE
Status: COMPLETED | OUTPATIENT
Start: 2024-02-27 | End: 2024-02-27

## 2024-02-27 RX ADMIN — IOHEXOL 100 ML: 350 INJECTION, SOLUTION INTRAVENOUS at 14:07

## 2024-02-27 RX ADMIN — KETOROLAC TROMETHAMINE 15 MG: 30 INJECTION, SOLUTION INTRAMUSCULAR; INTRAVENOUS at 15:37

## 2024-02-27 RX ADMIN — BACITRACIN 1 LARGE APPLICATION: 500 OINTMENT TOPICAL at 15:38
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
## 2024-02-27 NOTE — PROCEDURES
POC FAST US    Date/Time: 2/27/2024 2:02 PM    Performed by: Albino Gilmore MD  Authorized by: Albino Gilmore MD    Patient location:  ED  Procedure details:     Exam Type:  Diagnostic    Indications: blunt abdominal trauma      Assess for:  Intra-abdominal fluid and pericardial effusion    Technique: FAST      Views obtained:  Heart - Pericardial sac, LUQ - Splenorenal space, Suprapubic - Pouch of Bradley and RUQ - Dillon's Pouch    Image quality: diagnostic      Image availability:  Images available in PACS  FAST Findings:     RUQ (Hepatorenal) free fluid: absent      LUQ (Splenorenal) free fluid: absent      Suprapubic free fluid: absent      Cardiac wall motion: identified      Pericardial effusion: absent    Interpretation:     Impressions: negative

## 2024-02-28 ENCOUNTER — TELEPHONE (OUTPATIENT)
Dept: INTERNAL MEDICINE CLINIC | Facility: CLINIC | Age: 69
End: 2024-02-28

## 2024-02-28 ENCOUNTER — OFFICE VISIT (OUTPATIENT)
Dept: INTERNAL MEDICINE CLINIC | Facility: CLINIC | Age: 69
End: 2024-02-28
Payer: COMMERCIAL

## 2024-02-28 VITALS
OXYGEN SATURATION: 95 % | HEART RATE: 71 BPM | DIASTOLIC BLOOD PRESSURE: 84 MMHG | HEIGHT: 69 IN | SYSTOLIC BLOOD PRESSURE: 146 MMHG | BODY MASS INDEX: 24.29 KG/M2 | WEIGHT: 164 LBS | TEMPERATURE: 99.2 F

## 2024-02-28 DIAGNOSIS — R07.81 RIB PAIN ON LEFT SIDE: ICD-10-CM

## 2024-02-28 DIAGNOSIS — F10.20 ALCOHOL DEPENDENCE, UNCOMPLICATED (HCC): ICD-10-CM

## 2024-02-28 DIAGNOSIS — I26.94 MULTIPLE SUBSEGMENTAL PULMONARY EMBOLI WITHOUT ACUTE COR PULMONALE (HCC): ICD-10-CM
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
## 2024-02-28 DIAGNOSIS — M25.552 LEFT HIP PAIN: ICD-10-CM

## 2024-02-28 PROCEDURE — 99214 OFFICE O/P EST MOD 30 MIN: CPT | Performed by: INTERNAL MEDICINE

## 2024-02-28 RX ORDER — OXYCODONE HYDROCHLORIDE AND ACETAMINOPHEN 5; 325 MG/1; MG/1
1 TABLET ORAL EVERY 6 HOURS PRN
Qty: 30 TABLET | Refills: 0 | Status: SHIPPED | OUTPATIENT
Start: 2024-02-28

## 2024-02-28 RX ORDER — NALOXONE HYDROCHLORIDE 4 MG/.1ML
SPRAY NASAL
Qty: 1 EACH | Refills: 1 | Status: SHIPPED | OUTPATIENT
Start: 2024-02-28 | End: 2025-02-27

## 2024-02-28 NOTE — TELEPHONE ENCOUNTER
Discussed with him, to use as needed, if they have difficulty waking him up, he needs to use Inpatient Rehabilitation - Occupational Therapy Treatment Note/Discharge  [facility]     Patient Name: She López  : 1947  MRN: [id number]  Today's Date: 2020  Onset of Illness/Injury or Date of Surgery: 20            Admit Date: 2020    Visit Dx:     ICD-10-CM ICD-9-CM   1. General weakness R53.1 780.79     Patient Active Problem List   Diagnosis   • Crohn's disease of small intestine with other complication (CMS/HCC)   • Type 2 diabetes mellitus without complication (CMS/HCC)   • RSD upper limb   • Neuropathic pain of hand   • Hyperlipidemia   • Cervical myelopathy (CMS/HCC)   • Central pain syndrome   • Carpal tunnel syndrome   • Vitamin B 12 deficiency   • Guillain Barré syndrome (CMS/HCC)       Therapy Treatment  IRF Treatment Summary     Row Name 20 1559 20 0842          Evaluation/Treatment Time and Intent    Subjective Information  no complaints  -AF  no complaints  -     Existing Precautions/Restrictions  fall  -AF  fall  -     Document Type  discharge treatment;therapy note (daily note)  -AF  therapy note (daily note)  -     Mode of Treatment  occupational therapy  -AF  individual therapy;physical therapy  -     Patient/Family Observations  Pt sitting up in w/c  present in AM and PM sessions   -AF  pt seated in WC spouse present, no acute distress  -     Unable to Perform (Evaluation/Treatment)  --  -- pt has rwx, air cast. WC ordered from [facility]  -LH     Recorded by [AF] Reina Perera, OTR [LH] Chelsie Cline, PT     Row Name 20 0842             Caregiver Training    Caregiver(s) to be Trained  spouse/significant  -      Comment, Caregiver Training Plan  PT demo'ed to pt and spouse proper floor tsf techniques. also discussed fall prevention strategies- both receptive to PT recommendations  -LH      Recorded by [LH] Chelsie Cline, PT      Row Name 20 1559 20 0842          Cognition/Psychosocial- PT/OT    Affect/Mental  Status (Cognitive)  WFL  -AF  WFL  -LH     Orientation Status (Cognition)  oriented x 4  -AF  oriented x 4  -LH     Follows Commands (Cognition)  WFL  -AF  WFL  -LH     Personal Safety Interventions  fall prevention program maintained;gait belt;nonskid shoes/slippers when out of bed  -AF  fall prevention program maintained;gait belt;supervised activity  -LH     Safety Deficit (Cognitive)  insight into deficits/self awareness  -AF  --     Recorded by [AF] Reina Perera, OTR [] Chelsie Cline, PT     Row Name 02/26/20 0842             Mobility    Advanced Gait Activity  load/unload object from car  -LH      Recorded by [] Chelsie Cline, PT      Row Name 02/26/20 0842             Bed Mobility Assessment/Treatment    Comment (Bed Mobility)  NT  -LH      Recorded by [] Chelsie Cline, PT      Row Name 02/26/20 0842             Functional Mobility    Functional Mobility- Comment  ambulated over red mat CGA rwx  -LH      Recorded by [] Chelsie Cline, PT      Row Name 02/26/20 1559             Transfer Assessment/Treatment    Comment (Transfers)  EOM <> w/c CGA  -AF      Recorded by [AF] Reina Perera, OTR      Row Name 02/26/20 0842             Sit-Stand Transfer    Sit-Stand [place] (Transfers)  contact guard  -      Assistive Device (Sit-Stand Transfers)  walker, front-wheeled  -LH      Recorded by [] Chelsie Cline, PT      Row Name 02/26/20 0842             Stand-Sit Transfer    Stand-Sit [place] (Transfers)  contact guard  -      Assistive Device (Stand-Sit Transfers)  walker, front-wheeled  -LH      Recorded by [] Chelsie Cline, PT      Row Name 02/26/20 1559             Toilet Transfer    Type (Toilet Transfer)  stand pivot/stand step  -AF      [place] Level (Toilet Transfer)  contact guard;verbal cues  -AF      Assistive Device (Toilet Transfer)  commode;grab bars/safety frame;wheelchair  -AF      Recorded by [AF] Reina Perera, OTR      Row Name 02/26/20 0842             Car  Transfer    Type (Car Transfer)  sit-stand;stand-sit  -LH      [place] Level (Car Transfer)  contact guard  -      Assistive Device (Car Transfer)  walker, front-wheeled  -LH      Recorded by [LH] Chelsie Cline, PT      Row Name 02/26/20 0842             Gait/Stairs Assessment/Training    [place] Level (Gait)  contact guard  -      Assistive Device (Gait)  walker, front-wheeled  -LH      Distance in Feet (Gait)  160, 220  -LH      Pattern (Gait)  step-through  -LH      Deviations/Abnormal Patterns (Gait)  base of support, wide;ataxic  -LH      Bilateral Gait Deviations  forward flexed posture;heel strike decreased  -LH      Left Sided Gait Deviations  -- dec ankle control/supination-improved w active ankle brace  -LH      Recorded by [LH] Chelsie Cline, PT      Row Name 02/26/20 0842             Load/Unload Object In Car (Mobility)    [place], Loading/Unloading Objects To/From Car (Mobility)  contact guard  -      Comment, Loading/Unloading Objects To/From Car (Mobility)  pt ambulated to car with grocery cart - placed light weighted grocery bag into/out of back seat of car  -LH      Recorded by [LH] Chelsie Cline, PT      Row Name 02/26/20 1559             Bathing Assessment/Treatment    Bathing [place] Level  upper body;set up;lower body;moderate assist (50% patient effort)  -AF      Bathing Position  supported sitting;supported standing  -AF      Bathing Setup Assistance  obtain supplies  -AF      Recorded by [AF] Reina Perera OTR      Row Name 02/26/20 1559             Upper Body Dressing Assessment/Treatment    Upper Body Dressing Task  upper body dressing skills;set up assistance  -AF      Upper Body Dressing Position  supported sitting  -AF      Set-up Assistance (Upper Body Dressing)  obtain clothing  -AF      Recorded by [AF] Reina Perera OTR      Row Name 02/26/20 1558             Lower Body Dressing Assessment/Treatment    Lower Body Dressing [place] Level   doff;don;pants/bottoms;shoes/slippers;socks;underwear;moderate assist (50% patient effort);maximum assist (25% patient effort);verbal cues  -AF      Lower Body Dressing Position  supported sitting;supported standing  -AF      Lower Body Dressing Setup Assistance  obtain clothing  -AF      Recorded by [AF] Reina Perera, NILAMR      Row Name 02/26/20 1559             Grooming Assessment/Treatment    Grooming [place] Level  grooming skills;set up  -AF      Assistive Device (Grooming)  built-up handle items  -AF      Grooming Position  sink side;supported sitting  -AF      Grooming Setup Assistance  obtain supplies  -AF      Recorded by [AF] Reina Perera, R      Row Name 02/26/20 1559             Toileting Assessment/Treatment    Toileting [place] Level  toileting skills;verbal cues;moderate assist (50% patient effort)  -AF      Assistive Device Use (Toileting)  grab bar/safety frame;raised toilet seat  -AF      Toileting Position  unsupported sitting;supported standing  -AF      Comment (Toileting)   assists with ostomy bag, pt able to competle hygiene and 1/2 of clothing management tasks   -AF      Recorded by [AF] Reina Perera, NILAMR      Row Name 02/26/20 1559             General ROM    GENERAL ROM COMMENTS  BUE AROM WFL   -AF      Recorded by [AF] Reina Perera R      Row Name 02/26/20 1559             MMT (Manual Muscle Testing)    General MMT Comments  B shoulders 4/5, elbow 4/5, wrist 3+/5  -AF      Recorded by [AF] Reina Perera OTR      Row Name 02/26/20 1559             Hand  Strength Testing    Right Hand, Setting 1 (Dynamometer Testing)  11  -AF      Left Hand, Setting 1 (Dynamometer Testing)  25  -AF      Right Hand: Lateral (Key) Pinch Strength (Pinch Dynamometer Testing)  1  -AF      Left Hand: Lateral (Key) Pinch Strength (Pinch Dynamometer Testing)  4  -AF      Recorded by [AF] Reina Perera, R      Row Name 02/26/20 1559             Fine Motor Testing  & Training    Results, 9 Hole Peg Test of Fine Motor Coordination-Right  150  -AF      Results, 9 Hole Peg Test of Fine Motor Coordination-Left  45  -AF      Results, Box N Block Test-Right  45  -AF      Results, Box N Block Test-Left  45  -AF      Comment, Fine Motor Coordination  opposition intact with L hand with increased time, R hand 3/4 fingers FMC with pre writing tasks with built up handle with MOD diffiuclty to keep grasp on handle. stringing moderate size foam beads with MIN diffiuclty   -AF      Recorded by [AF] Reina Perera OTR      Row Name 02/26/20 1559             Sensory    Sensory General Assessment  light touch sensation deficits identified;proprioception deficits identified;stereognosis deficits identified  -AF      Recorded by [AF] Reina Perera OTR      Row Name 02/26/20 1559             Stereognosis Assessment    Left Hand Stereognosis  severe impairment, less than 50% correct responses  -AF      Right Hand Stereognosis  severe impairment, less than 50% correct responses  -AF      Recorded by [AF] Reina Perera OTR      Row Name 02/26/20 1559             Proprioception Assessment    Left Upper Extremity: Proprioception Assessment  mild impairment, 75% or more correct responses  -AF      Right Upper Extremity: Proprioception Assessment  mild impairment, 75% or more correct responses;moderate impairment, 50 to 74% correct responses  -AF      Recorded by [AF] Reina Perera OTR      Row Name 02/26/20 1559 02/26/20 0842          Pain Scale: Numbers Pre/Post-Treatment    Pain Scale: Numbers, Pretreatment  3/10  -AF  0/10 - no pain  -LH     Pain Scale: Numbers, Post-Treatment  3/10  -AF  0/10 - no pain  -LH     Pain Location - Side  Bilateral  -AF  --     Pain Location  hand  -AF  --     Recorded by [AF] Reina Perera OTR [LH] Chelsie Cline PT     Row Name 02/26/20 1559             Static Sitting Balance    Level of [place] (Unsupported Sitting, Static Balance)  standby  assist  -AF      Time Able to Maintain Position (Unsupported Sitting, Static Balance)  more than 5 minutes  -AF      Recorded by [AF] Reina Perera OTR      Row Name 02/26/20 0842             Sitting Balance Activity    Comment (Sitting, Balance Training)  crossovers and side stepping BUE supported at table top CGA  -LH      Recorded by [LH] Chelsie Cline, PT      Row Name 02/26/20 0842             Standing Balance Activity    Activities Performed (Standing, Balance Training)  standing ball toss;standing on foam roll  -LH      Support Needed for Balance (Standing, Balance Training)  CGA;uses at least one upper extremity for support  -LH      Progressive Balance Activity (Standing, Balance Training)  base of support narrowed during activity;upper extremity activities added during activity  -LH      Restrictions (Standing, Balance Training)  standing on foam mat, kicking ball, BUE to progress to unilateral UE support hemibars  -LH      Comment (Standing, Balance Training)  standing on foam square, balloon taping w UEs, one UE supported at hemibar  -LH      Recorded by [LH] Chelsie Cline, PT      Row Name 02/26/20 0842             Dynamic Balance Activity    Therapeutic Training Performed (Dynamic Balance)  functional activities with reaching or leaning any direction  -LH      Support Needed for Balance (Dynamic Balance Training)  CGA;uses at least one upper extremity for support  -LH      Restrictions (Dynamic Balance Training)  lateral walking and backwards walking unilateral support // bars. ambulated 8ft in // bars no UE support CGA  -LH      Comment (Dynamic Balance Training)  pushing grocery cart 80ft in gym- scanning and reaching for items at varied height and placing into grocery cart. bagging groceries in standing , at least one UE supported at cart  -LH      Recorded by [LH] Chelsie Cline, PT      Row Name 02/26/20 0842             Therapeutic Exercise    Therapeutic Exercise  standing, lower extremities   -LH      Recorded by [LH] Chelsie Cline, PT      Row Name 02/26/20 1559             Upper Extremity Seated Therapeutic Exercise    Performed, Seated Upper Extremity (Therapeutic Exercise)  shoulder flexion/extension;shoulder horizontal abduction/adduction;scapular protraction/retraction;elbow flexion/extension;forearm supination/pronation;wrist flexion/extension;digit flexion/extension  -AF      Device, Seated Upper Extremity (Therapeutic Exercise)  -- #3 hand weight, #3.5 dowel pa, hand gripper   -AF      Exercise Type, Seated Upper Extremity (Therapeutic Exercise)  AROM (active range of motion);resistive exercise  -AF      Expected Outcomes, Seated Upper Extremity (Therapeutic Exercise)  improve motor control;improve performance, BADLs;improve performance, transfer skills  -AF      Sets/Reps Detail, Seated Upper Extremity (Therapeutic Exercise)  2/20  -AF      Transfers Skills, Training to Functional Activity, Seated Upper Extremity (Therapeutic Exercise)  transfers skills to functional activity most of the time  -AF      Comment, Seated Upper Extremity (Therapeutic Exercise)   and pt understanding of HEP  -AF      Recorded by [AF] Reina Perera OTR      Row Name 02/26/20 0842             Lower Extremity Standing Therapeutic Exercise    Performed, Standing Lower Extremity (Therapeutic Exercise)  mini-squats;heel/toe raises;hip abduction/adduction;knee flexion/extension;hip flexion/extension  -      Exercise Type, Standing Lower Extremity (Therapeutic Exercise)  AROM (active range of motion)  -      Sets/Reps Detail, Standing Lower Extremity (Therapeutic Exercise)  1/5  -      Comment, Standing Lower Extremity (Therapeutic Exercise)  BUE supported at table top  -LH      Recorded by [LH] Chelsie Cline, PT      Row Name 02/26/20 1559 02/26/20 0842          Positioning and Restraints    Pre-Treatment Position  sitting in chair/recliner  -AF  sitting in chair/recliner  -LH     Post Treatment  Position  wheelchair  -AF  wheelchair  -LH     In Wheelchair  sitting;exit alarm on;with family/caregiver with  in AM and PM sessions   -AF  sitting;call light within reach;encouraged to call for assist;exit alarm on;with family/caregiver  -LH     Recorded by [AF] Reina Perera, OTR [LH] Chelsie Cline, PT       User Key  (r) = Recorded By, (t) = Taken By, (c) = Cosigned By    Initials Name Effective Dates     Chelsie Cline, PT 04/03/18 -     AF Reina Perera, OTR 04/03/18 -           Wound 12/09/19 1510 abdomen Incision (Active)   Dressing Appearance open to air 2/26/2020  7:42 AM   Closure Open to air 2/26/2020  7:42 AM   Base dressing in place, unable to visualize 2/25/2020  9:46 PM         OT IRF GOALS     Row Name 02/26/20 1606             Transfer Goal 1 (OT-IRF)    Progress/Outcomes (Transfer Goal 1, OT-IRF)  goal met  -AF         Transfer Goal 2 (OT-IRF)    Progress/Outcomes (Transfer Goal 2, OT-IRF)  goal met  -AF         Bathing Goal 1 (OT-IRF)    Progress/Outcomes (Bathing Goal 1, OT-IRF)  goal met  -AF         Bathing Goal 2 (OT-IRF)    Progress/Outcomes (Bathing Goal 2, OT-IRF)  goal partially met  -AF         UB Dressing Goal 1 (OT-IRF)    Progress/Outcomes (UB Dressing Goal 1, OT-IRF)  goal met  -AF         LB Dressing Goal 1 (OT-IRF)    Progress/Outcomes (LB Dressing Goal 1, OT-IRF)  goal met  -AF         LB Dressing Goal 2 (OT-IRF)    Progress/Outcomes (LB Dressing Goal 2, OT-IRF)  goal not met  -AF         Grooming Goal 1 (OT-IRF)    Progress/Outcomes (Grooming Goal 1, OT-IRF)  goal met  -AF         Grooming Goal 2 (OT-IRF)    Progress/Outcomes (Grooming Goal 2, OT-IRF)  goal not met  -AF         Toileting Goal 1 (OT-IRF)    Progress/Outcomes (Toileting Goal 1, OT-IRF)  goal met  -AF         Toileting Goal 2 (OT-IRF)    Progress/Outcomes (Toileting Goal 2, OT-IRF)  goal partially met  -AF         Balance Goal 1 (OT)    Progress/Outcomes (Balance Goal 1, OT)  goal met  -AF          Balance Goal 2 (OT)    Progress/Outcome (Balance Goal 2, OT)  goal met  -AF         Caregiver Training Goal 1 (OT-IRF)    Progress/Outcomes (Caregiver Training Goal 1, OT-IRF)  goal met  -AF        User Key  (r) = Recorded By, (t) = Taken By, (c) = Cosigned By    Initials Name Provider Type    Reina Noe OTMENDEZ Occupational Therapist                OT Recommendation and Plan                  Time Calculation:    Time Calculation- OT     Row Name 02/26/20 1608 02/26/20 1607          Time Calculation- OT    OT Start Time  1400  -AF  0930  -AF     OT Stop Time  1430  -AF  1030  -AF     OT Time Calculation (min)  30 min  -AF  60 min  -AF       User Key  (r) = Recorded By, (t) = Taken By, (c) = Cosigned By    Initials Name Provider Type    Reina Noe OTMENDEZ Occupational Therapist          Therapy Charges for Today     Code Description Service Date Service Provider Modifiers Qty    01425914007 HC OT SELF CARE/MGMT/TRAIN EA 15 MIN 2/25/2020 Reina Perera OTR GO 2    54954785773 HC OT NEUROMUSC RE EDUCATION EA 15 MIN 2/25/2020 Reina Perera OTR GO 2    81874593122 HC OT THER PROC EA 15 MIN 2/25/2020 Reina Perera OTR GO 2    68627745999 HC OT SELF CARE/MGMT/TRAIN EA 15 MIN 2/26/2020 Reina Perera OTR GO 2    80546974895 HC OT THER PROC EA 15 MIN 2/26/2020 Reina Perera OTR GO 2    75985446517 HC OT NEUROMUSC RE EDUCATION EA 15 MIN 2/26/2020 Reina Perera OTR GO 2                    Reina Perera OTMENDEZ  2/26/2020

## 2024-02-28 NOTE — TELEPHONE ENCOUNTER
Patient has a queston  regarding the one prescription that he was given for  Naloxone hydrochloride. On the box it says please see attached for detailed directions and the only thing it says is administer one spray into a nostril. If no response after 2 to 3 minutes, give another dose and the other nostril using a new spray. It doesn't say how long or how often I'm supposed to take this as far as if there's an hour hourly thing that I'm supposed to do or every four hours or what. Please give patient a call back .
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
## 2024-03-08 ENCOUNTER — CLINICAL SUPPORT (OUTPATIENT)
Dept: INTERNAL MEDICINE CLINIC | Facility: CLINIC | Age: 69
End: 2024-03-08
Payer: COMMERCIAL

## 2024-03-08 DIAGNOSIS — E53.8 VITAMIN B12 DEFICIENCY: Primary | ICD-10-CM

## 2024-03-08 PROCEDURE — 96372 THER/PROPH/DIAG INJ SC/IM: CPT

## 2024-03-08 RX ADMIN — CYANOCOBALAMIN 1000 MCG: 1000 INJECTION, SOLUTION INTRAMUSCULAR; SUBCUTANEOUS at 09:08

## 2024-03-15 DIAGNOSIS — M87.00 AVASCULAR NECROSIS (HCC): ICD-10-CM

## 2024-03-15 DIAGNOSIS — M16.11 PRIMARY OSTEOARTHRITIS OF ONE HIP, RIGHT: ICD-10-CM

## 2024-03-15 RX ORDER — MELOXICAM 15 MG/1
TABLET ORAL
Qty: 30 TABLET | Refills: 5 | Status: SHIPPED | OUTPATIENT
Start: 2024-03-15

## 2024-03-22 DIAGNOSIS — E87.6 HYPOKALEMIA: ICD-10-CM

## 2024-03-24 DIAGNOSIS — I10 ESSENTIAL HYPERTENSION, BENIGN: ICD-10-CM

## 2024-03-24 DIAGNOSIS — K21.9 GASTROESOPHAGEAL REFLUX DISEASE WITHOUT ESOPHAGITIS: ICD-10-CM

## 2024-03-24 DIAGNOSIS — J43.9 PULMONARY EMPHYSEMA, UNSPECIFIED EMPHYSEMA TYPE (HCC): ICD-10-CM

## 2024-03-25 RX ORDER — OMEPRAZOLE 20 MG/1
20 CAPSULE, DELAYED RELEASE ORAL DAILY
Qty: 90 CAPSULE | Refills: 0 | Status: SHIPPED | OUTPATIENT
Start: 2024-03-25

## 2024-03-25 RX ORDER — POTASSIUM CHLORIDE 750 MG/1
20 CAPSULE, EXTENDED RELEASE ORAL 2 TIMES DAILY
Qty: 180 CAPSULE | Refills: 0 | Status: SHIPPED | OUTPATIENT
Start: 2024-03-25

## 2024-03-25 RX ORDER — AMILORIDE HYDROCHLORIDE 5 MG/1
5 TABLET ORAL DAILY
Qty: 90 TABLET | Refills: 0 | Status: SHIPPED | OUTPATIENT
Start: 2024-03-25

## 2024-03-25 RX ORDER — UMECLIDINIUM BROMIDE AND VILANTEROL TRIFENATATE 62.5; 25 UG/1; UG/1
1 POWDER RESPIRATORY (INHALATION) DAILY
Qty: 90 EACH | Refills: 1 | Status: SHIPPED | OUTPATIENT
Start: 2024-03-25 | End: 2024-09-21

## 2024-03-29 ENCOUNTER — APPOINTMENT (OUTPATIENT)
Dept: LAB | Facility: CLINIC | Age: 69
End: 2024-03-29
Payer: COMMERCIAL

## 2024-03-29 ENCOUNTER — TELEPHONE (OUTPATIENT)
Dept: SURGICAL ONCOLOGY | Facility: CLINIC | Age: 69
End: 2024-03-29

## 2024-03-29 DIAGNOSIS — Z85.05 PERSONAL HISTORY OF LIVER CANCER: ICD-10-CM

## 2024-03-29 DIAGNOSIS — E78.2 MIXED HYPERLIPIDEMIA: ICD-10-CM

## 2024-03-29 DIAGNOSIS — R73.01 IMPAIRED FASTING BLOOD SUGAR: ICD-10-CM

## 2024-03-29 DIAGNOSIS — I10 ESSENTIAL HYPERTENSION: Chronic | ICD-10-CM

## 2024-03-29 LAB
AFP-TM SERPL-MCNC: 3.66 NG/ML (ref 0–9)
BUN SERPL-MCNC: 13 MG/DL (ref 5–25)
CREAT SERPL-MCNC: 0.87 MG/DL (ref 0.6–1.3)
GFR SERPL CREATININE-BSD FRML MDRD: 88 ML/MIN/1.73SQ M
TSH SERPL DL<=0.05 MIU/L-ACNC: 1.37 UIU/ML (ref 0.45–4.5)

## 2024-03-29 PROCEDURE — 82565 ASSAY OF CREATININE: CPT

## 2024-03-29 PROCEDURE — 82105 ALPHA-FETOPROTEIN SERUM: CPT

## 2024-03-29 PROCEDURE — 84443 ASSAY THYROID STIM HORMONE: CPT

## 2024-03-29 PROCEDURE — 84520 ASSAY OF UREA NITROGEN: CPT

## 2024-03-29 PROCEDURE — 36415 COLL VENOUS BLD VENIPUNCTURE: CPT

## 2024-03-29 NOTE — TELEPHONE ENCOUNTER
Called patient and left message to have labs done prior to MRI scheduled on 4/4 and in message reminded of MRI appt and follow up with Dr. Schafer scheduled.

## 2024-04-05 ENCOUNTER — CLINICAL SUPPORT (OUTPATIENT)
Dept: INTERNAL MEDICINE CLINIC | Facility: CLINIC | Age: 69
End: 2024-04-05
Payer: COMMERCIAL

## 2024-04-05 DIAGNOSIS — E53.8 VITAMIN B12 DEFICIENCY: Primary | ICD-10-CM

## 2024-04-05 PROCEDURE — 96372 THER/PROPH/DIAG INJ SC/IM: CPT | Performed by: INTERNAL MEDICINE

## 2024-04-05 RX ADMIN — CYANOCOBALAMIN 1000 MCG: 1000 INJECTION, SOLUTION INTRAMUSCULAR; SUBCUTANEOUS at 10:16

## 2024-04-08 ENCOUNTER — HOSPITAL ENCOUNTER (OUTPATIENT)
Dept: MRI IMAGING | Facility: HOSPITAL | Age: 69
Discharge: HOME | End: 2024-04-08
Payer: COMMERCIAL

## 2024-04-08 DIAGNOSIS — Z85.05 PERSONAL HISTORY OF LIVER CANCER: ICD-10-CM

## 2024-04-08 DIAGNOSIS — E87.6 HYPOKALEMIA: ICD-10-CM

## 2024-04-08 PROCEDURE — 74183 MRI ABD W/O CNTR FLWD CNTR: CPT

## 2024-04-08 PROCEDURE — A9585 GADOBUTROL INJECTION: HCPCS

## 2024-04-08 RX ORDER — POTASSIUM CHLORIDE 750 MG/1
20 CAPSULE, EXTENDED RELEASE ORAL 2 TIMES DAILY
Qty: 180 CAPSULE | Refills: 0 | Status: SHIPPED | OUTPATIENT
Start: 2024-04-08

## 2024-04-08 RX ORDER — GADOBUTROL 604.72 MG/ML
7 INJECTION INTRAVENOUS
Status: COMPLETED | OUTPATIENT
Start: 2024-04-08 | End: 2024-04-08

## 2024-04-08 RX ADMIN — GADOBUTROL 7 ML: 604.72 INJECTION INTRAVENOUS at 12:57

## 2024-04-12 ENCOUNTER — OFFICE VISIT (OUTPATIENT)
Dept: SURGICAL ONCOLOGY | Facility: CLINIC | Age: 69
End: 2024-04-12
Payer: COMMERCIAL

## 2024-04-12 VITALS
DIASTOLIC BLOOD PRESSURE: 64 MMHG | SYSTOLIC BLOOD PRESSURE: 118 MMHG | TEMPERATURE: 98.1 F | OXYGEN SATURATION: 95 % | WEIGHT: 165 LBS | BODY MASS INDEX: 24.44 KG/M2 | HEIGHT: 69 IN | RESPIRATION RATE: 15 BRPM | HEART RATE: 76 BPM

## 2024-04-12 DIAGNOSIS — Z85.05 PERSONAL HISTORY OF LIVER CANCER: ICD-10-CM

## 2024-04-12 DIAGNOSIS — Z85.05 ENCOUNTER FOR FOLLOW-UP SURVEILLANCE OF LIVER CANCER: Primary | ICD-10-CM

## 2024-04-12 DIAGNOSIS — Z08 ENCOUNTER FOR FOLLOW-UP SURVEILLANCE OF LIVER CANCER: Primary | ICD-10-CM

## 2024-04-12 PROCEDURE — 99214 OFFICE O/P EST MOD 30 MIN: CPT | Performed by: SURGERY
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
## 2024-04-17 ENCOUNTER — PREP FOR PROCEDURE (OUTPATIENT)
Age: 69
End: 2024-04-17

## 2024-04-17 ENCOUNTER — TELEPHONE (OUTPATIENT)
Dept: GASTROENTEROLOGY | Facility: CLINIC | Age: 69
End: 2024-04-17

## 2024-04-17 DIAGNOSIS — Z86.010 HISTORY OF COLON POLYPS: Primary | ICD-10-CM

## 2024-04-17 NOTE — TELEPHONE ENCOUNTER
Patient is due for a colonoscopy in early July with Dr. Bee for hx of multiple tubular adenoma polyps. Left message for patient to call and schedule.

## 2024-04-17 NOTE — TELEPHONE ENCOUNTER
Scheduled date of colonoscopy (as of today): 7/17   Physician performing colonoscopy: SEAN   Location of colonoscopy: [facility]   Bowel prep reviewed with patient: ALEXX/FABIAN   Instructions reviewed with patient by:  Clearances: NONE       Patient returned office call to [facility] recall Colonoscopy Repeat colonoscopy in 1 year  Personal history of colon polyps

## 2024-04-17 NOTE — TELEPHONE ENCOUNTER
Patient returned office call to [facility] recall Colonoscopy Repeat colonoscopy in 1 year  Personal history of colon polyps    There is no height or weight on file to calculate BMI. 24.37  Has patient been referred for a routine screening Colonoscopy? yes  Is the patient between 45-75 years old? yes      Previous Colonoscopy yes   If yes:    Date: 2023    Facility:     Reason:         Does the patient want to see a Gastroenterologist prior to their procedure OR are they having any GI symptoms? no    Has the patient been hospitalized or had abdominal surgery in the past 6 months? no    Does the patient use supplemental oxygen? no    Does the patient take Coumadin, Lovenox, Plavix, Elliquis, Xarelto, or other blood thinning medication? no    Has the patient had a stroke, cardiac event, or stent placed in the past year? no       If patient is between 45yrs - 49yrs, please advise patient that we will have to confirm benefits & coverage with their insurance company for a routine screening colonoscopy.

## 2024-04-25 DIAGNOSIS — F34.1 DYSTHYMIC DISORDER: ICD-10-CM

## 2024-04-27 RX ORDER — MIRTAZAPINE 15 MG/1
15 TABLET, FILM COATED ORAL
Qty: 90 TABLET | Refills: 1 | Status: SHIPPED | OUTPATIENT
Start: 2024-04-27
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
## 2024-05-08 ENCOUNTER — OFFICE VISIT (OUTPATIENT)
Dept: INTERNAL MEDICINE CLINIC | Facility: CLINIC | Age: 69
End: 2024-05-08
Payer: COMMERCIAL

## 2024-05-08 ENCOUNTER — APPOINTMENT (OUTPATIENT)
Dept: LAB | Facility: CLINIC | Age: 69
End: 2024-05-08
Payer: COMMERCIAL

## 2024-05-08 VITALS
WEIGHT: 160.4 LBS | DIASTOLIC BLOOD PRESSURE: 78 MMHG | HEART RATE: 84 BPM | OXYGEN SATURATION: 94 % | HEIGHT: 69 IN | SYSTOLIC BLOOD PRESSURE: 132 MMHG | BODY MASS INDEX: 23.76 KG/M2 | TEMPERATURE: 97.2 F

## 2024-05-08 DIAGNOSIS — E53.8 VITAMIN B12 DEFICIENCY: ICD-10-CM

## 2024-05-08 DIAGNOSIS — E03.8 SUBCLINICAL HYPOTHYROIDISM: ICD-10-CM

## 2024-05-08 DIAGNOSIS — I10 ESSENTIAL HYPERTENSION: Chronic | ICD-10-CM

## 2024-05-08 DIAGNOSIS — K50.018 CROHN'S DISEASE OF SMALL INTESTINE WITH OTHER COMPLICATION (HCC): ICD-10-CM

## 2024-05-08 DIAGNOSIS — E78.2 MIXED HYPERLIPIDEMIA: ICD-10-CM

## 2024-05-08 DIAGNOSIS — Z23 NEED FOR PROPHYLACTIC VACCINATION AND INOCULATION AGAINST VARICELLA: ICD-10-CM

## 2024-05-08 DIAGNOSIS — J43.8 OTHER EMPHYSEMA (HCC): ICD-10-CM

## 2024-05-08 DIAGNOSIS — I47.10 SUPRAVENTRICULAR TACHYCARDIA: ICD-10-CM

## 2024-05-08 DIAGNOSIS — J43.9 PULMONARY EMPHYSEMA, UNSPECIFIED EMPHYSEMA TYPE (HCC): ICD-10-CM

## 2024-05-08 DIAGNOSIS — K21.9 GASTROESOPHAGEAL REFLUX DISEASE WITHOUT ESOPHAGITIS: ICD-10-CM

## 2024-05-08 DIAGNOSIS — R73.01 IMPAIRED FASTING BLOOD SUGAR: ICD-10-CM

## 2024-05-08 DIAGNOSIS — B37.81 CANDIDA INFECTION, ESOPHAGEAL (HCC): ICD-10-CM

## 2024-05-08 DIAGNOSIS — E87.6 HYPOKALEMIA: ICD-10-CM

## 2024-05-08 DIAGNOSIS — I10 ESSENTIAL HYPERTENSION: Primary | Chronic | ICD-10-CM

## 2024-05-08 LAB
ALBUMIN SERPL BCP-MCNC: 4 G/DL (ref 3.5–5)
ALP SERPL-CCNC: 89 U/L (ref 34–104)
ALT SERPL W P-5'-P-CCNC: 7 U/L (ref 7–52)
ANION GAP SERPL CALCULATED.3IONS-SCNC: 9 MMOL/L (ref 4–13)
AST SERPL W P-5'-P-CCNC: 11 U/L (ref 13–39)
BASOPHILS # BLD AUTO: 0.04 THOUSANDS/ÂΜL (ref 0–0.1)
BASOPHILS NFR BLD AUTO: 0 % (ref 0–1)
BILIRUB SERPL-MCNC: 0.82 MG/DL (ref 0.2–1)
BUN SERPL-MCNC: 11 MG/DL (ref 5–25)
CALCIUM SERPL-MCNC: 8.9 MG/DL (ref 8.4–10.2)
CHLORIDE SERPL-SCNC: 104 MMOL/L (ref 96–108)
CHOLEST SERPL-MCNC: 117 MG/DL
CO2 SERPL-SCNC: 28 MMOL/L (ref 21–32)
CREAT SERPL-MCNC: 0.84 MG/DL (ref 0.6–1.3)
EOSINOPHIL # BLD AUTO: 0.12 THOUSAND/ÂΜL (ref 0–0.61)
EOSINOPHIL NFR BLD AUTO: 1 % (ref 0–6)
ERYTHROCYTE [DISTWIDTH] IN BLOOD BY AUTOMATED COUNT: 14.3 % (ref 11.6–15.1)
EST. AVERAGE GLUCOSE BLD GHB EST-MCNC: 114 MG/DL
GFR SERPL CREATININE-BSD FRML MDRD: 89 ML/MIN/1.73SQ M
GLUCOSE P FAST SERPL-MCNC: 93 MG/DL (ref 65–99)
HBA1C MFR BLD: 5.6 %
HCT VFR BLD AUTO: 40.8 % (ref 36.5–49.3)
HDLC SERPL-MCNC: 43 MG/DL
HGB BLD-MCNC: 13.6 G/DL (ref 12–17)
IMM GRANULOCYTES # BLD AUTO: 0.04 THOUSAND/UL (ref 0–0.2)
IMM GRANULOCYTES NFR BLD AUTO: 0 % (ref 0–2)
LDLC SERPL CALC-MCNC: 52 MG/DL (ref 0–100)
LYMPHOCYTES # BLD AUTO: 2.02 THOUSANDS/ÂΜL (ref 0.6–4.47)
LYMPHOCYTES NFR BLD AUTO: 19 % (ref 14–44)
MCH RBC QN AUTO: 31.1 PG (ref 26.8–34.3)
MCHC RBC AUTO-ENTMCNC: 33.3 G/DL (ref 31.4–37.4)
MCV RBC AUTO: 93 FL (ref 82–98)
MONOCYTES # BLD AUTO: 0.73 THOUSAND/ÂΜL (ref 0.17–1.22)
MONOCYTES NFR BLD AUTO: 7 % (ref 4–12)
NEUTROPHILS # BLD AUTO: 7.99 THOUSANDS/ÂΜL (ref 1.85–7.62)
NEUTS SEG NFR BLD AUTO: 73 % (ref 43–75)
NRBC BLD AUTO-RTO: 0 /100 WBCS
PLATELET # BLD AUTO: 183 THOUSANDS/UL (ref 149–390)
PMV BLD AUTO: 10.9 FL (ref 8.9–12.7)
POTASSIUM SERPL-SCNC: 3.7 MMOL/L (ref 3.5–5.3)
PROT SERPL-MCNC: 6.9 G/DL (ref 6.4–8.4)
RBC # BLD AUTO: 4.37 MILLION/UL (ref 3.88–5.62)
SODIUM SERPL-SCNC: 141 MMOL/L (ref 135–147)
TRIGL SERPL-MCNC: 111 MG/DL
TSH SERPL DL<=0.05 MIU/L-ACNC: 1.98 UIU/ML (ref 0.45–4.5)
WBC # BLD AUTO: 10.94 THOUSAND/UL (ref 4.31–10.16)

## 2024-05-08 PROCEDURE — 83036 HEMOGLOBIN GLYCOSYLATED A1C: CPT

## 2024-05-08 PROCEDURE — 36415 COLL VENOUS BLD VENIPUNCTURE: CPT

## 2024-05-08 PROCEDURE — 99214 OFFICE O/P EST MOD 30 MIN: CPT | Performed by: INTERNAL MEDICINE

## 2024-05-08 PROCEDURE — 84443 ASSAY THYROID STIM HORMONE: CPT

## 2024-05-08 PROCEDURE — 80053 COMPREHEN METABOLIC PANEL: CPT

## 2024-05-08 PROCEDURE — 96372 THER/PROPH/DIAG INJ SC/IM: CPT

## 2024-05-08 PROCEDURE — 80061 LIPID PANEL: CPT

## 2024-05-08 PROCEDURE — 85025 COMPLETE CBC W/AUTO DIFF WBC: CPT

## 2024-05-08 RX ORDER — LEVOTHYROXINE SODIUM 0.05 MG/1
50 TABLET ORAL
Qty: 90 TABLET | Refills: 1 | Status: SHIPPED | OUTPATIENT
Start: 2024-05-08

## 2024-05-08 RX ORDER — UMECLIDINIUM BROMIDE AND VILANTEROL TRIFENATATE 62.5; 25 UG/1; UG/1
1 POWDER RESPIRATORY (INHALATION) DAILY
Qty: 90 EACH | Refills: 1 | Status: CANCELLED | OUTPATIENT
Start: 2024-05-08 | End: 2024-11-04

## 2024-05-08 RX ORDER — ZOSTER VACCINE RECOMBINANT, ADJUVANTED 50 MCG/0.5
0.5 KIT INTRAMUSCULAR ONCE
Qty: 1 EACH | Refills: 1 | Status: SHIPPED | OUTPATIENT
Start: 2024-05-08 | End: 2024-05-08

## 2024-05-08 RX ORDER — POTASSIUM CHLORIDE 750 MG/1
20 CAPSULE, EXTENDED RELEASE ORAL 2 TIMES DAILY
Qty: 360 CAPSULE | Refills: 1 | Status: SHIPPED | OUTPATIENT
Start: 2024-05-08

## 2024-05-08 RX ORDER — UMECLIDINIUM BROMIDE AND VILANTEROL TRIFENATATE 62.5; 25 UG/1; UG/1
1 POWDER RESPIRATORY (INHALATION) DAILY
Qty: 90 EACH | Refills: 1 | Status: SHIPPED | OUTPATIENT
Start: 2024-05-08 | End: 2024-11-04

## 2024-05-08 RX ADMIN — CYANOCOBALAMIN 1000 MCG: 1000 INJECTION, SOLUTION INTRAMUSCULAR; SUBCUTANEOUS at 09:50
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
## 2024-05-24 ENCOUNTER — TELEPHONE (OUTPATIENT)
Dept: PULMONOLOGY | Facility: CLINIC | Age: 69
End: 2024-05-24

## 2024-05-25 DIAGNOSIS — Z01.810 PREOP CARDIOVASCULAR EXAM: ICD-10-CM

## 2024-05-26 DIAGNOSIS — Z87.898 HISTORY OF ALCOHOL USE DISORDER: ICD-10-CM

## 2024-05-26 RX ORDER — ROSUVASTATIN CALCIUM 10 MG/1
10 TABLET, COATED ORAL DAILY
Qty: 90 TABLET | Refills: 1 | Status: SHIPPED | OUTPATIENT
Start: 2024-05-26

## 2024-05-27 RX ORDER — FOLIC ACID 1 MG/1
1 TABLET ORAL DAILY
Qty: 90 TABLET | Refills: 1 | Status: SHIPPED | OUTPATIENT
Start: 2024-05-27

## 2024-05-29 DIAGNOSIS — J43.9 PULMONARY EMPHYSEMA, UNSPECIFIED EMPHYSEMA TYPE (HCC): ICD-10-CM

## 2024-05-31 RX ORDER — UMECLIDINIUM BROMIDE AND VILANTEROL TRIFENATATE 62.5; 25 UG/1; UG/1
1 POWDER RESPIRATORY (INHALATION) DAILY
Qty: 90 EACH | Refills: 0 | Status: SHIPPED | OUTPATIENT
Start: 2024-05-31 | End: 2024-11-27

## 2024-06-07 ENCOUNTER — CLINICAL SUPPORT (OUTPATIENT)
Dept: INTERNAL MEDICINE CLINIC | Facility: CLINIC | Age: 69
End: 2024-06-07
Payer: COMMERCIAL

## 2024-06-07 DIAGNOSIS — E53.8 B12 DEFICIENCY: Primary | ICD-10-CM

## 2024-06-07 PROCEDURE — 96372 THER/PROPH/DIAG INJ SC/IM: CPT | Performed by: INTERNAL MEDICINE

## 2024-06-07 RX ADMIN — CYANOCOBALAMIN 1000 MCG: 1000 INJECTION, SOLUTION INTRAMUSCULAR; SUBCUTANEOUS at 09:32

## 2024-06-19 DIAGNOSIS — E83.42 HYPOMAGNESEMIA: Primary | ICD-10-CM

## 2024-06-19 RX ORDER — CARBOXYMETHYLCELLULOSE SODIUM 0.5 %
1 DROPPERETTE, SINGLE-USE DROP DISPENSER OPHTHALMIC (EYE) DAILY
Qty: 90 TABLET | Refills: 0 | Status: SHIPPED | OUTPATIENT
Start: 2024-06-19 | End: 2024-06-24 | Stop reason: SDUPTHER

## 2024-06-24 DIAGNOSIS — E83.42 HYPOMAGNESEMIA: ICD-10-CM

## 2024-06-24 DIAGNOSIS — I10 ESSENTIAL HYPERTENSION, BENIGN: ICD-10-CM

## 2024-06-24 RX ORDER — CARBOXYMETHYLCELLULOSE SODIUM 0.5 %
1 DROPPERETTE, SINGLE-USE DROP DISPENSER OPHTHALMIC (EYE) DAILY
Qty: 90 TABLET | Refills: 1 | Status: SHIPPED | OUTPATIENT
Start: 2024-06-24

## 2024-06-24 RX ORDER — AMILORIDE HYDROCHLORIDE 5 MG/1
5 TABLET ORAL DAILY
Qty: 90 TABLET | Refills: 1 | Status: SHIPPED | OUTPATIENT
Start: 2024-06-24

## 2024-06-24 NOTE — TELEPHONE ENCOUNTER
Reason for call:   [x] Refill   [] Prior Auth  [x] Other: NOT A DUPLICATE - PHARMACY CHANGE    Office:   [x] PCP/Provider - Torri Coleman MD   [] Specialty/Provider -     Medication: Magnesium Oxide 250 MG TABS     Dose/Frequency: theresa 1 tablet (250 mg total) by mouth in the morning,     Quantity: 90    Pharmacy:   [facility]/pharmacy #[id number] - [place], [place] - [address]       Does the patient have enough for 3 days?   [] Yes   [x] No - Send as HP to POD

## 2024-07-08 ENCOUNTER — CLINICAL SUPPORT (OUTPATIENT)
Dept: INTERNAL MEDICINE CLINIC | Facility: CLINIC | Age: 69
End: 2024-07-08
Payer: COMMERCIAL

## 2024-07-08 DIAGNOSIS — E53.8 VITAMIN B12 DEFICIENCY: Primary | ICD-10-CM

## 2024-07-08 PROCEDURE — 96372 THER/PROPH/DIAG INJ SC/IM: CPT

## 2024-07-08 RX ADMIN — CYANOCOBALAMIN 1000 MCG: 1000 INJECTION, SOLUTION INTRAMUSCULAR; SUBCUTANEOUS at 09:29

## 2024-07-09 DIAGNOSIS — K21.9 GASTROESOPHAGEAL REFLUX DISEASE WITHOUT ESOPHAGITIS: ICD-10-CM

## 2024-07-10 RX ORDER — OMEPRAZOLE 20 MG/1
20 CAPSULE, DELAYED RELEASE ORAL DAILY
Qty: 100 CAPSULE | Refills: 1 | Status: SHIPPED | OUTPATIENT
Start: 2024-07-10

## 2024-07-11 ENCOUNTER — ANESTHESIA (OUTPATIENT)
Dept: ANESTHESIOLOGY | Facility: HOSPITAL | Age: 69
End: 2024-07-11

## 2024-07-11 ENCOUNTER — ANESTHESIA EVENT (OUTPATIENT)
Dept: ANESTHESIOLOGY | Facility: HOSPITAL | Age: 69
End: 2024-07-11

## 2024-07-11 ENCOUNTER — APPOINTMENT (OUTPATIENT)
Dept: LAB | Facility: CLINIC | Age: 69
End: 2024-07-11
Payer: COMMERCIAL

## 2024-07-11 DIAGNOSIS — Z85.05 PERSONAL HISTORY OF LIVER CANCER: ICD-10-CM

## 2024-07-11 LAB
AFP-TM SERPL-MCNC: 3.17 NG/ML (ref 0–9)
BUN SERPL-MCNC: 16 MG/DL (ref 5–25)
CREAT SERPL-MCNC: 1.2 MG/DL (ref 0.6–1.3)
GFR SERPL CREATININE-BSD FRML MDRD: 61 ML/MIN/1.73SQ M

## 2024-07-11 PROCEDURE — 36415 COLL VENOUS BLD VENIPUNCTURE: CPT

## 2024-07-11 PROCEDURE — 84520 ASSAY OF UREA NITROGEN: CPT

## 2024-07-11 PROCEDURE — 82565 ASSAY OF CREATININE: CPT

## 2024-07-11 PROCEDURE — 82105 ALPHA-FETOPROTEIN SERUM: CPT

## 2024-07-17 ENCOUNTER — HOSPITAL ENCOUNTER (OUTPATIENT)
Dept: MRI IMAGING | Facility: HOSPITAL | Age: 69
Discharge: HOME | End: 2024-07-17
Attending: SURGERY
Payer: COMMERCIAL

## 2024-07-17 DIAGNOSIS — Z85.05 PERSONAL HISTORY OF LIVER CANCER: ICD-10-CM

## 2024-07-17 PROCEDURE — 74183 MRI ABD W/O CNTR FLWD CNTR: CPT

## 2024-07-17 PROCEDURE — A9585 GADOBUTROL INJECTION: HCPCS | Performed by: SURGERY

## 2024-07-17 RX ORDER — GADOBUTROL 604.72 MG/ML
7 INJECTION INTRAVENOUS
Status: COMPLETED | OUTPATIENT
Start: 2024-07-17 | End: 2024-07-17

## 2024-07-17 RX ADMIN — GADOBUTROL 7 ML: 604.72 INJECTION INTRAVENOUS at 08:08

## 2024-07-24 ENCOUNTER — OFFICE VISIT (OUTPATIENT)
Dept: SURGICAL ONCOLOGY | Facility: CLINIC | Age: 69
End: 2024-07-24
Payer: COMMERCIAL

## 2024-07-24 VITALS
DIASTOLIC BLOOD PRESSURE: 96 MMHG | SYSTOLIC BLOOD PRESSURE: 144 MMHG | RESPIRATION RATE: 16 BRPM | BODY MASS INDEX: 24.59 KG/M2 | HEART RATE: 93 BPM | TEMPERATURE: 97.4 F | OXYGEN SATURATION: 96 % | WEIGHT: 166 LBS | HEIGHT: 69 IN

## 2024-07-24 DIAGNOSIS — Z85.05 ENCOUNTER FOR FOLLOW-UP SURVEILLANCE OF LIVER CANCER: Primary | ICD-10-CM

## 2024-07-24 DIAGNOSIS — Z85.05 PERSONAL HISTORY OF LIVER CANCER: ICD-10-CM

## 2024-07-24 DIAGNOSIS — Z08 ENCOUNTER FOR FOLLOW-UP SURVEILLANCE OF LIVER CANCER: Primary | ICD-10-CM

## 2024-07-24 PROCEDURE — 99213 OFFICE O/P EST LOW 20 MIN: CPT

## 2024-07-24 PROCEDURE — G2211 COMPLEX E/M VISIT ADD ON: HCPCS
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
## 2024-07-24 NOTE — ASSESSMENT & PLAN NOTE
Patient is doing very well, and there is no evidence of disease recurrence on most recent imaging.  AFP level is within normal range.  We will plan to repeat MRI and AFP in 3 months.
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
## 2024-07-25 ENCOUNTER — HOSPITAL ENCOUNTER (OUTPATIENT)
Dept: GASTROENTEROLOGY | Facility: AMBULARY SURGERY CENTER | Age: 69
Setting detail: OUTPATIENT SURGERY
End: 2024-07-25
Attending: INTERNAL MEDICINE
Payer: COMMERCIAL

## 2024-07-25 ENCOUNTER — ANESTHESIA (OUTPATIENT)
Dept: GASTROENTEROLOGY | Facility: AMBULARY SURGERY CENTER | Age: 69
End: 2024-07-25

## 2024-07-25 ENCOUNTER — ANESTHESIA EVENT (OUTPATIENT)
Dept: GASTROENTEROLOGY | Facility: AMBULARY SURGERY CENTER | Age: 69
End: 2024-07-25

## 2024-07-25 VITALS
HEIGHT: 69 IN | HEART RATE: 69 BPM | DIASTOLIC BLOOD PRESSURE: 64 MMHG | WEIGHT: 158 LBS | RESPIRATION RATE: 18 BRPM | SYSTOLIC BLOOD PRESSURE: 119 MMHG | BODY MASS INDEX: 23.4 KG/M2 | TEMPERATURE: 97.9 F | OXYGEN SATURATION: 97 %

## 2024-07-25 DIAGNOSIS — Z86.010 HISTORY OF COLON POLYPS: ICD-10-CM

## 2024-07-25 PROCEDURE — 88305 TISSUE EXAM BY PATHOLOGIST: CPT | Performed by: PATHOLOGY

## 2024-07-25 PROCEDURE — 45385 COLONOSCOPY W/LESION REMOVAL: CPT | Performed by: INTERNAL MEDICINE

## 2024-07-25 RX ORDER — SODIUM CHLORIDE, SODIUM LACTATE, POTASSIUM CHLORIDE, CALCIUM CHLORIDE 600; 310; 30; 20 MG/100ML; MG/100ML; MG/100ML; MG/100ML
INJECTION, SOLUTION INTRAVENOUS CONTINUOUS PRN
Status: DISCONTINUED | OUTPATIENT
Start: 2024-07-25 | End: 2024-07-25

## 2024-07-25 RX ORDER — LIDOCAINE HYDROCHLORIDE 10 MG/ML
INJECTION, SOLUTION EPIDURAL; INFILTRATION; INTRACAUDAL; PERINEURAL AS NEEDED
Status: DISCONTINUED | OUTPATIENT
Start: 2024-07-25 | End: 2024-07-25

## 2024-07-25 RX ORDER — PROPOFOL 10 MG/ML
INJECTION, EMULSION INTRAVENOUS AS NEEDED
Status: DISCONTINUED | OUTPATIENT
Start: 2024-07-25 | End: 2024-07-25

## 2024-07-25 RX ADMIN — PROPOFOL 50 MG: 10 INJECTION, EMULSION INTRAVENOUS at 11:48

## 2024-07-25 RX ADMIN — PROPOFOL 50 MG: 10 INJECTION, EMULSION INTRAVENOUS at 11:36

## 2024-07-25 RX ADMIN — PROPOFOL 50 MG: 10 INJECTION, EMULSION INTRAVENOUS at 11:22

## 2024-07-25 RX ADMIN — PROPOFOL 50 MG: 10 INJECTION, EMULSION INTRAVENOUS at 11:44

## 2024-07-25 RX ADMIN — PROPOFOL 50 MG: 10 INJECTION, EMULSION INTRAVENOUS at 11:16

## 2024-07-25 RX ADMIN — PROPOFOL 50 MG: 10 INJECTION, EMULSION INTRAVENOUS at 11:30

## 2024-07-25 RX ADMIN — PROPOFOL 50 MG: 10 INJECTION, EMULSION INTRAVENOUS at 11:26

## 2024-07-25 RX ADMIN — PROPOFOL 100 MG: 10 INJECTION, EMULSION INTRAVENOUS at 11:10

## 2024-07-25 RX ADMIN — LIDOCAINE HYDROCHLORIDE 100 MG: 10 INJECTION, SOLUTION EPIDURAL; INFILTRATION; INTRACAUDAL at 11:10

## 2024-07-25 RX ADMIN — SODIUM CHLORIDE, SODIUM LACTATE, POTASSIUM CHLORIDE, AND CALCIUM CHLORIDE: .6; .31; .03; .02 INJECTION, SOLUTION INTRAVENOUS at 10:28

## 2024-07-25 NOTE — ANESTHESIA PREPROCEDURE EVALUATION
Procedure:  COLONOSCOPY    Relevant Problems   ANESTHESIA (within normal limits)      CARDIO   (+) Chest pain   (+) Essential hypertension   (+) Mixed hyperlipidemia   (+) Supraventricular tachycardia      ENDO   (+) Subclinical hypothyroidism      GI/HEPATIC   (+) Gastroesophageal reflux disease without esophagitis      /RENAL   (+) Acute kidney injury (HCC)   (+) Kidney stone      HEMATOLOGY   (+) Anemia      MUSCULOSKELETAL   (+) Acute bilateral low back pain with right-sided sciatica      NEURO/PSYCH   (+) Chronic, continuous use of opioids   (+) Dysthymic disorder      PULMONARY   (+) Chronic obstructive pulmonary disease (HCC)   (+) Emphysema of lung (HCC)   (+) Emphysema/COPD (HCC)   (+) Observed sleep apnea        TTE 11/3/2022    Left Ventricle: Left ventricular cavity size is normal. Wall thickness is normal. The left ventricular ejection fraction is 60%. Systolic function is normal. Wall motion is normal. Diastolic function is normal for age.    Right Ventricle: Right ventricular cavity size is normal. Systolic function is normal.      Physical Exam    Airway    Mallampati score: II  TM Distance: >3 FB  Neck ROM: full     Dental        Cardiovascular      Pulmonary      Other Findings        Anesthesia Plan  ASA Score- 3     Anesthesia Type- IV sedation with anesthesia with ASA Monitors.         Additional Monitors:     Airway Plan:            Plan Factors-Exercise tolerance (METS): >4 METS.    Chart reviewed. EKG reviewed.  Existing labs reviewed. Patient summary reviewed.    Patient is not a current smoker.              Induction- intravenous.    Postoperative Plan-         Informed Consent- Anesthetic plan and risks discussed with patient.  I personally reviewed this patient with the CRNA. Discussed and agreed on the Anesthesia Plan with the CRNA..

## 2024-07-25 NOTE — ANESTHESIA POSTPROCEDURE EVALUATION
Post-Op Assessment Note    CV Status:  Stable  Pain Score: 0    Pain management: adequate       Mental Status:  Alert and awake   Hydration Status:  Euvolemic   PONV Controlled:  Controlled   Airway Patency:  Patent     Post Op Vitals Reviewed: Yes    No anethesia notable event occurred.    Staff: CRNA           /71 (07/25/24 1150)    Temp      Pulse 74 (07/25/24 1150)   Resp 15 (07/25/24 1150)    SpO2 95 % (07/25/24 1150)
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
## 2024-07-29 DIAGNOSIS — F34.1 DYSTHYMIC DISORDER: ICD-10-CM

## 2024-07-29 RX ORDER — BUPROPION HYDROCHLORIDE 150 MG/1
150 TABLET ORAL DAILY
Qty: 90 TABLET | Refills: 1 | Status: SHIPPED | OUTPATIENT
Start: 2024-07-29

## 2024-07-30 DIAGNOSIS — J30.89 NON-SEASONAL ALLERGIC RHINITIS, UNSPECIFIED TRIGGER: ICD-10-CM

## 2024-07-30 PROCEDURE — 88305 TISSUE EXAM BY PATHOLOGIST: CPT | Performed by: PATHOLOGY

## 2024-07-31 RX ORDER — FLUTICASONE PROPIONATE 50 MCG
2 SPRAY, SUSPENSION (ML) NASAL DAILY
Qty: 48 ML | Refills: 1 | Status: SHIPPED | OUTPATIENT
Start: 2024-07-31

## 2024-08-05 ENCOUNTER — CLINICAL SUPPORT (OUTPATIENT)
Dept: INTERNAL MEDICINE CLINIC | Facility: CLINIC | Age: 69
End: 2024-08-05
Payer: COMMERCIAL

## 2024-08-05 DIAGNOSIS — E53.8 B12 DEFICIENCY: Primary | ICD-10-CM

## 2024-08-05 PROCEDURE — 96372 THER/PROPH/DIAG INJ SC/IM: CPT

## 2024-08-05 RX ADMIN — CYANOCOBALAMIN 1000 MCG: 1000 INJECTION, SOLUTION INTRAMUSCULAR; SUBCUTANEOUS at 09:35

## 2024-08-11 DIAGNOSIS — I10 ESSENTIAL HYPERTENSION: ICD-10-CM

## 2024-08-11 RX ORDER — AMLODIPINE BESYLATE 5 MG/1
5 TABLET ORAL DAILY
Qty: 90 TABLET | Refills: 1 | Status: SHIPPED | OUTPATIENT
Start: 2024-08-11

## 2024-08-13 DIAGNOSIS — I47.10 SUPRAVENTRICULAR TACHYCARDIA: ICD-10-CM

## 2024-08-14 RX ORDER — CARVEDILOL 3.12 MG/1
3.12 TABLET ORAL 2 TIMES DAILY WITH MEALS
Qty: 180 TABLET | Refills: 0 | Status: SHIPPED | OUTPATIENT
Start: 2024-08-14

## 2024-08-19 ENCOUNTER — OFFICE VISIT (OUTPATIENT)
Dept: INTERNAL MEDICINE CLINIC | Facility: CLINIC | Age: 69
End: 2024-08-19
Payer: COMMERCIAL

## 2024-08-19 VITALS
HEIGHT: 69 IN | DIASTOLIC BLOOD PRESSURE: 86 MMHG | OXYGEN SATURATION: 95 % | TEMPERATURE: 98.7 F | HEART RATE: 73 BPM | BODY MASS INDEX: 23.7 KG/M2 | SYSTOLIC BLOOD PRESSURE: 158 MMHG | WEIGHT: 160 LBS

## 2024-08-19 DIAGNOSIS — J02.9 PHARYNGITIS, UNSPECIFIED ETIOLOGY: ICD-10-CM

## 2024-08-19 DIAGNOSIS — E53.8 B12 DEFICIENCY: ICD-10-CM

## 2024-08-19 DIAGNOSIS — K21.9 GASTROESOPHAGEAL REFLUX DISEASE WITHOUT ESOPHAGITIS: Primary | ICD-10-CM

## 2024-08-19 DIAGNOSIS — I10 ESSENTIAL HYPERTENSION: ICD-10-CM

## 2024-08-19 DIAGNOSIS — J43.9 PULMONARY EMPHYSEMA, UNSPECIFIED EMPHYSEMA TYPE (HCC): ICD-10-CM

## 2024-08-19 LAB
S PYO AG THROAT QL: NEGATIVE
SARS-COV-2 AG UPPER RESP QL IA: NEGATIVE
VALID CONTROL: NORMAL

## 2024-08-19 PROCEDURE — 87811 SARS-COV-2 COVID19 W/OPTIC: CPT | Performed by: INTERNAL MEDICINE

## 2024-08-19 PROCEDURE — 87880 STREP A ASSAY W/OPTIC: CPT | Performed by: INTERNAL MEDICINE

## 2024-08-19 PROCEDURE — 99214 OFFICE O/P EST MOD 30 MIN: CPT | Performed by: INTERNAL MEDICINE

## 2024-08-19 RX ORDER — FAMOTIDINE 20 MG/1
20 TABLET, FILM COATED ORAL
Qty: 60 TABLET | Refills: 1 | Status: SHIPPED | OUTPATIENT
Start: 2024-08-19 | End: 2024-09-18
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
## 2024-08-26 DIAGNOSIS — Z01.810 PREOP CARDIOVASCULAR EXAM: ICD-10-CM

## 2024-08-27 RX ORDER — ROSUVASTATIN CALCIUM 10 MG/1
10 TABLET, COATED ORAL DAILY
Qty: 90 TABLET | Refills: 1 | Status: SHIPPED | OUTPATIENT
Start: 2024-08-27

## 2024-08-31 DIAGNOSIS — J43.9 PULMONARY EMPHYSEMA, UNSPECIFIED EMPHYSEMA TYPE (HCC): ICD-10-CM

## 2024-09-02 RX ORDER — UMECLIDINIUM BROMIDE AND VILANTEROL TRIFENATATE 62.5; 25 UG/1; UG/1
1 POWDER RESPIRATORY (INHALATION) DAILY
Qty: 90 EACH | Refills: 1 | Status: SHIPPED | OUTPATIENT
Start: 2024-09-02 | End: 2024-09-13 | Stop reason: SDUPTHER

## 2024-09-03 ENCOUNTER — CLINICAL SUPPORT (OUTPATIENT)
Dept: INTERNAL MEDICINE CLINIC | Facility: CLINIC | Age: 69
End: 2024-09-03
Payer: COMMERCIAL

## 2024-09-03 DIAGNOSIS — E53.8 B12 DEFICIENCY: Primary | ICD-10-CM

## 2024-09-03 PROCEDURE — 96372 THER/PROPH/DIAG INJ SC/IM: CPT

## 2024-09-03 RX ADMIN — CYANOCOBALAMIN 1000 MCG: 1000 INJECTION, SOLUTION INTRAMUSCULAR; SUBCUTANEOUS at 08:45

## 2024-09-13 ENCOUNTER — OFFICE VISIT (OUTPATIENT)
Dept: INTERNAL MEDICINE CLINIC | Facility: CLINIC | Age: 69
End: 2024-09-13
Payer: COMMERCIAL

## 2024-09-13 VITALS
SYSTOLIC BLOOD PRESSURE: 116 MMHG | OXYGEN SATURATION: 92 % | HEIGHT: 69 IN | BODY MASS INDEX: 23.85 KG/M2 | HEART RATE: 76 BPM | DIASTOLIC BLOOD PRESSURE: 68 MMHG | WEIGHT: 161 LBS | TEMPERATURE: 98.9 F

## 2024-09-13 DIAGNOSIS — K50.018 CROHN'S DISEASE OF SMALL INTESTINE WITH OTHER COMPLICATION (HCC): Chronic | ICD-10-CM

## 2024-09-13 DIAGNOSIS — I10 ESSENTIAL HYPERTENSION: Chronic | ICD-10-CM

## 2024-09-13 DIAGNOSIS — E78.2 MIXED HYPERLIPIDEMIA: ICD-10-CM

## 2024-09-13 DIAGNOSIS — J43.9 PULMONARY EMPHYSEMA, UNSPECIFIED EMPHYSEMA TYPE (HCC): ICD-10-CM

## 2024-09-13 DIAGNOSIS — K21.9 GASTROESOPHAGEAL REFLUX DISEASE WITHOUT ESOPHAGITIS: Primary | ICD-10-CM

## 2024-09-13 DIAGNOSIS — E03.9 ACQUIRED HYPOTHYROIDISM: ICD-10-CM

## 2024-09-13 DIAGNOSIS — R73.01 IMPAIRED FASTING BLOOD SUGAR: ICD-10-CM

## 2024-09-13 DIAGNOSIS — J43.8 OTHER EMPHYSEMA (HCC): ICD-10-CM

## 2024-09-13 PROCEDURE — G2211 COMPLEX E/M VISIT ADD ON: HCPCS | Performed by: INTERNAL MEDICINE

## 2024-09-13 PROCEDURE — 99214 OFFICE O/P EST MOD 30 MIN: CPT | Performed by: INTERNAL MEDICINE

## 2024-09-13 RX ORDER — UMECLIDINIUM BROMIDE AND VILANTEROL TRIFENATATE 62.5; 25 UG/1; UG/1
1 POWDER RESPIRATORY (INHALATION) DAILY
Qty: 90 EACH | Refills: 1 | Status: SHIPPED | OUTPATIENT
Start: 2024-09-13 | End: 2025-03-12

## 2024-09-30 DIAGNOSIS — F34.1 DYSTHYMIC DISORDER: ICD-10-CM

## 2024-09-30 DIAGNOSIS — K21.9 GASTROESOPHAGEAL REFLUX DISEASE WITHOUT ESOPHAGITIS: ICD-10-CM

## 2024-10-01 ENCOUNTER — CLINICAL SUPPORT (OUTPATIENT)
Dept: INTERNAL MEDICINE CLINIC | Facility: CLINIC | Age: 69
End: 2024-10-01
Payer: COMMERCIAL

## 2024-10-01 DIAGNOSIS — E53.8 B12 DEFICIENCY: Primary | ICD-10-CM

## 2024-10-01 PROCEDURE — 96372 THER/PROPH/DIAG INJ SC/IM: CPT

## 2024-10-01 RX ADMIN — CYANOCOBALAMIN 1000 MCG: 1000 INJECTION, SOLUTION INTRAMUSCULAR; SUBCUTANEOUS at 10:05

## 2024-10-02 RX ORDER — ALPRAZOLAM 0.25 MG
0.25 TABLET ORAL
Qty: 90 TABLET | Refills: 0 | Status: SHIPPED | OUTPATIENT
Start: 2024-10-02 | End: 2024-12-31

## 2024-10-09 DIAGNOSIS — E87.6 HYPOKALEMIA: ICD-10-CM

## 2024-10-10 RX ORDER — POTASSIUM CHLORIDE 750 MG/1
CAPSULE, EXTENDED RELEASE ORAL
Qty: 360 CAPSULE | Refills: 1 | Status: SHIPPED | OUTPATIENT
Start: 2024-10-10

## 2024-10-20 DIAGNOSIS — F34.1 DYSTHYMIC DISORDER: ICD-10-CM

## 2024-10-21 RX ORDER — MIRTAZAPINE 15 MG/1
15 TABLET, FILM COATED ORAL
Qty: 90 TABLET | Refills: 1 | Status: SHIPPED | OUTPATIENT
Start: 2024-10-21

## 2024-10-23 ENCOUNTER — APPOINTMENT (OUTPATIENT)
Dept: LAB | Facility: CLINIC | Age: 69
End: 2024-10-23
Payer: COMMERCIAL

## 2024-10-23 DIAGNOSIS — Z85.05 PERSONAL HISTORY OF LIVER CANCER: ICD-10-CM

## 2024-10-23 LAB
AFP-TM SERPL-MCNC: 3.88 NG/ML (ref 0–9)
BUN SERPL-MCNC: 11 MG/DL (ref 5–25)
CREAT SERPL-MCNC: 1.02 MG/DL (ref 0.6–1.3)
GFR SERPL CREATININE-BSD FRML MDRD: 74 ML/MIN/1.73SQ M

## 2024-10-23 PROCEDURE — 36415 COLL VENOUS BLD VENIPUNCTURE: CPT

## 2024-10-23 PROCEDURE — 82105 ALPHA-FETOPROTEIN SERUM: CPT

## 2024-10-23 PROCEDURE — 84520 ASSAY OF UREA NITROGEN: CPT

## 2024-10-23 PROCEDURE — 82565 ASSAY OF CREATININE: CPT

## 2024-10-24 ENCOUNTER — HOSPITAL ENCOUNTER (OUTPATIENT)
Dept: MRI IMAGING | Facility: HOSPITAL | Age: 69
Discharge: HOME | End: 2024-10-24
Payer: COMMERCIAL

## 2024-10-24 DIAGNOSIS — Z85.05 PERSONAL HISTORY OF LIVER CANCER: ICD-10-CM

## 2024-10-24 PROCEDURE — 74183 MRI ABD W/O CNTR FLWD CNTR: CPT

## 2024-10-24 PROCEDURE — A9585 GADOBUTROL INJECTION: HCPCS

## 2024-10-24 RX ORDER — GADOBUTROL 604.72 MG/ML
7 INJECTION INTRAVENOUS
Status: COMPLETED | OUTPATIENT
Start: 2024-10-24 | End: 2024-10-24

## 2024-10-24 RX ADMIN — GADOBUTROL 7 ML: 604.72 INJECTION INTRAVENOUS at 07:41

## 2024-10-25 ENCOUNTER — CLINICAL SUPPORT (OUTPATIENT)
Dept: INTERNAL MEDICINE CLINIC | Facility: CLINIC | Age: 69
End: 2024-10-25
Payer: COMMERCIAL

## 2024-10-25 DIAGNOSIS — E53.8 B12 DEFICIENCY: Primary | ICD-10-CM

## 2024-10-25 PROCEDURE — 96372 THER/PROPH/DIAG INJ SC/IM: CPT

## 2024-10-25 RX ADMIN — CYANOCOBALAMIN 1000 MCG: 1000 INJECTION, SOLUTION INTRAMUSCULAR; SUBCUTANEOUS at 10:00

## 2024-10-31 ENCOUNTER — OFFICE VISIT (OUTPATIENT)
Dept: SURGICAL ONCOLOGY | Facility: CLINIC | Age: 69
End: 2024-10-31
Payer: COMMERCIAL

## 2024-10-31 VITALS
HEART RATE: 78 BPM | TEMPERATURE: 97.2 F | BODY MASS INDEX: 24.76 KG/M2 | WEIGHT: 167.2 LBS | HEIGHT: 69 IN | DIASTOLIC BLOOD PRESSURE: 80 MMHG | SYSTOLIC BLOOD PRESSURE: 148 MMHG | OXYGEN SATURATION: 96 %

## 2024-10-31 DIAGNOSIS — Z85.05 PERSONAL HISTORY OF LIVER CANCER: ICD-10-CM

## 2024-10-31 DIAGNOSIS — Z85.05 ENCOUNTER FOR FOLLOW-UP SURVEILLANCE OF LIVER CANCER: Primary | ICD-10-CM

## 2024-10-31 DIAGNOSIS — Z08 ENCOUNTER FOR FOLLOW-UP SURVEILLANCE OF LIVER CANCER: Primary | ICD-10-CM

## 2024-10-31 PROCEDURE — 99213 OFFICE O/P EST LOW 20 MIN: CPT

## 2024-10-31 PROCEDURE — G2211 COMPLEX E/M VISIT ADD ON: HCPCS

## 2024-10-31 NOTE — ASSESSMENT & PLAN NOTE
All previously treated lesions remain non-viable, and there are no new or concerning areas noted on MRI.  AFP remains low.  We will plan to repeat imaging and tumor marker in 304 months, and I will see him again at that time for another clinical exam.
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
## 2024-11-01 DIAGNOSIS — E03.8 SUBCLINICAL HYPOTHYROIDISM: ICD-10-CM

## 2024-11-01 RX ORDER — LEVOTHYROXINE SODIUM 50 UG/1
50 TABLET ORAL
Qty: 90 TABLET | Refills: 1 | Status: SHIPPED | OUTPATIENT
Start: 2024-11-01

## 2024-11-08 DIAGNOSIS — I10 ESSENTIAL HYPERTENSION: ICD-10-CM

## 2024-11-08 RX ORDER — AMLODIPINE BESYLATE 5 MG/1
5 TABLET ORAL DAILY
Qty: 90 TABLET | Refills: 1 | Status: SHIPPED | OUTPATIENT
Start: 2024-11-08

## 2024-11-09 DIAGNOSIS — I47.10 SUPRAVENTRICULAR TACHYCARDIA (HCC): ICD-10-CM

## 2024-11-11 RX ORDER — CARVEDILOL 3.12 MG/1
3.12 TABLET ORAL 2 TIMES DAILY WITH MEALS
Qty: 180 TABLET | Refills: 1 | Status: SHIPPED | OUTPATIENT
Start: 2024-11-11

## 2024-11-12 ENCOUNTER — OFFICE VISIT (OUTPATIENT)
Dept: INTERNAL MEDICINE CLINIC | Facility: CLINIC | Age: 69
End: 2024-11-12
Payer: COMMERCIAL

## 2024-11-12 VITALS
WEIGHT: 165 LBS | TEMPERATURE: 98.4 F | SYSTOLIC BLOOD PRESSURE: 136 MMHG | HEART RATE: 88 BPM | HEIGHT: 69 IN | BODY MASS INDEX: 24.44 KG/M2 | OXYGEN SATURATION: 97 % | DIASTOLIC BLOOD PRESSURE: 78 MMHG

## 2024-11-12 DIAGNOSIS — K50.018 CROHN'S DISEASE OF SMALL INTESTINE WITH OTHER COMPLICATION (HCC): Chronic | ICD-10-CM

## 2024-11-12 DIAGNOSIS — F34.1 DYSTHYMIC DISORDER: ICD-10-CM

## 2024-11-12 DIAGNOSIS — Z23 ENCOUNTER FOR IMMUNIZATION: ICD-10-CM

## 2024-11-12 DIAGNOSIS — I10 ESSENTIAL HYPERTENSION: Chronic | ICD-10-CM

## 2024-11-12 DIAGNOSIS — E03.9 ACQUIRED HYPOTHYROIDISM: ICD-10-CM

## 2024-11-12 DIAGNOSIS — K21.9 GASTROESOPHAGEAL REFLUX DISEASE WITHOUT ESOPHAGITIS: ICD-10-CM

## 2024-11-12 DIAGNOSIS — J43.8 OTHER EMPHYSEMA (HCC): Primary | ICD-10-CM

## 2024-11-12 DIAGNOSIS — E53.8 B12 DEFICIENCY: ICD-10-CM

## 2024-11-12 DIAGNOSIS — E78.2 MIXED HYPERLIPIDEMIA: ICD-10-CM

## 2024-11-12 DIAGNOSIS — R73.01 IMPAIRED FASTING BLOOD SUGAR: ICD-10-CM

## 2024-11-12 DIAGNOSIS — Z85.05 PERSONAL HISTORY OF LIVER CANCER: ICD-10-CM

## 2024-11-12 DIAGNOSIS — E87.6 HYPOKALEMIA: Chronic | ICD-10-CM

## 2024-11-12 PROCEDURE — 99214 OFFICE O/P EST MOD 30 MIN: CPT | Performed by: INTERNAL MEDICINE

## 2024-11-12 PROCEDURE — G0008 ADMIN INFLUENZA VIRUS VAC: HCPCS

## 2024-11-12 PROCEDURE — 90662 IIV NO PRSV INCREASED AG IM: CPT
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
## 2024-11-30 DIAGNOSIS — J43.9 PULMONARY EMPHYSEMA, UNSPECIFIED EMPHYSEMA TYPE (HCC): ICD-10-CM

## 2024-12-01 DIAGNOSIS — F34.1 DYSTHYMIC DISORDER: ICD-10-CM

## 2024-12-01 DIAGNOSIS — I10 ESSENTIAL HYPERTENSION, BENIGN: ICD-10-CM

## 2024-12-03 ENCOUNTER — APPOINTMENT (OUTPATIENT)
Dept: LAB | Facility: CLINIC | Age: 69
End: 2024-12-03
Payer: COMMERCIAL

## 2024-12-03 DIAGNOSIS — E78.2 MIXED HYPERLIPIDEMIA: ICD-10-CM

## 2024-12-03 DIAGNOSIS — E03.9 ACQUIRED HYPOTHYROIDISM: ICD-10-CM

## 2024-12-03 DIAGNOSIS — R73.01 IMPAIRED FASTING BLOOD SUGAR: ICD-10-CM

## 2024-12-03 DIAGNOSIS — E53.8 B12 DEFICIENCY: ICD-10-CM

## 2024-12-03 DIAGNOSIS — I10 ESSENTIAL HYPERTENSION: Chronic | ICD-10-CM

## 2024-12-03 DIAGNOSIS — K50.018 CROHN'S DISEASE OF SMALL INTESTINE WITH OTHER COMPLICATION (HCC): Chronic | ICD-10-CM

## 2024-12-03 LAB
ALBUMIN SERPL BCG-MCNC: 4.3 G/DL (ref 3.5–5)
ALP SERPL-CCNC: 85 U/L (ref 34–104)
ALT SERPL W P-5'-P-CCNC: 9 U/L (ref 7–52)
ANION GAP SERPL CALCULATED.3IONS-SCNC: 6 MMOL/L (ref 4–13)
AST SERPL W P-5'-P-CCNC: 13 U/L (ref 13–39)
BASOPHILS # BLD AUTO: 0.06 THOUSANDS/ΜL (ref 0–0.1)
BASOPHILS NFR BLD AUTO: 1 % (ref 0–1)
BILIRUB SERPL-MCNC: 0.58 MG/DL (ref 0.2–1)
BUN SERPL-MCNC: 10 MG/DL (ref 5–25)
CALCIUM SERPL-MCNC: 9.2 MG/DL (ref 8.4–10.2)
CHLORIDE SERPL-SCNC: 108 MMOL/L (ref 96–108)
CHOLEST SERPL-MCNC: 101 MG/DL (ref ?–200)
CO2 SERPL-SCNC: 29 MMOL/L (ref 21–32)
CREAT SERPL-MCNC: 0.95 MG/DL (ref 0.6–1.3)
EOSINOPHIL # BLD AUTO: 0.16 THOUSAND/ΜL (ref 0–0.61)
EOSINOPHIL NFR BLD AUTO: 2 % (ref 0–6)
ERYTHROCYTE [DISTWIDTH] IN BLOOD BY AUTOMATED COUNT: 13.5 % (ref 11.6–15.1)
EST. AVERAGE GLUCOSE BLD GHB EST-MCNC: 111 MG/DL
GFR SERPL CREATININE-BSD FRML MDRD: 81 ML/MIN/1.73SQ M
GLUCOSE P FAST SERPL-MCNC: 96 MG/DL (ref 65–99)
HBA1C MFR BLD: 5.5 %
HCT VFR BLD AUTO: 43.6 % (ref 36.5–49.3)
HDLC SERPL-MCNC: 52 MG/DL
HGB BLD-MCNC: 14.6 G/DL (ref 12–17)
IMM GRANULOCYTES # BLD AUTO: 0.03 THOUSAND/UL (ref 0–0.2)
IMM GRANULOCYTES NFR BLD AUTO: 0 % (ref 0–2)
LDLC SERPL CALC-MCNC: 21 MG/DL (ref 0–100)
LYMPHOCYTES # BLD AUTO: 2.34 THOUSANDS/ΜL (ref 0.6–4.47)
LYMPHOCYTES NFR BLD AUTO: 24 % (ref 14–44)
MCH RBC QN AUTO: 31.9 PG (ref 26.8–34.3)
MCHC RBC AUTO-ENTMCNC: 33.5 G/DL (ref 31.4–37.4)
MCV RBC AUTO: 95 FL (ref 82–98)
MONOCYTES # BLD AUTO: 0.61 THOUSAND/ΜL (ref 0.17–1.22)
MONOCYTES NFR BLD AUTO: 6 % (ref 4–12)
NEUTROPHILS # BLD AUTO: 6.47 THOUSANDS/ΜL (ref 1.85–7.62)
NEUTS SEG NFR BLD AUTO: 67 % (ref 43–75)
NRBC BLD AUTO-RTO: 0 /100 WBCS
PLATELET # BLD AUTO: 186 THOUSANDS/UL (ref 149–390)
PMV BLD AUTO: 10.4 FL (ref 8.9–12.7)
POTASSIUM SERPL-SCNC: 4.3 MMOL/L (ref 3.5–5.3)
PROT SERPL-MCNC: 7.3 G/DL (ref 6.4–8.4)
RBC # BLD AUTO: 4.57 MILLION/UL (ref 3.88–5.62)
SODIUM SERPL-SCNC: 143 MMOL/L (ref 135–147)
TRIGL SERPL-MCNC: 142 MG/DL (ref ?–150)
TSH SERPL DL<=0.05 MIU/L-ACNC: 1.64 UIU/ML (ref 0.45–4.5)
VIT B12 SERPL-MCNC: 275 PG/ML (ref 180–914)
WBC # BLD AUTO: 9.67 THOUSAND/UL (ref 4.31–10.16)

## 2024-12-03 PROCEDURE — 85025 COMPLETE CBC W/AUTO DIFF WBC: CPT

## 2024-12-03 PROCEDURE — 83036 HEMOGLOBIN GLYCOSYLATED A1C: CPT

## 2024-12-03 PROCEDURE — 82607 VITAMIN B-12: CPT

## 2024-12-03 PROCEDURE — 83918 ORGANIC ACIDS TOTAL QUANT: CPT

## 2024-12-03 PROCEDURE — 36415 COLL VENOUS BLD VENIPUNCTURE: CPT

## 2024-12-03 PROCEDURE — 80053 COMPREHEN METABOLIC PANEL: CPT

## 2024-12-03 PROCEDURE — 84443 ASSAY THYROID STIM HORMONE: CPT

## 2024-12-03 PROCEDURE — 80061 LIPID PANEL: CPT

## 2024-12-03 RX ORDER — AMILORIDE HYDROCHLORIDE 5 MG/1
5 TABLET ORAL DAILY
Qty: 90 TABLET | Refills: 1 | Status: SHIPPED | OUTPATIENT
Start: 2024-12-03

## 2024-12-03 RX ORDER — BUPROPION HYDROCHLORIDE 150 MG/1
150 TABLET ORAL DAILY
Qty: 90 TABLET | Refills: 1 | Status: SHIPPED | OUTPATIENT
Start: 2024-12-03

## 2024-12-04 RX ORDER — UMECLIDINIUM BROMIDE AND VILANTEROL TRIFENATATE 62.5; 25 UG/1; UG/1
1 POWDER RESPIRATORY (INHALATION) DAILY
Qty: 60 EACH | Refills: 1 | Status: SHIPPED | OUTPATIENT
Start: 2024-12-04 | End: 2025-06-02

## 2024-12-07 LAB — METHYLMALONATE SERPL-SCNC: 215 NMOL/L (ref 0–378)

## 2024-12-12 ENCOUNTER — OFFICE VISIT (OUTPATIENT)
Dept: INTERNAL MEDICINE CLINIC | Facility: CLINIC | Age: 69
End: 2024-12-12
Payer: COMMERCIAL

## 2024-12-12 VITALS
HEIGHT: 69 IN | DIASTOLIC BLOOD PRESSURE: 76 MMHG | SYSTOLIC BLOOD PRESSURE: 132 MMHG | HEART RATE: 74 BPM | OXYGEN SATURATION: 96 % | TEMPERATURE: 97.4 F | WEIGHT: 166 LBS | BODY MASS INDEX: 24.59 KG/M2

## 2024-12-12 DIAGNOSIS — J43.8 OTHER EMPHYSEMA (HCC): ICD-10-CM

## 2024-12-12 DIAGNOSIS — E03.9 ACQUIRED HYPOTHYROIDISM: ICD-10-CM

## 2024-12-12 DIAGNOSIS — I10 ESSENTIAL HYPERTENSION: Primary | Chronic | ICD-10-CM

## 2024-12-12 DIAGNOSIS — K50.018 CROHN'S DISEASE OF SMALL INTESTINE WITH OTHER COMPLICATION (HCC): Chronic | ICD-10-CM

## 2024-12-12 DIAGNOSIS — E87.6 HYPOKALEMIA: Chronic | ICD-10-CM

## 2024-12-12 DIAGNOSIS — E53.8 VITAMIN B12 DEFICIENCY: ICD-10-CM

## 2024-12-12 DIAGNOSIS — K21.9 GASTROESOPHAGEAL REFLUX DISEASE WITHOUT ESOPHAGITIS: ICD-10-CM

## 2024-12-12 PROCEDURE — 96372 THER/PROPH/DIAG INJ SC/IM: CPT | Performed by: INTERNAL MEDICINE

## 2024-12-12 PROCEDURE — G2211 COMPLEX E/M VISIT ADD ON: HCPCS | Performed by: INTERNAL MEDICINE

## 2024-12-12 PROCEDURE — 99214 OFFICE O/P EST MOD 30 MIN: CPT | Performed by: INTERNAL MEDICINE

## 2024-12-12 RX ADMIN — CYANOCOBALAMIN 1000 MCG: 1000 INJECTION, SOLUTION INTRAMUSCULAR; SUBCUTANEOUS at 15:56
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
## 2024-12-19 DIAGNOSIS — K21.9 GASTROESOPHAGEAL REFLUX DISEASE WITHOUT ESOPHAGITIS: ICD-10-CM

## 2024-12-19 RX ORDER — FAMOTIDINE 20 MG/1
20 TABLET, FILM COATED ORAL
Qty: 60 TABLET | Refills: 1 | Status: SHIPPED | OUTPATIENT
Start: 2024-12-19

## 2025-01-14 ENCOUNTER — OFFICE VISIT (OUTPATIENT)
Dept: INTERNAL MEDICINE CLINIC | Facility: CLINIC | Age: 70
End: 2025-01-14
Payer: COMMERCIAL

## 2025-01-14 VITALS
HEIGHT: 69 IN | DIASTOLIC BLOOD PRESSURE: 80 MMHG | OXYGEN SATURATION: 99 % | TEMPERATURE: 98.6 F | HEART RATE: 86 BPM | WEIGHT: 162 LBS | BODY MASS INDEX: 23.99 KG/M2 | SYSTOLIC BLOOD PRESSURE: 132 MMHG

## 2025-01-14 DIAGNOSIS — J43.8 OTHER EMPHYSEMA (HCC): ICD-10-CM

## 2025-01-14 DIAGNOSIS — K50.018 CROHN'S DISEASE OF SMALL INTESTINE WITH OTHER COMPLICATION (HCC): Chronic | ICD-10-CM

## 2025-01-14 DIAGNOSIS — F34.1 DYSTHYMIC DISORDER: ICD-10-CM

## 2025-01-14 DIAGNOSIS — M79.671 RIGHT FOOT PAIN: Primary | ICD-10-CM

## 2025-01-14 DIAGNOSIS — I10 ESSENTIAL HYPERTENSION: Chronic | ICD-10-CM

## 2025-01-14 DIAGNOSIS — E78.2 MIXED HYPERLIPIDEMIA: ICD-10-CM

## 2025-01-14 DIAGNOSIS — R73.01 IMPAIRED FASTING BLOOD SUGAR: ICD-10-CM

## 2025-01-14 DIAGNOSIS — E03.9 ACQUIRED HYPOTHYROIDISM: ICD-10-CM

## 2025-01-14 DIAGNOSIS — E53.8 VITAMIN B12 DEFICIENCY: ICD-10-CM

## 2025-01-14 DIAGNOSIS — E87.6 HYPOKALEMIA: Chronic | ICD-10-CM

## 2025-01-14 DIAGNOSIS — K21.9 GASTROESOPHAGEAL REFLUX DISEASE WITHOUT ESOPHAGITIS: ICD-10-CM

## 2025-01-14 DIAGNOSIS — Z00.00 MEDICARE ANNUAL WELLNESS VISIT, SUBSEQUENT: ICD-10-CM

## 2025-01-14 PROCEDURE — 99214 OFFICE O/P EST MOD 30 MIN: CPT | Performed by: INTERNAL MEDICINE

## 2025-01-14 PROCEDURE — G0439 PPPS, SUBSEQ VISIT: HCPCS | Performed by: INTERNAL MEDICINE

## 2025-01-14 PROCEDURE — 96372 THER/PROPH/DIAG INJ SC/IM: CPT | Performed by: INTERNAL MEDICINE

## 2025-01-14 RX ADMIN — CYANOCOBALAMIN 1000 MCG: 1000 INJECTION, SOLUTION INTRAMUSCULAR; SUBCUTANEOUS at 10:26
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
## 2025-01-14 NOTE — PROGRESS NOTES
"Answers submitted by the patient for this visit:  Medicare Annual Wellness Visit (Submitted on 1/7/2025)  How would you rate your overall health?: fair  Compared to last year, how is your physical health?: same  In general, how satisfied are you with your life?: satisfied  Compared to last year, how is your eyesight?: same  Compared to last year, how is your hearing?: same  Compared to last year, how is your emotional/mental health?: same  How often is anger a problem for you?: never, rarely  How often do you feel unusually tired/fatigued?: never, rarely  In the past 7 days, how much pain have you experienced?: some  If you answered \"some\" or \"a lot\", please rate the severity of your pain on a scale of 1 to 10 (1 being the least severe pain and 10 being the most intense pain).: 5/10  In the past 6 months, have you lost or gained 10 pounds without trying?: No  One or more falls in the last year: No  Do you have trouble with the stairs inside or outside your home?: No  Does your home have working smoke alarms?: Yes  Does your home have a carbon monoxide monitor?: Yes  Which safety hazards (if any) have you experienced in your home? Please select all that apply.: none  How would you describe your current diet? Please select all that apply.: Regular  In addition to prescription medications, are you taking any over-the-counter supplements?: Yes  If yes, what supplements are you taking?: Vitamin D  Can you manage your medications?: Yes  Are you currently taking any opioid medications?: Yes  Can you walk and transfer into and out of your bed and chair?: Yes  Can you dress and groom yourself?: Yes  Can you bathe or shower yourself?: Yes  Can you feed yourself?: Yes  Can you do your laundry/ housekeeping?: Yes  Can you manage your money, pay your bills, and track your expenses?: Yes  Can you make your own meals?: Yes  Can you do your own shopping?: Yes  Within the last 12 months, have you had any hospitalizations or " Emergency Department visits?: Yes  If yes, how many times have you been hospitalized within the past year?: 1-2  Do you have a living will?: Yes  Do you have a Durable POA (Power of ) for healthcare decisions?: Yes  Do you have an Advanced Directive for end of life decisions?: Yes  How often have you used an illegal drug (including marijuana) or a prescription medication for non-medical reasons in the past year?: never  What is the typical number of drinks you consume in a day?: 0  What is the typical number of drinks you consume in a week?: 3  How often did you have a drink containing alcohol in the past year?: 2 to 4 times a month  How many drinks did you have on a typical day  when you were drinking in the past year?: 1 to 2  How often did you have 6 or more drinks on one occasion in the past year?: never

## 2025-01-15 DIAGNOSIS — E83.42 HYPOMAGNESEMIA: ICD-10-CM

## 2025-01-15 RX ORDER — CARBOXYMETHYLCELLULOSE SODIUM 0.5 %
1 DROPPERETTE, SINGLE-USE DROP DISPENSER OPHTHALMIC (EYE) DAILY
Qty: 90 TABLET | Refills: 0 | OUTPATIENT
Start: 2025-01-15

## 2025-01-24 DIAGNOSIS — J30.89 NON-SEASONAL ALLERGIC RHINITIS, UNSPECIFIED TRIGGER: ICD-10-CM

## 2025-01-24 RX ORDER — FLUTICASONE PROPIONATE 50 MCG
SPRAY, SUSPENSION (ML) NASAL
Qty: 48 ML | Refills: 1 | Status: SHIPPED | OUTPATIENT
Start: 2025-01-24

## 2025-01-26 DIAGNOSIS — Z87.898 HISTORY OF ALCOHOL USE DISORDER: ICD-10-CM

## 2025-01-27 RX ORDER — FOLIC ACID 1 MG/1
1 TABLET ORAL DAILY
Qty: 90 TABLET | Refills: 1 | Status: SHIPPED | OUTPATIENT
Start: 2025-01-27

## 2025-02-06 DIAGNOSIS — F34.1 DYSTHYMIC DISORDER: ICD-10-CM

## 2025-02-06 RX ORDER — MIRTAZAPINE 15 MG/1
15 TABLET, FILM COATED ORAL
Qty: 90 TABLET | Refills: 1 | Status: SHIPPED | OUTPATIENT
Start: 2025-02-06

## 2025-02-10 ENCOUNTER — TELEPHONE (OUTPATIENT)
Dept: INTERNAL MEDICINE CLINIC | Facility: CLINIC | Age: 70
End: 2025-02-10

## 2025-02-10 NOTE — TELEPHONE ENCOUNTER
Patient called the RX Refill Line. Message is being forwarded to the office.     Patient is requesting Patient called stating [employer] will be sending forms for ostomy supplies to your office. Please complete and send completed forms back as soon as possible. Patient is in need of supplies. Make not to check the box for a 90 Day Supply per patient request.    Please contact patient at  [phone]

## 2025-02-11 ENCOUNTER — APPOINTMENT (OUTPATIENT)
Dept: LAB | Facility: CLINIC | Age: 70
End: 2025-02-11
Payer: COMMERCIAL

## 2025-02-11 ENCOUNTER — CLINICAL SUPPORT (OUTPATIENT)
Dept: INTERNAL MEDICINE CLINIC | Facility: CLINIC | Age: 70
End: 2025-02-11
Payer: COMMERCIAL

## 2025-02-11 DIAGNOSIS — E53.8 B12 DEFICIENCY: Primary | ICD-10-CM

## 2025-02-11 DIAGNOSIS — Z85.05 PERSONAL HISTORY OF LIVER CANCER: ICD-10-CM

## 2025-02-11 LAB
AFP-TM SERPL-MCNC: 4.15 NG/ML (ref 0–9)
BUN SERPL-MCNC: 12 MG/DL (ref 5–25)
CREAT SERPL-MCNC: 1.04 MG/DL (ref 0.6–1.3)
GFR SERPL CREATININE-BSD FRML MDRD: 72 ML/MIN/1.73SQ M

## 2025-02-11 PROCEDURE — 82565 ASSAY OF CREATININE: CPT

## 2025-02-11 PROCEDURE — 84520 ASSAY OF UREA NITROGEN: CPT

## 2025-02-11 PROCEDURE — 36415 COLL VENOUS BLD VENIPUNCTURE: CPT

## 2025-02-11 PROCEDURE — 82105 ALPHA-FETOPROTEIN SERUM: CPT

## 2025-02-11 PROCEDURE — 96372 THER/PROPH/DIAG INJ SC/IM: CPT

## 2025-02-11 RX ADMIN — CYANOCOBALAMIN 1000 MCG: 1000 INJECTION, SOLUTION INTRAMUSCULAR; SUBCUTANEOUS at 09:35

## 2025-02-21 ENCOUNTER — HOSPITAL ENCOUNTER (OUTPATIENT)
Dept: MRI IMAGING | Facility: HOSPITAL | Age: 70
Discharge: HOME | End: 2025-02-21
Payer: COMMERCIAL

## 2025-02-21 ENCOUNTER — APPOINTMENT (OUTPATIENT)
Dept: RADIOLOGY | Facility: HOSPITAL | Age: 70
End: 2025-02-21
Payer: COMMERCIAL

## 2025-02-21 DIAGNOSIS — Z85.05 PERSONAL HISTORY OF LIVER CANCER: ICD-10-CM

## 2025-02-21 PROCEDURE — 74183 MRI ABD W/O CNTR FLWD CNTR: CPT

## 2025-02-21 PROCEDURE — A9585 GADOBUTROL INJECTION: HCPCS

## 2025-02-21 RX ORDER — GADOBUTROL 604.72 MG/ML
7 INJECTION INTRAVENOUS
Status: COMPLETED | OUTPATIENT
Start: 2025-02-21 | End: 2025-02-21

## 2025-02-21 RX ADMIN — GADOBUTROL 7 ML: 604.72 INJECTION INTRAVENOUS at 07:02

## 2025-02-28 ENCOUNTER — OFFICE VISIT (OUTPATIENT)
Dept: SURGICAL ONCOLOGY | Facility: CLINIC | Age: 70
End: 2025-02-28
Payer: COMMERCIAL

## 2025-02-28 VITALS
WEIGHT: 167 LBS | HEIGHT: 69 IN | TEMPERATURE: 98 F | BODY MASS INDEX: 24.73 KG/M2 | DIASTOLIC BLOOD PRESSURE: 60 MMHG | HEART RATE: 83 BPM | SYSTOLIC BLOOD PRESSURE: 130 MMHG | OXYGEN SATURATION: 95 %

## 2025-02-28 DIAGNOSIS — Z85.05 PERSONAL HISTORY OF LIVER CANCER: Primary | ICD-10-CM

## 2025-02-28 DIAGNOSIS — K63.5 MULTIPLE POLYPS OF SIGMOID COLON: Primary | ICD-10-CM

## 2025-02-28 DIAGNOSIS — R91.1 LUNG NODULE: ICD-10-CM

## 2025-02-28 PROCEDURE — G2211 COMPLEX E/M VISIT ADD ON: HCPCS

## 2025-02-28 PROCEDURE — 99213 OFFICE O/P EST LOW 20 MIN: CPT

## 2025-02-28 NOTE — PROGRESS NOTES
Name: Jose Juan Elkins III      : 1955      MRN: [id number]  Encounter Provider: RAMANA Khan  Encounter Date: 2025   Encounter department: [facility] [place]  :  Assessment & Plan  Personal history of liver cancer  Patient is doing very well. MRI demonstrates stability of three previously treated non-viable lesions, and there are no new lesions. His AFP level remains low. We will see him again in 6 months for another clinical exam with AFP level and MRI, and I will also order chest CT since it has been a year since his last one.    Orders:  •  AFP tumor marker; Future  •  BUN; Future  •  Creatinine, serum; Future  •  MRI abdomen w wo contrast; Future  •  CT chest wo contrast; Future    Lung nodule    Orders:  •  CT chest wo contrast; Future        History of Present Illness   Chief Complaint   Patient presents with   • Follow-up     Return in about 6 months (around 2025) for Office visit with Dr Schafer, Imaging - See orders, Labs - See Treatment Plan.    Pertinent Medical History   This is a 68 y/o male who returns to the office today in follow-up for his history of recurrent HCC. He is currently ARLYN at 2 years from his most recent treatment.  He reports he feels well overall, and has no complaints at this time.  He is not experiencing any abdominal pain, bloating or distention.  He does have chronic but stable SOB secondary to COPD.  A recent AFP level has been drawn, and MRI was performed on .  I have reviewed these results with the patient today.       Oncology History   Cancer Staging   Personal history of liver cancer  Staging form: Liver (Excluding Intrahepatic Bile Ducts), AJCC 8th Edition  - Clinical stage from 2020: Stage IB (cT1b, cN0, cM0) - Signed by RAMANA Khan on 2023  Stage prefix: Initial diagnosis  Oncology History   Personal history of liver cancer   2020 Initial Diagnosis    Hepatocellular carcinoma,  moderately differentiated     6/8/2020 -  Cancer Staged    Staging form: Liver (Excluding Intrahepatic Bile Ducts), AJCC 8th Edition  - Clinical stage from 6/8/2020: Stage IB (cT1b, cN0, cM0) - Signed by RAMANA Khan on 12/8/2023  Stage prefix: Initial diagnosis       7/15/2020 Surgery    Surgical microwave ablation of liver segment 5     9/21/2022 -  Radiation    IR microwave ablation of segment 5 lesion     3/16/2023 -  Radiation    IR microwave ablation of segment 5/6 lesion          Review of Systems   Constitutional:  Negative for activity change, appetite change, fatigue and unexpected weight change.   HENT: Negative.     Respiratory: Negative.     Cardiovascular: Negative.    Gastrointestinal: Negative.  Negative for abdominal distention, abdominal pain, nausea and vomiting.   Musculoskeletal: Negative.    Skin: Negative.  Negative for color change.   Neurological: Negative.    Hematological: Negative.  Negative for adenopathy.   Psychiatric/Behavioral: Negative.             Current Outpatient Medications   Medication Sig Dispense Refill   • ALPRAZolam (XANAX) 0.25 mg tablet Take 1 tablet (0.25 mg total) by mouth daily at bedtime as needed for anxiety 90 tablet 0   • AMILoride 5 mg tablet TAKE 1 TABLET (5 MG TOTAL) BY MOUTH DAILY. 90 tablet 1   • amLODIPine (NORVASC) 5 mg tablet Take 1 tablet (5 mg total) by mouth daily 90 tablet 1   • buPROPion (WELLBUTRIN XL) 150 mg 24 hr tablet TAKE 1 TABLET BY MOUTH EVERY DAY 90 tablet 1   • calcium carbonate (TUMS) 500 mg chewable tablet Chew 1 tablet (500 mg total) daily  0   • carvedilol (COREG) 3.125 mg tablet TAKE 1 TABLET BY MOUTH TWICE A DAY WITH MEALS 180 tablet 1   • cholecalciferol (VITAMIN D3) 400 units tablet Take 400 Units by mouth Every Sunday     • [facility] Magnesium Oxide 250 MG TABS Take 1 tablet (250 mg total) by mouth in the morning 90 tablet 1   • famotidine (PEPCID) 20 mg tablet TAKE 1 TABLET BY MOUTH DAILY AT BEDTIME 60 tablet 1   • fluticasone  "(FLONASE) 50 mcg/act nasal spray SPRAY 2 SPRAYS INTO EACH NOSTRIL EVERY DAY 48 mL 1   • fluticasone-umeclidinium-vilanterol 100-62.5-25 mcg/actuation inhaler Inhale 1 puff daily Rinse mouth after use. 1 each 5   • folic acid (FOLVITE) 1 mg tablet Take 1 tablet (1 mg total) by mouth daily 90 tablet 1   • levothyroxine 50 mcg tablet TAKE 1 TABLET (50 MCG TOTAL) BY MOUTH DAILY IN THE EARLY MORNING 90 tablet 1   • mirtazapine (REMERON) 15 mg tablet TAKE 1 TABLET BY MOUTH EVERYDAY AT BEDTIME 90 tablet 1   • omeprazole (PriLOSEC) 20 mg delayed release capsule Take 1 capsule (20 mg total) by mouth daily 100 capsule 1   • Ostomy Supplies (Adapt Barrier) STRP Use daily as needed (PRN) 100 strip 6   • Ostomy Supplies (Premier Convex Skin Barrier) MISC Use daily as needed (PRN) 50 each 6   • Ostomy Supplies KIT Skin barries w/ mold to fit opening 12\" pouch w/ 2 sided comfort panel esenta sting free adhesive remover Sting free barrier wipes 1 kit 0   • Ostomy Supplies OINT Use as needed (as needed for every use) 30 each 0   • Ostomy Supplies Pouch MISC Use as needed (use as needed for every use) 30 each 0   • potassium chloride (MICRO-K) 10 MEQ CR capsule TAKE 2 CAPSULES (20 MEQ TOTAL) BY MOUTH TWICE A  capsule 1   • rosuvastatin (CRESTOR) 10 MG tablet TAKE 1 TABLET BY MOUTH EVERY DAY 90 tablet 1   • VIT B12-METHIONINE-INOS-CHOL IM Inject into a muscle every 30 (thirty) days     • Anoro Ellipta 62.5-25 MCG/ACT inhaler INHALE 1 PUFF DAILY (Patient not taking: Reported on 12/12/2024) 60 each 1   • naloxone (NARCAN) 4 mg/0.1 mL nasal spray Administer 1 spray into a nostril. If no response after 2-3 minutes, give another dose in the other nostril using a new spray. 1 each 1   • oxyCODONE-acetaminophen (Percocet) 5-325 mg per tablet Take 1 tablet by mouth every 6 (six) hours as needed for moderate pain Max Daily Amount: 4 tablets (Patient not taking: Reported on 11/12/2024) 30 tablet 0     Current Facility-Administered " "Medications   Medication Dose Route Frequency Provider Last Rate Last Admin   • cyanocobalamin injection 1,000 mcg  1,000 mcg Intramuscular Q30 Days Torri Coleman MD   1,000 mcg at 01/14/25 1026   • cyanocobalamin injection 1,000 mcg  1,000 mcg Intramuscular Q30 Days Torri Coleman MD   1,000 mcg at 08/14/23 1121   • cyanocobalamin injection 1,000 mcg  1,000 mcg Intramuscular Q30 Days Eduar Caruso MD   1,000 mcg at 02/11/25 0935      Objective   /60   Pulse 83   Temp 98 °F (36.7 °C)   Ht 5' 9\" (1.753 m)   Wt 75.8 kg (167 lb)   SpO2 95%   BMI 24.66 kg/m²     Pain Screening:  Pain Score: 0-No pain    Physical Exam  Vitals reviewed.   Constitutional:       General: He is not in acute distress.     Appearance: Normal appearance. He is normal weight. He is not ill-appearing or toxic-appearing.   HENT:      Head: Normocephalic and atraumatic.   Eyes:      General: No scleral icterus.  Cardiovascular:      Rate and Rhythm: Normal rate.   Pulmonary:      Effort: Pulmonary effort is normal.   Abdominal:      General: Abdomen is flat. There is no distension.      Palpations: Abdomen is soft. There is no mass.      Tenderness: There is no abdominal tenderness. There is no guarding.   Musculoskeletal:         General: Normal range of motion.      Cervical back: Normal range of motion.   Skin:     General: Skin is warm and dry.      Coloration: Skin is not jaundiced.   Neurological:      General: No focal deficit present.      Mental Status: He is alert and oriented to person, place, and time.   Psychiatric:         Mood and Affect: Mood normal.         Behavior: Behavior normal.         Thought Content: Thought content normal.         Judgment: Judgment normal.              Labs: I have reviewed pertinent labs.   AFP tumor marker  Order: [id number]      Component  Ref Range & Units (hover) 2/11/2025   AFP TUMOR MARKER 4.15            Radiology Results Review: I have reviewed radiology reports from 2/11/2025 " including: MRI of the abdomen.    MRI abdomen w wo contrast  Narrative & Impression   MRI OF THE ABDOMEN WITH AND WITHOUT CONTRAST     INDICATION: 69 years / Male. Z85.05: Personal history of malignant neoplasm of liver. Per surgical oncology clinical notes: Hepatocellular carcinoma treated with microwave ablation in 2020, 2022 and 2023. Noncirrhotic liver. EtOH.     COMPARISON: 7/17/2024 and 10/24/2024     TECHNIQUE: Multiplanar/multisequence MRI of the abdomen with MRCP was performed before and after administration of contrast.     IV Contrast: 7 mL of Gadobutrol injection     Image quality: Diagnostic.     FINDINGS:     LOWER CHEST:   Within normal limits.     LIVER:  Preserved signal intensity and morphology.     Treated lesions (remeasured on the prior studies by this reader today):  #1: Segment 5, 11/170:  Treatment zone size: 3.7 x 2.6 cm cm depth X width. Stable.  Masslike enhancement: None.  Restricted diffusion: None.  Unchanged treatment related signal changes.  LR-TR category: LR-TR nonviable.     # 2: Segment 5, 11/179:  2.1 x 1.5 cm stable  Masslike enhancement: None.  Restricted diffusion: None.  Unchanged treatment related signal changes.  LR-TR category: LR-TR nonviable     #3: Segment 5/6, 11/179:  Treatment zone size: 2.9 x 1.2 cm depth X width. Stable.  Masslike enhancement: None.  Restricted diffusion: None.  LR-TR category: LR-TR nonviable.     No new observations.     Patent hepatic and portal veins.     BILE DUCTS: Unchanged small dilated peripheral ducts around the treated lesions. Otherwise cholecystectomy.     GALLBLADDER:  Within normal limits.     SPLEEN:  Within normal limits.     PANCREAS:  Within normal limits. Normal duct caliber and morphology.     ADRENAL GLANDS:  Within normal limits.     KIDNEYS/PROXIMAL URETERS: Small left lower pole cyst. Otherwise within normal limits.     BOWEL:   Within normal limits.     PERITONEUM/RETROPERITONEUM:  No ascites or fluid collections.      LYMPH NODES:  No enlarged nodes.     VASCULAR STRUCTURES:  Within normal limits.     BONES: Degenerative changes and chronic bilateral femoral head AVN without articular surface collapse.     ABDOMINAL WALL:  Within normal limits.     IMPRESSION:     Stable appearance of treated hepatic lesions. No evidence of recurrent or new malignancy.

## 2025-02-28 NOTE — ASSESSMENT & PLAN NOTE
Patient is doing very well. MRI demonstrates stability of three previously treated non-viable lesions, and there are no new lesions. His AFP level remains low. We will see him again in 6 months for another clinical exam with AFP level and MRI, and I will also order chest CT since it has been a year since his last one.    Orders:  •  AFP tumor marker; Future  •  BUN; Future  •  Creatinine, serum; Future  •  MRI abdomen w wo contrast; Future  •  CT chest wo contrast; Future

## 2025-02-28 NOTE — LETTER
2025     Asa Schafer MD  [address] [facility] [address]  2nd Floor  [facility] [address]    Patient: Jose Juan Elkins III   YOB: 1955   Date of Visit: 2025       Dear Dr. Schafer:    Thank you for referring Jose Juan Elkins to me for evaluation. Below are my notes for this consultation.    If you have questions, please do not hesitate to call me. I look forward to following your patient along with you.         Sincerely,        RAMANA Khan        CC: No Recipients    RAMANA Khan  2025 10:34 AM  Sign when Signing Visit  Name: Jose Juan Elkins III      : 1955      MRN: [id number]  Encounter Provider: RAMANA Khan  Encounter Date: 2025   Encounter department: [facility] [place]  :  Assessment & Plan  Personal history of liver cancer  Patient is doing very well. MRI demonstrates stability of three previously treated non-viable lesions, and there are no new lesions. His AFP level remains low. We will see him again in 6 months for another clinical exam with AFP level and MRI, and I will also order chest CT since it has been a year since his last one.    Orders:  •  AFP tumor marker; Future  •  BUN; Future  •  Creatinine, serum; Future  •  MRI abdomen w wo contrast; Future  •  CT chest wo contrast; Future    Lung nodule    Orders:  •  CT chest wo contrast; Future        History of Present Illness  Chief Complaint   Patient presents with   • Follow-up     Return in about 6 months (around 2025) for Office visit with Dr Schafer, Imaging - See orders, Labs - See Treatment Plan.    Pertinent Medical History  This is a 70 y/o male who returns to the office today in follow-up for his history of recurrent HCC. He is currently ARLYN at 2 years from his most recent treatment.  He reports he feels well overall, and has no complaints at this time.  He is not experiencing any abdominal pain, bloating or distention.  He does have chronic but  stable SOB secondary to COPD.  A recent AFP level has been drawn, and MRI was performed on February 21.  I have reviewed these results with the patient today.      Oncology History  Cancer Staging   Personal history of liver cancer  Staging form: Liver (Excluding Intrahepatic Bile Ducts), AJCC 8th Edition  - Clinical stage from 6/8/2020: Stage IB (cT1b, cN0, cM0) - Signed by RAMANA Khan on 12/8/2023  Stage prefix: Initial diagnosis  Oncology History   Personal history of liver cancer   6/8/2020 Initial Diagnosis    Hepatocellular carcinoma, moderately differentiated     6/8/2020 -  Cancer Staged    Staging form: Liver (Excluding Intrahepatic Bile Ducts), AJCC 8th Edition  - Clinical stage from 6/8/2020: Stage IB (cT1b, cN0, cM0) - Signed by RAMANA Khan on 12/8/2023  Stage prefix: Initial diagnosis       7/15/2020 Surgery    Surgical microwave ablation of liver segment 5     9/21/2022 -  Radiation    IR microwave ablation of segment 5 lesion     3/16/2023 -  Radiation    IR microwave ablation of segment 5/6 lesion         Review of Systems   Constitutional:  Negative for activity change, appetite change, fatigue and unexpected weight change.   HENT: Negative.     Respiratory: Negative.     Cardiovascular: Negative.    Gastrointestinal: Negative.  Negative for abdominal distention, abdominal pain, nausea and vomiting.   Musculoskeletal: Negative.    Skin: Negative.  Negative for color change.   Neurological: Negative.    Hematological: Negative.  Negative for adenopathy.   Psychiatric/Behavioral: Negative.             Current Outpatient Medications   Medication Sig Dispense Refill   • ALPRAZolam (XANAX) 0.25 mg tablet Take 1 tablet (0.25 mg total) by mouth daily at bedtime as needed for anxiety 90 tablet 0   • AMILoride 5 mg tablet TAKE 1 TABLET (5 MG TOTAL) BY MOUTH DAILY. 90 tablet 1   • amLODIPine (NORVASC) 5 mg tablet Take 1 tablet (5 mg total) by mouth daily 90 tablet 1   • buPROPion  "(WELLBUTRIN XL) 150 mg 24 hr tablet TAKE 1 TABLET BY MOUTH EVERY DAY 90 tablet 1   • calcium carbonate (TUMS) 500 mg chewable tablet Chew 1 tablet (500 mg total) daily  0   • carvedilol (COREG) 3.125 mg tablet TAKE 1 TABLET BY MOUTH TWICE A DAY WITH MEALS 180 tablet 1   • cholecalciferol (VITAMIN D3) 400 units tablet Take 400 Units by mouth Every Sunday     • [facility] Magnesium Oxide 250 MG TABS Take 1 tablet (250 mg total) by mouth in the morning 90 tablet 1   • famotidine (PEPCID) 20 mg tablet TAKE 1 TABLET BY MOUTH DAILY AT BEDTIME 60 tablet 1   • fluticasone (FLONASE) 50 mcg/act nasal spray SPRAY 2 SPRAYS INTO EACH NOSTRIL EVERY DAY 48 mL 1   • fluticasone-umeclidinium-vilanterol 100-62.5-25 mcg/actuation inhaler Inhale 1 puff daily Rinse mouth after use. 1 each 5   • folic acid (FOLVITE) 1 mg tablet Take 1 tablet (1 mg total) by mouth daily 90 tablet 1   • levothyroxine 50 mcg tablet TAKE 1 TABLET (50 MCG TOTAL) BY MOUTH DAILY IN THE EARLY MORNING 90 tablet 1   • mirtazapine (REMERON) 15 mg tablet TAKE 1 TABLET BY MOUTH EVERYDAY AT BEDTIME 90 tablet 1   • omeprazole (PriLOSEC) 20 mg delayed release capsule Take 1 capsule (20 mg total) by mouth daily 100 capsule 1   • Ostomy Supplies (Adapt Barrier) STRP Use daily as needed (PRN) 100 strip 6   • Ostomy Supplies (Premier Convex Skin Barrier) MISC Use daily as needed (PRN) 50 each 6   • Ostomy Supplies KIT Skin barries w/ mold to fit opening 12\" pouch w/ 2 sided comfort panel esenta sting free adhesive remover Sting free barrier wipes 1 kit 0   • Ostomy Supplies OINT Use as needed (as needed for every use) 30 each 0   • Ostomy Supplies Pouch MISC Use as needed (use as needed for every use) 30 each 0   • potassium chloride (MICRO-K) 10 MEQ CR capsule TAKE 2 CAPSULES (20 MEQ TOTAL) BY MOUTH TWICE A  capsule 1   • rosuvastatin (CRESTOR) 10 MG tablet TAKE 1 TABLET BY MOUTH EVERY DAY 90 tablet 1   • VIT B12-METHIONINE-INOS-CHOL IM Inject into a muscle every 30 " "(thirty) days     • Anoro Ellipta 62.5-25 MCG/ACT inhaler INHALE 1 PUFF DAILY (Patient not taking: Reported on 12/12/2024) 60 each 1   • naloxone (NARCAN) 4 mg/0.1 mL nasal spray Administer 1 spray into a nostril. If no response after 2-3 minutes, give another dose in the other nostril using a new spray. 1 each 1   • oxyCODONE-acetaminophen (Percocet) 5-325 mg per tablet Take 1 tablet by mouth every 6 (six) hours as needed for moderate pain Max Daily Amount: 4 tablets (Patient not taking: Reported on 11/12/2024) 30 tablet 0     Current Facility-Administered Medications   Medication Dose Route Frequency Provider Last Rate Last Admin   • cyanocobalamin injection 1,000 mcg  1,000 mcg Intramuscular Q30 Days Torri Coleman MD   1,000 mcg at 01/14/25 1026   • cyanocobalamin injection 1,000 mcg  1,000 mcg Intramuscular Q30 Days Torri Coleman MD   1,000 mcg at 08/14/23 1121   • cyanocobalamin injection 1,000 mcg  1,000 mcg Intramuscular Q30 Days Eduar Caruso MD   1,000 mcg at 02/11/25 0935      Objective  /60   Pulse 83   Temp 98 °F (36.7 °C)   Ht 5' 9\" (1.753 m)   Wt 75.8 kg (167 lb)   SpO2 95%   BMI 24.66 kg/m²     Pain Screening:  Pain Score: 0-No pain    Physical Exam  Vitals reviewed.   Constitutional:       General: He is not in acute distress.     Appearance: Normal appearance. He is normal weight. He is not ill-appearing or toxic-appearing.   HENT:      Head: Normocephalic and atraumatic.   Eyes:      General: No scleral icterus.  Cardiovascular:      Rate and Rhythm: Normal rate.   Pulmonary:      Effort: Pulmonary effort is normal.   Abdominal:      General: Abdomen is flat. There is no distension.      Palpations: Abdomen is soft. There is no mass.      Tenderness: There is no abdominal tenderness. There is no guarding.   Musculoskeletal:         General: Normal range of motion.      Cervical back: Normal range of motion.   Skin:     General: Skin is warm and dry.      Coloration: Skin is not " jaundiced.   Neurological:      General: No focal deficit present.      Mental Status: He is alert and oriented to person, place, and time.   Psychiatric:         Mood and Affect: Mood normal.         Behavior: Behavior normal.         Thought Content: Thought content normal.         Judgment: Judgment normal.              Labs: I have reviewed pertinent labs.   AFP tumor marker  Order: [id number]      Component  Ref Range & Units (hover) 2/11/2025   AFP TUMOR MARKER 4.15            Radiology Results Review: I have reviewed radiology reports from 2/11/2025 including: MRI of the abdomen.    MRI abdomen w wo contrast  Narrative & Impression   MRI OF THE ABDOMEN WITH AND WITHOUT CONTRAST     INDICATION: 69 years / Male. Z85.05: Personal history of malignant neoplasm of liver. Per surgical oncology clinical notes: Hepatocellular carcinoma treated with microwave ablation in 2020, 2022 and 2023. Noncirrhotic liver. EtOH.     COMPARISON: 7/17/2024 and 10/24/2024     TECHNIQUE: Multiplanar/multisequence MRI of the abdomen with MRCP was performed before and after administration of contrast.     IV Contrast: 7 mL of Gadobutrol injection     Image quality: Diagnostic.     FINDINGS:     LOWER CHEST:   Within normal limits.     LIVER:  Preserved signal intensity and morphology.     Treated lesions (remeasured on the prior studies by this reader today):  #1: Segment 5, 11/170:  Treatment zone size: 3.7 x 2.6 cm cm depth X width. Stable.  Masslike enhancement: None.  Restricted diffusion: None.  Unchanged treatment related signal changes.  LR-TR category: LR-TR nonviable.     # 2: Segment 5, 11/179:  2.1 x 1.5 cm stable  Masslike enhancement: None.  Restricted diffusion: None.  Unchanged treatment related signal changes.  LR-TR category: LR-TR nonviable     #3: Segment 5/6, 11/179:  Treatment zone size: 2.9 x 1.2 cm depth X width. Stable.  Masslike enhancement: None.  Restricted diffusion: None.  LR-TR category: LR-TR nonviable.      No new observations.     Patent hepatic and portal veins.     BILE DUCTS: Unchanged small dilated peripheral ducts around the treated lesions. Otherwise cholecystectomy.     GALLBLADDER:  Within normal limits.     SPLEEN:  Within normal limits.     PANCREAS:  Within normal limits. Normal duct caliber and morphology.     ADRENAL GLANDS:  Within normal limits.     KIDNEYS/PROXIMAL URETERS: Small left lower pole cyst. Otherwise within normal limits.     BOWEL:   Within normal limits.     PERITONEUM/RETROPERITONEUM:  No ascites or fluid collections.     LYMPH NODES:  No enlarged nodes.     VASCULAR STRUCTURES:  Within normal limits.     BONES: Degenerative changes and chronic bilateral femoral head AVN without articular surface collapse.     ABDOMINAL WALL:  Within normal limits.     IMPRESSION:     Stable appearance of treated hepatic lesions. No evidence of recurrent or new malignancy.

## 2025-03-03 ENCOUNTER — TELEPHONE (OUTPATIENT)
Dept: SURGICAL ONCOLOGY | Facility: CLINIC | Age: 70
End: 2025-03-03

## 2025-03-03 NOTE — TELEPHONE ENCOUNTER
Called patient , left voice message in regards to disregard having genetic testing done, patient had full panel done in 2023., per RAMANA Funk. Provided office number if needed to return call.

## 2025-03-10 ENCOUNTER — CLINICAL SUPPORT (OUTPATIENT)
Dept: INTERNAL MEDICINE CLINIC | Facility: CLINIC | Age: 70
End: 2025-03-10
Payer: COMMERCIAL

## 2025-03-10 DIAGNOSIS — E53.8 VITAMIN B12 DEFICIENCY: Primary | ICD-10-CM

## 2025-03-10 PROCEDURE — 96372 THER/PROPH/DIAG INJ SC/IM: CPT

## 2025-03-10 RX ADMIN — CYANOCOBALAMIN 1000 MCG: 1000 INJECTION, SOLUTION INTRAMUSCULAR; SUBCUTANEOUS at 10:21

## 2025-04-07 ENCOUNTER — CLINICAL SUPPORT (OUTPATIENT)
Dept: INTERNAL MEDICINE CLINIC | Facility: CLINIC | Age: 70
End: 2025-04-07
Payer: COMMERCIAL

## 2025-04-07 DIAGNOSIS — E53.8 B12 DEFICIENCY: Primary | ICD-10-CM

## 2025-04-07 PROCEDURE — 96372 THER/PROPH/DIAG INJ SC/IM: CPT

## 2025-04-07 RX ADMIN — CYANOCOBALAMIN 1000 MCG: 1000 INJECTION, SOLUTION INTRAMUSCULAR; SUBCUTANEOUS at 09:29

## 2025-04-16 DIAGNOSIS — K21.9 GASTROESOPHAGEAL REFLUX DISEASE WITHOUT ESOPHAGITIS: ICD-10-CM

## 2025-04-17 RX ORDER — OMEPRAZOLE 20 MG/1
20 CAPSULE, DELAYED RELEASE ORAL DAILY
Qty: 100 CAPSULE | Refills: 1 | Status: SHIPPED | OUTPATIENT
Start: 2025-04-17

## 2025-04-20 DIAGNOSIS — K21.9 GASTROESOPHAGEAL REFLUX DISEASE WITHOUT ESOPHAGITIS: ICD-10-CM

## 2025-04-20 RX ORDER — FAMOTIDINE 20 MG/1
20 TABLET, FILM COATED ORAL
Qty: 60 TABLET | Refills: 1 | Status: SHIPPED | OUTPATIENT
Start: 2025-04-20

## 2025-04-22 ENCOUNTER — OFFICE VISIT (OUTPATIENT)
Dept: INTERNAL MEDICINE CLINIC | Facility: CLINIC | Age: 70
End: 2025-04-22
Payer: COMMERCIAL

## 2025-04-22 VITALS
HEIGHT: 69 IN | OXYGEN SATURATION: 96 % | SYSTOLIC BLOOD PRESSURE: 132 MMHG | BODY MASS INDEX: 23.99 KG/M2 | DIASTOLIC BLOOD PRESSURE: 70 MMHG | HEART RATE: 76 BPM | TEMPERATURE: 97.3 F | WEIGHT: 162 LBS

## 2025-04-22 DIAGNOSIS — R16.0 LIVER MASS: ICD-10-CM

## 2025-04-22 DIAGNOSIS — J43.8 OTHER EMPHYSEMA (HCC): Primary | ICD-10-CM

## 2025-04-22 DIAGNOSIS — D69.6 PLATELETS DECREASED (HCC): ICD-10-CM

## 2025-04-22 DIAGNOSIS — K50.018 CROHN'S DISEASE OF SMALL INTESTINE WITH OTHER COMPLICATION (HCC): Chronic | ICD-10-CM

## 2025-04-22 DIAGNOSIS — E03.9 ACQUIRED HYPOTHYROIDISM: ICD-10-CM

## 2025-04-22 DIAGNOSIS — E78.2 MIXED HYPERLIPIDEMIA: ICD-10-CM

## 2025-04-22 DIAGNOSIS — I10 ESSENTIAL HYPERTENSION: ICD-10-CM

## 2025-04-22 DIAGNOSIS — F10.20 ALCOHOL DEPENDENCE, UNCOMPLICATED (HCC): ICD-10-CM

## 2025-04-22 DIAGNOSIS — K76.6 PORTAL HYPERTENSION (HCC): ICD-10-CM

## 2025-04-22 DIAGNOSIS — E87.6 HYPOKALEMIA: Chronic | ICD-10-CM

## 2025-04-22 DIAGNOSIS — K21.9 GASTROESOPHAGEAL REFLUX DISEASE WITHOUT ESOPHAGITIS: ICD-10-CM

## 2025-04-22 DIAGNOSIS — R73.01 IMPAIRED FASTING BLOOD SUGAR: ICD-10-CM

## 2025-04-22 DIAGNOSIS — Z93.3 COLOSTOMY IN PLACE (HCC): ICD-10-CM

## 2025-04-22 DIAGNOSIS — E53.8 VITAMIN B12 DEFICIENCY: ICD-10-CM

## 2025-04-22 DIAGNOSIS — F34.1 DYSTHYMIC DISORDER: ICD-10-CM

## 2025-04-22 PROCEDURE — 99214 OFFICE O/P EST MOD 30 MIN: CPT | Performed by: INTERNAL MEDICINE
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
## 2025-05-03 DIAGNOSIS — E87.6 HYPOKALEMIA: ICD-10-CM

## 2025-05-03 DIAGNOSIS — E03.8 SUBCLINICAL HYPOTHYROIDISM: ICD-10-CM

## 2025-05-04 RX ORDER — POTASSIUM CHLORIDE 750 MG/1
20 CAPSULE, EXTENDED RELEASE ORAL 2 TIMES DAILY
Qty: 360 CAPSULE | Refills: 1 | Status: SHIPPED | OUTPATIENT
Start: 2025-05-04

## 2025-05-04 RX ORDER — LEVOTHYROXINE SODIUM 50 UG/1
50 TABLET ORAL
Qty: 90 TABLET | Refills: 1 | Status: SHIPPED | OUTPATIENT
Start: 2025-05-04

## 2025-05-05 ENCOUNTER — CLINICAL SUPPORT (OUTPATIENT)
Dept: INTERNAL MEDICINE CLINIC | Facility: CLINIC | Age: 70
End: 2025-05-05
Payer: COMMERCIAL

## 2025-05-05 DIAGNOSIS — I10 ESSENTIAL HYPERTENSION: ICD-10-CM

## 2025-05-05 DIAGNOSIS — E53.8 B12 DEFICIENCY: Primary | ICD-10-CM

## 2025-05-05 DIAGNOSIS — I47.10 SUPRAVENTRICULAR TACHYCARDIA (HCC): ICD-10-CM

## 2025-05-05 DIAGNOSIS — Z01.810 PREOP CARDIOVASCULAR EXAM: ICD-10-CM

## 2025-05-05 PROCEDURE — 96372 THER/PROPH/DIAG INJ SC/IM: CPT

## 2025-05-05 RX ORDER — ROSUVASTATIN CALCIUM 10 MG/1
10 TABLET, COATED ORAL DAILY
Qty: 90 TABLET | Refills: 1 | Status: SHIPPED | OUTPATIENT
Start: 2025-05-05

## 2025-05-05 RX ORDER — CARVEDILOL 3.12 MG/1
3.12 TABLET ORAL 2 TIMES DAILY WITH MEALS
Qty: 180 TABLET | Refills: 1 | Status: SHIPPED | OUTPATIENT
Start: 2025-05-05

## 2025-05-05 RX ORDER — AMLODIPINE BESYLATE 5 MG/1
5 TABLET ORAL DAILY
Qty: 90 TABLET | Refills: 1 | Status: SHIPPED | OUTPATIENT
Start: 2025-05-05

## 2025-05-05 RX ADMIN — CYANOCOBALAMIN 1000 MCG: 1000 INJECTION, SOLUTION INTRAMUSCULAR; SUBCUTANEOUS at 09:37

## 2025-06-01 ENCOUNTER — APPOINTMENT (OUTPATIENT)
Dept: LAB | Facility: CLINIC | Age: 70
End: 2025-06-01
Attending: INTERNAL MEDICINE
Payer: COMMERCIAL

## 2025-06-01 DIAGNOSIS — I10 ESSENTIAL HYPERTENSION: ICD-10-CM

## 2025-06-01 DIAGNOSIS — E78.2 MIXED HYPERLIPIDEMIA: ICD-10-CM

## 2025-06-01 DIAGNOSIS — R73.01 IMPAIRED FASTING BLOOD SUGAR: ICD-10-CM

## 2025-06-01 DIAGNOSIS — E53.8 VITAMIN B12 DEFICIENCY: ICD-10-CM

## 2025-06-01 LAB
ALBUMIN SERPL BCG-MCNC: 4.2 G/DL (ref 3.5–5)
ALP SERPL-CCNC: 77 U/L (ref 34–104)
ALT SERPL W P-5'-P-CCNC: 10 U/L (ref 7–52)
ANION GAP SERPL CALCULATED.3IONS-SCNC: 10 MMOL/L (ref 4–13)
AST SERPL W P-5'-P-CCNC: 15 U/L (ref 13–39)
BASOPHILS # BLD AUTO: 0.06 THOUSANDS/ÂΜL (ref 0–0.1)
BASOPHILS NFR BLD AUTO: 1 % (ref 0–1)
BILIRUB SERPL-MCNC: 0.63 MG/DL (ref 0.2–1)
BUN SERPL-MCNC: 10 MG/DL (ref 5–25)
CALCIUM SERPL-MCNC: 8.9 MG/DL (ref 8.4–10.2)
CHLORIDE SERPL-SCNC: 106 MMOL/L (ref 96–108)
CHOLEST SERPL-MCNC: 98 MG/DL (ref ?–200)
CO2 SERPL-SCNC: 26 MMOL/L (ref 21–32)
CREAT SERPL-MCNC: 1.04 MG/DL (ref 0.6–1.3)
EOSINOPHIL # BLD AUTO: 0.17 THOUSAND/ÂΜL (ref 0–0.61)
EOSINOPHIL NFR BLD AUTO: 2 % (ref 0–6)
ERYTHROCYTE [DISTWIDTH] IN BLOOD BY AUTOMATED COUNT: 13.7 % (ref 11.6–15.1)
EST. AVERAGE GLUCOSE BLD GHB EST-MCNC: 114 MG/DL
GFR SERPL CREATININE-BSD FRML MDRD: 72 ML/MIN/1.73SQ M
GLUCOSE P FAST SERPL-MCNC: 82 MG/DL (ref 65–99)
HBA1C MFR BLD: 5.6 %
HCT VFR BLD AUTO: 42.2 % (ref 36.5–49.3)
HDLC SERPL-MCNC: 51 MG/DL
HGB BLD-MCNC: 13.9 G/DL (ref 12–17)
IMM GRANULOCYTES # BLD AUTO: 0.05 THOUSAND/UL (ref 0–0.2)
IMM GRANULOCYTES NFR BLD AUTO: 1 % (ref 0–2)
LDLC SERPL CALC-MCNC: 22 MG/DL (ref 0–100)
LYMPHOCYTES # BLD AUTO: 1.98 THOUSANDS/ÂΜL (ref 0.6–4.47)
LYMPHOCYTES NFR BLD AUTO: 22 % (ref 14–44)
MCH RBC QN AUTO: 30.7 PG (ref 26.8–34.3)
MCHC RBC AUTO-ENTMCNC: 32.9 G/DL (ref 31.4–37.4)
MCV RBC AUTO: 93 FL (ref 82–98)
MONOCYTES # BLD AUTO: 0.65 THOUSAND/ÂΜL (ref 0.17–1.22)
MONOCYTES NFR BLD AUTO: 7 % (ref 4–12)
NEUTROPHILS # BLD AUTO: 6.24 THOUSANDS/ÂΜL (ref 1.85–7.62)
NEUTS SEG NFR BLD AUTO: 67 % (ref 43–75)
NRBC BLD AUTO-RTO: 0 /100 WBCS
PLATELET # BLD AUTO: 192 THOUSANDS/UL (ref 149–390)
PMV BLD AUTO: 10.8 FL (ref 8.9–12.7)
POTASSIUM SERPL-SCNC: 4.2 MMOL/L (ref 3.5–5.3)
PROT SERPL-MCNC: 7.2 G/DL (ref 6.4–8.4)
RBC # BLD AUTO: 4.53 MILLION/UL (ref 3.88–5.62)
SODIUM SERPL-SCNC: 142 MMOL/L (ref 135–147)
TRIGL SERPL-MCNC: 127 MG/DL (ref ?–150)
TSH SERPL DL<=0.05 MIU/L-ACNC: 2.02 UIU/ML (ref 0.45–4.5)
VIT B12 SERPL-MCNC: 206 PG/ML (ref 180–914)
WBC # BLD AUTO: 9.15 THOUSAND/UL (ref 4.31–10.16)

## 2025-06-01 PROCEDURE — 85025 COMPLETE CBC W/AUTO DIFF WBC: CPT

## 2025-06-01 PROCEDURE — 80053 COMPREHEN METABOLIC PANEL: CPT

## 2025-06-01 PROCEDURE — 36415 COLL VENOUS BLD VENIPUNCTURE: CPT

## 2025-06-01 PROCEDURE — 82607 VITAMIN B-12: CPT

## 2025-06-01 PROCEDURE — 84443 ASSAY THYROID STIM HORMONE: CPT

## 2025-06-01 PROCEDURE — 83036 HEMOGLOBIN GLYCOSYLATED A1C: CPT

## 2025-06-01 PROCEDURE — 80061 LIPID PANEL: CPT

## 2025-06-04 ENCOUNTER — CLINICAL SUPPORT (OUTPATIENT)
Dept: INTERNAL MEDICINE CLINIC | Facility: CLINIC | Age: 70
End: 2025-06-04
Payer: COMMERCIAL

## 2025-06-04 DIAGNOSIS — E53.8 VITAMIN B12 DEFICIENCY: Primary | ICD-10-CM

## 2025-06-04 DIAGNOSIS — F34.1 DYSTHYMIC DISORDER: ICD-10-CM

## 2025-06-04 PROCEDURE — 96372 THER/PROPH/DIAG INJ SC/IM: CPT

## 2025-06-04 RX ORDER — ALPRAZOLAM 0.25 MG
0.25 TABLET ORAL
Qty: 90 TABLET | Refills: 0 | Status: SHIPPED | OUTPATIENT
Start: 2025-06-04 | End: 2025-09-02

## 2025-06-04 RX ADMIN — CYANOCOBALAMIN 1000 MCG: 1000 INJECTION, SOLUTION INTRAMUSCULAR; SUBCUTANEOUS at 09:26

## 2025-06-05 NOTE — ASSESSMENT & PLAN NOTE
Patient called back to advise he needs forms revised- He is requesting for an ext on forms end date to reflect pending re alfred 07/14/25 - advised patient will task md and keep him updated on MD response via call or MyChart once forms are completed with MD approval on ext -verbal understanding from patient    · H/O Vasovagal 2/2 to poor oral intake , no indications for cardiac origin   · Orthostatics   · Tele   · nursing 02 eval on ambulation to check

## 2025-06-18 ENCOUNTER — TELEPHONE (OUTPATIENT)
Dept: ADMINISTRATIVE | Facility: OTHER | Age: 70
End: 2025-06-18

## 2025-06-18 ENCOUNTER — OFFICE VISIT (OUTPATIENT)
Dept: INTERNAL MEDICINE CLINIC | Facility: CLINIC | Age: 70
End: 2025-06-18
Payer: COMMERCIAL

## 2025-06-18 VITALS
HEART RATE: 71 BPM | BODY MASS INDEX: 24.88 KG/M2 | HEIGHT: 69 IN | TEMPERATURE: 97.5 F | SYSTOLIC BLOOD PRESSURE: 134 MMHG | WEIGHT: 168 LBS | DIASTOLIC BLOOD PRESSURE: 76 MMHG | OXYGEN SATURATION: 95 %

## 2025-06-18 DIAGNOSIS — F34.1 DYSTHYMIC DISORDER: ICD-10-CM

## 2025-06-18 DIAGNOSIS — J43.8 OTHER EMPHYSEMA (HCC): ICD-10-CM

## 2025-06-18 DIAGNOSIS — K21.9 GASTROESOPHAGEAL REFLUX DISEASE WITHOUT ESOPHAGITIS: ICD-10-CM

## 2025-06-18 DIAGNOSIS — K50.018 CROHN'S DISEASE OF SMALL INTESTINE WITH OTHER COMPLICATION (HCC): ICD-10-CM

## 2025-06-18 DIAGNOSIS — I10 ESSENTIAL HYPERTENSION: Primary | Chronic | ICD-10-CM

## 2025-06-18 DIAGNOSIS — E78.2 MIXED HYPERLIPIDEMIA: ICD-10-CM

## 2025-06-18 DIAGNOSIS — E03.9 ACQUIRED HYPOTHYROIDISM: ICD-10-CM

## 2025-06-18 DIAGNOSIS — Z93.3 COLOSTOMY IN PLACE (HCC): ICD-10-CM

## 2025-06-18 DIAGNOSIS — E87.6 HYPOKALEMIA: Chronic | ICD-10-CM

## 2025-06-18 PROCEDURE — 99214 OFFICE O/P EST MOD 30 MIN: CPT | Performed by: INTERNAL MEDICINE

## 2025-06-18 NOTE — TELEPHONE ENCOUNTER
Upon review of the request/inquiry, we are reaching out to you to ask for assistance. Exclusion for Colonoscopy is Total Colectomy documented in problem list. This was not found in chart. Please advise if there is a provider/facility to outreach to get OP report for this.    To respond with requested information, open this Encounter, navigate to the Routing section, add your response to the routing comments, add my name to the Recipient field, and select Send and Close Workspace.    Thank you  Aaron Joseph MA

## 2025-06-18 NOTE — TELEPHONE ENCOUNTER
----- Message from Belle SEYMOUR sent at 6/17/2025 12:10 PM EDT -----  Patient does not have a colon and wears an ostomy bag, Can we remove the HM for colonoscopy per Dr. Coleman

## 2025-06-18 NOTE — PROGRESS NOTES
"Name: Jose Juan Elkins III      : 1955      MRN: [id number]  Encounter Provider: Torri Coleman MD  Encounter Date: 2025   Encounter department: [facility]  :  Assessment & Plan  Essential hypertension  Continue same med       Other emphysema (HCC)  Continue same med       Gastroesophageal reflux disease without esophagitis  Continue same med       Acquired hypothyroidism  Continue same med       Dysthymic disorder  Continue same med       Hypokalemia  Continue same med       Mixed hyperlipidemia  Continue same med       Crohn's disease of small intestine with other complication (HCC)    Orders:  •  Ambulatory Referral to Colorectal Surgery; Future    Colostomy in place (HCC)    Orders:  •  Ambulatory Referral to Colorectal Surgery; Future           History of Present Illness   Follow-up on blood test on 2025 test discussed with him, no back pain, no radiation, was lifting heavy things, pain is little better,      Review of Systems   Constitutional:  Negative for chills and fever.   HENT:  Negative for congestion, ear pain and sore throat.    Eyes:  Negative for pain.   Respiratory:  Negative for cough and shortness of breath.    Cardiovascular:  Negative for chest pain and leg swelling.   Gastrointestinal:  Negative for abdominal pain, nausea and vomiting.   Endocrine: Negative for polyuria.   Genitourinary:  Negative for difficulty urinating, frequency and urgency.   Musculoskeletal:  Positive for arthralgias and back pain.   Skin:  Negative for rash.   Neurological:  Negative for weakness and headaches.   Psychiatric/Behavioral:  Negative for sleep disturbance. The patient is not nervous/anxious.        Objective   /76 (BP Location: Left arm, Patient Position: Sitting, Cuff Size: Standard)   Pulse 71   Temp 97.5 °F (36.4 °C) (Temporal)   Ht 5' 9\" (1.753 m)   Wt 76.2 kg (168 lb)   SpO2 95%   BMI 24.81 kg/m²      Physical Exam  Vitals and nursing note " reviewed.   Constitutional:       General: He is not in acute distress.     Appearance: He is well-developed.   HENT:      Head: Normocephalic and atraumatic.      Right Ear: Tympanic membrane, ear canal and external ear normal.      Left Ear: Tympanic membrane, ear canal and external ear normal.      Mouth/Throat:      Mouth: Mucous membranes are moist.      Pharynx: Oropharynx is clear.     Eyes:      Extraocular Movements: Extraocular movements intact.      Conjunctiva/sclera: Conjunctivae normal.       Cardiovascular:      Rate and Rhythm: Normal rate and regular rhythm.      Heart sounds: Normal heart sounds. No murmur heard.  Pulmonary:      Effort: Pulmonary effort is normal. No respiratory distress.      Breath sounds: Normal breath sounds. No wheezing or rales.   Abdominal:      General: Abdomen is flat. There is no distension.      Palpations: Abdomen is soft.      Tenderness: There is no abdominal tenderness.     Musculoskeletal:         General: No swelling.      Cervical back: Neck supple.      Right lower leg: No edema.      Left lower leg: No edema.     Skin:     General: Skin is warm and dry.      Capillary Refill: Capillary refill takes less than 2 seconds.     Neurological:      General: No focal deficit present.      Mental Status: He is alert and oriented to person, place, and time.     Psychiatric:         Mood and Affect: Mood normal.

## 2025-06-22 DIAGNOSIS — J43.8 OTHER EMPHYSEMA (HCC): ICD-10-CM

## 2025-06-23 RX ORDER — FLUTICASONE FUROATE, UMECLIDINIUM BROMIDE AND VILANTEROL TRIFENATATE 100; 62.5; 25 UG/1; UG/1; UG/1
POWDER RESPIRATORY (INHALATION)
Qty: 60 EACH | Refills: 5 | Status: SHIPPED | OUTPATIENT
Start: 2025-06-23

## 2025-07-08 ENCOUNTER — CLINICAL SUPPORT (OUTPATIENT)
Dept: INTERNAL MEDICINE CLINIC | Facility: CLINIC | Age: 70
End: 2025-07-08
Payer: COMMERCIAL

## 2025-07-08 DIAGNOSIS — E53.8 B12 DEFICIENCY: Primary | ICD-10-CM

## 2025-07-08 PROCEDURE — 96372 THER/PROPH/DIAG INJ SC/IM: CPT

## 2025-07-08 RX ADMIN — CYANOCOBALAMIN 1000 MCG: 1000 INJECTION, SOLUTION INTRAMUSCULAR; SUBCUTANEOUS at 09:17

## 2025-07-11 DIAGNOSIS — F34.1 DYSTHYMIC DISORDER: ICD-10-CM

## 2025-07-11 RX ORDER — BUPROPION HYDROCHLORIDE 150 MG/1
150 TABLET ORAL DAILY
Qty: 90 TABLET | Refills: 1 | Status: SHIPPED | OUTPATIENT
Start: 2025-07-11

## 2025-07-16 NOTE — DISCHARGE SUMMARY
- Plan to check this again now that patient has stopped taking hydrochlorothiazide.  Will also check parathyroid hormone.   [facility]  Discharge- Ruddy Royal III 1955, 79 y o  male MRN: [id number]  Unit/Bed#: S -01 Encounter: [id number]  Primary Care Provider: Pushpa Randhawa MD   Date and time admitted to hospital: 11/1/2022  9:35 PM    * Syncope-resolved as of 11/12/2022  Assessment & Plan  Patient reports loss of consciousness while out drinking with friends last night  He stood up to go to the bathroom and reports losing consciousness and hitting the floor  Reports poor oral intake and loss of appetite recently due to generalized anxiety  He has been experiencing lightheadedness and dizziness upon rising from sitting positing while at home  Denies loss of consciousness at home  He has not had any chest pain or shortness of breath with these episodes  No history of seizures per chart review, no history of CAD or MI  Brought to the emergency department by EMS  Found to be hyponatremic with osmolality profile fitting hypovolemic hyponatremia  Hyponatremia improved with IVF  Orthostatics positive in the ED  EKG shows NSR with incomplete RBBB and nonspecific ST segment abnormality  Troponins negative  Syncope most likely in the setting of poor oral intake and vasovagal response  Telemetry shows episodes of sinus and supraventricular tachycardia with HR ranging from 150 to 200  Echocardiogram negative for any cardiogenic cause of of his syncopal episodes  Orthostats negative x3     Plan:  Check orthostatics prior to discharge   Holter monitoring on discharge - will place referral to cardiology  Encourage cessation of alcohol consumption and better oral nutrition    Anemia  Assessment & Plan  Acute Macrocytic Anemia with a Hbg Hemoglobin of 9 6 noted on 11/8/22,   · hemoglobin remains stable with the morning hemoglobin   Patient is asymptomatic denying dizziness, headaches, lightheadedness, SOB, weakness or fatigue  · positive FOBT  · Iron level and saturation within normal limits, TIBC low at 138, ferritin 819  · GI consult, input appreciated   · CT Abd/Pelvis: no evidence of retroperitoneal hematoma  · Bidirectional scope performed on 11/11 with removal of multiple polyps, no signs of active bleeding, cauterization of AVMs    Delirium-resolved as of 11/13/2022  Assessment & Plan  Daughter reports confusion and unusual behavior observed over last 2 days noting that he has been calling home at odd hours of the day, having visual hallucinations, disorientation  · Patient alert and oriented during encounters, have not observed behavior during evaluation, remains AOx3 throughout encounter  · Overnight no events  Family do not report any repeat episodes of calling or hallucinations  · Ammonia, B12 within normal limits  · Labs consistent with subclinical hypothyroidid    History of alcohol use disorder  Assessment & Plan  Minimum of at least 6 cans / bottles of alcohol daily   Alcohol level   Has spoken with [address]    Originally agreeable to [employer], but now patient is  Is requesting DC home   Given Thiamine PO during hospital course, switched over to IV thiamine    Plan:   Continue IV thiamine  Continue with outpatient support from catch program          Subclinical hypothyroidism  Assessment & Plan  TSH 16 5, free T4 1 06  Plan:  Levothyroxine 50 mcg per day  Requires follow-up TSH and free T4 in 4-6 weeks    Electrolyte abnormality  Assessment & Plan  Lab Results   Component Value Date    K 4 6 11/13/2022    CORRECTEDCA 8 4 11/08/2022     Hypokalemia of 3 4 and calcium of 6 8 POA, Likely secondary to poor nutrition and oral intake    Plan  · Continue to monitor CMP on AM   · Replete electrolytes as necessary      Hypomagnesemia  Assessment & Plan  · Mg 0 5 POA Has received multiple dosages of IV Mg Sulfate  · Continues to have hypoglycemia    Plan:  Replenish as need, given IV magnesium sulfate 4 g  At discharge, will need order for magnesium oxide oral supplement    Crohn's disease of small intestine with other complication [facility])  Assessment & Plan  History of Crohn's disease W/ Ostomy  Patient denies abnormal or excessive output  Plan:  Outpatient GI follow up      Chronic obstructive pulmonary disease (HCC)  Assessment & Plan  · COPD Stable on room air  Plan:  · Continue Anoro Ellipta    Essential hypertension  Assessment & Plan  On Amiloride and Amlodipine 5 mg each daily   Plan:  Continue home meds  Carvedilol added    Hyponatremia-resolved as of 11/13/2022  Assessment & Plan  Lab Results   Component Value Date    SODIUM 138 11/13/2022   Baseline 135s -140s, , improved with fluids  Seems resolved at this time  Plan:   Encourage food and beverage hydration aside from alcohol  A m  BMP         Medical Problems             Resolved Problems  Date Reviewed: 11/12/2022          Resolved    Hyponatremia 11/13/2022     Resolved by  Mohan Virk DO    * (Principal) Syncope 11/12/2022     Resolved by  Conrado Angela MD    Platelets decreased ([facility] [address] ) 11/12/2022     Resolved by  Conrado Angela MD    Supraventricular tachycardia ([facility] [address] ) 11/12/2022     Resolved by  Mohan Virk DO    Delirium 11/13/2022     Resolved by  Mohan Virk DO              Discharging Resident: Christiano He [address] [place] [address]  Discharging Attending: Merari Villa DO  PCP: Christine Atkins MD  Admission Date:   Admission Orders (From admission, onward)     Ordered        11/02/22 0059  INPATIENT ADMISSION  Once                      Discharge Date: 11/13/22    Consultations During Hospital Stay:  · GI Consulted    Procedures Performed:    EGD: 11/10/22  · White plaque in the esophagus s/p biopsy  Mild PHG  Small sessile polyp in the 2nd portion of the duodenum removed with cold biopsy forceps and sent for pathology   2 small avms s/p ablation with APC  Colonoscopy: 11/10/22  · Total of 8 polyps removed in the ascnding colon polyps using cold snare polypectomy and the more distal polyps removed using hot snare with two larger polyps (25 and 15 mm respectively) removed using endoscopic mucosal resection technique  No evidence of Crohn's colitis on endoscopic appearance of mucosa  Significant Findings / Test Results:   · 11/1/22: CT Head Without Contrast: No acute intracranial abnormalities  Chronic microangiopathic changes  · 11/02/22: XR Chest 1V - No acute cardiopulmonary disease  · 11/7/22: CTA PE Study:No definite acute pulmonary emboli, but small segmental and subsegmental emboli, particularly in the lower lobes, can be obscured by motion artifact  Acute/subacute fracture of the right posterior 10th rib with trace right effusion   Adjacent curvilinear consolidation is likely due to atelectasis  Acute/subacute fracture of the posterior left 12th rib  Low-attenuation partially enhancing subcapsular lesion in the right hepatic lobe at the site of microwave ablation of HCC, 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     · CT abdomen pelvis w wo contrast: 5 8 cm conglomerate ablation zone defect subcapsular right hepatic lobe segments 8/5 without enhancement to suggest residual active tumor (LR-TR nonviable)  Please note phase of enhancement is somewhat later than optimal  A 1 cm LIRADS 3 observation in segment 6 on the September 21, 2022 study may again be present (#4/23), probably similar in size although not as well depicted possibly related to differences in phase of enhancement  Attention on 6 month follow up MRI recommended  No evidence of retroperitoneal hematoma  Stable shotty lymph nodes upper abdomen and retroperitoneum  Fractures of the left 10th and 12th ribs, not clearly evident on previous CT study  Subtle halo of density again seen about the JUNE, similar to the most recent CT study  Finding may be inflammatory in nature? Attention on follow-up advised  Fatty liver  Test Results Pending at Discharge (will require follow up):   · Follow up biopsy pathology results    Outpatient Tests Requested:  · Magnesium  · CBC  · TSH    Complications:  None  Reason for Admission: Syncopal event    Hospital Course:   Sara Washington is a 79 y o  male patient who originally presented to the hospital on 11/1/2022 after syncopal event  CT Head obtained which showed no acute intracranial abnormalities  Chest Xray imaging performed showing no evidence of acute cardiopulmonary disease  Patient on admission was noted be dehydrated and positive orthostatics with multiple electrolyte derangements including hyponatremia, hypokalemia, and hypomagnesemia  Patient was admitted for syncopal work up, placed on hydrate with IV fluids, and replenishment of electrolytes  Given history alcohol abuse with ETOH on admission of 231 patient placed on CIWA protocol during initial course of stay, and was removed once outside typical window of withdrawal course  Patient had multiple runs of SVT noted on telemetry during his course which resolved after correction of electrolytes and IV hydration  Given patient's persistent tachycardia PE Study was performed  Initially patient started on anticoagulation for presumed subsegmental emboli, however discontinued given acute drop in hemoglobin with positive fecal occult blood test  Workup for evaluation of active bleeding was initiated  Iron studies showed pattern consistent with anemia of chronic disease, B12 and folate normal to high range  GI was consulted and patient was sent for bidirectional scope with EGD and Colonoscopy performed on 11/10/22 with findings noted above and no signs of active bleeding  Hemoglobin shown stability during remaining stay  Patient's daughter expressed concerns in regards to patients mental status noting unusual calls made by patient in the middle of the night, and that patient apparently was have hallucinations such as dogs running throughout the hospital, or calling the window in the room a door   This was not clinical observed by medical staff, and patient was able describe care plan and remained alert and oriented throughout course of stay  Urinalysis, ammonia, b12, TSH, and thiamine levels were obtained to rule out potential etiologies of AMS however given hospital stay length inpatient delirium and sleep deprivation was determined to be a factor in daughter's concerns  Given history of alcohol abuse high thiamine was initiated  to presumptively treat and evaluate wernicke encephalopathy  Patient's family was concerned of some mild erythema and swelling of the left lower extremity, DVT ordered which was negative  OT consult was placed which was pending on discharge given weekend availability, family was okay that he was not evaluated by them prior to being sent home  Patient had magnesium of for IV on day of discharge which was completed  Family members state that he they have not received any calls or notice any confusion or delirium within the last 24-48 hours  Were comfortable with him coming home and are in agreement with current discharge planning  Patient will be sent home with taper thiamine, and the continuing levothyroxine therapy with plans for close follow-up upon discharge  Please see above list of diagnoses and related plan for additional information  Condition at Discharge: good    Discharge Day Visit / Exam:   Subjective:  Patient seen evaluated bed  No signs of delirium or or confusion noted during my encounter  Nurse reports that they have not noticed any odd or unusual behavior from patient  No acute overnight events  Patient denies any symptoms at this time and is here to go home  Family is was contacted and agreed with plan of discharge today      Vitals: Blood Pressure: 143/74 (11/13/22 0737)  Pulse: 74 (11/12/22 2202)  Temperature: (!) 97 3 °F (36 3 °C) (11/13/22 0737)  Temp Source: Oral (11/12/22 2202)  Respirations: 18 (11/13/22 0737)  Height: 5' 9" (175 3 cm) (11/10/22 1408)  Weight - Scale: 63 5 kg (140 lb) (11/10/22 1408)  SpO2: 94 % (11/12/22 2202)  Exam:   Physical Exam  Vitals reviewed  Constitutional:       General: He is not in acute distress  Appearance: He is not toxic-appearing  HENT:      Head: Normocephalic and atraumatic  Eyes:      General: No scleral icterus  Extraocular Movements: Extraocular movements intact  Conjunctiva/sclera: Conjunctivae normal       Pupils: Pupils are equal, round, and reactive to light  Neck:      Vascular: No carotid bruit  Cardiovascular:      Rate and Rhythm: Normal rate and regular rhythm  Pulses: Normal pulses  Heart sounds: Normal heart sounds  No murmur heard  No gallop  Pulmonary:      Effort: Pulmonary effort is normal  No respiratory distress  Breath sounds: Normal breath sounds  No stridor  No wheezing, rhonchi or rales  Chest:      Chest wall: No tenderness  Abdominal:      General: Abdomen is flat  The ostomy site is clean  Bowel sounds are normal  There is no distension  Palpations: Abdomen is soft  Tenderness: There is no abdominal tenderness  Comments: Ileostomy in place with brown stool in collection bag, no obvious signs of mass upon palpation of the abdomen   Musculoskeletal:         General: Normal range of motion  Cervical back: Normal range of motion  Right lower leg: No edema  Left lower leg: No edema  Skin:     General: Skin is warm  Coloration: Skin is not jaundiced  Findings: Erythema (Mild erythema noted to the left lower extremity) present  No rash  Neurological:      General: No focal deficit present  Mental Status: He is alert and oriented to person, place, and time  Mental status is at baseline  Motor: No weakness  Psychiatric:         Mood and Affect: Mood normal          Behavior: Behavior is not agitated  Behavior is cooperative  Discussion with Family: Updated  (daughter) via phone      Discharge instructions/Information to patient and family:   See after visit summary for information provided to patient and family  Provisions for Follow-Up Care:  See after visit summary for information related to follow-up care and any pertinent home health orders  Disposition:   Home    Planned Readmission:   None    Discharge Medications:  See after visit summary for reconciled discharge medications provided to patient and/or family        **Please Note: This note may have been constructed using a voice recognition system**

## 2025-07-18 DIAGNOSIS — Z87.898 HISTORY OF ALCOHOL USE DISORDER: ICD-10-CM

## 2025-07-18 RX ORDER — FOLIC ACID 1 MG/1
1000 TABLET ORAL DAILY
Qty: 90 TABLET | Refills: 1 | Status: SHIPPED | OUTPATIENT
Start: 2025-07-18

## 2025-07-28 DIAGNOSIS — J30.89 NON-SEASONAL ALLERGIC RHINITIS, UNSPECIFIED TRIGGER: ICD-10-CM

## 2025-07-29 ENCOUNTER — CLINICAL SUPPORT (OUTPATIENT)
Dept: INTERNAL MEDICINE CLINIC | Facility: CLINIC | Age: 70
End: 2025-07-29
Payer: COMMERCIAL

## 2025-07-29 DIAGNOSIS — J30.89 NON-SEASONAL ALLERGIC RHINITIS, UNSPECIFIED TRIGGER: ICD-10-CM

## 2025-07-29 DIAGNOSIS — E53.8 VITAMIN B12 DEFICIENCY: Primary | ICD-10-CM

## 2025-07-29 PROCEDURE — 96372 THER/PROPH/DIAG INJ SC/IM: CPT

## 2025-07-29 RX ADMIN — CYANOCOBALAMIN 1000 MCG: 1000 INJECTION, SOLUTION INTRAMUSCULAR; SUBCUTANEOUS at 09:57

## 2025-07-30 RX ORDER — FLUTICASONE PROPIONATE 50 MCG
SPRAY, SUSPENSION (ML) NASAL
Qty: 48 ML | Refills: 1 | OUTPATIENT
Start: 2025-07-30

## 2025-07-30 RX ORDER — FLUTICASONE PROPIONATE 50 MCG
SPRAY, SUSPENSION (ML) NASAL
Qty: 48 ML | Refills: 1 | Status: SHIPPED | OUTPATIENT
Start: 2025-07-30

## 2025-08-01 DIAGNOSIS — F34.1 DYSTHYMIC DISORDER: ICD-10-CM

## 2025-08-01 DIAGNOSIS — I10 ESSENTIAL HYPERTENSION, BENIGN: ICD-10-CM

## 2025-08-01 RX ORDER — AMILORIDE HYDROCHLORIDE 5 MG/1
5 TABLET ORAL DAILY
Qty: 90 TABLET | Refills: 1 | Status: SHIPPED | OUTPATIENT
Start: 2025-08-01

## 2025-08-01 RX ORDER — MIRTAZAPINE 15 MG/1
15 TABLET, FILM COATED ORAL
Qty: 90 TABLET | Refills: 1 | Status: SHIPPED | OUTPATIENT
Start: 2025-08-01

## 2025-08-03 DIAGNOSIS — E87.6 HYPOKALEMIA: ICD-10-CM

## 2025-08-05 RX ORDER — POTASSIUM CHLORIDE 750 MG/1
20 CAPSULE, EXTENDED RELEASE ORAL 2 TIMES DAILY
Qty: 360 CAPSULE | Refills: 1 | Status: SHIPPED | OUTPATIENT
Start: 2025-08-05

## 2025-08-17 DIAGNOSIS — K21.9 GASTROESOPHAGEAL REFLUX DISEASE WITHOUT ESOPHAGITIS: ICD-10-CM

## 2025-08-18 ENCOUNTER — TELEPHONE (OUTPATIENT)
Dept: SURGICAL ONCOLOGY | Facility: CLINIC | Age: 70
End: 2025-08-18

## 2025-08-19 ENCOUNTER — APPOINTMENT (OUTPATIENT)
Dept: LAB | Facility: CLINIC | Age: 70
End: 2025-08-19
Payer: COMMERCIAL

## 2025-08-19 ENCOUNTER — CLINICAL SUPPORT (OUTPATIENT)
Dept: INTERNAL MEDICINE CLINIC | Facility: CLINIC | Age: 70
End: 2025-08-19
Payer: COMMERCIAL

## 2025-08-19 DIAGNOSIS — E53.8 VITAMIN B12 DEFICIENCY: Primary | ICD-10-CM

## 2025-08-19 DIAGNOSIS — Z85.05 PERSONAL HISTORY OF LIVER CANCER: ICD-10-CM

## 2025-08-19 LAB
AFP-TM SERPL-MCNC: 4.04 NG/ML (ref 0–9)
BUN SERPL-MCNC: 10 MG/DL (ref 5–25)
CREAT SERPL-MCNC: 1.08 MG/DL (ref 0.6–1.3)
GFR SERPL CREATININE-BSD FRML MDRD: 69 ML/MIN/1.73SQ M

## 2025-08-19 PROCEDURE — 82105 ALPHA-FETOPROTEIN SERUM: CPT

## 2025-08-19 PROCEDURE — 36415 COLL VENOUS BLD VENIPUNCTURE: CPT

## 2025-08-19 PROCEDURE — 82565 ASSAY OF CREATININE: CPT

## 2025-08-19 PROCEDURE — 96372 THER/PROPH/DIAG INJ SC/IM: CPT

## 2025-08-19 PROCEDURE — 84520 ASSAY OF UREA NITROGEN: CPT

## 2025-08-19 RX ORDER — FAMOTIDINE 20 MG/1
20 TABLET, FILM COATED ORAL
Qty: 60 TABLET | Refills: 5 | Status: SHIPPED | OUTPATIENT
Start: 2025-08-19

## 2025-08-19 RX ADMIN — CYANOCOBALAMIN 1000 MCG: 1000 INJECTION, SOLUTION INTRAMUSCULAR; SUBCUTANEOUS at 09:13

## 2025-08-21 ENCOUNTER — HOSPITAL ENCOUNTER (OUTPATIENT)
Dept: MRI IMAGING | Facility: HOSPITAL | Age: 70
Discharge: HOME | End: 2025-08-21
Payer: COMMERCIAL

## 2025-08-21 ENCOUNTER — HOSPITAL ENCOUNTER (OUTPATIENT)
Dept: CT IMAGING | Facility: HOSPITAL | Age: 70
Discharge: HOME | End: 2025-08-21
Payer: COMMERCIAL

## 2025-08-21 DIAGNOSIS — R91.1 LUNG NODULE: ICD-10-CM

## 2025-08-21 DIAGNOSIS — Z85.05 PERSONAL HISTORY OF LIVER CANCER: ICD-10-CM

## 2025-08-21 PROCEDURE — 74183 MRI ABD W/O CNTR FLWD CNTR: CPT

## 2025-08-21 PROCEDURE — A9585 GADOBUTROL INJECTION: HCPCS

## 2025-08-21 PROCEDURE — 71250 CT THORAX DX C-: CPT

## 2025-08-21 RX ORDER — GADOBUTROL 604.72 MG/ML
7 INJECTION INTRAVENOUS
Status: COMPLETED | OUTPATIENT
Start: 2025-08-21 | End: 2025-08-21

## 2025-08-21 RX ADMIN — GADOBUTROL 7 ML: 604.72 INJECTION INTRAVENOUS at 08:07

## (undated) DEVICE — SURGICEL 4 X 8

## (undated) DEVICE — SUT SILK 2-0 18 IN A185H

## (undated) DEVICE — SUT SILK 3-0 18 IN A184H

## (undated) DEVICE — MEDI-VAC YANK SUCT HNDL W/TPRD BULBOUS TIP: Brand: CARDINAL HEALTH

## (undated) DEVICE — TRAY FOLEY 16FR URIMETER SURESTEP

## (undated) DEVICE — SUT POLYESTER TAPE D-G 8618-00

## (undated) DEVICE — SUT SILK 3-0 SH 30 IN K832H

## (undated) DEVICE — GLOVE INDICATOR PI UNDERGLOVE SZ 8 BLUE

## (undated) DEVICE — SUT SILK 0 30 IN A306H

## (undated) DEVICE — GLOVE SRG BIOGEL ECLIPSE 8

## (undated) DEVICE — BETHLEHEM MAJOR GENERAL PACK: Brand: CARDINAL HEALTH

## (undated) DEVICE — SUT SILK 0 SH 30 IN K834H

## (undated) DEVICE — SUT PROLENE 3-0 SH 36 IN 8522H

## (undated) DEVICE — BLANKET HYPOTHERMIA ADULT GAYMAR

## (undated) DEVICE — INTENDED FOR TISSUE SEPARATION, AND OTHER PROCEDURES THAT REQUIRE A SHARP SURGICAL BLADE TO PUNCTURE OR CUT.: Brand: BARD-PARKER SAFETY BLADES SIZE 15, STERILE

## (undated) DEVICE — Device

## (undated) DEVICE — SUT VICRYL 2-0 D-SPECIAL 27 IN D8114

## (undated) DEVICE — ADHESIVE SKIN HIGH VISCOSITY EXOFIN 1ML

## (undated) DEVICE — INTENDED FOR TISSUE SEPARATION, AND OTHER PROCEDURES THAT REQUIRE A SHARP SURGICAL BLADE TO PUNCTURE OR CUT.: Brand: BARD-PARKER SAFETY BLADES SIZE 10, STERILE

## (undated) DEVICE — SUT CHROMIC 0 BP-1 27 IN 47T

## (undated) DEVICE — PROXIMATE SKIN STAPLERS (35 WIDE) CONTAINS 35 STAINLESS STEEL STAPLES (FIXED HEAD): Brand: PROXIMATE

## (undated) DEVICE — LIGACLIP MCA MULTIPLE CLIP APPLIERS, 20 MEDIUM CLIPS: Brand: LIGACLIP

## (undated) DEVICE — 3M™ TEGADERM™ TRANSPARENT FILM DRESSING FRAME STYLE, 1628, 6 IN X 8 IN (15 CM X 20 CM), 10/CT 8CT/CASE: Brand: 3M™ TEGADERM™

## (undated) DEVICE — CHLORAPREP HI-LITE 26ML ORANGE

## (undated) DEVICE — SUT VICRYL 0 54 IN J207G

## (undated) DEVICE — PLUMEPEN PRO 10FT